# Patient Record
Sex: FEMALE
[De-identification: names, ages, dates, MRNs, and addresses within clinical notes are randomized per-mention and may not be internally consistent; named-entity substitution may affect disease eponyms.]

---

## 2023-08-11 ENCOUNTER — NURSE TRIAGE (OUTPATIENT)
Dept: OTHER | Facility: CLINIC | Age: 68
End: 2023-08-11

## 2023-08-11 NOTE — TELEPHONE ENCOUNTER
Location of patient: [place]    Subjective: Caller states \"Pt was calling because she wants to be put on ozempic but can't get insurance to cover it. Pt found a clinic that will deliver it over night and wanted to know if it was legit or not. \"     Pt was educated on different process for weight loss medications and how the clinics work. Informed pt to talk to  to see what theirs thoughts were. Recommended disposition: [facility] advice provided, patient verbalizes understanding; denies any other questions or concerns; instructed to call back for any new or worsening symptoms. Pt had no farther questions or concerns at this time. This triage is a result of a call to [address] [place] [address]. Please do not respond to the triage nurse through this encounter. Any subsequent communication should be directly with the patient. Reason for Disposition   Health Information question, no triage required and triager able to answer question    Protocols used:  Information Only Call - No Triage-ADULT-

## 2023-08-30 PROBLEM — J96.11 CHRONIC HYPOXEMIC RESPIRATORY FAILURE (MULTI): Status: ACTIVE | Noted: 2023-08-30

## 2023-08-30 PROBLEM — G47.33 OBSTRUCTIVE SLEEP APNEA SYNDROME: Status: ACTIVE | Noted: 2023-08-30

## 2023-08-30 PROBLEM — I10 HYPERTENSION: Status: ACTIVE | Noted: 2023-08-30

## 2023-08-30 PROBLEM — F41.8 MIXED ANXIETY DEPRESSIVE DISORDER: Status: ACTIVE | Noted: 2023-08-30

## 2023-08-30 PROBLEM — M1A.9XX0 CHRONIC GOUT WITHOUT TOPHUS: Status: ACTIVE | Noted: 2023-08-30

## 2023-08-30 PROBLEM — J44.9 CHRONIC OBSTRUCTIVE LUNG DISEASE (MULTI): Status: ACTIVE | Noted: 2023-08-30

## 2023-08-30 PROBLEM — I27.20 PULMONARY HYPERTENSION (MULTI): Status: ACTIVE | Noted: 2023-08-30

## 2023-08-30 PROBLEM — I25.10 CORONARY ARTERIOSCLEROSIS: Status: ACTIVE | Noted: 2023-08-30

## 2023-08-30 PROBLEM — I51.7 CARDIOMEGALY: Status: ACTIVE | Noted: 2023-08-30

## 2023-08-30 PROBLEM — I50.9 ACUTE CONGESTIVE HEART FAILURE (MULTI): Status: ACTIVE | Noted: 2023-08-30

## 2023-08-30 PROBLEM — J90 PLEURAL EFFUSION: Status: ACTIVE | Noted: 2023-08-30

## 2023-08-30 PROBLEM — M81.0 AGE-RELATED OSTEOPOROSIS WITHOUT CURRENT PATHOLOGICAL FRACTURE: Status: ACTIVE | Noted: 2023-08-30

## 2023-08-30 PROBLEM — R04.0 EPISTAXIS: Status: ACTIVE | Noted: 2023-08-30

## 2023-08-30 PROBLEM — R06.09 DYSPNEA ON EXERTION: Status: ACTIVE | Noted: 2023-08-30

## 2023-08-30 PROBLEM — J45.909 ASTHMA (HHS-HCC): Status: ACTIVE | Noted: 2023-08-30

## 2023-08-30 PROBLEM — F41.9 ANXIETY: Status: ACTIVE | Noted: 2023-08-30

## 2023-08-30 PROBLEM — R73.03 PREDIABETES: Status: ACTIVE | Noted: 2023-08-30

## 2023-08-30 PROBLEM — M47.814 THORACIC SPONDYLOSIS: Status: ACTIVE | Noted: 2023-08-30

## 2023-08-30 PROBLEM — I10 BENIGN ESSENTIAL HYPERTENSION: Status: ACTIVE | Noted: 2023-08-30

## 2023-08-30 PROBLEM — E66.9 OBESITY WITH BODY MASS INDEX 30 OR GREATER: Status: ACTIVE | Noted: 2023-08-30

## 2023-08-30 PROBLEM — M17.12 PRIMARY OSTEOARTHRITIS OF LEFT KNEE: Status: ACTIVE | Noted: 2023-08-30

## 2023-08-30 PROBLEM — M47.816 LUMBAR SPONDYLOSIS: Status: ACTIVE | Noted: 2023-08-30

## 2023-08-30 PROBLEM — I48.11 LONGSTANDING PERSISTENT ATRIAL FIBRILLATION (MULTI): Status: ACTIVE | Noted: 2023-08-30

## 2023-08-30 PROBLEM — M47.818 ARTHROPATHY OF BOTH SACROILIAC JOINTS: Status: ACTIVE | Noted: 2023-08-30

## 2023-08-30 PROBLEM — M81.0 OSTEOPOROSIS: Status: ACTIVE | Noted: 2023-08-30

## 2023-08-30 PROBLEM — E87.6 HYPOKALEMIA: Status: ACTIVE | Noted: 2023-08-30

## 2023-08-30 PROBLEM — I50.30 DIASTOLIC HEART FAILURE (MULTI): Status: ACTIVE | Noted: 2023-08-30

## 2023-08-30 PROBLEM — F32.A MODERATELY SEVERE DEPRESSION: Status: ACTIVE | Noted: 2023-08-30

## 2023-08-30 PROBLEM — I11.9 HYPERTENSIVE HEART DISEASE WITHOUT CONGESTIVE HEART FAILURE: Status: ACTIVE | Noted: 2023-08-30

## 2023-08-30 PROBLEM — E66.01 MORBID (SEVERE) OBESITY DUE TO EXCESS CALORIES (MULTI): Status: ACTIVE | Noted: 2023-08-30

## 2023-08-30 PROBLEM — G89.29 OTHER CHRONIC PAIN: Status: ACTIVE | Noted: 2023-08-30

## 2023-08-30 PROBLEM — K21.9 GASTROESOPHAGEAL REFLUX DISEASE: Status: ACTIVE | Noted: 2023-08-30

## 2023-08-30 RX ORDER — SEMAGLUTIDE 2.4 MG/.75ML
0.75 INJECTION, SOLUTION SUBCUTANEOUS
COMMUNITY
Start: 2023-05-23 | End: 2023-10-30 | Stop reason: WASHOUT

## 2023-08-30 RX ORDER — SEMAGLUTIDE 1 MG/.5ML
0.5 INJECTION, SOLUTION SUBCUTANEOUS
COMMUNITY
Start: 2023-05-23 | End: 2023-10-30 | Stop reason: WASHOUT

## 2023-08-30 RX ORDER — METOLAZONE 5 MG/1
5 TABLET ORAL DAILY PRN
COMMUNITY
Start: 2022-04-05

## 2023-08-30 RX ORDER — BIOTIN 10 MG
1 TABLET ORAL DAILY
COMMUNITY

## 2023-08-30 RX ORDER — AMLODIPINE BESYLATE 10 MG/1
10 TABLET ORAL DAILY
COMMUNITY
End: 2023-10-30 | Stop reason: WASHOUT

## 2023-08-30 RX ORDER — APIXABAN 5 MG/1
5 TABLET, FILM COATED ORAL
COMMUNITY
Start: 2023-02-28 | End: 2023-11-13 | Stop reason: SDUPTHER

## 2023-08-30 RX ORDER — SEMAGLUTIDE 0.25 MG/.5ML
0.25 INJECTION, SOLUTION SUBCUTANEOUS
COMMUNITY
Start: 2023-05-23 | End: 2023-10-30 | Stop reason: WASHOUT

## 2023-08-30 RX ORDER — TORSEMIDE 100 MG/1
0.5 TABLET ORAL 2 TIMES DAILY
COMMUNITY
End: 2024-04-08

## 2023-08-30 RX ORDER — BUPROPION HYDROCHLORIDE 150 MG/1
300 TABLET, EXTENDED RELEASE ORAL DAILY
COMMUNITY
End: 2023-11-13 | Stop reason: SDUPTHER

## 2023-08-30 RX ORDER — FUROSEMIDE 20 MG/1
20 TABLET ORAL DAILY
COMMUNITY
End: 2023-10-30 | Stop reason: WASHOUT

## 2023-08-30 RX ORDER — LISINOPRIL 40 MG/1
40 TABLET ORAL DAILY
COMMUNITY
End: 2023-10-30 | Stop reason: WASHOUT

## 2023-08-30 RX ORDER — GUAIFENESIN 600 MG/1
600 TABLET, EXTENDED RELEASE ORAL EVERY 12 HOURS PRN
COMMUNITY
Start: 2023-04-10 | End: 2024-02-09 | Stop reason: ALTCHOICE

## 2023-08-30 RX ORDER — ALLOPURINOL 200 MG/1
1 TABLET ORAL EVERY 12 HOURS
COMMUNITY
End: 2024-04-10 | Stop reason: WASHOUT

## 2023-08-30 RX ORDER — ATENOLOL 100 MG/1
100 TABLET ORAL DAILY
COMMUNITY
End: 2023-10-30 | Stop reason: WASHOUT

## 2023-08-30 RX ORDER — GABAPENTIN 300 MG/1
1 CAPSULE ORAL 3 TIMES DAILY
COMMUNITY
End: 2024-03-18

## 2023-08-30 RX ORDER — POTASSIUM CHLORIDE 20 MEQ/1
20 TABLET, EXTENDED RELEASE ORAL
COMMUNITY
Start: 2019-05-16 | End: 2024-05-29

## 2023-08-30 RX ORDER — SEMAGLUTIDE 1.7 MG/.75ML
0.75 INJECTION, SOLUTION SUBCUTANEOUS
COMMUNITY
Start: 2023-05-23 | End: 2023-10-30 | Stop reason: WASHOUT

## 2023-08-30 RX ORDER — LEVALBUTEROL TARTRATE 45 UG/1
2 AEROSOL, METERED ORAL EVERY 6 HOURS PRN
COMMUNITY
Start: 2020-04-14 | End: 2024-04-10 | Stop reason: WASHOUT

## 2023-09-29 ENCOUNTER — LAB (OUTPATIENT)
Dept: LAB | Facility: LAB | Age: 68
End: 2023-09-29
Payer: MEDICARE

## 2023-09-29 DIAGNOSIS — A21.9: ICD-10-CM

## 2023-09-29 DIAGNOSIS — I10 ESSENTIAL (PRIMARY) HYPERTENSION: ICD-10-CM

## 2023-09-29 DIAGNOSIS — E66.9 OBESITY, UNSPECIFIED: Primary | ICD-10-CM

## 2023-09-29 DIAGNOSIS — Z72.4 INAPPROPRIATE DIET AND EATING HABITS: ICD-10-CM

## 2023-09-29 DIAGNOSIS — I48.91 UNSPECIFIED ATRIAL FIBRILLATION (MULTI): ICD-10-CM

## 2023-09-29 DIAGNOSIS — R73.01 IMPAIRED FASTING GLUCOSE: ICD-10-CM

## 2023-09-29 DIAGNOSIS — Z13.21 ENCOUNTER FOR SCREENING FOR NUTRITIONAL DISORDER: ICD-10-CM

## 2023-09-29 DIAGNOSIS — I25.10 ATHEROSCLEROTIC HEART DISEASE OF NATIVE CORONARY ARTERY WITHOUT ANGINA PECTORIS: ICD-10-CM

## 2023-09-29 DIAGNOSIS — R73.03 PREDIABETES: ICD-10-CM

## 2023-09-29 DIAGNOSIS — I50.33 ACUTE ON CHRONIC DIASTOLIC (CONGESTIVE) HEART FAILURE (MULTI): ICD-10-CM

## 2023-09-29 DIAGNOSIS — R63.2 POLYPHAGIA: ICD-10-CM

## 2023-09-29 DIAGNOSIS — R60.0 LOCALIZED EDEMA: ICD-10-CM

## 2023-09-29 DIAGNOSIS — Z13.228 ENCOUNTER FOR SCREENING FOR OTHER METABOLIC DISORDERS: ICD-10-CM

## 2023-09-29 LAB
ALANINE AMINOTRANSFERASE (SGPT) (U/L) IN SER/PLAS: 43 U/L (ref 7–45)
ALBUMIN (G/DL) IN SER/PLAS: 4.2 G/DL (ref 3.4–5)
ALKALINE PHOSPHATASE (U/L) IN SER/PLAS: 73 U/L (ref 33–136)
ANION GAP IN SER/PLAS: 14 MMOL/L (ref 10–20)
ASPARTATE AMINOTRANSFERASE (SGOT) (U/L) IN SER/PLAS: 39 U/L (ref 9–39)
BILIRUBIN TOTAL (MG/DL) IN SER/PLAS: 0.9 MG/DL (ref 0–1.2)
CALCIUM (MG/DL) IN SER/PLAS: 9.8 MG/DL (ref 8.6–10.6)
CARBON DIOXIDE, TOTAL (MMOL/L) IN SER/PLAS: 41 MMOL/L (ref 21–32)
CHLORIDE (MMOL/L) IN SER/PLAS: 92 MMOL/L (ref 98–107)
CHOLESTEROL (MG/DL) IN SER/PLAS: 203 MG/DL (ref 0–199)
CHOLESTEROL IN HDL (MG/DL) IN SER/PLAS: 45.9 MG/DL
CHOLESTEROL/HDL RATIO: 4.4
COBALAMIN (VITAMIN B12) (PG/ML) IN SER/PLAS: 453 PG/ML (ref 211–911)
CREATININE (MG/DL) IN SER/PLAS: 0.52 MG/DL (ref 0.5–1.05)
ESTIMATED AVERAGE GLUCOSE FOR HBA1C: 128 MG/DL
FERRITIN (UG/LL) IN SER/PLAS: 100 UG/L (ref 8–150)
GFR FEMALE: >90 ML/MIN/1.73M2
GLUCOSE (MG/DL) IN SER/PLAS: 97 MG/DL (ref 74–99)
HEMOGLOBIN A1C/HEMOGLOBIN TOTAL IN BLOOD: 6.1 %
IRON (UG/DL) IN SER/PLAS: 146 UG/DL (ref 35–150)
LDL: 136 MG/DL (ref 0–99)
POTASSIUM (MMOL/L) IN SER/PLAS: 3.3 MMOL/L (ref 3.5–5.3)
PROTEIN TOTAL: 7.1 G/DL (ref 6.4–8.2)
SODIUM (MMOL/L) IN SER/PLAS: 144 MMOL/L (ref 136–145)
TRIGLYCERIDE (MG/DL) IN SER/PLAS: 104 MG/DL (ref 0–149)
UREA NITROGEN (MG/DL) IN SER/PLAS: 18 MG/DL (ref 6–23)
VLDL: 21 MG/DL (ref 0–40)

## 2023-09-29 PROCEDURE — 83527 ASSAY OF INSULIN: CPT

## 2023-09-29 PROCEDURE — 36415 COLL VENOUS BLD VENIPUNCTURE: CPT

## 2023-09-29 PROCEDURE — 82728 ASSAY OF FERRITIN: CPT

## 2023-09-29 PROCEDURE — 83525 ASSAY OF INSULIN: CPT

## 2023-09-29 PROCEDURE — 83036 HEMOGLOBIN GLYCOSYLATED A1C: CPT

## 2023-09-29 PROCEDURE — 80053 COMPREHEN METABOLIC PANEL: CPT

## 2023-09-29 PROCEDURE — 83540 ASSAY OF IRON: CPT

## 2023-09-29 PROCEDURE — 82607 VITAMIN B-12: CPT

## 2023-09-29 PROCEDURE — 80061 LIPID PANEL: CPT

## 2023-10-03 LAB
INSULIN FREE: 8 UIU/ML (ref 3–25)
INSULIN TOTAL: 10 UIU/ML (ref 3–25)

## 2023-10-06 ENCOUNTER — PHARMACY VISIT (OUTPATIENT)
Dept: PHARMACY | Facility: CLINIC | Age: 68
End: 2023-10-06
Payer: COMMERCIAL

## 2023-10-06 ENCOUNTER — TELEPHONE (OUTPATIENT)
Dept: PRIMARY CARE | Facility: CLINIC | Age: 68
End: 2023-10-06
Payer: MEDICARE

## 2023-10-06 DIAGNOSIS — R32 INCONTINENCE IN FEMALE: Primary | ICD-10-CM

## 2023-10-06 PROCEDURE — RXMED WILLOW AMBULATORY MEDICATION CHARGE

## 2023-10-06 NOTE — TELEPHONE ENCOUNTER
I do not have records of incontinence for her to do an official prescription. She needs to be evaluated for an official diagnosis and treatment consideration including prescription use of supplies. For this I recommend urogynecology. Referral placed. They will be able to get her Rx for supplies and everything once evaluated

## 2023-10-06 NOTE — TELEPHONE ENCOUNTER
Pt called in statin that her insurance told her to get a prescription written for her incontinence supplies and send to [facility] , pharmacy populated, states that they will pick it up ( insurance company).  The kind that she wants is call [facility] size large overnnight Please advise

## 2023-10-30 ENCOUNTER — OFFICE VISIT (OUTPATIENT)
Dept: PAIN MEDICINE | Facility: CLINIC | Age: 68
End: 2023-10-30
Payer: MEDICARE

## 2023-10-30 VITALS
SYSTOLIC BLOOD PRESSURE: 155 MMHG | WEIGHT: 277.8 LBS | HEIGHT: 62 IN | HEART RATE: 72 BPM | BODY MASS INDEX: 51.12 KG/M2 | DIASTOLIC BLOOD PRESSURE: 87 MMHG

## 2023-10-30 DIAGNOSIS — R63.2 POLYPHAGIA: ICD-10-CM

## 2023-10-30 DIAGNOSIS — E88.810 METABOLIC SYNDROME: ICD-10-CM

## 2023-10-30 DIAGNOSIS — Z71.3 DIETARY COUNSELING: ICD-10-CM

## 2023-10-30 DIAGNOSIS — Z71.82 EXERCISE COUNSELING: ICD-10-CM

## 2023-10-30 DIAGNOSIS — R73.03 PREDIABETES: Primary | ICD-10-CM

## 2023-10-30 DIAGNOSIS — E88.819 INSULIN RESISTANCE: ICD-10-CM

## 2023-10-30 PROCEDURE — 1125F AMNT PAIN NOTED PAIN PRSNT: CPT | Performed by: NURSE PRACTITIONER

## 2023-10-30 PROCEDURE — 1159F MED LIST DOCD IN RCRD: CPT | Performed by: NURSE PRACTITIONER

## 2023-10-30 PROCEDURE — 99214 OFFICE O/P EST MOD 30 MIN: CPT | Performed by: NURSE PRACTITIONER

## 2023-10-30 PROCEDURE — 3077F SYST BP >= 140 MM HG: CPT | Performed by: NURSE PRACTITIONER

## 2023-10-30 PROCEDURE — 1036F TOBACCO NON-USER: CPT | Performed by: NURSE PRACTITIONER

## 2023-10-30 PROCEDURE — 3079F DIAST BP 80-89 MM HG: CPT | Performed by: NURSE PRACTITIONER

## 2023-10-30 RX ORDER — DULAGLUTIDE 0.75 MG/.5ML
0.75 INJECTION, SOLUTION SUBCUTANEOUS
Qty: 2 ML | Refills: 0 | Status: SHIPPED | OUTPATIENT
Start: 2023-10-30 | End: 2023-11-13 | Stop reason: SDUPTHER

## 2023-10-30 RX ORDER — SEMAGLUTIDE 1.34 MG/ML
0.25 INJECTION, SOLUTION SUBCUTANEOUS
Qty: 1 EACH | Refills: 0 | Status: SHIPPED | OUTPATIENT
Start: 2023-10-30 | End: 2023-11-13 | Stop reason: SDUPTHER

## 2023-10-30 ASSESSMENT — PATIENT HEALTH QUESTIONNAIRE - PHQ9
2. FEELING DOWN, DEPRESSED OR HOPELESS: NOT AT ALL
SUM OF ALL RESPONSES TO PHQ9 QUESTIONS 1 AND 2: 0
1. LITTLE INTEREST OR PLEASURE IN DOING THINGS: NOT AT ALL

## 2023-10-30 ASSESSMENT — PAIN - FUNCTIONAL ASSESSMENT: PAIN_FUNCTIONAL_ASSESSMENT: 0-10

## 2023-10-30 ASSESSMENT — PAIN SCALES - GENERAL: PAINLEVEL_OUTOF10: 3

## 2023-10-30 NOTE — PROGRESS NOTES
Subjective   Patient ID: Vickie Foster is a 67 y.o. female who presents for Weight Management Follow-up.     HPI 67-year-old female with past medical history significant for obesity, GERD, HTN, DLD, CHF, Acute on chronic heart failure with preserved ejection fraction, asthma, pulmonary hypertension, LVH, anxiety with depression, spondylosis, thoracic spondylosis, chronic pain syndrome, atrial fibrillation, coronary artery disease, PreDMII and artropathy. She presents for a weight management follow-up. Vickie was given diet and exercise recommendations at her last visit.  She was asked to implement these changes over the next 1 to 2 months and return, having lost some weight, to further discuss antiobesity medications and lab results.    Weight Hx:  Initial Visit Weight: 278.8  Current Weight: 277.8   Total Loss: 1 lbs    Diet Recall:  Breakfast: 1 piece of pizzas  Lunch: 2 whole chicken wings, 2 cup coleslaw  Dinner: 2 whole chicken wings, 1 slice of apple pie  Snack(s): 1 piece of apple pie    # Current Exercise Routine: None     Review of Systems Unless noted in the HPI all other systems have been reviewed and are negative for complaint    Objective   Physical Exam  General: No apparent distress. Alert, appropriate, oriented x 3. Obese  HENT: Normocephalic, atraumatic. Hearing intact.  Eyes: Pupils equal and round  Neck: Supple, trachea midline  Lungs: Symmetric respiratory excursion on visual exam, nonlabored breathing.  Extremities: No cyanosis noted in legs.  Skin: No rashes, lesions noted examined skin of neck or shoulders  Neck: Reports tenderness to palpation of right trapezius muscle,  Neuro: Alert and appropriate. Ambulating with assistance of a walker. Bulk and tone within normal limits.    Assessment/Plan   Problem List Items Addressed This Visit          Endocrine/Metabolic    Prediabetes - Primary    Relevant Medications    dulaglutide (Trulicity) 0.75 mg/0.5 mL pen injector     Other Visit Diagnoses        Insulin resistance        Metabolic syndrome        Polyphagia        Dietary counseling        Exercise counseling              TREATMENT PLAN:  67-year-old female with obesity here for weight management.  Current BMI: 50  Total Loss: 1lb  Personally reviewed labs with patient:  A1C 6.1%--> prediabetes  EDGAR-IR Score=2.4 (>1.6 suggests IR/Metabolic Syndrome)   HCO3 elevated; has been trending up 37-->36-->41. Recommend fu with PCP.   K+ low at 3.3; reinforced protein rich foods and to fu with PCP.   HLD--> Total Cholesterol 203, LDL  135, HDL:TC Ratio= 4.4 (>2.5 in women suggests insulin resistance/metabolic syndrome).   Discussed medications to assist with prediabetes, insulin resistance and appetite suppression.   After discussing all medication options, we decided to start Trulicity.  Denies personal or family hx of MTC, MENII or Pancreatitis.   Risks and benefits discussed.   > 50% of time spent counseling on the importance of following recommended dietary modifications including: role of diet, low calorie and carbohydrate restrictions, limiting fast food and avoiding high sugary beverages.   Also discussed, the role of exercise with an ultimate goal of at least 250-300 minutes a week for weight loss and weight maintenance.  Follow-up in 8 weeks.

## 2023-10-30 NOTE — PATIENT INSTRUCTIONS
"Pre-Diabetes  Your Hemoglobin A1C is 6.1%   This is in the \"pre-diabetes\" category  Aggressive lifestyle modification focusing on weight reduction and increased physical activity is the primary therapy for the management of Diabetes.   Weight reduction is optimally achieved with a multimodality approach including diet, exercise and possible pharmacologic therapy.    Metabolic Syndrome/Insulin Resistance:   Your EDGAR-IR Score= 2.4 (>1.6 suggests Insulin Resistance).   Your recent labs show insulin resistance of which a diagnosis of Metabolic Syndrome is supported   Metabolic Syndrome is an important risk factor for the future development of Diabetes and/or Cardiovascular Disease.  Metabolic Syndrome has also been associated with other obesity-related disorders including: Fatty Liver Disease (Steatosis, Fibrosis and Cirrhosis), Hepatocellular Carcinoma, Chronic Kidney Disease, Obstructive Sleep Apnea, Gout, Cognitive Decline and Dementia.   Aggressive lifestyle modification focusing on weight reduction and increased physical activity is the primary therapy for the management of Metabolic Syndrome.  Weight reduction is optimally achieved with a multimodality approach including diet, exercise and possible pharmacologic therapy.    Abnormal Lipid Panel:  Your Total Cholesterol Level is elevated.  Your LDL Level is elevated.  Your Total Cholesterol to HDL ratio is elevated.  This is not uncommon with insulin resistance/metabolic syndrome  Diet, exercise and weight reduction will help lower your Lipids but discussion with your PCP is always recommended.    Diet Recommendations:  - Keep a food journal and log everything you eat and drink. You can use a phone applications like evly, MailLift it, a notebook, or the provided meal calendar.   - Eat between 0551-5680 calories per day; meal plan provided to you this visit.  - Consume less than 100 carbohydrates per day. Examples of carbohydrates include: bread, pasta, cakes, " cookies, rice, cereal, fruit drinks, juice, soda and fruit.   - Consume no more than 1 fruit per day.   - Eat 3 meals and 1 snack. Each meal should have 3-4 oz of protein and vegetables. Limit starches.  - Eat on a schedule and do not skip meals.    Exercise Recommendations:   - Aim for 30 minutes per day, 5 days per week to start, with progression over several weeks to more vigorous intensity.   - Emphasize increasing duration, rather than intensity initially.   - Moderate-intensity physical activity includes:  * Brisk Walking  * Biking (slower than 10 MPH)  * Water Aerobics  * Vigorous housework (washing windows, vacuuming, mopping)  * Mowing the lawn (push mower)  * Gardening  * Ballroom dancing    New medication:  You have been prescribed Trulicity, off-label, for the treatment of Insulin Resistance and Metabolic Syndrome.  Trulicity is indicated as an adjunct to a reduced calorie diet and increased physical activity.  This medication works by decreasing the appetite and increased feeling of fullness.   Administer Trulicity once weekly, on the same day each week, at any time of day, with or without meals.   Inject subcutaneously in the abdomen, thigh or upper arm.    Possible Side Effects Include: Nausea, Diarrhea, Vomiting, Constipation, Abdominal Pain, Headache, Fatigue, Dyspepsia, Dizziness, Abdominal Distention, Hypoglycemia (in patients with Type II Diabetes), flatulence, gastroenteritis, and GERD.    *For Constipation--> take a fiber supplement or stool softener daily. Make sure you are drinking at least 64oz of water daily.  *For Nausea--> Eat small, frequent meals throughout the day. If nausea persists, please call the office.    Follow-up in 4 weeks

## 2023-10-31 ENCOUNTER — TELEPHONE (OUTPATIENT)
Dept: PAIN MEDICINE | Facility: CLINIC | Age: 68
End: 2023-10-31

## 2023-11-01 NOTE — TELEPHONE ENCOUNTER
Just wanted to update you that I am working on this. No determination as of yet but the website has also been down all day.

## 2023-11-09 ENCOUNTER — TELEPHONE (OUTPATIENT)
Dept: PAIN MEDICINE | Facility: CLINIC | Age: 68
End: 2023-11-09

## 2023-11-13 ENCOUNTER — OFFICE VISIT (OUTPATIENT)
Dept: CARDIOLOGY | Facility: CLINIC | Age: 68
End: 2023-11-13
Payer: MEDICARE

## 2023-11-13 ENCOUNTER — TELEPHONE (OUTPATIENT)
Dept: PAIN MEDICINE | Facility: CLINIC | Age: 68
End: 2023-11-13

## 2023-11-13 DIAGNOSIS — I48.0 PAF (PAROXYSMAL ATRIAL FIBRILLATION) (MULTI): Primary | ICD-10-CM

## 2023-11-13 PROCEDURE — 3079F DIAST BP 80-89 MM HG: CPT

## 2023-11-13 PROCEDURE — 99213 OFFICE O/P EST LOW 20 MIN: CPT

## 2023-11-13 PROCEDURE — 1036F TOBACCO NON-USER: CPT

## 2023-11-13 PROCEDURE — 1126F AMNT PAIN NOTED NONE PRSNT: CPT

## 2023-11-13 PROCEDURE — 93005 ELECTROCARDIOGRAM TRACING: CPT

## 2023-11-13 PROCEDURE — 3077F SYST BP >= 140 MM HG: CPT

## 2023-11-13 PROCEDURE — 93000 ELECTROCARDIOGRAM COMPLETE: CPT

## 2023-11-13 PROCEDURE — 1159F MED LIST DOCD IN RCRD: CPT

## 2023-11-13 RX ORDER — DOFETILIDE 0.25 MG/1
250 CAPSULE ORAL EVERY 12 HOURS
Qty: 180 CAPSULE | Refills: 1 | Status: SHIPPED | OUTPATIENT
Start: 2023-11-13 | End: 2024-05-16

## 2023-11-13 RX ORDER — BUPROPION HYDROCHLORIDE 300 MG/1
300 TABLET ORAL EVERY MORNING
COMMUNITY
Start: 2023-10-27 | End: 2024-04-29

## 2023-11-13 RX ORDER — APIXABAN 5 MG/1
5 TABLET, FILM COATED ORAL 2 TIMES DAILY
Qty: 180 TABLET | Refills: 1 | Status: SHIPPED | OUTPATIENT
Start: 2023-11-13 | End: 2024-05-22

## 2023-11-13 RX ORDER — METOPROLOL SUCCINATE 25 MG/1
25 TABLET, EXTENDED RELEASE ORAL DAILY
Qty: 90 TABLET | Refills: 3 | Status: SHIPPED | OUTPATIENT
Start: 2023-11-13 | End: 2024-11-12

## 2023-11-13 ASSESSMENT — PAIN SCALES - GENERAL: PAINLEVEL: 0-NO PAIN

## 2023-11-13 NOTE — TELEPHONE ENCOUNTER
I will appeal the decision when I return next week. Please reassure the patient this can take some time but we are working on it.

## 2023-11-13 NOTE — PROGRESS NOTES
Chief Complaint:   Atrial Fibrillation (Tikosyn f/u)     History Of Present Illness:    Vickie Foster is a 67 y.o. female with a past medical history of hypertension and persistent atrial fibrillation.  Patient underwent numerous unsuccessful cardioversions in the past.  Patient underwent dofetilide loading and cardioversion in June of this year with successful restoration of sinus rhythm.  She is feeling well with no recurrence of atrial fibrillation to her knowledge. She is appropriately anticoagulated with apixaban.      Last Recorded Vitals:  There were no vitals filed for this visit.    Past Medical History:  She has a past medical history of A-fib (CMS/HCC), CHF (congestive heart failure) (CMS/HCC), COVID, HTN (hypertension), and Low back pain.    Past Surgical History:  She has no past surgical history on file.      Social History:  She reports that she has quit smoking. Her smoking use included cigarettes. She has never used smokeless tobacco. She reports current alcohol use. She reports that she does not use drugs.    Family History:  Family History   Problem Relation Name Age of Onset    Breast cancer Mother      Heart attack Mother      Heart disease Mother      Colon cancer Father      Breast cancer Sister      Diabetes type II Sister      Kidney failure Sister      Other (herione addict) Sister      No Known Problems Brother      No Known Problems Daughter      Other (recovering alcoholic) Son      Breast cancer Other Maternal Aunt         Allergies:  Patient has no known allergies.    Outpatient Medications:  Current Outpatient Medications   Medication Instructions    ALBUTEROL SULFATE ORAL Albuterol Sulfate ER    allopurinoL 200 mg tablet 1 tablet, oral, Every 12 hours    biotin 10 mg tablet 1 tablet, oral, Daily    buPROPion XL (WELLBUTRIN XL) 300 mg, oral, Every morning    dofetilide (Tikosyn) 250 mcg capsule TAKE 1 CAPSULE BY MOUTH EVERY 12 HOURS    Eliquis 5 mg, oral, 2 times daily    folic  acid/multivit-min/lutein (CENTRUM SILVER ORAL) oral, As directed    gabapentin (Neurontin) 300 mg capsule 1 capsule, oral, 3 times daily    guaiFENesin (MUCINEX) 600 mg, oral, Every 12 hours PRN    levalbuterol (Xopenex) 45 mcg/actuation inhaler 2 puffs, inhalation, Every 6 hours PRN    metOLazone (ZAROXOLYN) 5 mg, oral, Daily PRN    metoprolol succinate XL (Toprol-XL) 25 mg 24 hr tablet TAKE 1 TABLET BY MOUTH ONE TIME DAILY    multivit-min/folic acid/vit K1 (MULTI FOR HER 50 PLUS ORAL) oral, As directed    oxygen (O2) gas therapy inhalation    potassium chloride CR 20 mEq ER tablet 20 mEq, oral, 3 times daily with meals    torsemide (Demadex) 100 mg tablet 0.5 tablets, oral, 2 times daily       Physical Exam:  Physical Exam  Vitals reviewed.   Constitutional:       Appearance: Normal appearance.   Cardiovascular:      Rate and Rhythm: Normal rate and regular rhythm.      Pulses: Normal pulses.      Heart sounds: Normal heart sounds.   Pulmonary:      Effort: Pulmonary effort is normal.      Breath sounds: Normal breath sounds.   Abdominal:      General: Bowel sounds are normal.      Palpations: Abdomen is soft.   Musculoskeletal:      Right lower leg: Edema present.      Left lower leg: Edema present.   Skin:     General: Skin is warm and dry.   Neurological:      General: No focal deficit present.      Mental Status: She is alert and oriented to person, place, and time.      Review of Systems   All other systems reviewed and are negative.     Last Labs:  CBC -  Lab Results   Component Value Date    WBC 8.3 06/08/2023    HGB 12.7 06/08/2023    HCT 41.2 06/08/2023    MCV 99.3 06/08/2023     06/08/2023       CMP -  Lab Results   Component Value Date    CALCIUM 9.8 09/29/2023    PHOS 4.8 (H) 07/25/2022    PROT 7.1 09/29/2023    ALBUMIN 4.2 09/29/2023    ALBUMIN 3.8 06/05/2023    AST 39 09/29/2023    ALT 43 09/29/2023    ALKPHOS 73 09/29/2023    BILITOT 0.9 09/29/2023       LIPID PANEL -   Lab Results    Component Value Date    CHOL 203 (H) 09/29/2023    TRIG 104 09/29/2023    HDL 45.9 09/29/2023    CHHDL 4.4 09/29/2023    CHHDL 4.2 03/29/2023    LDLF 136 (H) 09/29/2023    VLDL 21 09/29/2023       RENAL FUNCTION PANEL -   Lab Results   Component Value Date    GLUCOSE 97 09/29/2023     09/29/2023    K 3.3 (L) 09/29/2023    CL 92 (L) 09/29/2023    CO2 41 (HH) 09/29/2023    ANIONGAP 14 09/29/2023    BUN 18 09/29/2023    CREATININE 0.52 09/29/2023    CALCIUM 9.8 09/29/2023    PHOS 4.8 (H) 07/25/2022    ALBUMIN 4.2 09/29/2023    ALBUMIN 3.8 06/05/2023        Lab Results   Component Value Date    HGBA1C 6.1 (A) 09/29/2023       Last Cardiology Tests:  ECG:  Sinus rhythm at 67 bpm.  AZ interval 180 ms, QRS duration 90 ms, QTc 479 ms    Assessment/Plan   Diagnoses and all orders for this visit:  PAF (paroxysmal atrial fibrillation) (CMS/[facility])  -     dofetilide (Tikosyn) 250 mcg capsule; TAKE 1 CAPSULE BY MOUTH EVERY 12 HOURS  -     Eliquis 5 mg tablet; Take 1 tablet (5 mg) by mouth 2 times a day.  -     metoprolol succinate XL (Toprol-XL) 25 mg 24 hr tablet; TAKE 1 TABLET BY MOUTH ONE TIME DAILY  -     ECG 12 lead (Clinic Performed)  -     Basic Metabolic Panel; Future  -     Magnesium; Future    Continue current regimen.     Mary Kay Webber, APRN-CNP

## 2023-11-22 ENCOUNTER — OFFICE VISIT (OUTPATIENT)
Dept: PAIN MEDICINE | Facility: CLINIC | Age: 68
End: 2023-11-22
Payer: MEDICARE

## 2023-11-22 VITALS — HEIGHT: 62 IN | WEIGHT: 275 LBS | BODY MASS INDEX: 50.61 KG/M2

## 2023-11-22 DIAGNOSIS — M47.812 ARTHROPATHY OF CERVICAL FACET JOINT: ICD-10-CM

## 2023-11-22 DIAGNOSIS — M17.12 PRIMARY OSTEOARTHRITIS OF LEFT KNEE: Primary | ICD-10-CM

## 2023-11-22 DIAGNOSIS — M47.818 ARTHROPATHY OF SACROILIAC JOINT: ICD-10-CM

## 2023-11-22 DIAGNOSIS — M79.18 MYOFASCIAL PAIN: ICD-10-CM

## 2023-11-22 DIAGNOSIS — M48.061 DEGENERATIVE LUMBAR SPINAL STENOSIS: ICD-10-CM

## 2023-11-22 DIAGNOSIS — M54.16 LUMBAR RADICULOPATHY: ICD-10-CM

## 2023-11-22 PROCEDURE — 20610 DRAIN/INJ JOINT/BURSA W/O US: CPT | Performed by: STUDENT IN AN ORGANIZED HEALTH CARE EDUCATION/TRAINING PROGRAM

## 2023-11-22 PROCEDURE — 1125F AMNT PAIN NOTED PAIN PRSNT: CPT | Performed by: STUDENT IN AN ORGANIZED HEALTH CARE EDUCATION/TRAINING PROGRAM

## 2023-11-22 PROCEDURE — 1160F RVW MEDS BY RX/DR IN RCRD: CPT | Performed by: STUDENT IN AN ORGANIZED HEALTH CARE EDUCATION/TRAINING PROGRAM

## 2023-11-22 PROCEDURE — 1159F MED LIST DOCD IN RCRD: CPT | Performed by: STUDENT IN AN ORGANIZED HEALTH CARE EDUCATION/TRAINING PROGRAM

## 2023-11-22 PROCEDURE — 99214 OFFICE O/P EST MOD 30 MIN: CPT | Performed by: STUDENT IN AN ORGANIZED HEALTH CARE EDUCATION/TRAINING PROGRAM

## 2023-11-22 PROCEDURE — 1036F TOBACCO NON-USER: CPT | Performed by: STUDENT IN AN ORGANIZED HEALTH CARE EDUCATION/TRAINING PROGRAM

## 2023-11-22 PROCEDURE — 2500000004 HC RX 250 GENERAL PHARMACY W/ HCPCS (ALT 636 FOR OP/ED): Performed by: STUDENT IN AN ORGANIZED HEALTH CARE EDUCATION/TRAINING PROGRAM

## 2023-11-22 PROCEDURE — 2500000005 HC RX 250 GENERAL PHARMACY W/O HCPCS: Performed by: STUDENT IN AN ORGANIZED HEALTH CARE EDUCATION/TRAINING PROGRAM

## 2023-11-22 RX ORDER — LIDOCAINE HYDROCHLORIDE 10 MG/ML
2 INJECTION, SOLUTION EPIDURAL; INFILTRATION; INTRACAUDAL; PERINEURAL ONCE
Status: COMPLETED | OUTPATIENT
Start: 2023-11-22 | End: 2023-11-22

## 2023-11-22 RX ORDER — LIDOCAINE HYDROCHLORIDE 10 MG/ML
4 INJECTION, SOLUTION EPIDURAL; INFILTRATION; INTRACAUDAL; PERINEURAL ONCE
Status: COMPLETED | OUTPATIENT
Start: 2023-11-22 | End: 2023-11-22

## 2023-11-22 RX ORDER — TRIAMCINOLONE ACETONIDE 40 MG/ML
40 INJECTION, SUSPENSION INTRA-ARTICULAR; INTRAMUSCULAR ONCE
Status: COMPLETED | OUTPATIENT
Start: 2023-11-22 | End: 2023-11-22

## 2023-11-22 RX ADMIN — LIDOCAINE HYDROCHLORIDE 20 MG: 10 INJECTION, SOLUTION EPIDURAL; INFILTRATION; INTRACAUDAL; PERINEURAL at 15:35

## 2023-11-22 RX ADMIN — LIDOCAINE HYDROCHLORIDE 40 MG: 10 INJECTION, SOLUTION EPIDURAL; INFILTRATION; INTRACAUDAL; PERINEURAL at 15:36

## 2023-11-22 RX ADMIN — TRIAMCINOLONE ACETONIDE 40 MG: 40 INJECTION, SUSPENSION INTRA-ARTICULAR; INTRAMUSCULAR at 15:31

## 2023-11-22 ASSESSMENT — PAIN SCALES - GENERAL
PAINLEVEL_OUTOF10: 1
PAINLEVEL_OUTOF10: 0 - NO PAIN
PAINLEVEL: 1
PAINLEVEL_OUTOF10: 0 - NO PAIN

## 2023-11-22 ASSESSMENT — PATIENT HEALTH QUESTIONNAIRE - PHQ9
SUM OF ALL RESPONSES TO PHQ9 QUESTIONS 1 AND 2: 0
1. LITTLE INTEREST OR PLEASURE IN DOING THINGS: NOT AT ALL
2. FEELING DOWN, DEPRESSED OR HOPELESS: NOT AT ALL

## 2023-11-22 ASSESSMENT — PAIN - FUNCTIONAL ASSESSMENT: PAIN_FUNCTIONAL_ASSESSMENT: 0-10

## 2023-11-22 ASSESSMENT — PAIN DESCRIPTION - DESCRIPTORS: DESCRIPTORS: SHARP;STABBING;ACHING

## 2023-11-22 NOTE — PROGRESS NOTES
[facility]  Follow Up Office Visit Note 2023    Patient Information: Vickie Foster, MRN: [id number], : 1955   Primary Care/Referring Physician: Danilo Gonzalez DO, [address] / [facility] [id number]     Chief Complaint: Left knee pain  Interval History: At her last office visit I performed TPI's to her neck. She is also seeing Cristina Rice for weight loss    Today she reports worsening left knee pain. She last had a left knee steroid injection in 2023 with at least 60% improvement for 8 months. She would like this repeated today. Of note, she is on Eliquis. I did discuss with her the increased risk of bleeding while on this medication, but discussed it is reasonable to perform this procedure while on Eliquis as long as she understands the risk. She was amenable to this.     Her back and leg pain remains well controlled and she does not need further intervention at this time    Brief History of Pain: Ms. Vickie Foster is a 67 y.o. female with a PMHx of CHF, pulmonary HTN, AFib (on Eliquis) who presents today for follow up regarding chronic low back/buttock pain.    For reference, she reports pain started approximately 2022 with no obvious inciting event. She has had this type of pain in past, but it is much more severe than normal. Has been to the ED a few times for this, had xrays performed which didn't show any fracture in the spine or hip. During one of these visits she was noted to have worsening LE edema and was admitted for CHF exacerbation. During this admission she continued to have back pain but also developed gout in her right foot. Is having difficulty bearing weight on her right foot, as it worsens both foot pain and causes pain in her right low back and burning pain in her buttock. Has to lie a certain way in bed (moreso on her left side) to get somewhat comfortable. Denies any numbness, tingling in her legs. She is unsure about weakness as she is not  walking much.    Current Pain Medications: Gabapentin 300 mg TID. Lidocaine patches - tried on both back and foot without benefit.  Previously Tried Pain Medications: Methocarbamol. Unable to use NSAID's due to history of ulcers. Tried BioFreeze without benefit    Relevant Surgeries: Denies hip or back surgery. Denies ankle or foot surgery.  Injections: Left knee injection - 60% pain relief for 8 months. Caudal ADAM - significant improvement in pain and ambulation, Right SI joint injection - 80% pain relief  Physical/Occupational Therapy: Denies any PT as she is having too much pain.    Medications:   Current Outpatient Medications   Medication Instructions    ALBUTEROL SULFATE ORAL Albuterol Sulfate ER    allopurinoL 200 mg tablet 1 tablet, oral, Every 12 hours    biotin 10 mg tablet 1 tablet, oral, Daily    buPROPion XL (WELLBUTRIN XL) 300 mg, oral, Every morning    dofetilide (Tikosyn) 250 mcg capsule TAKE 1 CAPSULE BY MOUTH EVERY 12 HOURS    Eliquis 5 mg, oral, 2 times daily    folic acid/multivit-min/lutein (CENTRUM SILVER ORAL) oral, As directed    gabapentin (Neurontin) 300 mg capsule 1 capsule, oral, 3 times daily    guaiFENesin (MUCINEX) 600 mg, oral, Every 12 hours PRN    levalbuterol (Xopenex) 45 mcg/actuation inhaler 2 puffs, inhalation, Every 6 hours PRN    metOLazone (ZAROXOLYN) 5 mg, oral, Daily PRN    metoprolol succinate XL (Toprol-XL) 25 mg 24 hr tablet TAKE 1 TABLET BY MOUTH ONE TIME DAILY    multivit-min/folic acid/vit K1 (MULTI FOR HER 50 PLUS ORAL) oral, As directed    oxygen (O2) 3 L/min, inhalation, Nightly    potassium chloride CR 20 mEq ER tablet 20 mEq, oral, 3 times daily with meals    torsemide (Demadex) 100 mg tablet 0.5 tablets, oral, 2 times daily      Allergies: No Known Allergies    Past Medical & Surgical History:  Past Medical History:   Diagnosis Date    A-fib (CMS/[facility])     CHF (congestive heart failure) (CMS/[facility])     COVID     HTN (hypertension)     Low back pain     History  reviewed. No pertinent surgical history.    Family History   Problem Relation Name Age of Onset    Breast cancer Mother      Heart attack Mother      Heart disease Mother      Colon cancer Father      Breast cancer Sister      Diabetes type II Sister      Kidney failure Sister      Other (herione addict) Sister      No Known Problems Brother      No Known Problems Daughter      Other (recovering alcoholic) Son      Breast cancer Other Maternal Aunt      Social History     Socioeconomic History    Marital status: Unknown     Spouse name: Not on file    Number of children: Not on file    Years of education: Not on file    Highest education level: Not on file   Occupational History    Not on file   Tobacco Use    Smoking status: Former     Types: Cigarettes    Smokeless tobacco: Never   Substance and Sexual Activity    Alcohol use: Yes     Comment: OCASSIONALY    Drug use: Never    Sexual activity: Not on file   Other Topics Concern    Not on file   Social History Narrative    Not on file     Social Determinants of Health     Financial Resource Strain: Not on file   Food Insecurity: Not on file   Transportation Needs: Not on file   Physical Activity: Not on file   Stress: Not on file   Social Connections: Not on file   Intimate Partner Violence: Not on file   Housing Stability: Not on file       Problems, Past medical history, past surgical history, Medications, allergies, social and family history reviewed and as per the electronic medical record from today's encounter    Review of Systems:  CONST: No fever, chills, fatigue, weight changes  EYES: No loss of vision  ENT: No hearing loss, tinnitus  CV: No chest pain, palpitations  RESP: Reports some dyspnea on exertion  GI: No stool incontinence, nausea, vomiting  : No urinary incontinence  MSK: No joint swelling  SKIN: No rash, no hives  NEURO: No headache, dizziness, weakness, paresthesias  PSYCH: No anxiety, depression or suicidal ideation  HEM/LYMPH: No easy  "bruising or bleeding  All other systems reviewed are negative     Physical Exam:  Vitals: Ht 1.575 m (5' 2\")   Wt 125 kg (275 lb)   BMI 50.30 kg/m²   General: No apparent distress. Alert, appropriate, oriented x 3. Mood generally positive, affect congruent. Speaking in full sentences.   HENT: Normocephalic, atraumatic. Hearing intact.  Eyes: Pupils equal and round  Neck: Supple, trachea midline  Lungs: Symmetric respiratory excursion on visual exam, nonlabored breathing.   Extremities: No cyanosis noted in extremities. Pitting edema noted in legs up to the shins  Skin: No rashes, lesions noted.  Neuro: Alert and appropriate. Using a rollator to ambulate. Bulk and tone within normal limits.    Laboratory Data:  The following laboratory data were reviewed during this visit:   Lab Results   Component Value Date    WBC 8.3 06/08/2023    RBC 4.15 06/08/2023    HGB 12.7 06/08/2023    HCT 41.2 06/08/2023     06/08/2023      Lab Results   Component Value Date    INR 1.0 06/05/2023    INR 1.0 12/01/2019     Lab Results   Component Value Date    CREATININE 0.52 09/29/2023    HGBA1C 6.1 (A) 09/29/2023       Imaging:  The following imaging impressions were reviewed by me during this visit:    -8/2022 lumbar MRI shows L3-L4 moderate to marked spinal canal stenosis and right foraminal stenosis without nerve root impingement. L4-L5 moderate spinal canal stenosis. L5-S1 with no significatn central or foraminal stenosis   -4/2021 left knee xray shows moderate OA    I also personally reviewed the images from the above studies myself. These images and my interpretation of them contributed to the management and decision making of the patient's medical plan.    ASSESSMENT:  Ms. Vickie Foster is a 67 y.o. female with low back, leg, and neck pain that is consistent with:    1. Primary osteoarthritis of left knee    2. Lumbar radiculopathy    3. Arthropathy of sacroiliac joint    4. Degenerative lumbar spinal stenosis    5. " Arthropathy of cervical facet joint    6. Myofascial pain        PLAN:  Radiology: -I reviewed her most recent lumbar MRI which shows L3-L4 moderate to marked spinal canal stenosis and right foraminal stenosis without nerve root impingement. L4-L5 moderate spinal canal stenosis. L5-S1 with no significant central or foraminal stenosis. No additional diagnostics at this time.- Given some concern for cervical facet arthropathy I ordered a cervical spine xray which showed fairly diffuse and severe degenerative changes.    Physically: Continue your regular HEP    Psychologically: No needs at this time    Medication: -Continue Gabapentin 300 mg TID  -I recommended cautious trial of Voltaren gel for her left knee. She cannot take PO NSAIDs due to history of ulcers and bloodthinner use    Duration: Greater than 2 month    Intervention: -Reports significant benefit from recent right SI joint injection but continued to have burning buttock pain (R>L) that radiates down her thighs, likely secondary to known lumbar spinal stenosis. Then performed a caudal ADAM instead with significant improvement in pain. Of note, is on Eliquis  - Also reports left knee pain, with prior knee x-ray showing moderate osteoarthritis. Her last injection provided 60% pain relief for almost 8 months but has been worsening lately. This was repeated today, as noted below. Could consider referral to orthopedics if this worsens  - I suspect her current neck pain is secondary to cervical facet arthropathy and myofascial pain/spasms. She has received some benefit from TPI's in the past    Procedure Note: Left knee intra-articular steroid injection  The patient was positioned sitting with the legs hanging over the exam table. The joint space was palpated and marked approximately 1 cm superior to the tibal plateau and 1 cm lateral to the patellar tendon. This area was cleaned with EtOH and the skin was anesthesized with 2 mL of lidocaine 1% with a 25G 1.5 inch  needle. Next, the knee was prepped with chlorhexidine and allowed to dry for 3 minutes. A 25 G 1.5 inch needle was then used in enter the joint space. After negative aspiration, a mixture of 40 mg triamcinolone diluted in 4 mL of lidocaine 1% was then injected, and the needle was then withdrawn. The patient tolerated the procedure well    Sincerely,  Joseph Kelley MD  [facility] - [place]

## 2023-11-28 ENCOUNTER — TELEPHONE (OUTPATIENT)
Dept: PAIN MEDICINE | Facility: CLINIC | Age: 68
End: 2023-11-28

## 2023-11-28 DIAGNOSIS — R73.03 PREDIABETES: Primary | ICD-10-CM

## 2023-11-28 RX ORDER — DULAGLUTIDE 0.75 MG/.5ML
0.75 INJECTION, SOLUTION SUBCUTANEOUS
Qty: 2 ML | Refills: 11 | Status: SHIPPED | OUTPATIENT
Start: 2023-11-28 | End: 2024-02-09

## 2023-11-29 ENCOUNTER — APPOINTMENT (OUTPATIENT)
Dept: PAIN MEDICINE | Facility: CLINIC | Age: 68
End: 2023-11-29
Payer: MEDICARE

## 2023-12-01 ENCOUNTER — TELEPHONE (OUTPATIENT)
Dept: PAIN MEDICINE | Facility: CLINIC | Age: 68
End: 2023-12-01
Payer: MEDICARE

## 2023-12-07 ENCOUNTER — LAB (OUTPATIENT)
Dept: LAB | Facility: LAB | Age: 68
End: 2023-12-07
Payer: MEDICARE

## 2023-12-07 DIAGNOSIS — I48.0 PAF (PAROXYSMAL ATRIAL FIBRILLATION) (MULTI): ICD-10-CM

## 2023-12-07 LAB
ANION GAP SERPL CALC-SCNC: 12 MMOL/L
BUN SERPL-MCNC: 18 MG/DL (ref 8–25)
CALCIUM SERPL-MCNC: 9.3 MG/DL (ref 8.5–10.4)
CHLORIDE SERPL-SCNC: 97 MMOL/L (ref 97–107)
CO2 SERPL-SCNC: 34 MMOL/L (ref 24–31)
CREAT SERPL-MCNC: 0.6 MG/DL (ref 0.4–1.6)
GFR SERPL CREATININE-BSD FRML MDRD: >90 ML/MIN/1.73M*2
GLUCOSE SERPL-MCNC: 95 MG/DL (ref 65–99)
MAGNESIUM SERPL-MCNC: 2.3 MG/DL (ref 1.6–3.1)
POTASSIUM SERPL-SCNC: 4.2 MMOL/L (ref 3.4–5.1)
SODIUM SERPL-SCNC: 143 MMOL/L (ref 133–145)

## 2023-12-07 PROCEDURE — 80048 BASIC METABOLIC PNL TOTAL CA: CPT

## 2023-12-07 PROCEDURE — 36415 COLL VENOUS BLD VENIPUNCTURE: CPT

## 2023-12-07 PROCEDURE — 83735 ASSAY OF MAGNESIUM: CPT

## 2023-12-15 DIAGNOSIS — E88.810 METABOLIC SYNDROME: ICD-10-CM

## 2023-12-15 DIAGNOSIS — E66.01 MORBID (SEVERE) OBESITY DUE TO EXCESS CALORIES (MULTI): Primary | ICD-10-CM

## 2024-01-08 ENCOUNTER — OFFICE VISIT (OUTPATIENT)
Dept: CARDIOLOGY | Facility: CLINIC | Age: 69
End: 2024-01-08
Payer: MEDICARE

## 2024-01-08 VITALS
HEART RATE: 66 BPM | BODY MASS INDEX: 50.48 KG/M2 | SYSTOLIC BLOOD PRESSURE: 138 MMHG | OXYGEN SATURATION: 94 % | DIASTOLIC BLOOD PRESSURE: 89 MMHG | WEIGHT: 276 LBS

## 2024-01-08 DIAGNOSIS — I50.32 CHRONIC DIASTOLIC HEART FAILURE (MULTI): ICD-10-CM

## 2024-01-08 DIAGNOSIS — I10 BENIGN ESSENTIAL HYPERTENSION: Primary | ICD-10-CM

## 2024-01-08 DIAGNOSIS — I10 PRIMARY HYPERTENSION: ICD-10-CM

## 2024-01-08 PROCEDURE — 99213 OFFICE O/P EST LOW 20 MIN: CPT | Performed by: INTERNAL MEDICINE

## 2024-01-08 PROCEDURE — 1036F TOBACCO NON-USER: CPT | Performed by: INTERNAL MEDICINE

## 2024-01-08 PROCEDURE — 1125F AMNT PAIN NOTED PAIN PRSNT: CPT | Performed by: INTERNAL MEDICINE

## 2024-01-08 PROCEDURE — 1159F MED LIST DOCD IN RCRD: CPT | Performed by: INTERNAL MEDICINE

## 2024-01-08 PROCEDURE — 3075F SYST BP GE 130 - 139MM HG: CPT | Performed by: INTERNAL MEDICINE

## 2024-01-08 PROCEDURE — 3079F DIAST BP 80-89 MM HG: CPT | Performed by: INTERNAL MEDICINE

## 2024-01-08 PROCEDURE — 1160F RVW MEDS BY RX/DR IN RCRD: CPT | Performed by: INTERNAL MEDICINE

## 2024-01-08 ASSESSMENT — PAIN SCALES - GENERAL: PAINLEVEL: 10-WORST PAIN EVER

## 2024-01-08 NOTE — PROGRESS NOTES
Subjective      Chief Complaint   Patient presents with    6 MONTH FOLLOW UP          She does have a history of atrial fibrillation has been placed on Tikosyn for this.  She does have pulmonary hypertension. She says in the normal rhythm.  She is not complaining of chest discomfort.  No complaints of PND or orthopnea.  The legs are not swelling on her.  NO palpitaions.  The BP is dong well  The chol was done in sept and the LDL is 119.             ROS     History reviewed. No pertinent surgical history.     Active Ambulatory Problems     Diagnosis Date Noted    Epistaxis 08/30/2023    Benign essential hypertension 08/30/2023    Chronic gout without tophus 08/30/2023    Chronic hypoxemic respiratory failure (CMS/HCC) 08/30/2023    Chronic obstructive lung disease (CMS/HCC) 08/30/2023    Acute congestive heart failure (CMS/HCC) 08/30/2023    Diastolic heart failure (CMS/HCC) 08/30/2023    Cardiomegaly 08/30/2023    Coronary arteriosclerosis 08/30/2023    Dyspnea on exertion 08/30/2023    Gastroesophageal reflux disease 08/30/2023    Hypertension 08/30/2023    Hypertensive heart disease without congestive heart failure 08/30/2023    Hypokalemia 08/30/2023    Longstanding persistent atrial fibrillation (CMS/HCC) 08/30/2023    Mixed anxiety depressive disorder 08/30/2023    Moderately severe depression 08/30/2023    Obesity with body mass index 30 or greater 08/30/2023    Morbid (severe) obesity due to excess calories (CMS/HCC) 08/30/2023    Other chronic pain 08/30/2023    Obstructive sleep apnea syndrome 08/30/2023    Osteoporosis 08/30/2023    Pleural effusion 08/30/2023    Prediabetes 08/30/2023    Primary osteoarthritis of left knee 08/30/2023    Pulmonary hypertension (CMS/HCC) 08/30/2023    Lumbar spondylosis 08/30/2023    Thoracic spondylosis 08/30/2023    Asthma 08/30/2023    Arthropathy of both sacroiliac joints 08/30/2023    Anxiety 08/30/2023    Age-related osteoporosis without current pathological fracture  "08/30/2023     Resolved Ambulatory Problems     Diagnosis Date Noted    No Resolved Ambulatory Problems     Past Medical History:   Diagnosis Date    A-fib (CMS/HCC)     CHF (congestive heart failure) (CMS/HCC)     COVID     HTN (hypertension)     Low back pain         Visit Vitals  /89   Pulse 66   Wt 125 kg (276 lb)   SpO2 94%   BMI 50.48 kg/m²   Smoking Status Former   BSA 2.34 m²        Objective     Constitutional:       Appearance: Healthy appearance.   Eyes:      Pupils: Pupils are equal, round, and reactive to light.   Neck:      Vascular: JVD normal.   Pulmonary:      Breath sounds: Normal breath sounds.   Cardiovascular:      PMI at left midclavicular line. Normal rate. Regular rhythm. Normal S1. Normal S2.       Murmurs: There is no murmur.      No gallop.  No click. No rub.   Pulses:     Intact distal pulses.   Edema:     Peripheral edema absent.   Abdominal:      Palpations: Abdomen is soft.      Tenderness: There is no abdominal tenderness.   Musculoskeletal:      Extremities: No clubbing present.Skin:     General: Skin is warm and dry.   Neurological:      General: No focal deficit present.            Lab Review:         Lab Results   Component Value Date    CHOL 203 (H) 09/29/2023    CHOL 179 03/29/2023    CHOL 177 06/16/2021     Lab Results   Component Value Date    HDL 45.9 09/29/2023    HDL 43 (L) 03/29/2023    HDL 52 06/16/2021     Lab Results   Component Value Date    LDLCALC 119 03/29/2023    LDLCALC 114 06/16/2021    LDLCALC 110 07/28/2020     Lab Results   Component Value Date    TRIG 104 09/29/2023    TRIG 84 03/29/2023    TRIG 53 06/16/2021     No components found for: \"CHOLHDL\"     Assessment/Plan     Benign essential hypertension  Is doing well  No angina and no chf    Diastolic heart failure (CMS/HCC)  Is stable    Hypertension  Is stable     "

## 2024-01-12 ENCOUNTER — TELEPHONE (OUTPATIENT)
Dept: PRIMARY CARE | Facility: CLINIC | Age: 69
End: 2024-01-12
Payer: MEDICARE

## 2024-01-12 NOTE — TELEPHONE ENCOUNTER
I think those should be fine.  We just need to keep an eye on whether or not they strain the kidneys or liver over time.  I do not know if this can affect anything with regards to Eliquis or her cardiac medications so I do strongly suggest that she run the same question by the cardiologist please.  Ginseng does have interactions with Coumadin from a blood thinning standpoint.  Turmeric does interfere with colchicine for gout, but I do not see allopurinol listed.  Turmeric does state caution with bleeding disorders, that should be taken into account with her being on Eliquis.  Genevieve hip should not be used if she has a history of kidney stones.  Thank you

## 2024-01-12 NOTE — TELEPHONE ENCOUNTER
Pt called in stating that she would like to start taking some vitamins, like ginsing and vitamin c with danna hips, and turmeric just wants to make sure that these vitamins will not interact with her routine medications. Please advise.

## 2024-02-09 PROBLEM — J06.9 ACUTE URI: Status: ACTIVE | Noted: 2024-02-09

## 2024-02-16 ENCOUNTER — TELEPHONE (OUTPATIENT)
Dept: PRIMARY CARE | Facility: CLINIC | Age: 69
End: 2024-02-16
Payer: MEDICARE

## 2024-02-16 DIAGNOSIS — J06.9 ACUTE URI: Primary | ICD-10-CM

## 2024-02-16 NOTE — TELEPHONE ENCOUNTER
Pt calling back stating she is still ill she only got 7 day rx of augmentin and would like to have another rx

## 2024-02-20 RX ORDER — AMOXICILLIN AND CLAVULANATE POTASSIUM 875; 125 MG/1; MG/1
875 TABLET, FILM COATED ORAL 2 TIMES DAILY
Qty: 14 TABLET | Refills: 0 | Status: SHIPPED | OUTPATIENT
Start: 2024-02-20 | End: 2024-02-27

## 2024-02-20 NOTE — TELEPHONE ENCOUNTER
Secondary to persisting symptoms, I will send a secondary 7 day course of augmentin. If symptoms continue following this additional course would recommend office follow-up

## 2024-02-21 DIAGNOSIS — M1A.9XX0 CHRONIC GOUT WITHOUT TOPHUS, UNSPECIFIED CAUSE, UNSPECIFIED SITE: ICD-10-CM

## 2024-02-22 ENCOUNTER — TELEPHONE (OUTPATIENT)
Dept: CARDIOLOGY | Facility: CLINIC | Age: 69
End: 2024-02-22

## 2024-02-22 ENCOUNTER — OFFICE VISIT (OUTPATIENT)
Dept: PAIN MEDICINE | Facility: CLINIC | Age: 69
End: 2024-02-22
Payer: MEDICARE

## 2024-02-22 VITALS
HEART RATE: 76 BPM | DIASTOLIC BLOOD PRESSURE: 86 MMHG | HEIGHT: 62 IN | SYSTOLIC BLOOD PRESSURE: 132 MMHG | BODY MASS INDEX: 50.05 KG/M2 | WEIGHT: 272 LBS | RESPIRATION RATE: 18 BRPM

## 2024-02-22 DIAGNOSIS — G89.29 CHRONIC PAIN OF RIGHT KNEE: ICD-10-CM

## 2024-02-22 DIAGNOSIS — M25.561 CHRONIC PAIN OF RIGHT KNEE: ICD-10-CM

## 2024-02-22 DIAGNOSIS — M17.11 PRIMARY OSTEOARTHRITIS OF RIGHT KNEE: Primary | ICD-10-CM

## 2024-02-22 PROCEDURE — 20610 DRAIN/INJ JOINT/BURSA W/O US: CPT | Performed by: STUDENT IN AN ORGANIZED HEALTH CARE EDUCATION/TRAINING PROGRAM

## 2024-02-22 PROCEDURE — 1125F AMNT PAIN NOTED PAIN PRSNT: CPT | Performed by: STUDENT IN AN ORGANIZED HEALTH CARE EDUCATION/TRAINING PROGRAM

## 2024-02-22 PROCEDURE — 1036F TOBACCO NON-USER: CPT | Performed by: STUDENT IN AN ORGANIZED HEALTH CARE EDUCATION/TRAINING PROGRAM

## 2024-02-22 PROCEDURE — 2500000005 HC RX 250 GENERAL PHARMACY W/O HCPCS: Performed by: STUDENT IN AN ORGANIZED HEALTH CARE EDUCATION/TRAINING PROGRAM

## 2024-02-22 PROCEDURE — 99214 OFFICE O/P EST MOD 30 MIN: CPT | Performed by: STUDENT IN AN ORGANIZED HEALTH CARE EDUCATION/TRAINING PROGRAM

## 2024-02-22 PROCEDURE — 1159F MED LIST DOCD IN RCRD: CPT | Performed by: STUDENT IN AN ORGANIZED HEALTH CARE EDUCATION/TRAINING PROGRAM

## 2024-02-22 PROCEDURE — 1160F RVW MEDS BY RX/DR IN RCRD: CPT | Performed by: STUDENT IN AN ORGANIZED HEALTH CARE EDUCATION/TRAINING PROGRAM

## 2024-02-22 PROCEDURE — 3079F DIAST BP 80-89 MM HG: CPT | Performed by: STUDENT IN AN ORGANIZED HEALTH CARE EDUCATION/TRAINING PROGRAM

## 2024-02-22 PROCEDURE — 2500000004 HC RX 250 GENERAL PHARMACY W/ HCPCS (ALT 636 FOR OP/ED): Performed by: STUDENT IN AN ORGANIZED HEALTH CARE EDUCATION/TRAINING PROGRAM

## 2024-02-22 PROCEDURE — 3075F SYST BP GE 130 - 139MM HG: CPT | Performed by: STUDENT IN AN ORGANIZED HEALTH CARE EDUCATION/TRAINING PROGRAM

## 2024-02-22 RX ORDER — BUDESONIDE AND FORMOTEROL FUMARATE DIHYDRATE 80; 4.5 UG/1; UG/1
2 AEROSOL RESPIRATORY (INHALATION)
COMMUNITY

## 2024-02-22 RX ORDER — LIDOCAINE HYDROCHLORIDE 10 MG/ML
4 INJECTION, SOLUTION EPIDURAL; INFILTRATION; INTRACAUDAL; PERINEURAL ONCE
Status: COMPLETED | OUTPATIENT
Start: 2024-02-22 | End: 2024-02-22

## 2024-02-22 RX ORDER — TRIAMCINOLONE ACETONIDE 40 MG/ML
40 INJECTION, SUSPENSION INTRA-ARTICULAR; INTRAMUSCULAR ONCE
Status: COMPLETED | OUTPATIENT
Start: 2024-02-22 | End: 2024-02-22

## 2024-02-22 RX ORDER — LIDOCAINE HYDROCHLORIDE 10 MG/ML
1 INJECTION, SOLUTION EPIDURAL; INFILTRATION; INTRACAUDAL; PERINEURAL ONCE
Status: COMPLETED | OUTPATIENT
Start: 2024-02-22 | End: 2024-02-22

## 2024-02-22 RX ADMIN — LIDOCAINE HYDROCHLORIDE 10 MG: 10 INJECTION, SOLUTION EPIDURAL; INFILTRATION; INTRACAUDAL at 16:24

## 2024-02-22 RX ADMIN — TRIAMCINOLONE ACETONIDE 40 MG: 40 INJECTION, SUSPENSION INTRA-ARTICULAR; INTRAMUSCULAR at 16:25

## 2024-02-22 RX ADMIN — LIDOCAINE HYDROCHLORIDE 40 MG: 10 INJECTION, SOLUTION EPIDURAL; INFILTRATION; INTRACAUDAL at 16:25

## 2024-02-22 ASSESSMENT — PAIN SCALES - GENERAL
PAINLEVEL_OUTOF10: 2
PAINLEVEL: 2

## 2024-02-22 ASSESSMENT — PAIN - FUNCTIONAL ASSESSMENT: PAIN_FUNCTIONAL_ASSESSMENT: 0-10

## 2024-02-22 ASSESSMENT — PATIENT HEALTH QUESTIONNAIRE - PHQ9
SUM OF ALL RESPONSES TO PHQ9 QUESTIONS 1 AND 2: 0
2. FEELING DOWN, DEPRESSED OR HOPELESS: NOT AT ALL
1. LITTLE INTEREST OR PLEASURE IN DOING THINGS: NOT AT ALL

## 2024-02-22 ASSESSMENT — PAIN DESCRIPTION - DESCRIPTORS: DESCRIPTORS: PRESSURE;DULL;SHARP

## 2024-02-22 NOTE — PROGRESS NOTES
[facility]  Follow Up Office Visit Note 2024    Patient Information: Vickie Foster, MRN: [id number], : 1955   Primary Care/Referring Physician: Danilo Gonzalez DO, [address] / [facility] [address]     Chief Complaint: Left knee pain  Interval History: At her last office visit I performed a left knee joint injection    Today she reports 90% pain relief for around 3 months from this injection. Today she is having more pain in the right knee and is interested in having a steroid injection in this knee. Of note, she is on Eliquis. I did discuss with her the increased risk of bleeding while on this medication, but discussed it is reasonable to perform this procedure while on Eliquis as long as she understands the risk. She was amenable to this.    She was seeing Cristina Rice for weight loss but has had trouble getting most weight loss medications approved.    Brief History of Pain: Ms. Vickie Foster is a 68 y.o. female with a PMHx of CHF, pulmonary HTN, AFib (on Eliquis) who presents today for follow up regarding chronic low back/buttock pain.    For reference, she reports pain started approximately 2022 with no obvious inciting event. She has had this type of pain in past, but it is much more severe than normal. Has been to the ED a few times for this, had xrays performed which didn't show any fracture in the spine or hip. During one of these visits she was noted to have worsening LE edema and was admitted for CHF exacerbation. During this admission she continued to have back pain but also developed gout in her right foot. Is having difficulty bearing weight on her right foot, as it worsens both foot pain and causes pain in her right low back and burning pain in her buttock. Has to lie a certain way in bed (moreso on her left side) to get somewhat comfortable. Denies any numbness, tingling in her legs. She is unsure about weakness as she is not walking much.    Current  Pain Medications: Gabapentin 300 mg TID. Lidocaine patches - tried on both back and foot without benefit.  Previously Tried Pain Medications: Methocarbamol. Unable to use NSAID's due to history of ulcers. Tried BioFreeze without benefit    Relevant Surgeries: Denies hip or back surgery. Denies ankle or foot surgery.  Injections: Left knee injection - 60% pain relief for 8 months. Caudal ADAM - significant improvement in pain and ambulation, Right SI joint injection - 80% pain relief  Physical/Occupational Therapy: Denies any PT as she is having too much pain.    Medications:   Current Outpatient Medications   Medication Instructions    ALBUTEROL SULFATE ORAL Albuterol Sulfate ER    allopurinoL 200 mg tablet 1 tablet, oral, Every 12 hours    amoxicillin-pot clavulanate (Augmentin) 875-125 mg tablet 875 mg, oral, 2 times daily    ascorbic acid (VITAMIN C WITH ZINA HIPS) 1,000 mg, oral, Daily    biotin 10 mg tablet 1 tablet, oral, Daily    budesonide-formoteroL (Symbicort) 80-4.5 mcg/actuation inhaler 2 puffs, inhalation, 2 times daily RT, Rinse mouth with water after use to reduce aftertaste and incidence of candidiasis. Do not swallow.    buPROPion XL (WELLBUTRIN XL) 300 mg, oral, Every morning    dofetilide (Tikosyn) 250 mcg capsule TAKE 1 CAPSULE BY MOUTH EVERY 12 HOURS    Eliquis 5 mg, oral, 2 times daily    folic acid/multivit-min/lutein (CENTRUM SILVER ORAL) oral, As directed    gabapentin (Neurontin) 300 mg capsule 1 capsule, oral, 3 times daily    levalbuterol (Xopenex HFA) 45 mcg/actuation inhaler 1-2 puffs, inhalation, Every 6 hours PRN    levalbuterol (Xopenex) 45 mcg/actuation inhaler 2 puffs, inhalation, Every 6 hours PRN    metOLazone (ZAROXOLYN) 5 mg, oral, Daily PRN    metoprolol succinate XL (Toprol-XL) 25 mg 24 hr tablet TAKE 1 TABLET BY MOUTH ONE TIME DAILY    oxygen (O2) 3 L/min, inhalation, Nightly    potassium chloride CR 20 mEq ER tablet 20 mEq, oral, 3 times daily with meals    torsemide  (Demadex) 100 mg tablet 0.5 tablets, oral, 2 times daily      Allergies: No Known Allergies    Past Medical & Surgical History:  Past Medical History:   Diagnosis Date    A-fib (CMS/HCC)     CHF (congestive heart failure) (CMS/HCC)     COVID     HTN (hypertension)     Low back pain       Past Surgical History:   Procedure Laterality Date    CARDIOVERSION         Family History   Problem Relation Name Age of Onset    Breast cancer Mother      Heart attack Mother      Heart disease Mother      Colon cancer Father      Breast cancer Sister      Diabetes type II Sister      Kidney failure Sister      Other (herione addict) Sister      No Known Problems Brother      No Known Problems Daughter      Other (recovering alcoholic) Son      Breast cancer Other Maternal Aunt      Social History     Socioeconomic History    Marital status: Unknown     Spouse name: Not on file    Number of children: Not on file    Years of education: Not on file    Highest education level: Not on file   Occupational History    Not on file   Tobacco Use    Smoking status: Former     Types: Cigarettes    Smokeless tobacco: Never   Vaping Use    Vaping Use: Never used   Substance and Sexual Activity    Alcohol use: Yes     Comment: OCASSIONALY    Drug use: Never    Sexual activity: Defer   Other Topics Concern    Not on file   Social History Narrative    Not on file     Social Determinants of Health     Financial Resource Strain: Not on file   Food Insecurity: Not on file   Transportation Needs: Not on file   Physical Activity: Not on file   Stress: Not on file   Social Connections: Not on file   Intimate Partner Violence: Not on file   Housing Stability: Not on file       Problems, Past medical history, past surgical history, Medications, allergies, social and family history reviewed and as per the electronic medical record from today's encounter    Review of Systems:  CONST: No fever, chills, fatigue, weight changes  EYES: No loss of vision  ENT:  "No hearing loss, tinnitus  CV: No chest pain, palpitations  RESP: Reports some dyspnea on exertion  GI: No stool incontinence, nausea, vomiting  : No urinary incontinence  MSK: No joint swelling  SKIN: No rash, no hives  NEURO: No headache, dizziness  PSYCH: No anxiety, depression  HEM/LYMPH: No easy bruising or bleeding  All other systems reviewed are negative     Physical Exam:  Vitals: /86   Pulse 76   Resp 18   Ht 1.575 m (5' 2\")   Wt 123 kg (272 lb)   BMI 49.75 kg/m²   General: No apparent distress. Alert, appropriate, oriented x 3. Mood generally positive, affect congruent. Speaking in full sentences.   HENT: Normocephalic, atraumatic. Hearing intact.  Eyes: Pupils equal and round  Neck: Supple, trachea midline  Lungs: Symmetric respiratory excursion on visual exam, nonlabored breathing.   Extremities: No cyanosis noted in extremities. Pitting edema noted in legs up to the shins  Skin: No rashes, lesions noted.  Neuro: Alert and appropriate. Using a rollator to ambulate. Bulk and tone within normal limits.    Laboratory Data:  The following laboratory data were reviewed during this visit:   Lab Results   Component Value Date    WBC 8.3 06/08/2023    RBC 4.15 06/08/2023    HGB 12.7 06/08/2023    HCT 41.2 06/08/2023     06/08/2023      Lab Results   Component Value Date    INR 1.0 06/05/2023    INR 1.0 12/01/2019     Lab Results   Component Value Date    CREATININE 0.60 12/07/2023    HGBA1C 6.1 (A) 09/29/2023       Imaging:  The following imaging impressions were reviewed by me during this visit:    -8/2022 lumbar MRI shows L3-L4 moderate to marked spinal canal stenosis and right foraminal stenosis without nerve root impingement. L4-L5 moderate spinal canal stenosis. L5-S1 with no significatn central or foraminal stenosis   -4/2021 left knee xray shows moderate OA    I also personally reviewed the images from the above studies myself. These images and my interpretation of them contributed to " the management and decision making of the patient's medical plan.    ASSESSMENT:  Ms. Vickie Foster is a 68 y.o. female with low back, leg, and neck pain that is consistent with:    1. Primary osteoarthritis of right knee    2. Chronic pain of right knee          PLAN:  Radiology: -I reviewed her most recent lumbar MRI which shows L3-L4 moderate to marked spinal canal stenosis and right foraminal stenosis without nerve root impingement. L4-L5 moderate spinal canal stenosis. L5-S1 with no significant central or foraminal stenosis. No additional diagnostics at this time.- Given some concern for cervical facet arthropathy I ordered a cervical spine xray which showed fairly diffuse and severe degenerative changes.    Physically: Continue your regular HEP    Psychologically: No needs at this time    Medication: -Continue Gabapentin 300 mg TID  -I recommended cautious trial of Voltaren gel for her left knee. She cannot take PO NSAIDs due to history of ulcers and bloodthinner use    Duration: Greater than 2 month    Intervention: -Reports significant benefit from recent right SI joint injection but continued to have burning buttock pain (R>L) that radiates down her thighs, likely secondary to known lumbar spinal stenosis. Then performed a caudal ADAM instead with significant improvement in pain. Of note, is on Eliquis  - Also reports left knee pain, with prior knee x-ray showing moderate osteoarthritis. Her last left knee injection provided 60% pain relief for almost 8 months. Today her right knee pain is more severe and I performed a right knee joint intra-articular steroid injection, as noted below  - I suspect her current neck pain is secondary to cervical facet arthropathy and myofascial pain/spasms. She has received some benefit from TPI's in the past    Procedure Note: Right knee intra-articular steroid injection  The patient was positioned sitting with the legs hanging over the exam table. The joint space was  palpated and marked approximately 1 cm superior to the tibal plateau and 1 cm lateral to the patellar tendon. This area was cleaned with EtOH and the skin was anesthesized with 1 mL of lidocaine 1% with a 25G 1.5 inch needle. Next, the knee was prepped with chlorhexidine and allowed to dry for 3 minutes. A 22 G 1.5 inch needle was then used in enter the joint space. After negative aspiration, a mixture of 40 mg triamcinolone diluted in 4 mL of lidocaine 1% was then injected, and the needle was then withdrawn. The patient tolerated the procedure well    Sincerely,  Joseph Kelley MD  [facility] - [place]

## 2024-02-27 RX ORDER — ALLOPURINOL 100 MG/1
TABLET ORAL
Qty: 360 TABLET | Refills: 0 | Status: SHIPPED | OUTPATIENT
Start: 2024-02-27 | End: 2024-05-24

## 2024-02-27 NOTE — TELEPHONE ENCOUNTER
Patient is overdue for medication follow-up visit.  Please schedule at her earliest convenience (may be when she has gotten over her current URI).  uric acid level for her gout treatment was last checked 11 months ago.  We need to update blood work for her chronic conditions and discuss refills at a visit.  Will send her a temporary refill of her allopurinol

## 2024-03-04 ENCOUNTER — TELEPHONE (OUTPATIENT)
Dept: PRIMARY CARE | Facility: CLINIC | Age: 69
End: 2024-03-04

## 2024-03-04 NOTE — TELEPHONE ENCOUNTER
[facility]  [phone] THEY HAVE PRIOR AUTHORIZATION FOR LEVALBUTEROL PARTRATE SHE STATES AUTHORIZATION EXPIRES TODAY IN THE NEXT 7 HOURS.  CASE  # [id number]

## 2024-03-16 DIAGNOSIS — M54.16 LUMBAR RADICULOPATHY: Primary | ICD-10-CM

## 2024-03-18 ENCOUNTER — APPOINTMENT (OUTPATIENT)
Dept: CARDIOLOGY | Facility: CLINIC | Age: 69
End: 2024-03-18
Payer: MEDICARE

## 2024-03-18 RX ORDER — GABAPENTIN 300 MG/1
300 CAPSULE ORAL 3 TIMES DAILY
Qty: 270 CAPSULE | Refills: 1 | Status: SHIPPED | OUTPATIENT
Start: 2024-03-18 | End: 2024-05-30 | Stop reason: SDUPTHER

## 2024-03-20 ENCOUNTER — TELEPHONE (OUTPATIENT)
Dept: CARDIOLOGY | Facility: CLINIC | Age: 69
End: 2024-03-20
Payer: MEDICARE

## 2024-03-20 ENCOUNTER — TELEPHONE (OUTPATIENT)
Dept: PRIMARY CARE | Facility: CLINIC | Age: 69
End: 2024-03-20
Payer: MEDICARE

## 2024-03-20 NOTE — TELEPHONE ENCOUNTER
PT WANTS TO KNOW IF SHE NEEDS CLEARANCE FOR CATARACT SURGERY BECAUSE SHE STARTED SEMUGLUTIDE CAN SHE BE ON THIS MEDICATION AND HAVE THE CATARACT SURGERY

## 2024-03-25 ENCOUNTER — APPOINTMENT (OUTPATIENT)
Dept: CARDIOLOGY | Facility: CLINIC | Age: 69
End: 2024-03-25
Payer: MEDICARE

## 2024-04-08 DIAGNOSIS — I50.32 CHRONIC DIASTOLIC HEART FAILURE (MULTI): Primary | ICD-10-CM

## 2024-04-08 RX ORDER — TORSEMIDE 100 MG/1
50 TABLET ORAL 2 TIMES DAILY
Qty: 90 TABLET | Refills: 3 | Status: SHIPPED | OUTPATIENT
Start: 2024-04-08

## 2024-04-10 ENCOUNTER — TELEMEDICINE (OUTPATIENT)
Dept: PRIMARY CARE | Facility: CLINIC | Age: 69
End: 2024-04-10
Payer: MEDICARE

## 2024-04-10 DIAGNOSIS — M1A.9XX0 CHRONIC GOUT WITHOUT TOPHUS, UNSPECIFIED CAUSE, UNSPECIFIED SITE: ICD-10-CM

## 2024-04-10 DIAGNOSIS — E66.01 CLASS 3 SEVERE OBESITY DUE TO EXCESS CALORIES WITH SERIOUS COMORBIDITY AND BODY MASS INDEX (BMI) OF 45.0 TO 49.9 IN ADULT (MULTI): Primary | ICD-10-CM

## 2024-04-10 RX ORDER — DOCUSATE SODIUM 100 MG/1
100 CAPSULE, LIQUID FILLED ORAL 2 TIMES DAILY
COMMUNITY

## 2024-04-10 ASSESSMENT — PATIENT HEALTH QUESTIONNAIRE - PHQ9
10. IF YOU CHECKED OFF ANY PROBLEMS, HOW DIFFICULT HAVE THESE PROBLEMS MADE IT FOR YOU TO DO YOUR WORK, TAKE CARE OF THINGS AT HOME, OR GET ALONG WITH OTHER PEOPLE: NOT DIFFICULT AT ALL
9. THOUGHTS THAT YOU WOULD BE BETTER OFF DEAD, OR OF HURTING YOURSELF: NOT AT ALL
SUM OF ALL RESPONSES TO PHQ QUESTIONS 1-9: 0
SUM OF ALL RESPONSES TO PHQ9 QUESTIONS 1 AND 2: 0
4. FEELING TIRED OR HAVING LITTLE ENERGY: NOT AT ALL
7. TROUBLE CONCENTRATING ON THINGS, SUCH AS READING THE NEWSPAPER OR WATCHING TELEVISION: NOT AT ALL
6. FEELING BAD ABOUT YOURSELF - OR THAT YOU ARE A FAILURE OR HAVE LET YOURSELF OR YOUR FAMILY DOWN: NOT AT ALL
8. MOVING OR SPEAKING SO SLOWLY THAT OTHER PEOPLE COULD HAVE NOTICED. OR THE OPPOSITE, BEING SO FIGETY OR RESTLESS THAT YOU HAVE BEEN MOVING AROUND A LOT MORE THAN USUAL: NOT AT ALL
2. FEELING DOWN, DEPRESSED OR HOPELESS: NOT AT ALL
1. LITTLE INTEREST OR PLEASURE IN DOING THINGS: NOT AT ALL
3. TROUBLE FALLING OR STAYING ASLEEP OR SLEEPING TOO MUCH: NOT AT ALL
5. POOR APPETITE OR OVEREATING: NOT AT ALL

## 2024-04-10 ASSESSMENT — PAIN SCALES - GENERAL: PAINLEVEL: 0-NO PAIN

## 2024-04-10 ASSESSMENT — ANXIETY QUESTIONNAIRES
3. WORRYING TOO MUCH ABOUT DIFFERENT THINGS: SEVERAL DAYS
2. NOT BEING ABLE TO STOP OR CONTROL WORRYING: NOT AT ALL
7. FEELING AFRAID AS IF SOMETHING AWFUL MIGHT HAPPEN: NOT AT ALL
4. TROUBLE RELAXING: NOT AT ALL
5. BEING SO RESTLESS THAT IT IS HARD TO SIT STILL: NOT AT ALL
1. FEELING NERVOUS, ANXIOUS, OR ON EDGE: NOT AT ALL
IF YOU CHECKED OFF ANY PROBLEMS ON THIS QUESTIONNAIRE, HOW DIFFICULT HAVE THESE PROBLEMS MADE IT FOR YOU TO DO YOUR WORK, TAKE CARE OF THINGS AT HOME, OR GET ALONG WITH OTHER PEOPLE: NOT DIFFICULT AT ALL
6. BECOMING EASILY ANNOYED OR IRRITABLE: SEVERAL DAYS
GAD7 TOTAL SCORE: 2

## 2024-04-10 ASSESSMENT — LIFESTYLE VARIABLES
SKIP TO QUESTIONS 9-10: 1
AUDIT-C TOTAL SCORE: 1
HOW MANY STANDARD DRINKS CONTAINING ALCOHOL DO YOU HAVE ON A TYPICAL DAY: 1 OR 2
HOW OFTEN DO YOU HAVE A DRINK CONTAINING ALCOHOL: MONTHLY OR LESS
HOW OFTEN DO YOU HAVE SIX OR MORE DRINKS ON ONE OCCASION: NEVER

## 2024-04-10 NOTE — PROGRESS NOTES
Outpatient Visit Note    Chief Complaint   Patient presents with    Medication review     Semiglutide injection, PT WANTS TO MAKE SURE HER TAKING THE MED IS OKAY WITH TALKING OTHER MEDS, UNSURE OF DOSAGE. No further concerns.        With patient's permission, this is a Telemedicine visit with video and audio. The provider and patient participated in this telemedicine encounter.    HPI:  Vickie Foster is a 68 y.o. female here for medication follow-up.    She has not seen me since August 2023 at which time we followed up on her anxiety with depression.  She has been following closely with cardiology since then for her chronic diastolic congestive heart failure, pulmonary hypertension and A-fib with failed cardioversions. Per chart review she last saw cardiology in January 2024, remains on Tikosyn.  Stable on examination. Today she states that she is in regular rhythm and has maintained that since cardioversion with Dr. Ty. Remains on Tikosyn.     She was feeling very depressed by her cardiac situation for which I increased her Wellbutrin dosing for more mood coverage and referred her to counseling.  She also wanted to consider Wegovy but her pulmonology's recommendations to help with weight loss with a focus on first getting her lower extremity edema better before starting that medication.  I did send prescriptions for Trulicity and Ozempic but it did not appear these were covered.  She states that she was able to get this type of medicine through a [employer] in [place] cash pay $350 a month. Lost 7 lbs reportedly in her first week. They inject her each week. She is changing to Weight Watchers for her diet and increasing protein intake. Wants to know if it is ok to be on this medicine. No Fhx thyroid cancer. Does not get reflux or GI issues in general. The company told her she can use dramamine as needed for nausea but she has no GI symptoms. Having regular bowel movements. Denies  tachycardia/palpitations.     She has hyperuricemia for which she is on allopurinol.  Is due to have a uric acid level checked at this time.    Continues to see her pain specialist.    Still happy with all her medicines. Likes the Wellbutrin, has good coverage. Has some labs ordered by the other specialist.     Admits she is not good about following with pulmonology's plans. She has not started to use the CPAP. Asking me about the benefits of this. Now says she will try this.     Medicare sent her a scale and blood pressure kit and have her in a visiting nurse program for monitoring.     PHQ9/GAD7:  Over the past 2 weeks, how often have you been bothered by any of the following problems?  Trouble falling or staying asleep, or sleeping too much: Not at all  Feeling tired or having little energy: Not at all  Poor appetite or overeating: Not at all  Feeling bad about yourself - or that you are a failure or have let yourself or your family down: Not at all  Trouble concentrating on things, such as reading the newspaper or watching television: Not at all  Moving or speaking so slowly that other people could have noticed? Or the opposite - being so fidgety or restless that you have been moving around a lot more than usual.: Not at all  Thoughts that you would be better off dead or hurting yourself in some way: Not at all  Patient Health Questionnaire-9 Score: 0  Over the last 2 weeks, how often have you been bothered by any of the following problems?  Feeling nervous, anxious, or on edge: Not at all  Not being able to stop or control worrying: Not at all  Worrying too much about different things: Several days  Trouble relaxing: Not at all  Being so restless that it is hard to sit still: Not at all  Becoming easily annoyed or irritable: Several days  Feeling afraid as if something awful might happen: Not at all  WILIAM-7 Total Score: 2      Patient Active Problem List    Diagnosis Date Noted    Acute URI 02/09/2024     Epistaxis 08/30/2023    Benign essential hypertension 08/30/2023    Chronic gout without tophus 08/30/2023    Chronic hypoxemic respiratory failure (CMS/HCC) 08/30/2023    Chronic obstructive lung disease (CMS/HCC) 08/30/2023    Acute congestive heart failure (CMS/HCC) 08/30/2023    Diastolic heart failure (CMS/HCC) 08/30/2023    Cardiomegaly 08/30/2023    Coronary arteriosclerosis 08/30/2023    Dyspnea on exertion 08/30/2023    Gastroesophageal reflux disease 08/30/2023    Hypertension 08/30/2023    Hypertensive heart disease without congestive heart failure 08/30/2023    Hypokalemia 08/30/2023    Longstanding persistent atrial fibrillation (CMS/HCC) 08/30/2023    Mixed anxiety depressive disorder 08/30/2023    Moderately severe depression 08/30/2023    Obesity with body mass index 30 or greater 08/30/2023    Morbid (severe) obesity due to excess calories (CMS/HCC) 08/30/2023    Other chronic pain 08/30/2023    Obstructive sleep apnea syndrome 08/30/2023    Osteoporosis 08/30/2023    Pleural effusion 08/30/2023    Prediabetes 08/30/2023    Primary osteoarthritis of left knee 08/30/2023    Pulmonary hypertension (CMS/HCC) 08/30/2023    Lumbar spondylosis 08/30/2023    Thoracic spondylosis 08/30/2023    Asthma 08/30/2023    Arthropathy of both sacroiliac joints 08/30/2023    Anxiety 08/30/2023    Age-related osteoporosis without current pathological fracture 08/30/2023        Past Medical History:   Diagnosis Date    A-fib (CMS/HCC)     CHF (congestive heart failure) (CMS/HCC)     COVID     HTN (hypertension)     Low back pain         Current Medications  Current Outpatient Medications   Medication Instructions    allopurinol (Zyloprim) 100 mg tablet TAKE 2 TABLETS (200 MG) EVERY 12 HOURS    ascorbic acid (VITAMIN C WITH ZINA HIPS) 1,000 mg, oral, Daily    biotin 10 mg tablet 1 tablet, oral, Daily    budesonide-formoteroL (Symbicort) 80-4.5 mcg/actuation inhaler 2 puffs, inhalation, 2 times daily RT, Rinse mouth with  water after use to reduce aftertaste and incidence of candidiasis. Do not swallow.    buPROPion XL (WELLBUTRIN XL) 300 mg, oral, Every morning    docusate sodium (COLACE) 100 mg, oral, 2 times daily    dofetilide (Tikosyn) 250 mcg capsule TAKE 1 CAPSULE BY MOUTH EVERY 12 HOURS    Eliquis 5 mg, oral, 2 times daily    folic acid/multivit-min/lutein (CENTRUM SILVER ORAL) oral, As directed    gabapentin (NEURONTIN) 300 mg, oral, 3 times daily    metOLazone (ZAROXOLYN) 5 mg, oral, Daily PRN    metoprolol succinate XL (Toprol-XL) 25 mg 24 hr tablet TAKE 1 TABLET BY MOUTH ONE TIME DAILY    oxygen (O2) 3 L/min, inhalation, Nightly    potassium chloride CR 20 mEq ER tablet 20 mEq, oral, 3 times daily with meals    torsemide (DEMADEX) 50 mg, oral, 2 times daily        Allergies  No Known Allergies     Past Surgical History:   Procedure Laterality Date    CARDIOVERSION       Family History   Problem Relation Name Age of Onset    Breast cancer Mother      Heart attack Mother      Heart disease Mother      Colon cancer Father      Breast cancer Sister      Diabetes type II Sister      Kidney failure Sister      Other (herione addict) Sister      No Known Problems Brother      No Known Problems Daughter      Other (recovering alcoholic) Son      Breast cancer Other Maternal Aunt      Social History     Tobacco Use    Smoking status: Former     Types: Cigarettes    Smokeless tobacco: Never   Vaping Use    Vaping status: Never Used   Substance Use Topics    Alcohol use: Yes     Comment: OCASSIONALY    Drug use: Never     Tobacco Use: Medium Risk (4/10/2024)    Patient History     Smoking Tobacco Use: Former     Smokeless Tobacco Use: Never     Passive Exposure: Not on file        ROS  All pertinent positive symptoms are included in the history of present illness.  All other systems have been reviewed and are negative and noncontributory to this patient's current ailments.    VITAL SIGNS  There were no vitals filed for this  visit.  There were no vitals filed for this visit.   There is no height or weight on file to calculate BMI.   Patient is unable to proivide    PHYSICAL EXAM  GENERAL APPEARANCE:  Alert and oriented x 3, Pleasant and cooperative, No acute distress.   LUNGS:  No conversational dyspnea or cough during encounter.   PSYCH:  appropriate mood and affect, no difficulty with speech.   Telemedicine visit, no other exam component done.    Counseling:       Medication education:           Education:  The patient is counseled regarding potential side-effects of all new medications          Understanding:  Patient expressed understanding of information conveyed today          Adherence:  No barriers to adherence identified    Assessment/Plan   Problem List Items Addressed This Visit             ICD-10-CM    Chronic gout without tophus M1A.9XX0    Relevant Orders    Uric acid     Other Visit Diagnoses         Codes    Class 3 severe obesity due to excess calories with serious comorbidity and body mass index (BMI) of 45.0 to 49.9 in adult (CMS/[facility])    -  Primary E66.01, Z68.42            Additional Visit Plans:  You are on semaglutide, unsure of the current dose, not through [facility] in [place].  Continue to follow with the medical provider there for monitoring and medication prescribing and administering.  You are experiencing successful weight loss and will do well to pair this with a program like weight watchers and using your CPAP machine.    Right now I do not see any contraindications for you to be on this medication.  No family history of thyroid cancer.  Postmenopausal.  No tachycardia or GI upset.    We are due to check a uric acid level at this time for proper dosing of your gout medicine.  Please have this done when you have your blood work done for the [place] facility.  They have some other labs ordered for you for monitoring.  Be sure to have them keep an eye on your liver, pancreas and thyroid during  treatment.    Doing well on your Wellbutrin, continue this medication dose.  Contact me when you need refills.  No adjustments at this time.    Enroll in nursing program with nurse visits and vitals monitoring.      Patient Care Team:  Danilo Gonzalez DO as PCP - General (Family Medicine)  Danilo Gonzalez DO as PCP - [employer] PCP  MD Manuel Jeong MD (Internal Medicine)    Danilo Gonzalez DO   04/10/24   12:48 PM

## 2024-04-10 NOTE — PATIENT INSTRUCTIONS
Problem List Items Addressed This Visit             ICD-10-CM    Chronic gout without tophus M1A.9XX0    Relevant Orders    Uric acid     Other Visit Diagnoses         Codes    Class 3 severe obesity due to excess calories with serious comorbidity and body mass index (BMI) of 45.0 to 49.9 in adult (CMS/[facility])    -  Primary E66.01, Z68.42            Additional Visit Plans:  You are on semaglutide, unsure of the current dose, not through [facility] in [place].  Continue to follow with the medical provider there for monitoring and medication prescribing and administering.  You are experiencing successful weight loss and will do well to pair this with a program like weight watchers and using your CPAP machine.    Right now I do not see any contraindications for you to be on this medication.  No family history of thyroid cancer.  Postmenopausal.  No tachycardia or GI upset.    We are due to check a uric acid level at this time for proper dosing of your gout medicine.  Please have this done when you have your blood work done for the [place] facility.  They have some other labs ordered for you for monitoring.  Be sure to have them keep an eye on your liver, pancreas and thyroid during treatment.    Doing well on your Wellbutrin, continue this medication dose.  Contact me when you need refills.  No adjustments at this time.    Enroll in nursing program with nurse visits and vitals monitoring.      Patient Care Team:  Danilo Gonzalez DO as PCP - General (Family Medicine)  Danilo Gonzalez DO as PCP - [employer]vantage PCP  MD Manuel Jeong MD (Internal Medicine)    Danilo Gonzalez DO   04/10/24   12:48 PM

## 2024-04-16 ENCOUNTER — OFFICE VISIT (OUTPATIENT)
Dept: CARDIOLOGY | Facility: CLINIC | Age: 69
End: 2024-04-16
Payer: MEDICARE

## 2024-04-16 VITALS — WEIGHT: 271 LBS | BODY MASS INDEX: 49.57 KG/M2

## 2024-04-16 DIAGNOSIS — I48.0 PAF (PAROXYSMAL ATRIAL FIBRILLATION) (MULTI): Primary | ICD-10-CM

## 2024-04-16 PROCEDURE — 93010 ELECTROCARDIOGRAM REPORT: CPT | Performed by: INTERNAL MEDICINE

## 2024-04-16 PROCEDURE — 93005 ELECTROCARDIOGRAM TRACING: CPT | Performed by: INTERNAL MEDICINE

## 2024-04-16 PROCEDURE — 1126F AMNT PAIN NOTED NONE PRSNT: CPT | Performed by: INTERNAL MEDICINE

## 2024-04-16 PROCEDURE — 1036F TOBACCO NON-USER: CPT | Performed by: INTERNAL MEDICINE

## 2024-04-16 PROCEDURE — 1160F RVW MEDS BY RX/DR IN RCRD: CPT | Performed by: INTERNAL MEDICINE

## 2024-04-16 PROCEDURE — 99214 OFFICE O/P EST MOD 30 MIN: CPT | Performed by: INTERNAL MEDICINE

## 2024-04-16 PROCEDURE — 1159F MED LIST DOCD IN RCRD: CPT | Performed by: INTERNAL MEDICINE

## 2024-04-16 PROCEDURE — 3008F BODY MASS INDEX DOCD: CPT | Performed by: INTERNAL MEDICINE

## 2024-04-16 ASSESSMENT — PATIENT HEALTH QUESTIONNAIRE - PHQ9
2. FEELING DOWN, DEPRESSED OR HOPELESS: NOT AT ALL
1. LITTLE INTEREST OR PLEASURE IN DOING THINGS: NOT AT ALL
SUM OF ALL RESPONSES TO PHQ9 QUESTIONS 1 AND 2: 0

## 2024-04-16 ASSESSMENT — PAIN SCALES - GENERAL: PAINLEVEL: 0-NO PAIN

## 2024-04-16 ASSESSMENT — COLUMBIA-SUICIDE SEVERITY RATING SCALE - C-SSRS
6. HAVE YOU EVER DONE ANYTHING, STARTED TO DO ANYTHING, OR PREPARED TO DO ANYTHING TO END YOUR LIFE?: NO
1. IN THE PAST MONTH, HAVE YOU WISHED YOU WERE DEAD OR WISHED YOU COULD GO TO SLEEP AND NOT WAKE UP?: NO
2. HAVE YOU ACTUALLY HAD ANY THOUGHTS OF KILLING YOURSELF?: NO

## 2024-04-16 ASSESSMENT — ENCOUNTER SYMPTOMS
DEPRESSION: 0
OCCASIONAL FEELINGS OF UNSTEADINESS: 0
LOSS OF SENSATION IN FEET: 0

## 2024-04-16 NOTE — ASSESSMENT & PLAN NOTE
History as above.  Plan for cardioversion.  I did discuss possibility of catheter-based therapy as well which she will consider if she has recurrence.

## 2024-04-16 NOTE — H&P (VIEW-ONLY)
Chief Complaint   Patient presents with    Atrial Fibrillation            The patient is a 68-year-old female with a history of hypertension, morbid obesity, and persistent atrial fibrillation.  She was treated with dofetilide last year but has been somewhat noncompliant with follow-up.  She is unclear regarding when she went back into atrial fibrillation but she definitely notes that she has been having more dyspnea with exertion for the past several months.  She says she is compliant with her dofetilide and anticoagulation.         Active Ambulatory Problems     Diagnosis Date Noted    Epistaxis 08/30/2023    Benign essential hypertension 08/30/2023    Chronic gout without tophus 08/30/2023    Chronic hypoxemic respiratory failure (Multi) 08/30/2023    Chronic obstructive lung disease (Multi) 08/30/2023    Acute congestive heart failure (Multi) 08/30/2023    Diastolic heart failure (Multi) 08/30/2023    Cardiomegaly 08/30/2023    Coronary arteriosclerosis 08/30/2023    Dyspnea on exertion 08/30/2023    Gastroesophageal reflux disease 08/30/2023    Hypertension 08/30/2023    Hypertensive heart disease without congestive heart failure 08/30/2023    Hypokalemia 08/30/2023    Longstanding persistent atrial fibrillation (Multi) 08/30/2023    Mixed anxiety depressive disorder 08/30/2023    Moderately severe depression 08/30/2023    Obesity with body mass index 30 or greater 08/30/2023    Morbid (severe) obesity due to excess calories (Multi) 08/30/2023    Other chronic pain 08/30/2023    Obstructive sleep apnea syndrome 08/30/2023    Osteoporosis 08/30/2023    Pleural effusion 08/30/2023    Prediabetes 08/30/2023    Primary osteoarthritis of left knee 08/30/2023    Pulmonary hypertension (Multi) 08/30/2023    Lumbar spondylosis 08/30/2023    Thoracic spondylosis 08/30/2023    Asthma (HHS-HCC) 08/30/2023    Arthropathy of both sacroiliac joints 08/30/2023    Anxiety 08/30/2023    Age-related osteoporosis without current  pathological fracture 08/30/2023    Acute URI 02/09/2024     Resolved Ambulatory Problems     Diagnosis Date Noted    No Resolved Ambulatory Problems     Past Medical History:   Diagnosis Date    A-fib (Multi)     CHF (congestive heart failure) (Multi)     COVID     HTN (hypertension)     Low back pain         Review of Systems   All other systems reviewed and are negative.       Objective     There were no vitals filed for this visit.     Constitutional:       Appearance: Healthy appearance. Obese.   Pulmonary:      Effort: Pulmonary effort is normal.      Breath sounds: Normal breath sounds.   Cardiovascular:      PMI at left midclavicular line. Tachycardia present. Irregularly irregular rhythm.      Murmurs: There is a systolic murmur.      No gallop.  No click. No rub.   Pulses:     Intact distal pulses.   Abdominal:      General: Bowel sounds are normal.   Musculoskeletal:      Cervical back: Neck supple. Skin:     General: Skin is warm and dry.   Neurological:      General: No focal deficit present.            Lab Review:   No visits with results within 2 Month(s) from this visit.   Latest known visit with results is:   Lab on 12/07/2023   Component Date Value    Glucose 12/07/2023 95     Sodium 12/07/2023 143     Potassium 12/07/2023 4.2     Chloride 12/07/2023 97     Bicarbonate 12/07/2023 34 (H)     Urea Nitrogen 12/07/2023 18     Creatinine 12/07/2023 0.60     eGFR 12/07/2023 >90     Calcium 12/07/2023 9.3     Anion Gap 12/07/2023 12     Magnesium 12/07/2023 2.30        ECG:  Atrial fibrillation with ventricular response in the 80s occasional aberrancy nonspecific ST segment changes QRS duration 90 ms QTc 480 ms    Assessment/Plan:  History as above.  Plan for cardioversion.  I did discuss possibility of catheter-based therapy as well which she will consider if she has recurrence.        Problem List Items Addressed This Visit    None

## 2024-04-24 DIAGNOSIS — F32.A MODERATELY SEVERE DEPRESSION: Primary | ICD-10-CM

## 2024-04-29 RX ORDER — BUPROPION HYDROCHLORIDE 300 MG/1
300 TABLET ORAL
Qty: 90 TABLET | Refills: 0 | Status: SHIPPED | OUTPATIENT
Start: 2024-04-29

## 2024-05-01 ENCOUNTER — ANESTHESIA EVENT (OUTPATIENT)
Dept: CARDIOLOGY | Facility: HOSPITAL | Age: 69
End: 2024-05-01
Payer: MEDICARE

## 2024-05-01 ENCOUNTER — ANESTHESIA (OUTPATIENT)
Dept: CARDIOLOGY | Facility: HOSPITAL | Age: 69
End: 2024-05-01
Payer: MEDICARE

## 2024-05-01 ENCOUNTER — HOSPITAL ENCOUNTER (OUTPATIENT)
Facility: HOSPITAL | Age: 69
Setting detail: OUTPATIENT SURGERY
Discharge: HOME | End: 2024-05-01
Attending: INTERNAL MEDICINE | Admitting: INTERNAL MEDICINE
Payer: MEDICARE

## 2024-05-01 VITALS
SYSTOLIC BLOOD PRESSURE: 115 MMHG | HEART RATE: 72 BPM | WEIGHT: 260 LBS | HEIGHT: 62 IN | DIASTOLIC BLOOD PRESSURE: 79 MMHG | RESPIRATION RATE: 18 BRPM | BODY MASS INDEX: 47.84 KG/M2 | OXYGEN SATURATION: 92 %

## 2024-05-01 DIAGNOSIS — I48.0 PAF (PAROXYSMAL ATRIAL FIBRILLATION) (MULTI): ICD-10-CM

## 2024-05-01 PROCEDURE — A92960 PR CARDIOVERSION, ELECTIVE;EXTERN: Performed by: ANESTHESIOLOGIST ASSISTANT

## 2024-05-01 PROCEDURE — 7100000009 HC PHASE TWO TIME - INITIAL BASE CHARGE: Performed by: INTERNAL MEDICINE

## 2024-05-01 PROCEDURE — 3700000002 HC GENERAL ANESTHESIA TIME - EACH INCREMENTAL 1 MINUTE: Performed by: INTERNAL MEDICINE

## 2024-05-01 PROCEDURE — 7100000010 HC PHASE TWO TIME - EACH INCREMENTAL 1 MINUTE: Performed by: INTERNAL MEDICINE

## 2024-05-01 PROCEDURE — 92960 CARDIOVERSION ELECTRIC EXT: CPT | Performed by: INTERNAL MEDICINE

## 2024-05-01 PROCEDURE — 3700000001 HC GENERAL ANESTHESIA TIME - INITIAL BASE CHARGE: Performed by: INTERNAL MEDICINE

## 2024-05-01 PROCEDURE — A92960 PR CARDIOVERSION, ELECTIVE;EXTERN: Performed by: STUDENT IN AN ORGANIZED HEALTH CARE EDUCATION/TRAINING PROGRAM

## 2024-05-01 PROCEDURE — 2500000004 HC RX 250 GENERAL PHARMACY W/ HCPCS (ALT 636 FOR OP/ED): Performed by: ANESTHESIOLOGIST ASSISTANT

## 2024-05-01 RX ORDER — SODIUM CHLORIDE 9 MG/ML
INJECTION, SOLUTION INTRAVENOUS CONTINUOUS PRN
Status: DISCONTINUED | OUTPATIENT
Start: 2024-05-01 | End: 2024-05-01

## 2024-05-01 RX ORDER — PROPOFOL 10 MG/ML
INJECTION, EMULSION INTRAVENOUS AS NEEDED
Status: DISCONTINUED | OUTPATIENT
Start: 2024-05-01 | End: 2024-05-01

## 2024-05-01 RX ADMIN — PROPOFOL 50 MG: 10 INJECTION, EMULSION INTRAVENOUS at 10:45

## 2024-05-01 RX ADMIN — PROPOFOL 50 MG: 10 INJECTION, EMULSION INTRAVENOUS at 10:44

## 2024-05-01 RX ADMIN — SODIUM CHLORIDE: 9 INJECTION, SOLUTION INTRAVENOUS at 10:40

## 2024-05-01 SDOH — HEALTH STABILITY: MENTAL HEALTH: CURRENT SMOKER: 0

## 2024-05-01 ASSESSMENT — COLUMBIA-SUICIDE SEVERITY RATING SCALE - C-SSRS
2. HAVE YOU ACTUALLY HAD ANY THOUGHTS OF KILLING YOURSELF?: NO
1. IN THE PAST MONTH, HAVE YOU WISHED YOU WERE DEAD OR WISHED YOU COULD GO TO SLEEP AND NOT WAKE UP?: NO
6. HAVE YOU EVER DONE ANYTHING, STARTED TO DO ANYTHING, OR PREPARED TO DO ANYTHING TO END YOUR LIFE?: NO

## 2024-05-01 ASSESSMENT — PAIN - FUNCTIONAL ASSESSMENT: PAIN_FUNCTIONAL_ASSESSMENT: 0-10

## 2024-05-01 ASSESSMENT — PAIN SCALES - GENERAL: PAINLEVEL_OUTOF10: 0 - NO PAIN

## 2024-05-01 NOTE — ANESTHESIA POSTPROCEDURE EVALUATION
Patient: Vickie Foster    Procedure Summary       Date: 05/01/24 Room / Location: [facility] CATH LAB 2 (EP) / [facility] Cardiac Cath Lab    Anesthesia Start: 1040 Anesthesia Stop: 1058    Procedure: Cardioversion Diagnosis:       PAF (paroxysmal atrial fibrillation) (Multi)      (PAF (paroxysmal atrial fibrillation) (Multi) [I48.0])    Providers: Amado Ty MD Responsible Provider: Shankar Penaloza DO    Anesthesia Type: MAC ASA Status: 3            Anesthesia Type: MAC    Vitals Value Taken Time   /87 05/01/24 1110   Temp 36 05/01/24 1114   Pulse 61 05/01/24 1110   Resp 18 05/01/24 1110   SpO2 90 % 05/01/24 1110       Anesthesia Post Evaluation    Patient location during evaluation: bedside  Patient participation: complete - patient participated  Level of consciousness: awake and alert  Pain management: adequate  Multimodal analgesia pain management approach  Airway patency: patent  Two or more strategies used to mitigate risk of obstructive sleep apnea  Cardiovascular status: acceptable  Respiratory status: acceptable  Hydration status: acceptable  Postoperative Nausea and Vomiting: none        There were no known notable events for this encounter.

## 2024-05-01 NOTE — POST-PROCEDURE NOTE
Physician Transition of Care Summary  Invasive Cardiovascular Lab    Procedure Date: 5/1/2024  Attending:    * Amado Ty - Primary  Resident/Fellow/Other Assistant: Surgeons and Role:  * No surgeons found with a matching role *    Indications:   Pre-op Diagnosis     * PAF (paroxysmal atrial fibrillation) (Multi) [I48.0]    Post-procedure diagnosis:   Post-op Diagnosis     * PAF (paroxysmal atrial fibrillation) (Multi) [I48.0]    Procedure(s):   Cardioversion  20933 - AK CARDIOVERSION ELECTIVE ARRHYTHMIA EXTERNAL    AK CARDIOVERSION ELECTIVE ARRHYTHMIA EXTERNAL [62624]    Procedure Findings:   See below    Description of the Procedure:   The patient was brought to the Heart and vascular care area while in atrial fibrillation.  She has been compliant with her medications including her anticoagulation without missing any doses.  Anterior/posterior patches were applied and the patient underwent a synchronized 250 joule biphasic shock restoring sinus rhythm immediately with no complications.    Summary:  Successful cardioversion of atrial fibrillation.     Complications:   none    Stents/Implants:   Implants       No implant documentation for this case.            Anticoagulation/Antiplatelet Plan:   eliquis    Estimated Blood Loss:   * No values recorded between 5/1/2024 12:00 AM and 5/1/2024 10:46 AM *    Anesthesia: Monitor Anesthesia Care Anesthesia Staff: Anesthesiologist: DO YANCI SpiveyAA: RASHAUN Brown    Any Specimen(s) Removed:   No specimens collected during this procedure.    Disposition:   stable      Electronically signed by: Amado Ty MD, 5/1/2024 10:46 AM

## 2024-05-01 NOTE — ANESTHESIA PREPROCEDURE EVALUATION
Patient: Vickie Foster    Procedure Information       Date/Time: 05/01/24 1030    Procedure: Cardioversion - 25370  i48.0  [employer]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    Location: [facility] CATH LAB 2 (EP) / [facility] Cardiac Cath Lab    Providers: Amado Ty MD          Past Medical History:   Diagnosis Date    A-fib (Multi)     CHF (congestive heart failure) (Multi)     COVID     HTN (hypertension)     Low back pain      Past Surgical History:   Procedure Laterality Date    CARDIOVERSION         Relevant Problems   Anesthesia (within normal limits)      Cardiac   (+) Acute congestive heart failure (Multi)   (+) Benign essential hypertension   (+) Coronary arteriosclerosis   (+) Hypertension   (+) Longstanding persistent atrial fibrillation (Multi)   (+) PAF (paroxysmal atrial fibrillation) (Multi)      Pulmonary   (+) Asthma (HHS-HCC)   (+) Chronic obstructive lung disease (Multi)   (+) Dyspnea on exertion   (+) Obstructive sleep apnea syndrome   (+) Pulmonary hypertension (Multi)      Neuro   (+) Anxiety   (+) Mixed anxiety depressive disorder   (+) Moderately severe depression      GI   (+) Gastroesophageal reflux disease      Endocrine   (+) Morbid (severe) obesity due to excess calories (Multi)      Musculoskeletal   (+) Lumbar spondylosis   (+) Primary osteoarthritis of left knee   (+) Thoracic spondylosis      ID   (+) Acute URI       Clinical information reviewed:                   NPO Detail:  No data recorded     Physical Exam    Airway  Mallampati: II  TM distance: >3 FB  Neck ROM: full     Cardiovascular    Dental    Pulmonary    Abdominal            Anesthesia Plan    History of general anesthesia?: yes  History of complications of general anesthesia?: no    ASA 3     MAC     The patient is not a current smoker.  Patient was not previously instructed to abstain from smoking on day of procedure.  Patient did not smoke on day of procedure.  Education provided regarding risk of obstructive sleep  apnea.  intravenous induction   Postoperative administration of opioids is intended.  Anesthetic plan and risks discussed with patient.  Use of blood products discussed with patient who consented to blood products.    Plan discussed with CRNA and CAA.

## 2024-05-02 ASSESSMENT — PAIN SCALES - GENERAL
PAINLEVEL_OUTOF10: 0 - NO PAIN
PAINLEVEL_OUTOF10: 0 - NO PAIN

## 2024-05-15 DIAGNOSIS — I48.0 PAF (PAROXYSMAL ATRIAL FIBRILLATION) (MULTI): ICD-10-CM

## 2024-05-16 DIAGNOSIS — I48.0 PAF (PAROXYSMAL ATRIAL FIBRILLATION) (MULTI): ICD-10-CM

## 2024-05-16 RX ORDER — DOFETILIDE 0.25 MG/1
250 CAPSULE ORAL EVERY 12 HOURS
Qty: 180 CAPSULE | Refills: 1 | Status: SHIPPED | OUTPATIENT
Start: 2024-05-16 | End: 2024-11-12

## 2024-05-17 RX ORDER — DOFETILIDE 0.25 MG/1
250 CAPSULE ORAL EVERY 12 HOURS
Qty: 180 CAPSULE | Refills: 1 | Status: SHIPPED | OUTPATIENT
Start: 2024-05-17 | End: 2024-11-13

## 2024-05-22 ENCOUNTER — HOSPITAL ENCOUNTER (OUTPATIENT)
Dept: RADIOLOGY | Facility: CLINIC | Age: 69
Discharge: HOME | End: 2024-05-22
Payer: MEDICARE

## 2024-05-22 ENCOUNTER — OFFICE VISIT (OUTPATIENT)
Dept: PAIN MEDICINE | Facility: CLINIC | Age: 69
End: 2024-05-22
Payer: MEDICARE

## 2024-05-22 VITALS
HEIGHT: 64 IN | OXYGEN SATURATION: 94 % | DIASTOLIC BLOOD PRESSURE: 90 MMHG | BODY MASS INDEX: 43.36 KG/M2 | HEART RATE: 89 BPM | WEIGHT: 254 LBS | RESPIRATION RATE: 18 BRPM | SYSTOLIC BLOOD PRESSURE: 138 MMHG

## 2024-05-22 DIAGNOSIS — G89.29 CHRONIC RIGHT SHOULDER PAIN: ICD-10-CM

## 2024-05-22 DIAGNOSIS — M25.511 CHRONIC RIGHT SHOULDER PAIN: ICD-10-CM

## 2024-05-22 DIAGNOSIS — M75.51 SUBACROMIAL BURSITIS OF RIGHT SHOULDER JOINT: Primary | ICD-10-CM

## 2024-05-22 PROCEDURE — 73030 X-RAY EXAM OF SHOULDER: CPT | Mod: RIGHT SIDE | Performed by: RADIOLOGY

## 2024-05-22 PROCEDURE — 1160F RVW MEDS BY RX/DR IN RCRD: CPT | Performed by: STUDENT IN AN ORGANIZED HEALTH CARE EDUCATION/TRAINING PROGRAM

## 2024-05-22 PROCEDURE — 99214 OFFICE O/P EST MOD 30 MIN: CPT | Performed by: STUDENT IN AN ORGANIZED HEALTH CARE EDUCATION/TRAINING PROGRAM

## 2024-05-22 PROCEDURE — 1036F TOBACCO NON-USER: CPT | Performed by: STUDENT IN AN ORGANIZED HEALTH CARE EDUCATION/TRAINING PROGRAM

## 2024-05-22 PROCEDURE — 3008F BODY MASS INDEX DOCD: CPT | Performed by: STUDENT IN AN ORGANIZED HEALTH CARE EDUCATION/TRAINING PROGRAM

## 2024-05-22 PROCEDURE — 3075F SYST BP GE 130 - 139MM HG: CPT | Performed by: STUDENT IN AN ORGANIZED HEALTH CARE EDUCATION/TRAINING PROGRAM

## 2024-05-22 PROCEDURE — 73030 X-RAY EXAM OF SHOULDER: CPT | Mod: RT

## 2024-05-22 PROCEDURE — 1159F MED LIST DOCD IN RCRD: CPT | Performed by: STUDENT IN AN ORGANIZED HEALTH CARE EDUCATION/TRAINING PROGRAM

## 2024-05-22 PROCEDURE — 3080F DIAST BP >= 90 MM HG: CPT | Performed by: STUDENT IN AN ORGANIZED HEALTH CARE EDUCATION/TRAINING PROGRAM

## 2024-05-22 PROCEDURE — 1125F AMNT PAIN NOTED PAIN PRSNT: CPT | Performed by: STUDENT IN AN ORGANIZED HEALTH CARE EDUCATION/TRAINING PROGRAM

## 2024-05-22 ASSESSMENT — PAIN SCALES - GENERAL
PAINLEVEL_OUTOF10: 9
PAINLEVEL: 9

## 2024-05-22 ASSESSMENT — PAIN - FUNCTIONAL ASSESSMENT: PAIN_FUNCTIONAL_ASSESSMENT: 0-10

## 2024-05-22 ASSESSMENT — PAIN DESCRIPTION - DESCRIPTORS: DESCRIPTORS: ACHING

## 2024-05-22 ASSESSMENT — PATIENT HEALTH QUESTIONNAIRE - PHQ9
1. LITTLE INTEREST OR PLEASURE IN DOING THINGS: NOT AT ALL
SUM OF ALL RESPONSES TO PHQ9 QUESTIONS 1 AND 2: 0
2. FEELING DOWN, DEPRESSED OR HOPELESS: NOT AT ALL

## 2024-05-22 NOTE — PROGRESS NOTES
[facility] Pain Management  Follow Up Office Visit Note 2024    Patient Information: Vickie Foster, MRN: [id number], : 1955   Primary Care/Referring Physician: Danilo Gonzalez, , [address] [facility] [address] / [facility] [address]     Chief Complaint: Left knee pain  Interval History: At her last office visit I performed a right knee joint injection    Today she reports continued pain relief from her last right knee injection.     She has a new right shoulder pain which started 3-4 weeks ago with no obvious inciting event. She has pain when reaching above her head, when holding her purse. She has some pain to palpation of her right anterior shoulder. She denies any pain radiating further down the arm. She denies any numbness, tingling, or weakness in the arms.     Brief History of Pain: Ms. Vickie Foster is a 68 y.o. female with a PMHx of CHF, pulmonary HTN, AFib (on Eliquis) who presents today for follow up regarding chronic low back/buttock pain.    For reference, she reports pain started approximately 2022 with no obvious inciting event. She has had this type of pain in past, but it is much more severe than normal. Has been to the ED a few times for this, had xrays performed which didn't show any fracture in the spine or hip. During one of these visits she was noted to have worsening LE edema and was admitted for CHF exacerbation. During this admission she continued to have back pain but also developed gout in her right foot. Is having difficulty bearing weight on her right foot, as it worsens both foot pain and causes pain in her right low back and burning pain in her buttock. Has to lie a certain way in bed (moreso on her left side) to get somewhat comfortable. Denies any numbness, tingling in her legs. She is unsure about weakness as she is not walking much.    Current Pain Medications: Gabapentin 300 mg TID. Lidocaine patches - tried on both back and foot without benefit.  Previously Tried Pain  Medications: Methocarbamol. Unable to use NSAID's due to history of ulcers. Tried BioFreeze without benefit    Relevant Surgeries: Denies hip or back surgery. Denies ankle or foot surgery.  Injections: Left knee injection - 60% pain relief for 8 months. Caudal ADAM - significant improvement in pain and ambulation, Right SI joint injection - 80% pain relief  Physical/Occupational Therapy: Denies any PT recently    Medications:   Current Outpatient Medications   Medication Instructions    allopurinol (Zyloprim) 100 mg tablet TAKE 2 TABLETS (200 MG) EVERY 12 HOURS    ascorbic acid (VITAMIN C WITH ZINA HIPS) 1,000 mg, oral, Daily    biotin 10 mg tablet 1 tablet, oral, Daily    budesonide-formoteroL (Symbicort) 80-4.5 mcg/actuation inhaler 2 puffs, inhalation, 2 times daily RT, Rinse mouth with water after use to reduce aftertaste and incidence of candidiasis. Do not swallow.    buPROPion XL (WELLBUTRIN XL) 300 mg, oral, Daily before breakfast    calcium carb-vitamin D3-vit K2 600 mg-1,000 unit-90 mcg tablet oral, Daily    docusate sodium (COLACE) 100 mg, oral, 2 times daily    dofetilide (Tikosyn) 250 mcg capsule TAKE 1 CAPSULE BY MOUTH EVERY 12 HOURS    dofetilide (TIKOSYN) 250 mcg, oral, Every 12 hours    Eliquis 5 mg, oral, 2 times daily    folic acid/multivit-min/lutein (CENTRUM SILVER ORAL) oral, As directed    gabapentin (NEURONTIN) 300 mg, oral, 3 times daily    metOLazone (ZAROXOLYN) 5 mg, oral, Daily PRN    metoprolol succinate XL (Toprol-XL) 25 mg 24 hr tablet TAKE 1 TABLET BY MOUTH ONE TIME DAILY    oxygen (O2) 3 L/min, inhalation, Nightly    potassium chloride CR 20 mEq ER tablet 20 mEq, oral, 3 times daily (morning, midday, late afternoon)    torsemide (DEMADEX) 50 mg, oral, 2 times daily      Allergies: No Known Allergies    Past Medical & Surgical History:  Past Medical History:   Diagnosis Date    A-fib (Multi)     CHF (congestive heart failure) (Multi)     COVID     HTN (hypertension)     Low back pain        Past Surgical History:   Procedure Laterality Date    CARDIAC ELECTROPHYSIOLOGY PROCEDURE N/A 05/01/2024    Procedure: Cardioversion;  Surgeon: Amado Ty MD;  Location: [facility] Cardiac Cath Lab;  Service: Electrophysiology;  Laterality: N/A;  66321  i48.0  medical mutual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    CARDIOVERSION      05/01/2024       Family History   Problem Relation Name Age of Onset    Breast cancer Mother      Heart attack Mother      Heart disease Mother      Colon cancer Father      Breast cancer Sister      Diabetes type II Sister      Kidney failure Sister      Other (herione addict) Sister      No Known Problems Brother      No Known Problems Daughter      Other (recovering alcoholic) Son      Breast cancer Other Maternal Aunt      Social History     Socioeconomic History    Marital status: Unknown     Spouse name: Not on file    Number of children: Not on file    Years of education: Not on file    Highest education level: Not on file   Occupational History    Not on file   Tobacco Use    Smoking status: Former     Types: Cigarettes    Smokeless tobacco: Never   Vaping Use    Vaping status: Never Used   Substance and Sexual Activity    Alcohol use: Yes     Comment: monthly or less    Drug use: Never    Sexual activity: Defer   Other Topics Concern    Not on file   Social History Narrative    Not on file     Social Determinants of Health     Financial Resource Strain: Not on file   Food Insecurity: Not on file   Transportation Needs: Not on file   Physical Activity: Not on file   Stress: Not on file   Social Connections: Not on file   Intimate Partner Violence: Not on file   Housing Stability: Not on file       Problems, Past medical history, past surgical history, Medications, allergies, social and family history reviewed and as per the electronic medical record from today's encounter    Review of Systems:  CONST: No fever, chills, fatigue, weight changes  EYES: No loss of vision  ENT: No hearing loss,  "tinnitus  CV: No chest pain, palpitations  RESP: Reports some dyspnea on exertion  GI: No stool incontinence, nausea, vomiting  : No urinary incontinence  MSK: No joint swelling  SKIN: No rash, no hives  NEURO: No headache, dizziness  PSYCH: No anxiety, depression  HEM/LYMPH: No easy bruising or bleeding  All other systems reviewed are negative     Physical Exam:  Vitals: /90   Pulse 89   Resp 18   Ht 1.626 m (5' 4\")   Wt 115 kg (254 lb)   SpO2 94%   BMI 43.60 kg/m²   General: No apparent distress. Alert, appropriate, oriented x 3. Mood generally positive, affect congruent. Speaking in full sentences.   HENT: Normocephalic, atraumatic. Hearing intact.  Eyes: Pupils equal and round  Neck: Supple, trachea midline  Lungs: Symmetric respiratory excursion on visual exam, nonlabored breathing.   Extremities: No cyanosis noted in extremities. Pitting edema noted in legs up to the shins  Skin: No rashes, lesions noted.  MSK: Reports significant pain with right shoulder resisted external rotation, empty can test, and shoulder impingement maneuvers. Mild weakness appreciated with right shoulder empty can test. Reports pain to palpation of right anterior shoulder.   Neuro: Alert and appropriate. Using a rollator to ambulate. Bulk and tone within normal limits.    Laboratory Data:  The following laboratory data were reviewed during this visit:   Lab Results   Component Value Date    WBC 8.3 06/08/2023    RBC 4.15 06/08/2023    HGB 12.7 06/08/2023    HCT 41.2 06/08/2023     06/08/2023      Lab Results   Component Value Date    INR 1.0 06/05/2023    INR 1.0 12/01/2019     Lab Results   Component Value Date    CREATININE 0.60 12/07/2023    HGBA1C 6.1 (A) 09/29/2023       Imaging:  The following imaging impressions were reviewed by me during this visit:    -8/2022 lumbar MRI shows L3-L4 moderate to marked spinal canal stenosis and right foraminal stenosis without nerve root impingement. L4-L5 moderate spinal " canal stenosis. L5-S1 with no significatn central or foraminal stenosis   -4/2021 left knee xray shows moderate OA  -7/20/23 cervical spine xray shows diffuse, severe degenerative changes    I also personally reviewed the images from the above studies myself. These images and my interpretation of them contributed to the management and decision making of the patient's medical plan.    ASSESSMENT:  Ms. Vickie Foster is a 68 y.o. female with low back, leg, and neck pain that is consistent with:    1. Subacromial bursitis of right shoulder joint    2. Chronic right shoulder pain        PLAN:  Radiology: -Given new right shoulder pain I recommend a right shoulder xray    Physically: Continue your regular HEP    Psychologically: No needs at this time    Medication: -Continue Gabapentin 300 mg TID    Duration: Greater than 2 month    Intervention: -Reports significant benefit from recent right SI joint injection but continued to have burning buttock pain (R>L) that radiates down her thighs, likely secondary to known lumbar spinal stenosis. Then performed a caudal ADAM instead with significant improvement in pain. Of note, is on Eliquis  - Also reports left knee pain, with prior knee x-ray showing moderate osteoarthritis. Her last left knee injection is providing 60% pain relief, persistent to today  - I suspect her current neck pain is secondary to cervical facet arthropathy and myofascial pain/spasms. She has received some benefit from TPI's in the past  - I suspect her right shoulder pain is secondary to right shoulder subacromial bursitis, with possible contribution from shoulder joint osteoarthritis. Given the severity of her pain and limited range of motion of her shoulder I recommend an ultrasound guided right subacromial bursa injection utilizing local anesthesia at her next office visit      Sincerely,  Joseph Kelley MD  [facility] Pain Management - [place]

## 2024-05-27 DIAGNOSIS — I10 BENIGN ESSENTIAL HYPERTENSION: Primary | ICD-10-CM

## 2024-05-29 RX ORDER — POTASSIUM CHLORIDE 1500 MG/1
TABLET, EXTENDED RELEASE ORAL
Qty: 270 TABLET | Refills: 3 | Status: SHIPPED | OUTPATIENT
Start: 2024-05-29

## 2024-05-30 ENCOUNTER — HOSPITAL ENCOUNTER (OUTPATIENT)
Dept: RADIOLOGY | Facility: EXTERNAL LOCATION | Age: 69
Discharge: HOME | End: 2024-05-30
Payer: MEDICARE

## 2024-05-30 ENCOUNTER — OFFICE VISIT (OUTPATIENT)
Dept: CARDIOLOGY | Facility: CLINIC | Age: 69
End: 2024-05-30
Payer: MEDICARE

## 2024-05-30 ENCOUNTER — OFFICE VISIT (OUTPATIENT)
Dept: PAIN MEDICINE | Facility: CLINIC | Age: 69
End: 2024-05-30
Payer: MEDICARE

## 2024-05-30 VITALS
DIASTOLIC BLOOD PRESSURE: 84 MMHG | OXYGEN SATURATION: 97 % | BODY MASS INDEX: 43.36 KG/M2 | SYSTOLIC BLOOD PRESSURE: 118 MMHG | HEART RATE: 76 BPM | WEIGHT: 254 LBS | HEIGHT: 64 IN | RESPIRATION RATE: 20 BRPM

## 2024-05-30 VITALS — SYSTOLIC BLOOD PRESSURE: 110 MMHG | DIASTOLIC BLOOD PRESSURE: 76 MMHG | HEART RATE: 86 BPM

## 2024-05-30 DIAGNOSIS — M25.561 CHRONIC PAIN OF BOTH KNEES: ICD-10-CM

## 2024-05-30 DIAGNOSIS — M17.0 BILATERAL PRIMARY OSTEOARTHRITIS OF KNEE: ICD-10-CM

## 2024-05-30 DIAGNOSIS — M75.51 SUBACROMIAL BURSITIS OF RIGHT SHOULDER JOINT: Primary | ICD-10-CM

## 2024-05-30 DIAGNOSIS — G89.29 CHRONIC PAIN OF BOTH KNEES: ICD-10-CM

## 2024-05-30 DIAGNOSIS — M54.16 LUMBAR RADICULOPATHY: ICD-10-CM

## 2024-05-30 DIAGNOSIS — I48.0 PAF (PAROXYSMAL ATRIAL FIBRILLATION) (MULTI): Primary | ICD-10-CM

## 2024-05-30 DIAGNOSIS — M25.562 CHRONIC PAIN OF BOTH KNEES: ICD-10-CM

## 2024-05-30 DIAGNOSIS — R06.09 DYSPNEA ON EXERTION: ICD-10-CM

## 2024-05-30 PROCEDURE — 20611 DRAIN/INJ JOINT/BURSA W/US: CPT | Performed by: STUDENT IN AN ORGANIZED HEALTH CARE EDUCATION/TRAINING PROGRAM

## 2024-05-30 PROCEDURE — 3078F DIAST BP <80 MM HG: CPT | Performed by: INTERNAL MEDICINE

## 2024-05-30 PROCEDURE — 1036F TOBACCO NON-USER: CPT | Performed by: STUDENT IN AN ORGANIZED HEALTH CARE EDUCATION/TRAINING PROGRAM

## 2024-05-30 PROCEDURE — 1160F RVW MEDS BY RX/DR IN RCRD: CPT | Performed by: STUDENT IN AN ORGANIZED HEALTH CARE EDUCATION/TRAINING PROGRAM

## 2024-05-30 PROCEDURE — 99214 OFFICE O/P EST MOD 30 MIN: CPT | Performed by: STUDENT IN AN ORGANIZED HEALTH CARE EDUCATION/TRAINING PROGRAM

## 2024-05-30 PROCEDURE — 3008F BODY MASS INDEX DOCD: CPT | Performed by: INTERNAL MEDICINE

## 2024-05-30 PROCEDURE — 3074F SYST BP LT 130 MM HG: CPT | Performed by: STUDENT IN AN ORGANIZED HEALTH CARE EDUCATION/TRAINING PROGRAM

## 2024-05-30 PROCEDURE — 93010 ELECTROCARDIOGRAM REPORT: CPT | Performed by: INTERNAL MEDICINE

## 2024-05-30 PROCEDURE — 2500000005 HC RX 250 GENERAL PHARMACY W/O HCPCS: Performed by: STUDENT IN AN ORGANIZED HEALTH CARE EDUCATION/TRAINING PROGRAM

## 2024-05-30 PROCEDURE — 3079F DIAST BP 80-89 MM HG: CPT | Performed by: STUDENT IN AN ORGANIZED HEALTH CARE EDUCATION/TRAINING PROGRAM

## 2024-05-30 PROCEDURE — 1159F MED LIST DOCD IN RCRD: CPT | Performed by: INTERNAL MEDICINE

## 2024-05-30 PROCEDURE — 1125F AMNT PAIN NOTED PAIN PRSNT: CPT | Performed by: STUDENT IN AN ORGANIZED HEALTH CARE EDUCATION/TRAINING PROGRAM

## 2024-05-30 PROCEDURE — 1159F MED LIST DOCD IN RCRD: CPT | Performed by: STUDENT IN AN ORGANIZED HEALTH CARE EDUCATION/TRAINING PROGRAM

## 2024-05-30 PROCEDURE — 2500000004 HC RX 250 GENERAL PHARMACY W/ HCPCS (ALT 636 FOR OP/ED): Performed by: STUDENT IN AN ORGANIZED HEALTH CARE EDUCATION/TRAINING PROGRAM

## 2024-05-30 PROCEDURE — 93005 ELECTROCARDIOGRAM TRACING: CPT | Performed by: INTERNAL MEDICINE

## 2024-05-30 PROCEDURE — 99214 OFFICE O/P EST MOD 30 MIN: CPT | Performed by: INTERNAL MEDICINE

## 2024-05-30 PROCEDURE — 3008F BODY MASS INDEX DOCD: CPT | Performed by: STUDENT IN AN ORGANIZED HEALTH CARE EDUCATION/TRAINING PROGRAM

## 2024-05-30 PROCEDURE — 3074F SYST BP LT 130 MM HG: CPT | Performed by: INTERNAL MEDICINE

## 2024-05-30 RX ORDER — LIDOCAINE HYDROCHLORIDE 10 MG/ML
3 INJECTION, SOLUTION EPIDURAL; INFILTRATION; INTRACAUDAL; PERINEURAL ONCE
Status: COMPLETED | OUTPATIENT
Start: 2024-05-30 | End: 2024-05-30

## 2024-05-30 RX ORDER — TRIAMCINOLONE ACETONIDE 40 MG/ML
40 INJECTION, SUSPENSION INTRA-ARTICULAR; INTRAMUSCULAR ONCE
Status: COMPLETED | OUTPATIENT
Start: 2024-05-30 | End: 2024-05-30

## 2024-05-30 RX ORDER — GABAPENTIN 300 MG/1
300 CAPSULE ORAL 3 TIMES DAILY
Qty: 270 CAPSULE | Refills: 1 | Status: SHIPPED | OUTPATIENT
Start: 2024-05-30

## 2024-05-30 RX ADMIN — TRIAMCINOLONE ACETONIDE 40 MG: 40 INJECTION, SUSPENSION INTRA-ARTICULAR; INTRAMUSCULAR at 15:18

## 2024-05-30 RX ADMIN — LIDOCAINE HYDROCHLORIDE 30 MG: 10 SOLUTION INTRAVENOUS at 15:20

## 2024-05-30 ASSESSMENT — PAIN - FUNCTIONAL ASSESSMENT: PAIN_FUNCTIONAL_ASSESSMENT: 0-10

## 2024-05-30 ASSESSMENT — PATIENT HEALTH QUESTIONNAIRE - PHQ9
SUM OF ALL RESPONSES TO PHQ9 QUESTIONS 1 AND 2: 2
2. FEELING DOWN, DEPRESSED OR HOPELESS: SEVERAL DAYS
1. LITTLE INTEREST OR PLEASURE IN DOING THINGS: SEVERAL DAYS
SUM OF ALL RESPONSES TO PHQ9 QUESTIONS 1 AND 2: 0
10. IF YOU CHECKED OFF ANY PROBLEMS, HOW DIFFICULT HAVE THESE PROBLEMS MADE IT FOR YOU TO DO YOUR WORK, TAKE CARE OF THINGS AT HOME, OR GET ALONG WITH OTHER PEOPLE: NOT DIFFICULT AT ALL
2. FEELING DOWN, DEPRESSED OR HOPELESS: NOT AT ALL
1. LITTLE INTEREST OR PLEASURE IN DOING THINGS: NOT AT ALL

## 2024-05-30 ASSESSMENT — COLUMBIA-SUICIDE SEVERITY RATING SCALE - C-SSRS
2. HAVE YOU ACTUALLY HAD ANY THOUGHTS OF KILLING YOURSELF?: NO
6. HAVE YOU EVER DONE ANYTHING, STARTED TO DO ANYTHING, OR PREPARED TO DO ANYTHING TO END YOUR LIFE?: NO
1. IN THE PAST MONTH, HAVE YOU WISHED YOU WERE DEAD OR WISHED YOU COULD GO TO SLEEP AND NOT WAKE UP?: NO

## 2024-05-30 ASSESSMENT — PAIN SCALES - GENERAL
PAINLEVEL: 5
PAINLEVEL_OUTOF10: 5 - MODERATE PAIN

## 2024-05-30 ASSESSMENT — PAIN DESCRIPTION - DESCRIPTORS: DESCRIPTORS: ACHING;STABBING

## 2024-05-30 NOTE — PROGRESS NOTES
[facility] Pain Management  Follow Up Office Visit Note 2024    Patient Information: Vickie Foster, MRN: [id number], : 1955   Primary Care/Referring Physician: Danilo Gonzalez, , [address] [facility] [address] / [facility] [address]     Chief Complaint: Left knee pain  Interval History: At her last office visit I ordered a right shoulder xray and discussed possible right subacromial bursa injection    Today she reports no changes since last seen. She continues to have right anterior shoulder pain, similar to before. I reviewed her right shoulder xray which shows moderate to severe glenohumeral joint OA and moderate AC joint OA. She would like to proceed with injection today    She also feels her bilateral knee pain has been worsening, right worse than left. Her last knee injection was approximately 3 months ago but is starting to wear off.     For reference, she has pain when reaching above her head, when holding her purse. She has some pain to palpation of her right anterior shoulder. She denies any pain radiating further down the arm. She denies any numbness, tingling, or weakness in the arms.     Brief History of Pain: Ms. Vickie Foster is a 68 y.o. female with a PMHx of CHF, pulmonary HTN, AFib (on Eliquis) who presents today for follow up regarding chronic low back/buttock pain.    For reference, she reports pain started approximately 2022 with no obvious inciting event. She has had this type of pain in past, but it is much more severe than normal. Has been to the ED a few times for this, had xrays performed which didn't show any fracture in the spine or hip. During one of these visits she was noted to have worsening LE edema and was admitted for CHF exacerbation. During this admission she continued to have back pain but also developed gout in her right foot. Is having difficulty bearing weight on her right foot, as it worsens both foot pain and causes pain in her right low back and burning pain in her  buttock. Has to lie a certain way in bed (moreso on her left side) to get somewhat comfortable. Denies any numbness, tingling in her legs. She is unsure about weakness as she is not walking much.    Current Pain Medications: Gabapentin 300 mg TID. Lidocaine patches - tried on both back and foot without benefit.  Previously Tried Pain Medications: Methocarbamol. Unable to use NSAID's due to history of ulcers. Tried BioFreeze without benefit    Relevant Surgeries: Denies hip or back surgery. Denies ankle or foot surgery.  Injections: Left knee injection - 60% pain relief for 8 months. Caudal ADAM - significant improvement in pain and ambulation, Right SI joint injection - 80% pain relief  Physical/Occupational Therapy: Denies any PT recently    Medications:   Current Outpatient Medications   Medication Instructions    allopurinol (Zyloprim) 100 mg tablet TAKE 2 TABLETS (200 MG) EVERY 12 HOURS    ascorbic acid (VITAMIN C WITH ZINA HIPS) 1,000 mg, oral, Daily    biotin 10 mg tablet 1 tablet, oral, Daily    budesonide-formoteroL (Symbicort) 80-4.5 mcg/actuation inhaler 2 puffs, inhalation, 2 times daily RT, Rinse mouth with water after use to reduce aftertaste and incidence of candidiasis. Do not swallow.    buPROPion XL (WELLBUTRIN XL) 300 mg, oral, Daily before breakfast    calcium carb-vitamin D3-vit K2 600 mg-1,000 unit-90 mcg tablet oral, Daily    docusate sodium (COLACE) 100 mg, oral, 2 times daily    dofetilide (Tikosyn) 250 mcg capsule TAKE 1 CAPSULE BY MOUTH EVERY 12 HOURS    dofetilide (TIKOSYN) 250 mcg, oral, Every 12 hours    Eliquis 5 mg, oral, 2 times daily    folic acid/multivit-min/lutein (CENTRUM SILVER ORAL) oral, As directed    gabapentin (NEURONTIN) 300 mg, oral, 3 times daily    Klor-Con M20 20 mEq ER tablet TAKE 1 TABLET THREE TIMES A DAY WITH FOOD    metOLazone (ZAROXOLYN) 5 mg, oral, Daily PRN    metoprolol succinate XL (Toprol-XL) 25 mg 24 hr tablet TAKE 1 TABLET BY MOUTH ONE TIME DAILY    oxygen  (O2) 3 L/min, inhalation, Nightly    semaglutide 2.3 mg, subcutaneous, Once Weekly    torsemide (DEMADEX) 50 mg, oral, 2 times daily      Allergies: No Known Allergies    Past Medical & Surgical History:  Past Medical History:   Diagnosis Date    A-fib (Multi)     CHF (congestive heart failure) (Multi)     COVID     HTN (hypertension)     Low back pain       Past Surgical History:   Procedure Laterality Date    CARDIAC ELECTROPHYSIOLOGY PROCEDURE N/A 05/01/2024    Procedure: Cardioversion;  Surgeon: Amado Ty MD;  Location: [facility] Cardiac Cath Lab;  Service: Electrophysiology;  Laterality: N/A;  28008  i48.0  [employer]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    CARDIOVERSION      05/01/2024       Family History   Problem Relation Name Age of Onset    Breast cancer Mother      Heart attack Mother      Heart disease Mother      Colon cancer Father      Breast cancer Sister      Diabetes type II Sister      Kidney failure Sister      Other (herione addict) Sister      No Known Problems Brother      No Known Problems Daughter      Other (recovering alcoholic) Son      Breast cancer Other Maternal Aunt      Social History     Socioeconomic History    Marital status: Unknown     Spouse name: Not on file    Number of children: Not on file    Years of education: Not on file    Highest education level: Not on file   Occupational History    Not on file   Tobacco Use    Smoking status: Former     Types: Cigarettes    Smokeless tobacco: Never   Vaping Use    Vaping status: Never Used   Substance and Sexual Activity    Alcohol use: Yes     Comment: monthly or less    Drug use: Never    Sexual activity: Defer   Other Topics Concern    Not on file   Social History Narrative    Not on file     Social Determinants of Health     Financial Resource Strain: Not on file   Food Insecurity: Not on file   Transportation Needs: Not on file   Physical Activity: Not on file   Stress: Not on file   Social Connections: Not on file   Intimate Partner  "Violence: Not on file   Housing Stability: Not on file       Problems, Past medical history, past surgical history, Medications, allergies, social and family history reviewed and as per the electronic medical record from today's encounter    Review of Systems:  CONST: No fever, chills, fatigue, weight changes  EYES: No loss of vision  ENT: No hearing loss, tinnitus  CV: No chest pain, palpitations  RESP: Reports some dyspnea on exertion  GI: No stool incontinence, nausea, vomiting  : No urinary incontinence  MSK: No joint swelling  SKIN: No rash, no hives  NEURO: No headache, dizziness  PSYCH: No anxiety, depression  HEM/LYMPH: No easy bruising or bleeding  All other systems reviewed are negative     Physical Exam:  Vitals: /84   Pulse 76   Resp 20   Ht 1.626 m (5' 4\")   Wt 115 kg (254 lb)   SpO2 97%   BMI 43.60 kg/m²   General: No apparent distress. Alert, appropriate, oriented x 3. Mood generally positive, affect congruent. Speaking in full sentences.   HENT: Normocephalic, atraumatic. Hearing intact.  Eyes: Pupils equal and round  Neck: Supple, trachea midline  Lungs: Symmetric respiratory excursion on visual exam, nonlabored breathing.   Extremities: No cyanosis noted in extremities. Pitting edema noted in legs up to the shins  Skin: No rashes, lesions noted.  MSK: Reports significant pain with right shoulder resisted external rotation, empty can test, and shoulder impingement maneuvers. Mild weakness appreciated with right shoulder empty can test. Reports pain to palpation of right anterior shoulder.   Neuro: Alert and appropriate. Using a rollator to ambulate. Bulk and tone within normal limits.    Laboratory Data:  The following laboratory data were reviewed during this visit:   Lab Results   Component Value Date    WBC 8.3 06/08/2023    RBC 4.15 06/08/2023    HGB 12.7 06/08/2023    HCT 41.2 06/08/2023     06/08/2023      Lab Results   Component Value Date    INR 1.0 06/05/2023    INR 1.0 " 12/01/2019     Lab Results   Component Value Date    CREATININE 0.60 12/07/2023    HGBA1C 6.1 (A) 09/29/2023       Imaging:  The following imaging impressions were reviewed by me during this visit:    -5/22/24 right shoulder xray shows moderate to severe glenohumeral joint OA, moderate AC joint OA  -8/2022 lumbar MRI shows L3-L4 moderate to marked spinal canal stenosis and right foraminal stenosis without nerve root impingement. L4-L5 moderate spinal canal stenosis. L5-S1 with no significatn central or foraminal stenosis   -4/2021 left knee xray shows moderate OA  -7/20/23 cervical spine xray shows diffuse, severe degenerative changes    I also personally reviewed the images from the above studies myself. These images and my interpretation of them contributed to the management and decision making of the patient's medical plan.    ASSESSMENT:  Ms. Vickie Foster is a 68 y.o. female with low back, leg, and neck pain that is consistent with:    1. Subacromial bursitis of right shoulder joint    2. Lumbar radiculopathy    3. Chronic pain of both knees    4. Bilateral primary osteoarthritis of knee          PLAN:  Radiology: -Given new right shoulder pain I ordered a right shoulder xray which shows significant glenohumeral and AC joint osteoarthritis. No new diagnostics at this time.     Physically: Continue your regular HEP    Psychologically: No needs at this time    Medication: -Continue Gabapentin 300 mg TID    Duration: Greater than 2 month    Intervention: -Reports significant benefit from recent right SI joint injection but continued to have burning buttock pain (R>L) that radiates down her thighs, likely secondary to known lumbar spinal stenosis. She subsequently underwent a caudal ADAM with significant improvement in pain. Of note, is on Eliquis  - Also reports bilateral knee pain, with prior knee x-rays showing moderate osteoarthritis. She has undergone both left and right knee joint injections with generally 60%  pain relief for 3-4 months. Given her need for fairly frequent steroid injections in multiple areas of her body I recommend trial of diagnostic genicular nerve blocks utilizing live fluoroscopy and local anesthesia, with consideration for RF ablation in the future. Risks, benefits, alternatives discussed. She would like to proceed.   - I suspect her current neck pain is secondary to cervical facet arthropathy and myofascial pain/spasms. She has received some benefit from TPI's in the past  - I suspect her right shoulder pain is secondary to right shoulder subacromial bursitis, with possible contribution from shoulder joint osteoarthritis. She underwent ultrasound guided right subacromial bursa injection today, as noted below    Joint Injection/Aspiration    Date/Time: 5/30/2024 1:30 PM    Performed by: Joseph Kelley MD  Authorized by: Joseph Kelley MD    Consent:     Consent obtained:  Written    Consent given by:  Patient    Risks, benefits, and alternatives were discussed: yes      Risks discussed:  Bleeding, infection and pain    Alternatives discussed:  Alternative treatment  Universal protocol:     Procedure explained and questions answered to patient or proxy's satisfaction: yes      Relevant documents present and verified: yes      Imaging studies available: yes      Site/side marked: yes      Immediately prior to procedure, a time out was called: yes      Patient identity confirmed:  Verbally with patient  Location:     Location:  Shoulder    Shoulder:  R subacromial bursa  Anesthesia:     Anesthesia method:  None  Procedure details:     Preparation: Patient was prepped and draped in usual sterile fashion      Needle gauge:  22 G    Ultrasound guidance: yes      Approach:  Lateral    Steroid injected: yes      Specimen collected: no    Post-procedure details:     Dressing:  Adhesive bandage    Procedure completion:  Tolerated well, no immediate complications  Comments:      Ultrasound guided right  subacromial bursa injection  After informed written consent was obtained, the patient was placed in the sitting position with the right arm resting in the lap. The correct site and laterality were confirmed with the patient and marked. The skin was prepped with chlorhexidine, allowed to dry for a minimum of 3 minutes, and draped in a sterile fashion.    A linear ultrasound probe (10-12mHz) was positioned over the right shoulder. The supraspinatus tendon and the subacromial space and bursa were located. Next a 22 gauge 1.5 inch needle was advanced slowly until its tip was visualized under ultrasound to be in the right subacromial/subdeltoid bursal space. Next, a mixture of 40 mg triamcinolone diluted in 3 mL of lidocaine 1% was injected and the needle was removed.   The skin was cleansed and a sterile bandage was applied. The patient tolerated the procedure well and no complications were encountered.    Performed by: Joseph Kelley MD  Authorized by: Joseph Kelley MD      Comments: Ultrasound guidance used for right subacromial bursa injection          Sincerely,  Joseph Kelley MD  On license of [facility] [place]

## 2024-05-30 NOTE — PROGRESS NOTES
Chief Complaint   Patient presents with   • Atrial Fibrillation     S/P Cardioversion 5/1/24            The patient is a 68-year-old female with a history of persistent atrial fibrillation treated with dofetilide.  She had a recurrence and underwent cardioversion just under 4 weeks ago.  She is here in close follow-up.  Unfortunately her atrial fibrillation recurred about a week after her cardioversion.  She did feel markedly improved while in sinus rhythm.  Her predominant manifestations of her atrial fibrillation include dyspnea with exertion.    Atrial Fibrillation  Past medical history includes atrial fibrillation.        Active Ambulatory Problems     Diagnosis Date Noted   • Epistaxis 08/30/2023   • Benign essential hypertension 08/30/2023   • Chronic gout without tophus 08/30/2023   • Chronic hypoxemic respiratory failure (Multi) 08/30/2023   • Chronic obstructive lung disease (Multi) 08/30/2023   • Acute congestive heart failure (Multi) 08/30/2023   • Diastolic heart failure (Multi) 08/30/2023   • Cardiomegaly 08/30/2023   • Coronary arteriosclerosis 08/30/2023   • Dyspnea on exertion 08/30/2023   • Gastroesophageal reflux disease 08/30/2023   • Hypertension 08/30/2023   • Hypertensive heart disease without congestive heart failure 08/30/2023   • Hypokalemia 08/30/2023   • Longstanding persistent atrial fibrillation (Multi) 08/30/2023   • Mixed anxiety depressive disorder 08/30/2023   • Moderately severe depression 08/30/2023   • Obesity with body mass index 30 or greater 08/30/2023   • Morbid (severe) obesity due to excess calories (Multi) 08/30/2023   • Other chronic pain 08/30/2023   • Obstructive sleep apnea syndrome 08/30/2023   • Osteoporosis 08/30/2023   • Pleural effusion 08/30/2023   • Prediabetes 08/30/2023   • Primary osteoarthritis of left knee 08/30/2023   • Pulmonary hypertension (Multi) 08/30/2023   • Lumbar spondylosis 08/30/2023   • Thoracic spondylosis 08/30/2023   • Asthma ([facility])  08/30/2023   • Arthropathy of both sacroiliac joints 08/30/2023   • Anxiety 08/30/2023   • Age-related osteoporosis without current pathological fracture 08/30/2023   • Acute URI 02/09/2024   • PAF (paroxysmal atrial fibrillation) (Multi) 04/16/2024     Resolved Ambulatory Problems     Diagnosis Date Noted   • No Resolved Ambulatory Problems     Past Medical History:   Diagnosis Date   • A-fib (Multi)    • CHF (congestive heart failure) (Multi)    • COVID    • HTN (hypertension)    • Low back pain         Review of Systems   All other systems reviewed and are negative.       Objective     There were no vitals filed for this visit.     Constitutional:       Appearance: Healthy appearance. Overweight.   Pulmonary:      Effort: Pulmonary effort is normal.      Breath sounds: Normal breath sounds.   Cardiovascular:      PMI at left midclavicular line. Tachycardia present. Irregularly irregular rhythm.      Murmurs: There is a systolic murmur.      No gallop.  No click. No rub.   Pulses:     Intact distal pulses.   Abdominal:      General: Bowel sounds are normal.   Musculoskeletal:      Cervical back: Neck supple. Skin:     General: Skin is warm and dry.   Neurological:      General: No focal deficit present.          Lab Review:   No visits with results within 2 Month(s) from this visit.   Latest known visit with results is:   Lab on 12/07/2023   Component Date Value   • Glucose 12/07/2023 95    • Sodium 12/07/2023 143    • Potassium 12/07/2023 4.2    • Chloride 12/07/2023 97    • Bicarbonate 12/07/2023 34 (H)    • Urea Nitrogen 12/07/2023 18    • Creatinine 12/07/2023 0.60    • eGFR 12/07/2023 >90    • Calcium 12/07/2023 9.3    • Anion Gap 12/07/2023 12    • Magnesium 12/07/2023 2.30        ECG:    Atrial fibrillation with a ventricular response in the 80s nonspecific ST segment changes QRS duration 94 ms QTc 470 ms    Assessment/plan:  History as above.  I would recommend catheter-based therapy with pulm vein  isolation.  I discussed this with the patient and her daughter today at length versus taking a rate control approach and discontinuation of her antiarrhythmic drug.  She wishes the former approach for now.  She will be scheduled in the coming weeks.  Problem List Items Addressed This Visit       PAF (paroxysmal atrial fibrillation) (Multi) - Primary    Relevant Orders    ECG 12 lead (Clinic Performed)    Comprehensive metabolic panel    Magnesium

## 2024-06-11 ENCOUNTER — LAB (OUTPATIENT)
Dept: LAB | Facility: LAB | Age: 69
End: 2024-06-11
Payer: MEDICARE

## 2024-06-11 DIAGNOSIS — M1A.9XX0 CHRONIC GOUT WITHOUT TOPHUS, UNSPECIFIED CAUSE, UNSPECIFIED SITE: ICD-10-CM

## 2024-06-11 DIAGNOSIS — I48.0 PAF (PAROXYSMAL ATRIAL FIBRILLATION) (MULTI): ICD-10-CM

## 2024-06-11 DIAGNOSIS — E88.810 METABOLIC SYNDROME: ICD-10-CM

## 2024-06-11 DIAGNOSIS — E66.01 MORBID (SEVERE) OBESITY DUE TO EXCESS CALORIES (MULTI): ICD-10-CM

## 2024-06-11 LAB
ALBUMIN SERPL-MCNC: 4.1 G/DL (ref 3.5–5)
ALP BLD-CCNC: 84 U/L (ref 35–125)
ALT SERPL-CCNC: 23 U/L (ref 5–40)
ANION GAP SERPL CALC-SCNC: 10 MMOL/L
AST SERPL-CCNC: 23 U/L (ref 5–40)
BASOPHILS # BLD AUTO: 0.04 X10*3/UL (ref 0–0.1)
BASOPHILS NFR BLD AUTO: 0.4 %
BILIRUB SERPL-MCNC: 0.5 MG/DL (ref 0.1–1.2)
BUN SERPL-MCNC: 26 MG/DL (ref 8–25)
CALCIUM SERPL-MCNC: 9.7 MG/DL (ref 8.5–10.4)
CHLORIDE SERPL-SCNC: 95 MMOL/L (ref 97–107)
CO2 SERPL-SCNC: 35 MMOL/L (ref 24–31)
CREAT SERPL-MCNC: 0.7 MG/DL (ref 0.4–1.6)
EGFRCR SERPLBLD CKD-EPI 2021: >90 ML/MIN/1.73M*2
EOSINOPHIL # BLD AUTO: 0.08 X10*3/UL (ref 0–0.7)
EOSINOPHIL NFR BLD AUTO: 0.7 %
ERYTHROCYTE [DISTWIDTH] IN BLOOD BY AUTOMATED COUNT: 14.5 % (ref 11.5–14.5)
GLUCOSE SERPL-MCNC: 90 MG/DL (ref 65–99)
HCT VFR BLD AUTO: 46.4 % (ref 36–46)
HGB BLD-MCNC: 14.7 G/DL (ref 12–16)
IMM GRANULOCYTES # BLD AUTO: 0.03 X10*3/UL (ref 0–0.7)
IMM GRANULOCYTES NFR BLD AUTO: 0.3 % (ref 0–0.9)
LYMPHOCYTES # BLD AUTO: 1.76 X10*3/UL (ref 1.2–4.8)
LYMPHOCYTES NFR BLD AUTO: 15.5 %
MAGNESIUM SERPL-MCNC: 2.1 MG/DL (ref 1.6–3.1)
MCH RBC QN AUTO: 30.2 PG (ref 26–34)
MCHC RBC AUTO-ENTMCNC: 31.7 G/DL (ref 32–36)
MCV RBC AUTO: 95 FL (ref 80–100)
MONOCYTES # BLD AUTO: 0.93 X10*3/UL (ref 0.1–1)
MONOCYTES NFR BLD AUTO: 8.2 %
NEUTROPHILS # BLD AUTO: 8.54 X10*3/UL (ref 1.2–7.7)
NEUTROPHILS NFR BLD AUTO: 74.9 %
NRBC BLD-RTO: 0 /100 WBCS (ref 0–0)
PLATELET # BLD AUTO: 266 X10*3/UL (ref 150–450)
POTASSIUM SERPL-SCNC: 4.2 MMOL/L (ref 3.4–5.1)
PROT SERPL-MCNC: 6.8 G/DL (ref 5.9–7.9)
RBC # BLD AUTO: 4.87 X10*6/UL (ref 4–5.2)
SODIUM SERPL-SCNC: 140 MMOL/L (ref 133–145)
T4 FREE SERPL-MCNC: 1.3 NG/DL (ref 0.9–1.7)
TSH SERPL DL<=0.05 MIU/L-ACNC: 1.69 MIU/L (ref 0.27–4.2)
URATE SERPL-MCNC: 6.9 MG/DL (ref 2.5–6.8)
WBC # BLD AUTO: 11.4 X10*3/UL (ref 4.4–11.3)

## 2024-06-11 PROCEDURE — 84550 ASSAY OF BLOOD/URIC ACID: CPT

## 2024-06-11 PROCEDURE — 84443 ASSAY THYROID STIM HORMONE: CPT

## 2024-06-11 PROCEDURE — 80053 COMPREHEN METABOLIC PANEL: CPT

## 2024-06-11 PROCEDURE — 84439 ASSAY OF FREE THYROXINE: CPT

## 2024-06-11 PROCEDURE — 85025 COMPLETE CBC W/AUTO DIFF WBC: CPT

## 2024-06-11 PROCEDURE — 83735 ASSAY OF MAGNESIUM: CPT

## 2024-06-11 PROCEDURE — 36415 COLL VENOUS BLD VENIPUNCTURE: CPT

## 2024-06-13 ENCOUNTER — OFFICE VISIT (OUTPATIENT)
Dept: PRIMARY CARE | Facility: CLINIC | Age: 69
End: 2024-06-13
Payer: MEDICARE

## 2024-06-13 VITALS
TEMPERATURE: 98.9 F | WEIGHT: 248 LBS | HEART RATE: 89 BPM | HEIGHT: 62 IN | DIASTOLIC BLOOD PRESSURE: 84 MMHG | RESPIRATION RATE: 18 BRPM | OXYGEN SATURATION: 97 % | BODY MASS INDEX: 45.64 KG/M2 | SYSTOLIC BLOOD PRESSURE: 138 MMHG

## 2024-06-13 DIAGNOSIS — E66.01 CLASS 3 SEVERE OBESITY DUE TO EXCESS CALORIES WITH SERIOUS COMORBIDITY AND BODY MASS INDEX (BMI) OF 45.0 TO 49.9 IN ADULT (MULTI): ICD-10-CM

## 2024-06-13 DIAGNOSIS — M1A.9XX0 CHRONIC GOUT WITHOUT TOPHUS, UNSPECIFIED CAUSE, UNSPECIFIED SITE: Primary | ICD-10-CM

## 2024-06-13 DIAGNOSIS — R32 URINARY INCONTINENCE, UNSPECIFIED TYPE: ICD-10-CM

## 2024-06-13 DIAGNOSIS — F32.A MODERATELY SEVERE DEPRESSION: ICD-10-CM

## 2024-06-13 PROCEDURE — 3075F SYST BP GE 130 - 139MM HG: CPT | Performed by: FAMILY MEDICINE

## 2024-06-13 PROCEDURE — 1126F AMNT PAIN NOTED NONE PRSNT: CPT | Performed by: FAMILY MEDICINE

## 2024-06-13 PROCEDURE — 1159F MED LIST DOCD IN RCRD: CPT | Performed by: FAMILY MEDICINE

## 2024-06-13 PROCEDURE — 3008F BODY MASS INDEX DOCD: CPT | Performed by: FAMILY MEDICINE

## 2024-06-13 PROCEDURE — 3079F DIAST BP 80-89 MM HG: CPT | Performed by: FAMILY MEDICINE

## 2024-06-13 PROCEDURE — 99214 OFFICE O/P EST MOD 30 MIN: CPT | Performed by: FAMILY MEDICINE

## 2024-06-13 PROCEDURE — 1160F RVW MEDS BY RX/DR IN RCRD: CPT | Performed by: FAMILY MEDICINE

## 2024-06-13 RX ORDER — BUPROPION HYDROCHLORIDE 300 MG/1
300 TABLET ORAL
Qty: 90 TABLET | Refills: 1 | Status: SHIPPED | OUTPATIENT
Start: 2024-06-13

## 2024-06-13 RX ORDER — ALLOPURINOL 300 MG/1
300 TABLET ORAL DAILY
Qty: 90 TABLET | Refills: 1 | Status: SHIPPED | OUTPATIENT
Start: 2024-06-13

## 2024-06-13 ASSESSMENT — PATIENT HEALTH QUESTIONNAIRE - PHQ9
1. LITTLE INTEREST OR PLEASURE IN DOING THINGS: NOT AT ALL
SUM OF ALL RESPONSES TO PHQ9 QUESTIONS 1 & 2: 0
2. FEELING DOWN, DEPRESSED OR HOPELESS: NOT AT ALL

## 2024-06-13 ASSESSMENT — ENCOUNTER SYMPTOMS
OCCASIONAL FEELINGS OF UNSTEADINESS: 1
DEPRESSION: 0
LOSS OF SENSATION IN FEET: 0

## 2024-06-13 ASSESSMENT — PAIN SCALES - GENERAL: PAINLEVEL: 0-NO PAIN

## 2024-06-13 NOTE — PROGRESS NOTES
Outpatient Visit Note    Chief Complaint   Patient presents with    Follow up labs       HPI:  Vickie Foster is a 68 y.o. female here for follow-up on her blood work.     She has been following closely with cardiology for her chronic diastolic congestive heart failure, pulmonary hypertension and A-fib with failed cardioversions. Going to have a cardiac ablation.     She is on semaglutide through [facility] in [place].  Has continued to maintain weight loss on this - down 6 more lbs. Has lost a total of 30 lbs.     She has hyperuricemia for which she is on allopurinol.  Recently had a uric acid level checked which was elevated at 6.9.  She is on 100 mg of allopurinol twice daily.    Has good mood coverage with being on the Wellbutrin.    TSH was normal.  CBC with no significant anemia.  Normal kidney function but can stand to drink more water.  Normal liver numbers.     PHQ9/GAD7:         Past Medical History:   Diagnosis Date    A-fib (Multi)     CHF (congestive heart failure) (Multi)     COVID     HTN (hypertension)     Low back pain         Current Medications  Current Outpatient Medications   Medication Instructions    allopurinol (ZYLOPRIM) 300 mg, oral, Daily    ascorbic acid (VITAMIN C WITH ZINA HIPS) 1,000 mg, oral, Daily    biotin 10 mg tablet 1 tablet, oral, Daily    budesonide-formoteroL (Symbicort) 80-4.5 mcg/actuation inhaler 2 puffs, inhalation, 2 times daily RT, Rinse mouth with water after use to reduce aftertaste and incidence of candidiasis. Do not swallow.    buPROPion XL (WELLBUTRIN XL) 300 mg, oral, Daily before breakfast    calcium carb-vitamin D3-vit K2 600 mg-1,000 unit-90 mcg tablet oral, Daily    docusate sodium (COLACE) 100 mg, oral, 2 times daily    dofetilide (TIKOSYN) 250 mcg, oral, Every 12 hours    Eliquis 5 mg, oral, 2 times daily    folic acid/multivit-min/lutein (CENTRUM SILVER ORAL) oral, As directed    gabapentin (NEURONTIN) 300 mg, oral, 3 times daily    Klor-Con M20 20  mEq ER tablet TAKE 1 TABLET THREE TIMES A DAY WITH FOOD    metOLazone (ZAROXOLYN) 5 mg, oral, Daily PRN    metoprolol succinate XL (Toprol-XL) 25 mg 24 hr tablet TAKE 1 TABLET BY MOUTH ONE TIME DAILY    oxygen (O2) 3 L/min, inhalation, Nightly    semaglutide 2.3 mg, subcutaneous, Once Weekly    torsemide (DEMADEX) 50 mg, oral, 2 times daily        Allergies  No Known Allergies     Past Surgical History:   Procedure Laterality Date    CARDIAC ELECTROPHYSIOLOGY PROCEDURE N/A 05/01/2024    Procedure: Cardioversion;  Surgeon: Amado Ty MD;  Location: [facility] Cardiac Cath Lab;  Service: Electrophysiology;  Laterality: N/A;  [id number]  i48.0  medical [employer]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    CARDIOVERSION      05/01/2024     Family History   Problem Relation Name Age of Onset    Breast cancer Mother      Heart attack Mother      Heart disease Mother      Colon cancer Father      Breast cancer Sister      Diabetes type II Sister      Kidney failure Sister      Other (herione addict) Sister      No Known Problems Brother      No Known Problems Daughter      Other (recovering alcoholic) Son      Breast cancer Other Maternal Aunt      Social History     Tobacco Use    Smoking status: Former     Types: Cigarettes    Smokeless tobacco: Never   Vaping Use    Vaping status: Never Used   Substance Use Topics    Alcohol use: Yes     Comment: monthly or less    Drug use: Never     Tobacco Use: Medium Risk (6/13/2024)    Patient History     Smoking Tobacco Use: Former     Smokeless Tobacco Use: Never     Passive Exposure: Not on file        ROS  All pertinent positive symptoms are included in the history of present illness.  All other systems have been reviewed and are negative and noncontributory to this patient's current ailments.    VITAL SIGNS  Vitals:    06/13/24 1500   BP: 138/84   Pulse: 89   Resp: 18   Temp: 37.2 °C (98.9 °F)   SpO2: 97%     Vitals:    06/13/24 1500   Weight: 112 kg (248 lb)      Body mass index is 45.36 kg/m².     PHYSICAL  EXAM  GENERAL APPEARANCE: well nourished, well developed, looks like stated age, in no acute distress, not ill or tired appearing, conversing well.   HEENT: no trauma, normocephalic.   NECK: supple without rigidity, no neck mass was observed.   LUNGS: good chest wall expansion. In no acute respiratory distress.   EXTREMITIES: moving all extremities equally with no edema.   SKIN: normal skin color and pigmentation, without rash.   NEUROLOGIC EXAM: CN II-XII grossly intact, normal gait.   PSYCH: mood and affect appropriate; alert and oriented to time, place, person; no difficulty with speech or language.     Counseling:       Medication education:           Education:  The patient is counseled regarding potential side-effects of all new medications          Understanding:  Patient expressed understanding of information conveyed today          Adherence:  No barriers to adherence identified     Assessment/Plan   Problem List Items Addressed This Visit             ICD-10-CM    Chronic gout without tophus - Primary [id number].9XX0    Relevant Medications    allopurinol (Zyloprim) 300 mg tablet    Other Relevant Orders    Uric acid    Basic metabolic panel    Moderately severe depression F32.A    Relevant Medications    buPROPion XL (Wellbutrin XL) 300 mg 24 hr tablet     Other Visit Diagnoses         Codes    Class 3 severe obesity due to excess calories with serious comorbidity and body mass index (BMI) of 45.0 to 49.9 in adult (Multi)     E66.01, Z68.42    Urinary incontinence, unspecified type     R32    Relevant Orders    Referral to Physical Therapy            Additional Visit Plans:  Shift your evening torsemide to a bit early to help reduce night time urination. No fluids 2 hours before bed time.     Consider pelvic floor physical therapy    Plan for more allopurinol coverage, 300mg once daily. Recheck uric acid level in 3 months.     Follow up in 6 months or sooner if needed.       Patient Care Team:  Danilo Gonzalez,   as PCP - General (Family Medicine)  Danilo Gonzalez DO as PCP - [employer]age PCP  MD Manuel Jeong MD (Internal Medicine)    Danilo Gonzalez DO   06/13/24   3:42 PM

## 2024-06-13 NOTE — PATIENT INSTRUCTIONS
Problem List Items Addressed This Visit             ICD-10-CM    Chronic gout without tophus - Primary M1A.9XX0    Relevant Medications    allopurinol (Zyloprim) 300 mg tablet    Other Relevant Orders    Uric acid    Basic metabolic panel    Moderately severe depression F32.A    Relevant Medications    buPROPion XL (Wellbutrin XL) 300 mg 24 hr tablet     Other Visit Diagnoses         Codes    Class 3 severe obesity due to excess calories with serious comorbidity and body mass index (BMI) of 45.0 to 49.9 in adult (Multi)     E66.01, Z68.42    Urinary incontinence, unspecified type     R32    Relevant Orders    Referral to Physical Therapy            Additional Visit Plans:  Shift your evening torsemide to a bit early to help reduce night time urination. No fluids 2 hours before bed time.     Consider pelvic floor physical therapy    Plan for more allopurinol coverage, 300mg once daily. Recheck uric acid level in 3 months.     Follow up in 6 months or sooner if needed.       Patient Care Team:  Danilo Gonzalez DO as PCP - General (Family Medicine)  Danilo Gonzalez DO as PCP - [employer] PCP  MD Manuel Jeong MD (Internal Medicine)    Danilo Gonzalez DO   06/13/24   3:42 PM

## 2024-06-14 ENCOUNTER — APPOINTMENT (OUTPATIENT)
Dept: CARDIOLOGY | Facility: CLINIC | Age: 69
End: 2024-06-14
Payer: MEDICARE

## 2024-06-18 ENCOUNTER — TELEPHONE (OUTPATIENT)
Dept: CARDIOLOGY | Facility: CLINIC | Age: 69
End: 2024-06-18
Payer: MEDICARE

## 2024-06-18 DIAGNOSIS — I48.0 PAF (PAROXYSMAL ATRIAL FIBRILLATION) (MULTI): ICD-10-CM

## 2024-06-18 RX ORDER — DOFETILIDE 0.25 MG/1
250 CAPSULE ORAL EVERY 12 HOURS
Qty: 180 CAPSULE | Refills: 1 | Status: SHIPPED | OUTPATIENT
Start: 2024-06-18 | End: 2024-12-15

## 2024-06-18 NOTE — TELEPHONE ENCOUNTER
Patient calling for clarification. Was told by pharmacy her Tikosyn was discontinued so she wanted to know if it should be stopped. It appears patient recently saw PCP and the staff attempted to do a med list cleanup and discontinued a duplicate Tikosyn order that sent notification to the pharmacy that the med was stopped. Called patient to let her know that she should still be taking the Tikosyn and that I could send in a new prescription. In discussing things with her she mentioned upcoming afib ablation and has some questions and concerns about the procedure. Spoke with Dr. Ty, who will call the patient back to discuss her concerns and answer any questions she has before her procedure 7/8/24. Patient aware to call should any other questions or issues arise

## 2024-06-27 ENCOUNTER — HOSPITAL ENCOUNTER (OUTPATIENT)
Dept: CARDIOLOGY | Facility: CLINIC | Age: 69
Discharge: HOME | End: 2024-06-27
Payer: MEDICARE

## 2024-06-27 ENCOUNTER — HOSPITAL ENCOUNTER (OUTPATIENT)
Dept: RADIOLOGY | Facility: HOSPITAL | Age: 69
Discharge: HOME | End: 2024-06-27
Payer: MEDICARE

## 2024-06-27 DIAGNOSIS — I48.0 PAF (PAROXYSMAL ATRIAL FIBRILLATION) (MULTI): ICD-10-CM

## 2024-06-27 DIAGNOSIS — R06.09 DYSPNEA ON EXERTION: ICD-10-CM

## 2024-06-27 PROCEDURE — 93306 TTE W/DOPPLER COMPLETE: CPT | Performed by: INTERNAL MEDICINE

## 2024-06-27 PROCEDURE — 2550000001 HC RX 255 CONTRASTS: Performed by: INTERNAL MEDICINE

## 2024-06-27 PROCEDURE — 71275 CT ANGIOGRAPHY CHEST: CPT

## 2024-06-27 PROCEDURE — 93306 TTE W/DOPPLER COMPLETE: CPT

## 2024-06-27 NOTE — PROGRESS NOTES
Vickie Foster is a 68 y.o. female on day 0 of admission presenting with No Principal Problem: There is no principal problem currently on the Problem List. Please update the Problem List and refresh..    Subjective   ***       Objective     Physical Exam    Last Recorded Vitals  There were no vitals taken for this visit.  Intake/Output last 3 Shifts:  No intake/output data recorded.    Relevant Results  {If you would like to pull in Medications, type .meds     If you would like to pull in Lab results for the last 24 hours, type .[id number]    If you would like to pull in Imaging results, type .imgrslt :99}    {Link to Stroke Scoring tools - Link :99}                       Assessment/Plan   Active Problems:  There are no active Hospital Problems.    ***     {This patient does not have an ACP note on file for this encounter, please fill one out - Advance Care Planning Activity :99}      Price Cruz, RT

## 2024-06-29 LAB
AORTIC VALVE PEAK VELOCITY: 1.47 M/S
AV PEAK GRADIENT: 8.7 MMHG
AVA (PEAK VEL): 2.53 CM2
EJECTION FRACTION APICAL 4 CHAMBER: 62.5
EJECTION FRACTION: 63 %
LEFT ATRIUM VOLUME AREA LENGTH INDEX BSA: 86.1 ML/M2
LEFT VENTRICLE INTERNAL DIMENSION DIASTOLE: 4.22 CM (ref 3.5–6)
LEFT VENTRICULAR OUTFLOW TRACT DIAMETER: 2.25 CM
RIGHT VENTRICLE FREE WALL PEAK S': 8.87 CM/S
RIGHT VENTRICLE PEAK SYSTOLIC PRESSURE: 43.1 MMHG
TRICUSPID ANNULAR PLANE SYSTOLIC EXCURSION: 1.3 CM

## 2024-06-29 NOTE — H&P (VIEW-ONLY)
Subjective      Chief Complaint   Patient presents with     Patient wants to discuss with Dr. Sanchez possible ablation             She does have a history of atrial fibrillation has been placed on Tikosyn for this.  She does have pulmonary hypertension with the last echo showing the right ventricular pressure of 43 mmHg.She underwent a cardioversion on 5/1/2024 lasting for approximately a week and went back into atrial fibrillation.  They are now planning to do A-fib ablation.  Over the last few days she has gained 5-6lbs of water wt.  The ankles are swollen today.  She has lost 30lbs on the semiglutin.  She las sleep apnea and is working on the cpap mask.  No PND or orthopnea.           ROS     Past Surgical History:   Procedure Laterality Date    CARDIAC ELECTROPHYSIOLOGY PROCEDURE N/A 05/01/2024    Procedure: Cardioversion;  Surgeon: Amado Ty MD;  Location: [facility] Cardiac Cath Lab;  Service: Electrophysiology;  Laterality: N/A;  [id number]  i48.0  [employer]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    CARDIOVERSION      05/01/2024        Active Ambulatory Problems     Diagnosis Date Noted    Epistaxis 08/30/2023    Benign essential hypertension 08/30/2023    Chronic gout without tophus 08/30/2023    Chronic hypoxemic respiratory failure (Multi) 08/30/2023    Chronic obstructive lung disease (Multi) 08/30/2023    Acute congestive heart failure (Multi) 08/30/2023    Diastolic heart failure (Multi) 08/30/2023    Cardiomegaly 08/30/2023    Coronary arteriosclerosis 08/30/2023    Dyspnea on exertion 08/30/2023    Gastroesophageal reflux disease 08/30/2023    Hypertension 08/30/2023    Hypertensive heart disease without congestive heart failure 08/30/2023    Hypokalemia 08/30/2023    Longstanding persistent atrial fibrillation (Multi) 08/30/2023    Mixed anxiety depressive disorder 08/30/2023    Moderately severe depression 08/30/2023    Obesity with body mass index 30 or greater 08/30/2023    Morbid (severe) obesity due to excess calories  (Multi) 08/30/2023    Other chronic pain 08/30/2023    Obstructive sleep apnea syndrome 08/30/2023    Osteoporosis 08/30/2023    Pleural effusion 08/30/2023    Prediabetes 08/30/2023    Primary osteoarthritis of left knee 08/30/2023    Pulmonary hypertension (Multi) 08/30/2023    Lumbar spondylosis 08/30/2023    Thoracic spondylosis 08/30/2023    Asthma (HHS-HCC) 08/30/2023    Arthropathy of both sacroiliac joints 08/30/2023    Anxiety 08/30/2023    Age-related osteoporosis without current pathological fracture 08/30/2023    Acute URI 02/09/2024    PAF (paroxysmal atrial fibrillation) (Multi) 04/16/2024     Resolved Ambulatory Problems     Diagnosis Date Noted    No Resolved Ambulatory Problems     Past Medical History:   Diagnosis Date    A-fib (Multi)     CHF (congestive heart failure) (Multi)     COVID     HTN (hypertension)     Low back pain         Visit Vitals  /82   Pulse 91   Wt 114 kg (251 lb)   SpO2 92%   BMI 45.91 kg/m²   OB Status Postmenopausal   Smoking Status Former   BSA 2.23 m²        Objective     Constitutional:       Appearance: Healthy appearance.   Neck:      Vascular: No JVR.   Pulmonary:      Effort: Pulmonary effort is normal.      Breath sounds: Normal breath sounds.   Cardiovascular:      PMI at left midclavicular line. Normal rate. Irregularly irregular rhythm. Normal S1. Normal S2.       Murmurs: There is no murmur.      No gallop.  No click. No rub.   Pulses:     Intact distal pulses.   Edema:     Thigh: bilateral edema of the thigh.     Pretibial: bilateral trace edema of the pretibial area.     Ankle: bilateral trace edema of the ankle.     Feet: bilateral trace edema of the feet.  Abdominal:      Palpations: Abdomen is soft.   Musculoskeletal: Normal range of motion. Skin:     General: Skin is warm and dry.   Neurological:      General: No focal deficit present.            Lab Review:         Lab Results   Component Value Date    CHOL 203 (H) 09/29/2023    CHOL 179 03/29/2023  "   CHOL 177 06/16/2021     Lab Results   Component Value Date    HDL 45.9 09/29/2023    HDL 43 (L) 03/29/2023    HDL 52 06/16/2021     Lab Results   Component Value Date    LDLCALC 119 03/29/2023    LDLCALC 114 06/16/2021    LDLCALC 110 07/28/2020     Lab Results   Component Value Date    TRIG 104 09/29/2023    TRIG 84 03/29/2023    TRIG 53 06/16/2021     No components found for: \"CHOLHDL\"     Assessment/Plan     Acute congestive heart failure (Multi)  She says the legs are swelling some and can take an extra torsemide.  She will have the afib ablation next week and is nervous    Benign essential hypertension  Is controlled     "

## 2024-06-29 NOTE — PROGRESS NOTES
Subjective      Chief Complaint   Patient presents with     Patient wants to discuss with Dr. Sanchez possible ablation             She does have a history of atrial fibrillation has been placed on Tikosyn for this.  She does have pulmonary hypertension with the last echo showing the right ventricular pressure of 43 mmHg.She underwent a cardioversion on 5/1/2024 lasting for approximately a week and went back into atrial fibrillation.  They are now planning to do A-fib ablation.  Over the last few days she has gained 5-6lbs of water wt.  The ankles are swollen today.  She has lost 30lbs on the semiglutin.  She las sleep apnea and is working on the cpap mask.  No PND or orthopnea.           ROS     Past Surgical History:   Procedure Laterality Date    CARDIAC ELECTROPHYSIOLOGY PROCEDURE N/A 05/01/2024    Procedure: Cardioversion;  Surgeon: Amado Ty MD;  Location: [facility] Cardiac Cath Lab;  Service: Electrophysiology;  Laterality: N/A;  [id number]  i48.0  [employer]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    CARDIOVERSION      05/01/2024        Active Ambulatory Problems     Diagnosis Date Noted    Epistaxis 08/30/2023    Benign essential hypertension 08/30/2023    Chronic gout without tophus 08/30/2023    Chronic hypoxemic respiratory failure (Multi) 08/30/2023    Chronic obstructive lung disease (Multi) 08/30/2023    Acute congestive heart failure (Multi) 08/30/2023    Diastolic heart failure (Multi) 08/30/2023    Cardiomegaly 08/30/2023    Coronary arteriosclerosis 08/30/2023    Dyspnea on exertion 08/30/2023    Gastroesophageal reflux disease 08/30/2023    Hypertension 08/30/2023    Hypertensive heart disease without congestive heart failure 08/30/2023    Hypokalemia 08/30/2023    Longstanding persistent atrial fibrillation (Multi) 08/30/2023    Mixed anxiety depressive disorder 08/30/2023    Moderately severe depression 08/30/2023    Obesity with body mass index 30 or greater 08/30/2023    Morbid (severe) obesity due to excess calories  (Multi) 08/30/2023    Other chronic pain 08/30/2023    Obstructive sleep apnea syndrome 08/30/2023    Osteoporosis 08/30/2023    Pleural effusion 08/30/2023    Prediabetes 08/30/2023    Primary osteoarthritis of left knee 08/30/2023    Pulmonary hypertension (Multi) 08/30/2023    Lumbar spondylosis 08/30/2023    Thoracic spondylosis 08/30/2023    Asthma (HHS-HCC) 08/30/2023    Arthropathy of both sacroiliac joints 08/30/2023    Anxiety 08/30/2023    Age-related osteoporosis without current pathological fracture 08/30/2023    Acute URI 02/09/2024    PAF (paroxysmal atrial fibrillation) (Multi) 04/16/2024     Resolved Ambulatory Problems     Diagnosis Date Noted    No Resolved Ambulatory Problems     Past Medical History:   Diagnosis Date    A-fib (Multi)     CHF (congestive heart failure) (Multi)     COVID     HTN (hypertension)     Low back pain         Visit Vitals  /82   Pulse 91   Wt 114 kg (251 lb)   SpO2 92%   BMI 45.91 kg/m²   OB Status Postmenopausal   Smoking Status Former   BSA 2.23 m²        Objective     Constitutional:       Appearance: Healthy appearance.   Neck:      Vascular: No JVR.   Pulmonary:      Effort: Pulmonary effort is normal.      Breath sounds: Normal breath sounds.   Cardiovascular:      PMI at left midclavicular line. Normal rate. Irregularly irregular rhythm. Normal S1. Normal S2.       Murmurs: There is no murmur.      No gallop.  No click. No rub.   Pulses:     Intact distal pulses.   Edema:     Thigh: bilateral edema of the thigh.     Pretibial: bilateral trace edema of the pretibial area.     Ankle: bilateral trace edema of the ankle.     Feet: bilateral trace edema of the feet.  Abdominal:      Palpations: Abdomen is soft.   Musculoskeletal: Normal range of motion. Skin:     General: Skin is warm and dry.   Neurological:      General: No focal deficit present.            Lab Review:         Lab Results   Component Value Date    CHOL 203 (H) 09/29/2023    CHOL 179 03/29/2023  "   CHOL 177 06/16/2021     Lab Results   Component Value Date    HDL 45.9 09/29/2023    HDL 43 (L) 03/29/2023    HDL 52 06/16/2021     Lab Results   Component Value Date    LDLCALC 119 03/29/2023    LDLCALC 114 06/16/2021    LDLCALC 110 07/28/2020     Lab Results   Component Value Date    TRIG 104 09/29/2023    TRIG 84 03/29/2023    TRIG 53 06/16/2021     No components found for: \"CHOLHDL\"     Assessment/Plan     Acute congestive heart failure (Multi)  She says the legs are swelling some and can take an extra torsemide.  She will have the afib ablation next week and is nervous    Benign essential hypertension  Is controlled     "

## 2024-07-01 ENCOUNTER — OFFICE VISIT (OUTPATIENT)
Dept: CARDIOLOGY | Facility: CLINIC | Age: 69
End: 2024-07-01
Payer: MEDICARE

## 2024-07-01 VITALS
SYSTOLIC BLOOD PRESSURE: 125 MMHG | OXYGEN SATURATION: 92 % | HEART RATE: 91 BPM | DIASTOLIC BLOOD PRESSURE: 82 MMHG | WEIGHT: 251 LBS | BODY MASS INDEX: 45.91 KG/M2

## 2024-07-01 DIAGNOSIS — I50.9 ACUTE CONGESTIVE HEART FAILURE, UNSPECIFIED HEART FAILURE TYPE (MULTI): Primary | ICD-10-CM

## 2024-07-01 DIAGNOSIS — I10 BENIGN ESSENTIAL HYPERTENSION: ICD-10-CM

## 2024-07-01 PROCEDURE — 1125F AMNT PAIN NOTED PAIN PRSNT: CPT | Performed by: INTERNAL MEDICINE

## 2024-07-01 PROCEDURE — 3079F DIAST BP 80-89 MM HG: CPT | Performed by: INTERNAL MEDICINE

## 2024-07-01 PROCEDURE — 99213 OFFICE O/P EST LOW 20 MIN: CPT | Performed by: INTERNAL MEDICINE

## 2024-07-01 PROCEDURE — 3074F SYST BP LT 130 MM HG: CPT | Performed by: INTERNAL MEDICINE

## 2024-07-01 PROCEDURE — 3008F BODY MASS INDEX DOCD: CPT | Performed by: INTERNAL MEDICINE

## 2024-07-01 PROCEDURE — 1036F TOBACCO NON-USER: CPT | Performed by: INTERNAL MEDICINE

## 2024-07-01 PROCEDURE — 1159F MED LIST DOCD IN RCRD: CPT | Performed by: INTERNAL MEDICINE

## 2024-07-01 RX ORDER — CLOBETASOL PROPIONATE 0.5 MG/G
CREAM TOPICAL 2 TIMES DAILY
COMMUNITY

## 2024-07-01 ASSESSMENT — PAIN SCALES - GENERAL: PAINLEVEL: 4

## 2024-07-01 ASSESSMENT — ENCOUNTER SYMPTOMS
DEPRESSION: 0
LOSS OF SENSATION IN FEET: 0
OCCASIONAL FEELINGS OF UNSTEADINESS: 0

## 2024-07-01 NOTE — ASSESSMENT & PLAN NOTE
She says the legs are swelling some and can take an extra torsemide.  She will have the afib ablation next week and is nervous

## 2024-07-05 ENCOUNTER — TELEPHONE (OUTPATIENT)
Dept: PAIN MEDICINE | Facility: CLINIC | Age: 69
End: 2024-07-05
Payer: MEDICARE

## 2024-07-08 ENCOUNTER — APPOINTMENT (OUTPATIENT)
Dept: RADIOLOGY | Facility: HOSPITAL | Age: 69
End: 2024-07-08
Payer: MEDICARE

## 2024-07-08 ENCOUNTER — APPOINTMENT (OUTPATIENT)
Dept: CARDIOLOGY | Facility: HOSPITAL | Age: 69
End: 2024-07-08
Payer: MEDICARE

## 2024-07-08 ENCOUNTER — HOSPITAL ENCOUNTER (INPATIENT)
Facility: HOSPITAL | Age: 69
LOS: 4 days | Discharge: HOME | End: 2024-07-12
Attending: INTERNAL MEDICINE | Admitting: INTERNAL MEDICINE
Payer: MEDICARE

## 2024-07-08 DIAGNOSIS — G45.8 OTHER TRANSIENT CEREBRAL ISCHEMIC ATTACKS AND RELATED SYNDROMES: ICD-10-CM

## 2024-07-08 DIAGNOSIS — I63.9 ACUTE CVA (CEREBROVASCULAR ACCIDENT) (MULTI): ICD-10-CM

## 2024-07-08 DIAGNOSIS — I63.89 AC ISCH MULTI VASC TERRITORIES STROKE (MULTI): ICD-10-CM

## 2024-07-08 DIAGNOSIS — R06.00 ACUTE DYSPNEA: ICD-10-CM

## 2024-07-08 DIAGNOSIS — R06.09 DYSPNEA ON EXERTION: ICD-10-CM

## 2024-07-08 DIAGNOSIS — I48.0 PAF (PAROXYSMAL ATRIAL FIBRILLATION) (MULTI): Primary | ICD-10-CM

## 2024-07-08 LAB
ABO GROUP (TYPE) IN BLOOD: NORMAL
ABO GROUP (TYPE) IN BLOOD: NORMAL
ACT BLD: 190 SEC (ref 89–169)
ACT BLD: 217 SEC (ref 89–169)
ALBUMIN SERPL-MCNC: 3.8 G/DL (ref 3.5–5)
ALBUMIN SERPL-MCNC: 4.3 G/DL (ref 3.5–5)
ALP BLD-CCNC: 93 U/L (ref 35–125)
ALT SERPL-CCNC: 30 U/L (ref 5–40)
ANION GAP BLDA CALCULATED.4IONS-SCNC: 10 MMO/L (ref 10–25)
ANION GAP BLDA CALCULATED.4IONS-SCNC: 5 MMO/L (ref 10–25)
ANION GAP SERPL CALC-SCNC: 12 MMOL/L
ANION GAP SERPL CALC-SCNC: 9 MMOL/L
ANTIBODY SCREEN: NORMAL
AORTIC VALVE MEAN GRADIENT: 8.4 MMHG
AORTIC VALVE PEAK VELOCITY: 2.15 M/S
APPARATUS: ABNORMAL
APPARATUS: ABNORMAL
APPEARANCE UR: CLEAR
APTT PPP: 28.8 SECONDS (ref 22–32.5)
ARTERIAL PATENCY WRIST A: POSITIVE
ARTERIAL PATENCY WRIST A: POSITIVE
AST SERPL-CCNC: 34 U/L (ref 5–40)
AV PEAK GRADIENT: 18.6 MMHG
AVA (PEAK VEL): 2.48 CM2
AVA (VTI): 2.83 CM2
BASE EXCESS BLDA CALC-SCNC: 11.7 MMOL/L (ref -2–3)
BASE EXCESS BLDA CALC-SCNC: 6.6 MMOL/L (ref -2–3)
BASOPHILS # BLD AUTO: 0.05 X10*3/UL (ref 0–0.1)
BASOPHILS NFR BLD AUTO: 0.4 %
BILIRUB SERPL-MCNC: 0.6 MG/DL (ref 0.1–1.2)
BILIRUB UR STRIP.AUTO-MCNC: NEGATIVE MG/DL
BODY TEMPERATURE: 37 DEGREES CELSIUS
BODY TEMPERATURE: 37 DEGREES CELSIUS
BUN SERPL-MCNC: 22 MG/DL (ref 8–25)
BUN SERPL-MCNC: 23 MG/DL (ref 8–25)
CA-I BLDA-SCNC: 1.16 MMOL/L (ref 1.1–1.33)
CA-I BLDA-SCNC: 1.21 MMOL/L (ref 1.1–1.33)
CALCIUM SERPL-MCNC: 8.9 MG/DL (ref 8.5–10.4)
CALCIUM SERPL-MCNC: 9.2 MG/DL (ref 8.5–10.4)
CHLORIDE BLDA-SCNC: 102 MMOL/L (ref 98–107)
CHLORIDE BLDA-SCNC: 104 MMOL/L (ref 98–107)
CHLORIDE SERPL-SCNC: 102 MMOL/L (ref 97–107)
CHLORIDE SERPL-SCNC: 103 MMOL/L (ref 97–107)
CO2 SERPL-SCNC: 31 MMOL/L (ref 24–31)
CO2 SERPL-SCNC: 35 MMOL/L (ref 24–31)
COLOR UR: NORMAL
CREAT SERPL-MCNC: 0.6 MG/DL (ref 0.4–1.6)
CREAT SERPL-MCNC: 0.6 MG/DL (ref 0.4–1.6)
CRITICAL CALL TIME: 1519
CRITICAL CALL TIME: 1741
CRITICAL CALLED BY: ABNORMAL
CRITICAL CALLED BY: ABNORMAL
CRITICAL CALLED TO: ABNORMAL
CRITICAL CALLED TO: ABNORMAL
CRITICAL NOTE: ABNORMAL
CRITICAL NOTE: ABNORMAL
CRITICAL READ BACK: ABNORMAL
CRITICAL READ BACK: ABNORMAL
EGFRCR SERPLBLD CKD-EPI 2021: >90 ML/MIN/1.73M*2
EGFRCR SERPLBLD CKD-EPI 2021: >90 ML/MIN/1.73M*2
EJECTION FRACTION APICAL 4 CHAMBER: 61.8
EJECTION FRACTION: 68 %
EOSINOPHIL # BLD AUTO: 0.06 X10*3/UL (ref 0–0.7)
EOSINOPHIL NFR BLD AUTO: 0.4 %
EPAP CMH2O: 5 CM H2O
ERYTHROCYTE [DISTWIDTH] IN BLOOD BY AUTOMATED COUNT: 14.8 % (ref 11.5–14.5)
FLOW: 15 LPM
GLUCOSE BLDA-MCNC: 157 MG/DL (ref 74–99)
GLUCOSE BLDA-MCNC: 229 MG/DL (ref 74–99)
GLUCOSE SERPL-MCNC: 219 MG/DL (ref 65–99)
GLUCOSE SERPL-MCNC: 90 MG/DL (ref 65–99)
GLUCOSE UR STRIP.AUTO-MCNC: NORMAL MG/DL
HCO3 BLDA-SCNC: 36.7 MMOL/L (ref 22–26)
HCO3 BLDA-SCNC: 39.3 MMOL/L (ref 22–26)
HCT VFR BLD AUTO: 49 % (ref 36–46)
HCT VFR BLD EST: 44 % (ref 36–46)
HCT VFR BLD EST: 46 % (ref 36–46)
HGB BLD-MCNC: 14.8 G/DL (ref 12–16)
HGB BLDA-MCNC: 14.7 G/DL (ref 12–16)
HGB BLDA-MCNC: 15.2 G/DL (ref 12–16)
IMM GRANULOCYTES # BLD AUTO: 0.2 X10*3/UL (ref 0–0.7)
IMM GRANULOCYTES NFR BLD AUTO: 1.4 % (ref 0–0.9)
INHALED O2 CONCENTRATION: 90 %
INHALED O2 CONCENTRATION: 90 %
INR PPP: 1 (ref 0.9–1.2)
IPAP CMH2O: 12 CM H2O
KETONES UR STRIP.AUTO-MCNC: NEGATIVE MG/DL
LACTATE BLDA-SCNC: 0.7 MMOL/L (ref 0.4–2)
LACTATE BLDA-SCNC: 1.1 MMOL/L (ref 0.4–2)
LEFT ATRIUM VOLUME AREA LENGTH INDEX BSA: 85.1 ML/M2
LEFT VENTRICLE INTERNAL DIMENSION DIASTOLE: 4.75 CM (ref 3.5–6)
LEFT VENTRICULAR OUTFLOW TRACT DIAMETER: 2.04 CM
LEUKOCYTE ESTERASE UR QL STRIP.AUTO: NEGATIVE
LV EJECTION FRACTION BIPLANE: 63 %
LYMPHOCYTES # BLD AUTO: 1.96 X10*3/UL (ref 1.2–4.8)
LYMPHOCYTES NFR BLD AUTO: 13.8 %
MAGNESIUM SERPL-MCNC: 2.2 MG/DL (ref 1.6–3.1)
MAGNESIUM SERPL-MCNC: 2.2 MG/DL (ref 1.6–3.1)
MCH RBC QN AUTO: 30 PG (ref 26–34)
MCHC RBC AUTO-ENTMCNC: 30.2 G/DL (ref 32–36)
MCV RBC AUTO: 99 FL (ref 80–100)
MITRAL VALVE E/A RATIO: 106.77
MONOCYTES # BLD AUTO: 0.64 X10*3/UL (ref 0.1–1)
MONOCYTES NFR BLD AUTO: 4.5 %
NEUTROPHILS # BLD AUTO: 11.27 X10*3/UL (ref 1.2–7.7)
NEUTROPHILS NFR BLD AUTO: 79.5 %
NITRITE UR QL STRIP.AUTO: NEGATIVE
NRBC BLD-RTO: 0 /100 WBCS (ref 0–0)
NT-PROBNP SERPL-MCNC: 541 PG/ML (ref 0–353)
OXYHGB MFR BLDA: 93.7 % (ref 94–98)
OXYHGB MFR BLDA: 96.7 % (ref 94–98)
PCO2 BLDA: 62 MM HG (ref 38–42)
PCO2 BLDA: 78 MM HG (ref 38–42)
PH BLDA: 7.28 PH (ref 7.38–7.42)
PH BLDA: 7.41 PH (ref 7.38–7.42)
PH UR STRIP.AUTO: 6.5 [PH]
PHOSPHATE SERPL-MCNC: 3.8 MG/DL (ref 2.5–4.5)
PHOSPHATE SERPL-MCNC: 4.2 MG/DL (ref 2.5–4.5)
PLATELET # BLD AUTO: 280 X10*3/UL (ref 150–450)
PO2 BLDA: 106 MM HG (ref 85–95)
PO2 BLDA: 82 MM HG (ref 85–95)
POTASSIUM BLDA-SCNC: 2.6 MMOL/L (ref 3.5–5.3)
POTASSIUM BLDA-SCNC: 2.8 MMOL/L (ref 3.5–5.3)
POTASSIUM SERPL-SCNC: 3 MMOL/L (ref 3.4–5.1)
POTASSIUM SERPL-SCNC: 3.6 MMOL/L (ref 3.4–5.1)
PROT SERPL-MCNC: 7.5 G/DL (ref 5.9–7.9)
PROT UR STRIP.AUTO-MCNC: NEGATIVE MG/DL
PROTHROMBIN TIME: 10.9 SECONDS (ref 9.3–12.7)
RBC # BLD AUTO: 4.93 X10*6/UL (ref 4–5.2)
RBC # UR STRIP.AUTO: NEGATIVE /UL
RH FACTOR (ANTIGEN D): NORMAL
RH FACTOR (ANTIGEN D): NORMAL
RIGHT VENTRICLE FREE WALL PEAK S': 15.44 CM/S
RIGHT VENTRICLE PEAK SYSTOLIC PRESSURE: 55.4 MMHG
SAO2 % BLDA: 95 % (ref 94–100)
SAO2 % BLDA: 98 % (ref 94–100)
SODIUM BLDA-SCNC: 146 MMOL/L (ref 136–145)
SODIUM BLDA-SCNC: 146 MMOL/L (ref 136–145)
SODIUM SERPL-SCNC: 145 MMOL/L (ref 133–145)
SODIUM SERPL-SCNC: 147 MMOL/L (ref 133–145)
SP GR UR STRIP.AUTO: 1.01
SPECIMEN DRAWN FROM PATIENT: ABNORMAL
SPECIMEN DRAWN FROM PATIENT: ABNORMAL
TRICUSPID ANNULAR PLANE SYSTOLIC EXCURSION: 1.9 CM
UROBILINOGEN UR STRIP.AUTO-MCNC: NORMAL MG/DL
VENTILATOR MODE: ABNORMAL
VENTILATOR RATE: 12 BPM
WBC # BLD AUTO: 14.2 X10*3/UL (ref 4.4–11.3)

## 2024-07-08 PROCEDURE — 2780000003 HC OR 278 NO HCPCS: Performed by: INTERNAL MEDICINE

## 2024-07-08 PROCEDURE — 84132 ASSAY OF SERUM POTASSIUM: CPT

## 2024-07-08 PROCEDURE — 7100000010 HC PHASE TWO TIME - EACH INCREMENTAL 1 MINUTE: Performed by: INTERNAL MEDICINE

## 2024-07-08 PROCEDURE — 36600 WITHDRAWAL OF ARTERIAL BLOOD: CPT

## 2024-07-08 PROCEDURE — 5A2204Z RESTORATION OF CARDIAC RHYTHM, SINGLE: ICD-10-PCS | Performed by: INTERNAL MEDICINE

## 2024-07-08 PROCEDURE — C1769 GUIDE WIRE: HCPCS | Performed by: INTERNAL MEDICINE

## 2024-07-08 PROCEDURE — 83735 ASSAY OF MAGNESIUM: CPT

## 2024-07-08 PROCEDURE — C1766 INTRO/SHEATH,STRBLE,NON-PEEL: HCPCS | Performed by: INTERNAL MEDICINE

## 2024-07-08 PROCEDURE — 2500000002 HC RX 250 W HCPCS SELF ADMINISTERED DRUGS (ALT 637 FOR MEDICARE OP, ALT 636 FOR OP/ED)

## 2024-07-08 PROCEDURE — 36415 COLL VENOUS BLD VENIPUNCTURE: CPT

## 2024-07-08 PROCEDURE — 94660 CPAP INITIATION&MGMT: CPT

## 2024-07-08 PROCEDURE — C1759 CATH, INTRA ECHOCARDIOGRAPHY: HCPCS | Performed by: INTERNAL MEDICINE

## 2024-07-08 PROCEDURE — C1760 CLOSURE DEV, VASC: HCPCS | Performed by: INTERNAL MEDICINE

## 2024-07-08 PROCEDURE — C1893 INTRO/SHEATH, FIXED,NON-PEEL: HCPCS | Performed by: INTERNAL MEDICINE

## 2024-07-08 PROCEDURE — 85610 PROTHROMBIN TIME: CPT

## 2024-07-08 PROCEDURE — 86900 BLOOD TYPING SEROLOGIC ABO: CPT

## 2024-07-08 PROCEDURE — 99152 MOD SED SAME PHYS/QHP 5/>YRS: CPT | Performed by: INTERNAL MEDICINE

## 2024-07-08 PROCEDURE — 2500000004 HC RX 250 GENERAL PHARMACY W/ HCPCS (ALT 636 FOR OP/ED): Performed by: INTERNAL MEDICINE

## 2024-07-08 PROCEDURE — 93306 TTE W/DOPPLER COMPLETE: CPT

## 2024-07-08 PROCEDURE — 71045 X-RAY EXAM CHEST 1 VIEW: CPT

## 2024-07-08 PROCEDURE — 2720000007 HC OR 272 NO HCPCS: Performed by: INTERNAL MEDICINE

## 2024-07-08 PROCEDURE — 99153 MOD SED SAME PHYS/QHP EA: CPT | Performed by: INTERNAL MEDICINE

## 2024-07-08 PROCEDURE — 02583ZZ DESTRUCTION OF CONDUCTION MECHANISM, PERCUTANEOUS APPROACH: ICD-10-PCS | Performed by: INTERNAL MEDICINE

## 2024-07-08 PROCEDURE — 2500000001 HC RX 250 WO HCPCS SELF ADMINISTERED DRUGS (ALT 637 FOR MEDICARE OP): Performed by: REGISTERED NURSE

## 2024-07-08 PROCEDURE — 2020000001 HC ICU ROOM DAILY

## 2024-07-08 PROCEDURE — 86850 RBC ANTIBODY SCREEN: CPT

## 2024-07-08 PROCEDURE — 71045 X-RAY EXAM CHEST 1 VIEW: CPT | Performed by: RADIOLOGY

## 2024-07-08 PROCEDURE — 2500000004 HC RX 250 GENERAL PHARMACY W/ HCPCS (ALT 636 FOR OP/ED)

## 2024-07-08 PROCEDURE — 5A09357 ASSISTANCE WITH RESPIRATORY VENTILATION, LESS THAN 24 CONSECUTIVE HOURS, CONTINUOUS POSITIVE AIRWAY PRESSURE: ICD-10-PCS | Performed by: REGISTERED NURSE

## 2024-07-08 PROCEDURE — 99222 1ST HOSP IP/OBS MODERATE 55: CPT

## 2024-07-08 PROCEDURE — 85025 COMPLETE CBC W/AUTO DIFF WBC: CPT

## 2024-07-08 PROCEDURE — 92960 CARDIOVERSION ELECTRIC EXT: CPT | Performed by: INTERNAL MEDICINE

## 2024-07-08 PROCEDURE — 2500000005 HC RX 250 GENERAL PHARMACY W/O HCPCS: Performed by: REGISTERED NURSE

## 2024-07-08 PROCEDURE — 93656 COMPRE EP EVAL ABLTJ ATR FIB: CPT | Performed by: INTERNAL MEDICINE

## 2024-07-08 PROCEDURE — 84100 ASSAY OF PHOSPHORUS: CPT

## 2024-07-08 PROCEDURE — 2700000047 HC OR 270 NO HCPCS: Performed by: INTERNAL MEDICINE

## 2024-07-08 PROCEDURE — 85347 COAGULATION TIME ACTIVATED: CPT

## 2024-07-08 PROCEDURE — 9420000001 HC RT PATIENT EDUCATION 5 MIN

## 2024-07-08 PROCEDURE — 81003 URINALYSIS AUTO W/O SCOPE: CPT | Performed by: REGISTERED NURSE

## 2024-07-08 PROCEDURE — 94640 AIRWAY INHALATION TREATMENT: CPT

## 2024-07-08 PROCEDURE — 83880 ASSAY OF NATRIURETIC PEPTIDE: CPT | Performed by: REGISTERED NURSE

## 2024-07-08 PROCEDURE — 93306 TTE W/DOPPLER COMPLETE: CPT | Performed by: INTERNAL MEDICINE

## 2024-07-08 PROCEDURE — C1733 CATH, EP, OTHR THAN COOL-TIP: HCPCS | Performed by: INTERNAL MEDICINE

## 2024-07-08 PROCEDURE — 2500000005 HC RX 250 GENERAL PHARMACY W/O HCPCS: Performed by: INTERNAL MEDICINE

## 2024-07-08 PROCEDURE — C1730 CATH, EP, 19 OR FEW ELECT: HCPCS | Performed by: INTERNAL MEDICINE

## 2024-07-08 PROCEDURE — 7100000009 HC PHASE TWO TIME - INITIAL BASE CHARGE: Performed by: INTERNAL MEDICINE

## 2024-07-08 RX ORDER — POTASSIUM CHLORIDE 14.9 MG/ML
20 INJECTION INTRAVENOUS
Status: COMPLETED | OUTPATIENT
Start: 2024-07-08 | End: 2024-07-08

## 2024-07-08 RX ORDER — FUROSEMIDE 10 MG/ML
60 INJECTION INTRAMUSCULAR; INTRAVENOUS ONCE
Status: COMPLETED | OUTPATIENT
Start: 2024-07-08 | End: 2024-07-08

## 2024-07-08 RX ORDER — DOFETILIDE 0.25 MG/1
250 CAPSULE ORAL EVERY 12 HOURS
Status: DISCONTINUED | OUTPATIENT
Start: 2024-07-08 | End: 2024-07-12 | Stop reason: HOSPADM

## 2024-07-08 RX ORDER — ALLOPURINOL 100 MG/1
100 TABLET ORAL EVERY MORNING
Status: DISCONTINUED | OUTPATIENT
Start: 2024-07-09 | End: 2024-07-12 | Stop reason: HOSPADM

## 2024-07-08 RX ORDER — ACETAMINOPHEN 325 MG/1
650 TABLET ORAL EVERY 6 HOURS PRN
Status: DISCONTINUED | OUTPATIENT
Start: 2024-07-08 | End: 2024-07-10

## 2024-07-08 RX ORDER — POTASSIUM CHLORIDE 14.9 MG/ML
20 INJECTION INTRAVENOUS
Status: DISCONTINUED | OUTPATIENT
Start: 2024-07-08 | End: 2024-07-09

## 2024-07-08 RX ORDER — FUROSEMIDE 10 MG/ML
20 INJECTION INTRAMUSCULAR; INTRAVENOUS ONCE
Status: COMPLETED | OUTPATIENT
Start: 2024-07-08 | End: 2024-07-08

## 2024-07-08 RX ORDER — IPRATROPIUM BROMIDE AND ALBUTEROL SULFATE 2.5; .5 MG/3ML; MG/3ML
3 SOLUTION RESPIRATORY (INHALATION)
Status: DISCONTINUED | OUTPATIENT
Start: 2024-07-08 | End: 2024-07-09

## 2024-07-08 RX ORDER — POTASSIUM CHLORIDE 20 MEQ/1
40 TABLET, EXTENDED RELEASE ORAL ONCE
Status: COMPLETED | OUTPATIENT
Start: 2024-07-08 | End: 2024-07-08

## 2024-07-08 RX ORDER — HEPARIN SODIUM 10000 [USP'U]/100ML
INJECTION, SOLUTION INTRAVENOUS CONTINUOUS PRN
Status: DISCONTINUED | OUTPATIENT
Start: 2024-07-08 | End: 2024-07-08 | Stop reason: HOSPADM

## 2024-07-08 RX ORDER — FENTANYL CITRATE 50 UG/ML
INJECTION, SOLUTION INTRAMUSCULAR; INTRAVENOUS AS NEEDED
Status: DISCONTINUED | OUTPATIENT
Start: 2024-07-08 | End: 2024-07-08 | Stop reason: HOSPADM

## 2024-07-08 RX ORDER — METOPROLOL SUCCINATE 25 MG/1
25 TABLET, EXTENDED RELEASE ORAL DAILY
Status: DISCONTINUED | OUTPATIENT
Start: 2024-07-09 | End: 2024-07-12 | Stop reason: HOSPADM

## 2024-07-08 RX ORDER — HYDRALAZINE HYDROCHLORIDE 20 MG/ML
10 INJECTION INTRAMUSCULAR; INTRAVENOUS EVERY 4 HOURS PRN
Status: DISCONTINUED | OUTPATIENT
Start: 2024-07-08 | End: 2024-07-11

## 2024-07-08 RX ORDER — ALLOPURINOL 100 MG/1
200 TABLET ORAL NIGHTLY
Status: DISCONTINUED | OUTPATIENT
Start: 2024-07-09 | End: 2024-07-12 | Stop reason: HOSPADM

## 2024-07-08 RX ORDER — GABAPENTIN 300 MG/1
300 CAPSULE ORAL 3 TIMES DAILY
Status: DISCONTINUED | OUTPATIENT
Start: 2024-07-08 | End: 2024-07-12 | Stop reason: HOSPADM

## 2024-07-08 RX ORDER — FLUTICASONE FUROATE AND VILANTEROL 100; 25 UG/1; UG/1
1 POWDER RESPIRATORY (INHALATION)
Status: DISCONTINUED | OUTPATIENT
Start: 2024-07-08 | End: 2024-07-08

## 2024-07-08 RX ORDER — HEPARIN SODIUM 1000 [USP'U]/ML
INJECTION, SOLUTION INTRAVENOUS; SUBCUTANEOUS AS NEEDED
Status: DISCONTINUED | OUTPATIENT
Start: 2024-07-08 | End: 2024-07-08 | Stop reason: HOSPADM

## 2024-07-08 RX ORDER — ALLOPURINOL 300 MG/1
300 TABLET ORAL DAILY
Status: DISCONTINUED | OUTPATIENT
Start: 2024-07-08 | End: 2024-07-08

## 2024-07-08 RX ORDER — MIDAZOLAM HYDROCHLORIDE 1 MG/ML
INJECTION, SOLUTION INTRAMUSCULAR; INTRAVENOUS AS NEEDED
Status: DISCONTINUED | OUTPATIENT
Start: 2024-07-08 | End: 2024-07-08 | Stop reason: HOSPADM

## 2024-07-08 RX ORDER — POTASSIUM CHLORIDE 20 MEQ/1
40 TABLET, EXTENDED RELEASE ORAL ONCE
Status: DISCONTINUED | OUTPATIENT
Start: 2024-07-08 | End: 2024-07-08

## 2024-07-08 RX ORDER — HYDRALAZINE HYDROCHLORIDE 20 MG/ML
10 INJECTION INTRAMUSCULAR; INTRAVENOUS ONCE
Status: COMPLETED | OUTPATIENT
Start: 2024-07-08 | End: 2024-07-08

## 2024-07-08 RX ORDER — BUPROPION HYDROCHLORIDE 300 MG/1
300 TABLET ORAL
Status: DISCONTINUED | OUTPATIENT
Start: 2024-07-09 | End: 2024-07-12 | Stop reason: HOSPADM

## 2024-07-08 RX ORDER — HYDRALAZINE HYDROCHLORIDE 20 MG/ML
INJECTION INTRAMUSCULAR; INTRAVENOUS AS NEEDED
Status: DISCONTINUED | OUTPATIENT
Start: 2024-07-08 | End: 2024-07-08 | Stop reason: HOSPADM

## 2024-07-08 SDOH — SOCIAL STABILITY: SOCIAL INSECURITY: DOES ANYONE TRY TO KEEP YOU FROM HAVING/CONTACTING OTHER FRIENDS OR DOING THINGS OUTSIDE YOUR HOME?: UNABLE TO ASSESS

## 2024-07-08 SDOH — SOCIAL STABILITY: SOCIAL INSECURITY: HAS ANYONE EVER THREATENED TO HURT YOUR FAMILY OR YOUR PETS?: UNABLE TO ASSESS

## 2024-07-08 SDOH — SOCIAL STABILITY: SOCIAL INSECURITY: DO YOU FEEL UNSAFE GOING BACK TO THE PLACE WHERE YOU ARE LIVING?: UNABLE TO ASSESS

## 2024-07-08 SDOH — SOCIAL STABILITY: SOCIAL INSECURITY: HAVE YOU HAD THOUGHTS OF HARMING ANYONE ELSE?: UNABLE TO ASSESS

## 2024-07-08 SDOH — SOCIAL STABILITY: SOCIAL INSECURITY: ARE THERE ANY APPARENT SIGNS OF INJURIES/BEHAVIORS THAT COULD BE RELATED TO ABUSE/NEGLECT?: UNABLE TO ASSESS

## 2024-07-08 SDOH — SOCIAL STABILITY: SOCIAL INSECURITY: ABUSE: ADULT

## 2024-07-08 SDOH — SOCIAL STABILITY: SOCIAL INSECURITY: DO YOU FEEL ANYONE HAS EXPLOITED OR TAKEN ADVANTAGE OF YOU FINANCIALLY OR OF YOUR PERSONAL PROPERTY?: UNABLE TO ASSESS

## 2024-07-08 SDOH — SOCIAL STABILITY: SOCIAL INSECURITY: HAVE YOU HAD ANY THOUGHTS OF HARMING ANYONE ELSE?: UNABLE TO ASSESS

## 2024-07-08 SDOH — SOCIAL STABILITY: SOCIAL INSECURITY: ARE YOU OR HAVE YOU BEEN THREATENED OR ABUSED PHYSICALLY, EMOTIONALLY, OR SEXUALLY BY ANYONE?: UNABLE TO ASSESS

## 2024-07-08 ASSESSMENT — PATIENT HEALTH QUESTIONNAIRE - PHQ9
SUM OF ALL RESPONSES TO PHQ9 QUESTIONS 1 & 2: 0
2. FEELING DOWN, DEPRESSED OR HOPELESS: NOT AT ALL
1. LITTLE INTEREST OR PLEASURE IN DOING THINGS: NOT AT ALL

## 2024-07-08 ASSESSMENT — COGNITIVE AND FUNCTIONAL STATUS - GENERAL
PATIENT BASELINE BEDBOUND: NO
DAILY ACTIVITIY SCORE: 24
MOBILITY SCORE: 24

## 2024-07-08 ASSESSMENT — PAIN - FUNCTIONAL ASSESSMENT
PAIN_FUNCTIONAL_ASSESSMENT: 0-10

## 2024-07-08 ASSESSMENT — PAIN SCALES - WONG BAKER
WONGBAKER_NUMERICALRESPONSE: NO HURT
WONGBAKER_NUMERICALRESPONSE: HURTS LITTLE BIT
WONGBAKER_NUMERICALRESPONSE: NO HURT
WONGBAKER_NUMERICALRESPONSE: HURTS LITTLE BIT

## 2024-07-08 ASSESSMENT — PAIN DESCRIPTION - LOCATION
LOCATION: BACK
LOCATION: HEAD

## 2024-07-08 ASSESSMENT — ACTIVITIES OF DAILY LIVING (ADL)
HEARING - LEFT EAR: FUNCTIONAL
TOILETING: INDEPENDENT
LACK_OF_TRANSPORTATION: PATIENT UNABLE TO ANSWER
ADEQUATE_TO_COMPLETE_ADL: YES
DRESSING YOURSELF: INDEPENDENT
JUDGMENT_ADEQUATE_SAFELY_COMPLETE_DAILY_ACTIVITIES: YES
ASSISTIVE_DEVICE: OXYGEN;WALKER
BATHING: INDEPENDENT
FEEDING YOURSELF: INDEPENDENT
WALKS IN HOME: INDEPENDENT
GROOMING: INDEPENDENT
HEARING - RIGHT EAR: FUNCTIONAL
PATIENT'S MEMORY ADEQUATE TO SAFELY COMPLETE DAILY ACTIVITIES?: YES

## 2024-07-08 ASSESSMENT — PAIN DESCRIPTION - DESCRIPTORS
DESCRIPTORS: ACHING

## 2024-07-08 ASSESSMENT — LIFESTYLE VARIABLES
HOW MANY STANDARD DRINKS CONTAINING ALCOHOL DO YOU HAVE ON A TYPICAL DAY: PATIENT UNABLE TO ANSWER
AUDIT-C TOTAL SCORE: -1
PRESCIPTION_ABUSE_PAST_12_MONTHS: NO
HOW OFTEN DO YOU HAVE A DRINK CONTAINING ALCOHOL: MONTHLY OR LESS
SUBSTANCE_ABUSE_PAST_12_MONTHS: NO
AUDIT-C TOTAL SCORE: -1
HOW OFTEN DO YOU HAVE 6 OR MORE DRINKS ON ONE OCCASION: PATIENT UNABLE TO ANSWER
SKIP TO QUESTIONS 9-10: 0

## 2024-07-08 ASSESSMENT — PAIN SCALES - GENERAL
PAINLEVEL_OUTOF10: 0 - NO PAIN
PAINLEVEL_OUTOF10: 8
PAINLEVEL_OUTOF10: 8
PAINLEVEL_OUTOF10: 0 - NO PAIN

## 2024-07-08 ASSESSMENT — PAIN DESCRIPTION - ORIENTATION
ORIENTATION: MID
ORIENTATION: MID

## 2024-07-08 NOTE — POST-PROCEDURE NOTE
Physician Transition of Care Summary  Invasive Cardiovascular Lab    Procedure Date: 7/8/2024  Attending:    * Amado Ty - Primary  Resident/Fellow/Other Assistant: Surgeons and Role:  * No surgeons found with a matching role *    Indications:   Pre-op Diagnosis     * PAF (paroxysmal atrial fibrillation) (Multi) [I48.0]    Post-procedure diagnosis:   Post-op Diagnosis     * PAF (paroxysmal atrial fibrillation) (Multi) [I48.0]    Procedure(s):   Ablation A-Fib  [id number] - MD COMPRE EP EVAL ABLTJ ATR FIB PULM VEIN ISOLATION        Procedure Findings:   Please see below    Description of the Procedure:   After written witnessed informed consent was obtained the procedure from the patient, the patient was transferred to the EP laboratory. The patient was in the fasting state. A grounding pad was placed. The patient was set up for monitoring of surface ECG and intracardiac electrograms. 3-D mapping patches were placed. The right and left groins were prepped and draped in the usual sterile fashion. Bupivacaine was administered locally for anesthetic. Right and Left  Femoral vein access was obtained. The vessel was accessed using the modified Seldinger technique.     The catheters were positioned as follows:  Right Femoral Vein - 6 Fr EPXT octopolar catheter for phrenic nerve pacing  Left Femoral Vein - 6 Fr coronary sinus EPXT octopolar catheter  Right Femoral Vein - 6 Fr SLO long sheath through a short 12 Fr sheath  Left Femoral Vein - 9 Fr SJM Intracardiac ECHO View Flex catheter       An inquiry catheter was advanced to the superior vena cava for phrenic nerve pacing during right vein ablation. Additionally a separate ECG lead was placed over the phrenic nerve region for assessment of electrocardiographic evidence of phrenic nerve capture. Phrenic nerve pacing was performed during both the right superior vein and right inferior vein ablation. Electrocardiographic monitoring and manual palpation was performed  throughout the right sided vein lesions. There was no loss of phrenic nerve capture during ablation.  Trans septal catheterization was performed under biplane fluoroscopic and intracardiac echocardiographic guidance. Heparin bolus and continuous IV infusion were given immediately following trans septal puncture. An ACT was maintained around 300 throughout the procedure with assessments between 20 and 30 minutes apart throughout the procedure.  The SL 0 long sheath was exchanged over a wire for the Medtronic Flex catheter long sheath.  Transseptal puncture was eventually performed utilizing a cross Medtronic sheath as attempts with a BR K1 needle as well as a [place] needle were unsuccessful.  A 27 mm Medtronic cryoablation arctic front advance balloon system was then advanced to the left atrium. An intra luminary achieve circular catheter was advanced through the balloon catheter. A three dimensional map was recreated of the left atrium and superimposed on our CT scan of the left atrium obtained prior to procedure. The 2 maps were performed utilizing the [employer] mapping system. All 4 veins were cannulated with the  catheter and we performed at least 2 lesions for each vein confirming appropriate seal with contrast injection and cinematography. Duration lesion is very between 3 and 4 minutes per lesion. We achieved temperatures between -40 and -60°C for each lesion. Pulmonary vein signals were assessed utilizing the achieve catheter to ensure complete pulmonary vein isolation. An EPS showed normal intracardiac intervals and no other inducible arrhythmias.  Cardioversion was necessary the end of the procedure to restore sinus rhythm there is no evidence of dual AV mo physiology.     Vascade closure device was utilized for all four venous access sites      Summary  Successful pulmonary vein isolation with Cryoablation.     Complications:   None    Stents/Implants:   Implants       No implant documentation for  this case.            Anticoagulation/Antiplatelet Plan:   Heparin as outlined above    Estimated Blood Loss:   35 mL    Anesthesia: Moderate Sedation Anesthesia Staff: No anesthesia staff entered.    Any Specimen(s) Removed:   No specimens collected during this procedure.    Disposition:   Stable      Electronically signed by: Amado Ty MD, 7/8/2024 6:08 PM

## 2024-07-08 NOTE — CONSULTS
Reason For Consult  Dyspnea post-afib ablation     [facility] Critical Care Medicine       Date:  7/8/2024  Patient:  Vickie Foster  YOB: 1955  MRN:  [id number]   Admit Date:  7/8/2024        History of Present Illness:  Vickie Foster is a 68 y.o. year old female patient with past medical history of afib s/p tikosyn and cardioversion 5/1/24, pHTN, COPD, TANVIR with cpap at night, HFpEF, HTN, GERD, depression, anxiety, morbid obesity, hyperuricemia, with recent weight gain of 5-6lbs in a few days, who presented to  for outpatient afib ablation with Dr Ty. Of note, she recently had a cardioversion 5/1/24 and she converted back to afib at the end of June. After the procedure was completed, she developed acute dyspnea, tachycardia, HTN, and diaphoresis. Vital signs post-ablation were HR 96 (NSR), /74, RR 26, SpO2 86-92% on NRB 15L 100%. She was given 10mg hydralazine and 20mg lasix and diuresed 500mL prior to arrival to the floor. She was then brought to ICU for management of respiratory distress. Stat labs and CXR ordered. Bedside POCUS and CXR consistent with pulmonary edema. She was given another 60mg of IV lasix and 40mEq K. ABG showed respiratory acidosis 7.28/78/82/36. She was placed on bipap by RT.     Interval ICU Events:  7/8: Pt brought to ICU, stat labs obtained and stat echo ordered. ABG showed respiratory acidosis and she was placed on bipap. Will give another dose of lasix d/t vascular congestion.     Medical History:  Past Medical History:   Diagnosis Date    A-fib (Multi)     CHF (congestive heart failure) (Multi)     COVID     HTN (hypertension)     Low back pain      Past Surgical History:   Procedure Laterality Date    CARDIAC ELECTROPHYSIOLOGY PROCEDURE N/A 05/01/2024    Procedure: Cardioversion;  Surgeon: Amado Ty MD;  Location: [facility] Cardiac Cath Lab;  Service: Electrophysiology;  Laterality: N/A;  [address]4718  i48.0  medical [employer]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    CARDIOVERSION       05/01/2024     Medications Prior to Admission   Medication Sig Dispense Refill Last Dose    allopurinol (Zyloprim) 300 mg tablet Take 1 tablet (300 mg) by mouth once daily. 90 tablet 1 7/8/2024    ascorbic acid (Vitamin C With Genevieve Hips) 1,000 mg tablet Take 1 tablet (1,000 mg) by mouth once daily.   7/8/2024    biotin 10 mg tablet Take 1 tablet (10 mg) by mouth once daily.   7/8/2024    budesonide-formoteroL (Symbicort) 80-4.5 mcg/actuation inhaler Inhale 2 puffs 2 times a day. Rinse mouth with water after use to reduce aftertaste and incidence of candidiasis. Do not swallow.   Past Month    buPROPion XL (Wellbutrin XL) 300 mg 24 hr tablet Take 1 tablet (300 mg) by mouth once daily in the morning. Take before meals. 90 tablet 1 7/8/2024    calcium carb-vitamin D3-vit K2 600 mg-1,000 unit-90 mcg tablet Take by mouth once daily.   7/8/2024    clobetasol (Temovate) 0.05 % cream Apply topically 2 times a day.   Past Week    docusate sodium (Colace) 100 mg capsule Take 1 capsule (100 mg) by mouth 2 times a day.   7/7/2024    dofetilide (Tikosyn) 250 mcg capsule Take 1 capsule (250 mcg) by mouth every 12 hours. 180 capsule 1 7/7/2024    Eliquis 5 mg tablet Take 1 tablet (5 mg) by mouth 2 times a day. 180 tablet 1 7/7/2024    folic acid/multivit-min/lutein (CENTRUM SILVER ORAL) Take by mouth. As directed   7/7/2024    gabapentin (Neurontin) 300 mg capsule Take 1 capsule (300 mg) by mouth 3 times a day. 270 capsule 1 7/8/2024    Klor-Con M20 20 mEq ER tablet TAKE 1 TABLET THREE TIMES A DAY WITH FOOD 270 tablet 3 7/8/2024    metOLazone (Zaroxolyn) 5 mg tablet Take 1 tablet (5 mg) by mouth once daily as needed.   7/8/2024    metoprolol succinate XL (Toprol-XL) 25 mg 24 hr tablet TAKE 1 TABLET BY MOUTH ONE TIME DAILY 90 tablet 3 7/8/2024    oxygen (O2) gas therapy Inhale 3 L/min once daily at bedtime.   7/7/2024    semaglutide 2 mg/dose (8 mg/3 mL) pen injector Inject 2.3 mg under the skin 1 (one) time per week.   Past  Week    torsemide (Demadex) 100 mg tablet TAKE ONE-HALF (1/2) TABLET TWICE A DAY 90 tablet 3 7/7/2024     Patient has no known allergies.  Social History     Tobacco Use    Smoking status: Former     Types: Cigarettes    Smokeless tobacco: Never   Vaping Use    Vaping status: Never Used   Substance Use Topics    Alcohol use: Yes     Comment: monthly or less    Drug use: Never     Family History   Problem Relation Name Age of Onset    Breast cancer Mother      Heart attack Mother      Heart disease Mother      Colon cancer Father      Breast cancer Sister      Diabetes type II Sister      Kidney failure Sister      Other (herione addict) Sister      No Known Problems Brother      No Known Problems Daughter      Other (recovering alcoholic) Son      Breast cancer Other Maternal Aunt        Hospital Medications:           Current Facility-Administered Medications:     furosemide (Lasix) injection 60 mg, 60 mg, intravenous, Once, Philippe Seth PA-C    oxygen (O2) therapy, , inhalation, Continuous - Inhalation, Minnie Mcfarlane, APRN-CNP, 90 percent at 07/08/24 1545    perflutren lipid microspheres (Definity) injection 0.5-10 mL of dilution, 0.5-10 mL of dilution, intravenous, Once in imaging, Philippe Seth PA-C    perflutren lipid microspheres (Definity) injection 0.5-10 mL of dilution, 0.5-10 mL of dilution, intravenous, Once in imaging, Amado Ty MD    perflutren protein A microsphere (Optison) injection 0.5 mL, 0.5 mL, intravenous, Once in imaging, Philippe Seth PA-C    perflutren protein A microsphere (Optison) injection 0.5 mL, 0.5 mL, intravenous, Once in imaging, Amado Ty MD    potassium chloride 20 mEq in 100 mL IV premix, 20 mEq, intravenous, q2h, Philippe Seth PA-C    sulfur hexafluoride microsphr (Lumason) injection 24.28 mg, 2 mL, intravenous, Once in imaging, Philippe Seth PA-C    sulfur hexafluoride microsphr (Lumason) injection 24.28 mg, 2 mL, intravenous, Once in imaging,  Amado Ty MD    Review of Systems:  14 point review of systems was completed and negative except for those specially mention in my HPI    Physical Exam:    Heart Rate:  [72-97]   Temp:  [36.8 °C (98.3 °F)]   Resp:  [12-26]   BP: (135-164)/(74-99)   SpO2:  [86 %-98 %]     Physical Exam  Constitutional:       General: She is in acute distress.      Appearance: She is ill-appearing. She is not diaphoretic.   HENT:      Mouth/Throat:      Mouth: Mucous membranes are dry.   Eyes:      Extraocular Movements: Extraocular movements intact.      Conjunctiva/sclera: Conjunctivae normal.      Pupils: Pupils are equal, round, and reactive to light.   Cardiovascular:      Rate and Rhythm: Normal rate and regular rhythm.   Pulmonary:      Effort: Respiratory distress present.   Abdominal:      General: There is distension.      Palpations: Abdomen is soft.      Tenderness: There is no abdominal tenderness.   Musculoskeletal:      Right lower leg: Edema present.      Left lower leg: Edema present.   Skin:     General: Skin is warm and dry.   Neurological:      Mental Status: She is alert and oriented to person, place, and time.         Objective:    I have reviewed all medications, laboratory results, and imaging pertinent for today's encounter.    FiO2 (%):  [90 %] 90 %      Intake/Output Summary (Last 24 hours) at 7/8/2024 1543  Last data filed at 7/8/2024 1353  Gross per 24 hour   Intake --   Output 35 ml   Net -35 ml         Assessment/Plan:    I am currently managing this critically ill patient for the following problems:    Neuro/Psych/Pain Ctrl/Sedation: pt is AOx  Hx depression, anxiety   - Pain Control: tylenol PRN   - Continue home wellbutrin   - CAM ICU qshift, sleep-wake hygiene, delirium precautions     Respiratory/ENT:  Acute hypoxic respiratory failure 2/2 flash pulmonary edema   Dyspnea s/p cardiac ablation   Hx COPD, TANVIR  - Supplemental O2: Bipap, will wean to HFNC when able to sit up-right  - CPAP at  night    - 80mg lasix given with appropriate UOP   - Wean O2 as tolerated to maintain SpO2 >92%  - Continuous pulse ox monitoring   - Pulm hygiene    Cardiovascular:  Hx chronic diasotlic HF, HTN, HLD   Persistent atrial fibrillation s/p ablation - now in NSR   - Stat echo to r/o tamponade/perforation  - Continue home tikosyn, metoprolol   - Home eliquis to resume tmrw d/t oozing from femoral site post-op  - Check coags   - Hydralazine PRN for SBP >160, if no improvement, consider nitroglycerin gtt for afterload reduction   - Continuous cardiac monitoring per ICU protocol  - Maintain MAPs >65  - EKGs PRN for ACS symptoms, arrhythmias     GI:  Hx GERD  - Diet: NPO   - BR: none    Renal/Volume Status (Intra & Extravascular):  Hypervolemia   Hypokalemia   Hyperuricemia   - Baseline Cr 0.9  - Continue home allopurinol  - Maintain jones catheter for adequate I/Os  - Maintain urine output 0.5-1.0cc/kg/hr  - Hourly I/O's  - Replete electrolytes to maintain K >4.0 and Mg >2.0  - Daily BMP, Mg, Phos    Endocrine  Hyperglycemia - possibly stress-induced, no hx DM   - POCT glucose q4hrs while NPO, Consider adding SSI if glucose >180  - Hypoglycemia protocol PRN     Infectious Disease:  Leukocytosis - likely stress-induced post-ablation, predominantly neutrophils   - Antibiotics: not indicated at this time   - Monitor SIRS criteria    Heme/Onc:  No active concerns   - Monitor for s/sx of anemia such as bleeding and bruising   - Transfuse if Hgb <7.0   - Continue home eliquis   - Daily CBC    MSK:  No active concerns   - Padded pressure points   - PT/OT when appropriate    Derm:  - ICU skin protocol    Ethics/Code Status:  Full code     :  DVT Prophylaxis: SCDs  GI Prophylaxis:   Bowel Regimen: none  Diet: NPO until off bipap   CVC: none  Adela: none  Jones: yes 7/8  Restraints: none  Dispo: ICU    Critical Care Time:  60 minutes spent in preparing to see patient (I.e. labs, imaging, etc.), documentation,  discussion plan of care with patient/family/caregiver, and/or coordination of care with multidisciplinary team including the attending. Time does not include completion of procedure time.     Philippe Seth PA-C  Pulmonary & Critical Care Medicine   [facility]

## 2024-07-08 NOTE — CARE PLAN
Problem: Respiratory  Goal: Clear secretions with interventions this shift  Outcome: Progressing  Goal: Minimize anxiety/maximize coping throughout shift  Outcome: Progressing  Goal: Minimal/no exertional discomfort or dyspnea this shift  Outcome: Progressing  Goal: No signs of respiratory distress (eg. Use of accessory muscles. Peds grunting)  Outcome: Progressing  Goal: Patent airway maintained this shift  Outcome: Progressing  Goal: Tolerate mechanical ventilation evidenced by VS/agitation level this shift  Outcome: Progressing  Goal: Tolerate pulmonary toileting this shift  Outcome: Progressing  Goal: Verbalize decreased shortness of breath this shift  Outcome: Progressing  Goal: Wean oxygen to maintain O2 saturation per order/standard this shift  Outcome: Progressing  Goal: Increase self care and/or family involvement in next 24 hours  Outcome: Progressing     Problem: Skin  Goal: Decreased wound size/increased tissue granulation at next dressing change  Outcome: Progressing  Goal: Participates in plan/prevention/treatment measures  Outcome: Progressing  Goal: Prevent/manage excess moisture  Outcome: Progressing  Goal: Prevent/minimize sheer/friction injuries  Outcome: Progressing  Goal: Promote/optimize nutrition  Outcome: Progressing  Goal: Promote skin healing  Outcome: Progressing     Problem: Fall/Injury  Goal: Not fall by end of shift  Outcome: Progressing  Goal: Be free from injury by end of the shift  Outcome: Progressing  Goal: Verbalize understanding of personal risk factors for fall in the hospital  Outcome: Progressing  Goal: Verbalize understanding of risk factor reduction measures to prevent injury from fall in the home  Outcome: Progressing  Goal: Use assistive devices by end of the shift  Outcome: Progressing  Goal: Pace activities to prevent fatigue by end of the shift  Outcome: Progressing   The patient's goals for the shift include      The clinical goals for the shift include to come of  cpap

## 2024-07-08 NOTE — Clinical Note
Closure device placed in the vein. Site closed by VASCADE. X4; 2xleft femoral vein, 2x right femoral vein (right side vascades failed)

## 2024-07-08 NOTE — NURSING NOTE
Pt arrived to the floor at 1530 after ablation d/t respiratory distress. Lt groin dressing is CDI. Rt groin was actively bleeding. Pressure held to site by Lorraine Lewis and myself for 30min. New dressing applied. Site rechecked and dressing is saturated. Pressure, surgi foam and new dressing applied. Pt tolerating cpap at this time and will transition to HFNC.

## 2024-07-08 NOTE — PROGRESS NOTES
Vickie Foster is a 68 y.o. female on day 0 of admission presenting with PAF (paroxysmal atrial fibrillation) (Multi).    Subjective   Seen in the C for acute respiratory distress, hypoxemia postprocedure placed on nonrebreather, given IV furosemide, hydralazine for hypertension, stat x-ray arrangements made for transfer to the ICU   Objective     Last Recorded Vitals  Blood pressure 133/66, pulse 88, temperature 36.8 °C (98.3 °F), temperature source Oral, resp. rate 21, SpO2 95%.  Intake/Output last 3 Shifts:  No intake/output data recorded.    Relevant Results  Results for orders placed or performed during the hospital encounter of 07/08/24 (from the past 24 hour(s))   ACTIVATED CLOTTING TIME LOW   Result Value Ref Range    POCT Activated Clotting Time Low Range 217 (H) 89 - 169 sec   ACTIVATED CLOTTING TIME LOW   Result Value Ref Range    POCT Activated Clotting Time Low Range 190 (H) 89 - 169 sec   Blood Gas Arterial Full Panel   Result Value Ref Range    POCT pH, Arterial 7.28 (L) 7.38 - 7.42 pH    POCT pCO2, Arterial 78 (HH) 38 - 42 mm Hg    POCT pO2, Arterial 82 (L) 85 - 95 mm Hg    POCT SO2, Arterial 95 94 - 100 %    POCT Oxy Hemoglobin, Arterial 93.7 (L) 94.0 - 98.0 %    POCT Hematocrit Calculated, Arterial 46.0 36.0 - 46.0 %    POCT Sodium, Arterial 146 (H) 136 - 145 mmol/L    POCT Potassium, Arterial 2.8 (LL) 3.5 - 5.3 mmol/L    POCT Chloride, Arterial 102 98 - 107 mmol/L    POCT Ionized Calcium, Arterial 1.21 1.10 - 1.33 mmol/L    POCT Glucose, Arterial 229 (H) 74 - 99 mg/dL    POCT Lactate, Arterial 0.7 0.4 - 2.0 mmol/L    POCT Base Excess, Arterial 6.6 (H) -2.0 - 3.0 mmol/L    POCT HCO3 Calculated, Arterial 36.7 (H) 22.0 - 26.0 mmol/L    POCT Hemoglobin, Arterial 15.2 12.0 - 16.0 g/dL    POCT Anion Gap, Arterial 10 10 - 25 mmo/L    Patient Temperature 37.0 degrees Celsius    FiO2 90 %    Apparatus NON REBREATHER     Flow 15.0 LPM    Critical Called By AYDE MARES     Critical Called To DR KIM      Critical Call Time 1519     Critical Read Back Y     Critical Note PH 7.28 PCO2 78     Site of Arterial Puncture Radial Right     Cricket's Test Positive    CBC and Auto Differential   Result Value Ref Range    WBC 14.2 (H) 4.4 - 11.3 x10*3/uL    nRBC 0.0 0.0 - 0.0 /100 WBCs    RBC 4.93 4.00 - 5.20 x10*6/uL    Hemoglobin 14.8 12.0 - 16.0 g/dL    Hematocrit 49.0 (H) 36.0 - 46.0 %    MCV 99 80 - 100 fL    MCH 30.0 26.0 - 34.0 pg    MCHC 30.2 (L) 32.0 - 36.0 g/dL    RDW 14.8 (H) 11.5 - 14.5 %    Platelets 280 150 - 450 x10*3/uL    Neutrophils % 79.5 40.0 - 80.0 %    Immature Granulocytes %, Automated 1.4 (H) 0.0 - 0.9 %    Lymphocytes % 13.8 13.0 - 44.0 %    Monocytes % 4.5 2.0 - 10.0 %    Eosinophils % 0.4 0.0 - 6.0 %    Basophils % 0.4 0.0 - 2.0 %    Neutrophils Absolute 11.27 (H) 1.20 - 7.70 x10*3/uL    Immature Granulocytes Absolute, Automated 0.20 0.00 - 0.70 x10*3/uL    Lymphocytes Absolute 1.96 1.20 - 4.80 x10*3/uL    Monocytes Absolute 0.64 0.10 - 1.00 x10*3/uL    Eosinophils Absolute 0.06 0.00 - 0.70 x10*3/uL    Basophils Absolute 0.05 0.00 - 0.10 x10*3/uL   Comprehensive metabolic panel   Result Value Ref Range    Glucose 219 (H) 65 - 99 mg/dL    Sodium 145 133 - 145 mmol/L    Potassium 3.0 (L) 3.4 - 5.1 mmol/L    Chloride 102 97 - 107 mmol/L    Bicarbonate 31 24 - 31 mmol/L    Urea Nitrogen 23 8 - 25 mg/dL    Creatinine 0.60 0.40 - 1.60 mg/dL    eGFR >90 >60 mL/min/1.73m*2    Calcium 9.2 8.5 - 10.4 mg/dL    Albumin 4.3 3.5 - 5.0 g/dL    Alkaline Phosphatase 93 35 - 125 U/L    Total Protein 7.5 5.9 - 7.9 g/dL    AST 34 5 - 40 U/L    Bilirubin, Total 0.6 0.1 - 1.2 mg/dL    ALT 30 5 - 40 U/L    Anion Gap 12 <=19 mmol/L   Magnesium   Result Value Ref Range    Magnesium 2.20 1.60 - 3.10 mg/dL   Phosphorus   Result Value Ref Range    Phosphorus 4.2 2.5 - 4.5 mg/dL   NT Pro-BNP   Result Value Ref Range    PROBNP 541 (H) 0 - 353 pg/mL   Transthoracic Echo (TTE) Complete   Result Value Ref Range    AV pk gilberto 2.15 m/s     LVOT diam 2.04 cm    AV mn grad 8.4 mmHg    MV E/A ratio 106.77     Tricuspid annular plane systolic excursion 1.9 cm    LV Biplane EF 63 %    LA vol index A/L 85.1 ml/m2    LV EF 68 %    RV free wall pk S' 15.44 cm/s    RVSP 55.4 mmHg    LVIDd 4.75 cm    AV pk grad 18.6 mmHg    Aortic Valve Area by Continuity of VTI 2.83 cm2    Aortic Valve Area by Continuity of Peak Velocity 2.48 cm2    LV A4C EF 61.8    Transthoracic Echo (TTE) Complete    Result Date: 7/8/2024           [facility] [id number] [place] [address]            Phone [phone] TRANSTHORACIC ECHOCARDIOGRAM REPORT Patient Name:     SOURAV Oakley Physician:   [id number] Servando [facility] Study Date:       7/8/2024             Ordering Provider:   [id number] MELLISSA MULLINS MRN/PID:          [id number]             Fellow: Accession#:       [id number]         Nurse: Date of           1955 / 68      Sonographer:         Dalila Kaba RDCS Birth/Age:        years Gender:           F                    Additional Staff: Height:           157.48 cm            Admit Date:          7/8/2024 Weight:           113.85 kg            Admission Status:    Inpatient - STAT BSA / BMI:        2.11 m2 / 45.91      Department Location: [facility]                   kg/m2 Blood Pressure: 161 /96 mmHg Study Type:    TRANSTHORACIC ECHO (TTE) COMPLETE Diagnosis/ICD: Dyspnea, unspecified-R06.00 Indication:    dypsnea post afib ablation, r/o pericardial disease CPT Codes:     Echo Complete w Full Doppler-[id number] Patient History: Pertinent History: HTN, CHF, cardiomegaly, coronary arteriosclerosis, AFIB,                    PHTN, COPD, GERD. Study Detail: The following Echo studies were performed: 2D, M-Mode, Doppler and               color flow.  PHYSICIAN INTERPRETATION: Left Ventricle: The left ventricular systolic function is normal, with a visually estimated ejection fraction of  65-70%. There are no regional wall motion abnormalities. The left ventricular cavity size is normal. Spectral Doppler shows an impaired relaxation pattern of left ventricular diastolic filling. Left Atrium: The left atrium is moderately dilated. Right Ventricle: The right ventricle is normal in size. There is normal right ventricular global systolic function. Right Atrium: The right atrium is mildly dilated. Aortic Valve: The aortic valve is trileaflet. The aortic valve dimensionless index is 0.87. There is no evidence of aortic valve regurgitation. The peak instantaneous gradient of the aortic valve is 18.6 mmHg. The mean gradient of the aortic valve is 8.4 mmHg. Mitral Valve: The mitral valve is normal in structure. There is mild to moderate mitral valve regurgitation. Tricuspid Valve: The tricuspid valve is structurally normal. There is mild to moderate tricuspid regurgitation. Pulmonic Valve: The pulmonic valve is not well visualized. The pulmonic valve regurgitation was not well visualized. Pericardium: There is no pericardial effusion noted. Aorta: The aortic root is normal.  CONCLUSIONS:  1. The left ventricular systolic function is normal, with a visually estimated ejection fraction of 65-70%.  2. Spectral Doppler shows an impaired relaxation pattern of left ventricular diastolic filling.  3. There is normal right ventricular global systolic function.  4. The left atrium is moderately dilated.  5. Mild to moderate mitral valve regurgitation.  6. Mild to moderate tricuspid regurgitation. QUANTITATIVE DATA SUMMARY: 2D MEASUREMENTS:                           Normal Ranges: IVSd:          1.45 cm    (0.6-1.1cm) LVPWd:         1.14 cm    (0.6-1.1cm) LVIDd:         4.75 cm    (3.9-5.9cm) LVIDs:         3.05 cm LV Mass Index: 114.6 g/m2 LV % FS        35.7 % LA VOLUME:                               Normal Ranges: LA Vol A4C:        144.8 ml   (22+/-6mL/m2) LA Vol A2C:        183.3 ml LA Vol BP:         179.1 ml LA  Vol Index A4C:  68.8 ml/m2 LA Vol Index A2C:  87.1 ml/m2 LA Vol Index BP:   85.1 ml/m2 LA Area A4C:       38.5 cm2 LA Area A2C:       39.4 cm2 LA Major Axis A4C: 8.7 cm LA Major Axis A2C: 7.2 cm LA Volume Index:   85.0 ml/m2 LA Vol A4C:        144.0 ml LA Vol A2C:        184.0 ml RA VOLUME BY A/L METHOD:                               Normal Ranges: RA Vol A4C:        97.3 ml    (8.3-19.5ml) RA Vol Index A4C:  46.2 ml/m2 RA Area A4C:       28.9 cm2 RA Major Axis A4C: 7.3 cm AORTA MEASUREMENTS:                      Normal Ranges: Ao Sinus, d: 2.90 cm (2.1-3.5cm) Ao STJ, d:   2.60 cm (1.7-3.4cm) Asc Ao, d:   3.30 cm (2.1-3.4cm) LV SYSTOLIC FUNCTION BY 2D PLANIMETRY (MOD):                      Normal Ranges: EF-A4C View:    62 % (>=55%) EF-A2C View:    64 % EF-Biplane:     63 % EF-Visual:      68 % LV EF Reported: 68 % LV DIASTOLIC FUNCTION:                         Normal Ranges: MV Peak E:    1.24 m/s  (0.7-1.2 m/s) MV Peak A:    0.01 m/s  (0.42-0.7 m/s) E/A Ratio:    106.77    (1.0-2.2) MV e'         0.109 m/s (>8.0) MV lateral e' 0.17 m/s MV medial e'  0.05 m/s E/e' Ratio:   11.36     (<8.0) MITRAL VALVE:                 Normal Ranges: MV DT: 134 msec (150-240msec) AORTIC VALVE:                                    Normal Ranges: AoV Vmax:                2.15 m/s  (<=1.7m/s) AoV Peak P.6 mmHg (<20mmHg) AoV Mean P.4 mmHg  (1.7-11.5mmHg) LVOT Max Sean:            1.64 m/s  (<=1.1m/s) AoV VTI:                 31.64 cm  (18-25cm) LVOT VTI:                27.54 cm LVOT Diameter:           2.04 cm   (1.8-2.4cm) AoV Area, VTI:           2.83 cm2  (2.5-5.5cm2) AoV Area,Vmax:           2.48 cm2  (2.5-4.5cm2) AoV Dimensionless Index: 0.87  RIGHT VENTRICLE: RV Basal 4.58 cm RV Major 7.5 cm TAPSE:   19.5 mm RV s'    0.15 m/s TRICUSPID VALVE/RVSP:                             Normal Ranges: Peak TR Velocity: 3.62 m/s RV Syst Pressure: 55.4 mmHg (< 30mmHg) IVC Diam:         2.41 cm AORTA: Asc Ao Diam  3.35 cm  [id number] Servando Lundberg DO Electronically signed on 7/8/2024 at 4:24:40 PM  ** Final **     Transthoracic echo (TTE) complete    Result Date: 6/29/2024   [facility], [address], [place] [address]            Tel [phone] and Fax [phone] TRANSTHORACIC ECHOCARDIOGRAM REPORT  Patient Name:      SOURAV CUADRA AUDREYLAWRENCE      Reading Physician:    [id number] Demetrius Guerrero MD Study Date:        6/27/2024            Ordering Provider:    [id number] AGA CAMPBELL MRN/PID:           [id number]             Fellow: Accession#:        [id number]         Nurse: Date of Birth/Age: 1955 / 68      Sonographer:          Price parks RDCS Gender:            F                    Additional Staff: Height:            157.48 cm            Admit Date: Weight:            112.49 kg            Admission Status:     Outpatient BSA / BMI:         2.09 m2 / 45.36      Encounter#:           [id number]                    kg/m2 Blood Pressure:    138/84 mmHg          Department Location:  [facility] Study Type:    TRANSTHORACIC ECHO (TTE) COMPLETE Diagnosis/ICD: Paroxysmal atrial fibrillation-I48.0; Other forms of                dyspnea-R06.09 Indication:    afib, MORLEY CPT Code:      Echo Complete w Full Doppler-02448 Patient History: Pertinent History: HTN, diastolic HF, cardiomegaly, afib, PHTN, CHF,MORLEY, morbid                    obesity. Study Detail: The following Echo studies were performed: M-Mode, 2D, Doppler and               color flow. Technically challenging study due to body habitus,               poor acoustic windows and prominent lung artifact.  PHYSICIAN INTERPRETATION: Left Ventricle: Left ventricular ejection fraction is normal, by visual estimate at 60-65%. There are no regional wall motion abnormalities. The left  ventricular cavity size is normal. There is mild to moderate concentric left ventricular hypertrophy. Left ventricular diastolic filling was not assessed. Left Atrium: The left atrium is moderate to severely dilated. Right Ventricle: The right ventricle is normal in size. There is normal right ventriclar wall thickness. There is normal right ventricular global systolic function. Right Atrium: The right atrium is normal in size. Aortic Valve: The aortic valve appears structurally normal. The aortic valve appears tricuspid and non-restricted. There is no evidence of aortic valve regurgitation. The peak instantaneous gradient of the aortic valve is 8.7 mmHg. Mitral Valve: The mitral valve is normal in structure. There is normal mitral valve leaflet mobility. There is trace to mild mitral valve regurgitation. Tricuspid Valve: The tricuspid valve is structurally normal. There is normal tricuspid valve leaflet mobility. There is mild tricuspid regurgitation. The Doppler estimated RVSP is mildly elevated at 43.1 mmHg. Pulmonic Valve: The pulmonic valve is structurally normal. There is trace pulmonic valve regurgitation. Pericardium: There is no pericardial effusion noted. Aorta: The aortic root is normal. The Ao Sinus is 3.70 cm. The Asc Ao is 3.50 cm. There is no dilatation of the aortic arch. There is no dilatation of the ascending aorta. There is no dilatation of the aortic root. There is plaque visualized in the ascending aorta, which is classified as a Grade 2 [mild (focal or diffuse) intimal thickening of 2-3 mm] atherosclerosis. Pulmonary Artery: The pulmonary artery is normal in size. The tricuspid regurgitant velocity is 3.17 m/s, and with an estimated right atrial pressure of 3 mmHg, the estimated pulmonary artery pressure is mildly elevated with the RVSP at 43.1 mmHg. The estimated PASP is 43 mmHg. Systemic Veins: The inferior vena cava appears to be of normal size. There is IVC inspiratory collapse greater than  50%.  CONCLUSIONS:  1. Left ventricular ejection fraction is normal, by visual estimate at 60-65%.  2. There is normal right ventricular global systolic function.  3. The left atrium is moderate to severely dilated.  4. Trace to mild mitral valve regurgitation.  5. Mild tricuspid regurgitation visualized.  6. Mildly elevated RVSP.  7. The estimated PASP is 43 mmHg.  8. There is plaque visualized in the ascending aorta. QUANTITATIVE DATA SUMMARY: 2D MEASUREMENTS:                          Normal Ranges: Ao Root d:     3.70 cm   (2.0-3.7cm) LAs:           6.06 cm   (2.7-4.0cm) IVSd:          1.27 cm   (0.6-1.1cm) LVPWd:         1.09 cm   (0.6-1.1cm) LVIDd:         4.22 cm   (3.9-5.9cm) LVIDs:         3.15 cm LV Mass Index: 83.8 g/m2 LV % FS        25.2 % LA VOLUME:                                Normal Ranges: LA Vol A4C:        147.1 ml    (22+/-6mL/m2) LA Vol A2C:        216.2 ml LA Vol BP:         180.4 ml LA Vol Index A4C:  70.2 ml/m2 LA Vol Index A2C:  103.2 ml/m2 LA Vol Index BP:   86.1 ml/m2 LA Area A4C:       38.8 cm2 LA Area A2C:       46.5 cm2 LA Major Axis A4C: 8.7 cm LA Major Axis A2C: 8.5 cm LA Volume Index:   86.1 ml/m2 LA Vol A4C:        132.5 ml LA Vol A2C:        210.3 ml RA VOLUME BY A/L METHOD:                       Normal Ranges: RA Area A4C: 26.6 cm2 AORTA MEASUREMENTS:                      Normal Ranges: Ao Sinus, d: 3.70 cm (2.1-3.5cm) Asc Ao, d:   3.50 cm (2.1-3.4cm) LV SYSTOLIC FUNCTION BY 2D PLANIMETRY (MOD):                      Normal Ranges: EF-A4C View:    62 % (>=55%) EF-A2C View:    64 % EF-Biplane:     64 % EF-Visual:      63 % LV EF Reported: 63 % LV DIASTOLIC FUNCTION:                     Normal Ranges: MV Peak E: 0.85 m/s (0.7-1.2 m/s) AORTIC VALVE:                         Normal Ranges: AoV Vmax:      1.47 m/s (<=1.7m/s) AoV Peak P.7 mmHg (<20mmHg) LVOT Max Sean:  0.94 m/s (<=1.1m/s) LVOT VTI:      17.61 cm LVOT Diameter: 2.25 cm  (1.8-2.4cm) AoV Area,Vmax: 2.53 cm2  (2.5-4.5cm2)  RIGHT VENTRICLE: RV Basal 4.40 cm TAPSE:   13.0 mm RV s'    0.09 m/s TRICUSPID VALVE/RVSP:                             Normal Ranges: Peak TR Velocity: 3.17 m/s RV Syst Pressure: 43.1 mmHg (< 30mmHg) IVC Diam:         1.80 cm AORTA: Asc Ao Diam 3.51 cm  [id number] Demetrius Guerrero MD Electronically signed on 6/29/2024 at 12:31:59 PM  ** Final **         Physical Exam:  Gen: A+O x 3, respiratory distress, breathing w/ assessory muscle use particularly abdominal muscles  HEENT: Normocephalic/atraumatic pupils equal react light  Neck: No JVD, upstrokes and volumes nl, no bruits   Lung: Diminished bilaterally anterior and posterior, crackles lateral bases   CV:  nl sounding S1, S2, soft MR,  PMI nondisplaced  Abdomen: large, soft, non tender, + BS x 4   Indwelling catheter  Extremities: Right groin [place] with continued oozing, distal pulses palpable bilaterally, 2+ lower extremity edema, doughy /pitting warm to   Hands with swelling, dusky,  good radial pulses  Neuro: no neurologic deficits    Assessment/Plan   Principal Problem:    PAF (paroxysmal atrial fibrillation) (Multi)    68-year-old female with known atrial fibrillation, multiple cardioversions with recurrent atrial fibrillation even despite dofetilide started 1 year prior.  Known diastolic dysfunction stage II at top of right-sided dilatation and high pulmonary pressures, maintained on torsemide and as needed metolazone.  Status post cardioversion last week unable to maintain his sinus rhythm, has noticed increased weight with more dyspnea, did not utilize furosemide today, underwent RFA without difficulty, cardioversion per case, received 500 cc fluid during case, postprocedure developed flash pulmonary edema with hypoxemia.  Treated with nonrebreather, IV furosemide and hydralazine for hypertension.  ABG with acidosis and CO2 in the 70s placed on BiPAP, additional furosemide ordered.  Stat chest x-ray with evidence for volume overload, echocardiogram  with preserved left ventricular function somewhat hyperdynamic 65-70% less RV RA dilatation with suspect high filling pressures and LVH.  Additional,  furosemide 60 mg IV ordered, receiving IV 40 meq for K+ 3.0.   WBC 14.2 may be reactive, urine to be sent  Intensive this team consulted for help in managing patient, will continue to diurese with supplementation of potassium   Keep K+>4 and Mg+ >2  Maintain BiPAP  Continue with dofetilide, baseline ECG  Discussed with Dr. Ty and intensivist team  Initiate intensivist close  involvement management of  this patient      I spent 60 minutes in the professional and overall care of this patient.      Minnie Mcfarlane, APRN-CNP

## 2024-07-09 ENCOUNTER — APPOINTMENT (OUTPATIENT)
Dept: RADIOLOGY | Facility: HOSPITAL | Age: 69
End: 2024-07-09
Payer: MEDICARE

## 2024-07-09 LAB
ALBUMIN SERPL-MCNC: 3.8 G/DL (ref 3.5–5)
ANION GAP SERPL CALC-SCNC: 10 MMOL/L
ANION GAP SERPL CALC-SCNC: 11 MMOL/L
ANION GAP SERPL CALC-SCNC: 9 MMOL/L
BASOPHILS # BLD AUTO: 0.01 X10*3/UL (ref 0–0.1)
BASOPHILS NFR BLD AUTO: 0.1 %
BUN SERPL-MCNC: 22 MG/DL (ref 8–25)
BUN SERPL-MCNC: 23 MG/DL (ref 8–25)
BUN SERPL-MCNC: 28 MG/DL (ref 8–25)
CALCIUM SERPL-MCNC: 8.6 MG/DL (ref 8.5–10.4)
CALCIUM SERPL-MCNC: 9 MG/DL (ref 8.5–10.4)
CALCIUM SERPL-MCNC: 9.1 MG/DL (ref 8.5–10.4)
CHLORIDE SERPL-SCNC: 100 MMOL/L (ref 97–107)
CHLORIDE SERPL-SCNC: 103 MMOL/L (ref 97–107)
CHLORIDE SERPL-SCNC: 99 MMOL/L (ref 97–107)
CO2 SERPL-SCNC: 28 MMOL/L (ref 24–31)
CO2 SERPL-SCNC: 32 MMOL/L (ref 24–31)
CO2 SERPL-SCNC: 34 MMOL/L (ref 24–31)
CREAT SERPL-MCNC: 0.6 MG/DL (ref 0.4–1.6)
CREAT SERPL-MCNC: 0.7 MG/DL (ref 0.4–1.6)
CREAT SERPL-MCNC: 0.8 MG/DL (ref 0.4–1.6)
EGFRCR SERPLBLD CKD-EPI 2021: 81 ML/MIN/1.73M*2
EGFRCR SERPLBLD CKD-EPI 2021: >90 ML/MIN/1.73M*2
EGFRCR SERPLBLD CKD-EPI 2021: >90 ML/MIN/1.73M*2
EOSINOPHIL # BLD AUTO: 0.01 X10*3/UL (ref 0–0.7)
EOSINOPHIL NFR BLD AUTO: 0.1 %
ERYTHROCYTE [DISTWIDTH] IN BLOOD BY AUTOMATED COUNT: 15 % (ref 11.5–14.5)
GLUCOSE SERPL-MCNC: 106 MG/DL (ref 65–99)
GLUCOSE SERPL-MCNC: 111 MG/DL (ref 65–99)
GLUCOSE SERPL-MCNC: 95 MG/DL (ref 65–99)
HCT VFR BLD AUTO: 42.4 % (ref 36–46)
HGB BLD-MCNC: 13.1 G/DL (ref 12–16)
IMM GRANULOCYTES # BLD AUTO: 0.05 X10*3/UL (ref 0–0.7)
IMM GRANULOCYTES NFR BLD AUTO: 0.4 % (ref 0–0.9)
LYMPHOCYTES # BLD AUTO: 1.09 X10*3/UL (ref 1.2–4.8)
LYMPHOCYTES NFR BLD AUTO: 8.9 %
MAGNESIUM SERPL-MCNC: 2.1 MG/DL (ref 1.6–3.1)
MAGNESIUM SERPL-MCNC: 2.2 MG/DL (ref 1.6–3.1)
MAGNESIUM SERPL-MCNC: 2.2 MG/DL (ref 1.6–3.1)
MCH RBC QN AUTO: 30.4 PG (ref 26–34)
MCHC RBC AUTO-ENTMCNC: 30.9 G/DL (ref 32–36)
MCV RBC AUTO: 98 FL (ref 80–100)
MONOCYTES # BLD AUTO: 0.9 X10*3/UL (ref 0.1–1)
MONOCYTES NFR BLD AUTO: 7.3 %
NEUTROPHILS # BLD AUTO: 10.2 X10*3/UL (ref 1.2–7.7)
NEUTROPHILS NFR BLD AUTO: 83.2 %
NRBC BLD-RTO: 0 /100 WBCS (ref 0–0)
NT-PROBNP SERPL-MCNC: 753 PG/ML (ref 0–353)
PHOSPHATE SERPL-MCNC: 3.1 MG/DL (ref 2.5–4.5)
PHOSPHATE SERPL-MCNC: 4.5 MG/DL (ref 2.5–4.5)
PHOSPHATE SERPL-MCNC: 4.5 MG/DL (ref 2.5–4.5)
PLATELET # BLD AUTO: 250 X10*3/UL (ref 150–450)
POTASSIUM SERPL-SCNC: 3.7 MMOL/L (ref 3.4–5.1)
POTASSIUM SERPL-SCNC: 4 MMOL/L (ref 3.4–5.1)
POTASSIUM SERPL-SCNC: 4 MMOL/L (ref 3.4–5.1)
RBC # BLD AUTO: 4.31 X10*6/UL (ref 4–5.2)
SODIUM SERPL-SCNC: 138 MMOL/L (ref 133–145)
SODIUM SERPL-SCNC: 142 MMOL/L (ref 133–145)
SODIUM SERPL-SCNC: 146 MMOL/L (ref 133–145)
TROPONIN T SERPL-MCNC: 395 NG/L
WBC # BLD AUTO: 12.3 X10*3/UL (ref 4.4–11.3)

## 2024-07-09 PROCEDURE — 97116 GAIT TRAINING THERAPY: CPT | Mod: GP

## 2024-07-09 PROCEDURE — 71045 X-RAY EXAM CHEST 1 VIEW: CPT

## 2024-07-09 PROCEDURE — 2500000005 HC RX 250 GENERAL PHARMACY W/O HCPCS

## 2024-07-09 PROCEDURE — 94640 AIRWAY INHALATION TREATMENT: CPT

## 2024-07-09 PROCEDURE — 2500000004 HC RX 250 GENERAL PHARMACY W/ HCPCS (ALT 636 FOR OP/ED)

## 2024-07-09 PROCEDURE — 99232 SBSQ HOSP IP/OBS MODERATE 35: CPT | Performed by: INTERNAL MEDICINE

## 2024-07-09 PROCEDURE — 2500000002 HC RX 250 W HCPCS SELF ADMINISTERED DRUGS (ALT 637 FOR MEDICARE OP, ALT 636 FOR OP/ED): Performed by: REGISTERED NURSE

## 2024-07-09 PROCEDURE — 94660 CPAP INITIATION&MGMT: CPT

## 2024-07-09 PROCEDURE — 2500000002 HC RX 250 W HCPCS SELF ADMINISTERED DRUGS (ALT 637 FOR MEDICARE OP, ALT 636 FOR OP/ED)

## 2024-07-09 PROCEDURE — 84484 ASSAY OF TROPONIN QUANT: CPT | Performed by: REGISTERED NURSE

## 2024-07-09 PROCEDURE — 2500000001 HC RX 250 WO HCPCS SELF ADMINISTERED DRUGS (ALT 637 FOR MEDICARE OP): Performed by: REGISTERED NURSE

## 2024-07-09 PROCEDURE — 80048 BASIC METABOLIC PNL TOTAL CA: CPT

## 2024-07-09 PROCEDURE — 71045 X-RAY EXAM CHEST 1 VIEW: CPT | Performed by: RADIOLOGY

## 2024-07-09 PROCEDURE — 36415 COLL VENOUS BLD VENIPUNCTURE: CPT

## 2024-07-09 PROCEDURE — 97161 PT EVAL LOW COMPLEX 20 MIN: CPT | Mod: GP

## 2024-07-09 PROCEDURE — 2500000005 HC RX 250 GENERAL PHARMACY W/O HCPCS: Performed by: REGISTERED NURSE

## 2024-07-09 PROCEDURE — 83880 ASSAY OF NATRIURETIC PEPTIDE: CPT | Performed by: REGISTERED NURSE

## 2024-07-09 PROCEDURE — 83735 ASSAY OF MAGNESIUM: CPT

## 2024-07-09 PROCEDURE — 84100 ASSAY OF PHOSPHORUS: CPT

## 2024-07-09 PROCEDURE — 97166 OT EVAL MOD COMPLEX 45 MIN: CPT | Mod: GO

## 2024-07-09 PROCEDURE — 97535 SELF CARE MNGMENT TRAINING: CPT | Mod: GO

## 2024-07-09 PROCEDURE — 99291 CRITICAL CARE FIRST HOUR: CPT

## 2024-07-09 PROCEDURE — 2020000001 HC ICU ROOM DAILY

## 2024-07-09 PROCEDURE — 85025 COMPLETE CBC W/AUTO DIFF WBC: CPT

## 2024-07-09 PROCEDURE — 9420000001 HC RT PATIENT EDUCATION 5 MIN

## 2024-07-09 PROCEDURE — 2500000001 HC RX 250 WO HCPCS SELF ADMINISTERED DRUGS (ALT 637 FOR MEDICARE OP)

## 2024-07-09 RX ORDER — FUROSEMIDE 10 MG/ML
60 INJECTION INTRAMUSCULAR; INTRAVENOUS ONCE
Status: COMPLETED | OUTPATIENT
Start: 2024-07-09 | End: 2024-07-09

## 2024-07-09 RX ORDER — ACETAMINOPHEN 500 MG
5 TABLET ORAL NIGHTLY PRN
Status: DISCONTINUED | OUTPATIENT
Start: 2024-07-09 | End: 2024-07-10

## 2024-07-09 RX ORDER — LIDOCAINE 560 MG/1
2 PATCH PERCUTANEOUS; TOPICAL; TRANSDERMAL DAILY
Status: DISCONTINUED | OUTPATIENT
Start: 2024-07-09 | End: 2024-07-12 | Stop reason: HOSPADM

## 2024-07-09 RX ORDER — FUROSEMIDE 10 MG/ML
80 INJECTION INTRAMUSCULAR; INTRAVENOUS ONCE
Status: DISCONTINUED | OUTPATIENT
Start: 2024-07-09 | End: 2024-07-09

## 2024-07-09 RX ORDER — POTASSIUM CHLORIDE 14.9 MG/ML
20 INJECTION INTRAVENOUS ONCE
Status: COMPLETED | OUTPATIENT
Start: 2024-07-09 | End: 2024-07-09

## 2024-07-09 RX ORDER — IPRATROPIUM BROMIDE AND ALBUTEROL SULFATE 2.5; .5 MG/3ML; MG/3ML
3 SOLUTION RESPIRATORY (INHALATION)
Status: DISCONTINUED | OUTPATIENT
Start: 2024-07-09 | End: 2024-07-12 | Stop reason: HOSPADM

## 2024-07-09 RX ORDER — KETOROLAC TROMETHAMINE 30 MG/ML
15 INJECTION, SOLUTION INTRAMUSCULAR; INTRAVENOUS ONCE
Status: COMPLETED | OUTPATIENT
Start: 2024-07-09 | End: 2024-07-09

## 2024-07-09 RX ORDER — METOLAZONE 5 MG/1
5 TABLET ORAL DAILY
Status: DISCONTINUED | OUTPATIENT
Start: 2024-07-09 | End: 2024-07-12 | Stop reason: HOSPADM

## 2024-07-09 RX ORDER — POTASSIUM CHLORIDE 20 MEQ/1
40 TABLET, EXTENDED RELEASE ORAL ONCE
Status: COMPLETED | OUTPATIENT
Start: 2024-07-09 | End: 2024-07-09

## 2024-07-09 SDOH — HEALTH STABILITY: MENTAL HEALTH: HOW MANY STANDARD DRINKS CONTAINING ALCOHOL DO YOU HAVE ON A TYPICAL DAY?: 1 OR 2

## 2024-07-09 SDOH — HEALTH STABILITY: MENTAL HEALTH
HOW OFTEN DO YOU NEED TO HAVE SOMEONE HELP YOU WHEN YOU READ INSTRUCTIONS, PAMPHLETS, OR OTHER WRITTEN MATERIAL FROM YOUR DOCTOR OR PHARMACY?: NEVER

## 2024-07-09 SDOH — ECONOMIC STABILITY: INCOME INSECURITY: HOW HARD IS IT FOR YOU TO PAY FOR THE VERY BASICS LIKE FOOD, HOUSING, MEDICAL CARE, AND HEATING?: NOT VERY HARD

## 2024-07-09 SDOH — SOCIAL STABILITY: SOCIAL INSECURITY: WITHIN THE LAST YEAR, HAVE YOU BEEN AFRAID OF YOUR PARTNER OR EX-PARTNER?: NO

## 2024-07-09 SDOH — ECONOMIC STABILITY: FOOD INSECURITY: WITHIN THE PAST 12 MONTHS, THE FOOD YOU BOUGHT JUST DIDN'T LAST AND YOU DIDN'T HAVE MONEY TO GET MORE.: NEVER TRUE

## 2024-07-09 SDOH — SOCIAL STABILITY: SOCIAL NETWORK: HOW OFTEN DO YOU ATTEND CHURCH OR RELIGIOUS SERVICES?: NEVER

## 2024-07-09 SDOH — SOCIAL STABILITY: SOCIAL NETWORK: HOW OFTEN DO YOU ATTENT MEETINGS OF THE CLUB OR ORGANIZATION YOU BELONG TO?: NEVER

## 2024-07-09 SDOH — SOCIAL STABILITY: SOCIAL NETWORK: ARE YOU MARRIED, WIDOWED, DIVORCED, SEPARATED, NEVER MARRIED, OR LIVING WITH A PARTNER?: MARRIED

## 2024-07-09 SDOH — ECONOMIC STABILITY: TRANSPORTATION INSECURITY
IN THE PAST 12 MONTHS, HAS LACK OF TRANSPORTATION KEPT YOU FROM MEETINGS, WORK, OR FROM GETTING THINGS NEEDED FOR DAILY LIVING?: NO

## 2024-07-09 SDOH — HEALTH STABILITY: MENTAL HEALTH: HOW OFTEN DO YOU HAVE 6 OR MORE DRINKS ON ONE OCCASION?: NEVER

## 2024-07-09 SDOH — HEALTH STABILITY: MENTAL HEALTH: HOW OFTEN DO YOU HAVE A DRINK CONTAINING ALCOHOL?: MONTHLY OR LESS

## 2024-07-09 SDOH — ECONOMIC STABILITY: HOUSING INSECURITY: IN THE LAST 12 MONTHS, HOW MANY PLACES HAVE YOU LIVED?: 1

## 2024-07-09 SDOH — HEALTH STABILITY: MENTAL HEALTH
STRESS IS WHEN SOMEONE FEELS TENSE, NERVOUS, ANXIOUS, OR CAN'T SLEEP AT NIGHT BECAUSE THEIR MIND IS TROUBLED. HOW STRESSED ARE YOU?: NOT AT ALL

## 2024-07-09 SDOH — ECONOMIC STABILITY: HOUSING INSECURITY
IN THE LAST 12 MONTHS, WAS THERE A TIME WHEN YOU DID NOT HAVE A STEADY PLACE TO SLEEP OR SLEPT IN A SHELTER (INCLUDING NOW)?: NO

## 2024-07-09 SDOH — SOCIAL STABILITY: SOCIAL INSECURITY
WITHIN THE LAST YEAR, HAVE TO BEEN RAPED OR FORCED TO HAVE ANY KIND OF SEXUAL ACTIVITY BY YOUR PARTNER OR EX-PARTNER?: NO

## 2024-07-09 SDOH — ECONOMIC STABILITY: FOOD INSECURITY: WITHIN THE PAST 12 MONTHS, YOU WORRIED THAT YOUR FOOD WOULD RUN OUT BEFORE YOU GOT MONEY TO BUY MORE.: NEVER TRUE

## 2024-07-09 SDOH — ECONOMIC STABILITY: TRANSPORTATION INSECURITY
IN THE PAST 12 MONTHS, HAS THE LACK OF TRANSPORTATION KEPT YOU FROM MEDICAL APPOINTMENTS OR FROM GETTING MEDICATIONS?: NO

## 2024-07-09 SDOH — ECONOMIC STABILITY: INCOME INSECURITY: IN THE LAST 12 MONTHS, WAS THERE A TIME WHEN YOU WERE NOT ABLE TO PAY THE MORTGAGE OR RENT ON TIME?: NO

## 2024-07-09 SDOH — SOCIAL STABILITY: SOCIAL NETWORK: HOW OFTEN DO YOU GET TOGETHER WITH FRIENDS OR RELATIVES?: MORE THAN THREE TIMES A WEEK

## 2024-07-09 SDOH — HEALTH STABILITY: PHYSICAL HEALTH: ON AVERAGE, HOW MANY DAYS PER WEEK DO YOU ENGAGE IN MODERATE TO STRENUOUS EXERCISE (LIKE A BRISK WALK)?: 0 DAYS

## 2024-07-09 SDOH — SOCIAL STABILITY: SOCIAL INSECURITY
WITHIN THE LAST YEAR, HAVE YOU BEEN KICKED, HIT, SLAPPED, OR OTHERWISE PHYSICALLY HURT BY YOUR PARTNER OR EX-PARTNER?: NO

## 2024-07-09 SDOH — SOCIAL STABILITY: SOCIAL NETWORK
DO YOU BELONG TO ANY CLUBS OR ORGANIZATIONS SUCH AS CHURCH GROUPS UNIONS, FRATERNAL OR ATHLETIC GROUPS, OR SCHOOL GROUPS?: NO

## 2024-07-09 SDOH — SOCIAL STABILITY: SOCIAL NETWORK
IN A TYPICAL WEEK, HOW MANY TIMES DO YOU TALK ON THE PHONE WITH FAMILY, FRIENDS, OR NEIGHBORS?: MORE THAN THREE TIMES A WEEK

## 2024-07-09 SDOH — SOCIAL STABILITY: SOCIAL INSECURITY: WITHIN THE LAST YEAR, HAVE YOU BEEN HUMILIATED OR EMOTIONALLY ABUSED IN OTHER WAYS BY YOUR PARTNER OR EX-PARTNER?: NO

## 2024-07-09 SDOH — ECONOMIC STABILITY: INCOME INSECURITY: IN THE PAST 12 MONTHS, HAS THE ELECTRIC, GAS, OIL, OR WATER COMPANY THREATENED TO SHUT OFF SERVICE IN YOUR HOME?: NO

## 2024-07-09 SDOH — HEALTH STABILITY: PHYSICAL HEALTH: ON AVERAGE, HOW MANY MINUTES DO YOU ENGAGE IN EXERCISE AT THIS LEVEL?: 0 MIN

## 2024-07-09 ASSESSMENT — PAIN SCALES - GENERAL
PAINLEVEL_OUTOF10: 0 - NO PAIN
PAINLEVEL_OUTOF10: 7
PAINLEVEL_OUTOF10: 8
PAINLEVEL_OUTOF10: 0 - NO PAIN
PAINLEVEL_OUTOF10: 8
PAINLEVEL_OUTOF10: 0 - NO PAIN
PAINLEVEL_OUTOF10: 0 - NO PAIN
PAINLEVEL_OUTOF10: 3

## 2024-07-09 ASSESSMENT — PAIN DESCRIPTION - DESCRIPTORS
DESCRIPTORS: ACHING

## 2024-07-09 ASSESSMENT — PAIN SCALES - WONG BAKER
WONGBAKER_NUMERICALRESPONSE: NO HURT
WONGBAKER_NUMERICALRESPONSE: NO HURT

## 2024-07-09 ASSESSMENT — COGNITIVE AND FUNCTIONAL STATUS - GENERAL
DRESSING REGULAR LOWER BODY CLOTHING: A LOT
MOBILITY SCORE: 17
WALKING IN HOSPITAL ROOM: A LITTLE
DAILY ACTIVITIY SCORE: 19
HELP NEEDED FOR BATHING: A LITTLE
CLIMB 3 TO 5 STEPS WITH RAILING: A LOT
TOILETING: A LITTLE
DRESSING REGULAR UPPER BODY CLOTHING: A LITTLE
MOVING TO AND FROM BED TO CHAIR: A LITTLE
TURNING FROM BACK TO SIDE WHILE IN FLAT BAD: A LITTLE
MOVING FROM LYING ON BACK TO SITTING ON SIDE OF FLAT BED WITH BEDRAILS: A LITTLE
STANDING UP FROM CHAIR USING ARMS: A LITTLE

## 2024-07-09 ASSESSMENT — PAIN - FUNCTIONAL ASSESSMENT
PAIN_FUNCTIONAL_ASSESSMENT: 0-10

## 2024-07-09 ASSESSMENT — LIFESTYLE VARIABLES
AUDIT-C TOTAL SCORE: 1
SKIP TO QUESTIONS 9-10: 1

## 2024-07-09 ASSESSMENT — PAIN DESCRIPTION - LOCATION
LOCATION: HEAD
LOCATION: BACK
LOCATION: HEAD

## 2024-07-09 ASSESSMENT — ACTIVITIES OF DAILY LIVING (ADL)
HOME_MANAGEMENT_TIME_ENTRY: 10
ADL_ASSISTANCE: INDEPENDENT
BATHING_ASSISTANCE: MODERATE
LACK_OF_TRANSPORTATION: NO

## 2024-07-09 ASSESSMENT — PAIN DESCRIPTION - ORIENTATION: ORIENTATION: MID

## 2024-07-09 NOTE — PROGRESS NOTES
Physical Therapy    Physical Therapy Evaluation & Treatment    Patient Name: Vickie Foster  MRN: [id number]  Today's Date: 7/9/2024   Time Calculation  Start Time: 1347  Stop Time: 1410  Time Calculation (min): 23 min    Assessment/Plan   PT Assessment  PT Assessment Results: Decreased strength, Decreased endurance, Impaired balance, Decreased mobility, Decreased safety awareness  Rehab Prognosis: Good  Evaluation/Treatment Tolerance: Patient limited by fatigue  End of Session Communication: Bedside nurse  Assessment Comment: 68 year old female presents with decline from baseline functional mobility, fall risk, impaired balance, and decreased tolerance to activity.  End of Session Patient Position: Up in chair   IP OR SWING BED PT PLAN  Inpatient or Swing Bed: Inpatient  PT Plan  Treatment/Interventions: Bed mobility, Transfer training, Gait training, Stair training, Balance training, Endurance training, Therapeutic exercise, Therapeutic activity  PT Plan: Ongoing PT  PT Frequency: 6 times per week  PT Discharge Recommendations: Low intensity level of continued care (Transition to outpatient pulmonary rehab)  PT Recommended Transfer Status: Assist x1  PT - OK to Discharge: Yes      Subjective     General Visit Information:  General  Reason for Referral: Impaired mobility with PAF  Past Medical History Relevant to Rehab: AFIB, pulm HTN, cardioversion 5/1/2024, gout, cardiomegaly, MORLEY, GERD, HTN, hypokalemia, anxiety, depression, TANVIR, osteoporosis, pleural effusion, OA, spondylosis T-L spine, asthma, CHF, COVID, LBP  Co-Treatment: OT  Co-Treatment Reason: Medical complexity ICU patient  Prior to Session Communication: Bedside nurse  Patient Position Received:  (sitting on side of bed)  General Comment: 68 year old female admit to ICU from outpatient clinic due to developing acute dyspnea, tachycardia, HTN, and diaphoresis s/p cardiac ablation.  Home Living:  Home Living  Type of Home: House  Lives With: Spouse  Home  Adaptive Equipment: Walker rolling or standard  Home Layout: Multi-level  Alternate Level Stairs-Rails:  (Single)  Alternate Level Stairs-Number of Steps: 6  Home Access: Stairs to enter without rails  Entrance Stairs-Rails: None  Entrance Stairs-Number of Steps: 1  Bathroom Shower/Tub: Tub/shower unit  Prior Level of Function:  Prior Function Per Pt/Caregiver Report  Ambulatory Assistance: Independent (walker for community distances)  Precautions:  Precautions  Medical Precautions: Fall precautions  Vital Signs:  Vital Signs  SpO2: (!) 81 % (O2 at 6 liters via nasal cannula)    Objective   Pain:  Pain Assessment  Pain Assessment: 0-10  0-10 (Numeric) Pain Score: 0 - No pain  Cognition:  Cognition  Overall Cognitive Status: Within Functional Limits    General Assessments:  General Observation  General Observation: Edema jett LE ankles               Activity Tolerance  Endurance: Decreased tolerance for upright activites  Activity Tolerance Comments: Fatigue;  mild SOB         Strength  Strength Comments: Jett LE strength assessed via function  Coordination  Movements are Fluid and Coordinated: No  Lower Body Coordination: Slower rate of movement jett LE    Static Sitting Balance  Static Sitting-Balance Support: No upper extremity supported  Static Sitting-Level of Assistance: Independent    Static Standing Balance  Static Standing-Balance Support: Bilateral upper extremity supported  Static Standing-Level of Assistance: Close supervision  Static Standing-Comment/Number of Minutes: Supervision with balance during static standing with rolling walker  Functional Assessments:  Bed Mobility  Bed Mobility: No    Transfers  Transfer: Yes  Transfer 1  Transfer From 1: Sit to  Transfer to 1: Stand  Technique 1: Sit to stand  Transfer Device 1: Walker  Transfer Level of Assistance 1: Minimum assistance  Trials/Comments 1: Assist with trunk support and balance  Transfers 2  Transfer From 2: Stand to  Transfer to 2: Sit  Technique  2: Stand to sit  Transfer Device 2: Walker  Transfer Level of Assistance 2: Close supervision  Trials/Comments 2: Supervision with balance;  verbal cues for hand placement    Ambulation/Gait Training  Ambulation/Gait Training Performed: Yes  Ambulation/Gait Training 1  Surface 1: Level tile  Device 1: Rolling walker  Assistance 1: Close supervision  Comments/Distance (ft) 1: 10 feet x 1, 20 feet x 1, 30 feet x 1 with rolling walker and supervision with balance;  verbal cues for posture;  patient ambulates with slow una, reciprocating gait pattern, decreased step length rakel LE, and forward flexed posture.    Stairs  Stairs: No  Extremity/Trunk Assessments:  RLE   RLE : Within Functional Limits  LLE   LLE : Within Functional Limits  Treatments:                 Bed Mobility  Bed Mobility: No    Ambulation/Gait Training  Ambulation/Gait Training Performed: Yes  Ambulation/Gait Training 1  Surface 1: Level tile  Device 1: Rolling walker  Assistance 1: Close supervision  Comments/Distance (ft) 1: 10 feet x 1, 20 feet x 1, 30 feet x 1 with rolling walker and supervision with balance;  verbal cues for posture;  patient ambulates with slow una, reciprocating gait pattern, decreased step length rakel LE, and forward flexed posture.  Transfers  Transfer: Yes  Transfer 1  Transfer From 1: Sit to  Transfer to 1: Stand  Technique 1: Sit to stand  Transfer Device 1: Walker  Transfer Level of Assistance 1: Minimum assistance  Trials/Comments 1: Assist with trunk support and balance  Transfers 2  Transfer From 2: Stand to  Transfer to 2: Sit  Technique 2: Stand to sit  Transfer Device 2: Walker  Transfer Level of Assistance 2: Close supervision  Trials/Comments 2: Supervision with balance;  verbal cues for hand placement    Stairs  Stairs: No       Outcome Measures:  [facility] Basic Mobility  Turning from your back to your side while in a flat bed without using bedrails: A little  Moving from lying on your back to sitting on the  side of a flat bed without using bedrails: A little  Moving to and from bed to chair (including a wheelchair): A little  Standing up from a chair using your arms (e.g. wheelchair or bedside chair): A little  To walk in hospital room: A little  Climbing 3-5 steps with railing: A lot  Basic Mobility - Total Score: 17    Encounter Problems       Encounter Problems (Active)       Mobility       Bed mobility including supine to sit and sit to supine independently.       Start:  07/09/24    Expected End:  07/23/24            Ambulate 125 feet with rolling walker mod independently. (Progressing)       Start:  07/09/24    Expected End:  07/23/24            Negotiate 6 steps with single handrail and mod assist.       Start:  07/09/24    Expected End:  07/23/24               Transfers       Transfers including sit to stand and stand to sit mod independently. (Progressing)       Start:  07/09/24    Expected End:  07/23/24                   Education Documentation  Mobility Training, taught by Clif Oliveira PT at 7/9/2024  3:27 PM.  Learner: Patient  Readiness: Acceptance  Method: Explanation, Demonstration  Response: Needs Reinforcement    Education Comments  No comments found.

## 2024-07-09 NOTE — CARE PLAN
Problem: Mobility  Goal: Ambulate 125 feet with rolling walker mod independently.  Outcome: Progressing     Problem: Transfers  Goal: Transfers including sit to stand and stand to sit mod independently.  Outcome: Progressing

## 2024-07-09 NOTE — PROGRESS NOTES
TCC met with patient. Assessment complete. Patient lives with her spouse and her son and his girlfriend live in the home. Patient is independent. Patient wears 3 liters of oxygen at bed, [employer] is the supplier. Patient uses a walker at times when outside the home. Patient follows Dr. Danilo Gonzalez. Patient fills prescriptions at [facility] [place]. At this time the discharge plan is to return home with no skilled services. Will follow.      07/09/24 1071   Discharge Planning   Living Arrangements Spouse/significant other;Children   Support Systems Spouse/significant other;Children   Type of Residence Private residence   Number of Stairs to Enter Residence 1   Number of Stairs Within Residence 8   Home or Post Acute Services None   Patient expects to be discharged to: Home   Does the patient need discharge transport arranged? No   Financial Resource Strain   How hard is it for you to pay for the very basics like food, housing, medical care, and heating? Not very   Housing Stability   In the last 12 months, was there a time when you were not able to pay the mortgage or rent on time? N   In the last 12 months, how many places have you lived? 1   In the last 12 months, was there a time when you did not have a steady place to sleep or slept in a shelter (including now)? N   Transportation Needs   In the past 12 months, has lack of transportation kept you from medical appointments or from getting medications? no   In the past 12 months, has lack of transportation kept you from meetings, work, or from getting things needed for daily living? No

## 2024-07-09 NOTE — CARE PLAN
The patient's goals for the shift include      The clinical goals for the shift include pt will remain hemodynamically stable      Problem: Respiratory  Goal: Clear secretions with interventions this shift  Outcome: Progressing  Goal: Minimize anxiety/maximize coping throughout shift  Outcome: Progressing  Goal: Minimal/no exertional discomfort or dyspnea this shift  Outcome: Progressing  Goal: No signs of respiratory distress (eg. Use of accessory muscles. Peds grunting)  Outcome: Progressing  Goal: Patent airway maintained this shift  Outcome: Progressing  Goal: Tolerate mechanical ventilation evidenced by VS/agitation level this shift  Outcome: Progressing  Goal: Tolerate pulmonary toileting this shift  Outcome: Progressing  Goal: Verbalize decreased shortness of breath this shift  Outcome: Progressing  Goal: Wean oxygen to maintain O2 saturation per order/standard this shift  Outcome: Progressing  Goal: Increase self care and/or family involvement in next 24 hours  Outcome: Progressing     Problem: Skin  Goal: Decreased wound size/increased tissue granulation at next dressing change  Outcome: Progressing  Flowsheets (Taken 7/9/2024 1038)  Decreased wound size/increased tissue granulation at next dressing change:   Protective dressings over bony prominences   Promote sleep for wound healing  Goal: Participates in plan/prevention/treatment measures  Outcome: Progressing  Flowsheets (Taken 7/9/2024 1038)  Participates in plan/prevention/treatment measures:   Elevate heels   Increase activity/out of bed for meals  Goal: Prevent/manage excess moisture  Outcome: Progressing  Flowsheets (Taken 7/9/2024 1038)  Prevent/manage excess moisture:   Moisturize dry skin   Monitor for/manage infection if present  Goal: Prevent/minimize sheer/friction injuries  Outcome: Progressing  Flowsheets (Taken 7/9/2024 1038)  Prevent/minimize sheer/friction injuries:   HOB 30 degrees or less   Increase activity/out of bed for meals    Turn/reposition every 2 hours/use positioning/transfer devices  Goal: Promote/optimize nutrition  Outcome: Progressing  Flowsheets (Taken 7/9/2024 1038)  Promote/optimize nutrition:   Discuss with provider if NPO > 2 days   Monitor/record intake including meals  Goal: Promote skin healing  Outcome: Progressing  Flowsheets (Taken 7/9/2024 1038)  Promote skin healing:   Protective dressings over bony prominences   Rotate device position/do not position patient on device     Problem: Fall/Injury  Goal: Not fall by end of shift  Outcome: Progressing  Goal: Be free from injury by end of the shift  Outcome: Progressing  Goal: Verbalize understanding of personal risk factors for fall in the hospital  Outcome: Progressing  Goal: Verbalize understanding of risk factor reduction measures to prevent injury from fall in the home  Outcome: Progressing  Goal: Use assistive devices by end of the shift  Outcome: Progressing  Goal: Pace activities to prevent fatigue by end of the shift  Outcome: Progressing

## 2024-07-09 NOTE — PROGRESS NOTES
"Vickie Foster is a 68 y.o. female on day 1 of admission presenting with PAF (paroxysmal atrial fibrillation) (Multi).    Subjective   The patient is doing well with good diuresis markedly proved although still requiring oxygen   Remains in sinus rhythm    Objective     Physical Exam  General: Alert, oriented to person, place, date/time, pleasant  HEENT: Normocephalic, eyes normal ears normal  Heart: Normal S1, S2 , rhythm regular, no rubs or gallops   Respiratory: Decreased at the bases  Abdomen: Bowel sounds present in all quadrants  Extremities: No upper or lower extremity edema appreciated  Dermatology: Skin Normal  Genitourinary: Deferred  Neurological: Nonfocal, moves all extremities  Psychology :Appropriate affect and mood     Last Recorded Vitals  Blood pressure 107/65, pulse 73, temperature 36.9 °C (98.4 °F), temperature source Oral, resp. rate 16, height 1.575 m (5' 2\"), weight 111 kg (244 lb 0.1 oz), SpO2 98%.  Intake/Output last 3 Shifts:  I/O last 3 completed shifts:  In: 300 (2.7 mL/kg) [IV Piggyback:300]  Out: 2535 (22.9 mL/kg) [Urine:2500 (0.6 mL/kg/hr); Blood:35]  Weight: 110.7 kg     Relevant Results                             Assessment/Plan   Principal Problem:    PAF (paroxysmal atrial fibrillation) (Multi)    Continue diuresis and plan for discharge tomorrow       I spent 30 minutes in the professional and overall care of this patient.      Amado Ty MD      "

## 2024-07-09 NOTE — NURSING NOTE
Head to toe skin assessment complete.  Bilateral groin sites clean, dry and intact. No areas of skin breakdown noted. Bony prominences covered, waffle mattress in place.

## 2024-07-09 NOTE — PROGRESS NOTES
Spiritual Care Visit    Clinical Encounter Type  Visited With: Patient not available     Uziel Ambriz

## 2024-07-09 NOTE — CARE PLAN
Problem: Respiratory  Goal: Minimize anxiety/maximize coping throughout shift  Outcome: Progressing  Goal: No signs of respiratory distress (eg. Use of accessory muscles. Peds grunting)  Outcome: Progressing  Goal: Wean oxygen to maintain O2 saturation per order/standard this shift  Outcome: Progressing     Problem: Respiratory  Goal: Minimize anxiety/maximize coping throughout shift  Outcome: Progressing     Problem: Respiratory  Goal: No signs of respiratory distress (eg. Use of accessory muscles. Peds grunting)  Outcome: Progressing

## 2024-07-09 NOTE — CARE PLAN
The patient's goals for the shift include  Wean from supplemental O2  Problem: Skin  Goal: Decreased wound size/increased tissue granulation at next dressing change  Outcome: Progressing  Flowsheets (Taken 7/9/2024 0518)  Decreased wound size/increased tissue granulation at next dressing change:   Promote sleep for wound healing   Protective dressings over bony prominences  Goal: Participates in plan/prevention/treatment measures  Outcome: Progressing  Flowsheets (Taken 7/9/2024 0518)  Participates in plan/prevention/treatment measures: Elevate heels  Goal: Prevent/manage excess moisture  Outcome: Progressing  Flowsheets (Taken 7/9/2024 0518)  Prevent/manage excess moisture:   Moisturize dry skin   Monitor for/manage infection if present  Goal: Prevent/minimize sheer/friction injuries  Outcome: Progressing  Flowsheets (Taken 7/9/2024 0518)  Prevent/minimize sheer/friction injuries:   Complete micro-shifts as needed if patient unable. Adjust patient position to relieve pressure points, not a full turn   Use pull sheet   HOB 30 degrees or less  Goal: Promote/optimize nutrition  Outcome: Progressing  Flowsheets (Taken 7/9/2024 0518)  Promote/optimize nutrition: Offer water/supplements/favorite foods  Goal: Promote skin healing  Outcome: Progressing  Flowsheets (Taken 7/9/2024 0518)  Promote skin healing: Protective dressings over bony prominences     Problem: Fall/Injury  Goal: Not fall by end of shift  Outcome: Progressing  Goal: Be free from injury by end of the shift  Outcome: Progressing  Goal: Verbalize understanding of personal risk factors for fall in the hospital  Outcome: Progressing  Goal: Verbalize understanding of risk factor reduction measures to prevent injury from fall in the home  Outcome: Progressing  Goal: Use assistive devices by end of the shift  Outcome: Progressing  Goal: Pace activities to prevent fatigue by end of the shift  Outcome: Progressing     The clinical goals for the shift include  Patient will remain hemodynamically stable    Over the shift, the patient did not make progress toward the following goals. Barriers to progression include. Recommendations to address these barriers include.

## 2024-07-09 NOTE — PROGRESS NOTES
07/09/24 9439   Physical Activity   On average, how many days per week do you engage in moderate to strenuous exercise (like a brisk walk)? 0 days   On average, how many minutes do you engage in exercise at this level? 0 min   Financial Resource Strain   How hard is it for you to pay for the very basics like food, housing, medical care, and heating? Not very   Housing Stability   In the last 12 months, was there a time when you were not able to pay the mortgage or rent on time? N   In the last 12 months, how many places have you lived? 1   In the last 12 months, was there a time when you did not have a steady place to sleep or slept in a shelter (including now)? N   Transportation Needs   In the past 12 months, has lack of transportation kept you from medical appointments or from getting medications? no   In the past 12 months, has lack of transportation kept you from meetings, work, or from getting things needed for daily living? No   Food Insecurity   Within the past 12 months, you worried that your food would run out before you got the money to buy more. Never true   Within the past 12 months, the food you bought just didn't last and you didn't have money to get more. Never true   Stress   Do you feel stress - tense, restless, nervous, or anxious, or unable to sleep at night because your mind is troubled all the time - these days? Not at all   Social Connections   In a typical week, how many times do you talk on the phone with family, friends, or neighbors? More than 3   How often do you get together with friends or relatives? More than 3   How often do you attend Alevism or Gnosticism services? Never   Do you belong to any clubs or organizations such as Alevism groups, unions, fraternal or athletic groups, or school groups? No   How often do you attend meetings of the clubs or organizations you belong to? Never   Are you , , , , never , or living with a partner?     Intimate Partner Violence   Within the last year, have you been afraid of your partner or ex-partner? No   Within the last year, have you been humiliated or emotionally abused in other ways by your partner or ex-partner? No   Within the last year, have you been kicked, hit, slapped, or otherwise physically hurt by your partner or ex-partner? No   Within the last year, have you been raped or forced to have any kind of sexual activity by your partner or ex-partner? No   Alcohol Use   Q1: How often do you have a drink containing alcohol? Monthly or l   Q2: How many drinks containing alcohol do you have on a typical day when you are drinking? 1 or 2   Q3: How often do you have six or more drinks on one occasion? Never   Utilities   In the past 12 months has the electric, gas, oil, or water company threatened to shut off services in your home? No   Health Literacy   How often do you need to have someone help you when you read instructions, pamphlets, or other written material from your doctor or pharmacy? Never

## 2024-07-09 NOTE — PROGRESS NOTES
[facility] Critical Care Medicine       Date:  7/9/2024  Patient:  Vickie Foster  YOB: 1955  MRN:  [id number]   Admit Date:  7/8/2024  ========================================================================================================    No chief complaint on file.        History of Present Illness:  Vickie Foster is a 68 y.o. year old female patient with past medical history of afib s/p tikosyn and cardioversion 5/1/24, pHTN, COPD, TANVIR with cpap at night, HFpEF, HTN, GERD, depression, anxiety, morbid obesity, hyperuricemia, with recent weight gain of 5-6lbs in a few days, who presented to  for outpatient afib ablation with Dr yT. Of note, she recently had a cardioversion 5/1/24 and she converted back to afib at the end of June. After the procedure was completed, she developed acute dyspnea, tachycardia, HTN, and diaphoresis. Vital signs post-ablation were HR 96 (NSR), /74, RR 26, SpO2 86-92% on NRB 15L 100%. She was given 10mg hydralazine and 20mg lasix and diuresed 500mL prior to arrival to the floor. She was then brought to ICU for management of respiratory distress. Stat labs and CXR ordered. Bedside POCUS and CXR consistent with pulmonary edema. She was given another 60mg of IV lasix and 40mEq K. ABG showed respiratory acidosis 7.28/78/82/36. She was placed on bipap by RT.       Interval ICU Events:  7/8: Pt brought to ICU, stat labs obtained and stat echo ordered. ABG showed respiratory acidosis and she was placed on bipap. Will give another dose of lasix d/t vascular congestion.     7/9: No acute events overnight. Continue diauresing today and restart home diuretic medicine.     Medical History:  Past Medical History:   Diagnosis Date    A-fib (Multi)     CHF (congestive heart failure) (Multi)     COVID     HTN (hypertension)     Low back pain      Past Surgical History:   Procedure Laterality Date    CARDIAC ELECTROPHYSIOLOGY PROCEDURE N/A 05/01/2024    Procedure:  Cardioversion;  Surgeon: Amado Ty MD;  Location: [facility] Cardiac Cath Lab;  Service: Electrophysiology;  Laterality: N/A;  [id number]  i48.0  medical mutual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    CARDIOVERSION      05/01/2024     Medications Prior to Admission   Medication Sig Dispense Refill Last Dose    allopurinol (Zyloprim) 300 mg tablet Take 1 tablet (300 mg) by mouth once daily. 90 tablet 1 7/8/2024    ascorbic acid (Vitamin C With Genevieve Hips) 1,000 mg tablet Take 1 tablet (1,000 mg) by mouth once daily.   7/8/2024    biotin 10 mg tablet Take 1 tablet (10 mg) by mouth once daily.   7/8/2024    budesonide-formoteroL (Symbicort) 80-4.5 mcg/actuation inhaler Inhale 2 puffs 2 times a day. Rinse mouth with water after use to reduce aftertaste and incidence of candidiasis. Do not swallow.   Past Month    buPROPion XL (Wellbutrin XL) 300 mg 24 hr tablet Take 1 tablet (300 mg) by mouth once daily in the morning. Take before meals. 90 tablet 1 7/8/2024    calcium carb-vitamin D3-vit K2 600 mg-1,000 unit-90 mcg tablet Take by mouth once daily.   7/8/2024    clobetasol (Temovate) 0.05 % cream Apply topically 2 times a day.   Past Week    docusate sodium (Colace) 100 mg capsule Take 1 capsule (100 mg) by mouth 2 times a day.   7/7/2024    dofetilide (Tikosyn) 250 mcg capsule Take 1 capsule (250 mcg) by mouth every 12 hours. 180 capsule 1 7/7/2024    Eliquis 5 mg tablet Take 1 tablet (5 mg) by mouth 2 times a day. 180 tablet 1 7/7/2024    folic acid/multivit-min/lutein (CENTRUM SILVER ORAL) Take by mouth. As directed   7/7/2024    gabapentin (Neurontin) 300 mg capsule Take 1 capsule (300 mg) by mouth 3 times a day. 270 capsule 1 7/8/2024    Klor-Con M20 20 mEq ER tablet TAKE 1 TABLET THREE TIMES A DAY WITH FOOD 270 tablet 3 7/8/2024    metOLazone (Zaroxolyn) 5 mg tablet Take 1 tablet (5 mg) by mouth once daily as needed.   7/8/2024    metoprolol succinate XL (Toprol-XL) 25 mg 24 hr tablet TAKE 1 TABLET BY MOUTH ONE TIME DAILY 90 tablet 3  7/8/2024    oxygen (O2) gas therapy Inhale 3 L/min once daily at bedtime.   7/7/2024    semaglutide 2 mg/dose (8 mg/3 mL) pen injector Inject 2.3 mg under the skin 1 (one) time per week.   Past Week    torsemide (Demadex) 100 mg tablet TAKE ONE-HALF (1/2) TABLET TWICE A DAY 90 tablet 3 7/7/2024     Patient has no known allergies.  Social History     Tobacco Use    Smoking status: Former     Types: Cigarettes    Smokeless tobacco: Never   Vaping Use    Vaping status: Never Used   Substance Use Topics    Alcohol use: Yes     Comment: monthly or less    Drug use: Never     Family History   Problem Relation Name Age of Onset    Breast cancer Mother      Heart attack Mother      Heart disease Mother      Colon cancer Father      Breast cancer Sister      Diabetes type II Sister      Kidney failure Sister      Other (herione addict) Sister      No Known Problems Brother      No Known Problems Daughter      Other (recovering alcoholic) Son      Breast cancer Other Maternal Aunt        Hospital Medications:           Current Facility-Administered Medications:     acetaminophen (Tylenol) tablet 650 mg, 650 mg, oral, q6h PRN, Reynaldo Hahn PA-C, 650 mg at 07/08/24 2041    allopurinol (Zyloprim) tablet 100 mg, 100 mg, oral, q AM, Rhianna Elena    allopurinol (Zyloprim) tablet 200 mg, 200 mg, oral, Nightly, Rhianna Parker    apixaban (Eliquis) tablet 5 mg, 5 mg, oral, BID, KEVON Heller-CNP    buPROPion XL (Wellbutrin XL) 24 hr tablet 300 mg, 300 mg, oral, Daily before breakfast, KEVON Heller-CNP, 300 mg at 07/09/24 0714    dofetilide (Tikosyn) capsule 250 mcg, 250 mcg, oral, q12h, Minnie Mcfarlane APRN-CNP, 250 mcg at 07/09/24 0714    furosemide (Lasix) injection 80 mg, 80 mg, intravenous, Once, Reynaldo Hahn PA-C    gabapentin (Neurontin) capsule 300 mg, 300 mg, oral, TID, Minnie Mcfarlane APRN-CNP, 300 mg at 07/08/24 2042    hydrALAZINE (Apresoline) injection 10 mg, 10 mg, intravenous, q4h PRN, Philippe Seth  "GEOVANNI    ipratropium-albuteroL (Duo-Neb) 0.5-2.5 mg/3 mL nebulizer solution 3 mL, 3 mL, nebulization, q6h, Philippe Seth PA-C, 3 mL at 07/09/24 0739    lidocaine 4 % patch 2 patch, 2 patch, transdermal, Daily, Reynaldo Hahn PA-C    metoprolol succinate XL (Toprol-XL) 24 hr tablet 25 mg, 25 mg, oral, Daily, IBIS Heller    oxygen (O2) therapy, , inhalation, Continuous - Inhalation, IBIS Heller, 6 L/min at 07/09/24 0804    Review of Systems:  14 point review of systems was completed and negative except for those specially mention in my HPI    Physical Exam:    Heart Rate:  [62-97]   Temp:  [36.4 °C (97.5 °F)-36.9 °C (98.4 °F)]   Resp:  [12-30]   BP: ()/(47-99)   Height:  [157.5 cm (5' 2\")]   Weight:  [111 kg (244 lb)-111 kg (244 lb 0.1 oz)]   SpO2:  [86 %-99 %]     Physical Exam  Constitutional:       Appearance: Normal appearance. She is obese.   HENT:      Nose: Nose normal.      Mouth/Throat:      Mouth: Mucous membranes are moist.      Pharynx: Oropharynx is clear.   Cardiovascular:      Rate and Rhythm: Normal rate and regular rhythm.      Pulses: Normal pulses.      Heart sounds: Normal heart sounds.   Pulmonary:      Effort: Pulmonary effort is normal.      Breath sounds: Normal breath sounds.   Abdominal:      General: Abdomen is flat. Bowel sounds are normal.   Musculoskeletal:      Right lower leg: Edema present.      Left lower leg: Edema present.   Skin:     Capillary Refill: Capillary refill takes 2 to 3 seconds.   Neurological:      General: No focal deficit present.      Mental Status: She is alert and oriented to person, place, and time.   Psychiatric:         Mood and Affect: Mood normal.         Behavior: Behavior normal.         Objective:    I have reviewed all medications, laboratory results, and imaging pertinent for today's encounter.    FiO2 (%):  [44 %-90 %] 44 %      Intake/Output Summary (Last 24 hours) at 7/9/2024 0808  Last data filed at 7/9/2024 0600  Gross " per 24 hour   Intake 300 ml   Output 2535 ml   Net -2235 ml       Recent Imaging  Transthoracic Echo (TTE) Complete   Final Result      XR chest 1 view   Final Result   Bilateral infiltrates and probable effusions raising the possibility   of pulmonary edema and CHF. Follow-up to assure clearing is suggested.        MACRO:   none        Signed by: Clif Benavidez 7/8/2024 4:38 PM   Dictation workstation:   [id number]      Electrophysiology procedure   Final Result      XR chest 1 view    (Results Pending)       Transthoracic Echo (TTE) Complete    Result Date: 7/8/2024           [place] [address]            Phone [phone] TRANSTHORACIC ECHOCARDIOGRAM REPORT Patient Name:     SOURAV CUADRA PAKO      Reading Physician:   [id number] Servando [facility] Study Date:       7/8/2024             Ordering Provider:   [id number] MELLISSA MULLINS MRN/PID:          [id number]             Fellow: Accession#:       [id number]         Nurse: Date of           1955 / 68      Sonographer:         Dalila Kaba RDCS Birth/Age:        years Gender:           F                    Additional Staff: Height:           157.48 cm            Admit Date:          7/8/2024 Weight:           113.85 kg            Admission Status:    Inpatient - STAT BSA / BMI:        2.11 m2 / 45.91      Department Location: [facility] ICU                   kg/m2 Blood Pressure: 161 /96 mmHg Study Type:    TRANSTHORACIC ECHO (TTE) COMPLETE Diagnosis/ICD: Dyspnea, unspecified-R06.00 Indication:    dypsnea post afib ablation, r/o pericardial disease CPT Codes:     Echo Complete w Full Doppler-[id number] Patient History: Pertinent History: HTN, CHF, cardiomegaly, coronary arteriosclerosis, AFIB,                    PHTN, COPD, GERD. Study Detail: The following Echo studies were performed: 2D, M-Mode, Doppler and               color flow.  PHYSICIAN INTERPRETATION: Left  Ventricle: The left ventricular systolic function is normal, with a visually estimated ejection fraction of 65-70%. There are no regional wall motion abnormalities. The left ventricular cavity size is normal. Spectral Doppler shows an impaired relaxation pattern of left ventricular diastolic filling. Left Atrium: The left atrium is moderately dilated. Right Ventricle: The right ventricle is normal in size. There is normal right ventricular global systolic function. Right Atrium: The right atrium is mildly dilated. Aortic Valve: The aortic valve is trileaflet. The aortic valve dimensionless index is 0.87. There is no evidence of aortic valve regurgitation. The peak instantaneous gradient of the aortic valve is 18.6 mmHg. The mean gradient of the aortic valve is 8.4 mmHg. Mitral Valve: The mitral valve is normal in structure. There is mild to moderate mitral valve regurgitation. Tricuspid Valve: The tricuspid valve is structurally normal. There is mild to moderate tricuspid regurgitation. Pulmonic Valve: The pulmonic valve is not well visualized. The pulmonic valve regurgitation was not well visualized. Pericardium: There is no pericardial effusion noted. Aorta: The aortic root is normal.  CONCLUSIONS:  1. The left ventricular systolic function is normal, with a visually estimated ejection fraction of 65-70%.  2. Spectral Doppler shows an impaired relaxation pattern of left ventricular diastolic filling.  3. There is normal right ventricular global systolic function.  4. The left atrium is moderately dilated.  5. Mild to moderate mitral valve regurgitation.  6. Mild to moderate tricuspid regurgitation. QUANTITATIVE DATA SUMMARY: 2D MEASUREMENTS:                           Normal Ranges: IVSd:          1.45 cm    (0.6-1.1cm) LVPWd:         1.14 cm    (0.6-1.1cm) LVIDd:         4.75 cm    (3.9-5.9cm) LVIDs:         3.05 cm LV Mass Index: 114.6 g/m2 LV % FS        35.7 % LA VOLUME:                               Normal  Ranges: LA Vol A4C:        144.8 ml   (22+/-6mL/m2) LA Vol A2C:        183.3 ml LA Vol BP:         179.1 ml LA Vol Index A4C:  68.8 ml/m2 LA Vol Index A2C:  87.1 ml/m2 LA Vol Index BP:   85.1 ml/m2 LA Area A4C:       38.5 cm2 LA Area A2C:       39.4 cm2 LA Major Axis A4C: 8.7 cm LA Major Axis A2C: 7.2 cm LA Volume Index:   85.0 ml/m2 LA Vol A4C:        144.0 ml LA Vol A2C:        184.0 ml RA VOLUME BY A/L METHOD:                               Normal Ranges: RA Vol A4C:        97.3 ml    (8.3-19.5ml) RA Vol Index A4C:  46.2 ml/m2 RA Area A4C:       28.9 cm2 RA Major Axis A4C: 7.3 cm AORTA MEASUREMENTS:                      Normal Ranges: Ao Sinus, d: 2.90 cm (2.1-3.5cm) Ao STJ, d:   2.60 cm (1.7-3.4cm) Asc Ao, d:   3.30 cm (2.1-3.4cm) LV SYSTOLIC FUNCTION BY 2D PLANIMETRY (MOD):                      Normal Ranges: EF-A4C View:    62 % (>=55%) EF-A2C View:    64 % EF-Biplane:     63 % EF-Visual:      68 % LV EF Reported: 68 % LV DIASTOLIC FUNCTION:                         Normal Ranges: MV Peak E:    1.24 m/s  (0.7-1.2 m/s) MV Peak A:    0.01 m/s  (0.42-0.7 m/s) E/A Ratio:    106.77    (1.0-2.2) MV e'         0.109 m/s (>8.0) MV lateral e' 0.17 m/s MV medial e'  0.05 m/s E/e' Ratio:   11.36     (<8.0) MITRAL VALVE:                 Normal Ranges: MV DT: 134 msec (150-240msec) AORTIC VALVE:                                    Normal Ranges: AoV Vmax:                2.15 m/s  (<=1.7m/s) AoV Peak P.6 mmHg (<20mmHg) AoV Mean P.4 mmHg  (1.7-11.5mmHg) LVOT Max Sean:            1.64 m/s  (<=1.1m/s) AoV VTI:                 31.64 cm  (18-25cm) LVOT VTI:                27.54 cm LVOT Diameter:           2.04 cm   (1.8-2.4cm) AoV Area, VTI:           2.83 cm2  (2.5-5.5cm2) AoV Area,Vmax:           2.48 cm2  (2.5-4.5cm2) AoV Dimensionless Index: 0.87  RIGHT VENTRICLE: RV Basal 4.58 cm RV Major 7.5 cm TAPSE:   19.5 mm RV s'    0.15 m/s TRICUSPID VALVE/RVSP:                             Normal Ranges:  Peak TR Velocity: 3.62 m/s RV Syst Pressure: 55.4 mmHg (< 30mmHg) IVC Diam:         2.41 cm AORTA: Asc Ao Diam 3.35 cm  [id number] Servando Lundberg DO Electronically signed on 7/8/2024 at 4:24:40 PM  ** Final **     Transthoracic echo (TTE) complete    Result Date: 6/29/2024   [facility], [address] [address]            Tel [phone] and Fax [phone] TRANSTHORACIC ECHOCARDIOGRAM REPORT  Patient Name:      SOURAV KHALIF MIN      Reading Physician:    [id number] Demetrius Guerrero MD Study Date:        6/27/2024            Ordering Provider:    [id number] AGA CAMPBELL MRN/PID:           [id number]             Fellow: Accession#:        [id number]         Nurse: Date of Birth/Age: 1955 / 68      Sonographer:          Price parks RDCS Gender:            F                    Additional Staff: Height:            157.48 cm            Admit Date: Weight:            112.49 kg            Admission Status:     Outpatient BSA / BMI:         2.09 m2 / 45.36      Encounter#:           [id number]                    kg/m2 Blood Pressure:    138/84 mmHg          Department Location:  [facility] Study Type:    TRANSTHORACIC ECHO (TTE) COMPLETE Diagnosis/ICD: Paroxysmal atrial fibrillation-I48.0; Other forms of                dyspnea-R06.09 Indication:    afib, MORLEY CPT Code:      Echo Complete w Full Doppler-[id number] Patient History: Pertinent History: HTN, diastolic HF, cardiomegaly, afib, PHTN, CHF,MORLEY, morbid                    obesity. Study Detail: The following Echo studies were performed: M-Mode, 2D, Doppler and               color flow. Technically challenging study due to body habitus,               poor acoustic windows and prominent lung artifact.  PHYSICIAN INTERPRETATION: Left Ventricle: Left ventricular ejection  fraction is normal, by visual estimate at 60-65%. There are no regional wall motion abnormalities. The left ventricular cavity size is normal. There is mild to moderate concentric left ventricular hypertrophy. Left ventricular diastolic filling was not assessed. Left Atrium: The left atrium is moderate to severely dilated. Right Ventricle: The right ventricle is normal in size. There is normal right ventriclar wall thickness. There is normal right ventricular global systolic function. Right Atrium: The right atrium is normal in size. Aortic Valve: The aortic valve appears structurally normal. The aortic valve appears tricuspid and non-restricted. There is no evidence of aortic valve regurgitation. The peak instantaneous gradient of the aortic valve is 8.7 mmHg. Mitral Valve: The mitral valve is normal in structure. There is normal mitral valve leaflet mobility. There is trace to mild mitral valve regurgitation. Tricuspid Valve: The tricuspid valve is structurally normal. There is normal tricuspid valve leaflet mobility. There is mild tricuspid regurgitation. The Doppler estimated RVSP is mildly elevated at 43.1 mmHg. Pulmonic Valve: The pulmonic valve is structurally normal. There is trace pulmonic valve regurgitation. Pericardium: There is no pericardial effusion noted. Aorta: The aortic root is normal. The Ao Sinus is 3.70 cm. The Asc Ao is 3.50 cm. There is no dilatation of the aortic arch. There is no dilatation of the ascending aorta. There is no dilatation of the aortic root. There is plaque visualized in the ascending aorta, which is classified as a Grade 2 [mild (focal or diffuse) intimal thickening of 2-3 mm] atherosclerosis. Pulmonary Artery: The pulmonary artery is normal in size. The tricuspid regurgitant velocity is 3.17 m/s, and with an estimated right atrial pressure of 3 mmHg, the estimated pulmonary artery pressure is mildly elevated with the RVSP at 43.1 mmHg. The estimated PASP is 43 mmHg.  Systemic Veins: The inferior vena cava appears to be of normal size. There is IVC inspiratory collapse greater than 50%.  CONCLUSIONS:  1. Left ventricular ejection fraction is normal, by visual estimate at 60-65%.  2. There is normal right ventricular global systolic function.  3. The left atrium is moderate to severely dilated.  4. Trace to mild mitral valve regurgitation.  5. Mild tricuspid regurgitation visualized.  6. Mildly elevated RVSP.  7. The estimated PASP is 43 mmHg.  8. There is plaque visualized in the ascending aorta. QUANTITATIVE DATA SUMMARY: 2D MEASUREMENTS:                          Normal Ranges: Ao Root d:     3.70 cm   (2.0-3.7cm) LAs:           6.06 cm   (2.7-4.0cm) IVSd:          1.27 cm   (0.6-1.1cm) LVPWd:         1.09 cm   (0.6-1.1cm) LVIDd:         4.22 cm   (3.9-5.9cm) LVIDs:         3.15 cm LV Mass Index: 83.8 g/m2 LV % FS        25.2 % LA VOLUME:                                Normal Ranges: LA Vol A4C:        147.1 ml    (22+/-6mL/m2) LA Vol A2C:        216.2 ml LA Vol BP:         180.4 ml LA Vol Index A4C:  70.2 ml/m2 LA Vol Index A2C:  103.2 ml/m2 LA Vol Index BP:   86.1 ml/m2 LA Area A4C:       38.8 cm2 LA Area A2C:       46.5 cm2 LA Major Axis A4C: 8.7 cm LA Major Axis A2C: 8.5 cm LA Volume Index:   86.1 ml/m2 LA Vol A4C:        132.5 ml LA Vol A2C:        210.3 ml RA VOLUME BY A/L METHOD:                       Normal Ranges: RA Area A4C: 26.6 cm2 AORTA MEASUREMENTS:                      Normal Ranges: Ao Sinus, d: 3.70 cm (2.1-3.5cm) Asc Ao, d:   3.50 cm (2.1-3.4cm) LV SYSTOLIC FUNCTION BY 2D PLANIMETRY (MOD):                      Normal Ranges: EF-A4C View:    62 % (>=55%) EF-A2C View:    64 % EF-Biplane:     64 % EF-Visual:      63 % LV EF Reported: 63 % LV DIASTOLIC FUNCTION:                     Normal Ranges: MV Peak E: 0.85 m/s (0.7-1.2 m/s) AORTIC VALVE:                         Normal Ranges: AoV Vmax:      1.47 m/s (<=1.7m/s) AoV Peak P.7 mmHg (<20mmHg) LVOT Max Sean:   0.94 m/s (<=1.1m/s) LVOT VTI:      17.61 cm LVOT Diameter: 2.25 cm  (1.8-2.4cm) AoV Area,Vmax: 2.53 cm2 (2.5-4.5cm2)  RIGHT VENTRICLE: RV Basal 4.40 cm TAPSE:   13.0 mm RV s'    0.09 m/s TRICUSPID VALVE/RVSP:                             Normal Ranges: Peak TR Velocity: 3.17 m/s RV Syst Pressure: 43.1 mmHg (< 30mmHg) IVC Diam:         1.80 cm AORTA: Asc Ao Diam 3.51 cm  [id number] Demetrius Guerrero MD Electronically signed on 6/29/2024 at 12:31:59 PM  ** Final **      Assessment/Plan:    I am currently managing this critically ill patient for the following problems:    Neuro/Psych/Pain Ctrl/Sedation:  Hx depression, anxiety   - Pain Control: tylenol PRN   - Continue home wellbutrin and Gabapentin  - CAM ICU qshift, sleep-wake hygiene, delirium precautions     Respiratory/ENT:  Acute hypoxic respiratory failure 2/2 flash pulmonary edema   Dyspnea s/p cardiac ablation   Hx COPD, TANVIR  - Supplemental O2: 6L HFNC  - CPAP at night    - 60 mg lasix given this am  - Duonebs TID  - Wean O2 as tolerated to maintain SpO2 >92%  - Continuous pulse ox monitoring   - Pulm hygiene    Cardiovascular:  Hx chronic diasotlic HF, HTN, HLD   Persistent atrial fibrillation s/p ablation - now in NSR   - Stat Echo: EF 65-70%, spectral Doppler shows an impaired relaxation pattern of left ventricular diastolic filling, normal right ventricular global systolic function, left atrium is moderately dilated, mild to moderate mitral valve regurgitation and mild to moderate tricuspid regurgitation.  - CXR this am: showed less pulmonary edema compared to the day before  - Continue home tikosyn, metoprolol   - Restart home eliquis  - Coags unremarkable  - Hydralazine PRN for SBP >160, if no improvement, consider nitroglycerin gtt for afterload reduction   - Continuous cardiac monitoring per ICU protocol  - Maintain MAPs >65  - EKGs PRN for ACS symptoms, arrhythmias     GI:  Hx GERD  - Diet: Cardiac diet  - BR: none    Renal/Volume Status (Intra &  Extravascular):  Hypervolemia   Hypokalemia   Hyperuricemia   - Baseline Cr 0.9, currently 0.70  - negative 2 L, continue diuresing (60 mg lasix)  - Restart home diuretics (metozalone). Continue to hold Torsemide.  - Continue home allopurinol  - Maintain jones catheter for adequate I/Os  - Maintain urine output 0.5-1.0cc/kg/hr  - Hourly I/O's  - Replete electrolytes to maintain K >4.0 and Mg >2.0  - Daily BMP, Mg, Phos    Endocrine  Hyperglycemia - possibly stress-induced, no hx DM   - POCT glucose q4hrs while NPO, Consider adding SSI if glucose >180  - Hypoglycemia protocol PRN   - Hold home Semaglutide    Infectious Disease:  Leukocytosis - likely stress-induced post-ablation, predominantly neutrophils   - Antibiotics: not indicated at this time   - Monitor SIRS criteria    Heme/Onc:  No active concerns   - Monitor for s/sx of anemia such as bleeding and bruising   - Transfuse if Hgb <7.0   - Continue home eliquis   - Daily CBC    OBGYN/MSK:  No active concerns   - Padded pressure points   - PT/OT when appropriate    Ethics/Code Status:  - ICU skin protocol     :  DVT Prophylaxis: SCDs, Eliquis  GI Prophylaxis: none  Bowel Regimen: none  Diet: Cardiac diet  CVC: none  Adela: none  Jones: yes 7/8. Plan to discontinue tomorrow once diuresed.   Restraints: none  Dispo: ICU    Critical Care Time:  40 minutes spent in preparing to see patient (I.e. review of medical records), evaluation of diagnostics (I.e. labs, imaging, etc.), documentation, discussing plan of care with patient/ family/ caregiver, and/ or coordination of care with multidisciplinary team. Time does not include completion of procedure time.    Plan discussed with Dr. Jose Salcedo PA-C

## 2024-07-09 NOTE — PROGRESS NOTES
Occupational Therapy    Evaluation/Treatment    Patient Name: Vickie Foster  MRN: [id number]  : 1955  Today's Date: 24  Time Calculation  Start Time: 1335  Stop Time: 1400  Time Calculation (min): 25 min       Assessment:  OT Assessment: OT order received, chart reviewed, evaluation completed. Pt demonstrated deficits in ADLs and functional mobility and would benefit from acute OT services to faciliate return to PLOF.  Prognosis: Good  Barriers to Discharge: None  Evaluation/Treatment Tolerance: Patient limited by fatigue  Medical Staff Made Aware: Yes  End of Session Communication: Bedside nurse  End of Session Patient Position: Up in chair, Alarm off, not on at start of session  OT Assessment Results: Decreased ADL status, Decreased safe judgment during ADL, Decreased upper extremity strength, Decreased endurance, Decreased functional mobility, Decreased IADLs  Prognosis: Good  Barriers to Discharge: None  Evaluation/Treatment Tolerance: Patient limited by fatigue  Medical Staff Made Aware: Yes  Strengths: Ability to acquire knowledge  Barriers to Participation: Comorbidities  Plan:  Treatment Interventions: ADL retraining, Functional transfer training, UE strengthening/ROM, Endurance training, Patient/family training, Equipment evaluation/education  OT Frequency: 3 times per week  OT Discharge Recommendations: Low intensity level of continued care  Equipment Recommended upon Discharge: Wheeled walker  OT Recommended Transfer Status: Minimal assist  OT - OK to Discharge: Yes  Treatment Interventions: ADL retraining, Functional transfer training, UE strengthening/ROM, Endurance training, Patient/family training, Equipment evaluation/education    Subjective     General:   OT Received On: 24  General  Reason for Referral: activities of daily living, PAF  Referred By: Rosanna Salcedo PA-C  Past Medical History Relevant to Rehab: Epistaxis, Benign essential hypertension, Chronic gout without  tophus, Chronic hypoxemic respiratory failure,  Chronic obstructive lung disease, Acute congestive heart failure, Diastolic heart failure,   Cardiomegaly  Coronary arteriosclerosis Dyspnea on exertion Gastroesophageal reflux disease Hypertension Hypertensive heart disease without congestive heart failure Hypokalemia Longstanding persistent atrial fibrillation anxiety depressive disorder Moderately severe depression  Morbid (severe) obesity chronic pain Obstructive sleep apnea syndrome Osteoporosis Pleural effusion Prediabetes  Primary osteoarthritis of left knee  Pulmonary hypertension Lumbar spondylosis Thoracic spondylosis  Family/Caregiver Present: Yes  Caregiver Feedback: spouse present but left during eval  Co-Treatment: PT  Co-Treatment Reason: Initial ICU evaluation and limited pt tolerance to activity  Prior to Session Communication: Bedside nurse  Patient Position Received: Bed, 2 rail up (seated EOB)  Preferred Learning Style: verbal  General Comment: Pt is a 67 yo female admit after having an outpatient ablation and developing postop complications.  Precautions:  Hearing/Visual Limitations: WFL  Medical Precautions: Fall precautions, Oxygen therapy device and L/min (6LO2)  Vital Signs:  Heart Rate: 70  Heart Rate Source: Monitor  SpO2: 95 % (degreased to 89% with mobility increased to 92% post rest break)  BP: 90/60  MAP (mmHg): 70  BP Location: Right arm  BP Method: Automatic  Patient Position: Sitting  Pain:  Pain Assessment  Pain Assessment: 0-10  0-10 (Numeric) Pain Score: 0 - No pain    Objective   Cognition:  Overall Cognitive Status: Within Functional Limits  Orientation Level: Oriented X4     Home Living:  Type of Home: House  Lives With: Spouse  Home Adaptive Equipment: Walker rolling or standard  Home Layout: Multi-level (6 up and 6 down split level)  Home Access: Stairs to enter with rails  Entrance Stairs-Rails: Right  Entrance Stairs-Number of Steps: 3  Bathroom Shower/Tub: Tub/shower  unit  Bathroom Toilet: Standard  Bathroom Equipment: None  Prior Function:  Level of [place]: Independent with ADLs and functional transfers, Independent with homemaking with ambulation  Receives Help From:  (Spouse)  ADL Assistance: Independent  Homemaking Assistance: Independent  Ambulatory Assistance: Independent (with RW PRN)  Hand Dominance: Right    ADL:  Eating Assistance: Independent  Grooming Deficit: Setup, Wash/dry face, Brushing hair (seated in chair)  Bathing Assistance: Moderate  UE Dressing Assistance: Minimal  UE Dressing Deficit:  (donning backside gown)  LE Dressing Assistance: Maximal  LE Dressing Deficit: Don/doff L sock, Don/doff R sock (due to groin access)  Toileting Assistance with Device: Not performed  Toileting Deficit: Urinary Catheter    Activity Tolerance:  Endurance: Decreased tolerance for upright activites (SOB with minimal mobility)  Activity Tolerance Comments: required cues for pursed lip breathing  Rate of Perceived Exertion (RPE): 7/10    Bed Mobility/Transfers: Bed Mobility  Bed Mobility: No    Transfers  Transfer: Yes  Transfer 1  Transfer From 1: Bed to  Transfer to 1: Stand  Technique 1: Sit to stand  Transfer Device 1: Walker  Transfer Level of Assistance 1: Minimum assistance  Trials/Comments 1: cues for safe hand placement and RW support  Transfers 2  Transfer From 2: Stand to  Transfer to 2: Chair with arms  Technique 2: Stand to sit  Transfer Device 2: Walker  Transfer Level of Assistance 2: Minimum assistance  Trials/Comments 2: Pt transferred to bedside chair with min A and cues for safe hand placement. Pt with needs in reach.    Functional Mobility:  Functional Mobility  Functional Mobility Performed: Yes  Functional Mobility 1  Surface 1: Level tile  Device 1: Rolling walker  Assistance 1: Minimum assistance  Comments 1: Pt performed functional mobility household distance with min A and RW verbal cues for standing rest break and pursed lip  breathing.    Sensation:  Light Touch: No apparent deficits  Strength:  Strength Comments: 3/5 BUES    Coordination:  Movements are Fluid and Coordinated: Yes   Hand Function:  Hand Function  Gross Grasp: Functional  Coordination: Functional  Extremities: RUE   RUE : Within Functional Limits and LUE   LUE: Within Functional Limits    Outcome Measures: [facility] Daily Activity  Putting on and taking off regular lower body clothing: A lot  Bathing (including washing, rinsing, drying): A little  Putting on and taking off regular upper body clothing: A little  Toileting, which includes using toilet, bedpan or urinal: A little  Taking care of personal grooming such as brushing teeth: None  Eating Meals: None  Daily Activity - Total Score: 19        Education Documentation  Precautions, taught by Dalila Lopez OT at 7/9/2024  3:40 PM.  Learner: Patient  Readiness: Acceptance  Method: Explanation  Response: Verbalizes Understanding  Comment: Pt edu on OT POC    ADL Training, taught by Dalila Lopez OT at 7/9/2024  3:40 PM.  Learner: Patient  Readiness: Acceptance  Method: Explanation  Response: Verbalizes Understanding  Comment: Pt edu on OT POC      Goals:  Encounter Problems       Encounter Problems (Active)       OT Goals       ADLs       Start:  07/09/24    Expected End:  07/26/24       Pt will complete ADL tasks with Mod I, using AE as needed, in order to complete self-care tasks.           Functional mobility       Start:  07/09/24    Expected End:  07/26/24       Pt will perform functional mobility household distance at mod ind level with RW             Functional transfers       Start:  07/09/24    Expected End:  07/26/24       Pt will perform functional transfers at mod level with RW

## 2024-07-10 ENCOUNTER — APPOINTMENT (OUTPATIENT)
Dept: RADIOLOGY | Facility: HOSPITAL | Age: 69
End: 2024-07-10
Payer: MEDICARE

## 2024-07-10 LAB
ALBUMIN SERPL-MCNC: 4 G/DL (ref 3.5–5)
ANION GAP SERPL CALC-SCNC: 14 MMOL/L
ANION GAP SERPL CALC-SCNC: 9 MMOL/L
BASOPHILS # BLD AUTO: 0.01 X10*3/UL (ref 0–0.1)
BASOPHILS NFR BLD AUTO: 0.1 %
BUN SERPL-MCNC: 24 MG/DL (ref 8–25)
BUN SERPL-MCNC: 27 MG/DL (ref 8–25)
CALCIUM SERPL-MCNC: 10 MG/DL (ref 8.5–10.4)
CALCIUM SERPL-MCNC: 9.4 MG/DL (ref 8.5–10.4)
CHLORIDE SERPL-SCNC: 91 MMOL/L (ref 97–107)
CHLORIDE SERPL-SCNC: 99 MMOL/L (ref 97–107)
CO2 SERPL-SCNC: 32 MMOL/L (ref 24–31)
CO2 SERPL-SCNC: 33 MMOL/L (ref 24–31)
CREAT SERPL-MCNC: 0.7 MG/DL (ref 0.4–1.6)
CREAT SERPL-MCNC: 0.8 MG/DL (ref 0.4–1.6)
EGFRCR SERPLBLD CKD-EPI 2021: 81 ML/MIN/1.73M*2
EGFRCR SERPLBLD CKD-EPI 2021: >90 ML/MIN/1.73M*2
EOSINOPHIL # BLD AUTO: 0.06 X10*3/UL (ref 0–0.7)
EOSINOPHIL NFR BLD AUTO: 0.5 %
ERYTHROCYTE [DISTWIDTH] IN BLOOD BY AUTOMATED COUNT: 15 % (ref 11.5–14.5)
GLUCOSE SERPL-MCNC: 123 MG/DL (ref 65–99)
GLUCOSE SERPL-MCNC: 191 MG/DL (ref 65–99)
HCT VFR BLD AUTO: 40.9 % (ref 36–46)
HGB BLD-MCNC: 12.7 G/DL (ref 12–16)
IMM GRANULOCYTES # BLD AUTO: 0.05 X10*3/UL (ref 0–0.7)
IMM GRANULOCYTES NFR BLD AUTO: 0.4 % (ref 0–0.9)
LYMPHOCYTES # BLD AUTO: 0.88 X10*3/UL (ref 1.2–4.8)
LYMPHOCYTES NFR BLD AUTO: 6.9 %
MAGNESIUM SERPL-MCNC: 1.9 MG/DL (ref 1.6–3.1)
MAGNESIUM SERPL-MCNC: 2.5 MG/DL (ref 1.6–3.1)
MCH RBC QN AUTO: 30.4 PG (ref 26–34)
MCHC RBC AUTO-ENTMCNC: 31.1 G/DL (ref 32–36)
MCV RBC AUTO: 98 FL (ref 80–100)
MONOCYTES # BLD AUTO: 1 X10*3/UL (ref 0.1–1)
MONOCYTES NFR BLD AUTO: 7.8 %
NEUTROPHILS # BLD AUTO: 10.78 X10*3/UL (ref 1.2–7.7)
NEUTROPHILS NFR BLD AUTO: 84.3 %
NRBC BLD-RTO: 0 /100 WBCS (ref 0–0)
PHOSPHATE SERPL-MCNC: 3.1 MG/DL (ref 2.5–4.5)
PHOSPHATE SERPL-MCNC: 3.6 MG/DL (ref 2.5–4.5)
PLATELET # BLD AUTO: 207 X10*3/UL (ref 150–450)
POTASSIUM SERPL-SCNC: 3.4 MMOL/L (ref 3.4–5.1)
POTASSIUM SERPL-SCNC: 3.5 MMOL/L (ref 3.4–5.1)
RBC # BLD AUTO: 4.18 X10*6/UL (ref 4–5.2)
SODIUM SERPL-SCNC: 138 MMOL/L (ref 133–145)
SODIUM SERPL-SCNC: 140 MMOL/L (ref 133–145)
WBC # BLD AUTO: 12.8 X10*3/UL (ref 4.4–11.3)

## 2024-07-10 PROCEDURE — 2550000001 HC RX 255 CONTRASTS: Performed by: INTERNAL MEDICINE

## 2024-07-10 PROCEDURE — 2500000005 HC RX 250 GENERAL PHARMACY W/O HCPCS: Performed by: INTERNAL MEDICINE

## 2024-07-10 PROCEDURE — 71045 X-RAY EXAM CHEST 1 VIEW: CPT | Performed by: RADIOLOGY

## 2024-07-10 PROCEDURE — 94664 DEMO&/EVAL PT USE INHALER: CPT

## 2024-07-10 PROCEDURE — 2500000005 HC RX 250 GENERAL PHARMACY W/O HCPCS

## 2024-07-10 PROCEDURE — 97116 GAIT TRAINING THERAPY: CPT | Mod: GP

## 2024-07-10 PROCEDURE — 84100 ASSAY OF PHOSPHORUS: CPT

## 2024-07-10 PROCEDURE — 94640 AIRWAY INHALATION TREATMENT: CPT

## 2024-07-10 PROCEDURE — 2500000001 HC RX 250 WO HCPCS SELF ADMINISTERED DRUGS (ALT 637 FOR MEDICARE OP)

## 2024-07-10 PROCEDURE — 36415 COLL VENOUS BLD VENIPUNCTURE: CPT

## 2024-07-10 PROCEDURE — 70450 CT HEAD/BRAIN W/O DYE: CPT

## 2024-07-10 PROCEDURE — 83735 ASSAY OF MAGNESIUM: CPT

## 2024-07-10 PROCEDURE — 9420000001 HC RT PATIENT EDUCATION 5 MIN

## 2024-07-10 PROCEDURE — 71045 X-RAY EXAM CHEST 1 VIEW: CPT

## 2024-07-10 PROCEDURE — 70498 CT ANGIOGRAPHY NECK: CPT | Performed by: RADIOLOGY

## 2024-07-10 PROCEDURE — 99291 CRITICAL CARE FIRST HOUR: CPT

## 2024-07-10 PROCEDURE — 70450 CT HEAD/BRAIN W/O DYE: CPT | Performed by: STUDENT IN AN ORGANIZED HEALTH CARE EDUCATION/TRAINING PROGRAM

## 2024-07-10 PROCEDURE — 2500000001 HC RX 250 WO HCPCS SELF ADMINISTERED DRUGS (ALT 637 FOR MEDICARE OP): Performed by: REGISTERED NURSE

## 2024-07-10 PROCEDURE — 99233 SBSQ HOSP IP/OBS HIGH 50: CPT | Performed by: INTERNAL MEDICINE

## 2024-07-10 PROCEDURE — 2500000002 HC RX 250 W HCPCS SELF ADMINISTERED DRUGS (ALT 637 FOR MEDICARE OP, ALT 636 FOR OP/ED)

## 2024-07-10 PROCEDURE — 2020000001 HC ICU ROOM DAILY

## 2024-07-10 PROCEDURE — 97535 SELF CARE MNGMENT TRAINING: CPT | Mod: GO

## 2024-07-10 PROCEDURE — 80069 RENAL FUNCTION PANEL: CPT

## 2024-07-10 PROCEDURE — 80048 BASIC METABOLIC PNL TOTAL CA: CPT | Mod: CCI

## 2024-07-10 PROCEDURE — 85025 COMPLETE CBC W/AUTO DIFF WBC: CPT

## 2024-07-10 PROCEDURE — 70496 CT ANGIOGRAPHY HEAD: CPT | Performed by: RADIOLOGY

## 2024-07-10 PROCEDURE — 94660 CPAP INITIATION&MGMT: CPT

## 2024-07-10 PROCEDURE — 70496 CT ANGIOGRAPHY HEAD: CPT

## 2024-07-10 PROCEDURE — 2500000004 HC RX 250 GENERAL PHARMACY W/ HCPCS (ALT 636 FOR OP/ED)

## 2024-07-10 RX ORDER — LORAZEPAM 2 MG/ML
1 INJECTION INTRAMUSCULAR ONCE
Status: COMPLETED | OUTPATIENT
Start: 2024-07-10 | End: 2024-07-10

## 2024-07-10 RX ORDER — LANOLIN ALCOHOL/MO/W.PET/CERES
400 CREAM (GRAM) TOPICAL ONCE
Status: COMPLETED | OUTPATIENT
Start: 2024-07-10 | End: 2024-07-10

## 2024-07-10 RX ORDER — ACETAMINOPHEN 500 MG
5 TABLET ORAL NIGHTLY
Status: DISCONTINUED | OUTPATIENT
Start: 2024-07-10 | End: 2024-07-12 | Stop reason: HOSPADM

## 2024-07-10 RX ORDER — POTASSIUM CHLORIDE 20 MEQ/1
40 TABLET, EXTENDED RELEASE ORAL ONCE
Status: COMPLETED | OUTPATIENT
Start: 2024-07-10 | End: 2024-07-10

## 2024-07-10 RX ORDER — OLANZAPINE 10 MG/2ML
5 INJECTION, POWDER, FOR SOLUTION INTRAMUSCULAR EVERY 4 HOURS PRN
Status: DISCONTINUED | OUTPATIENT
Start: 2024-07-10 | End: 2024-07-12

## 2024-07-10 RX ORDER — LORAZEPAM 2 MG/ML
INJECTION INTRAMUSCULAR
Status: COMPLETED
Start: 2024-07-10 | End: 2024-07-10

## 2024-07-10 RX ORDER — TORSEMIDE 100 MG/1
50 TABLET ORAL 2 TIMES DAILY
Status: DISCONTINUED | OUTPATIENT
Start: 2024-07-10 | End: 2024-07-12 | Stop reason: HOSPADM

## 2024-07-10 RX ORDER — ACETAMINOPHEN 10 MG/ML
1000 INJECTION, SOLUTION INTRAVENOUS EVERY 6 HOURS SCHEDULED
Status: COMPLETED | OUTPATIENT
Start: 2024-07-10 | End: 2024-07-10

## 2024-07-10 RX ORDER — POTASSIUM CHLORIDE 14.9 MG/ML
20 INJECTION INTRAVENOUS
Status: DISCONTINUED | OUTPATIENT
Start: 2024-07-10 | End: 2024-07-10

## 2024-07-10 RX ORDER — POTASSIUM CHLORIDE 20 MEQ/1
20 TABLET, EXTENDED RELEASE ORAL ONCE
Status: COMPLETED | OUTPATIENT
Start: 2024-07-10 | End: 2024-07-10

## 2024-07-10 RX ORDER — ACETAMINOPHEN 325 MG/1
650 TABLET ORAL EVERY 6 HOURS PRN
Status: DISCONTINUED | OUTPATIENT
Start: 2024-07-10 | End: 2024-07-12 | Stop reason: HOSPADM

## 2024-07-10 RX ORDER — ATORVASTATIN CALCIUM 40 MG/1
40 TABLET, FILM COATED ORAL NIGHTLY
Status: DISCONTINUED | OUTPATIENT
Start: 2024-07-10 | End: 2024-07-12 | Stop reason: HOSPADM

## 2024-07-10 SDOH — ECONOMIC STABILITY: HOUSING INSECURITY: IN THE PAST 12 MONTHS, HOW MANY TIMES HAVE YOU MOVED WHERE YOU WERE LIVING?: 1

## 2024-07-10 SDOH — ECONOMIC STABILITY: INCOME INSECURITY: HOW HARD IS IT FOR YOU TO PAY FOR THE VERY BASICS LIKE FOOD, HOUSING, MEDICAL CARE, AND HEATING?: NOT VERY HARD

## 2024-07-10 SDOH — ECONOMIC STABILITY: HOUSING INSECURITY: AT ANY TIME IN THE PAST 12 MONTHS, WERE YOU HOMELESS OR LIVING IN A SHELTER (INCLUDING NOW)?: NO

## 2024-07-10 SDOH — ECONOMIC STABILITY: INCOME INSECURITY: IN THE LAST 12 MONTHS, WAS THERE A TIME WHEN YOU WERE NOT ABLE TO PAY THE MORTGAGE OR RENT ON TIME?: NO

## 2024-07-10 SDOH — HEALTH STABILITY: PHYSICAL HEALTH: ON AVERAGE, HOW MANY DAYS PER WEEK DO YOU ENGAGE IN MODERATE TO STRENUOUS EXERCISE (LIKE A BRISK WALK)?: 0 DAYS

## 2024-07-10 SDOH — HEALTH STABILITY: PHYSICAL HEALTH: ON AVERAGE, HOW MANY MINUTES DO YOU ENGAGE IN EXERCISE AT THIS LEVEL?: 0 MIN

## 2024-07-10 ASSESSMENT — PAIN SCALES - GENERAL
PAINLEVEL_OUTOF10: 0 - NO PAIN
PAINLEVEL_OUTOF10: 7
PAINLEVEL_OUTOF10: 0 - NO PAIN

## 2024-07-10 ASSESSMENT — COGNITIVE AND FUNCTIONAL STATUS - GENERAL
MOBILITY SCORE: 16
HELP NEEDED FOR BATHING: A LITTLE
CLIMB 3 TO 5 STEPS WITH RAILING: TOTAL
MOBILITY SCORE: 17
DRESSING REGULAR UPPER BODY CLOTHING: A LITTLE
DRESSING REGULAR LOWER BODY CLOTHING: A LOT
DRESSING REGULAR UPPER BODY CLOTHING: A LITTLE
HELP NEEDED FOR BATHING: A LITTLE
WALKING IN HOSPITAL ROOM: A LITTLE
MOVING TO AND FROM BED TO CHAIR: A LITTLE
STANDING UP FROM CHAIR USING ARMS: A LITTLE
MOVING FROM LYING ON BACK TO SITTING ON SIDE OF FLAT BED WITH BEDRAILS: A LITTLE
WALKING IN HOSPITAL ROOM: A LITTLE
MOVING FROM LYING ON BACK TO SITTING ON SIDE OF FLAT BED WITH BEDRAILS: A LITTLE
CLIMB 3 TO 5 STEPS WITH RAILING: A LOT
TURNING FROM BACK TO SIDE WHILE IN FLAT BAD: A LITTLE
DAILY ACTIVITIY SCORE: 19
TOILETING: A LITTLE
TOILETING: A LITTLE
STANDING UP FROM CHAIR USING ARMS: A LITTLE
TURNING FROM BACK TO SIDE WHILE IN FLAT BAD: A LITTLE
DAILY ACTIVITIY SCORE: 19
DRESSING REGULAR LOWER BODY CLOTHING: A LOT
MOVING TO AND FROM BED TO CHAIR: A LITTLE

## 2024-07-10 ASSESSMENT — PAIN DESCRIPTION - LOCATION: LOCATION: HEAD

## 2024-07-10 ASSESSMENT — PAIN - FUNCTIONAL ASSESSMENT
PAIN_FUNCTIONAL_ASSESSMENT: 0-10

## 2024-07-10 ASSESSMENT — ACTIVITIES OF DAILY LIVING (ADL): HOME_MANAGEMENT_TIME_ENTRY: 19

## 2024-07-10 NOTE — SIGNIFICANT EVENT
Pt woke up altered at 0115 when she pulled off her CPAP mask.  Nurse found the pt unable to speak for a moment followed by intermittent episodes of expressive aphasia for which I was notified for.  STAT head CT was ordered at this time.      While down in CT, pt became agitated and diaphoretic.  Pt was able to be talked down by taking deep breaths and CT scan was able to be obtained.  Following the CT scan, pt became agitated again and attempting to climb out of bed while being transported back to her room in the ICU.  Pt was given 1mg of ativan and CPAP was applied.  Pt appeared to be more calm and SpO2 was in the high 90's.      Telestroke consult was placed at this time.  Spoke with Dr Walters from Neuro tele-stroke at 0304. Discussed the pt case with Dr. Walters, including hx of atrial fibrillation and pt on eliquis that was restarted the morning of 7/9.  After reviewing the case and CT, no intervention (TNK or thrombectomy) was indicated at this time

## 2024-07-10 NOTE — NURSING NOTE
Wound Wednesday note.  Pt has no areas of breakdown on body.  Pt has rakel groin wounds dry and intact.  Pt has appropriate borders in place.  Pt on ICU bed mattress.

## 2024-07-10 NOTE — PROGRESS NOTES
[facility] Critical Care Medicine       Date:  7/10/2024  Patient:  Vickie Foster  YOB: 1955  MRN:  [id number]   Admit Date:  7/8/2024  ========================================================================================================    No chief complaint on file.        History of Present Illness:  Vickie Foster is a 68 y.o. year old female patient with past medical history of afib s/p tikosyn and cardioversion 5/1/24, pHTN, COPD, TANVIR with cpap at night, HFpEF, HTN, GERD, depression, anxiety, morbid obesity, hyperuricemia, with recent weight gain of 5-6lbs in a few days, who presented to  for outpatient afib ablation with Dr Ty. Of note, she recently had a cardioversion 5/1/24 and she converted back to afib at the end of June. After the procedure was completed, she developed acute dyspnea, tachycardia, HTN, and diaphoresis. Vital signs post-ablation were HR 96 (NSR), /74, RR 26, SpO2 86-92% on NRB 15L 100%. She was given 10mg hydralazine and 20mg lasix and diuresed 500mL prior to arrival to the floor. She was then brought to ICU for management of respiratory distress. Stat labs and CXR ordered. Bedside POCUS and CXR consistent with pulmonary edema. She was given another 60mg of IV lasix and 40mEq K. ABG showed respiratory acidosis 7.28/78/82/36. She was placed on bipap by RT.       Interval ICU Events:  7/8: Pt brought to ICU, stat labs obtained and stat echo ordered. ABG showed respiratory acidosis and she was placed on bipap. Will give another dose of lasix d/t vascular congestion.     7/9: No acute events overnight. Continue diauresing today and restart home diuretic medicine.    7/10: Overnight, pt experienced expressive aphasia. CT head negative and night PA spoke with Dr. Walters from tele-stroke and no interventions were indicated at this time. Pt became agitated following the CT scan and received a 1 mg dose of Ativan. Neurology consulted. Restarted all home diuretics,  will reassess this afternoon if patient would benefit from dose of lasix.     Medical History:  Past Medical History:   Diagnosis Date    A-fib (Multi)     CHF (congestive heart failure) (Multi)     COVID     HTN (hypertension)     Low back pain      Past Surgical History:   Procedure Laterality Date    CARDIAC ELECTROPHYSIOLOGY PROCEDURE N/A 05/01/2024    Procedure: Cardioversion;  Surgeon: Amado Ty MD;  Location: [facility] Cardiac Cath Lab;  Service: Electrophysiology;  Laterality: N/A;  63541  i48.0  medical mutual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    CARDIAC ELECTROPHYSIOLOGY PROCEDURE N/A 7/8/2024    Procedure: Ablation A-Fib;  Surgeon: Amado Ty MD;  Location: [facility] Cardiac Cath Lab;  Service: Electrophysiology;  Laterality: N/A;  AFIB CRYOABLATION; CPT 49648; ICD I48.0   Medicare mutual of [place] medicare  ID - [id number]  [phone]  No Auth required  Call ref # [id number]    CARDIOVERSION      05/01/2024     Medications Prior to Admission   Medication Sig Dispense Refill Last Dose    allopurinol (Zyloprim) 300 mg tablet Take 1 tablet (300 mg) by mouth once daily. 90 tablet 1 7/8/2024    ascorbic acid (Vitamin C With Genevieve Hips) 1,000 mg tablet Take 1 tablet (1,000 mg) by mouth once daily.   7/8/2024    biotin 10 mg tablet Take 1 tablet (10 mg) by mouth once daily.   7/8/2024    budesonide-formoteroL (Symbicort) 80-4.5 mcg/actuation inhaler Inhale 2 puffs 2 times a day. Rinse mouth with water after use to reduce aftertaste and incidence of candidiasis. Do not swallow.   Past Month    buPROPion XL (Wellbutrin XL) 300 mg 24 hr tablet Take 1 tablet (300 mg) by mouth once daily in the morning. Take before meals. 90 tablet 1 7/8/2024    calcium carb-vitamin D3-vit K2 600 mg-1,000 unit-90 mcg tablet Take by mouth once daily.   7/8/2024    clobetasol (Temovate) 0.05 % cream Apply topically 2 times a day.   Past Week    docusate sodium (Colace) 100 mg capsule Take 1 capsule (100 mg) by mouth 2 times a day.   7/7/2024     dofetilide (Tikosyn) 250 mcg capsule Take 1 capsule (250 mcg) by mouth every 12 hours. 180 capsule 1 7/7/2024    Eliquis 5 mg tablet Take 1 tablet (5 mg) by mouth 2 times a day. 180 tablet 1 7/7/2024    folic acid/multivit-min/lutein (CENTRUM SILVER ORAL) Take by mouth. As directed   7/7/2024    gabapentin (Neurontin) 300 mg capsule Take 1 capsule (300 mg) by mouth 3 times a day. 270 capsule 1 7/8/2024    Klor-Con M20 20 mEq ER tablet TAKE 1 TABLET THREE TIMES A DAY WITH FOOD 270 tablet 3 7/8/2024    metOLazone (Zaroxolyn) 5 mg tablet Take 1 tablet (5 mg) by mouth once daily as needed.   7/8/2024    metoprolol succinate XL (Toprol-XL) 25 mg 24 hr tablet TAKE 1 TABLET BY MOUTH ONE TIME DAILY 90 tablet 3 7/8/2024    oxygen (O2) gas therapy Inhale 3 L/min once daily at bedtime.   7/7/2024    semaglutide 2 mg/dose (8 mg/3 mL) pen injector Inject 2.3 mg under the skin 1 (one) time per week.   Past Week    torsemide (Demadex) 100 mg tablet TAKE ONE-HALF (1/2) TABLET TWICE A DAY 90 tablet 3 7/7/2024     Patient has no known allergies.  Social History     Tobacco Use    Smoking status: Former     Types: Cigarettes    Smokeless tobacco: Never   Vaping Use    Vaping status: Never Used   Substance Use Topics    Alcohol use: Yes     Comment: monthly or less    Drug use: Never     Family History   Problem Relation Name Age of Onset    Breast cancer Mother      Heart attack Mother      Heart disease Mother      Colon cancer Father      Breast cancer Sister      Diabetes type II Sister      Kidney failure Sister      Other (herione addict) Sister      No Known Problems Brother      No Known Problems Daughter      Other (recovering alcoholic) Son      Breast cancer Other Maternal Aunt        Hospital Medications:           Current Facility-Administered Medications:     acetaminophen (Ofirmev) injection 1,000 mg, 1,000 mg, intravenous, q6h Reynaldo ANGELES PA-C, 1,000 mg at 07/10/24 0642    allopurinol (Zyloprim) tablet 100 mg,  100 mg, oral, q AM, Rhianna Elena, 100 mg at 07/09/24 0922    allopurinol (Zyloprim) tablet 200 mg, 200 mg, oral, Nightly, Rhianna Elena, 200 mg at 07/09/24 2057    apixaban (Eliquis) tablet 5 mg, 5 mg, oral, BID, KEVON Heller-CNP, 5 mg at 07/09/24 2057    buPROPion XL (Wellbutrin XL) 24 hr tablet 300 mg, 300 mg, oral, Daily before breakfast, Minnie Mcfarlane APRN-CNP, 300 mg at 07/10/24 0641    dofetilide (Tikosyn) capsule 250 mcg, 250 mcg, oral, q12h, KEVON Heller-CNP, 250 mcg at 07/10/24 0641    gabapentin (Neurontin) capsule 300 mg, 300 mg, oral, TID, KEVON Heller-CNP, 300 mg at 07/09/24 2057    hydrALAZINE (Apresoline) injection 10 mg, 10 mg, intravenous, q4h PRN, Philippe Seth PA-C    ipratropium-albuteroL (Duo-Neb) 0.5-2.5 mg/3 mL nebulizer solution 3 mL, 3 mL, nebulization, TID, Haroldo Carvalho PA-C, 3 mL at 07/10/24 0706    lidocaine 4 % patch 2 patch, 2 patch, transdermal, Daily, Reynaldo Hahn PA-C, 2 patch at 07/09/24 0922    melatonin tablet 5 mg, 5 mg, oral, Nightly PRN, Reynaldo Hahn PA-C    metOLazone (Zaroxolyn) tablet 5 mg, 5 mg, oral, Daily, Haroldo Carvalho PA-C, 5 mg at 07/09/24 1056    metoprolol succinate XL (Toprol-XL) 24 hr tablet 25 mg, 25 mg, oral, Daily, KEVON Heller-CNP, 25 mg at 07/09/24 0922    oxygen (O2) therapy, , inhalation, Continuous PRN - O2/gases, Amado Ty MD    oxygen (O2) therapy, , inhalation, BID, Amado Ty MD, 6 L/min at 07/10/24 0711    Review of Systems:  14 point review of systems was completed and negative except for those specially mention in my HPI    Physical Exam:    Heart Rate:  [63-85]   Temp:  [36.6 °C (97.9 °F)-37.4 °C (99.3 °F)]   Resp:  [12-28]   BP: ()/(56-94)   SpO2:  [81 %-100 %]     Physical Exam  Constitutional:       Appearance: Normal appearance. She is obese.   HENT:      Nose: Nose normal.      Mouth/Throat:      Mouth: Mucous membranes are moist.      Pharynx: Oropharynx is clear.   Cardiovascular:       Rate and Rhythm: Normal rate and regular rhythm.      Pulses: Normal pulses.      Heart sounds: Normal heart sounds.   Pulmonary:      Effort: Pulmonary effort is normal.      Breath sounds: Normal breath sounds.   Abdominal:      General: Abdomen is flat. Bowel sounds are normal.   Musculoskeletal:         General: Normal range of motion.   Skin:     Capillary Refill: Capillary refill takes 2 to 3 seconds.   Neurological:      Mental Status: She is alert and oriented to person, place, and time.      Comments: Expressive aphasia   Psychiatric:         Mood and Affect: Mood normal.         Behavior: Behavior normal.         Objective:    I have reviewed all medications, laboratory results, and imaging pertinent for today's encounter.    FiO2 (%):  [44 %] 44 %      Intake/Output Summary (Last 24 hours) at 7/10/2024 0722  Last data filed at 7/10/2024 0400  Gross per 24 hour   Intake 250 ml   Output 2365 ml   Net -2115 ml       Recent Imaging  CT head wo IV contrast   Final Result   No acute intracranial abnormality.        Chronic changes as detailed in the body of the report.        MACRO:   None        Signed by: Arden Berkowitz 7/10/2024 3:22 AM   Dictation workstation:   [id number]      XR chest 1 view   Final Result   Limited exam. Question of faint parenchymal infiltrates.        MACRO:   none        Signed by: Daija Jane 7/9/2024 12:14 PM   Dictation workstation:   [id number]      XR chest 1 view   Final Result   1. Improved alveolar airspace infiltrate with alveolar component of   pulmonary edema. If there is persistent interstitial pulmonary edema   suggested.        2. Mild left basilar effusion.        Signed by: Savita Dominguez 7/9/2024 9:17 AM   Dictation workstation:   [id number]      Transthoracic Echo (TTE) Complete   Final Result      XR chest 1 view   Final Result   Bilateral infiltrates and probable effusions raising the possibility   of pulmonary edema and CHF. Follow-up to assure clearing  is suggested.        MACRO:   none        Signed by: Clif Benavidez 7/8/2024 4:38 PM   Dictation workstation:   [id number]      Electrophysiology procedure   Final Result      XR chest 1 view    (Results Pending)       Transthoracic Echo (TTE) Complete    Result Date: 7/8/2024           [place] [id number]            Phone [phone] TRANSTHORACIC ECHOCARDIOGRAM REPORT Patient Name:     SOURAV KHALIF MIN      Reading Physician:   [id number] Servando [facility] Study Date:       7/8/2024             Ordering Provider:   [id number] MELLISSA MULLINS MRN/PID:          [id number]             Fellow: Accession#:       [id number]         Nurse: Date of           1955 / 68      Sonographer:         Dalila Kaba RDCS Birth/Age:        years Gender:           F                    Additional Staff: Height:           157.48 cm            Admit Date:          7/8/2024 Weight:           113.85 kg            Admission Status:    Inpatient - STAT BSA / BMI:        2.11 m2 / 45.91      Department Location: [facility]                   kg/m2 Blood Pressure: 161 /96 mmHg Study Type:    TRANSTHORACIC ECHO (TTE) COMPLETE Diagnosis/ICD: Dyspnea, unspecified-R06.00 Indication:    dypsnea post afib ablation, r/o pericardial disease CPT Codes:     Echo Complete w Full Doppler-32178 Patient History: Pertinent History: HTN, CHF, cardiomegaly, coronary arteriosclerosis, AFIB,                    PHTN, COPD, GERD. Study Detail: The following Echo studies were performed: 2D, M-Mode, Doppler and               color flow.  PHYSICIAN INTERPRETATION: Left Ventricle: The left ventricular systolic function is normal, with a visually estimated ejection fraction of 65-70%. There are no regional wall motion abnormalities. The left ventricular cavity size is normal. Spectral Doppler shows an impaired relaxation pattern of left ventricular diastolic filling.  Left Atrium: The left atrium is moderately dilated. Right Ventricle: The right ventricle is normal in size. There is normal right ventricular global systolic function. Right Atrium: The right atrium is mildly dilated. Aortic Valve: The aortic valve is trileaflet. The aortic valve dimensionless index is 0.87. There is no evidence of aortic valve regurgitation. The peak instantaneous gradient of the aortic valve is 18.6 mmHg. The mean gradient of the aortic valve is 8.4 mmHg. Mitral Valve: The mitral valve is normal in structure. There is mild to moderate mitral valve regurgitation. Tricuspid Valve: The tricuspid valve is structurally normal. There is mild to moderate tricuspid regurgitation. Pulmonic Valve: The pulmonic valve is not well visualized. The pulmonic valve regurgitation was not well visualized. Pericardium: There is no pericardial effusion noted. Aorta: The aortic root is normal.  CONCLUSIONS:  1. The left ventricular systolic function is normal, with a visually estimated ejection fraction of 65-70%.  2. Spectral Doppler shows an impaired relaxation pattern of left ventricular diastolic filling.  3. There is normal right ventricular global systolic function.  4. The left atrium is moderately dilated.  5. Mild to moderate mitral valve regurgitation.  6. Mild to moderate tricuspid regurgitation. QUANTITATIVE DATA SUMMARY: 2D MEASUREMENTS:                           Normal Ranges: IVSd:          1.45 cm    (0.6-1.1cm) LVPWd:         1.14 cm    (0.6-1.1cm) LVIDd:         4.75 cm    (3.9-5.9cm) LVIDs:         3.05 cm LV Mass Index: 114.6 g/m2 LV % FS        35.7 % LA VOLUME:                               Normal Ranges: LA Vol A4C:        144.8 ml   (22+/-6mL/m2) LA Vol A2C:        183.3 ml LA Vol BP:         179.1 ml LA Vol Index A4C:  68.8 ml/m2 LA Vol Index A2C:  87.1 ml/m2 LA Vol Index BP:   85.1 ml/m2 LA Area A4C:       38.5 cm2 LA Area A2C:       39.4 cm2 LA Major Axis A4C: 8.7 cm LA Major Axis A2C: 7.2 cm  LA Volume Index:   85.0 ml/m2 LA Vol A4C:        144.0 ml LA Vol A2C:        184.0 ml RA VOLUME BY A/L METHOD:                               Normal Ranges: RA Vol A4C:        97.3 ml    (8.3-19.5ml) RA Vol Index A4C:  46.2 ml/m2 RA Area A4C:       28.9 cm2 RA Major Axis A4C: 7.3 cm AORTA MEASUREMENTS:                      Normal Ranges: Ao Sinus, d: 2.90 cm (2.1-3.5cm) Ao STJ, d:   2.60 cm (1.7-3.4cm) Asc Ao, d:   3.30 cm (2.1-3.4cm) LV SYSTOLIC FUNCTION BY 2D PLANIMETRY (MOD):                      Normal Ranges: EF-A4C View:    62 % (>=55%) EF-A2C View:    64 % EF-Biplane:     63 % EF-Visual:      68 % LV EF Reported: 68 % LV DIASTOLIC FUNCTION:                         Normal Ranges: MV Peak E:    1.24 m/s  (0.7-1.2 m/s) MV Peak A:    0.01 m/s  (0.42-0.7 m/s) E/A Ratio:    106.77    (1.0-2.2) MV e'         0.109 m/s (>8.0) MV lateral e' 0.17 m/s MV medial e'  0.05 m/s E/e' Ratio:   11.36     (<8.0) MITRAL VALVE:                 Normal Ranges: MV DT: 134 msec (150-240msec) AORTIC VALVE:                                    Normal Ranges: AoV Vmax:                2.15 m/s  (<=1.7m/s) AoV Peak P.6 mmHg (<20mmHg) AoV Mean P.4 mmHg  (1.7-11.5mmHg) LVOT Max Sean:            1.64 m/s  (<=1.1m/s) AoV VTI:                 31.64 cm  (18-25cm) LVOT VTI:                27.54 cm LVOT Diameter:           2.04 cm   (1.8-2.4cm) AoV Area, VTI:           2.83 cm2  (2.5-5.5cm2) AoV Area,Vmax:           2.48 cm2  (2.5-4.5cm2) AoV Dimensionless Index: 0.87  RIGHT VENTRICLE: RV Basal 4.58 cm RV Major 7.5 cm TAPSE:   19.5 mm RV s'    0.15 m/s TRICUSPID VALVE/RVSP:                             Normal Ranges: Peak TR Velocity: 3.62 m/s RV Syst Pressure: 55.4 mmHg (< 30mmHg) IVC Diam:         2.41 cm AORTA: Asc Ao Diam 3.35 cm  [id number] [facility] Electronically signed on 2024 at 4:24:40 PM  ** Final **     Transthoracic echo (TTE) complete    Result Date: 2024   [facility], [address]  [place] [address]            Tel [phone] and Fax [phone] TRANSTHORACIC ECHOCARDIOGRAM REPORT  Patient Name:      SOURAV CUADRA AUDREYLAWRENCE      Reading Physician:    [id number] Demetrius Guerrero MD Study Date:        6/27/2024            Ordering Provider:    [id number] AGA CAMPBELL MRN/PID:           [id number]             Fellow: Accession#:        [id number]         Nurse: Date of Birth/Age: 1955 / 68      Sonographer:          Price parks                                      [facility] Gender:            F                    Additional Staff: Height:            157.48 cm            Admit Date: Weight:            112.49 kg            Admission Status:     Outpatient BSA / BMI:         2.09 m2 / 45.36      Encounter#:           [id number]                    kg/m2 Blood Pressure:    138/84 mmHg          Department Location:  [facility] Study Type:    TRANSTHORACIC ECHO (TTE) COMPLETE Diagnosis/ICD: Paroxysmal atrial fibrillation-I48.0; Other forms of                dyspnea-R06.09 Indication:    afib, MORLEY CPT Code:      Echo Complete w Full Doppler-17534 Patient History: Pertinent History: HTN, diastolic HF, cardiomegaly, afib, PHTN, CHF,MORLEY, morbid                    obesity. Study Detail: The following Echo studies were performed: M-Mode, 2D, Doppler and               color flow. Technically challenging study due to body habitus,               poor acoustic windows and prominent lung artifact.  PHYSICIAN INTERPRETATION: Left Ventricle: Left ventricular ejection fraction is normal, by visual estimate at 60-65%. There are no regional wall motion abnormalities. The left ventricular cavity size is normal. There is mild to moderate concentric left ventricular hypertrophy. Left ventricular diastolic filling was not assessed. Left Atrium: The left atrium is  moderate to severely dilated. Right Ventricle: The right ventricle is normal in size. There is normal right ventriclar wall thickness. There is normal right ventricular global systolic function. Right Atrium: The right atrium is normal in size. Aortic Valve: The aortic valve appears structurally normal. The aortic valve appears tricuspid and non-restricted. There is no evidence of aortic valve regurgitation. The peak instantaneous gradient of the aortic valve is 8.7 mmHg. Mitral Valve: The mitral valve is normal in structure. There is normal mitral valve leaflet mobility. There is trace to mild mitral valve regurgitation. Tricuspid Valve: The tricuspid valve is structurally normal. There is normal tricuspid valve leaflet mobility. There is mild tricuspid regurgitation. The Doppler estimated RVSP is mildly elevated at 43.1 mmHg. Pulmonic Valve: The pulmonic valve is structurally normal. There is trace pulmonic valve regurgitation. Pericardium: There is no pericardial effusion noted. Aorta: The aortic root is normal. The Ao Sinus is 3.70 cm. The Asc Ao is 3.50 cm. There is no dilatation of the aortic arch. There is no dilatation of the ascending aorta. There is no dilatation of the aortic root. There is plaque visualized in the ascending aorta, which is classified as a Grade 2 [mild (focal or diffuse) intimal thickening of 2-3 mm] atherosclerosis. Pulmonary Artery: The pulmonary artery is normal in size. The tricuspid regurgitant velocity is 3.17 m/s, and with an estimated right atrial pressure of 3 mmHg, the estimated pulmonary artery pressure is mildly elevated with the RVSP at 43.1 mmHg. The estimated PASP is 43 mmHg. Systemic Veins: The inferior vena cava appears to be of normal size. There is IVC inspiratory collapse greater than 50%.  CONCLUSIONS:  1. Left ventricular ejection fraction is normal, by visual estimate at 60-65%.  2. There is normal right ventricular global systolic function.  3. The left atrium is  moderate to severely dilated.  4. Trace to mild mitral valve regurgitation.  5. Mild tricuspid regurgitation visualized.  6. Mildly elevated RVSP.  7. The estimated PASP is 43 mmHg.  8. There is plaque visualized in the ascending aorta. QUANTITATIVE DATA SUMMARY: 2D MEASUREMENTS:                          Normal Ranges: Ao Root d:     3.70 cm   (2.0-3.7cm) LAs:           6.06 cm   (2.7-4.0cm) IVSd:          1.27 cm   (0.6-1.1cm) LVPWd:         1.09 cm   (0.6-1.1cm) LVIDd:         4.22 cm   (3.9-5.9cm) LVIDs:         3.15 cm LV Mass Index: 83.8 g/m2 LV % FS        25.2 % LA VOLUME:                                Normal Ranges: LA Vol A4C:        147.1 ml    (22+/-6mL/m2) LA Vol A2C:        216.2 ml LA Vol BP:         180.4 ml LA Vol Index A4C:  70.2 ml/m2 LA Vol Index A2C:  103.2 ml/m2 LA Vol Index BP:   86.1 ml/m2 LA Area A4C:       38.8 cm2 LA Area A2C:       46.5 cm2 LA Major Axis A4C: 8.7 cm LA Major Axis A2C: 8.5 cm LA Volume Index:   86.1 ml/m2 LA Vol A4C:        132.5 ml LA Vol A2C:        210.3 ml RA VOLUME BY A/L METHOD:                       Normal Ranges: RA Area A4C: 26.6 cm2 AORTA MEASUREMENTS:                      Normal Ranges: Ao Sinus, d: 3.70 cm (2.1-3.5cm) Asc Ao, d:   3.50 cm (2.1-3.4cm) LV SYSTOLIC FUNCTION BY 2D PLANIMETRY (MOD):                      Normal Ranges: EF-A4C View:    62 % (>=55%) EF-A2C View:    64 % EF-Biplane:     64 % EF-Visual:      63 % LV EF Reported: 63 % LV DIASTOLIC FUNCTION:                     Normal Ranges: MV Peak E: 0.85 m/s (0.7-1.2 m/s) AORTIC VALVE:                         Normal Ranges: AoV Vmax:      1.47 m/s (<=1.7m/s) AoV Peak P.7 mmHg (<20mmHg) LVOT Max Sean:  0.94 m/s (<=1.1m/s) LVOT VTI:      17.61 cm LVOT Diameter: 2.25 cm  (1.8-2.4cm) AoV Area,Vmax: 2.53 cm2 (2.5-4.5cm2)  RIGHT VENTRICLE: RV Basal 4.40 cm TAPSE:   13.0 mm RV s'    0.09 m/s TRICUSPID VALVE/RVSP:                             Normal Ranges: Peak TR Velocity: 3.17 m/s RV Syst Pressure: 43.1  mmHg (< 30mmHg) IVC Diam:         1.80 cm AORTA: Asc Ao Diam 3.51 cm  [id number] Demetrius Guerrero MD Electronically signed on 6/29/2024 at 12:31:59 PM  ** Final **      Assessment/Plan:    I am currently managing this critically ill patient for the following problems:    Neuro/Psych/Pain Ctrl/Sedation:  Stroke like symptoms-aphasia  Hx depression, anxiety   - CT head negative. Not a candidate for TNK d/t Eliquis.  - Neurology consulted. Start statin  - Pain Control: tylenol PRN   - Continue home wellbutrin and Gabapentin  - CAM ICU qshift, negative  - Sleep-wake hygiene  - Delirium precautions     Respiratory/ENT:  Acute hypoxic respiratory failure 2/2 flash pulmonary edema   Dyspnea s/p cardiac ablation   Hx COPD, TANVIR  - Supplemental O2: 6L HFNC  - CPAP at night    - Diuresed 2.5 L yesterday, will reassess if another dose of lasix would be beneficial this afternoon  - Duonebs TID  - Wean O2 as tolerated to maintain SpO2 >92%  - Continuous pulse ox monitoring   - Pulm hygiene    Cardiovascular:  Hx chronic diasotlic HF, HTN, HLD   Persistent atrial fibrillation s/p ablation - now in NSR   - Stat Echo: EF 65-70%, spectral Doppler shows an impaired relaxation pattern of left ventricular diastolic filling, normal right ventricular global systolic function, left atrium is moderately dilated, mild to moderate mitral valve regurgitation and mild to moderate tricuspid regurgitation.  - CXR this am: showed less pulmonary edema compared to the day before  - Start stain d/t stroke like symptoms  - Continue home tikosyn, metoprolol   - Continue home eliquis  - Coags unremarkable  - Hydralazine PRN for SBP >160, if no improvement, consider nitroglycerin gtt for afterload reduction   - Continuous cardiac monitoring per ICU protocol  - Maintain MAPs >65  - EKGs PRN for ACS symptoms, arrhythmias     GI:  Hx GERD  - Diet: Cardiac diet  - BR: none    Renal/Volume Status (Intra & Extravascular):  Hypervolemia   Hypokalemia -replaced this  am  Hyperuricemia   - Baseline Cr 0.9, currently 0.70  - negative 2 L, continue diuresing (60 mg lasix)  - Continue home diuretics (metozalone) and Torsemide. Will reassess if another dose of lasix would be beneficial this afternoon.   - Continue home allopurinol  - Cuellar catheter removed today  - Maintain urine output 0.5-1.0cc/kg/hr  - Hourly I/O's  - Replete electrolytes to maintain K >4.0 and Mg >2.0  - Daily BMP, Mg, Phos    Endocrine  Hyperglycemia - possibly stress-induced, no hx DM   - Consider adding SSI if glucose >180  - Hypoglycemia protocol PRN   - Hold home Semaglutide    Infectious Disease:  Leukocytosis - likely stress-induced post-ablation, predominantly neutrophils   - 12.3->12.8. Will continue to monitor.  - Antibiotics: not indicated at this time   - Monitor SIRS criteria    Heme/Onc:  No active concerns   - Monitor for s/sx of anemia such as bleeding and bruising   - Transfuse if Hgb <7.0   - Continue home eliquis   - Daily CBC    OBGYN/MSK:  No active concerns   - Padded pressure points   - PT/OT when appropriate    Ethics/Code Status:  - ICU skin protocol     :  DVT Prophylaxis: SCDs, Eliquis  GI Prophylaxis: none  Bowel Regimen: none  Diet: Cardiac diet  CVC: none  Adela: none  Cuellar: none  Restraints: none  Dispo: ICU    Critical Care Time:  40 minutes spent in preparing to see patient (I.e. review of medical records), evaluation of diagnostics (I.e. labs, imaging, etc.), documentation, discussing plan of care with patient/ family/ caregiver, and/ or coordination of care with multidisciplinary team. Time does not include completion of procedure time.    Plan discussed with Dr. Jose Salcedo PARonnyC

## 2024-07-10 NOTE — CARE PLAN
Problem: Mobility  Goal: Bed mobility including supine to sit and sit to supine independently.  Outcome: Progressing  Goal: Ambulate 125 feet with rolling walker mod independently.  Outcome: Progressing     Problem: Transfers  Goal: Transfers including sit to stand and stand to sit mod independently.  Outcome: Progressing

## 2024-07-10 NOTE — NURSING NOTE
0115: patient pulled CPAP mask off, intermittent expressive aphasia noted. PA-C Hipps notified and came to bedside. Orders received for stat CT scan.    0215: Patient returned to unit from CT scan. PA-C Hipps at bedside. Patient agitated, diaphoretic & attempting to climb out of bed. SpO2 86%. CPAP applied, 1mg ativan given per verbal order from PA Hipps. SpO2 100% post ativan dose.

## 2024-07-10 NOTE — PROGRESS NOTES
"Vickie Foster is a 68 y.o. female on day 2 of admission presenting with PAF (paroxysmal atrial fibrillation) (Multi).    Subjective   Had a neurological event although the CT scan was normal and she appears to be back to herself.  No further atrial fibrillation.  Awaiting further neurological input prior to discharge       Objective     Physical Exam  General: Alert, oriented to person, place, date/time, pleasant  HEENT: Normocephalic, eyes normal ears normal  Heart: Normal S1, S2 , rhythm regular, no rubs or gallops   Respiratory: Lungs clear to auscultation bilaterally, no rales, crackles, wheezes or rhonchi  Abdomen: Bowel sounds present in all quadrants  Extremities: No upper or lower extremity edema appreciated  Dermatology: Skin Normal  Genitourinary: Deferred    Last Recorded Vitals  Blood pressure 111/56, pulse 81, temperature 36.9 °C (98.4 °F), temperature source Axillary, resp. rate 22, height 1.575 m (5' 2\"), weight 111 kg (244 lb 0.1 oz), SpO2 94%.  Intake/Output last 3 Shifts:  I/O last 3 completed shifts:  In: 650 (5.9 mL/kg) [P.O.:150; IV Piggyback:500]  Out: 3290 (29.7 mL/kg) [Urine:3290 (0.8 mL/kg/hr)]  Weight: 110.7 kg     Relevant Results                             Assessment/Plan   Principal Problem:    PAF (paroxysmal atrial fibrillation) (Multi)    Post ablation with heart failure.  Continue diuresis and wean oxygen off.  Awaiting neurological consultation for further recommendations.  Continue anticoagulation.       I spent 30 minutes in the professional and overall care of this patient.      Amado Ty MD      "

## 2024-07-10 NOTE — CONSULTS
Inpatient consult to Neurology  Consult performed by: Mayank Bain MD  Consult ordered by: Rosanna Salcedo PA-C      Chief complaints: Acute onset of speech disturbances    History Of Present Illness  Vickie Foster is a 68 y.o. female presenting with acute onset of speech disturbances.  Patient is a 68-year-old female with history significant for atrial fibrillation status post Tikosyn and cardioversion, hypertension, COPD, TANVIR on CPAP, GERD, depression, anxiety, hyperuricemia who  underwent cardioversion for atrial fibrillation as an outpatient procedure.  However after completion of the procedure, patient developed acute dyspnea with diaphoresis and tachycardia and patient was admitted to the ICU.  Since admission, patient noticed at 1:30 AM that she has speech disturbances with trouble getting words out consistent with expressive aphasia.  Patient had a stroke alert initiated and telestroke was consulted.  After evaluation, patient was found not to be a candidate for any acute intervention as patient has been on anticoagulation with Eliquis.  A neurological consultation was requested for further evaluation and management.  Patient since this early morning when her symptoms started, noticed that her speech has gotten better with still some minor deficits with finding words.  No weakness, numbness, visual disturbances, dizziness or any other neurological symptoms or associated with the episode.     Past Medical History  She has a past medical history of A-fib (Multi), CHF (congestive heart failure) (Multi), COVID, HTN (hypertension), and Low back pain.    Surgical History  She has a past surgical history that includes Cardioversion; Cardiac electrophysiology procedure (N/A, 05/01/2024); and Cardiac electrophysiology procedure (N/A, 7/8/2024).     Social History  She reports that she has quit smoking. Her smoking use included cigarettes. She has never used smokeless tobacco. She reports current alcohol  use. She reports that she does not use drugs.     Family history: No significant family history is noted    Allergies  Patient has no known allergies.    Medications  Medications Prior to Admission   Medication Sig Dispense Refill Last Dose    allopurinol (Zyloprim) 300 mg tablet Take 1 tablet (300 mg) by mouth once daily. 90 tablet 1 7/8/2024    ascorbic acid (Vitamin C With Genevieve Hips) 1,000 mg tablet Take 1 tablet (1,000 mg) by mouth once daily.   7/8/2024    biotin 10 mg tablet Take 1 tablet (10 mg) by mouth once daily.   7/8/2024    budesonide-formoteroL (Symbicort) 80-4.5 mcg/actuation inhaler Inhale 2 puffs 2 times a day. Rinse mouth with water after use to reduce aftertaste and incidence of candidiasis. Do not swallow.   Past Month    buPROPion XL (Wellbutrin XL) 300 mg 24 hr tablet Take 1 tablet (300 mg) by mouth once daily in the morning. Take before meals. 90 tablet 1 7/8/2024    calcium carb-vitamin D3-vit K2 600 mg-1,000 unit-90 mcg tablet Take by mouth once daily.   7/8/2024    clobetasol (Temovate) 0.05 % cream Apply topically 2 times a day.   Past Week    docusate sodium (Colace) 100 mg capsule Take 1 capsule (100 mg) by mouth 2 times a day.   7/7/2024    dofetilide (Tikosyn) 250 mcg capsule Take 1 capsule (250 mcg) by mouth every 12 hours. 180 capsule 1 7/7/2024    Eliquis 5 mg tablet Take 1 tablet (5 mg) by mouth 2 times a day. 180 tablet 1 7/7/2024    folic acid/multivit-min/lutein (CENTRUM SILVER ORAL) Take by mouth. As directed   7/7/2024    gabapentin (Neurontin) 300 mg capsule Take 1 capsule (300 mg) by mouth 3 times a day. 270 capsule 1 7/8/2024    Klor-Con M20 20 mEq ER tablet TAKE 1 TABLET THREE TIMES A DAY WITH FOOD 270 tablet 3 7/8/2024    metOLazone (Zaroxolyn) 5 mg tablet Take 1 tablet (5 mg) by mouth once daily as needed.   7/8/2024    metoprolol succinate XL (Toprol-XL) 25 mg 24 hr tablet TAKE 1 TABLET BY MOUTH ONE TIME DAILY 90 tablet 3 7/8/2024    oxygen (O2) gas therapy Inhale 3  "L/min once daily at bedtime.   7/7/2024    semaglutide 2 mg/dose (8 mg/3 mL) pen injector Inject 2.3 mg under the skin 1 (one) time per week.   Past Week    torsemide (Demadex) 100 mg tablet TAKE ONE-HALF (1/2) TABLET TWICE A DAY 90 tablet 3 7/7/2024       Review of Systems  14 point review of systems was reviewed and negative except as noted above.    Neurological Exam  Physical Exam  Mental Status :  Alert , O x 3  Attention and concentration normal    Speech : Expressive aphasia noted    CN 2-12 :  Pupils round , regular reacting to light  Fundus could not be assessed  VA intact, EOM intact  Facial sensation intact  No facial asymmetry noted  Hearing acuity intact  bilaterally  Tongue midline  Shoulder shrug intact    Motor:  Strength moves all extremities against gravity  Normal bulk and tone    Sensory:  Intact to light touch    Reflexes : 1+  symmetric with equivocal toes    Coordination : Intact to Finger to Nose    Gait : Unable to assess.      Last Recorded Vitals   Blood pressure 114/57, pulse 81, temperature 36.9 °C (98.4 °F), temperature source Axillary, resp. rate 20, height 1.575 m (5' 2\"), weight 111 kg (244 lb 0.1 oz), SpO2 92%.    Relevant Results  XR chest 1 view    Result Date: 7/10/2024  Interpreted By:  Clif Benavidez, STUDY: XR CHEST 1 VIEW; 7/10/2024 5:57 am   INDICATION: CLINICAL INFORMATION: Signs/Symptoms:pulmonary edema. Paroxysmal atrial fibrillation.   COMPARISON: 07/09/2024   ACCESSION NUMBER(S): [id number]   ORDERING CLINICIAN: PAUL HERNANDEZ   TECHNIQUE: Portable chest one view.   FINDINGS: The cardiac silhouette is quite prominent suggesting cardiomegaly. Perihilar infiltrates are present along with small bilateral effusions. No alveolar consolidation is identified although evaluation pulmonary bases is limited by the patient's body habitus.       Probable cardiomegaly with bilateral infiltrates and effusions suggesting CHF and pulmonary edema.   MACRO: none   Signed by: Clif " Reema 7/10/2024 10:28 AM Dictation workstation:   [id number]    CT head wo IV contrast    Result Date: 7/10/2024  Interpreted By:  Arden Berkowitz, STUDY: CT HEAD WO IV CONTRAST;  7/10/2024 2:19 am   INDICATION: Signs/Symptoms:altered mental status.   COMPARISON: Head CT 08/11/2017   ACCESSION NUMBER(S): [id number]   ORDERING CLINICIAN: BENJAMIN WELCH   TECHNIQUE: Axial noncontrast CT images of the head.   FINDINGS: Motion during acquisition of the exam partially limits evaluation.   BRAIN PARENCHYMA: Mild periventricular and subcortical white matter hypoattenuation, likely sequelae of chronic ischemic microangiopathy. Gray-white matter interfaces are preserved. No mass effect or midline shift.   HEMORRHAGE: No acute intracranial hemorrhage. VENTRICLES and EXTRA-AXIAL SPACES: The ventricles and sulci are within normal limits in size for brain volume. No abnormal extraaxial fluid collection. EXTRACRANIAL SOFT TISSUES: Within normal limits. PARANASAL SINUSES/MASTOIDS:  The visualized paranasal sinuses and mastoid air cells are aerated. CALVARIUM: No depressed skull fracture. No destructive osseous lesion.   OTHER FINDINGS: None.       No acute intracranial abnormality.   Chronic changes as detailed in the body of the report.   MACRO: None   Signed by: Arden Berkowitz 7/10/2024 3:22 AM Dictation workstation:   [id number]    XR chest 1 view    Result Date: 7/9/2024  Interpreted By:  Daija Jane, STUDY: XR CHEST 1 VIEW;  7/9/2024 11:04 am   INDICATION: Signs/Symptoms:HF.   COMPARISON: 07/09/2024 at 5:24 a.m.; CT chest dated 06/27/2024   ACCESSION NUMBER(S): [id number]   ORDERING CLINICIAN: KIMBERLY DAVILA   FINDINGS: Resolution is limited due to the patient's size.   The heart may be somewhat enlarged.   There is a suggestion of bilateral faint infiltrates. The left costophrenic angle is not well defined.   There appear to be degenerative changes of the spine.   COMPARISON OF FINDING: The chest is similar.       Limited  exam. Question of faint parenchymal infiltrates.   MACRO: none   Signed by: Daija Jane 7/9/2024 12:14 PM Dictation workstation:   [id number]    XR chest 1 view    Result Date: 7/9/2024  Interpreted By:  Savita Dominguez, STUDY: XR CHEST 1 VIEW 7/9/2024 5:23 am   INDICATION: Signs/Symptoms:hypoxia, pulmonary edema   COMPARISON: 07/08/2024   ACCESSION NUMBER(S): [id number]   ORDERING CLINICIAN: MELLISSA MULLINS   TECHNIQUE: Single AP view chest   FINDINGS: Cardiomediastinal silhouette is moderately enlarged. There is interval improvement in alveolar airspace infiltrate demonstrated within bilateral lung fields in particular right upper lung zone with persistent cephalization and interstitial prominence with interstitial pulmonary edema. Hazy opacities in particular at the left lung base component of layering basilar effusion noted.             1. Improved alveolar airspace infiltrate with alveolar component of pulmonary edema. If there is persistent interstitial pulmonary edema suggested.   2. Mild left basilar effusion.   Signed by: Savita Dominguez 7/9/2024 9:17 AM Dictation workstation:   [id number] ,    Results for orders placed or performed during the hospital encounter of 07/08/24 (from the past 24 hour(s))   Basic Metabolic Panel   Result Value Ref Range    Glucose 111 (H) 65 - 99 mg/dL    Sodium 138 133 - 145 mmol/L    Potassium 3.7 3.4 - 5.1 mmol/L    Chloride 99 97 - 107 mmol/L    Bicarbonate 28 24 - 31 mmol/L    Urea Nitrogen 28 (H) 8 - 25 mg/dL    Creatinine 0.80 0.40 - 1.60 mg/dL    eGFR 81 >60 mL/min/1.73m*2    Calcium 8.6 8.5 - 10.4 mg/dL    Anion Gap 11 <=19 mmol/L   Magnesium   Result Value Ref Range    Magnesium 2.20 1.60 - 3.10 mg/dL   Phosphorus   Result Value Ref Range    Phosphorus 3.1 2.5 - 4.5 mg/dL   Magnesium   Result Value Ref Range    Magnesium 2.50 1.60 - 3.10 mg/dL   Phosphorus   Result Value Ref Range    Phosphorus 3.6 2.5 - 4.5 mg/dL   CBC and Auto Differential   Result Value Ref  Range    WBC 12.8 (H) 4.4 - 11.3 x10*3/uL    nRBC 0.0 0.0 - 0.0 /100 WBCs    RBC 4.18 4.00 - 5.20 x10*6/uL    Hemoglobin 12.7 12.0 - 16.0 g/dL    Hematocrit 40.9 36.0 - 46.0 %    MCV 98 80 - 100 fL    MCH 30.4 26.0 - 34.0 pg    MCHC 31.1 (L) 32.0 - 36.0 g/dL    RDW 15.0 (H) 11.5 - 14.5 %    Platelets 207 150 - 450 x10*3/uL    Neutrophils % 84.3 40.0 - 80.0 %    Immature Granulocytes %, Automated 0.4 0.0 - 0.9 %    Lymphocytes % 6.9 13.0 - 44.0 %    Monocytes % 7.8 2.0 - 10.0 %    Eosinophils % 0.5 0.0 - 6.0 %    Basophils % 0.1 0.0 - 2.0 %    Neutrophils Absolute 10.78 (H) 1.20 - 7.70 x10*3/uL    Immature Granulocytes Absolute, Automated 0.05 0.00 - 0.70 x10*3/uL    Lymphocytes Absolute 0.88 (L) 1.20 - 4.80 x10*3/uL    Monocytes Absolute 1.00 0.10 - 1.00 x10*3/uL    Eosinophils Absolute 0.06 0.00 - 0.70 x10*3/uL    Basophils Absolute 0.01 0.00 - 0.10 x10*3/uL   Basic Metabolic Panel   Result Value Ref Range    Glucose 123 (H) 65 - 99 mg/dL    Sodium 140 133 - 145 mmol/L    Potassium 3.5 3.4 - 5.1 mmol/L    Chloride 99 97 - 107 mmol/L    Bicarbonate 32 (H) 24 - 31 mmol/L    Urea Nitrogen 24 8 - 25 mg/dL    Creatinine 0.70 0.40 - 1.60 mg/dL    eGFR >90 >60 mL/min/1.73m*2    Calcium 9.4 8.5 - 10.4 mg/dL    Anion Gap 9 <=19 mmol/L          Assessment/Plan     Principal Problem:    PAF (paroxysmal atrial fibrillation) (Multi)    Patient is a 68-year-old female with history significant for atrial fibrillation status post Tikosyn and cardioversion, hypertension, COPD, TANVIR on CPAP, GERD, depression, anxiety, hyperuricemia who  underwent cardioversion for atrial fibrillation as an outpatient procedure and subsequent development of diaphoresis with dyspnea and tachycardia with new onset speech disturbances and given her history of atrial fibrillation rule out embolic CVA, stable with symptoms improving with some minor deficits with expressive aphasia noted.    Recommendations;  Reviewed CT scan brain and pending CT angiogram  head /neck results.  MRI brain pending  Continue Eliquis and High intensity statin  2 d echo  PT/OT eval and treatment  Speech eval and treatment  Neurochecks  Stroke labs including TSH, Hb A1c, Lipid Panel  Blood pressure / Blood sugar monitoring and control  Telemetry monitoring   Continue current management  Discussed the above plan with the patient / nurse

## 2024-07-10 NOTE — PROGRESS NOTES
Physical Therapy    Physical Therapy Treatment    Patient Name: Vickie Foster  MRN: [id number]  Today's Date: 7/10/2024  Time Calculation  Start Time: 1418  Stop Time: 1440  Time Calculation (min): 22 min    Assessment/Plan   PT Assessment  Rehab Prognosis: Good  Evaluation/Treatment Tolerance: Patient limited by fatigue  End of Session Communication: Bedside nurse  Assessment Comment: Progressing slowly with functional mobility;  tolerance to activity improving slowly;  fall risk  End of Session Patient Position: Up in chair, Alarm off, not on at start of session  PT Plan  Inpatient/Swing Bed or Outpatient: Inpatient  PT Plan  Treatment/Interventions: Bed mobility, Transfer training, Gait training, Stair training, Balance training, Endurance training, Therapeutic exercise, Therapeutic activity  PT Plan: Ongoing PT  PT Frequency: 6 times per week  PT Discharge Recommendations: Low intensity level of continued care  PT Recommended Transfer Status: Assist x1  PT - OK to Discharge: Yes (with skilled physical therapy services at next level of care)      General Visit Information:   PT  Visit  PT Received On: 07/10/24  General  Co-Treatment: OT  Co-Treatment Reason: Partial co-tx due to medical complexity ICU patient  Prior to Session Communication: Bedside nurse  Patient Position Received: Up in chair, Alarm off, not on at start of session  General Comment: RN cleared patient for treatment.  Patient reports agreeable to treatment.    Subjective   Precautions:  Precautions  Medical Precautions: Fall precautions  Vital Signs:  Vital Signs  SpO2: 93 %    Objective   Pain:  Pain Assessment  Pain Assessment: 0-10  0-10 (Numeric) Pain Score: 0 - No pain  Cognition:  Cognition  Overall Cognitive Status: Within Functional Limits  Coordination:  Movements are Fluid and Coordinated: No  Lower Body Coordination: slower rate of movement rakel LE  Postural Control:  Static Sitting Balance  Static Sitting-Balance Support: No upper  extremity supported  Static Sitting-Level of Assistance: Close supervision  Static Standing Balance  Static Standing-Balance Support: Bilateral upper extremity supported  Static Standing-Level of Assistance: Close supervision  Static Standing-Comment/Number of Minutes: Supervision with balance during static standing with rolling walker          Activity Tolerance:  Activity Tolerance  Endurance: Decreased tolerance for upright activites  Activity Tolerance Comments: Fatigue  Treatments:  Therapeutic Exercise  Therapeutic Exercise Performed: Yes  Therapeutic Exercise Activity 1: ankle pumps, sitting knee extension, sitting hip flexion 5 reps x 1 set              Bed Mobility  Bed Mobility: No    Ambulation/Gait Training  Ambulation/Gait Training Performed: Yes  Ambulation/Gait Training 1  Surface 1: Level tile  Device 1: Rolling walker  Assistance 1: Close supervision  Comments/Distance (ft) 1: 12 feet x 1, 45 feet x 2 with rolling walker and supervision with balance;  verbal cues for posture;  patient ambulates with slow una, reciprocating gait pattern, decreased step length rakel LE, and mild forward flexed posture.  Transfers  Transfer: Yes  Transfer 1  Transfer From 1: Sit to  Transfer to 1: Stand  Technique 1: Sit to stand  Transfer Device 1: Walker  Transfer Level of Assistance 1: Close supervision  Trials/Comments 1: x 2 reps; supervision with balance;  verbal cues for hand placement  Transfers 2  Transfer From 2: Stand to  Transfer to 2: Sit  Technique 2: Stand to sit  Transfer Device 2: Walker  Transfer Level of Assistance 2: Close supervision  Trials/Comments 2: x 2 reps;  supervision with balance;  verbal cues for hand placement    Stairs  Stairs: No         Outcome Measures:  [facility] Basic Mobility  Turning from your back to your side while in a flat bed without using bedrails: A little  Moving from lying on your back to sitting on the side of a flat bed without using bedrails: A little  Moving to and  from bed to chair (including a wheelchair): A little  Standing up from a chair using your arms (e.g. wheelchair or bedside chair): A little  To walk in hospital room: A little  Climbing 3-5 steps with railing: A lot  Basic Mobility - Total Score: 17    Education Documentation  No documentation found.  Education Comments  No comments found.        OP EDUCATION:       Encounter Problems       Encounter Problems (Active)       Mobility       Bed mobility including supine to sit and sit to supine independently. (Progressing)       Start:  07/09/24    Expected End:  07/23/24            Ambulate 125 feet with rolling walker mod independently. (Progressing)       Start:  07/09/24    Expected End:  07/23/24            Negotiate 6 steps with single handrail and mod assist.       Start:  07/09/24    Expected End:  07/23/24               Transfers       Transfers including sit to stand and stand to sit mod independently. (Progressing)       Start:  07/09/24    Expected End:  07/23/24

## 2024-07-10 NOTE — PROGRESS NOTES
Occupational Therapy    OT Treatment    Patient Name: Vickie Foster  MRN: [id number]  Today's Date: 7/10/2024  Time Calculation  Start Time: 1401  Stop Time: 1420  Time Calculation (min): 19 min        Assessment:  OT Assessment: Pt is demonstrating minimal functional progress towards her established goals, but is now demonstrating mild expressive aphasia symptoms. Continue POC.  Evaluation/Treatment Tolerance: Patient tolerated treatment well  End of Session Communication: Bedside nurse  End of Session Patient Position: Up in chair, Alarm off, caregiver present (Needs in reach and PT in with pt.)    Plan:  Treatment Interventions: ADL retraining, Functional transfer training, UE strengthening/ROM, Endurance training, Patient/family training, Equipment evaluation/education  OT Frequency: 3 times per week  OT Discharge Recommendations: Low intensity level of continued care  Equipment Recommended upon Discharge: Wheeled walker  OT Recommended Transfer Status: Assist of 1  OT - OK to Discharge: Yes      Subjective     Previous Visit Info:  OT Last Visit  OT Received On: 07/10/24    General:  General  Family/Caregiver Present: Yes  Caregiver Feedback: spouse present but left during treatment  Co-Treatment: PT  Co-Treatment Reason: Partial co-treat d/t medically complex ICU patient  Prior to Session Communication: Bedside nurse  Patient Position Received: Up in chair, Alarm off, not on at start of session  General Comment: Cleared by nurse prior to session and pt agreeable to OT treatment.    Precautions:  Medical Precautions: Fall precautions, Oxygen therapy device and L/min (6L 02)    Vital Signs:  Vital Signs  Heart Rate: 81  Heart Rate Source: Monitor  SpO2: 95 %  BP: 97/54    Pain:  Pain Assessment  Pain Assessment: 0-10  0-10 (Numeric) Pain Score: 0 - No pain    Objective      Cognition:  Cognition  Overall Cognitive Status: Within Functional Limits  Cognition Comments: Pt having intermittent episodes of word  finding issues during conversation, which nursing and doctors are aware of since yesterday.    Activities of Daily Living:   LE Dressing  LE Dressing: Yes  Adult Briefs Level of Assistance: Close supervision  LE Dressing Where Assessed: Toilet  LE Dressing Comments: Pt doffed soiled pullups and donned new pullups while seated on the ICU toilet with close S for safety.    Toileting  Toileting Level of Assistance: Close supervision  Where Assessed: Toilet  Toileting Comments: Pt completed her whole routine, including clothing management and pericare, with supervision for safety.    Bed Mobility/Transfers:      Transfers  Transfer: Yes  Transfer 1  Transfer From 1: Chair with arms to  Transfer to 1: Stand  Technique 1: Sit to stand, Stand to sit  Transfer Device 1: Walker  Transfer Level of Assistance 1: Close supervision  Trials/Comments 1: Supervision for safety and verbal cues as needed.    Toilet Transfers  Toilet Transfer From: Walker  Toilet Transfer Type: To and from  Toilet Transfer to: Standard toilet  Toilet Transfer Technique: Ambulating  Toilet Transfers: Contact guard, Supervision  Toilet Transfers Comments: Intermittent steadying assist for balance and verbal cues as needed.    Functional Mobility:  Functional Mobility  Functional Mobility Performed: Yes  Functional Mobility 1  Device 1: Rolling walker  Assistance 1: Contact guard  Comments 1: Pt tolerated functional mobility for a household distance using a RW with CGA for balance and supervision for safety.      Outcome Measures:[facility] Daily Activity  Putting on and taking off regular lower body clothing: A lot  Bathing (including washing, rinsing, drying): A little  Putting on and taking off regular upper body clothing: A little  Toileting, which includes using toilet, bedpan or urinal: A little  Taking care of personal grooming such as brushing teeth: None  Eating Meals: None  Daily Activity - Total Score: 19      Education Documentation  No  documentation found.  Education Comments  No comments found.      Goals:  Encounter Problems       Encounter Problems (Active)       OT Goals       ADLs (Progressing)       Start:  07/09/24    Expected End:  07/26/24       Pt will complete ADL tasks with Mod I, using AE as needed, in order to complete self-care tasks.           Functional mobility (Progressing)       Start:  07/09/24    Expected End:  07/26/24       Pt will perform functional mobility household distance at mod ind level with RW             Functional transfers (Progressing)       Start:  07/09/24    Expected End:  07/26/24       Pt will perform functional transfers at mod level with RW

## 2024-07-10 NOTE — TELECONSULT
HPI: Telestroke consult at 3:02 am  68 y.o. female admitted for AF ablation    LKW reported 12am, awoke 1:15 am aphasic    NIHSS reported- 3    Prior Functional Status (Modified Dalzell Scale):  0 No symptoms at all     Imaging Results:  CT Head: no hemorrhage or early infarct signs  CTA Head/Neck: n/a     Assessment:   Working Diagnosis: TIA/ Ischemic Stroke from description of acute onset and in setting of Afib intervention; less likely encephalopathy/ delirium        Recommendations:   Not eligible for TNK - rationale-on Eliquis  Not eligible for thrombectomy - rationale- low NIHSS     Additional Recommendations: discussed, to continue neurologic monitoring, evaluation and management.   Stroke best practices- Swallow eval prior to any PO.  Can continue eliquis therapy for prevention  Risk factor control- BP goal <130/80, Intensive statin to goal LDL < 70, Glucose < 180 mg/dl, smoking cessation.   Complete evaluation with MRI to confirm diagnosis delineate pattern of infarct and obtain vessel imaging (MRA head/neck)      Consult completed by: TELEPHONE communication was used to provide this telehealth service.  Time includes consultation with ED provider and extensive review of data- history, medical records, test results, and neuroimaging studies: 11 - 20 mins was spent in consultation

## 2024-07-10 NOTE — CARE PLAN
The patient's goals for the shift include      The clinical goals for the shift include remain hemodynamically stable    Over the shift, the patient did not make progress toward the following goals. Barriers to progression include pt having some expressive aphasia. Recommendations to address these barriers include monitor pt and labs.

## 2024-07-10 NOTE — PROGRESS NOTES
Anticipate discharge soon. TCC met with patient earlier and she was frustrated that she had expressive aphasia over night. We discussed discharge plans and patient is declining C. At the time of discharge, patient will return home with no skilled services. Will follow.      07/10/24 6388   Discharge Planning   Home or Post Acute Services None   Expected Discharge Disposition Home   Does the patient need discharge transport arranged? No   Patient expects to be discharged to: Home

## 2024-07-11 ENCOUNTER — APPOINTMENT (OUTPATIENT)
Dept: CARDIOLOGY | Facility: HOSPITAL | Age: 69
End: 2024-07-11
Payer: MEDICARE

## 2024-07-11 ENCOUNTER — APPOINTMENT (OUTPATIENT)
Dept: RADIOLOGY | Facility: HOSPITAL | Age: 69
End: 2024-07-11
Payer: MEDICARE

## 2024-07-11 LAB
ALBUMIN SERPL-MCNC: 3.8 G/DL (ref 3.5–5)
ANION GAP BLDV CALCULATED.4IONS-SCNC: -7 MMOL/L (ref 10–25)
ANION GAP SERPL CALC-SCNC: 11 MMOL/L
ANION GAP SERPL CALC-SCNC: 12 MMOL/L
BASE EXCESS BLDV CALC-SCNC: 23.2 MMOL/L (ref -2–3)
BASOPHILS # BLD AUTO: 0.02 X10*3/UL (ref 0–0.1)
BASOPHILS NFR BLD AUTO: 0.2 %
BODY TEMPERATURE: 37 DEGREES CELSIUS
BUN SERPL-MCNC: 27 MG/DL (ref 8–25)
BUN SERPL-MCNC: 30 MG/DL (ref 8–25)
CA-I BLDV-SCNC: 1.16 MMOL/L (ref 1.1–1.33)
CALCIUM SERPL-MCNC: 10 MG/DL (ref 8.5–10.4)
CALCIUM SERPL-MCNC: 9.7 MG/DL (ref 8.5–10.4)
CHLORIDE BLDV-SCNC: 94 MMOL/L (ref 98–107)
CHLORIDE SERPL-SCNC: 90 MMOL/L (ref 97–107)
CHLORIDE SERPL-SCNC: 91 MMOL/L (ref 97–107)
CHOLEST SERPL-MCNC: 186 MG/DL (ref 133–200)
CHOLEST/HDLC SERPL: 4 {RATIO}
CO2 SERPL-SCNC: 35 MMOL/L (ref 24–31)
CO2 SERPL-SCNC: 41 MMOL/L (ref 24–31)
CREAT SERPL-MCNC: 0.8 MG/DL (ref 0.4–1.6)
CREAT SERPL-MCNC: 0.9 MG/DL (ref 0.4–1.6)
EGFRCR SERPLBLD CKD-EPI 2021: 70 ML/MIN/1.73M*2
EGFRCR SERPLBLD CKD-EPI 2021: 81 ML/MIN/1.73M*2
EOSINOPHIL # BLD AUTO: 0.11 X10*3/UL (ref 0–0.7)
EOSINOPHIL NFR BLD AUTO: 1.2 %
ERYTHROCYTE [DISTWIDTH] IN BLOOD BY AUTOMATED COUNT: 14.6 % (ref 11.5–14.5)
EST. AVERAGE GLUCOSE BLD GHB EST-MCNC: 111 MG/DL
GLUCOSE BLD MANUAL STRIP-MCNC: 140 MG/DL (ref 74–99)
GLUCOSE BLDV-MCNC: 106 MG/DL (ref 74–99)
GLUCOSE SERPL-MCNC: 110 MG/DL (ref 65–99)
GLUCOSE SERPL-MCNC: 98 MG/DL (ref 65–99)
HBA1C MFR BLD: 5.5 %
HCO3 BLDV-SCNC: 48.8 MMOL/L (ref 22–26)
HCT VFR BLD AUTO: 44.1 % (ref 36–46)
HCT VFR BLD EST: 43 % (ref 36–46)
HDLC SERPL-MCNC: 46 MG/DL
HGB BLD-MCNC: 14.1 G/DL (ref 12–16)
HGB BLDV-MCNC: 14.4 G/DL (ref 12–16)
IMM GRANULOCYTES # BLD AUTO: 0.02 X10*3/UL (ref 0–0.7)
IMM GRANULOCYTES NFR BLD AUTO: 0.2 % (ref 0–0.9)
INHALED O2 CONCENTRATION: 40 %
LACTATE BLDV-SCNC: 1.7 MMOL/L (ref 0.4–2)
LDLC SERPL CALC-MCNC: 119 MG/DL (ref 65–130)
LYMPHOCYTES # BLD AUTO: 1.36 X10*3/UL (ref 1.2–4.8)
LYMPHOCYTES NFR BLD AUTO: 14.9 %
MAGNESIUM SERPL-MCNC: 1.9 MG/DL (ref 1.6–3.1)
MAGNESIUM SERPL-MCNC: 2.4 MG/DL (ref 1.6–3.1)
MCH RBC QN AUTO: 30.4 PG (ref 26–34)
MCHC RBC AUTO-ENTMCNC: 32 G/DL (ref 32–36)
MCV RBC AUTO: 95 FL (ref 80–100)
MONOCYTES # BLD AUTO: 0.89 X10*3/UL (ref 0.1–1)
MONOCYTES NFR BLD AUTO: 9.8 %
NEUTROPHILS # BLD AUTO: 6.71 X10*3/UL (ref 1.2–7.7)
NEUTROPHILS NFR BLD AUTO: 73.7 %
NRBC BLD-RTO: 0 /100 WBCS (ref 0–0)
OXYHGB MFR BLDV: 97.6 % (ref 45–75)
PCO2 BLDV: 52 MM HG (ref 41–51)
PH BLDV: 7.58 PH (ref 7.33–7.43)
PHOSPHATE SERPL-MCNC: 4.1 MG/DL (ref 2.5–4.5)
PHOSPHATE SERPL-MCNC: 4.5 MG/DL (ref 2.5–4.5)
PLATELET # BLD AUTO: 264 X10*3/UL (ref 150–450)
PO2 BLDV: 170 MM HG (ref 35–45)
POTASSIUM BLDV-SCNC: 3.7 MMOL/L (ref 3.5–5.3)
POTASSIUM SERPL-SCNC: 3.2 MMOL/L (ref 3.4–5.1)
POTASSIUM SERPL-SCNC: 3.8 MMOL/L (ref 3.4–5.1)
RBC # BLD AUTO: 4.64 X10*6/UL (ref 4–5.2)
SAO2 % BLDV: 99 % (ref 45–75)
SODIUM BLDV-SCNC: 132 MMOL/L (ref 136–145)
SODIUM SERPL-SCNC: 138 MMOL/L (ref 133–145)
SODIUM SERPL-SCNC: 142 MMOL/L (ref 133–145)
TRIGL SERPL-MCNC: 107 MG/DL (ref 40–150)
TSH SERPL DL<=0.05 MIU/L-ACNC: 2.5 MIU/L (ref 0.27–4.2)
WBC # BLD AUTO: 9.1 X10*3/UL (ref 4.4–11.3)

## 2024-07-11 PROCEDURE — 94640 AIRWAY INHALATION TREATMENT: CPT

## 2024-07-11 PROCEDURE — 70551 MRI BRAIN STEM W/O DYE: CPT

## 2024-07-11 PROCEDURE — 2500000004 HC RX 250 GENERAL PHARMACY W/ HCPCS (ALT 636 FOR OP/ED)

## 2024-07-11 PROCEDURE — 2060000001 HC INTERMEDIATE ICU ROOM DAILY

## 2024-07-11 PROCEDURE — 84132 ASSAY OF SERUM POTASSIUM: CPT

## 2024-07-11 PROCEDURE — 9420000001 HC RT PATIENT EDUCATION 5 MIN

## 2024-07-11 PROCEDURE — 82947 ASSAY GLUCOSE BLOOD QUANT: CPT

## 2024-07-11 PROCEDURE — 70551 MRI BRAIN STEM W/O DYE: CPT | Performed by: RADIOLOGY

## 2024-07-11 PROCEDURE — 2500000001 HC RX 250 WO HCPCS SELF ADMINISTERED DRUGS (ALT 637 FOR MEDICARE OP): Performed by: REGISTERED NURSE

## 2024-07-11 PROCEDURE — 2500000005 HC RX 250 GENERAL PHARMACY W/O HCPCS

## 2024-07-11 PROCEDURE — 99232 SBSQ HOSP IP/OBS MODERATE 35: CPT

## 2024-07-11 PROCEDURE — 36415 COLL VENOUS BLD VENIPUNCTURE: CPT

## 2024-07-11 PROCEDURE — 83036 HEMOGLOBIN GLYCOSYLATED A1C: CPT

## 2024-07-11 PROCEDURE — 93005 ELECTROCARDIOGRAM TRACING: CPT

## 2024-07-11 PROCEDURE — 80061 LIPID PANEL: CPT

## 2024-07-11 PROCEDURE — 2500000001 HC RX 250 WO HCPCS SELF ADMINISTERED DRUGS (ALT 637 FOR MEDICARE OP)

## 2024-07-11 PROCEDURE — 93306 TTE W/DOPPLER COMPLETE: CPT

## 2024-07-11 PROCEDURE — 84443 ASSAY THYROID STIM HORMONE: CPT

## 2024-07-11 PROCEDURE — 85025 COMPLETE CBC W/AUTO DIFF WBC: CPT

## 2024-07-11 PROCEDURE — 2500000005 HC RX 250 GENERAL PHARMACY W/O HCPCS: Performed by: INTERNAL MEDICINE

## 2024-07-11 PROCEDURE — 83735 ASSAY OF MAGNESIUM: CPT

## 2024-07-11 PROCEDURE — 93306 TTE W/DOPPLER COMPLETE: CPT | Performed by: INTERNAL MEDICINE

## 2024-07-11 PROCEDURE — 84100 ASSAY OF PHOSPHORUS: CPT

## 2024-07-11 PROCEDURE — 2500000002 HC RX 250 W HCPCS SELF ADMINISTERED DRUGS (ALT 637 FOR MEDICARE OP, ALT 636 FOR OP/ED)

## 2024-07-11 PROCEDURE — 80048 BASIC METABOLIC PNL TOTAL CA: CPT

## 2024-07-11 PROCEDURE — 93010 ELECTROCARDIOGRAM REPORT: CPT | Performed by: INTERNAL MEDICINE

## 2024-07-11 RX ORDER — SPIRONOLACTONE 25 MG/1
12.5 TABLET ORAL DAILY
Status: DISCONTINUED | OUTPATIENT
Start: 2024-07-12 | End: 2024-07-12 | Stop reason: HOSPADM

## 2024-07-11 RX ORDER — MAGNESIUM SULFATE HEPTAHYDRATE 40 MG/ML
2 INJECTION, SOLUTION INTRAVENOUS ONCE
Status: COMPLETED | OUTPATIENT
Start: 2024-07-11 | End: 2024-07-11

## 2024-07-11 RX ORDER — POTASSIUM CHLORIDE 20 MEQ/1
40 TABLET, EXTENDED RELEASE ORAL ONCE
Status: COMPLETED | OUTPATIENT
Start: 2024-07-11 | End: 2024-07-11

## 2024-07-11 RX ORDER — POTASSIUM CHLORIDE 14.9 MG/ML
20 INJECTION INTRAVENOUS
Status: DISCONTINUED | OUTPATIENT
Start: 2024-07-11 | End: 2024-07-11

## 2024-07-11 ASSESSMENT — PAIN - FUNCTIONAL ASSESSMENT
PAIN_FUNCTIONAL_ASSESSMENT: 0-10

## 2024-07-11 ASSESSMENT — PAIN SCALES - GENERAL
PAINLEVEL_OUTOF10: 0 - NO PAIN

## 2024-07-11 NOTE — CARE PLAN
Problem: Respiratory  Goal: Clear secretions with interventions this shift  Outcome: Progressing  Goal: Minimize anxiety/maximize coping throughout shift  Outcome: Progressing  Goal: Minimal/no exertional discomfort or dyspnea this shift  Outcome: Progressing  Goal: No signs of respiratory distress (eg. Use of accessory muscles. Peds grunting)  Outcome: Progressing  Goal: Patent airway maintained this shift  Outcome: Progressing  Goal: Tolerate mechanical ventilation evidenced by VS/agitation level this shift  Outcome: Progressing  Goal: Tolerate pulmonary toileting this shift  Outcome: Progressing  Goal: Verbalize decreased shortness of breath this shift  Outcome: Progressing  Goal: Wean oxygen to maintain O2 saturation per order/standard this shift  Outcome: Progressing  Goal: Increase self care and/or family involvement in next 24 hours  Outcome: Progressing     Problem: Skin  Goal: Decreased wound size/increased tissue granulation at next dressing change  Outcome: Progressing  Flowsheets (Taken 7/10/2024 0531 by Amisha Whitney RN)  Decreased wound size/increased tissue granulation at next dressing change:   Protective dressings over bony prominences   Utilize specialty bed per algorithm   Promote sleep for wound healing  Goal: Participates in plan/prevention/treatment measures  Outcome: Progressing  Flowsheets (Taken 7/10/2024 0531 by Amisha Whitney RN)  Participates in plan/prevention/treatment measures: Elevate heels  Goal: Prevent/manage excess moisture  Outcome: Progressing  Flowsheets (Taken 7/10/2024 0531 by Amisha Whitney RN)  Prevent/manage excess moisture:   Moisturize dry skin   Monitor for/manage infection if present   Use wicking fabric (obtain order)  Goal: Prevent/minimize sheer/friction injuries  Outcome: Progressing  Flowsheets (Taken 7/10/2024 0531 by Amisha Whitney RN)  Prevent/minimize sheer/friction injuries:   HOB 30 degrees or less   Utilize specialty bed per algorithm   Increase  activity/out of bed for meals   Use pull sheet  Goal: Promote/optimize nutrition  Outcome: Progressing  Flowsheets (Taken 7/10/2024 0531 by Amisha Whitney RN)  Promote/optimize nutrition:   Consume > 50% meals/supplements   Offer water/supplements/favorite foods   Monitor/record intake including meals  Goal: Promote skin healing  Outcome: Progressing  Flowsheets (Taken 7/10/2024 0531 by Amisha Whitney RN)  Promote skin healing:   Assess skin/pad under line(s)/device(s)   Ensure correct size (line/device) and apply per  instructions   Rotate device position/do not position patient on device     Problem: Fall/Injury  Goal: Not fall by end of shift  Outcome: Progressing  Goal: Be free from injury by end of the shift  Outcome: Progressing  Goal: Verbalize understanding of personal risk factors for fall in the hospital  Outcome: Progressing  Goal: Verbalize understanding of risk factor reduction measures to prevent injury from fall in the home  Outcome: Progressing  Goal: Use assistive devices by end of the shift  Outcome: Progressing  Goal: Pace activities to prevent fatigue by end of the shift  Outcome: Progressing     Problem: Pain  Goal: Takes deep breaths with improved pain control throughout the shift  Outcome: Progressing  Goal: Turns in bed with improved pain control throughout the shift  Outcome: Progressing  Goal: Walks with improved pain control throughout the shift  Outcome: Progressing  Goal: Performs ADL's with improved pain control throughout shift  Outcome: Progressing  Goal: Participates in PT with improved pain control throughout the shift  Outcome: Progressing  Goal: Free from opioid side effects throughout the shift  Outcome: Progressing  Goal: Free from acute confusion related to pain meds throughout the shift  Outcome: Progressing           The patient's goals for the shift include      The clinical goals for the shift include remain hd stable, tolerate MRI

## 2024-07-11 NOTE — PROGRESS NOTES
Patient not medically clear. Patient to have neurosurgery tele consult. At the time of discharge, patient will return home. Patient declining [facility]. Will continue to follow.      07/11/24 4343   Discharge Planning   Home or Post Acute Services None   Expected Discharge Disposition Home   Does the patient need discharge transport arranged? No   Patient expects to be discharged to: Home

## 2024-07-11 NOTE — PROGRESS NOTES
"Vickie Foster is a 68 y.o. female on day 3 of admission presenting with PAF (paroxysmal atrial fibrillation) (Multi).    Subjective   Feels much improved, not aware in atrial fibrillation less shortness of breath       Objective     Physical Exam  Gen: A+O x 3, no acute distress  HEENT: Normocephalic/atraumatic pupils equal react light  Neck: No JVD, upstrokes and volumes nl, no bruits   Lung: CTA, nl AP diameter  CV:  nl sounding S1, S2, no murmur, PMI nondisplaced  Abdomen: soft, non tender, + BS x 4   Extremities: warm to touch, palpable pulses bilaterally, no edema   Neuro: no neurologic deficits        Last Recorded Vitals  Blood pressure 103/60, pulse 83, temperature 37.3 °C (99.1 °F), temperature source Oral, resp. rate 19, height 1.575 m (5' 2\"), weight 111 kg (244 lb 0.1 oz), SpO2 92%.  Intake/Output last 3 Shifts:  I/O last 3 completed shifts:  In: 1760 (15.9 mL/kg) [P.O.:1440; IV Piggyback:320]  Out: 3515 (31.8 mL/kg) [Urine:3515 (0.9 mL/kg/hr)]  Weight: 110.7 kg     Relevant Results  Results for orders placed or performed during the hospital encounter of 07/08/24 (from the past 24 hour(s))   Renal function panel   Result Value Ref Range    Glucose 191 (H) 65 - 99 mg/dL    Sodium 138 133 - 145 mmol/L    Potassium 3.4 3.4 - 5.1 mmol/L    Chloride 91 (L) 97 - 107 mmol/L    Bicarbonate 33 (H) 24 - 31 mmol/L    Urea Nitrogen 27 (H) 8 - 25 mg/dL    Creatinine 0.80 0.40 - 1.60 mg/dL    eGFR 81 >60 mL/min/1.73m*2    Calcium 10.0 8.5 - 10.4 mg/dL    Phosphorus 3.1 2.5 - 4.5 mg/dL    Albumin 4.0 3.5 - 5.0 g/dL    Anion Gap 14 <=19 mmol/L   Magnesium   Result Value Ref Range    Magnesium 1.90 1.60 - 3.10 mg/dL   Magnesium   Result Value Ref Range    Magnesium 1.90 1.60 - 3.10 mg/dL   Phosphorus   Result Value Ref Range    Phosphorus 4.5 2.5 - 4.5 mg/dL   CBC and Auto Differential   Result Value Ref Range    WBC 9.1 4.4 - 11.3 x10*3/uL    nRBC 0.0 0.0 - 0.0 /100 WBCs    RBC 4.64 4.00 - 5.20 x10*6/uL    Hemoglobin " 14.1 12.0 - 16.0 g/dL    Hematocrit 44.1 36.0 - 46.0 %    MCV 95 80 - 100 fL    MCH 30.4 26.0 - 34.0 pg    MCHC 32.0 32.0 - 36.0 g/dL    RDW 14.6 (H) 11.5 - 14.5 %    Platelets 264 150 - 450 x10*3/uL    Neutrophils % 73.7 40.0 - 80.0 %    Immature Granulocytes %, Automated 0.2 0.0 - 0.9 %    Lymphocytes % 14.9 13.0 - 44.0 %    Monocytes % 9.8 2.0 - 10.0 %    Eosinophils % 1.2 0.0 - 6.0 %    Basophils % 0.2 0.0 - 2.0 %    Neutrophils Absolute 6.71 1.20 - 7.70 x10*3/uL    Immature Granulocytes Absolute, Automated 0.02 0.00 - 0.70 x10*3/uL    Lymphocytes Absolute 1.36 1.20 - 4.80 x10*3/uL    Monocytes Absolute 0.89 0.10 - 1.00 x10*3/uL    Eosinophils Absolute 0.11 0.00 - 0.70 x10*3/uL    Basophils Absolute 0.02 0.00 - 0.10 x10*3/uL   Basic Metabolic Panel   Result Value Ref Range    Glucose 110 (H) 65 - 99 mg/dL    Sodium 142 133 - 145 mmol/L    Potassium 3.2 (L) 3.4 - 5.1 mmol/L    Chloride 90 (L) 97 - 107 mmol/L    Bicarbonate 41 (H) 24 - 31 mmol/L    Urea Nitrogen 27 (H) 8 - 25 mg/dL    Creatinine 0.80 0.40 - 1.60 mg/dL    eGFR 81 >60 mL/min/1.73m*2    Calcium 10.0 8.5 - 10.4 mg/dL    Anion Gap 11 <=19 mmol/L   TSH   Result Value Ref Range    Thyroid Stimulating Hormone 2.50 0.27 - 4.20 mIU/L   Hemoglobin A1c   Result Value Ref Range    Hemoglobin A1C 5.5 See below %    Estimated Average Glucose 111 Not Established mg/dL   Lipid panel   Result Value Ref Range    Cholesterol 186 133 - 200 mg/dL    HDL-Cholesterol 46.0 (L) >50.0 mg/dL    Cholesterol/HDL Ratio 4.0 SEE COMMENT    LDL Calculated 119 65 - 130 mg/dL    Triglycerides 107 40 - 150 mg/dL   POCT GLUCOSE   Result Value Ref Range    POCT Glucose 140 (H) 74 - 99 mg/dL   Transthoracic Echo (TTE) Complete   Result Value Ref Range    BSA 2.2 m2   Renal function panel   Result Value Ref Range    Glucose 98 65 - 99 mg/dL    Sodium 138 133 - 145 mmol/L    Potassium 3.8 3.4 - 5.1 mmol/L    Chloride 91 (L) 97 - 107 mmol/L    Bicarbonate 35 (H) 24 - 31 mmol/L    Urea  "Nitrogen 30 (H) 8 - 25 mg/dL    Creatinine 0.90 0.40 - 1.60 mg/dL    eGFR 70 >60 mL/min/1.73m*2    Calcium 9.7 8.5 - 10.4 mg/dL    Phosphorus 4.1 2.5 - 4.5 mg/dL    Albumin 3.8 3.5 - 5.0 g/dL    Anion Gap 12 <=19 mmol/L   Magnesium   Result Value Ref Range    Magnesium 2.40 1.60 - 3.10 mg/dL   BLOOD GAS VENOUS FULL PANEL   Result Value Ref Range    POCT pH, Venous 7.58 (H) 7.33 - 7.43 pH    POCT pCO2, Venous 52 (H) 41 - 51 mm Hg    POCT pO2, Venous 170 (H) 35 - 45 mm Hg    POCT SO2, Venous 99 (H) 45 - 75 %    POCT Oxy Hemoglobin, Venous 97.6 (H) 45.0 - 75.0 %    POCT Hematocrit Calculated, Venous 43.0 36.0 - 46.0 %    POCT Sodium, Venous 132 (L) 136 - 145 mmol/L    POCT Potassium, Venous 3.7 3.5 - 5.3 mmol/L    POCT Chloride, Venous 94 (L) 98 - 107 mmol/L    POCT Ionized Calicum, Venous 1.16 1.10 - 1.33 mmol/L    POCT Glucose, Venous 106 (H) 74 - 99 mg/dL    POCT Lactate, Venous 1.7 0.4 - 2.0 mmol/L    POCT Base Excess, Venous 23.2 (H) -2.0 - 3.0 mmol/L    POCT HCO3 Calculated, Venous 48.8 (H) 22.0 - 26.0 mmol/L    POCT Hemoglobin, Venous 14.4 12.0 - 16.0 g/dL    POCT Anion Gap, Venous -7.0 (L) 10.0 - 25.0 mmol/L    Patient Temperature 37.0 degrees Celsius    FiO2 40 %     MRI Brain   IMPRESSION:  1. The posterior left insula and adjacent temporal lobe/operculum has  a patchy area of acute/subacute ischemia with an additional focus of  ischemia more superiorly in the left lateral parietal lobe. These may  be secondary to embolus. No hemorrhage is noted.    2. The white matter elsewhere has a few nonspecific foci of  hyperintensity which may be secondary to chronic microvascular  ischemic or other etiologies.    3. The sella turcica is enlarged with flattening of the gland along  the floor of the sella consistent with \"empty sella\".    Assessment/Plan   Principal Problem:    PAF (paroxysmal atrial fibrillation) (Multi)    Acute respiratory failure post atrial fibrillation ablation in the setting elevated filling " pressures, acidosis with subsequent flash pulmonary edema acidosis once back in sinus rhythm.  -Has diuresed greater than a liter on a daily basis with much improvement, potassium aggressively supplemented.  -Echocardiogram continues to have preserved left ventricular function with diastolic abnormal relaxation.  -Neurologic event on 7/10 is since resolved MRI with ischemic white matter possibly secondary to emboli also incidental small mass finding, continues to be on apixaban, high intensity statin  -Reverted to AF rates reasonable, would continue dofetilide/bb, would not attempt any cvn in light of recent neurologic issues,  may also convert back to sinus spontaneously, currently early w/ 3 mos post RFA blanking, scarring period  -In terms of medications would continue w/diuresis per intensivist  team though would probably eliminate metolazone was only using on an every few month basis.  May benefit from spironolactone to aid diuresis and K+ sparing. Could consider adding SGLT2 though uncertain interaction with Ozempic and glycemic levels  -Wean oxygen as able,  maintain saturation > 92%, continue with activity  Will assess for discharge tomorrow    D/w Dr. Ty and intensivist team      I spent 45 minutes in the professional and overall care of this patient.      Minnie Mcfarlane, KEVON-CNP

## 2024-07-11 NOTE — PROGRESS NOTES
Occupational Therapy                 Therapy Communication Note    Patient Name: Vickie Foster  MRN: [id number]  Today's Date: 7/11/2024     Discipline: Occupational Therapy    Missed Visit Reason: Patient refused (Pt politely declining any skilled therapy treatment this afternoon due to anti-anxiety meds from her MRI earlier making her feel lethargic/groggy.)

## 2024-07-11 NOTE — PROGRESS NOTES
Physical Therapy                 Therapy Communication Note    Patient Name: Vickie Foster  MRN: [id number]  Today's Date: 7/11/2024     Discipline: Physical Therapy    Missed Visit Reason: Missed Visit Reason: Other (Comment) (Patient reports feeling effects of meds administered prior to MRI;  requesting rest at this time.)    Missed Time: Attempt

## 2024-07-11 NOTE — PROGRESS NOTES
[facility] Medicine       Date:  7/11/2024  Patient:  Vickie Foster  YOB: 1955  MRN:  [id number]   Admit Date:  7/8/2024  ========================================================================================================    No chief complaint on file.        History of Present Illness:  Vickie Foster is a 68 y.o. year old female patient with past medical history of afib s/p tikosyn and cardioversion 5/1/24, pHTN, COPD, TANVIR with cpap at night, HFpEF, HTN, GERD, depression, anxiety, morbid obesity, hyperuricemia, with recent weight gain of 5-6lbs in a few days, who presented to  for outpatient afib ablation with Dr Ty. Of note, she recently had a cardioversion 5/1/24 and she converted back to afib at the end of June. After the procedure was completed, she developed acute dyspnea, tachycardia, HTN, and diaphoresis. Vital signs post-ablation were HR 96 (NSR), /74, RR 26, SpO2 86-92% on NRB 15L 100%. She was given 10mg hydralazine and 20mg lasix and diuresed 500mL prior to arrival to the floor. She was then brought to ICU for management of respiratory distress. Stat labs and CXR ordered. Bedside POCUS and CXR consistent with pulmonary edema. She was given another 60mg of IV lasix and 40mEq K. ABG showed respiratory acidosis 7.28/78/82/36. She was placed on bipap by RT.       Interval ICU Events:  7/8: Pt brought to ICU, stat labs obtained and stat echo ordered. ABG showed respiratory acidosis and she was placed on bipap. Will give another dose of lasix d/t vascular congestion.     7/9: No acute events overnight. Continue diauresing today and restart home diuretic medicine.    7/10: Overnight, pt experienced expressive aphasia. CT head negative and night PA spoke with Dr. Walters from tele-stroke and no interventions were indicated at this time. Pt became agitated following the CT scan and received a 1 mg dose of Ativan. Neurology consulted. Restarted all home diuretics,  will reassess this afternoon if patient would benefit from dose of lasix.     7/11: Aphasia resolved this am. K 3.2 this am, replaced. Back into Afib, rate control (70s-80s). No episodes of aphasia last night. Increased bicarb (41), held home diuretics. May need adjustment to home regimen.   Afternoon update: MRI showed acute/subacute ischemia in the posterior left insula and adjacent temporal lobe/operculum. Discussed with Neurology, no current concerns, will continue current regimen. CT scan showed 9 mm possible meningioma. Discussed with Neurosurgery (Dr. Carney), no acute concerns and can follow up outpatient.     Medical History:  Past Medical History:   Diagnosis Date    A-fib (Multi)     CHF (congestive heart failure) (Multi)     COVID     HTN (hypertension)     Low back pain      Past Surgical History:   Procedure Laterality Date    CARDIAC ELECTROPHYSIOLOGY PROCEDURE N/A 05/01/2024    Procedure: Cardioversion;  Surgeon: Amado Ty MD;  Location: [facility] Cardiac Cath Lab;  Service: Electrophysiology;  Laterality: N/A;  88555  i48.0  medical [place]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    CARDIAC ELECTROPHYSIOLOGY PROCEDURE N/A 7/8/2024    Procedure: Ablation A-Fib;  Surgeon: Amado Ty MD;  Location: [facility] Cardiac Cath Lab;  Service: Electrophysiology;  Laterality: N/A;  AFIB CRYOABLATION; CPT 65631; ICD I48.0   Medicare [employer] of [place] medicare  ID - [id number]  [phone]  No Auth required  Call ref # [id number]    CARDIOVERSION      05/01/2024     Medications Prior to Admission   Medication Sig Dispense Refill Last Dose    allopurinol (Zyloprim) 300 mg tablet Take 1 tablet (300 mg) by mouth once daily. 90 tablet 1 7/8/2024    ascorbic acid (Vitamin C With Genevieve Hips) 1,000 mg tablet Take 1 tablet (1,000 mg) by mouth once daily.   7/8/2024    biotin 10 mg tablet Take 1 tablet (10 mg) by mouth once daily.   7/8/2024    budesonide-formoteroL (Symbicort) 80-4.5 mcg/actuation inhaler Inhale 2 puffs 2 times a day. Rinse  mouth with water after use to reduce aftertaste and incidence of candidiasis. Do not swallow.   Past Month    buPROPion XL (Wellbutrin XL) 300 mg 24 hr tablet Take 1 tablet (300 mg) by mouth once daily in the morning. Take before meals. 90 tablet 1 7/8/2024    calcium carb-vitamin D3-vit K2 600 mg-1,000 unit-90 mcg tablet Take by mouth once daily.   7/8/2024    clobetasol (Temovate) 0.05 % cream Apply topically 2 times a day.   Past Week    docusate sodium (Colace) 100 mg capsule Take 1 capsule (100 mg) by mouth 2 times a day.   7/7/2024    dofetilide (Tikosyn) 250 mcg capsule Take 1 capsule (250 mcg) by mouth every 12 hours. 180 capsule 1 7/7/2024    Eliquis 5 mg tablet Take 1 tablet (5 mg) by mouth 2 times a day. 180 tablet 1 7/7/2024    folic acid/multivit-min/lutein (CENTRUM SILVER ORAL) Take by mouth. As directed   7/7/2024    gabapentin (Neurontin) 300 mg capsule Take 1 capsule (300 mg) by mouth 3 times a day. 270 capsule 1 7/8/2024    Klor-Con M20 20 mEq ER tablet TAKE 1 TABLET THREE TIMES A DAY WITH FOOD 270 tablet 3 7/8/2024    metOLazone (Zaroxolyn) 5 mg tablet Take 1 tablet (5 mg) by mouth once daily as needed.   7/8/2024    metoprolol succinate XL (Toprol-XL) 25 mg 24 hr tablet TAKE 1 TABLET BY MOUTH ONE TIME DAILY 90 tablet 3 7/8/2024    oxygen (O2) gas therapy Inhale 3 L/min once daily at bedtime.   7/7/2024    semaglutide 2 mg/dose (8 mg/3 mL) pen injector Inject 2.3 mg under the skin 1 (one) time per week.   Past Week    torsemide (Demadex) 100 mg tablet TAKE ONE-HALF (1/2) TABLET TWICE A DAY 90 tablet 3 7/7/2024     Patient has no known allergies.  Social History     Tobacco Use    Smoking status: Former     Types: Cigarettes    Smokeless tobacco: Never   Vaping Use    Vaping status: Never Used   Substance Use Topics    Alcohol use: Yes     Comment: monthly or less    Drug use: Never     Family History   Problem Relation Name Age of Onset    Breast cancer Mother      Heart attack Mother      Heart  disease Mother      Colon cancer Father      Breast cancer Sister      Diabetes type II Sister      Kidney failure Sister      Other (herione addict) Sister      No Known Problems Brother      No Known Problems Daughter      Other (recovering alcoholic) Son      Breast cancer Other Maternal Aunt        Hospital Medications:           Current Facility-Administered Medications:     acetaminophen (Tylenol) tablet 650 mg, 650 mg, oral, q6h PRN, Rosanna Salcedo PA-C, 650 mg at 07/11/24 0420    allopurinol (Zyloprim) tablet 100 mg, 100 mg, oral, q AM, Rhianna Elena, 100 mg at 07/10/24 0938    allopurinol (Zyloprim) tablet 200 mg, 200 mg, oral, Nightly, Rhianna [place], 200 mg at 07/10/24 2015    apixaban (Eliquis) tablet 5 mg, 5 mg, oral, BID, IBIS Heller, 5 mg at 07/10/24 2014    atorvastatin (Lipitor) tablet 40 mg, 40 mg, oral, Nightly, Rosanna Salcedo PA-C, 40 mg at 07/10/24 2015    buPROPion XL (Wellbutrin XL) 24 hr tablet 300 mg, 300 mg, oral, Daily before breakfast, IBIS Heller, 300 mg at 07/11/24 0650    dofetilide (Tikosyn) capsule 250 mcg, 250 mcg, oral, q12h, IBIS Heller, 250 mcg at 07/11/24 0650    gabapentin (Neurontin) capsule 300 mg, 300 mg, oral, TID, IBIS Heller, 300 mg at 07/10/24 2100    hydrALAZINE (Apresoline) injection 10 mg, 10 mg, intravenous, q4h PRN, Philippe Seth PA-C    ipratropium-albuteroL (Duo-Neb) 0.5-2.5 mg/3 mL nebulizer solution 3 mL, 3 mL, nebulization, TID, Haroldo Carvalho PA-C, 3 mL at 07/10/24 1928    lidocaine 4 % patch 2 patch, 2 patch, transdermal, Daily, Reynaldo Hahn PA-C, 2 patch at 07/09/24 0922    magnesium sulfate 2 g in sterile water for injection 50 mL, 2 g, intravenous, Once, Philippe Seth PA-C    melatonin tablet 5 mg, 5 mg, oral, Nightly, Rosanna Salcedo PA-C, 5 mg at 07/10/24 2014    metOLazone (Zaroxolyn) tablet 5 mg, 5 mg, oral, Daily, Haroldo Carvalho PA-C, 5 mg at 07/10/24 0938    metoprolol succinate XL  (Toprol-XL) 24 hr tablet 25 mg, 25 mg, oral, Daily, Minnie Mcfarlane, APRN-CNP, 25 mg at 07/10/24 0938    OLANZapine (ZyPREXA) injection 5 mg, 5 mg, intramuscular, q4h PRN, Philippe Seth PA-C    oxygen (O2) therapy, , inhalation, Continuous PRN - O2/gases, Amado Ty MD    oxygen (O2) therapy, , inhalation, BID, Amado Ty MD, 3 L/min at 07/10/24 1928    potassium chloride 20 mEq in sterile water for injection 100 mL, 20 mEq, intravenous, q2h, Philippe Seth PA-C, Last Rate: 50 mL/hr at 07/11/24 0650, 20 mEq at 07/11/24 0650    torsemide (Demadex) tablet 50 mg, 50 mg, oral, BID, Rosanna Salcedo PA-C, 50 mg at 07/10/24 2014    Review of Systems:  14 point review of systems was completed and negative except for those specially mention in my HPI    Physical Exam:    Heart Rate:  [65-91]   Temp:  [36.9 °C (98.4 °F)-37 °C (98.6 °F)]   Resp:  [14-26]   BP: ()/()   Weight:  [111 kg (244 lb 0.1 oz)]   SpO2:  [90 %-96 %]     Physical Exam  Constitutional:       Appearance: Normal appearance. She is obese.   HENT:      Nose: Nose normal.      Mouth/Throat:      Mouth: Mucous membranes are moist.      Pharynx: Oropharynx is clear.   Cardiovascular:      Rate and Rhythm: Normal rate. Rhythm irregular.      Pulses: Normal pulses.      Heart sounds: Normal heart sounds.      Comments: Afib  Pulmonary:      Effort: Pulmonary effort is normal.      Breath sounds: Normal breath sounds.   Abdominal:      General: Abdomen is flat. Bowel sounds are normal.   Musculoskeletal:         General: Normal range of motion.   Skin:     Capillary Refill: Capillary refill takes 2 to 3 seconds.   Neurological:      General: No focal deficit present.      Mental Status: She is alert and oriented to person, place, and time.   Psychiatric:         Mood and Affect: Mood normal.         Behavior: Behavior normal.         Objective:    I have reviewed all medications, laboratory results, and imaging pertinent for today's  encounter.    FiO2 (%):  [32 %] 32 %      Intake/Output Summary (Last 24 hours) at 7/11/2024 0712  Last data filed at 7/11/2024 0500  Gross per 24 hour   Intake 1560 ml   Output 2550 ml   Net -990 ml       Recent Imaging  XR chest 1 view   Final Result   Probable cardiomegaly with bilateral infiltrates and effusions   suggesting CHF and pulmonary edema.        MACRO:   none        Signed by: Clif Benavidez 7/10/2024 10:28 AM   Dictation workstation:   [id number]      CT head wo IV contrast   Final Result   No acute intracranial abnormality.        Chronic changes as detailed in the body of the report.        MACRO:   None        Signed by: Arden Berkowitz 7/10/2024 3:22 AM   Dictation workstation:   [id number]      XR chest 1 view   Final Result   Limited exam. Question of faint parenchymal infiltrates.        MACRO:   none        Signed by: Daija Jane 7/9/2024 12:14 PM   Dictation workstation:   [id number]      XR chest 1 view   Final Result   1. Improved alveolar airspace infiltrate with alveolar component of   pulmonary edema. If there is persistent interstitial pulmonary edema   suggested.        2. Mild left basilar effusion.        Signed by: Savita Dominguez 7/9/2024 9:17 AM   Dictation workstation:   [id number]      Transthoracic Echo (TTE) Complete   Final Result      XR chest 1 view   Final Result   Bilateral infiltrates and probable effusions raising the possibility   of pulmonary edema and CHF. Follow-up to assure clearing is suggested.        MACRO:   none        Signed by: Clif Benavidez 7/8/2024 4:38 PM   Dictation workstation:   [id number]      Electrophysiology procedure   Final Result      CT angio head and neck w and wo IV contrast    (Results Pending)   Transthoracic Echo (TTE) Complete    (Results Pending)   MR brain wo IV contrast    (Results Pending)       Transthoracic Echo (TTE) Complete    Result Date: 7/8/2024           [address]  [address]            Phone [phone] TRANSTHORACIC ECHOCARDIOGRAM REPORT Patient Name:     SOURAV MIN      Reading Phys[facility]   [id number] [facility] Study Date:       7/8/2024             Ordering Provider:   [id number] MELLISSA ANASTACIA                                                             SUSANNA MRN/PID:          [id number]             Fellow: Accession#:       [id number]         Nurse: Date of           1955 / 68      Sonographer:         Dalila Kaba RDCS Birth/Age:        years Gender:           F                    Additional Staff: Height:           157.48 cm            Admit Date:          7/8/2024 Weight:           113.85 kg            Admission Status:    Inpatient - STAT BSA / BMI:        2.11 m2 / 45.91      Department Location: [facility]                   kg/m2 Blood Pressure: 161 /96 mmHg Study Type:    TRANSTHORACIC ECHO (TTE) COMPLETE Diagnosis/ICD: Dyspnea, unspecified-R06.00 Indication:    dypsnea post afib ablation, r/o pericardial disease CPT Codes:     Echo Complete w Full Doppler-[id number] Patient History: Pertinent History: HTN, CHF, cardiomegaly, coronary arteriosclerosis, AFIB,                    PHTN, COPD, GERD. Study Detail: The following Echo studies were performed: 2D, M-Mode, Doppler and               color flow.  PHYSICIAN INTERPRETATION: Left Ventricle: The left ventricular systolic function is normal, with a visually estimated ejection fraction of 65-70%. There are no regional wall motion abnormalities. The left ventricular cavity size is normal. Spectral Doppler shows an impaired relaxation pattern of left ventricular diastolic filling. Left Atrium: The left atrium is moderately dilated. Right Ventricle: The right ventricle is normal in size. There is normal right ventricular global systolic function. Right Atrium: The right atrium is mildly dilated. Aortic Valve: The aortic valve is trileaflet. The aortic valve dimensionless index is 0.87. There is no evidence of aortic valve  regurgitation. The peak instantaneous gradient of the aortic valve is 18.6 mmHg. The mean gradient of the aortic valve is 8.4 mmHg. Mitral Valve: The mitral valve is normal in structure. There is mild to moderate mitral valve regurgitation. Tricuspid Valve: The tricuspid valve is structurally normal. There is mild to moderate tricuspid regurgitation. Pulmonic Valve: The pulmonic valve is not well visualized. The pulmonic valve regurgitation was not well visualized. Pericardium: There is no pericardial effusion noted. Aorta: The aortic root is normal.  CONCLUSIONS:  1. The left ventricular systolic function is normal, with a visually estimated ejection fraction of 65-70%.  2. Spectral Doppler shows an impaired relaxation pattern of left ventricular diastolic filling.  3. There is normal right ventricular global systolic function.  4. The left atrium is moderately dilated.  5. Mild to moderate mitral valve regurgitation.  6. Mild to moderate tricuspid regurgitation. QUANTITATIVE DATA SUMMARY: 2D MEASUREMENTS:                           Normal Ranges: IVSd:          1.45 cm    (0.6-1.1cm) LVPWd:         1.14 cm    (0.6-1.1cm) LVIDd:         4.75 cm    (3.9-5.9cm) LVIDs:         3.05 cm LV Mass Index: 114.6 g/m2 LV % FS        35.7 % LA VOLUME:                               Normal Ranges: LA Vol A4C:        144.8 ml   (22+/-6mL/m2) LA Vol A2C:        183.3 ml LA Vol BP:         179.1 ml LA Vol Index A4C:  68.8 ml/m2 LA Vol Index A2C:  87.1 ml/m2 LA Vol Index BP:   85.1 ml/m2 LA Area A4C:       38.5 cm2 LA Area A2C:       39.4 cm2 LA Major Axis A4C: 8.7 cm LA Major Axis A2C: 7.2 cm LA Volume Index:   85.0 ml/m2 LA Vol A4C:        144.0 ml LA Vol A2C:        184.0 ml RA VOLUME BY A/L METHOD:                               Normal Ranges: RA Vol A4C:        97.3 ml    (8.3-19.5ml) RA Vol Index A4C:  46.2 ml/m2 RA Area A4C:       28.9 cm2 RA Major Axis A4C: 7.3 cm AORTA MEASUREMENTS:                      Normal Ranges: Ao  Sinus, d: 2.90 cm (2.1-3.5cm) Ao STJ, d:   2.60 cm (1.7-3.4cm) Asc Ao, d:   3.30 cm (2.1-3.4cm) LV SYSTOLIC FUNCTION BY 2D PLANIMETRY (MOD):                      Normal Ranges: EF-A4C View:    62 % (>=55%) EF-A2C View:    64 % EF-Biplane:     63 % EF-Visual:      68 % LV EF Reported: 68 % LV DIASTOLIC FUNCTION:                         Normal Ranges: MV Peak E:    1.24 m/s  (0.7-1.2 m/s) MV Peak A:    0.01 m/s  (0.42-0.7 m/s) E/A Ratio:    106.77    (1.0-2.2) MV e'         0.109 m/s (>8.0) MV lateral e' 0.17 m/s MV medial e'  0.05 m/s E/e' Ratio:   11.36     (<8.0) MITRAL VALVE:                 Normal Ranges: MV DT: 134 msec (150-240msec) AORTIC VALVE:                                    Normal Ranges: AoV Vmax:                2.15 m/s  (<=1.7m/s) AoV Peak P.6 mmHg (<20mmHg) AoV Mean P.4 mmHg  (1.7-11.5mmHg) LVOT Max Sean:            1.64 m/s  (<=1.1m/s) AoV VTI:                 31.64 cm  (18-25cm) LVOT VTI:                27.54 cm LVOT Diameter:           2.04 cm   (1.8-2.4cm) AoV Area, VTI:           2.83 cm2  (2.5-5.5cm2) AoV Area,Vmax:           2.48 cm2  (2.5-4.5cm2) AoV Dimensionless Index: 0.87  RIGHT VENTRICLE: RV Basal 4.58 cm RV Major 7.5 cm TAPSE:   19.5 mm RV s'    0.15 m/s TRICUSPID VALVE/RVSP:                             Normal Ranges: Peak TR Velocity: 3.62 m/s RV Syst Pressure: 55.4 mmHg (< 30mmHg) IVC Diam:         2.41 cm AORTA: Asc Ao Diam 3.35 cm  [id number] [facility] Electronically signed on 2024 at 4:24:40 PM  ** Final **     Transthoracic echo (TTE) complete    Result Date: 2024   [facility], [address] [place] [address]            Tel [phone] and Fax [phone] TRANSTHORACIC ECHOCARDIOGRAM REPORT  Patient Name:      SOURAV Oakley Physician:    [id number] Demetrius Guerrero MD Study Date:        2024            Ordering Provider:    [id number] AGA DOMINGUEZ                                                                SHANNAN MRN/PID:           [id number]             Fellow: Accession#:        [id number]         Nurse: Date of Birth/Age: 1955 / 68      Sonographer:          Price parks                                      TERE Gender:            F                    Additional Staff: Height:            157.48 cm            Admit Date: Weight:            112.49 kg            Admission Status:     Outpatient BSA / BMI:         2.09 m2 / 45.36      Encounter#:           [id number]                    kg/m2 Blood Pressure:    138/84 mmHg          Department Location:  [facility] Study Type:    TRANSTHORACIC ECHO (TTE) COMPLETE Diagnosis/ICD: Paroxysmal atrial fibrillation-I48.0; Other forms of                dyspnea-R06.09 Indication:    afib, MORLEY CPT Code:      Echo Complete w Full Doppler-15009 Patient History: Pertinent History: HTN, diastolic HF, cardiomegaly, afib, PHTN, CHF,MORLEY, morbid                    obesity. Study Detail: The following Echo studies were performed: M-Mode, 2D, Doppler and               color flow. Technically challenging study due to body habitus,               poor acoustic windows and prominent lung artifact.  PHYSICIAN INTERPRETATION: Left Ventricle: Left ventricular ejection fraction is normal, by visual estimate at 60-65%. There are no regional wall motion abnormalities. The left ventricular cavity size is normal. There is mild to moderate concentric left ventricular hypertrophy. Left ventricular diastolic filling was not assessed. Left Atrium: The left atrium is moderate to severely dilated. Right Ventricle: The right ventricle is normal in size. There is normal right ventriclar wall thickness. There is normal right ventricular global systolic function. Right Atrium: The right atrium is normal in size. Aortic Valve: The aortic valve appears structurally normal. The aortic valve appears tricuspid and  non-restricted. There is no evidence of aortic valve regurgitation. The peak instantaneous gradient of the aortic valve is 8.7 mmHg. Mitral Valve: The mitral valve is normal in structure. There is normal mitral valve leaflet mobility. There is trace to mild mitral valve regurgitation. Tricuspid Valve: The tricuspid valve is structurally normal. There is normal tricuspid valve leaflet mobility. There is mild tricuspid regurgitation. The Doppler estimated RVSP is mildly elevated at 43.1 mmHg. Pulmonic Valve: The pulmonic valve is structurally normal. There is trace pulmonic valve regurgitation. Pericardium: There is no pericardial effusion noted. Aorta: The aortic root is normal. The Ao Sinus is 3.70 cm. The Asc Ao is 3.50 cm. There is no dilatation of the aortic arch. There is no dilatation of the ascending aorta. There is no dilatation of the aortic root. There is plaque visualized in the ascending aorta, which is classified as a Grade 2 [mild (focal or diffuse) intimal thickening of 2-3 mm] atherosclerosis. Pulmonary Artery: The pulmonary artery is normal in size. The tricuspid regurgitant velocity is 3.17 m/s, and with an estimated right atrial pressure of 3 mmHg, the estimated pulmonary artery pressure is mildly elevated with the RVSP at 43.1 mmHg. The estimated PASP is 43 mmHg. Systemic Veins: The inferior vena cava appears to be of normal size. There is IVC inspiratory collapse greater than 50%.  CONCLUSIONS:  1. Left ventricular ejection fraction is normal, by visual estimate at 60-65%.  2. There is normal right ventricular global systolic function.  3. The left atrium is moderate to severely dilated.  4. Trace to mild mitral valve regurgitation.  5. Mild tricuspid regurgitation visualized.  6. Mildly elevated RVSP.  7. The estimated PASP is 43 mmHg.  8. There is plaque visualized in the ascending aorta. QUANTITATIVE DATA SUMMARY: 2D MEASUREMENTS:                          Normal Ranges: Ao Root d:     3.70 cm    (2.0-3.7cm) LAs:           6.06 cm   (2.7-4.0cm) IVSd:          1.27 cm   (0.6-1.1cm) LVPWd:         1.09 cm   (0.6-1.1cm) LVIDd:         4.22 cm   (3.9-5.9cm) LVIDs:         3.15 cm LV Mass Index: 83.8 g/m2 LV % FS        25.2 % LA VOLUME:                                Normal Ranges: LA Vol A4C:        147.1 ml    (22+/-6mL/m2) LA Vol A2C:        216.2 ml LA Vol BP:         180.4 ml LA Vol Index A4C:  70.2 ml/m2 LA Vol Index A2C:  103.2 ml/m2 LA Vol Index BP:   86.1 ml/m2 LA Area A4C:       38.8 cm2 LA Area A2C:       46.5 cm2 LA Major Axis A4C: 8.7 cm LA Major Axis A2C: 8.5 cm LA Volume Index:   86.1 ml/m2 LA Vol A4C:        132.5 ml LA Vol A2C:        210.3 ml RA VOLUME BY A/L METHOD:                       Normal Ranges: RA Area A4C: 26.6 cm2 AORTA MEASUREMENTS:                      Normal Ranges: Ao Sinus, d: 3.70 cm (2.1-3.5cm) Asc Ao, d:   3.50 cm (2.1-3.4cm) LV SYSTOLIC FUNCTION BY 2D PLANIMETRY (MOD):                      Normal Ranges: EF-A4C View:    62 % (>=55%) EF-A2C View:    64 % EF-Biplane:     64 % EF-Visual:      63 % LV EF Reported: 63 % LV DIASTOLIC FUNCTION:                     Normal Ranges: MV Peak E: 0.85 m/s (0.7-1.2 m/s) AORTIC VALVE:                         Normal Ranges: AoV Vmax:      1.47 m/s (<=1.7m/s) AoV Peak P.7 mmHg (<20mmHg) LVOT Max Sean:  0.94 m/s (<=1.1m/s) LVOT VTI:      17.61 cm LVOT Diameter: 2.25 cm  (1.8-2.4cm) AoV Area,Vmax: 2.53 cm2 (2.5-4.5cm2)  RIGHT VENTRICLE: RV Basal 4.40 cm TAPSE:   13.0 mm RV s'    0.09 m/s TRICUSPID VALVE/RVSP:                             Normal Ranges: Peak TR Velocity: 3.17 m/s RV Syst Pressure: 43.1 mmHg (< 30mmHg) IVC Diam:         1.80 cm AORTA: Asc Ao Diam 3.51 cm  [id number] Demetrius Guerrero MD Electronically signed on 2024 at 12:31:59 PM  ** Final **      Assessment/Plan:    I am currently managing this critically ill patient for the following problems:    Neuro/Psych/Pain Ctrl/Sedation:  Stroke like symptoms-aphasia  Hx depression,  anxiety   - CT head negative. Not a candidate for TNK d/t Eliquis.  - Per Neurology, CT head/neck and MRI. Check TSH, Hgb A1c and lipid panel  - Expressive aphasia resolved this am  - Pain Control: tylenol PRN   - Continue home wellbutrin and Gabapentin  - CAM ICU qshift, negative  - Sleep-wake hygiene  - Delirium precautions     Respiratory/ENT:  Acute hypoxic respiratory failure 2/2 flash pulmonary edema   Dyspnea s/p cardiac ablation   Hx COPD, TANVIR  - Supplemental O2: 6L HFNC. Increased due to going down to MRI and worried about SOB  - CPAP at night    - Diuresed 2.5 L yesterday  - Duonebs TID  - Wean O2 as tolerated to maintain SpO2 >92%  - Continuous pulse ox monitoring   - Pulm hygiene    Cardiovascular:  Hx chronic diasotlic HF, HTN, HLD   Persistent atrial fibrillation s/p ablation   - Afib since 1700 yesterday, rate controlled  - Stat Echo: EF 65-70%, spectral Doppler shows an impaired relaxation pattern of left ventricular diastolic filling, normal right ventricular global systolic function, left atrium is moderately dilated, mild to moderate mitral valve regurgitation and mild to moderate tricuspid regurgitation.  - CXR 7/11: showed less pulmonary edema compared to the day before  - Continue statin d/t stroke like symptoms  - Continue home tikosyn, metoprolol   - Continue home eliquis  - Coags unremarkable  - Continuous cardiac monitoring per ICU protocol  - Maintain MAPs >65  - EKGs PRN for ACS symptoms, arrhythmias     GI:  Hx GERD  - Diet: Cardiac diet  - BR: none    Renal/Volume Status (Intra & Extravascular):  Hypervolemia   Hypokalemia -replaced this am  Hypomagnesia-replaced this am   Hyperuricemia   Contraction Alkalosis  - Increases bicarb and hypokalemic, held home diuretics (Metolazone and Torsemide) this am  - Re-check BMP this afternoon  - Check VBG  - If continued hypokalemia, may need home diuretic medicine regimen adjusted  - Baseline Cr 0.9, currently 0.80  - Negative 2.5 L yesterday with  home diuretics  - Continue home allopurinol  - Maintain urine output 0.5-1.0cc/kg/hr  - Hourly I/O's  - Replete electrolytes to maintain K >4.0 and Mg >2.0  - Daily BMP, Mg, Phos    Endocrine  Hyperglycemia - improved  - Consider adding SSI if glucose >180  - Hypoglycemia protocol PRN   - Hold home Semaglutide    Infectious Disease:  Leukocytosis - improved  - 12.3->12.8->9.1. Will continue to monitor.  - Antibiotics: not indicated at this time   - Monitor SIRS criteria    Heme/Onc:  No active concerns   - Monitor for s/sx of anemia such as bleeding and bruising   - Transfuse if Hgb <7.0   - Continue home eliquis   - Daily CBC    OBGYN/MSK:  No active concerns   - Padded pressure points   - PT/OT when appropriate    Ethics/Code Status:  - ICU skin protocol     :  DVT Prophylaxis: SCDs, Eliquis  GI Prophylaxis: none  Bowel Regimen: none  Diet: Cardiac diet  CVC: none  Adela: none  Ceullar: none  Restraints: none  Dispo: Transfer to floor if continued to wean O2 requirements once back from MRI    Critical Care Time:  40 minutes spent in preparing to see patient (I.e. review of medical records), evaluation of diagnostics (I.e. labs, imaging, etc.), documentation, discussing plan of care with patient/ family/ caregiver, and/ or coordination of care with multidisciplinary team. Time does not include completion of procedure time.    Plan discussed with Dr. Jose Salcedo, [place]

## 2024-07-11 NOTE — PROGRESS NOTES
Vickie Foster is a 68 y.o. female on day 3 of admission presenting with PAF (paroxysmal atrial fibrillation) (Multi).      Subjective   Patient seen and examined.  More alert and interactive.  States her speech has significantly improved since yesterday.  Family at bedside.  MRI consistent with acute to subacute CVA and incidental meningioma noted for which the results were discussed with the patient.       Medications  Medications Prior to Admission   Medication Sig Dispense Refill Last Dose    allopurinol (Zyloprim) 300 mg tablet Take 1 tablet (300 mg) by mouth once daily. 90 tablet 1 7/8/2024    ascorbic acid (Vitamin C With Genevieve Hips) 1,000 mg tablet Take 1 tablet (1,000 mg) by mouth once daily.   7/8/2024    biotin 10 mg tablet Take 1 tablet (10 mg) by mouth once daily.   7/8/2024    budesonide-formoteroL (Symbicort) 80-4.5 mcg/actuation inhaler Inhale 2 puffs 2 times a day. Rinse mouth with water after use to reduce aftertaste and incidence of candidiasis. Do not swallow.   Past Month    buPROPion XL (Wellbutrin XL) 300 mg 24 hr tablet Take 1 tablet (300 mg) by mouth once daily in the morning. Take before meals. 90 tablet 1 7/8/2024    calcium carb-vitamin D3-vit K2 600 mg-1,000 unit-90 mcg tablet Take by mouth once daily.   7/8/2024    clobetasol (Temovate) 0.05 % cream Apply topically 2 times a day.   Past Week    docusate sodium (Colace) 100 mg capsule Take 1 capsule (100 mg) by mouth 2 times a day.   7/7/2024    dofetilide (Tikosyn) 250 mcg capsule Take 1 capsule (250 mcg) by mouth every 12 hours. 180 capsule 1 7/7/2024    Eliquis 5 mg tablet Take 1 tablet (5 mg) by mouth 2 times a day. 180 tablet 1 7/7/2024    folic acid/multivit-min/lutein (CENTRUM SILVER ORAL) Take by mouth. As directed   7/7/2024    gabapentin (Neurontin) 300 mg capsule Take 1 capsule (300 mg) by mouth 3 times a day. 270 capsule 1 7/8/2024    Klor-Con M20 20 mEq ER tablet TAKE 1 TABLET THREE TIMES A DAY WITH FOOD 270 tablet 3  "7/8/2024    metOLazone (Zaroxolyn) 5 mg tablet Take 1 tablet (5 mg) by mouth once daily as needed.   7/8/2024    metoprolol succinate XL (Toprol-XL) 25 mg 24 hr tablet TAKE 1 TABLET BY MOUTH ONE TIME DAILY 90 tablet 3 7/8/2024    oxygen (O2) gas therapy Inhale 3 L/min once daily at bedtime.   7/7/2024    semaglutide 2 mg/dose (8 mg/3 mL) pen injector Inject 2.3 mg under the skin 1 (one) time per week.   Past Week    torsemide (Demadex) 100 mg tablet TAKE ONE-HALF (1/2) TABLET TWICE A DAY 90 tablet 3 7/7/2024       Review of Systems  14 point review of systems was reviewed and negative except as noted above.    Objective     Last Recorded Vitals  Blood pressure 103/60, pulse 83, temperature 37.3 °C (99.1 °F), temperature source Oral, resp. rate 19, height 1.575 m (5' 2\"), weight 111 kg (244 lb 0.1 oz), SpO2 92%.      Neurological Exam  Physical Exam  Mental Status :  Alert , O x 3  Attention and concentration normal     Speech : Expressive aphasia improving since yesterday     CN 2-12 :  Pupils round , regular reacting to light  Fundus could not be assessed  VA intact, EOM intact  Facial sensation intact  No facial asymmetry noted  Hearing acuity intact  bilaterally  Tongue midline  Shoulder shrug intact     Motor:  Strength moves all extremities against gravity  Normal bulk and tone     Sensory:  Intact to light touch     Reflexes : 1+  symmetric with equivocal toes     Coordination : Intact to Finger to Nose     Gait : Unable to assess.    Relevant Results  MR brain wo IV contrast    Result Date: 7/11/2024  Interpreted By:  Agus Lynn, STUDY: MR BRAIN WO IV CONTRAST;  7/11/2024 10:29 am   INDICATION: Signs/Symptoms:Patient with history of atrial fibrillation with new onset speech disturbances.  Rule out embolic CVA.   COMPARISON: 07/10/2024 CT   ACCESSION NUMBER(S): [id number]   ORDERING CLINICIAN: NIRAV MELARA   TECHNIQUE: Axial T2, FLAIR, DWI, gradient echo T2 and sagittal and coronal T1 weighted images " "of brain were acquired.   FINDINGS: CSF Spaces: Ventricles and sulci are normal, unchanged.   Parenchyma: The left cerebral hemisphere has scattered foci of acute ischemia with restricted diffusion most prominent along the posterior left insula extending to the posterior anterior left temporal lobe and insular cortex. An additional focus is noted more posteriorly and superiorly in the lateral left parietal lobe. The findings along the posterior left temporal lobe are also FLAIR hyperintense. No hemorrhage or mass is noted.   The cerebral white matter elsewhere has a few nonspecific foci of FLAIR hyperintensity.   The pituitary gland is flattened along the sella turcica; the sella turcica is enlarged to 17 mm AP dimension.   Paranasal Sinuses and Mastoids: Visualized paranasal sinuses and mastoid air cells are unremarkable.       1. The posterior left insula and adjacent temporal lobe/operculum has a patchy area of acute/subacute ischemia with an additional focus of ischemia more superiorly in the left lateral parietal lobe. These may be secondary to embolus. No hemorrhage is noted.   2. The white matter elsewhere has a few nonspecific foci of hyperintensity which may be secondary to chronic microvascular ischemic or other etiologies.   3. The sella turcica is enlarged with flattening of the gland along the floor of the sella consistent with \"empty sella\".   MACRO: Critical Finding:  See findings. Notification was initiated on 7/11/2024 at 10:38 am by  Agus Lynn.  (**-OCF-**)   Signed by: Augs Lynn 7/11/2024 10:38 AM Dictation workstation:   [id number]    CT angio head and neck w and wo IV contrast    Result Date: 7/11/2024  Interpreted By:  Agus Lynn, STUDY: CT ANGIO HEAD AND NECK W AND WO IV CONTRAST;  7/10/2024 6:52 pm   INDICATION: Signs/Symptoms:Patient with acute onset of speech disturbances.  Rule out embolic CVA.   COMPARISON: None.   ACCESSION NUMBER(S): [id number]   ORDERING CLINICIAN: NIRAV" KELTON   TECHNIQUE: Unenhanced CT images of the head were obtained. Subsequently, 75 ML of Omnipaque 350 was administered intravenously and axial images of the head and neck were acquired.  Coronal, sagittal, and 3-D reconstructions were provided for review. Carotid stenoses were determined using modified NASCET criteria.   FINDINGS: No new acute infarct, hemorrhage or intraparenchymal brain mass is noted.   The dura just to the left of the superior sagittal sinus in the region of the posterior left frontal lobe has a 9 mm partially calcified enhancing mass.   The main pulmonary artery is dilated to 4.4 cm.   CTA HEAD FINDINGS:   Anterior circulation: The bilateral intracranial internal carotid arteries, bilateral carotid terminals, bilateral proximal anterior and middle cerebral arteries are normal.   Posterior circulation: Bilateral intracranial vertebral arteries, vertebrobasilar junction, basilar artery and proximal posterior cerebral arteries are normal.   CTA NECK FINDINGS:   Right carotid vessels: The common carotid artery is normal. The carotid bifurcation is normal. The cervical segment of the right internal carotid artery is markedly tortuous distally there is 0% stenosis  .   Left carotid vessels: The common carotid artery is normal. The carotid bifurcation is normal. The mid cervical segment of the left internal carotid artery is markedly tortuous. There is 0% stenosis  .   Vertebral vessels:  The distal cervical segments of the vertebral arteries are tortuous with mild beading bilaterally. No significant stenoses are noted.         1. The dura has a 9 mm enhancing partially calcified mass just to the left of the superior sagittal sinus along the posterior left frontal lobe consistent with meningioma.   2. The distal cervical segments of the vertebral arteries are ectatic with mild beading. The mid to distal cervical segments of the internal carotid arteries are also markedly tortuous. These findings  can be seen with fibromuscular dysplasia.   3. No significant stenoses of the cervical carotid/vertebral arteries or intracranial arteries are noted.   MACRO: Critical Finding:  See findings. Notification was initiated on 7/11/2024 at 10:16 am by  Agus Lynn.  (**-OCF-**)   Signed by: Agus Lynn 7/11/2024 10:16 AM Dictation workstation:   [id number]    XR chest 1 view    Result Date: 7/10/2024  Interpreted By:  Clif Benavidez, STUDY: XR CHEST 1 VIEW; 7/10/2024 5:57 am   INDICATION: CLINICAL INFORMATION: Signs/Symptoms:pulmonary edema. Paroxysmal atrial fibrillation.   COMPARISON: 07/09/2024   ACCESSION NUMBER(S): [id number]   ORDERING CLINICIAN: PAUL HERNANDEZ   TECHNIQUE: Portable chest one view.   FINDINGS: The cardiac silhouette is quite prominent suggesting cardiomegaly. Perihilar infiltrates are present along with small bilateral effusions. No alveolar consolidation is identified although evaluation pulmonary bases is limited by the patient's body habitus.       Probable cardiomegaly with bilateral infiltrates and effusions suggesting CHF and pulmonary edema.   MACRO: none   Signed by: Clif Benavidez 7/10/2024 10:28 AM Dictation workstation:   [id number]    CT head wo IV contrast    Result Date: 7/10/2024  Interpreted By:  Arden Berkowitz, STUDY: CT HEAD WO IV CONTRAST;  7/10/2024 2:19 am   INDICATION: Signs/Symptoms:altered mental status.   COMPARISON: Head CT 08/11/2017   ACCESSION NUMBER(S): [id number]   ORDERING CLINICIAN: BENJAMIN WELCH   TECHNIQUE: Axial noncontrast CT images of the head.   FINDINGS: Motion during acquisition of the exam partially limits evaluation.   BRAIN PARENCHYMA: Mild periventricular and subcortical white matter hypoattenuation, likely sequelae of chronic ischemic microangiopathy. Gray-white matter interfaces are preserved. No mass effect or midline shift.   HEMORRHAGE: No acute intracranial hemorrhage. VENTRICLES and EXTRA-AXIAL SPACES: The ventricles and sulci are within normal  limits in size for brain volume. No abnormal extraaxial fluid collection. EXTRACRANIAL SOFT TISSUES: Within normal limits. PARANASAL SINUSES/MASTOIDS:  The visualized paranasal sinuses and mastoid air cells are aerated. CALVARIUM: No depressed skull fracture. No destructive osseous lesion.   OTHER FINDINGS: None.       No acute intracranial abnormality.   Chronic changes as detailed in the body of the report.   MACRO: None   Signed by: Arden Berkowitz 7/10/2024 3:22 AM Dictation workstation:   [id number]     Results for orders placed or performed during the hospital encounter of 07/08/24 (from the past 24 hour(s))   Renal function panel   Result Value Ref Range    Glucose 191 (H) 65 - 99 mg/dL    Sodium 138 133 - 145 mmol/L    Potassium 3.4 3.4 - 5.1 mmol/L    Chloride 91 (L) 97 - 107 mmol/L    Bicarbonate 33 (H) 24 - 31 mmol/L    Urea Nitrogen 27 (H) 8 - 25 mg/dL    Creatinine 0.80 0.40 - 1.60 mg/dL    eGFR 81 >60 mL/min/1.73m*2    Calcium 10.0 8.5 - 10.4 mg/dL    Phosphorus 3.1 2.5 - 4.5 mg/dL    Albumin 4.0 3.5 - 5.0 g/dL    Anion Gap 14 <=19 mmol/L   Magnesium   Result Value Ref Range    Magnesium 1.90 1.60 - 3.10 mg/dL   Magnesium   Result Value Ref Range    Magnesium 1.90 1.60 - 3.10 mg/dL   Phosphorus   Result Value Ref Range    Phosphorus 4.5 2.5 - 4.5 mg/dL   CBC and Auto Differential   Result Value Ref Range    WBC 9.1 4.4 - 11.3 x10*3/uL    nRBC 0.0 0.0 - 0.0 /100 WBCs    RBC 4.64 4.00 - 5.20 x10*6/uL    Hemoglobin 14.1 12.0 - 16.0 g/dL    Hematocrit 44.1 36.0 - 46.0 %    MCV 95 80 - 100 fL    MCH 30.4 26.0 - 34.0 pg    MCHC 32.0 32.0 - 36.0 g/dL    RDW 14.6 (H) 11.5 - 14.5 %    Platelets 264 150 - 450 x10*3/uL    Neutrophils % 73.7 40.0 - 80.0 %    Immature Granulocytes %, Automated 0.2 0.0 - 0.9 %    Lymphocytes % 14.9 13.0 - 44.0 %    Monocytes % 9.8 2.0 - 10.0 %    Eosinophils % 1.2 0.0 - 6.0 %    Basophils % 0.2 0.0 - 2.0 %    Neutrophils Absolute 6.71 1.20 - 7.70 x10*3/uL    Immature Granulocytes  Absolute, Automated 0.02 0.00 - 0.70 x10*3/uL    Lymphocytes Absolute 1.36 1.20 - 4.80 x10*3/uL    Monocytes Absolute 0.89 0.10 - 1.00 x10*3/uL    Eosinophils Absolute 0.11 0.00 - 0.70 x10*3/uL    Basophils Absolute 0.02 0.00 - 0.10 x10*3/uL   Basic Metabolic Panel   Result Value Ref Range    Glucose 110 (H) 65 - 99 mg/dL    Sodium 142 133 - 145 mmol/L    Potassium 3.2 (L) 3.4 - 5.1 mmol/L    Chloride 90 (L) 97 - 107 mmol/L    Bicarbonate 41 (H) 24 - 31 mmol/L    Urea Nitrogen 27 (H) 8 - 25 mg/dL    Creatinine 0.80 0.40 - 1.60 mg/dL    eGFR 81 >60 mL/min/1.73m*2    Calcium 10.0 8.5 - 10.4 mg/dL    Anion Gap 11 <=19 mmol/L   TSH   Result Value Ref Range    Thyroid Stimulating Hormone 2.50 0.27 - 4.20 mIU/L   Hemoglobin A1c   Result Value Ref Range    Hemoglobin A1C 5.5 See below %    Estimated Average Glucose 111 Not Established mg/dL   Lipid panel   Result Value Ref Range    Cholesterol 186 133 - 200 mg/dL    HDL-Cholesterol 46.0 (L) >50.0 mg/dL    Cholesterol/HDL Ratio 4.0 SEE COMMENT    LDL Calculated 119 65 - 130 mg/dL    Triglycerides 107 40 - 150 mg/dL   POCT GLUCOSE   Result Value Ref Range    POCT Glucose 140 (H) 74 - 99 mg/dL   Transthoracic Echo (TTE) Complete   Result Value Ref Range    BSA 2.2 m2   Renal function panel   Result Value Ref Range    Glucose 98 65 - 99 mg/dL    Sodium 138 133 - 145 mmol/L    Potassium 3.8 3.4 - 5.1 mmol/L    Chloride 91 (L) 97 - 107 mmol/L    Bicarbonate 35 (H) 24 - 31 mmol/L    Urea Nitrogen 30 (H) 8 - 25 mg/dL    Creatinine 0.90 0.40 - 1.60 mg/dL    eGFR 70 >60 mL/min/1.73m*2    Calcium 9.7 8.5 - 10.4 mg/dL    Phosphorus 4.1 2.5 - 4.5 mg/dL    Albumin 3.8 3.5 - 5.0 g/dL    Anion Gap 12 <=19 mmol/L   Magnesium   Result Value Ref Range    Magnesium 2.40 1.60 - 3.10 mg/dL   BLOOD GAS VENOUS FULL PANEL   Result Value Ref Range    POCT pH, Venous 7.58 (H) 7.33 - 7.43 pH    POCT pCO2, Venous 52 (H) 41 - 51 mm Hg    POCT pO2, Venous 170 (H) 35 - 45 mm Hg    POCT SO2, Venous 99 (H)  45 - 75 %    POCT Oxy Hemoglobin, Venous 97.6 (H) 45.0 - 75.0 %    POCT Hematocrit Calculated, Venous 43.0 36.0 - 46.0 %    POCT Sodium, Venous 132 (L) 136 - 145 mmol/L    POCT Potassium, Venous 3.7 3.5 - 5.3 mmol/L    POCT Chloride, Venous 94 (L) 98 - 107 mmol/L    POCT Ionized Calicum, Venous 1.16 1.10 - 1.33 mmol/L    POCT Glucose, Venous 106 (H) 74 - 99 mg/dL    POCT Lactate, Venous 1.7 0.4 - 2.0 mmol/L    POCT Base Excess, Venous 23.2 (H) -2.0 - 3.0 mmol/L    POCT HCO3 Calculated, Venous 48.8 (H) 22.0 - 26.0 mmol/L    POCT Hemoglobin, Venous 14.4 12.0 - 16.0 g/dL    POCT Anion Gap, Venous -7.0 (L) 10.0 - 25.0 mmol/L    Patient Temperature 37.0 degrees Celsius    FiO2 40 %         Assessment/Plan      Principal Problem:    PAF (paroxysmal atrial fibrillation) (Multi)  Patient is a 68-year-old female with history significant for atrial fibrillation status post Tikosyn and cardioversion, hypertension, COPD, TANVIR on CPAP, GERD, depression, anxiety, hyperuricemia who  underwent cardioversion for atrial fibrillation as an outpatient procedure and subsequent development of diaphoresis with dyspnea and tachycardia with new onset speech disturbances and given her history of atrial fibrillation and MRI of the brain consistent with most likely embolic CVA and also an incidental meningioma noted, stable with symptoms improving.    Recommendations;  Reviewed CT scan brain and pending CT angiogram head /neck results.  MRI brain results reviewed and discussed with the patient  Continue Eliquis and High intensity statin  2 d echo preliminary results reviewed  PT/OT eval and treatment  Speech eval and treatment  Neurochecks  Cardiology on board  Stroke labs including TSH, Hb A1c, Lipid Panel  Blood pressure / Blood sugar monitoring and control  Telemetry monitoring   Continue current management  Discussed the above plan with the patient / nurse and family    I spent 45 minutes in the professional and overall care of this  patient.      Mayank Bain MD

## 2024-07-12 ENCOUNTER — PHARMACY VISIT (OUTPATIENT)
Dept: PHARMACY | Facility: CLINIC | Age: 69
End: 2024-07-12
Payer: COMMERCIAL

## 2024-07-12 VITALS
RESPIRATION RATE: 18 BRPM | DIASTOLIC BLOOD PRESSURE: 67 MMHG | OXYGEN SATURATION: 92 % | TEMPERATURE: 97.9 F | WEIGHT: 231.48 LBS | HEIGHT: 62 IN | SYSTOLIC BLOOD PRESSURE: 114 MMHG | HEART RATE: 104 BPM | BODY MASS INDEX: 42.6 KG/M2

## 2024-07-12 LAB
ANION GAP SERPL CALC-SCNC: 9 MMOL/L
BASOPHILS # BLD AUTO: 0.02 X10*3/UL (ref 0–0.1)
BASOPHILS NFR BLD AUTO: 0.2 %
BUN SERPL-MCNC: 36 MG/DL (ref 8–25)
CALCIUM SERPL-MCNC: 10.1 MG/DL (ref 8.5–10.4)
CHLORIDE SERPL-SCNC: 90 MMOL/L (ref 97–107)
CO2 SERPL-SCNC: 42 MMOL/L (ref 24–31)
CREAT SERPL-MCNC: 0.9 MG/DL (ref 0.4–1.6)
EGFRCR SERPLBLD CKD-EPI 2021: 70 ML/MIN/1.73M*2
EOSINOPHIL # BLD AUTO: 0.14 X10*3/UL (ref 0–0.7)
EOSINOPHIL NFR BLD AUTO: 1.4 %
ERYTHROCYTE [DISTWIDTH] IN BLOOD BY AUTOMATED COUNT: 14.6 % (ref 11.5–14.5)
GLUCOSE SERPL-MCNC: 114 MG/DL (ref 65–99)
HCT VFR BLD AUTO: 47.2 % (ref 36–46)
HGB BLD-MCNC: 15 G/DL (ref 12–16)
IMM GRANULOCYTES # BLD AUTO: 0.03 X10*3/UL (ref 0–0.7)
IMM GRANULOCYTES NFR BLD AUTO: 0.3 % (ref 0–0.9)
LYMPHOCYTES # BLD AUTO: 1.7 X10*3/UL (ref 1.2–4.8)
LYMPHOCYTES NFR BLD AUTO: 17.1 %
MAGNESIUM SERPL-MCNC: 2.1 MG/DL (ref 1.6–3.1)
MCH RBC QN AUTO: 30.4 PG (ref 26–34)
MCHC RBC AUTO-ENTMCNC: 31.8 G/DL (ref 32–36)
MCV RBC AUTO: 96 FL (ref 80–100)
MONOCYTES # BLD AUTO: 0.84 X10*3/UL (ref 0.1–1)
MONOCYTES NFR BLD AUTO: 8.4 %
NEUTROPHILS # BLD AUTO: 7.22 X10*3/UL (ref 1.2–7.7)
NEUTROPHILS NFR BLD AUTO: 72.6 %
NRBC BLD-RTO: 0 /100 WBCS (ref 0–0)
PHOSPHATE SERPL-MCNC: 5 MG/DL (ref 2.5–4.5)
PLATELET # BLD AUTO: 259 X10*3/UL (ref 150–450)
POTASSIUM SERPL-SCNC: 4 MMOL/L (ref 3.4–5.1)
Q ONSET: 224 MS
QRS COUNT: 14 BEATS
QRS DURATION: 92 MS
QT INTERVAL: 370 MS
QTC CALCULATION(BAZETT): 445 MS
QTC FREDERICIA: 418 MS
R AXIS: 0 DEGREES
RBC # BLD AUTO: 4.93 X10*6/UL (ref 4–5.2)
SODIUM SERPL-SCNC: 141 MMOL/L (ref 133–145)
T AXIS: 154 DEGREES
T OFFSET: 409 MS
VENTRICULAR RATE: 87 BPM
WBC # BLD AUTO: 10 X10*3/UL (ref 4.4–11.3)

## 2024-07-12 PROCEDURE — 97530 THERAPEUTIC ACTIVITIES: CPT | Mod: GP

## 2024-07-12 PROCEDURE — 83735 ASSAY OF MAGNESIUM: CPT

## 2024-07-12 PROCEDURE — 84100 ASSAY OF PHOSPHORUS: CPT

## 2024-07-12 PROCEDURE — 2500000005 HC RX 250 GENERAL PHARMACY W/O HCPCS

## 2024-07-12 PROCEDURE — 2500000001 HC RX 250 WO HCPCS SELF ADMINISTERED DRUGS (ALT 637 FOR MEDICARE OP)

## 2024-07-12 PROCEDURE — 94640 AIRWAY INHALATION TREATMENT: CPT

## 2024-07-12 PROCEDURE — RXMED WILLOW AMBULATORY MEDICATION CHARGE

## 2024-07-12 PROCEDURE — 2500000002 HC RX 250 W HCPCS SELF ADMINISTERED DRUGS (ALT 637 FOR MEDICARE OP, ALT 636 FOR OP/ED)

## 2024-07-12 PROCEDURE — 85025 COMPLETE CBC W/AUTO DIFF WBC: CPT

## 2024-07-12 PROCEDURE — 94660 CPAP INITIATION&MGMT: CPT

## 2024-07-12 PROCEDURE — 80048 BASIC METABOLIC PNL TOTAL CA: CPT

## 2024-07-12 PROCEDURE — 36415 COLL VENOUS BLD VENIPUNCTURE: CPT

## 2024-07-12 PROCEDURE — 97110 THERAPEUTIC EXERCISES: CPT | Mod: GP

## 2024-07-12 RX ORDER — ATORVASTATIN CALCIUM 40 MG/1
40 TABLET, FILM COATED ORAL NIGHTLY
Qty: 30 TABLET | Refills: 0 | Status: SHIPPED | OUTPATIENT
Start: 2024-07-12

## 2024-07-12 ASSESSMENT — PAIN DESCRIPTION - ORIENTATION
ORIENTATION: LOWER
ORIENTATION: UPPER;MID

## 2024-07-12 ASSESSMENT — COGNITIVE AND FUNCTIONAL STATUS - GENERAL
CLIMB 3 TO 5 STEPS WITH RAILING: TOTAL
DRESSING REGULAR LOWER BODY CLOTHING: A LITTLE
MOVING TO AND FROM BED TO CHAIR: A LITTLE
MOVING TO AND FROM BED TO CHAIR: A LITTLE
DRESSING REGULAR UPPER BODY CLOTHING: A LITTLE
TURNING FROM BACK TO SIDE WHILE IN FLAT BAD: A LITTLE
TURNING FROM BACK TO SIDE WHILE IN FLAT BAD: A LITTLE
CLIMB 3 TO 5 STEPS WITH RAILING: A LOT
TOILETING: A LITTLE
HELP NEEDED FOR BATHING: A LITTLE
DAILY ACTIVITIY SCORE: 20
WALKING IN HOSPITAL ROOM: A LITTLE
WALKING IN HOSPITAL ROOM: A LITTLE
MOBILITY SCORE: 16
STANDING UP FROM CHAIR USING ARMS: A LITTLE
MOVING FROM LYING ON BACK TO SITTING ON SIDE OF FLAT BED WITH BEDRAILS: A LITTLE
STANDING UP FROM CHAIR USING ARMS: A LITTLE
MOBILITY SCORE: 18

## 2024-07-12 ASSESSMENT — PAIN SCALES - GENERAL
PAINLEVEL_OUTOF10: 0 - NO PAIN
PAINLEVEL_OUTOF10: 0 - NO PAIN
PAINLEVEL_OUTOF10: 6
PAINLEVEL_OUTOF10: 0 - NO PAIN
PAINLEVEL_OUTOF10: 0 - NO PAIN
PAINLEVEL_OUTOF10: 6

## 2024-07-12 ASSESSMENT — PAIN - FUNCTIONAL ASSESSMENT
PAIN_FUNCTIONAL_ASSESSMENT: 0-10
PAIN_FUNCTIONAL_ASSESSMENT: FLACC (FACE, LEGS, ACTIVITY, CRY, CONSOLABILITY)
PAIN_FUNCTIONAL_ASSESSMENT: FLACC (FACE, LEGS, ACTIVITY, CRY, CONSOLABILITY)
PAIN_FUNCTIONAL_ASSESSMENT: 0-10
PAIN_FUNCTIONAL_ASSESSMENT: 0-10

## 2024-07-12 ASSESSMENT — PAIN DESCRIPTION - LOCATION
LOCATION: BACK
LOCATION: HEAD

## 2024-07-12 ASSESSMENT — PAIN DESCRIPTION - DESCRIPTORS: DESCRIPTORS: ACHING

## 2024-07-12 NOTE — PROGRESS NOTES
Vickie Foster is a 68 y.o. female on day 4 of admission presenting with PAF (paroxysmal atrial fibrillation) (Multi).      Subjective   Sitting at side of the bed eating breakfast. Denies any complaints. States she feels well. Currently on 3L NC, denies any respiratory symptoms. States she wears 3L NC nightly, has all of the supplies at home if she needs to wear O2 during the day. Denies difficulty urinating and constipation.        Objective     Last Recorded Vitals  BP 99/61   Pulse 79   Temp 36.5 °C (97.7 °F) (Temporal)   Resp 18   Wt 105 kg (231 lb 7.7 oz)   SpO2 98%   Intake/Output last 3 Shifts:    Intake/Output Summary (Last 24 hours) at 7/12/2024 0943  Last data filed at 7/12/2024 0900  Gross per 24 hour   Intake 1010 ml   Output 2350 ml   Net -1340 ml       Admission Weight  Weight: 111 kg (244 lb) (07/08/24 1521)    Daily Weight  07/12/24 : 105 kg (231 lb 7.7 oz)    Image Results  ECG 12 Lead  Atrial fibrillation  Septal infarct , age undetermined  ST & T wave abnormality, consider lateral ischemia  Abnormal ECG  When compared with ECG of 01-MAY-2024 10:10,  Nonspecific T wave abnormality, worse in Anterior leads      Physical Exam  Constitutional:       General: She is not in acute distress.     Appearance: She is not toxic-appearing.   HENT:      Head: Normocephalic.      Mouth/Throat:      Pharynx: Oropharynx is clear.   Eyes:      General: No scleral icterus.  Cardiovascular:      Rate and Rhythm: Normal rate. Rhythm irregular.   Pulmonary:      Effort: No respiratory distress.      Breath sounds: No wheezing.   Abdominal:      General: There is no distension.      Palpations: Abdomen is soft.   Musculoskeletal:      Right lower leg: No edema.      Left lower leg: No edema.   Neurological:      Mental Status: She is alert and oriented to person, place, and time.         Relevant Results               Assessment/Plan        Principal Problem:    PAF (paroxysmal atrial fibrillation)  (Multi)    Acute hypoxic respiratory failure 2/2 flash pulmonary edema  Occurred post-procedurally after a cardiac ablation on 7/8  S/p IV diuresis, now back on home torsemide  Wean O2 as tolerated  May need home O2 eval prior to discharge    Atrial fibrillation  S/p ablation 7/8, was briefly in sinus rhythm but is now back in Afib  EP following  Continue dofetilide and metoprolol  Continue eliquis    Acute/subacute stroke in the posterior left insula/temporal lobe  Had transient aphasia, now resolved  Likely embolic in the setting of atrial fibrillation  Evaluated by neurology  Continue eliquis and high intensity statin    Meningioma  Incidental finding on CT brain  Follow up with neurology/neurosurgery as outpatient    Chronic diastolic heart failure  Echo showing EF 65-70% with diastolic dysfunction  S/p IV diuresis, now back on home torsemide  Metolazone discontinued by EP    HTN  Started on aldactone by EP, hold parameters in place    TANVIR  Baseline 3L NC nightly  Continue BiPAP nightly    Gout  Continue home allopurinol    Plan:  Wean O2 as able  May need home O2 eval prior to discharge  Await final EP recommendations  Anticipate discharge home today                    Keena Ibarra MD

## 2024-07-12 NOTE — NURSING NOTE
Assumed care of patient.  Patient sitting up on side of bed.  Bedside shift report received.  POX 98% on 3L NC.  No c/o pain or discomfort at this time.   Call light in reach.

## 2024-07-12 NOTE — DISCHARGE SUMMARY
Discharge Diagnosis  PAF (paroxysmal atrial fibrillation) (Multi)    Issues Requiring Follow-Up  Atrial fibrillation   Stroke  Meningioma    Discharge Meds     Your medication list        START taking these medications        Instructions Last Dose Given Next Dose Due   atorvastatin 40 mg tablet  Commonly known as: Lipitor      Take 1 tablet (40 mg) by mouth once daily at bedtime.              CONTINUE taking these medications        Instructions Last Dose Given Next Dose Due   allopurinol 300 mg tablet  Commonly known as: Zyloprim      Take 1 tablet (300 mg) by mouth once daily.       biotin 10 mg tablet           budesonide-formoteroL 80-4.5 mcg/actuation inhaler  Commonly known as: Symbicort           buPROPion  mg 24 hr tablet  Commonly known as: Wellbutrin XL      Take 1 tablet (300 mg) by mouth once daily in the morning. Take before meals.       calcium carb-vitamin D3-vit K2 600 mg-1,000 unit-90 mcg tablet           CENTRUM SILVER ORAL           clobetasol 0.05 % cream  Commonly known as: Temovate           docusate sodium 100 mg capsule  Commonly known as: Colace           dofetilide 250 mcg capsule  Commonly known as: Tikosyn      Take 1 capsule (250 mcg) by mouth every 12 hours.       Eliquis 5 mg tablet  Generic drug: apixaban      Take 1 tablet (5 mg) by mouth 2 times a day.       gabapentin 300 mg capsule  Commonly known as: Neurontin      Take 1 capsule (300 mg) by mouth 3 times a day.       Klor-Con M20 20 mEq ER tablet  Generic drug: potassium chloride CR      TAKE 1 TABLET THREE TIMES A DAY WITH FOOD       metoprolol succinate XL 25 mg 24 hr tablet  Commonly known as: Toprol-XL      TAKE 1 TABLET BY MOUTH ONE TIME DAILY       oxygen gas therapy  Commonly known as: O2           semaglutide 2 mg/dose (8 mg/3 mL) pen injector           torsemide 100 mg tablet  Commonly known as: Demadex      TAKE ONE-HALF (1/2) TABLET TWICE A DAY       Vitamin C With Genevieve Hips 1,000 mg tablet  Generic drug:  ascorbic acid                  STOP taking these medications      metOLazone 5 mg tablet  Commonly known as: Zaroxolyn                  Where to Get Your Medications        These medications [facility]  [id number] [facility] [address]      Hours: 9 AM to 6 PM Mon-Fri, 9 AM to 1 PM Sat Phone: [phone]   atorvastatin 40 mg tablet         Test Results Pending At Discharge  Pending Labs       No current pending labs.            Hospital Course   68yoF hx of atrial fibrillation, HTN, TANVIR, chronic diastolic heart failure who presented for elective cardiac ablation. Following the procedure, patient developed acute hypoxic respiratory failure 2/2 flash pulmonary edema and required BiPAP. Patient was admitted to the ICU and was treated with IV diuresis. EP following. Patient had oozing from the femoral site and there was a delay in resuming home eliquis. During this time, patient developed new onset aphasia. Stroke neurology was consulted. CT head negative for acute process but did incidentally show a meningioma. MRI brain showed acute/subacute stroke in the posterior left insula/temporal lobe. Neurology was consulted, suspect the area of ischemia is likely embolic in origin. Recommended continue eliquis and adding high intensity statin. Patient's aphasia resolved completely the following day. Respiratory status improved and supplemental oxygen was weaned down. Home O2 eval was completed and it was determined that patient did not require oxygen at rest or with activity. Cleared for discharge by EP. Patient will need to follow up with EP, neurology, and her PCP.     Pertinent Physical Exam At Time of Discharge  Physical Exam    Outpatient Follow-Up  Future Appointments   Date Time Provider Department Center   7/24/2024  1:00 PM Joanie Osborn, PT [id number] [facility]   8/9/2024 10:00 AM Amado Ty MD [id number] [facility]   8/27/2024 10:45 AM Amado Ty MD [id number]  [facility]   1/6/2025  2:00 PM Joseph Sanchez MD [id number] [facility]     Time spent on discharge: 35 minutes    Keena Ibarra MD

## 2024-07-12 NOTE — PROGRESS NOTES
07/12/24 0829   Discharge Planning   Expected Discharge Disposition Home   Patient expects to be discharged to: Home refusing [facility]

## 2024-07-12 NOTE — PROGRESS NOTES
Physical Therapy    Physical Therapy Treatment    Patient Name: Vickie Foster  MRN: [id number]  Today's Date: 7/12/2024  Time Calculation  Start Time: 1130  Stop Time: 1157  Time Calculation (min): 27 min    Assessment/Plan   PT Assessment  End of Session Communication: Bedside nurse  Assessment Comment: pt demonstrating improving overall functional mobility tolerance and ease with FWW use, on 2-3 liters o2; Cont to recommend low int rehab  as pt is still functioning below baseline  End of Session Patient Position: Up in chair, Alarm off, not on at start of session (call button in reach)  PT Plan  Inpatient/Swing Bed or Outpatient: Inpatient  PT Plan  Treatment/Interventions: Bed mobility, Transfer training, Gait training, Stair training, Balance training, Endurance training, Therapeutic exercise, Therapeutic activity  PT Plan: Ongoing PT  PT Frequency: 6 times per week  PT Discharge Recommendations: Low intensity level of continued care  Equipment Recommended upon Discharge: Wheeled walker  PT Recommended Transfer Status: Stand by assist, Assistive device (FWw)  PT - OK to Discharge: Yes      General Visit Information:   PT  Visit  PT Received On: 07/12/24  General  Family/Caregiver Present: No  Prior to Session Communication: Bedside nurse  Patient Position Received: Alarm off, not on at start of session (sitting edge of bed)  Preferred Learning Style: verbal, visual  General Comment: cleared by nurse for therapy; pt agreeable to therapy; + telemetry, odalisck; MRI of brain 7/10 revealed acute/subacute left tempral embolic CVA---acute speech difficulties with complete resolution 7/10/24; pt on Eliquis    Subjective   Precautions:  Precautions  Hearing/Visual Limitations: WFL  Medical Precautions: Fall precautions, Oxygen therapy device and L/min (2-3 liters o2 via nc)  Vital Signs:  Vital Signs  Heart Rate: 92 (in A-fib)  SpO2: 94 %  Patient Position: Sitting    Objective   Pain:  Pain Assessment  Pain Assessment:  0-10  0-10 (Numeric) Pain Score: 0 - No pain  Cognition:  Cognition  Overall Cognitive Status: Within Functional Limits  Orientation Level: Oriented X4  Safety/Judgement:  (decreased safety insight during functional mobility)  Coordination:  Heel to Shin: Intact  Postural Control:  Postural Control  Posture Comment: obese with mild forward head, protracted shldrs  Extremity/Trunk Assessments:    Activity Tolerance:  Activity Tolerance  Endurance: Decreased tolerance for upright activites  Activity Tolerance Comments: improving functional mobility tolerance-----good -  Treatments:  Therapeutic Exercise  Therapeutic Exercise Performed: Yes  Therapeutic Exercise Activity 1: seated rakel AP/heel raises x 25 reps  Therapeutic Exercise Activity 2: seated rakel LAQ's x 25 reps  Therapeutic Exercise Activity 3: seated rakel marching x 20 reps  Therapeutic Exercise Activity 4: seated rakel hip abd/add x 20 reps    Therapeutic Activity  Therapeutic Activity Performed: Yes (see transfers, amb with FWW comments)    Ambulation/Gait Training  Ambulation/Gait Training Performed: Yes  Ambulation/Gait Training 1  Surface 1: Level tile  Device 1: Rolling walker  Assistance 1: Close supervision  Comments/Distance (ft) 1: pt amb 56 ft x 2, FWW, verbal cues for staying close to FWW, pursed lip breathing; O2 sat dropped to 90% but pt recivered to 94% with seated pursed lip breathing x 30 to 40 secs. Overall balance good.  Ambulation/Gait Training 2  Surface 2: Level tile  Device 2: Rolling walker  Assistance 2: Close supervision, Minimal verbal cues  Comments/Distance (ft) 2: pt amb 150 ft x 1, FWW, verbal cues for pursed lip breathing; o2 sat dropped to 89%; recovered to 94% with seated pursed lip breathing x 30 to 40 secs  Transfers  Transfer: Yes  Transfer 1  Transfer From 1: Sit to  Transfer to 1: Stand  Technique 1: Sit to stand  Transfer Device 1: Walker  Transfer Level of Assistance 1: Close supervision, Minimal verbal  cues  Trials/Comments 1: x 3 trials throughout session; verbal cues for proper rakel hand placement on seated surface  Transfers 2  Transfer From 2: Stand to  Transfer to 2: Sit  Technique 2: Stand to sit  Transfer Device 2: Walker  Transfer Level of Assistance 2: Close supervision, Minimal verbal cues  Trials/Comments 2: x 3 trials throughout session; verbal cues for reaching back for bed/arms of chair with both hands    Stairs  Stairs: No    Outcome Measures:  [facility] Basic Mobility  Turning from your back to your side while in a flat bed without using bedrails: None  Moving from lying on your back to sitting on the side of a flat bed without using bedrails: A little  Moving to and from bed to chair (including a wheelchair): A little  Standing up from a chair using your arms (e.g. wheelchair or bedside chair): A little  To walk in hospital room: A little  Climbing 3-5 steps with railing: A lot  Basic Mobility - Total Score: 18    Education Documentation  Mobility Training, taught by Jess Milton, PT at 7/12/2024 12:10 PM.  Learner: Patient  Readiness: Acceptance  Method: Explanation, Demonstration  Response: Verbalizes Understanding, Needs Reinforcement                     Encounter Problems       Encounter Problems (Active)       Mobility       Bed mobility including supine to sit and sit to supine independently. (Progressing)       Start:  07/09/24    Expected End:  07/23/24            Ambulate 125 feet with rolling walker mod independently. (Progressing)       Start:  07/09/24    Expected End:  07/23/24            Negotiate 6 steps with single handrail and mod assist. (Progressing)       Start:  07/09/24    Expected End:  07/23/24               Pain - Adult          Transfers       Transfers including sit to stand and stand to sit mod independently. (Progressing)       Start:  07/09/24    Expected End:  07/23/24

## 2024-07-13 LAB
AORTIC VALVE MEAN GRADIENT: 3.8 MMHG
AORTIC VALVE PEAK VELOCITY: 1.46 M/S
AV PEAK GRADIENT: 8.5 MMHG
AVA (PEAK VEL): 1.87 CM2
AVA (VTI): 2.32 CM2
EJECTION FRACTION APICAL 4 CHAMBER: 60.5
EJECTION FRACTION: 63 %
LEFT VENTRICLE INTERNAL DIMENSION DIASTOLE: 1.37 CM (ref 3.5–6)
LEFT VENTRICULAR OUTFLOW TRACT DIAMETER: 1.97 CM
LV EJECTION FRACTION BIPLANE: 58 %
MITRAL VALVE E/A RATIO: 115.55
RIGHT VENTRICLE PEAK SYSTOLIC PRESSURE: 33.8 MMHG

## 2024-07-15 ENCOUNTER — APPOINTMENT (OUTPATIENT)
Dept: CARDIOLOGY | Facility: CLINIC | Age: 69
End: 2024-07-15
Payer: MEDICARE

## 2024-07-15 LAB
Q ONSET: 224 MS
QRS COUNT: 14 BEATS
QRS DURATION: 92 MS
QT INTERVAL: 370 MS
QTC CALCULATION(BAZETT): 445 MS
QTC FREDERICIA: 418 MS
R AXIS: 0 DEGREES
T AXIS: 154 DEGREES
T OFFSET: 409 MS
VENTRICULAR RATE: 87 BPM

## 2024-07-18 ENCOUNTER — APPOINTMENT (OUTPATIENT)
Dept: PRIMARY CARE | Facility: CLINIC | Age: 69
End: 2024-07-18
Payer: MEDICARE

## 2024-07-18 DIAGNOSIS — D32.9 MENINGIOMA (MULTI): ICD-10-CM

## 2024-07-18 DIAGNOSIS — Z09 HOSPITAL DISCHARGE FOLLOW-UP: Primary | ICD-10-CM

## 2024-07-18 DIAGNOSIS — Z86.73 HISTORY OF EMBOLIC STROKE: ICD-10-CM

## 2024-07-18 NOTE — PROGRESS NOTES
STAFF TO DO ON ROOMING: X           Outpatient Visit Note    No chief complaint on file.      HPI:  Vickie Foster is a 68 y.o. female here  ***    She was admitted to the hospital on 7/8/2024 for atrial fibrillation with stroke.  She presented for an elective cardiac ablation at which time she developed acute hypoxic respiratory failure secondary to flash pulmonary edema and needed oxygen support.  Treated in the ICU with IV diuresis.  During this time she developed new onset aphasia and neurology was consulted.  CT of the head was negative for an acute process but did show a meningioma.  MRI of the brain showed acute/subacute stroke, thought to be embolic in origin.  Remained on Eliquis and high intensity statin was added.  Aphasia resolved the next day and respiratory status improved.  Patient did not require home oxygen at rest or with activity.  Discharged home with follow-up with EP, neurology and me.  Discharged home on 7/12/2024.    She has an upcoming appointment with Dr. Ty on 8/27/2024.    Neurology?    Today she states that XX    No longer in atrial fibrillation?    Residual neurological deficits?    Shortness of breath, orthopnea or lower extremity edema?  Chest pain?    PHQ9/GAD7:         Patient Active Problem List    Diagnosis Date Noted    Acute URI 02/09/2024    Epistaxis 08/30/2023    Benign essential hypertension 08/30/2023    Chronic gout without tophus 08/30/2023    Chronic hypoxemic respiratory failure (Multi) 08/30/2023    Chronic obstructive lung disease (Multi) 08/30/2023    Acute congestive heart failure (Multi) 08/30/2023    Diastolic heart failure (Multi) 08/30/2023    Cardiomegaly 08/30/2023    Coronary arteriosclerosis 08/30/2023    Dyspnea on exertion 08/30/2023    Gastroesophageal reflux disease 08/30/2023    Hypertension 08/30/2023    Hypertensive heart disease without congestive heart failure 08/30/2023    Hypokalemia 08/30/2023    Longstanding persistent atrial  fibrillation (Multi) 08/30/2023    Mixed anxiety depressive disorder 08/30/2023    Moderately severe depression 08/30/2023    Obesity with body mass index 30 or greater 08/30/2023    Morbid (severe) obesity due to excess calories (Multi) 08/30/2023    Other chronic pain 08/30/2023    Obstructive sleep apnea syndrome 08/30/2023    Osteoporosis 08/30/2023    Pleural effusion 08/30/2023    Prediabetes 08/30/2023    Primary osteoarthritis of left knee 08/30/2023    Pulmonary hypertension (Multi) 08/30/2023    Lumbar spondylosis 08/30/2023    Thoracic spondylosis 08/30/2023    Asthma ([facility]) 08/30/2023    Arthropathy of both sacroiliac joints 08/30/2023    Anxiety 08/30/2023    Age-related osteoporosis without current pathological fracture 08/30/2023    PAF (paroxysmal atrial fibrillation) (Multi) 04/16/2024        Past Medical History:   Diagnosis Date    A-fib (Multi)     CHF (congestive heart failure) (Multi)     COVID     HTN (hypertension)     Low back pain         Current Medications  Current Outpatient Medications   Medication Instructions    allopurinol (ZYLOPRIM) 300 mg, oral, Daily    ascorbic acid (VITAMIN C WITH ZINA HIPS) 1,000 mg, oral, Daily    atorvastatin (LIPITOR) 40 mg, oral, Nightly    biotin 10 mg tablet 1 tablet, oral, Daily    budesonide-formoteroL (Symbicort) 80-4.5 mcg/actuation inhaler 2 puffs, inhalation, 2 times daily RT, Rinse mouth with water after use to reduce aftertaste and incidence of candidiasis. Do not swallow.    buPROPion XL (WELLBUTRIN XL) 300 mg, oral, Daily before breakfast    calcium carb-vitamin D3-vit K2 600 mg-1,000 unit-90 mcg tablet oral, Daily    clobetasol (Temovate) 0.05 % cream Topical, 2 times daily    docusate sodium (COLACE) 100 mg, oral, 2 times daily    dofetilide (TIKOSYN) 250 mcg, oral, Every 12 hours    Eliquis 5 mg, oral, 2 times daily    folic acid/multivit-min/lutein (CENTRUM SILVER ORAL) oral, As directed    gabapentin (NEURONTIN) 300 mg, oral, 3 times  daily    Klor-Con M20 20 mEq ER tablet TAKE 1 TABLET THREE TIMES A DAY WITH FOOD    metoprolol succinate XL (Toprol-XL) 25 mg 24 hr tablet TAKE 1 TABLET BY MOUTH ONE TIME DAILY    oxygen (O2) 3 L/min, inhalation, Nightly    semaglutide 2.3 mg, subcutaneous, Once Weekly    torsemide (DEMADEX) 50 mg, oral, 2 times daily        Allergies  No Known Allergies     Past Surgical History:   Procedure Laterality Date    CARDIAC ELECTROPHYSIOLOGY PROCEDURE N/A 05/01/2024    Procedure: Cardioversion;  Surgeon: Amado Ty MD;  Location: [facility];  Service: Electrophysiology;  Laterality: N/A;  81884  i48.0  medical mutual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    CARDIAC ELECTROPHYSIOLOGY PROCEDURE N/A 7/8/2024    Procedure: Ablation A-Fib;  Surgeon: Amado Ty MD;  Location: [facility];  Service: Electrophysiology;  Laterality: N/A;  AFIB CRYOABLATION; CPT 93154; ICD I48.0   Medicare mutual of [place] medicare  ID - [id number]  [phone]  No Auth required  Call ref # [id number]    CARDIOVERSION      05/01/2024     Family History   Problem Relation Name Age of Onset    Breast cancer Mother      Heart attack Mother      Heart disease Mother      Colon cancer Father      Breast cancer Sister      Diabetes type II Sister      Kidney failure Sister      Other (herione addict) Sister      No Known Problems Brother      No Known Problems Daughter      Other (recovering alcoholic) Son      Breast cancer Other Maternal Aunt      Social History     Tobacco Use    Smoking status: Former     Types: Cigarettes    Smokeless tobacco: Never   Vaping Use    Vaping status: Never Used   Substance Use Topics    Alcohol use: Yes     Comment: monthly or less    Drug use: Never     Tobacco Use: Medium Risk (7/1/2024)    Patient History     Smoking Tobacco Use: Former     Smokeless Tobacco Use: Never     Passive Exposure: Not on file        ROS  All pertinent positive symptoms are included in the history of present illness.  All  other systems have been reviewed and are negative and noncontributory to this patient's current ailments.    VITAL SIGNS  There were no vitals filed for this visit.  There were no vitals filed for this visit.   There is no height or weight on file to calculate BMI.     PHYSICAL EXAM  GENERAL APPEARANCE: well nourished, well developed, looks like stated age, in no acute distress, not ill or tired appearing, conversing well.   HEENT: no trauma, normocephalic.   NECK: no nodes, supple without rigidity, no neck mass was observed,  no thyromegaly.   HEART: regular rate and rhythm, S1 and S2 heard with no murmurs or skipped beats. No carotid bruits.  LUNGS: clear to auscultation bilaterally with no wheezes, crackles or rales.   ABDOMEN: no organomegaly, soft, nontender, nondistended, normal bowel sounds, no guarding/rebound/rigidity.   EXTREMITIES: moving all extremities equally with no edema or deformities.   SKIN: normal skin color and pigmentation, normal skin turgor without rash, lesions, or nodules visualized.   NEUROLOGIC EXAM: CN II-XII grossly intact, normal gait, normal balance.   PSYCH: mood and affect appropriate; alert and oriented to time, place, person; no difficulty with speech or language.     GENERAL APPEARANCE: well nourished, well developed, looks like stated age, in no acute distress, not ill or tired appearing, conversing well.   HEENT: no trauma, normocephalic.   NECK: supple without rigidity, no neck mass was observed.   LUNGS: good chest wall expansion. In no acute respiratory distress.   EXTREMITIES: moving all extremities equally with no edema.   SKIN: normal skin color and pigmentation, without rash.   NEUROLOGIC EXAM: CN II-XII grossly intact, normal gait.   PSYCH: mood and affect appropriate; alert and oriented to time, place, person; no difficulty with speech or language.     Counseling:       Medication education:           Education:  The patient is counseled regarding potential  side-effects of all new medications          Understanding:  Patient expressed understanding of information conveyed today          Adherence:  No barriers to adherence identified     Assessment/Plan   {Assess/PlanSmartLinks:94857}    Additional Visit Plans:  ***    Next Wellness Exam/Annual Physical Due  ***    Patient Care Team:  Danilo Gonzalez DO as PCP - General (Family Medicine)  Danilo Gonzalez DO as PCP - O [employer] Advantage PCP  MD Manuel Jeong MD (Internal Medicine)    Danilo Gonzalez DO   07/18/24   8:05 AM

## 2024-07-19 ENCOUNTER — OFFICE VISIT (OUTPATIENT)
Dept: PRIMARY CARE | Facility: CLINIC | Age: 69
End: 2024-07-19
Payer: MEDICARE

## 2024-07-19 VITALS
DIASTOLIC BLOOD PRESSURE: 92 MMHG | WEIGHT: 248.8 LBS | OXYGEN SATURATION: 93 % | TEMPERATURE: 98.7 F | HEART RATE: 95 BPM | BODY MASS INDEX: 45.78 KG/M2 | RESPIRATION RATE: 20 BRPM | SYSTOLIC BLOOD PRESSURE: 122 MMHG | HEIGHT: 62 IN

## 2024-07-19 DIAGNOSIS — I50.32 CHRONIC DIASTOLIC HEART FAILURE (MULTI): Primary | ICD-10-CM

## 2024-07-19 DIAGNOSIS — J44.9 CHRONIC OBSTRUCTIVE PULMONARY DISEASE, UNSPECIFIED COPD TYPE (MULTI): ICD-10-CM

## 2024-07-19 DIAGNOSIS — Z09 HOSPITAL DISCHARGE FOLLOW-UP: Primary | ICD-10-CM

## 2024-07-19 DIAGNOSIS — Z86.73 HISTORY OF EMBOLIC STROKE: ICD-10-CM

## 2024-07-19 DIAGNOSIS — D32.9 MENINGIOMA (MULTI): ICD-10-CM

## 2024-07-19 PROCEDURE — 1111F DSCHRG MED/CURRENT MED MERGE: CPT | Performed by: FAMILY MEDICINE

## 2024-07-19 PROCEDURE — 1036F TOBACCO NON-USER: CPT | Performed by: FAMILY MEDICINE

## 2024-07-19 PROCEDURE — 1160F RVW MEDS BY RX/DR IN RCRD: CPT | Performed by: FAMILY MEDICINE

## 2024-07-19 PROCEDURE — 3080F DIAST BP >= 90 MM HG: CPT | Performed by: FAMILY MEDICINE

## 2024-07-19 PROCEDURE — 3008F BODY MASS INDEX DOCD: CPT | Performed by: FAMILY MEDICINE

## 2024-07-19 PROCEDURE — 1159F MED LIST DOCD IN RCRD: CPT | Performed by: FAMILY MEDICINE

## 2024-07-19 PROCEDURE — 99214 OFFICE O/P EST MOD 30 MIN: CPT | Performed by: FAMILY MEDICINE

## 2024-07-19 PROCEDURE — 1126F AMNT PAIN NOTED NONE PRSNT: CPT | Performed by: FAMILY MEDICINE

## 2024-07-19 PROCEDURE — 3074F SYST BP LT 130 MM HG: CPT | Performed by: FAMILY MEDICINE

## 2024-07-19 RX ORDER — DOXYCYCLINE 100 MG/1
1 CAPSULE ORAL
COMMUNITY
Start: 2024-07-17

## 2024-07-19 RX ORDER — GUAIFENESIN 600 MG/1
600 TABLET, EXTENDED RELEASE ORAL 2 TIMES DAILY
COMMUNITY
Start: 2023-04-10

## 2024-07-19 RX ORDER — PREDNISONE 10 MG/1
10 TABLET ORAL
COMMUNITY
Start: 2024-07-17

## 2024-07-19 ASSESSMENT — PATIENT HEALTH QUESTIONNAIRE - PHQ9
SUM OF ALL RESPONSES TO PHQ9 QUESTIONS 1 & 2: 0
1. LITTLE INTEREST OR PLEASURE IN DOING THINGS: NOT AT ALL
2. FEELING DOWN, DEPRESSED OR HOPELESS: NOT AT ALL

## 2024-07-19 ASSESSMENT — COLUMBIA-SUICIDE SEVERITY RATING SCALE - C-SSRS
1. IN THE PAST MONTH, HAVE YOU WISHED YOU WERE DEAD OR WISHED YOU COULD GO TO SLEEP AND NOT WAKE UP?: NO
2. HAVE YOU ACTUALLY HAD ANY THOUGHTS OF KILLING YOURSELF?: NO
6. HAVE YOU EVER DONE ANYTHING, STARTED TO DO ANYTHING, OR PREPARED TO DO ANYTHING TO END YOUR LIFE?: NO

## 2024-07-19 ASSESSMENT — ENCOUNTER SYMPTOMS
OCCASIONAL FEELINGS OF UNSTEADINESS: 0
DEPRESSION: 0
LOSS OF SENSATION IN FEET: 0

## 2024-07-19 ASSESSMENT — PAIN SCALES - GENERAL: PAINLEVEL: 0-NO PAIN

## 2024-07-19 NOTE — PATIENT INSTRUCTIONS
Problem List Items Addressed This Visit             ICD-10-CM    Chronic obstructive lung disease (Multi) J44.9    Meningioma (Multi) D32.9    History of embolic stroke Z86.73     Other Visit Diagnoses         Codes    Hospital discharge follow-up    -  Primary Z09            Additional Visit Plans:  Try sleeping propped upright in a chair and see if this is more restful and comfortable for you. Try to prop those legs up.     Be up and about but take it easy as you recover. Printed a bed chair pillow for you to consider.     Talked about your upcoming camping trip. Continue your medicines and seeing your specialists. The neurologist will watch the size of the meningioma and let you know if there is any concern for cancer or if it is benign. Headaches are likely from the low oxygen in general, your COPD adds to this.     Talk to cardiology about your reservations for the stress test.           Patient Care Team:  Danilo Gonzaelz DO as PCP - General (Family Medicine)  Danilo Gonzalez DO as PCP - O Medicare Advantage PCP  MD Manuel Jeong MD (Internal Medicine)    Danilo Gonzalez DO   07/19/24   1:24 PM

## 2024-07-19 NOTE — PROGRESS NOTES
Outpatient Visit Note    Chief Complaint   Patient presents with    Hospital Follow-up     Afib/cardiac ablation       HPI:  Vickie Foster is a 68 y.o. female here  for hospital follow up. Her daughter joined the visit towards the end.     She was admitted to the hospital on 7/8/2024 for atrial fibrillation with stroke.  She presented for an elective cardiac ablation at which time she developed acute hypoxic respiratory failure secondary to flash pulmonary edema and needed oxygen support.  Treated in the ICU with IV diuresis.  During this time she developed new onset aphasia and neurology was consulted.  CT of the head was negative for an acute process but did show a meningioma.  MRI of the brain showed acute/subacute stroke, thought to be embolic in origin.  Remained on Eliquis and high intensity statin was added.  Aphasia resolved the next day and respiratory status improved.  Patient did not require home oxygen at rest or with activity.  Discharged home with follow-up with EP, neurology and me.  Discharged home on 7/12/2024.    She has an upcoming appointment with Dr. Ty on 8/27/2024.    Neurology appt has not been secured.     Today she tells me about how things happened with her not being abler to breath not get her words out.     Atrial fibrillation came back. She does not feel this when it is there.     Residual neurological deficits.    Saw Dr. Broussard 2 days ago. Started on an antibiotic, steroid and inhaler.     Still fighting the swelling in her legs and the SOB from her infection. No chest pain.     PHQ9/GAD7:         Patient Active Problem List    Diagnosis Date Noted    History of embolic stroke 07/19/2024    Meningioma (Multi) 07/18/2024    Acute URI 02/09/2024    Epistaxis 08/30/2023    Benign essential hypertension 08/30/2023    Chronic gout without tophus 08/30/2023    Chronic hypoxemic respiratory failure (Multi) 08/30/2023    Chronic obstructive lung disease (Multi) 08/30/2023     Acute congestive heart failure (Multi) 08/30/2023    Diastolic heart failure (Multi) 08/30/2023    Cardiomegaly 08/30/2023    Coronary arteriosclerosis 08/30/2023    Dyspnea on exertion 08/30/2023    Gastroesophageal reflux disease 08/30/2023    Hypertension 08/30/2023    Hypertensive heart disease without congestive heart failure 08/30/2023    Hypokalemia 08/30/2023    Longstanding persistent atrial fibrillation (Multi) 08/30/2023    Mixed anxiety depressive disorder 08/30/2023    Moderately severe depression 08/30/2023    Obesity with body mass index 30 or greater 08/30/2023    Morbid (severe) obesity due to excess calories (Multi) 08/30/2023    Other chronic pain 08/30/2023    Obstructive sleep apnea syndrome 08/30/2023    Osteoporosis 08/30/2023    Pleural effusion 08/30/2023    Prediabetes 08/30/2023    Primary osteoarthritis of left knee 08/30/2023    Pulmonary hypertension (Multi) 08/30/2023    Lumbar spondylosis 08/30/2023    Thoracic spondylosis 08/30/2023    Asthma ([facility]) 08/30/2023    Arthropathy of both sacroiliac joints 08/30/2023    Anxiety 08/30/2023    Age-related osteoporosis without current pathological fracture 08/30/2023    PAF (paroxysmal atrial fibrillation) (Multi) 04/16/2024        Past Medical History:   Diagnosis Date    A-fib (Multi)     CHF (congestive heart failure) (Multi)     COVID     Heart disease     HTN (hypertension)     Low back pain         Current Medications  Current Outpatient Medications   Medication Instructions    allopurinol (ZYLOPRIM) 300 mg, oral, Daily    ascorbic acid (VITAMIN C WITH ZINA HIPS) 1,000 mg, oral, Daily    atorvastatin (LIPITOR) 40 mg, oral, Nightly    biotin 10 mg tablet 1 tablet, oral, Daily    budesonide-formoteroL (Symbicort) 80-4.5 mcg/actuation inhaler 2 puffs, inhalation, 2 times daily RT, Rinse mouth with water after use to reduce aftertaste and incidence of candidiasis. Do not swallow.    buPROPion XL (WELLBUTRIN XL) 300 mg, oral, Daily before  breakfast    calcium carb-vitamin D3-vit K2 600 mg-1,000 unit-90 mcg tablet oral, Daily    clobetasol (Temovate) 0.05 % cream Topical, 2 times daily    docusate sodium (COLACE) 100 mg, oral, 2 times daily    dofetilide (TIKOSYN) 250 mcg, oral, Every 12 hours    doxycycline (Monodox) 100 mg capsule 1 capsule, oral, Every 12 hours scheduled (0630,1830)    Eliquis 5 mg, oral, 2 times daily    folic acid/multivit-min/lutein (CENTRUM SILVER ORAL) oral, As directed    gabapentin (NEURONTIN) 300 mg, oral, 3 times daily    guaiFENesin (MUCINEX) 600 mg, oral, 2 times daily    Klor-Con M20 20 mEq ER tablet TAKE 1 TABLET THREE TIMES A DAY WITH FOOD    metoprolol succinate XL (Toprol-XL) 25 mg 24 hr tablet TAKE 1 TABLET BY MOUTH ONE TIME DAILY    oxygen (O2) 3 L/min, inhalation, Nightly    predniSONE (DELTASONE) 10 mg    semaglutide 2.3 mg, subcutaneous, Once Weekly    torsemide (DEMADEX) 50 mg, oral, 2 times daily        Allergies  No Known Allergies     Past Surgical History:   Procedure Laterality Date    CARDIAC ELECTROPHYSIOLOGY PROCEDURE N/A 05/01/2024    Procedure: Cardioversion;  Surgeon: Amado Ty MD;  Location: [facility] Cardiac Cath Lab;  Service: Electrophysiology;  Laterality: N/A;  26197  i48.0  medical mutual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    CARDIAC ELECTROPHYSIOLOGY PROCEDURE N/A 7/8/2024    Procedure: Ablation A-Fib;  Surgeon: Amado Ty MD;  Location: [facility] Cardiac Cath Lab;  Service: Electrophysiology;  Laterality: N/A;  AFIB CRYOABLATION; CPT 27696; ICD I48.0   Medicare mutual of [place] medicare  ID - [id number]  [phone]  No Auth required  Call ref # [id number]    CARDIOVERSION      05/01/2024     Family History   Problem Relation Name Age of Onset    Breast cancer Mother      Heart attack Mother      Heart disease Mother      Colon cancer Father      Breast cancer Sister      Diabetes type II Sister      Kidney failure Sister      Other (herione addict) Sister      No Known Problems Brother      No Known  Problems Daughter      Other (recovering alcoholic) Son      Breast cancer Other Maternal Aunt      Social History     Tobacco Use    Smoking status: Former     Types: Cigarettes    Smokeless tobacco: Never   Vaping Use    Vaping status: Never Used   Substance Use Topics    Alcohol use: Yes     Comment: monthly or less    Drug use: Never     Tobacco Use: Medium Risk (7/19/2024)    Patient History     Smoking Tobacco Use: Former     Smokeless Tobacco Use: Never     Passive Exposure: Not on file        ROS  All pertinent positive symptoms are included in the history of present illness.  All other systems have been reviewed and are negative and noncontributory to this patient's current ailments.    VITAL SIGNS  Vitals:    07/19/24 1309   BP: (!) 122/92   Pulse: 95   Resp: 20   Temp: 37.1 °C (98.7 °F)   SpO2: 93%     Vitals:    07/19/24 1309   Weight: 113 kg (248 lb 12.8 oz)      Body mass index is 45.51 kg/m².     PHYSICAL EXAM  GENERAL APPEARANCE: well nourished, well developed, looks like stated age, in no acute distress, not ill or tired appearing, conversing well.   HEENT: no trauma, normocephalic. Slight hoarseness.  NECK: supple without rigidity, no neck mass was observed  LUNGS: Slight crackles in lower lung fields bilaterally, infrequent. No wheezing. No increased work of breathing or conversational dyspnea.   EXTREMITIES: moving all extremities equally with edema   SKIN: normal skin color and pigmentation, normal skin turgor without rash, lesions, or nodules visualized.   NEUROLOGIC EXAM: CN II-XII grossly intact, normal balance. Walking with wheeled walker  PSYCH: Anxious mood but consolable when having questions answered, and affect appropriate; alert and oriented to time, place, person; no difficulty with speech or language.       Assessment/Plan   Problem List Items Addressed This Visit             ICD-10-CM    Chronic obstructive lung disease (Multi) J44.9    Meningioma (Multi) D32.9    History of embolic  stroke Z86.73     Other Visit Diagnoses         Codes    Hospital discharge follow-up    -  Primary Z09            Additional Visit Plans:  Try sleeping propped upright in a chair and see if this is more restful and comfortable for you. Try to prop those legs up.     Be up and about but take it easy as you recover. Printed a bed chair pillow for you to consider.     Talked about your upcoming camping trip. Continue your medicines and seeing your specialists. The neurologist will watch the size of the meningioma and let you know if there is any concern for cancer or if it is benign. Headaches are likely from the low oxygen in general, your COPD adds to this.     Talk to cardiology about your reservations for the stress test.     Together we looked up photos of the lungs when they are affected by pneumonia versus pulmonary edema from CHF etc.       Patient Care Team:  Danilo Gonzalez DO as PCP - General (Family Medicine)  Danilo Gonzalez DO as PCP - [employer] PCP  MD Manuel Jeong MD (Internal Medicine)    Danilo Gonzalez DO   07/19/24   1:24 PM

## 2024-07-19 NOTE — SIGNIFICANT EVENT
Billing based on time which included chart review and preparation, obtaining HPI, completion of exam, answering patient's questions and looking at photos together to help her understand how the lungs are affected by her conditions, talking about helping with sleep while recovering from her ailments, guidance regarding concerning symptoms to look out for for needing medical care, placement of referral and documenting in  the chart the same day.

## 2024-07-22 ENCOUNTER — TELEPHONE (OUTPATIENT)
Dept: CARDIOLOGY | Facility: CLINIC | Age: 69
End: 2024-07-22
Payer: MEDICARE

## 2024-07-22 NOTE — TELEPHONE ENCOUNTER
Patient calling because she is swollen in her ankles ,she is wondering if she should double up on her torsemide and for how long . Patient also stated she did this on Friday when she was advised by the doc on call but he said only for two days .her current dosage is 100 mg 1/2 in am and 1/2 pm please advise

## 2024-07-23 ENCOUNTER — TELEPHONE (OUTPATIENT)
Dept: CARDIOLOGY | Facility: CLINIC | Age: 69
End: 2024-07-23
Payer: MEDICARE

## 2024-07-23 DIAGNOSIS — I48.0 PAF (PAROXYSMAL ATRIAL FIBRILLATION) (MULTI): ICD-10-CM

## 2024-07-23 RX ORDER — APIXABAN 5 MG/1
5 TABLET, FILM COATED ORAL 2 TIMES DAILY
Qty: 180 TABLET | Refills: 1 | Status: SHIPPED | OUTPATIENT
Start: 2024-07-23 | End: 2025-01-19

## 2024-07-24 ENCOUNTER — TELEPHONE (OUTPATIENT)
Dept: CARDIOLOGY | Facility: CLINIC | Age: 69
End: 2024-07-24
Payer: MEDICARE

## 2024-07-24 ENCOUNTER — APPOINTMENT (OUTPATIENT)
Dept: PHYSICAL THERAPY | Facility: CLINIC | Age: 69
End: 2024-07-24
Payer: MEDICARE

## 2024-07-24 ENCOUNTER — TELEPHONE (OUTPATIENT)
Dept: PRIMARY CARE | Facility: CLINIC | Age: 69
End: 2024-07-24
Payer: MEDICARE

## 2024-07-24 NOTE — TELEPHONE ENCOUNTER
Visiting Nurse Gwen called the office today stating that Vickie's legs are swollen.  Vickie had called on call over weekend and was told to double up on her water pill.  She is still swelling, so on her own this morning took 150mg of torsemide.  Her legs are still swelling.  Gwen tried to send her to ER, but she refused.  Gwen states that Vickie is somewhat confused also, she had a stroke recently.  She is asking for advice?  Thanks  Call back Vickie [phone]    Called patient with response, she expressed understanding

## 2024-07-24 NOTE — TELEPHONE ENCOUNTER
Patient called, has questions regarding the referral for Neurology. Her last appointment was on 07-19-24 (Hospital Follow Up). PT.# [phone]

## 2024-07-25 ENCOUNTER — APPOINTMENT (OUTPATIENT)
Dept: CARDIOLOGY | Facility: CLINIC | Age: 69
End: 2024-07-25
Payer: MEDICARE

## 2024-07-26 NOTE — TELEPHONE ENCOUNTER
Okay continue to follow closely with cardiology.  The follow-up is not because there is any thought that the meningioma would be abnormal but because she experienced a stroke.  Every stroke patient has a neurology follow-up and a neurologist on the routine.  I highly encouraged her to see neurology!  This is very important for risk reduction

## 2024-07-26 NOTE — TELEPHONE ENCOUNTER
Spoke with patient and she states that she is not going to see the neurologist and she had seen one when she was in the hospital and was told that it is benign and not to worry about it and just to have a repeat CT don in 6 Months.  Unless her symptoms increase (dizziness, sweating, nausea).  She states that her legs became very swollen from her CHF she in creased her diuretic and postassium and she did an on demand call and they had her get labs done and [facility]. She doesn't know what the results are yet. She is back to taking her usual dose now.   She is going to come in on Wed to get set up for mychart.  PL

## 2024-07-30 ENCOUNTER — APPOINTMENT (OUTPATIENT)
Dept: PHARMACY | Facility: HOSPITAL | Age: 69
End: 2024-07-30
Payer: MEDICARE

## 2024-07-30 ENCOUNTER — HOSPITAL ENCOUNTER (OUTPATIENT)
Dept: RADIOLOGY | Facility: CLINIC | Age: 69
Discharge: HOME | End: 2024-07-30
Payer: MEDICARE

## 2024-07-30 DIAGNOSIS — J44.9 CHRONIC OBSTRUCTIVE PULMONARY DISEASE, UNSPECIFIED (MULTI): ICD-10-CM

## 2024-07-30 DIAGNOSIS — I50.32 CHRONIC DIASTOLIC HEART FAILURE (MULTI): ICD-10-CM

## 2024-07-30 PROCEDURE — 71046 X-RAY EXAM CHEST 2 VIEWS: CPT | Performed by: RADIOLOGY

## 2024-07-30 PROCEDURE — 71046 X-RAY EXAM CHEST 2 VIEWS: CPT

## 2024-07-30 ASSESSMENT — ENCOUNTER SYMPTOMS
ORTHOPNEA: 0
UNEXPECTED WEIGHT CHANGE: 0
NEAR-SYNCOPE: 0
PALPITATIONS: 0
CHEST PRESSURE: 0
EDEMA: 0
ABDOMINAL PAIN: 0
CLAUDICATION: 0
SHORTNESS OF BREATH: 1
FATIGUE: 1

## 2024-07-30 NOTE — PROGRESS NOTES
Pharmacy Post-Discharge Visit    Vickie Foster is a 68 y.o. female was referred to Clinical Pharmacy Team to complete a post-discharge medication optimization and monitoring visit.  The patient was referred for their Congestive Heart Failure.    Admission Date: 7/8/24  Discharge Date: 7/12/24    Referring Provider: Danilo Gonzalez DO  PCP: Danilo Gonzalez DO - last visit: 7/19/24, next visit:       Subjective   No Known Allergies    [id number] - [place], [place] - [address] [facility] [facility]  [address]  [place] [facility] [address]  Phone: [phone] Fax: [phone]    [facility] - [place], [place] - [address] [place] [address]  [address] [place] [address]  [place] [address]  Phone: [phone] Fax: [phone]    [facility] - [address] [address]  Phone: [phone] Fax: [phone]      Medication System Management:  Affordability/Accessibility: none  Adherence/Organization: none      Social History     Social History Narrative    Not on file        Notable Medication changes following discharge:  Start: atorvastatin  Stop: metolazone  Change: none    Congestive Heart Failure  Presents for initial visit. The disease course has been stable. Associated symptoms include fatigue and shortness of breath. Pertinent negatives include no abdominal pain, chest pain, chest pressure, claudication, edema, muscle weakness, near-syncope, nocturia, orthopnea, palpitations, paroxysmal nocturnal dyspnea or unexpected weight change. Past treatments include beta blockers. The treatment provided moderate relief. Compliance with prior treatments has been good. Her past medical history is significant for CVA and HTN.     Weak. Feeling stronger now.    Review of Systems   Constitutional:  Positive for fatigue. Negative for unexpected weight change.   Respiratory:  Positive for shortness of breath.     Cardiovascular:  Negative for chest pain, palpitations, claudication and near-syncope.   Gastrointestinal:  Negative for abdominal pain.   Genitourinary:  Negative for nocturia.   Musculoskeletal:  Negative for muscle weakness.     Objective     There were no vitals taken for this visit.   BP Readings from Last 4 Encounters:   07/19/24 (!) 122/92   07/12/24 114/67   07/01/24 125/82   06/13/24 138/84      There were no vitals filed for this visit.     LAB  Lab Results   Component Value Date    BILITOT 0.6 07/08/2024    CALCIUM 10.1 07/12/2024    CO2 42 (H) 07/12/2024    CL 90 (L) 07/12/2024    CREATININE 0.90 07/12/2024    GLUCOSE 114 (H) 07/12/2024    ALKPHOS 93 07/08/2024    K 4.0 07/12/2024    PROT 7.5 07/08/2024     07/12/2024    AST 34 07/08/2024    ALT 30 07/08/2024    BUN 36 (H) 07/12/2024    ANIONGAP 9 07/12/2024    MG 2.10 07/12/2024    PHOS 5.0 (H) 07/12/2024    ALBUMIN 3.8 07/11/2024    GFRF >90 09/29/2023     Lab Results   Component Value Date    TRIG 107 07/11/2024    CHOL 186 07/11/2024    LDLCALC 119 07/11/2024    HDL 46.0 (L) 07/11/2024     Lab Results   Component Value Date    HGBA1C 5.5 07/11/2024         Current Outpatient Medications   Medication Instructions    allopurinol (ZYLOPRIM) 300 mg, oral, Daily    ascorbic acid (VITAMIN C WITH ZIAN HIPS) 1,000 mg, oral, Daily    atorvastatin (LIPITOR) 40 mg, oral, Nightly    biotin 10 mg tablet 1 tablet, oral, Daily    budesonide-formoteroL (Symbicort) 80-4.5 mcg/actuation inhaler 2 puffs, inhalation, 2 times daily RT, Rinse mouth with water after use to reduce aftertaste and incidence of candidiasis. Do not swallow.    buPROPion XL (WELLBUTRIN XL) 300 mg, oral, Daily before breakfast    calcium carb-vitamin D3-vit K2 600 mg-1,000 unit-90 mcg tablet oral, Daily    clobetasol (Temovate) 0.05 % cream Topical, 2 times daily    docusate sodium (COLACE) 100 mg, oral, 2 times daily    dofetilide (TIKOSYN) 250 mcg, oral, Every 12 hours    doxycycline  (Monodox) 100 mg capsule 1 capsule, oral, Every 12 hours scheduled (0630,1830)    Eliquis 5 mg, oral, 2 times daily    folic acid/multivit-min/lutein (CENTRUM SILVER ORAL) oral, As directed    gabapentin (NEURONTIN) 300 mg, oral, 3 times daily    guaiFENesin (MUCINEX) 600 mg, oral, 2 times daily    Klor-Con M20 20 mEq ER tablet TAKE 1 TABLET THREE TIMES A DAY WITH FOOD    metoprolol succinate XL (Toprol-XL) 25 mg 24 hr tablet TAKE 1 TABLET BY MOUTH ONE TIME DAILY    oxygen (O2) 3 L/min, inhalation, Nightly    predniSONE (DELTASONE) 10 mg    semaglutide 2.3 mg, subcutaneous, Once Weekly    torsemide (DEMADEX) 50 mg, oral, 2 times daily        HISTORICAL PHARMACOTHERAPY  none    DRUG INTERACTIONS  None at time of review      Assessment/Plan   Problem List Items Addressed This Visit       Diastolic heart failure (Multi)     CONTINUE all medications as prescribed  CHF not controlled but improving;  Statin  Diuretics  Beta blocker  Future recommendations: Pt to discuss with cardiologist  Entresto for reduce hospitalization benefits. No contraindications found  SGLT-2 for reduce hospitalization benefits. No contraindications found  Educated on  DASH Diet  150 mins per week of exercise  FUV with pharmacy in 3 months              Follow Up:    Continue all meds under the continuation of care with the referring provider and clinical pharmacy team.    Shen Benedict PharmD     Verbal consent to manage patient's drug therapy was obtained from the patient. They were informed they may decline to participate or withdraw from participation in pharmacy services at any time.

## 2024-07-31 ENCOUNTER — TELEPHONE (OUTPATIENT)
Dept: PRIMARY CARE | Facility: CLINIC | Age: 69
End: 2024-07-31
Payer: MEDICARE

## 2024-07-31 NOTE — TELEPHONE ENCOUNTER
Spoke with patient and she states that she had the conversation with the pharmacy  group and that they are ok with her going back on the semaglutide program She wants to know how she can get set up to go back on it next week. Please advise

## 2024-08-01 NOTE — TELEPHONE ENCOUNTER
CHERISE, the [facility] fax no: [phone], Pt is calling for a letter stating she can return back to the treatments. Please call her and let her know when [phone]

## 2024-08-01 NOTE — TELEPHONE ENCOUNTER
Vickie is calling asking if Dr. FARIBA Gonzalez will be willing to provide a letter stating it is ok to resume the medication:    semaglutide 2 mg/dose (8 mg/3 mL) pen injector Inject 2.3 mg under the skin 1 (one) time per week.     She had stopped taking this because she had a Heart Ablasion on 07-08-24 with Dr. Amado Ty. Pt. Gets this medication from [facility] and they are requesting she provide a letter stating it is ok to resume this medication  [employer] phone # [phone]. Pt will call us with their fax number when she gets it. Pt's # [phone]. Thank You.

## 2024-08-01 NOTE — ASSESSMENT & PLAN NOTE
CONTINUE all medications as prescribed  CHF not controlled but improving;  Statin  Diuretics  Beta blocker  Future recommendations: Pt to discuss with cardiologist  Entresto for reduce hospitalization benefits. No contraindications found  SGLT-2 for reduce hospitalization benefits. No contraindications found  Educated on  DASH Diet  150 mins per week of exercise  FUV with pharmacy in 3 months

## 2024-08-05 ENCOUNTER — APPOINTMENT (OUTPATIENT)
Dept: RADIOLOGY | Facility: HOSPITAL | Age: 69
End: 2024-08-05
Payer: MEDICARE

## 2024-08-05 ENCOUNTER — HOSPITAL ENCOUNTER (INPATIENT)
Facility: HOSPITAL | Age: 69
LOS: 4 days | Discharge: HOME | End: 2024-08-10
Attending: STUDENT IN AN ORGANIZED HEALTH CARE EDUCATION/TRAINING PROGRAM | Admitting: HOSPITALIST
Payer: MEDICARE

## 2024-08-05 ENCOUNTER — APPOINTMENT (OUTPATIENT)
Dept: CARDIOLOGY | Facility: HOSPITAL | Age: 69
End: 2024-08-05
Payer: MEDICARE

## 2024-08-05 DIAGNOSIS — M47.818 ARTHROPATHY OF BOTH SACROILIAC JOINTS: ICD-10-CM

## 2024-08-05 DIAGNOSIS — Z86.73 HISTORY OF EMBOLIC STROKE: ICD-10-CM

## 2024-08-05 DIAGNOSIS — I25.10 CORONARY ARTERIOSCLEROSIS: ICD-10-CM

## 2024-08-05 DIAGNOSIS — M47.816 LUMBAR SPONDYLOSIS: ICD-10-CM

## 2024-08-05 DIAGNOSIS — I50.32 CHRONIC DIASTOLIC HEART FAILURE (MULTI): ICD-10-CM

## 2024-08-05 DIAGNOSIS — R06.02 SHORTNESS OF BREATH: Primary | ICD-10-CM

## 2024-08-05 DIAGNOSIS — I50.33 ACUTE ON CHRONIC DIASTOLIC CONGESTIVE HEART FAILURE (MULTI): ICD-10-CM

## 2024-08-05 DIAGNOSIS — M79.89 RIGHT LEG SWELLING: ICD-10-CM

## 2024-08-05 DIAGNOSIS — I51.7 CARDIOMEGALY: ICD-10-CM

## 2024-08-05 DIAGNOSIS — I48.0 PAF (PAROXYSMAL ATRIAL FIBRILLATION) (MULTI): ICD-10-CM

## 2024-08-05 LAB
ALBUMIN SERPL-MCNC: 3.7 G/DL (ref 3.5–5)
ALP BLD-CCNC: 84 U/L (ref 35–125)
ALT SERPL-CCNC: 27 U/L (ref 5–40)
ANION GAP SERPL CALC-SCNC: 10 MMOL/L
AST SERPL-CCNC: 25 U/L (ref 5–40)
BASOPHILS # BLD AUTO: 0.02 X10*3/UL (ref 0–0.1)
BASOPHILS NFR BLD AUTO: 0.2 %
BILIRUB SERPL-MCNC: 0.4 MG/DL (ref 0.1–1.2)
BUN SERPL-MCNC: 23 MG/DL (ref 8–25)
CALCIUM SERPL-MCNC: 9.2 MG/DL (ref 8.5–10.4)
CHLORIDE SERPL-SCNC: 100 MMOL/L (ref 97–107)
CO2 SERPL-SCNC: 34 MMOL/L (ref 24–31)
CREAT SERPL-MCNC: 0.8 MG/DL (ref 0.4–1.6)
EGFRCR SERPLBLD CKD-EPI 2021: 81 ML/MIN/1.73M*2
EOSINOPHIL # BLD AUTO: 0.12 X10*3/UL (ref 0–0.7)
EOSINOPHIL NFR BLD AUTO: 1 %
ERYTHROCYTE [DISTWIDTH] IN BLOOD BY AUTOMATED COUNT: 16.5 % (ref 11.5–14.5)
GLUCOSE SERPL-MCNC: 104 MG/DL (ref 65–99)
HCT VFR BLD AUTO: 41.8 % (ref 36–46)
HGB BLD-MCNC: 13.1 G/DL (ref 12–16)
IMM GRANULOCYTES # BLD AUTO: 0.04 X10*3/UL (ref 0–0.7)
IMM GRANULOCYTES NFR BLD AUTO: 0.3 % (ref 0–0.9)
LYMPHOCYTES # BLD AUTO: 1.74 X10*3/UL (ref 1.2–4.8)
LYMPHOCYTES NFR BLD AUTO: 15.1 %
MCH RBC QN AUTO: 30.4 PG (ref 26–34)
MCHC RBC AUTO-ENTMCNC: 31.3 G/DL (ref 32–36)
MCV RBC AUTO: 97 FL (ref 80–100)
MONOCYTES # BLD AUTO: 0.81 X10*3/UL (ref 0.1–1)
MONOCYTES NFR BLD AUTO: 7 %
NEUTROPHILS # BLD AUTO: 8.82 X10*3/UL (ref 1.2–7.7)
NEUTROPHILS NFR BLD AUTO: 76.4 %
NRBC BLD-RTO: 0 /100 WBCS (ref 0–0)
NT-PROBNP SERPL-MCNC: 710 PG/ML (ref 0–353)
PLATELET # BLD AUTO: 198 X10*3/UL (ref 150–450)
POTASSIUM SERPL-SCNC: 3.6 MMOL/L (ref 3.4–5.1)
PROT SERPL-MCNC: 6.8 G/DL (ref 5.9–7.9)
RBC # BLD AUTO: 4.31 X10*6/UL (ref 4–5.2)
SODIUM SERPL-SCNC: 144 MMOL/L (ref 133–145)
TROPONIN T SERPL-MCNC: 28 NG/L
TROPONIN T SERPL-MCNC: 29 NG/L
WBC # BLD AUTO: 11.6 X10*3/UL (ref 4.4–11.3)

## 2024-08-05 PROCEDURE — 2500000002 HC RX 250 W HCPCS SELF ADMINISTERED DRUGS (ALT 637 FOR MEDICARE OP, ALT 636 FOR OP/ED): Performed by: STUDENT IN AN ORGANIZED HEALTH CARE EDUCATION/TRAINING PROGRAM

## 2024-08-05 PROCEDURE — 36415 COLL VENOUS BLD VENIPUNCTURE: CPT | Performed by: NURSE PRACTITIONER

## 2024-08-05 PROCEDURE — 71045 X-RAY EXAM CHEST 1 VIEW: CPT

## 2024-08-05 PROCEDURE — 71045 X-RAY EXAM CHEST 1 VIEW: CPT | Performed by: RADIOLOGY

## 2024-08-05 PROCEDURE — 83880 ASSAY OF NATRIURETIC PEPTIDE: CPT | Performed by: NURSE PRACTITIONER

## 2024-08-05 PROCEDURE — 99285 EMERGENCY DEPT VISIT HI MDM: CPT | Mod: 25

## 2024-08-05 PROCEDURE — 85025 COMPLETE CBC W/AUTO DIFF WBC: CPT | Performed by: NURSE PRACTITIONER

## 2024-08-05 PROCEDURE — 84484 ASSAY OF TROPONIN QUANT: CPT | Performed by: NURSE PRACTITIONER

## 2024-08-05 PROCEDURE — 80053 COMPREHEN METABOLIC PANEL: CPT | Performed by: NURSE PRACTITIONER

## 2024-08-05 PROCEDURE — 93005 ELECTROCARDIOGRAM TRACING: CPT

## 2024-08-05 PROCEDURE — 96374 THER/PROPH/DIAG INJ IV PUSH: CPT

## 2024-08-05 PROCEDURE — 94640 AIRWAY INHALATION TREATMENT: CPT

## 2024-08-05 PROCEDURE — 96375 TX/PRO/DX INJ NEW DRUG ADDON: CPT

## 2024-08-05 PROCEDURE — 2500000004 HC RX 250 GENERAL PHARMACY W/ HCPCS (ALT 636 FOR OP/ED): Performed by: STUDENT IN AN ORGANIZED HEALTH CARE EDUCATION/TRAINING PROGRAM

## 2024-08-05 RX ORDER — IPRATROPIUM BROMIDE AND ALBUTEROL SULFATE 2.5; .5 MG/3ML; MG/3ML
3 SOLUTION RESPIRATORY (INHALATION) ONCE
Status: COMPLETED | OUTPATIENT
Start: 2024-08-05 | End: 2024-08-05

## 2024-08-05 RX ORDER — KETOROLAC TROMETHAMINE 30 MG/ML
15 INJECTION, SOLUTION INTRAMUSCULAR; INTRAVENOUS ONCE
Status: COMPLETED | OUTPATIENT
Start: 2024-08-05 | End: 2024-08-05

## 2024-08-05 RX ORDER — FUROSEMIDE 10 MG/ML
60 INJECTION INTRAMUSCULAR; INTRAVENOUS ONCE
Status: COMPLETED | OUTPATIENT
Start: 2024-08-05 | End: 2024-08-05

## 2024-08-05 ASSESSMENT — COLUMBIA-SUICIDE SEVERITY RATING SCALE - C-SSRS
1. IN THE PAST MONTH, HAVE YOU WISHED YOU WERE DEAD OR WISHED YOU COULD GO TO SLEEP AND NOT WAKE UP?: NO
6. HAVE YOU EVER DONE ANYTHING, STARTED TO DO ANYTHING, OR PREPARED TO DO ANYTHING TO END YOUR LIFE?: NO
2. HAVE YOU ACTUALLY HAD ANY THOUGHTS OF KILLING YOURSELF?: NO

## 2024-08-05 ASSESSMENT — PAIN DESCRIPTION - PROGRESSION: CLINICAL_PROGRESSION: NOT CHANGED

## 2024-08-05 ASSESSMENT — PAIN DESCRIPTION - PAIN TYPE: TYPE: CHRONIC PAIN

## 2024-08-05 ASSESSMENT — LIFESTYLE VARIABLES
HAVE YOU EVER FELT YOU SHOULD CUT DOWN ON YOUR DRINKING: NO
EVER HAD A DRINK FIRST THING IN THE MORNING TO STEADY YOUR NERVES TO GET RID OF A HANGOVER: NO
TOTAL SCORE: 0
EVER FELT BAD OR GUILTY ABOUT YOUR DRINKING: NO
HAVE PEOPLE ANNOYED YOU BY CRITICIZING YOUR DRINKING: NO

## 2024-08-05 ASSESSMENT — PAIN DESCRIPTION - LOCATION: LOCATION: HIP

## 2024-08-05 ASSESSMENT — PAIN SCALES - GENERAL
PAINLEVEL_OUTOF10: 3
PAINLEVEL_OUTOF10: 9
PAINLEVEL_OUTOF10: 3

## 2024-08-05 ASSESSMENT — PAIN DESCRIPTION - ORIENTATION: ORIENTATION: RIGHT

## 2024-08-05 ASSESSMENT — PAIN - FUNCTIONAL ASSESSMENT: PAIN_FUNCTIONAL_ASSESSMENT: 0-10

## 2024-08-05 NOTE — ED TRIAGE NOTES
TRIAGE NOTE   I saw the patient as the Clinician in Triage and performed a brief history and physical exam, established acuity, and ordered appropriate tests to develop basic plan of care. Patient will be seen by an [facility], resident and/or physician who will independently evaluate the patient. Please see subsequent provider notes for further details and disposition.     Brief HPI: In brief, Vickie Foster is a 68 y.o. female that presents for lower extremity edema, CHF exacerbation, shortness of breath.  Patient with bilateral lower extremity edema mild shortness of breath worse with exertion.    Focused Physical exam:   CONSTITUTIONAL: Vital signs reviewed as charted, well-developed and in no distress  Eyes: Extraocular muscles are intact. Pupils equal round and reactive to light. Conjunctiva are pink.    ENT: Mucous membranes are moist. Tongue in the midline. Pharynx was without erythema or exudates, uvula midline  LUNGS: Decreased breath sounds at the bases, clear in the upper lobes.  HEART: Regular rate and rhythm without murmur thrill or rub, strong tones, auscultation is normal.  3+ bilateral lower extremity edema  ABDOMEN: Soft and nontender without guarding rebound rigidity or mass. Bowel sounds are present and normal in all quadrants. There is no palpable masses or aneurysms identified. No hepatosplenomegaly, normal abdominal exam.  Neuro: The patient is awake, alert and oriented ×3. Moving all 4 extremities and answering questions appropriately.   MUSCULOSKELETAL: The calves are nontender to palpation. Full gross active range of motion.   PSYCH: Awake alert oriented, normal mood and affect.  Skin:  Dry, normal color, warm to the touch, no rash present.      Plan/MDM:   Cardiac evaluation, chest x-ray  Please see subsequent provider note for further details and disposition

## 2024-08-06 ENCOUNTER — APPOINTMENT (OUTPATIENT)
Dept: RADIOLOGY | Facility: HOSPITAL | Age: 69
End: 2024-08-06
Payer: MEDICARE

## 2024-08-06 PROBLEM — R06.02 SHORTNESS OF BREATH: Status: ACTIVE | Noted: 2024-08-06

## 2024-08-06 LAB
ALBUMIN SERPL-MCNC: 3.6 G/DL (ref 3.5–5)
ANION GAP SERPL CALC-SCNC: 9 MMOL/L
ATRIAL RATE: 300 BPM
BUN SERPL-MCNC: 24 MG/DL (ref 8–25)
CALCIUM SERPL-MCNC: 9 MG/DL (ref 8.5–10.4)
CHLORIDE SERPL-SCNC: 102 MMOL/L (ref 97–107)
CO2 SERPL-SCNC: 36 MMOL/L (ref 24–31)
CREAT SERPL-MCNC: 0.8 MG/DL (ref 0.4–1.6)
EGFRCR SERPLBLD CKD-EPI 2021: 81 ML/MIN/1.73M*2
ERYTHROCYTE [DISTWIDTH] IN BLOOD BY AUTOMATED COUNT: 16.5 % (ref 11.5–14.5)
GLUCOSE SERPL-MCNC: 106 MG/DL (ref 65–99)
HCT VFR BLD AUTO: 40.3 % (ref 36–46)
HGB BLD-MCNC: 12.5 G/DL (ref 12–16)
MAGNESIUM SERPL-MCNC: 2.3 MG/DL (ref 1.6–3.1)
MCH RBC QN AUTO: 30.5 PG (ref 26–34)
MCHC RBC AUTO-ENTMCNC: 31 G/DL (ref 32–36)
MCV RBC AUTO: 98 FL (ref 80–100)
NRBC BLD-RTO: 0 /100 WBCS (ref 0–0)
PHOSPHATE SERPL-MCNC: 5.1 MG/DL (ref 2.5–4.5)
PLATELET # BLD AUTO: 189 X10*3/UL (ref 150–450)
POTASSIUM SERPL-SCNC: 3.4 MMOL/L (ref 3.4–5.1)
Q ONSET: 224 MS
QRS COUNT: 11 BEATS
QRS DURATION: 90 MS
QT INTERVAL: 442 MS
QTC CALCULATION(BAZETT): 467 MS
QTC FREDERICIA: 458 MS
R AXIS: 34 DEGREES
RBC # BLD AUTO: 4.1 X10*6/UL (ref 4–5.2)
SODIUM SERPL-SCNC: 147 MMOL/L (ref 133–145)
T AXIS: 146 DEGREES
T OFFSET: 445 MS
TROPONIN T SERPL-MCNC: 31 NG/L
VENTRICULAR RATE: 67 BPM
WBC # BLD AUTO: 8.9 X10*3/UL (ref 4.4–11.3)

## 2024-08-06 PROCEDURE — 93971 EXTREMITY STUDY: CPT | Performed by: RADIOLOGY

## 2024-08-06 PROCEDURE — 2500000001 HC RX 250 WO HCPCS SELF ADMINISTERED DRUGS (ALT 637 FOR MEDICARE OP): Performed by: REGISTERED NURSE

## 2024-08-06 PROCEDURE — 2500000004 HC RX 250 GENERAL PHARMACY W/ HCPCS (ALT 636 FOR OP/ED): Performed by: HOSPITALIST

## 2024-08-06 PROCEDURE — 2500000005 HC RX 250 GENERAL PHARMACY W/O HCPCS: Performed by: HOSPITALIST

## 2024-08-06 PROCEDURE — 83735 ASSAY OF MAGNESIUM: CPT | Performed by: HOSPITALIST

## 2024-08-06 PROCEDURE — 2060000001 HC INTERMEDIATE ICU ROOM DAILY

## 2024-08-06 PROCEDURE — 84484 ASSAY OF TROPONIN QUANT: CPT | Performed by: NURSE PRACTITIONER

## 2024-08-06 PROCEDURE — 2500000004 HC RX 250 GENERAL PHARMACY W/ HCPCS (ALT 636 FOR OP/ED): Performed by: INTERNAL MEDICINE

## 2024-08-06 PROCEDURE — 85027 COMPLETE CBC AUTOMATED: CPT | Performed by: HOSPITALIST

## 2024-08-06 PROCEDURE — 36415 COLL VENOUS BLD VENIPUNCTURE: CPT | Performed by: HOSPITALIST

## 2024-08-06 PROCEDURE — 2500000002 HC RX 250 W HCPCS SELF ADMINISTERED DRUGS (ALT 637 FOR MEDICARE OP, ALT 636 FOR OP/ED): Performed by: HOSPITALIST

## 2024-08-06 PROCEDURE — 99222 1ST HOSP IP/OBS MODERATE 55: CPT

## 2024-08-06 PROCEDURE — 80069 RENAL FUNCTION PANEL: CPT | Performed by: HOSPITALIST

## 2024-08-06 PROCEDURE — 2500000001 HC RX 250 WO HCPCS SELF ADMINISTERED DRUGS (ALT 637 FOR MEDICARE OP): Performed by: HOSPITALIST

## 2024-08-06 PROCEDURE — 93971 EXTREMITY STUDY: CPT

## 2024-08-06 PROCEDURE — 36415 COLL VENOUS BLD VENIPUNCTURE: CPT | Performed by: NURSE PRACTITIONER

## 2024-08-06 RX ORDER — TIZANIDINE 2 MG/1
2 TABLET ORAL EVERY 8 HOURS PRN
Status: DISCONTINUED | OUTPATIENT
Start: 2024-08-06 | End: 2024-08-10 | Stop reason: HOSPADM

## 2024-08-06 RX ORDER — PROCHLORPERAZINE EDISYLATE 5 MG/ML
5 INJECTION INTRAMUSCULAR; INTRAVENOUS EVERY 6 HOURS PRN
Status: DISCONTINUED | OUTPATIENT
Start: 2024-08-06 | End: 2024-08-10 | Stop reason: HOSPADM

## 2024-08-06 RX ORDER — POTASSIUM CHLORIDE 20 MEQ/1
20 TABLET, EXTENDED RELEASE ORAL
Status: DISCONTINUED | OUTPATIENT
Start: 2024-08-06 | End: 2024-08-10 | Stop reason: HOSPADM

## 2024-08-06 RX ORDER — FUROSEMIDE 10 MG/ML
80 INJECTION INTRAMUSCULAR; INTRAVENOUS EVERY 12 HOURS
Status: DISCONTINUED | OUTPATIENT
Start: 2024-08-06 | End: 2024-08-09

## 2024-08-06 RX ORDER — TALC
3 POWDER (GRAM) TOPICAL NIGHTLY PRN
Status: DISCONTINUED | OUTPATIENT
Start: 2024-08-06 | End: 2024-08-10 | Stop reason: HOSPADM

## 2024-08-06 RX ORDER — GABAPENTIN 300 MG/1
300 CAPSULE ORAL 3 TIMES DAILY
Status: DISCONTINUED | OUTPATIENT
Start: 2024-08-06 | End: 2024-08-10 | Stop reason: HOSPADM

## 2024-08-06 RX ORDER — FUROSEMIDE 10 MG/ML
80 INJECTION INTRAMUSCULAR; INTRAVENOUS EVERY 8 HOURS
Status: DISCONTINUED | OUTPATIENT
Start: 2024-08-06 | End: 2024-08-06

## 2024-08-06 RX ORDER — ALLOPURINOL 300 MG/1
300 TABLET ORAL DAILY
Status: DISCONTINUED | OUTPATIENT
Start: 2024-08-06 | End: 2024-08-10 | Stop reason: HOSPADM

## 2024-08-06 RX ORDER — BUPROPION HYDROCHLORIDE 300 MG/1
300 TABLET ORAL DAILY
Status: DISCONTINUED | OUTPATIENT
Start: 2024-08-06 | End: 2024-08-10 | Stop reason: HOSPADM

## 2024-08-06 RX ORDER — FLUTICASONE FUROATE AND VILANTEROL 100; 25 UG/1; UG/1
1 POWDER RESPIRATORY (INHALATION)
Status: DISCONTINUED | OUTPATIENT
Start: 2024-08-06 | End: 2024-08-10 | Stop reason: HOSPADM

## 2024-08-06 RX ORDER — ATORVASTATIN CALCIUM 40 MG/1
40 TABLET, FILM COATED ORAL NIGHTLY
Status: DISCONTINUED | OUTPATIENT
Start: 2024-08-06 | End: 2024-08-10 | Stop reason: HOSPADM

## 2024-08-06 RX ORDER — DOFETILIDE 0.25 MG/1
250 CAPSULE ORAL EVERY 12 HOURS
Status: DISCONTINUED | OUTPATIENT
Start: 2024-08-06 | End: 2024-08-10 | Stop reason: HOSPADM

## 2024-08-06 RX ORDER — ACETAMINOPHEN 325 MG/1
650 TABLET ORAL EVERY 6 HOURS PRN
Status: DISCONTINUED | OUTPATIENT
Start: 2024-08-06 | End: 2024-08-10 | Stop reason: HOSPADM

## 2024-08-06 RX ORDER — METOPROLOL SUCCINATE 25 MG/1
25 TABLET, EXTENDED RELEASE ORAL DAILY
Status: DISCONTINUED | OUTPATIENT
Start: 2024-08-06 | End: 2024-08-10 | Stop reason: HOSPADM

## 2024-08-06 RX ORDER — POLYETHYLENE GLYCOL 3350 17 G/17G
17 POWDER, FOR SOLUTION ORAL DAILY PRN
Status: DISCONTINUED | OUTPATIENT
Start: 2024-08-06 | End: 2024-08-10 | Stop reason: HOSPADM

## 2024-08-06 SDOH — SOCIAL STABILITY: SOCIAL INSECURITY: DOES ANYONE TRY TO KEEP YOU FROM HAVING/CONTACTING OTHER FRIENDS OR DOING THINGS OUTSIDE YOUR HOME?: NO

## 2024-08-06 SDOH — ECONOMIC STABILITY: HOUSING INSECURITY: AT ANY TIME IN THE PAST 12 MONTHS, WERE YOU HOMELESS OR LIVING IN A SHELTER (INCLUDING NOW)?: NO

## 2024-08-06 SDOH — ECONOMIC STABILITY: INCOME INSECURITY: IN THE PAST 12 MONTHS, HAS THE ELECTRIC, GAS, OIL, OR WATER COMPANY THREATENED TO SHUT OFF SERVICE IN YOUR HOME?: NO

## 2024-08-06 SDOH — SOCIAL STABILITY: SOCIAL INSECURITY: ARE THERE ANY APPARENT SIGNS OF INJURIES/BEHAVIORS THAT COULD BE RELATED TO ABUSE/NEGLECT?: NO

## 2024-08-06 SDOH — SOCIAL STABILITY: SOCIAL NETWORK: ARE YOU MARRIED, WIDOWED, DIVORCED, SEPARATED, NEVER MARRIED, OR LIVING WITH A PARTNER?: MARRIED

## 2024-08-06 SDOH — SOCIAL STABILITY: SOCIAL INSECURITY: HAVE YOU HAD ANY THOUGHTS OF HARMING ANYONE ELSE?: NO

## 2024-08-06 SDOH — ECONOMIC STABILITY: FOOD INSECURITY: WITHIN THE PAST 12 MONTHS, THE FOOD YOU BOUGHT JUST DIDN'T LAST AND YOU DIDN'T HAVE MONEY TO GET MORE.: NEVER TRUE

## 2024-08-06 SDOH — HEALTH STABILITY: MENTAL HEALTH: HOW OFTEN DO YOU HAVE 6 OR MORE DRINKS ON ONE OCCASION?: NEVER

## 2024-08-06 SDOH — SOCIAL STABILITY: SOCIAL INSECURITY: DO YOU FEEL ANYONE HAS EXPLOITED OR TAKEN ADVANTAGE OF YOU FINANCIALLY OR OF YOUR PERSONAL PROPERTY?: NO

## 2024-08-06 SDOH — SOCIAL STABILITY: SOCIAL NETWORK: HOW OFTEN DO YOU ATTEND CHURCH OR RELIGIOUS SERVICES?: 1 TO 4 TIMES PER YEAR

## 2024-08-06 SDOH — ECONOMIC STABILITY: FOOD INSECURITY: WITHIN THE PAST 12 MONTHS, YOU WORRIED THAT YOUR FOOD WOULD RUN OUT BEFORE YOU GOT MONEY TO BUY MORE.: NEVER TRUE

## 2024-08-06 SDOH — SOCIAL STABILITY: SOCIAL INSECURITY: ARE YOU OR HAVE YOU BEEN THREATENED OR ABUSED PHYSICALLY, EMOTIONALLY, OR SEXUALLY BY ANYONE?: NO

## 2024-08-06 SDOH — SOCIAL STABILITY: SOCIAL INSECURITY: DO YOU FEEL UNSAFE GOING BACK TO THE PLACE WHERE YOU ARE LIVING?: NO

## 2024-08-06 SDOH — SOCIAL STABILITY: SOCIAL INSECURITY: HAVE YOU HAD THOUGHTS OF HARMING ANYONE ELSE?: NO

## 2024-08-06 SDOH — HEALTH STABILITY: PHYSICAL HEALTH: ON AVERAGE, HOW MANY DAYS PER WEEK DO YOU ENGAGE IN MODERATE TO STRENUOUS EXERCISE (LIKE A BRISK WALK)?: 0 DAYS

## 2024-08-06 SDOH — SOCIAL STABILITY: SOCIAL INSECURITY: WITHIN THE LAST YEAR, HAVE YOU BEEN AFRAID OF YOUR PARTNER OR EX-PARTNER?: NO

## 2024-08-06 SDOH — SOCIAL STABILITY: SOCIAL INSECURITY: ABUSE: ADULT

## 2024-08-06 SDOH — SOCIAL STABILITY: SOCIAL INSECURITY: WERE YOU ABLE TO COMPLETE ALL THE BEHAVIORAL HEALTH SCREENINGS?: YES

## 2024-08-06 SDOH — SOCIAL STABILITY: SOCIAL INSECURITY: WITHIN THE LAST YEAR, HAVE YOU BEEN HUMILIATED OR EMOTIONALLY ABUSED IN OTHER WAYS BY YOUR PARTNER OR EX-PARTNER?: NO

## 2024-08-06 SDOH — HEALTH STABILITY: MENTAL HEALTH: HOW OFTEN DO YOU HAVE A DRINK CONTAINING ALCOHOL?: NEVER

## 2024-08-06 SDOH — SOCIAL STABILITY: SOCIAL INSECURITY: HAS ANYONE EVER THREATENED TO HURT YOUR FAMILY OR YOUR PETS?: NO

## 2024-08-06 SDOH — HEALTH STABILITY: MENTAL HEALTH: HOW MANY STANDARD DRINKS CONTAINING ALCOHOL DO YOU HAVE ON A TYPICAL DAY?: PATIENT DOES NOT DRINK

## 2024-08-06 SDOH — SOCIAL STABILITY: SOCIAL NETWORK: HOW OFTEN DO YOU ATTENT MEETINGS OF THE CLUB OR ORGANIZATION YOU BELONG TO?: 1 TO 4 TIMES PER YEAR

## 2024-08-06 SDOH — SOCIAL STABILITY: SOCIAL NETWORK: HOW OFTEN DO YOU GET TOGETHER WITH FRIENDS OR RELATIVES?: MORE THAN THREE TIMES A WEEK

## 2024-08-06 SDOH — HEALTH STABILITY: PHYSICAL HEALTH: ON AVERAGE, HOW MANY MINUTES DO YOU ENGAGE IN EXERCISE AT THIS LEVEL?: 0 MIN

## 2024-08-06 ASSESSMENT — COGNITIVE AND FUNCTIONAL STATUS - GENERAL
PATIENT BASELINE BEDBOUND: NO
MOBILITY SCORE: 24
DAILY ACTIVITIY SCORE: 24
DAILY ACTIVITIY SCORE: 24
MOBILITY SCORE: 24

## 2024-08-06 ASSESSMENT — LIFESTYLE VARIABLES
SUBSTANCE_ABUSE_PAST_12_MONTHS: NO
AUDIT-C TOTAL SCORE: 1
SKIP TO QUESTIONS 9-10: 1
HOW OFTEN DO YOU HAVE 6 OR MORE DRINKS ON ONE OCCASION: NEVER
PRESCIPTION_ABUSE_PAST_12_MONTHS: NO
AUDIT-C TOTAL SCORE: 1
HOW OFTEN DO YOU HAVE A DRINK CONTAINING ALCOHOL: MONTHLY OR LESS
HOW MANY STANDARD DRINKS CONTAINING ALCOHOL DO YOU HAVE ON A TYPICAL DAY: 1 OR 2
SKIP TO QUESTIONS 9-10: 1
AUDIT-C TOTAL SCORE: 0
AUDIT-C TOTAL SCORE: 1
SUBSTANCE_ABUSE_PAST_12_MONTHS: NO
HOW OFTEN DO YOU HAVE A DRINK CONTAINING ALCOHOL: MONTHLY OR LESS
SKIP TO QUESTIONS 9-10: 1
PRESCIPTION_ABUSE_PAST_12_MONTHS: NO
HOW OFTEN DO YOU HAVE 6 OR MORE DRINKS ON ONE OCCASION: NEVER
HOW MANY STANDARD DRINKS CONTAINING ALCOHOL DO YOU HAVE ON A TYPICAL DAY: 1 OR 2
AUDIT-C TOTAL SCORE: 1

## 2024-08-06 ASSESSMENT — PATIENT HEALTH QUESTIONNAIRE - PHQ9
1. LITTLE INTEREST OR PLEASURE IN DOING THINGS: NOT AT ALL
SUM OF ALL RESPONSES TO PHQ9 QUESTIONS 1 & 2: 0
2. FEELING DOWN, DEPRESSED OR HOPELESS: NOT AT ALL
SUM OF ALL RESPONSES TO PHQ9 QUESTIONS 1 & 2: 0
1. LITTLE INTEREST OR PLEASURE IN DOING THINGS: NOT AT ALL
2. FEELING DOWN, DEPRESSED OR HOPELESS: NOT AT ALL

## 2024-08-06 ASSESSMENT — ACTIVITIES OF DAILY LIVING (ADL)
DRESSING YOURSELF: INDEPENDENT
JUDGMENT_ADEQUATE_SAFELY_COMPLETE_DAILY_ACTIVITIES: YES
HEARING - RIGHT EAR: FUNCTIONAL
ADEQUATE_TO_COMPLETE_ADL: YES
WALKS IN HOME: INDEPENDENT
PATIENT'S MEMORY ADEQUATE TO SAFELY COMPLETE DAILY ACTIVITIES?: YES
FEEDING YOURSELF: INDEPENDENT
ADEQUATE_TO_COMPLETE_ADL: YES
BATHING: INDEPENDENT
PATIENT'S MEMORY ADEQUATE TO SAFELY COMPLETE DAILY ACTIVITIES?: YES
HEARING - LEFT EAR: FUNCTIONAL
GROOMING: INDEPENDENT
TOILETING: INDEPENDENT
ASSISTIVE_DEVICE: WALKER
ASSISTIVE_DEVICE: OXYGEN;WALKER
HEARING - RIGHT EAR: FUNCTIONAL
HEARING - LEFT EAR: FUNCTIONAL
TOILETING: INDEPENDENT
FEEDING YOURSELF: INDEPENDENT
BATHING: INDEPENDENT
LACK_OF_TRANSPORTATION: NO
GROOMING: INDEPENDENT
WALKS IN HOME: INDEPENDENT
DRESSING YOURSELF: INDEPENDENT

## 2024-08-06 ASSESSMENT — ENCOUNTER SYMPTOMS
DYSPNEA ON EXERTION: 1
SHORTNESS OF BREATH: 1

## 2024-08-06 ASSESSMENT — PAIN SCALES - GENERAL
PAINLEVEL_OUTOF10: 0 - NO PAIN
PAINLEVEL_OUTOF10: 0 - NO PAIN

## 2024-08-06 ASSESSMENT — COLUMBIA-SUICIDE SEVERITY RATING SCALE - C-SSRS
2. HAVE YOU ACTUALLY HAD ANY THOUGHTS OF KILLING YOURSELF?: NO
6. HAVE YOU EVER DONE ANYTHING, STARTED TO DO ANYTHING, OR PREPARED TO DO ANYTHING TO END YOUR LIFE?: NO

## 2024-08-06 ASSESSMENT — PAIN - FUNCTIONAL ASSESSMENT: PAIN_FUNCTIONAL_ASSESSMENT: 0-10

## 2024-08-06 NOTE — CONSULTS
Inpatient consult to Cardiology  Consult performed by: KEVON Bautsita-CNP  Consult ordered by: Joanna Farnsworth DO  Reason for consult: CHF Exacerbation        History Of Present Illness:    Vickie Foster is a 68 y.o. female presenting with shortness of breath and lower extremity swelling.  She follows with Dr. Sanchez and Dr. Ty for cardiology.  She is a past medical history of atrial fibrillation, chronic diastolic heart failure, chronic respiratory failure requiring 3 L of oxygen at night, hypertensive disorder, and recent embolic stroke.  Patient was recently hospitalized and discharged on 7/12/2020 for after developing flash pulmonary edema following an elective cardiac ablation for atrial fibrillation.  This stay was further complicated by an embolic stroke.  The patient was discharged home in stable condition.  She reports to me that over the last week she has noticed worsening shortness of breath and lower extremity swelling.  She does report orthopnea as well as significant dyspnea on exertion.  Denies chest pain, pressure or palpitations.  She states that over the weekend it became significantly worse and she was unable to reach any of her doctors so she decided to double her dose of home torsemide on Sunday.  She was taking 100 mg of torsemide twice daily and did not have significant improvement in her symptoms so she decided to present to [facility] emergency department.  Chest x-ray on arrival revealed prominent interstitial markings at the lung bases representative of possible mild edema but also could be related to poor inspiratory effort.  Lab work revealed sodium 144, potassium 3.6, creatinine 0.80 and hemoglobin of 13.1.  proBNP very mildly elevated at 710.  Troponins elevated but flat at 28, 2931.  EKG completed in the emergency department revealed atrial flutter with variable AV block but no evidence of acute ischemia.  She was given 2 nebulizer treatments, a total of 120 mg of IV  Lasix and Toradol in the emergency department and was admitted to the hospital on telemetry for further assessment and management.    Review of Systems   Cardiovascular:  Positive for dyspnea on exertion and leg swelling.   Respiratory:  Positive for shortness of breath.    All other systems reviewed and are negative.         Last Recorded Vitals:  Vitals:    08/06/24 0715 08/06/24 0730 08/06/24 0745 08/06/24 0800   BP:  112/70  105/61   Pulse: 76 77 75 72   Resp: 15 16 (!) 22 15   Temp:       TempSrc:       SpO2: 99% 98% 95% 94%   Weight:       Height:           Last Labs:  CBC - 8/6/2024:  6:46 AM  8.9 12.5 189    40.3      CMP - 8/6/2024:  6:46 AM  9.0 6.8 25 --- 0.4   5.1 3.6 27 84      PTT - 7/8/2024:  5:26 PM  1.0   10.9 28.8     Hemoglobin A1C   Date/Time Value Ref Range Status   07/11/2024 05:26 AM 5.5 See below % Final   09/29/2023 02:34 PM 6.1 (A) % Final     Comment:          Diagnosis of Diabetes-Adults   Non-Diabetic: < or = 5.6%   Increased risk for developing diabetes: 5.7-6.4%   Diagnostic of diabetes: > or = 6.5%  .       Monitoring of Diabetes                Age (y)     Therapeutic Goal (%)   Adults:          >18           <7.0   Pediatrics:    13-18           <7.5                   7-12           <8.0                   0- 6            7.5-8.5   American Diabetes Association. Diabetes Care 33(S1), Jan 2010.   03/29/2023 01:38 PM 6.1 (H) 4.0 - 6.0 % Final     Comment:     Hemoglobin A1C levels are related to mean blood glucose during the   preceding 2-3 months. The relationship table below may be used as a   general guide. Each 1% increase in HGB A1C is a reflection of an   increase in mean glucose of approximately 30 mg/dl.   Reference: Diabetes Care, volume 29, supplement 1 Jan. 2006                        HGB A1C ................. Approx. Mean Glucose   _______________________________________________   6%   ...............................  120 mg/dl   7%   ...............................  150  mg/dl   8%   ...............................  180 mg/dl   9%   ...............................  210 mg/dl   10%  ...............................  240 mg/dl  Performed at [address] [address]       LDL Calculated   Date/Time Value Ref Range Status   07/11/2024 05:26  65 - 130 mg/dL Final   03/29/2023 01:38  65 - 130 MG/DL Final   06/16/2021 07:33  65 - 130 MG/DL Final   07/28/2020 03:08  65 - 130 MG/DL Final     VLDL   Date/Time Value Ref Range Status   09/29/2023 02:34 PM 21 0 - 40 mg/dL Final      Last I/O:  I/O last 3 completed shifts:  In: - (0 mL/kg)   Out: 300 (2.6 mL/kg) [Urine:300 (0.1 mL/kg/hr)]  Weight: 113.4 kg     Past Cardiology Tests (Last 3 Years):  EKG:  ECG 12 Lead 08/05/2024 (Preliminary)      ECG 12 Lead 07/11/2024      ECG 12 lead (Clinic Performed) 05/30/2024      ECG 12 lead (Clinic Performed) 04/16/2024      ECG 12 lead (Clinic Performed) 11/13/2023 (Preliminary)    Echo:  Transthoracic Echo (TTE) Complete 07/11/2024      Transthoracic Echo (TTE) Complete 07/08/2024      Transthoracic echo (TTE) complete 06/27/2024    Ejection Fractions:  EF   Date/Time Value Ref Range Status   07/11/2024 11:41 AM 63 %    07/08/2024 03:51 PM 68 %    06/27/2024 02:24 PM 63 %      Cath:  No results found for this or any previous visit from the past 1095 days.    Stress Test:  No results found for this or any previous visit from the past 1095 days.    Cardiac Imaging:  No results found for this or any previous visit from the past 1095 days.      Past Medical History:  She has a past medical history of A-fib (Multi), CHF (congestive heart failure) (Multi), COVID, Heart disease, HTN (hypertension), and Low back pain.    Past Surgical History:  She has a past surgical history that includes Cardioversion; Cardiac electrophysiology procedure (N/A, 05/01/2024); Cardiac electrophysiology procedure (N/A, 07/08/2024); and Ablation of dysrhythmic focus.      Social  History:  She reports that she quit smoking about 20 years ago. Her smoking use included cigarettes. She has never used smokeless tobacco. She reports current alcohol use. She reports that she does not use drugs.    Family History:  Family History   Problem Relation Name Age of Onset    Breast cancer Mother      Heart attack Mother           in early 80s of MI    Heart disease Mother      Colon cancer Father      Breast cancer Sister      Diabetes type II Sister      Kidney failure Sister      Other (herione addict) Sister      No Known Problems Brother      No Known Problems Daughter      Other (recovering alcoholic) Son      Breast cancer Other Maternal Aunt         Allergies:  Patient has no known allergies.    Inpatient Medications:  Scheduled medications   Medication Dose Route Frequency    allopurinol  300 mg oral Daily    apixaban  5 mg oral BID    atorvastatin  40 mg oral Nightly    buPROPion XL  300 mg oral Daily    dofetilide  250 mcg oral q12h    fluticasone furoate-vilanteroL  1 puff inhalation Daily    furosemide  80 mg intravenous q8h    gabapentin  300 mg oral TID    metoprolol succinate XL  25 mg oral Daily    oxygen   inhalation Continuous - Inhalation    potassium chloride CR  20 mEq oral BID     PRN medications   Medication    acetaminophen    polyethylene glycol    prochlorperazine    tiZANidine     Continuous Medications   Medication Dose Last Rate     Outpatient Medications:  Current Outpatient Medications   Medication Instructions    allopurinol (ZYLOPRIM) 300 mg, oral, Daily    ascorbic acid (VITAMIN C WITH ZINA HIPS) 1,000 mg, oral, Daily    atorvastatin (LIPITOR) 40 mg, oral, Nightly    biotin 10 mg tablet 1 tablet, oral, Daily    budesonide-formoteroL (Symbicort) 80-4.5 mcg/actuation inhaler 2 puffs, inhalation, 2 times daily RT, Rinse mouth with water after use to reduce aftertaste and incidence of candidiasis. Do not swallow.    buPROPion XL (WELLBUTRIN XL) 300 mg, oral, Daily before  breakfast    calcium carb-vitamin D3-vit K2 600 mg-1,000 unit-90 mcg tablet oral, Daily    clobetasol (Temovate) 0.05 % cream Topical, 2 times daily    docusate sodium (COLACE) 100 mg, oral, 2 times daily    dofetilide (TIKOSYN) 250 mcg, oral, Every 12 hours    doxycycline (Monodox) 100 mg capsule 1 capsule, oral, Every 12 hours scheduled (0630,1830)    Eliquis 5 mg, oral, 2 times daily    folic acid/multivit-min/lutein (CENTRUM SILVER ORAL) oral, As directed    gabapentin (NEURONTIN) 300 mg, oral, 3 times daily    guaiFENesin (MUCINEX) 600 mg, oral, 2 times daily    Klor-Con M20 20 mEq ER tablet TAKE 1 TABLET THREE TIMES A DAY WITH FOOD    metoprolol succinate XL (Toprol-XL) 25 mg 24 hr tablet TAKE 1 TABLET BY MOUTH ONE TIME DAILY    oxygen (O2) 3 L/min, inhalation, Nightly    predniSONE (DELTASONE) 10 mg    semaglutide 2.3 mg, subcutaneous, Once Weekly    torsemide (DEMADEX) 50 mg, oral, 2 times daily       Physical Exam  Cardiovascular:      Rate and Rhythm: Normal rate. Rhythm irregular.      Heart sounds: No murmur heard.     No friction rub. No gallop.   Pulmonary:      Comments: Conversational dyspnea noted. Crackles in the bilateral lung bases.  Musculoskeletal:      Right lower leg: Edema present.      Left lower leg: Edema present.   Skin:     General: Skin is warm and dry.      Capillary Refill: Capillary refill takes less than 2 seconds.   Neurological:      Mental Status: She is alert and oriented to person, place, and time.   Psychiatric:         Mood and Affect: Mood normal.         Behavior: Behavior normal.            Assessment/Plan   Acute on Chronic Hypoxic Respiratory Failure on 3L Oxygen nightly  Acute on Chronic Diastolic Heart Failure  Longstanding Persistent Atrial Fibrillation/Flutter  Recent Stroke    Impression and Plan:    8/6: As described above.  Patient with worsening lower extremity swelling as well as shortness of breath over the past few weeks but worsening over the last couple of  days. She does endorse significant orthopnea. She did increase her home torsemide dose on her own to 100 mg twice daily without significant improvement.  Her proBNP is very mildly elevated at 710.  Troponins elevated but flat pattern at 28, 2931.  Chest x-ray with evidence of what could be mild edema but rather unchanged from her previous study on 7/31/2024.  The patient did have a recent echocardiogram on 7/11/2024 which revealed a preserved ejection fraction of 60 to 65% and moderately dilated left atrium.  On a previous echo from June of this year the patient was noted to have a mildly elevated RVSP with an estimated PASP at 43 mg of mercury.  No need to repeat the study this admission.  On my exam the patient is alert and oriented.  She remains in a rate controlled atrial flutter on telemetry.  She does have significant lower extremity edema as well as conversational dyspnea.  The patient is currently on 3 L nasal cannula, and as I am talking to her she is desatting to the low 90s on her pulse oximetry.  Kidney function is normal this morning with a creatinine of 0.80.  Patient does report to me that she was recently camping and may have had more salty foods and increased fluid intake. Patient certainly does present with clinical symptoms of an acute exacerbation of her diastolic heart failure, would continue IV Lasix at 80 mg every 8 hours.  As noted per the primary team the patient was taking very high dose torsemide at home without significant diuresis, so it may be challenging to find an appropriate diuretic regimen.  Will need close attention paid to intake and output as well as daily weights.  Continue Eliquis twice daily for stroke risk reduction in the setting of atrial fibrillation.  Would continue the patient on her home dose of Tikosyn, although she is not currently in sinus rhythm.  Heart failure navigator as well as dietician have been consulted. We will follow with you.     Peripheral IV 08/05/24  20 G Proximal;Right Forearm (Active)   Site Assessment Clean;Dry;Intact 08/05/24 1921   Number of days: 1       Code Status:  Full Code    I spent 60 minutes in the professional and overall care of this patient.        KEVON Bautista-CNP

## 2024-08-06 NOTE — SIGNIFICANT EVENT
68 y.o. female with history of atrial fibrillation, diastolic heart failure, chronic respiratory failure with oxygen requirements 3 L at night, hypertension, recent stroke who presents to the emergency room with shortness of breath.  C/O of worsening orthopnea where prior to this CHFE she could lie on side (has not been able to lie flat for a while), where now she has to sleep in a chair.  Notes dietary indiscretion with increased salt intake.  [facility] and [facility], remains on 2L NC. Asymmetric swelling in LE R>L. Cardiology consulted.  Plan in addition to H&P  - Lasix 80 mg IV q8  - resume dofetilide   - Continue eliquis   - Holding home torsemide, with plan to modify diuretic regimen at DC  - Duplex US of RLE, is on eliquis, low suspicion for DVT    Orlando Gaitan DO

## 2024-08-06 NOTE — ED PROVIDER NOTES
HPI   Chief Complaint   Patient presents with    Leg Swelling     Patient arrives through triage with obvious bilaterally leg swelling and difficulty breathing. History of CHF.        This is a 68-year-old female with a past medical history of atrial fibrillation, HFpEF, COPD presenting to the ED for evaluation of shortness of breath, worsening swelling the legs over the past 3 days or so.  She states that she tried increasing her torsemide to 100 mg twice a day from 50 mg twice per day and her symptoms have only been continuing to get worse.  She denies any chest pain but admits severe shortness of breath with any exertion and is unable to lay flat to sleep due to severe worsening of her shortness of breath.  She does wear 3 L of oxygen at night by nasal cannula at night when sleeping and is not supposed to wear during the day, she has been wearing her oxygen during the day as well due to shortness of breath.  Family states they have seen pulse ox readings as low as 79% while she is trying to sleep at night.      History provided by:  Patient and relative   used: No            Patient History   Past Medical History:   Diagnosis Date    A-fib (Multi)     CHF (congestive heart failure) (Multi)     COVID     Heart disease     HTN (hypertension)     Low back pain      Past Surgical History:   Procedure Laterality Date    CARDIAC ELECTROPHYSIOLOGY PROCEDURE N/A 05/01/2024    Procedure: Cardioversion;  Surgeon: Amado Ty MD;  Location: [facility] Cardiac Cath Lab;  Service: Electrophysiology;  Laterality: N/A;  67243  i48.0  medical mutual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    CARDIAC ELECTROPHYSIOLOGY PROCEDURE N/A 7/8/2024    Procedure: Ablation A-Fib;  Surgeon: Amado Ty MD;  Location: [facility] Cardiac Cath Lab;  Service: Electrophysiology;  Laterality: N/A;  AFIB CRYOABLATION; CPT 59669; ICD I48.0   Medicare mutual of [place] medicare  ID - [id number]  [phone]  No Auth required  Call ref # [id number]     CARDIOVERSION      05/01/2024     Family History   Problem Relation Name Age of Onset    Breast cancer Mother      Heart attack Mother      Heart disease Mother      Colon cancer Father      Breast cancer Sister      Diabetes type II Sister      Kidney failure Sister      Other (herione addict) Sister      No Known Problems Brother      No Known Problems Daughter      Other (recovering alcoholic) Son      Breast cancer Other Maternal Aunt      Social History     Tobacco Use    Smoking status: Former     Types: Cigarettes    Smokeless tobacco: Never   Vaping Use    Vaping status: Never Used   Substance Use Topics    Alcohol use: Yes     Comment: monthly or less    Drug use: Never       Physical Exam   ED Triage Vitals [08/05/24 1758]   Temperature Heart Rate Respirations BP   36.4 °C (97.5 °F) 70 20 (!) 158/104      Pulse Ox Temp Source Heart Rate Source Patient Position   (!) 92 % Tympanic -- Sitting      BP Location FiO2 (%)     Left arm --       Physical Exam  General: well developed, obese elderly female who is awake and alert, in no apparent distress.  Family at bedside.  HENT: normocephalic, atraumatic.  CV: regular rate, irregularly irregular rhythm, no murmur, no gallops, or rubs. radial and dorsalis pedis pulses +2/4 bilaterally  Resp: Lung sounds diminished in the bases bilaterally.  Faint crackles in the posterior lower lung fields are present.  Patient is breathing comfortably at rest, however when laid flat she becomes tachypneic and develops intercostal retractions.  GI: abdomen soft, nontender without rigidity or guarding, no peritoneal signs, abdomen is nondistended, no masses palpated  MSK: 3+ pitting edema to lower legs bilaterally.  Psych: appropriate mood and affect, cooperative with exam  Skin: warm, dry, without evidence of rash or abrasions    ED Course & MDM   ED Course as of 08/07/24 1438   Mon Aug 05, 2024   2115 EKG Time:2112  EKG Interpretation time:2115  EKG Interpretation: EKG shows  atrial flutter with variable block with a rate of 67 bpm, normal axis, QTc 467, nonspecific ST and T wave changes but no evidence of STEMI.    EKG was interpreted by myself independently [JL]      ED Course User Index  [JL] Jayce Rothman DO         Diagnoses as of 08/07/24 1438   Shortness of breath   Acute on chronic diastolic congestive heart failure (Multi)                       [place] Coma Scale Score: 15                        Medical Decision Making  Patient is sitting upright in the ED, in no acute distress.  I did lay her flat for about 30 seconds and she developed worsening tachypnea, intercostal muscle use, worsening dyspnea and oxygen saturation dropped to 88%.  I sat her back up and her symptoms gradually improved over the course of a few minutes.  She does have pitting edema in the lower extremities bilaterally consistent with fluid overload.  BNP is 710 x-ray image which I personally reviewed shows evidence of pulmonary edema on the x-ray.  She does take Eliquis, I have low suspicion for acute PE, clinically with worsening bilateral leg swelling, worsening orthopnea symptoms are more suggestive of acute decompensated CHF.  She does have a history of COPD, 2 DuoNebs were ordered as well as IV Lasix for initial treatment of her symptoms.  Patient to be admitted for management of her acute CHF.    EKG shows atrial flutter with variable block, normal heart rate.  No acute ischemic changes.  No STEMI.  She will need to be admitted due to acute respiratory failure in the setting of acute on chronic CHF.    Procedure  Procedures     Janes Little,   08/07/24 143

## 2024-08-06 NOTE — PROGRESS NOTES
8:53 AM Called patient to complete assessment; tried cell phone and room phone but no answer.     9:55 AM Received a return call from patient and introduced self. She states she meant to call someone else and will call this worker back at a later time.

## 2024-08-06 NOTE — CARE PLAN
Problem: Skin  Goal: Decreased wound size/increased tissue granulation at next dressing change  Outcome: Progressing  Goal: Participates in plan/prevention/treatment measures  Outcome: Progressing  Goal: Prevent/manage excess moisture  Outcome: Progressing  Goal: Prevent/minimize sheer/friction injuries  Outcome: Progressing  Goal: Promote/optimize nutrition  Outcome: Progressing  Goal: Promote skin healing  Outcome: Progressing   The patient's goals for the shift include      The clinical goals for the shift include start diuresing    Over the shift, the patient did not make progress toward the following goals. Barriers to progression include nothing. Recommendations to address these barriers include none.

## 2024-08-06 NOTE — PROGRESS NOTES
TCC spoke to patient at bedside, introduced self and explained role. Patient is independent from home with spouse. State she uses oxygen at night 3L supplied from [employer]. Patient uses walker for long distances. No reports of smoking or alcohol use. OSMEL Zhang is PCP and [facility] [place] [facility] [place]. TCC provided LW and POA paperwork for patient to review.   Patient plans to return upon discharge   TCC will follow for discharge needs.     UPDATE 1500: Spoke to Visiting nurse through insurance states patient is in a program and does her vitals everyday and they follow virtually.      08/06/24 1011   Discharge Planning   Living Arrangements Spouse/significant other   Support Systems Spouse/significant other   Assistance Needed uses a walker for long distances   Type of Residence Private residence   Number of Stairs to Enter Residence 1   Number of Stairs Within Residence 13  (5 to downstairs, 8 to upstairs)   Do you have animals or pets at home? Yes   Type of Animals or Pets dog   Who is requesting discharge planning? Provider   Home or Post Acute Services None   Expected Discharge Disposition Home   Does the patient need discharge transport arranged? No   Financial Resource Strain   How hard is it for you to pay for the very basics like food, housing, medical care, and heating? Not hard   Housing Stability   In the last 12 months, was there a time when you were not able to pay the mortgage or rent on time? N   In the past 12 months, how many times have you moved where you were living? 0   At any time in the past 12 months, were you homeless or living in a shelter (including now)? N   Transportation Needs   In the past 12 months, has lack of transportation kept you from medical appointments or from getting medications? no   In the past 12 months, has lack of transportation kept you from meetings, work, or from getting things needed for daily living? No

## 2024-08-06 NOTE — PROGRESS NOTES
Spiritual Care Visit    Clinical Encounter Type  Visited With: Patient not available  Continue Visiting: Yes     Uziel Ambriz

## 2024-08-06 NOTE — H&P
History Of Present Illness  Vickie Foster is a 68 y.o. female with history of atrial fibrillation, diastolic heart failure, chronic respiratory failure with oxygen requirements 3 L at night, hypertension, recent stroke who presents to the emergency room with shortness of breath.  Patient reports increasing shortness of breath, dyspnea on exertion, orthopnea, lower extremity swelling over the last 3 weeks.  She says she called her normal doctors but no one got back to her.  About a week ago she increased her home torsemide dose from 50 mg to 100 mg twice daily.  She has been taking the increased dose for about the last week and still not having significant urine output or improving symptoms.  In the emergency room I saw her on 3 L.  She still has some conversational dyspnea.  She is sitting at a 90 degree angle in bed.  Pulse ox drops to 87% very quickly if the head of the bed is lowered.  She denies any chest pain.  Says otherwise she feels okay.  She says she has been coughing for about 24 hours with clear mucus production.     Past Medical History  She has a past medical history of A-fib (Multi), CHF (congestive heart failure) (Multi), COVID, Heart disease, HTN (hypertension), and Low back pain.    Surgical History  She has a past surgical history that includes Cardioversion; Cardiac electrophysiology procedure (N/A, 2024); Cardiac electrophysiology procedure (N/A, 2024); and Ablation of dysrhythmic focus.     Social History  She reports that she quit smoking about 20 years ago. Her smoking use included cigarettes. She has never used smokeless tobacco. She reports current alcohol use. She reports that she does not use drugs.    Family History  Family History   Problem Relation Name Age of Onset    Breast cancer Mother      Heart attack Mother           in early 80s of MI    Heart disease Mother      Colon cancer Father      Breast cancer Sister      Diabetes type II Sister      Kidney failure  Sister      Other (herione addict) Sister      No Known Problems Brother      No Known Problems Daughter      Other (recovering alcoholic) Son      Breast cancer Other Maternal Aunt         Allergies  Patient has no known allergies.    Review of Systems  General: no fatigue, no malaise, no fevers/chills   HENT: no rhinorrhea, no sore throat, no ear pain   Eyes: no change in vision, denies eye pain or discharge   Lungs: + SOB, + cough, no hemoptysis   CV: no chest pain, no palpitations, + leg edema   Abd: no nausea/vomiting, no constipation/diarrhea, no abdominal pain   : no dysuria, no frequency, no nocturia, no flank pain   Endocrine: no polydipsia/polyuria, no hot or cold intolerance   Neuro: no headaches, no syncope, no seizures   MSK: no back pain, no neck pain, no joint problems   Psych: no anxiety, no depression, no hallucinations       Physical Exam  General: alert, no diaphoresis   HENT: mucous membranes moist, external ears normal, no rhinorrhea   Eyes: no icterus or injection, no discharge   Lungs: Bibasilar rales   Heart: Irregularly irregular,  +3 pitting LE edema BL   GI: abdomen soft, nontender, nondistended, BS present   MSK: no joint effusion or deformity   Skin: no rashes, erythema, or ecchymosis   Neuro: grossly normal cognition, motor strength, sensation     Last Recorded Vitals  /74   Pulse 75   Temp 36.4 °C (97.5 °F) (Tympanic)   Resp (!) 22   Wt 113 kg (250 lb)   SpO2 97%     Relevant Results           Assessment/Plan   Principal Problem:    Shortness of breath      Acute on chronic diastolic heart failure  -Has all the clinical symptoms of heart failure with respiratory failure  -She takes high torsemide at home, taking approximately 100 mg twice daily for the last week.  She has not had any improvement in symptoms in that time.  Will start IV Lasix 80 3 times daily.  I am concerned because this probably still is an equivalent to what she has been taking at home.  She may benefit  from a higher dose Lasix drip but will defer to cardiology as they have seen her previously  -She has not been able to urinate yet in the emergency room.  Will place Cuellar catheter if she is not able to urinate within the next 2 hours.  Bladder scan done and showed 300 cc of urine but the nurse said it was suboptimal view because of patient's inability to lay flat  -Heart failure navigator, care coordinator, dietitian consulted for support as well  -Cardiology consulted    Acute on chronic respiratory failure with hypoxia  -Secondary to the problem above.  Will wean oxygen as able.  She should be on continuous pulse ox for now given her rapid ability to desaturate    A-fib  -Continue beta-blocker, Eliquis, Tikosyn    Back pain  -Muscle relaxer ordered as needed as well as Tylenol    Recent stroke  -Patient on Eliquis as mentioned above.  Also on atorvastatin    Patient request to be FULL CODE.       Joanna Farnsworth DO

## 2024-08-07 LAB
ALBUMIN SERPL-MCNC: 3.4 G/DL (ref 3.5–5)
ANION GAP SERPL CALC-SCNC: 10 MMOL/L
BUN SERPL-MCNC: 24 MG/DL (ref 8–25)
CALCIUM SERPL-MCNC: 9.4 MG/DL (ref 8.5–10.4)
CHLORIDE SERPL-SCNC: 103 MMOL/L (ref 97–107)
CO2 SERPL-SCNC: 33 MMOL/L (ref 24–31)
CREAT SERPL-MCNC: 0.7 MG/DL (ref 0.4–1.6)
EGFRCR SERPLBLD CKD-EPI 2021: >90 ML/MIN/1.73M*2
ERYTHROCYTE [DISTWIDTH] IN BLOOD BY AUTOMATED COUNT: 16.5 % (ref 11.5–14.5)
GLUCOSE SERPL-MCNC: 99 MG/DL (ref 65–99)
HCT VFR BLD AUTO: 40.8 % (ref 36–46)
HGB BLD-MCNC: 12.4 G/DL (ref 12–16)
MAGNESIUM SERPL-MCNC: 2.4 MG/DL (ref 1.6–3.1)
MCH RBC QN AUTO: 30.3 PG (ref 26–34)
MCHC RBC AUTO-ENTMCNC: 30.4 G/DL (ref 32–36)
MCV RBC AUTO: 100 FL (ref 80–100)
NRBC BLD-RTO: 0 /100 WBCS (ref 0–0)
PHOSPHATE SERPL-MCNC: 4.2 MG/DL (ref 2.5–4.5)
PLATELET # BLD AUTO: 180 X10*3/UL (ref 150–450)
POTASSIUM SERPL-SCNC: 3.5 MMOL/L (ref 3.4–5.1)
RBC # BLD AUTO: 4.09 X10*6/UL (ref 4–5.2)
SODIUM SERPL-SCNC: 146 MMOL/L (ref 133–145)
WBC # BLD AUTO: 8.6 X10*3/UL (ref 4.4–11.3)

## 2024-08-07 PROCEDURE — 85027 COMPLETE CBC AUTOMATED: CPT | Performed by: HOSPITALIST

## 2024-08-07 PROCEDURE — 83735 ASSAY OF MAGNESIUM: CPT

## 2024-08-07 PROCEDURE — 2500000004 HC RX 250 GENERAL PHARMACY W/ HCPCS (ALT 636 FOR OP/ED): Performed by: INTERNAL MEDICINE

## 2024-08-07 PROCEDURE — 94640 AIRWAY INHALATION TREATMENT: CPT

## 2024-08-07 PROCEDURE — 2500000001 HC RX 250 WO HCPCS SELF ADMINISTERED DRUGS (ALT 637 FOR MEDICARE OP): Performed by: REGISTERED NURSE

## 2024-08-07 PROCEDURE — 2500000001 HC RX 250 WO HCPCS SELF ADMINISTERED DRUGS (ALT 637 FOR MEDICARE OP): Performed by: HOSPITALIST

## 2024-08-07 PROCEDURE — 2500000002 HC RX 250 W HCPCS SELF ADMINISTERED DRUGS (ALT 637 FOR MEDICARE OP, ALT 636 FOR OP/ED): Performed by: HOSPITALIST

## 2024-08-07 PROCEDURE — 2500000001 HC RX 250 WO HCPCS SELF ADMINISTERED DRUGS (ALT 637 FOR MEDICARE OP): Performed by: STUDENT IN AN ORGANIZED HEALTH CARE EDUCATION/TRAINING PROGRAM

## 2024-08-07 PROCEDURE — 80069 RENAL FUNCTION PANEL: CPT | Performed by: HOSPITALIST

## 2024-08-07 PROCEDURE — 2500000005 HC RX 250 GENERAL PHARMACY W/O HCPCS: Performed by: HOSPITALIST

## 2024-08-07 PROCEDURE — 36415 COLL VENOUS BLD VENIPUNCTURE: CPT | Performed by: HOSPITALIST

## 2024-08-07 PROCEDURE — 2060000001 HC INTERMEDIATE ICU ROOM DAILY

## 2024-08-07 PROCEDURE — 99232 SBSQ HOSP IP/OBS MODERATE 35: CPT

## 2024-08-07 RX ORDER — PANTOPRAZOLE SODIUM 40 MG/10ML
40 INJECTION, POWDER, LYOPHILIZED, FOR SOLUTION INTRAVENOUS DAILY
Status: DISCONTINUED | OUTPATIENT
Start: 2024-08-07 | End: 2024-08-10 | Stop reason: HOSPADM

## 2024-08-07 RX ORDER — PANTOPRAZOLE SODIUM 40 MG/1
40 TABLET, DELAYED RELEASE ORAL DAILY
Status: DISCONTINUED | OUTPATIENT
Start: 2024-08-07 | End: 2024-08-10 | Stop reason: HOSPADM

## 2024-08-07 ASSESSMENT — COGNITIVE AND FUNCTIONAL STATUS - GENERAL
DAILY ACTIVITIY SCORE: 24
DAILY ACTIVITIY SCORE: 24
MOBILITY SCORE: 24
MOBILITY SCORE: 24

## 2024-08-07 ASSESSMENT — PAIN SCALES - GENERAL
PAINLEVEL_OUTOF10: 0 - NO PAIN
PAINLEVEL_OUTOF10: 0 - NO PAIN

## 2024-08-07 ASSESSMENT — PAIN - FUNCTIONAL ASSESSMENT
PAIN_FUNCTIONAL_ASSESSMENT: 0-10
PAIN_FUNCTIONAL_ASSESSMENT: 0-10

## 2024-08-07 NOTE — PROGRESS NOTES
08/07/24 1247   Discharge Planning   Expected Discharge Disposition Home     No skilled needs

## 2024-08-07 NOTE — NURSING NOTE
Spoke to Dr. Farnsworth and asked her if the EKG needs to be completed for a QTC results for the tikosyn being administered. She stated that it is not needed.

## 2024-08-07 NOTE — NURSING NOTE
Assumed care of patient. Patient is A&Ox3 and is resting in bed. Call light in reach. Patient denies pain and is afib on monitor @ 79bpm. Patient is on 3L nc.

## 2024-08-07 NOTE — PROGRESS NOTES
"Vickie Foster is a 68 y.o. female on day 1 of admission presenting with Shortness of breath.    Subjective   Patient sitting up in chair. Reports her breathing feels improved this morning.        Objective     Physical Exam  Cardiovascular:      Rate and Rhythm: Normal rate. Rhythm irregular.      Heart sounds: No murmur heard.     No friction rub. No gallop.   Pulmonary:      Effort: Pulmonary effort is normal.      Breath sounds: No wheezing, rhonchi or rales.      Comments: On 3L NC. Breath sounds diminished in bilateral lung bases  Abdominal:      General: Bowel sounds are normal.      Palpations: Abdomen is soft.   Musculoskeletal:      Right lower leg: 3+ Edema present.      Left lower leg: 3+ Edema present.   Skin:     General: Skin is warm and dry.      Capillary Refill: Capillary refill takes less than 2 seconds.   Neurological:      Mental Status: She is alert and oriented to person, place, and time.   Psychiatric:         Behavior: Behavior normal.         Last Recorded Vitals  Blood pressure 107/70, pulse 87, temperature 36.7 °C (98.1 °F), temperature source Oral, resp. rate 20, height 1.575 m (5' 2\"), weight 113 kg (249 lb 1.9 oz), SpO2 96%.  Intake/Output last 3 Shifts:  I/O last 3 completed shifts:  In: 200 (1.8 mL/kg) [P.O.:200]  Out: 300 (2.7 mL/kg) [Urine:300 (0.1 mL/kg/hr)]  Weight: 113 kg     Relevant Results  Results for orders placed or performed during the hospital encounter of 08/05/24 (from the past 24 hour(s))   Renal Function Panel   Result Value Ref Range    Glucose 99 65 - 99 mg/dL    Sodium 146 (H) 133 - 145 mmol/L    Potassium 3.5 3.4 - 5.1 mmol/L    Chloride 103 97 - 107 mmol/L    Bicarbonate 33 (H) 24 - 31 mmol/L    Urea Nitrogen 24 8 - 25 mg/dL    Creatinine 0.70 0.40 - 1.60 mg/dL    eGFR >90 >60 mL/min/1.73m*2    Calcium 9.4 8.5 - 10.4 mg/dL    Phosphorus 4.2 2.5 - 4.5 mg/dL    Albumin 3.4 (L) 3.5 - 5.0 g/dL    Anion Gap 10 <=19 mmol/L   CBC   Result Value Ref Range    WBC 8.6 " 4.4 - 11.3 x10*3/uL    nRBC 0.0 0.0 - 0.0 /100 WBCs    RBC 4.09 4.00 - 5.20 x10*6/uL    Hemoglobin 12.4 12.0 - 16.0 g/dL    Hematocrit 40.8 36.0 - 46.0 %     80 - 100 fL    MCH 30.3 26.0 - 34.0 pg    MCHC 30.4 (L) 32.0 - 36.0 g/dL    RDW 16.5 (H) 11.5 - 14.5 %    Platelets 180 150 - 450 x10*3/uL   Magnesium   Result Value Ref Range    Magnesium 2.40 1.60 - 3.10 mg/dL                     Assessment/Plan   Principal Problem:    Shortness of breath    Acute on Chronic Hypoxic Respiratory Failure on 3L Oxygen nightly  Acute on Chronic Diastolic Heart Failure  Longstanding Persistent Atrial Fibrillation/Flutter  Recent Stroke     Impression and Plan:     8/6: As described above.  Patient with worsening lower extremity swelling as well as shortness of breath over the past few weeks but worsening over the last couple of days. She does endorse significant orthopnea. She did increase her home torsemide dose on her own to 100 mg twice daily without significant improvement.  Her proBNP is very mildly elevated at 710.  Troponins elevated but flat pattern at 28, 2931.  Chest x-ray with evidence of what could be mild edema but rather unchanged from her previous study on 7/31/2024.  The patient did have a recent echocardiogram on 7/11/2024 which revealed a preserved ejection fraction of 60 to 65% and moderately dilated left atrium.  On a previous echo from June of this year the patient was noted to have a mildly elevated RVSP with an estimated PASP at 43 mg of mercury.  No need to repeat the study this admission.  On my exam the patient is alert and oriented.  She remains in a rate controlled atrial flutter on telemetry.  She does have significant lower extremity edema as well as conversational dyspnea.  The patient is currently on 3 L nasal cannula, and as I am talking to her she is desatting to the low 90s on her pulse oximetry.  Kidney function is normal this morning with a creatinine of 0.80.  Patient does report to me  that she was recently camping and may have had more salty foods and increased fluid intake. Patient certainly does present with clinical symptoms of an acute exacerbation of her diastolic heart failure, would continue IV Lasix at 80 mg every 8 hours.  As noted per the primary team the patient was taking very high dose torsemide at home without significant diuresis, so it may be challenging to find an appropriate diuretic regimen.  Will need close attention paid to intake and output as well as daily weights.  Continue Eliquis twice daily for stroke risk reduction in the setting of atrial fibrillation.  Would continue the patient on her home dose of Tikosyn, although she is not currently in sinus rhythm.  Heart failure navigator as well as dietician have been consulted. We will follow with you.     8/7: As above. Patient denies chest pain, pressure or palpitations. Patient reports to me that she urinated quite a bit overnight, but unfortunately no output was recorded. I have addressed this with nursing staff. She is on 3L NC with pulse oximetries around 94%. At home, she only wears oxygen at night. Patient is in a rate controlled atrial fibrillation on telemetry without significant ectopy. Blood pressures remain normotensive with last recorded at 96%. Patient still with significant lower extremity pitting edema. Lab work this morning reveals sodum 146, potassium 3.5, creatinine 0.70, and a hemoglobin of 12.4. Would continue IV furosemide every 12 hours with strict intakes and outputs as well as daily weights. We will follow with you.       Wendy Rodrigues, APRN-CNP

## 2024-08-07 NOTE — PROGRESS NOTES
Vickie Foster is a 68 y.o. female on day 1 of admission presenting with Shortness of breath.      Subjective   Feeling well today. States her lower extremity edema is improving, feels like she's 50% of the way back to baseline. Tolerating PO. Currently on 3L NC. Denies SOB. Reports low urine output today, states she hasn't urinated after getting the AM dose of lasix. Discussed with RN, will bladder scan to evaluate for retention. Having BM, denies constipation.        Objective     Last Recorded Vitals  /63 (BP Location: Right arm, Patient Position: Sitting)   Pulse 87   Temp 36.6 °C (97.9 °F) (Oral)   Resp 18   Wt 113 kg (249 lb 1.9 oz)   SpO2 96%   Intake/Output last 3 Shifts:    Intake/Output Summary (Last 24 hours) at 8/7/2024 1428  Last data filed at 8/7/2024 0932  Gross per 24 hour   Intake 380 ml   Output 500 ml   Net -120 ml       Admission Weight  Weight: 113 kg (250 lb) (08/05/24 1758)    Daily Weight  08/06/24 : 113 kg (249 lb 1.9 oz)    Image Results  ECG 12 Lead   Atrial fibrillationÂ Â ST &Â T wave abnormality, consider lateral ischemia  Abnormal ECG  When compared with ECG ofÂ 11-JUL-2024 06:20,   No significant change was foundÂ Â   Confirmed by Demetrius Guerrero ([id number]) on 8/6/2024 9:45:02 PM  Lower extremity venous duplex right  Narrative: Interpreted By:  Savita Dominguez,   STUDY:  [facility] US LOWER EXTREMITY VENOUS DUPLEX RIGHT;  8/6/2024 1:32 pm      INDICATION:  Signs/Symptoms:Asymmetrical swelling of RLE.      COMPARISON:  None.      ACCESSION NUMBER(S):  [id number]      ORDERING CLINICIAN:  ABHISHEK WILL      TECHNIQUE:  Vascular ultrasound of the right lower extremities was performed.  Real-time compression views as well as Gray scale, color Doppler and  spectral Doppler waveform analysis was performed.      FINDINGS:  Evaluation of the visualized portions of the right common femoral  vein, proximal, mid, and distal femoral vein, and popliteal vein were  performed.  Evaluation of the  visualized portions of the  posterior  tibial and peroneal veins were also performed.              The evaluated veins demonstrate normal compressibility. There is  intact venous flow demonstrating normal respiratory variability and  normal augmentation of flow with calf compression. Therefore, there  is no ultrasonographic evidence for deep vein thrombosis within the  evaluated veins.          Impression: 1. No DVT visualized portions right lower extremity.      MACRO:  None      Signed by: Savita Dominguez 8/6/2024 2:15 PM  Dictation workstation:   [id number]      Physical Exam  Constitutional:       General: She is not in acute distress.     Appearance: She is not toxic-appearing.   HENT:      Head: Normocephalic.      Mouth/Throat:      Pharynx: Oropharynx is clear.   Eyes:      General: No scleral icterus.  Cardiovascular:      Rate and Rhythm: Normal rate. Rhythm irregular.   Pulmonary:      Effort: No respiratory distress.      Breath sounds: No wheezing.      Comments: Mildly diminished in B/L bases, otherwise clear  Abdominal:      General: There is no distension.      Palpations: Abdomen is soft.   Musculoskeletal:      Right lower leg: Edema present.      Left lower leg: Edema present.      Comments: RLE edema > LLE edema   Neurological:      Mental Status: She is alert and oriented to person, place, and time.         Relevant Results               Assessment/Plan                  Principal Problem:    Shortness of breath    Acute on chronic diastolic heart failure  Cardiology consulted  Continue IV diuresis per cardiology  RLE edema > LLE edema which is normal per patient; LE US negative for DVT  Cardiac diet  Fluid restriction  Strict I&Os    Atrial fibrillation  Currently rate controlled  Continue eliquis, metoprolol, and tikosyn    Acute on chronic hypoxic respiratory failure 2/2 CHF exacerbation  Continue supplemental oxygen, wean as able    Back pain  Continue muscle relaxant and tylenol  PRN    Recent stroke  Continue eliquis and statin    Low urine output  Discussed with RN - will start with bladder scan  Consider straight cath vs jones placement if retaining    DVT ppx: eliquis  GI ppx: PPI    Plan:  Continue IV diuresis per cardiology  Bladder scan to r/o retention, may need straight cath vs jones                         Keena Ibarra MD

## 2024-08-07 NOTE — CONSULTS
"Nutrition Assessement Note    Nutrition Assessment    Reason for Assessment: Provider consult order (Educate patient)    Reason for Hospital Admission:  Vickie Foster is a 68 y.o. female who is admitted for SOB. Called patient via phone. Patient states she watches weight and intake at home. She is agreeable to heart healthy diet education at this time. Education provided via phone, will bring physical handout for patient at follow-up.     Past Medical History:   Diagnosis Date    A-fib (Multi)     CHF (congestive heart failure) (Multi)     COVID     Heart disease     HTN (hypertension)     Low back pain       Past Surgical History:   Procedure Laterality Date    ABLATION OF DYSRHYTHMIC FOCUS      CARDIAC ELECTROPHYSIOLOGY PROCEDURE N/A 05/01/2024    Procedure: Cardioversion;  Surgeon: Amado Ty MD;  Location: [facility];  Service: Electrophysiology;  Laterality: N/A;  59005  i48.0  medical [place]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    CARDIAC ELECTROPHYSIOLOGY PROCEDURE N/A 07/08/2024    Procedure: Ablation A-Fib;  Surgeon: Amado Ty MD;  Location: [facility];  Service: Electrophysiology;  Laterality: N/A;  AFIB CRYOABLATION; CPT 63425; ICD I48.0   Medicare [employer] [place] [employer]are  ID - [id number]  [phone]  No Auth required  Call ref # [id number]    CARDIOVERSION      05/01/2024       Nutrition History:  Food and Nutrient History: Patient reports \"okay\" appetite, states she has not been too hungry. Still eating adequate meals/day. Intake is good.  Energy Intake: Good > 75 %  Food Allergies/Intolerances:  None  GI Symptoms: None  Oral Problems: None    Anthropometrics:  Ht: 157.5 cm (5' 2\"), Wt: 113 kg (249 lb 1.9 oz), BMI: 45.55  IBW/kg (Dietitian Calculated): 50 kg  Percent of IBW: 226 %  Adjusted Body Weight (kg): 65.8 kg    Weight Change:  Daily Weight  08/06/24 : 113 kg (249 lb 1.9 oz)  07/19/24 : 113 kg (248 lb 12.8 oz)  07/12/24 : 105 kg (231 lb 7.7 oz)  07/01/24 : 114 kg (251 " lb)  06/13/24 : 112 kg (248 lb)  05/30/24 : 115 kg (254 lb)  05/22/24 : 115 kg (254 lb)  05/01/24 : 118 kg (260 lb)  04/16/24 : 123 kg (271 lb)  02/22/24 : 123 kg (272 lb)     Weight History / % Weight Change: Patient reports a recent wt around 234#. States she has had a recent 16# wt gain likely fluid related. Records support.        Significant Weight Gain: Fluid related    Nutrition Focused Physical Exam Findings: defer: Phone assessment                       Nutrition Significant Labs:  Lab Results   Component Value Date    WBC 8.6 08/07/2024    HGB 12.4 08/07/2024    HCT 40.8 08/07/2024     08/07/2024    CHOL 186 07/11/2024    TRIG 107 07/11/2024    HDL 46.0 (L) 07/11/2024    ALT 27 08/05/2024    AST 25 08/05/2024     (H) 08/07/2024    K 3.5 08/07/2024     08/07/2024    CREATININE 0.70 08/07/2024    BUN 24 08/07/2024    CO2 33 (H) 08/07/2024    TSH 2.50 07/11/2024    INR 1.0 07/08/2024    HGBA1C 5.5 07/11/2024     Nutrition Specific Medications:  allopurinol, 300 mg, oral, Daily  apixaban, 5 mg, oral, BID  atorvastatin, 40 mg, oral, Nightly  buPROPion XL, 300 mg, oral, Daily  dofetilide, 250 mcg, oral, q12h  fluticasone furoate-vilanteroL, 1 puff, inhalation, Daily  furosemide, 80 mg, intravenous, q12h  gabapentin, 300 mg, oral, TID  metoprolol succinate XL, 25 mg, oral, Daily  oxygen, , inhalation, Continuous - Inhalation  potassium chloride CR, 20 mEq, oral, BID           Dietary Orders (From admission, onward)       Start     Ordered    08/06/24 0830  Adult diet Cardiac; 70 gm fat; 2 - 3 grams Sodium; 1800 mL fluid  Diet effective now        Question Answer Comment   Diet type Cardiac    Fat restriction: 70 gm fat    Sodium restriction: 2 - 3 grams Sodium    Dietary fluid restriction / 24h: 1800 mL fluid        08/06/24 0829                   Estimated Needs:   Estimated Energy Needs  Total Energy Estimated Needs (kCal):  (8643-2333)  Total Estimated Energy Need per Day (kCal/kg):   (25-30)  Method for Estimating Needs: Adjusted wt    Estimated Protein Needs  Total Protein Estimated Needs (g):  (66-79)  Total Protein Estimated Needs (g/kg):  (1.0-1.2)  Method for Estimating Needs: Adjusted wt    Estimated Fluid Needs  Total Fluid Estimated Needs (mL):  (2947-5100)  Total Fluid Estimated Needs (mL/kg):  (25-30)  Method for Estimating Needs: 1 mL/kcal        Nutrition Diagnosis   Nutrition Diagnosis:  Malnutrition Diagnosis  Patient has Malnutrition Diagnosis: No    Nutrition Diagnosis  Patient has Nutrition Diagnosis: Yes  Diagnosis Status (1): New  Nutrition Diagnosis 1: Increased nutrient needs  Related to (1): increased demand for nutrients  As Evidenced by (1): conditions associated with diagnosis - CHF  Additional Nutrition Diagnosis: Diagnosis 2  Diagnosis Status (2): New  Nutrition Diagnosis 2: Food and nutrition related knowledge deficit  Related to (2): needs review of diet education  As Evidenced by (2): conditions associated with diagnosis       Nutrition Interventions/Recommendations   Nutrition Interventions and Recommendations:    Nutrition Prescription:  Individualized Nutrition Prescription Provided for : 1881-7717 kcal/day and 66-79g protein/day to be provided via diet    Nutrition Interventions:   Food and/or Nutrient Delivery Interventions  Interventions: Meals and snacks  Meals and Snacks: Fat-modified diet, Mineral-modified diet  Goal: Provide as ordered    Education Documentation  Diet - Low Sodium, taught by Cris Henley RDN, LD at 8/7/2024 12:10 PM.  Learner: Patient  Readiness: Acceptance  Method: Explanation  Response: Verbalizes Understanding             Nutrition Monitoring and Evaluation   Monitoring/Evaluation:   Food/Nutrient Related History Monitoring  Monitoring and Evaluation Plan: Energy intake  Energy Intake: Estimated energy intake  Criteria: pt to consume >/= 75% estimated needs    Body Composition/Growth/Weight History  Monitoring and Evaluation Plan:  Weight  Weight: Measured weight  Criteria: pt will maintain non fluid related wt            Time Spent/Follow-up:   Follow Up  Time Spent (min): 35 minutes  Last Date of Nutrition Visit: 08/07/24  Nutrition Follow-Up Needed?: 5-7 days  Follow up Comment: 8/12/24

## 2024-08-08 ENCOUNTER — DOCUMENTATION (OUTPATIENT)
Dept: CASE MANAGEMENT | Facility: HOSPITAL | Age: 69
End: 2024-08-08
Payer: MEDICARE

## 2024-08-08 LAB
ALBUMIN SERPL-MCNC: 3.7 G/DL (ref 3.5–5)
ANION GAP SERPL CALC-SCNC: 11 MMOL/L
BUN SERPL-MCNC: 21 MG/DL (ref 8–25)
CALCIUM SERPL-MCNC: 9.3 MG/DL (ref 8.5–10.4)
CHLORIDE SERPL-SCNC: 99 MMOL/L (ref 97–107)
CO2 SERPL-SCNC: 32 MMOL/L (ref 24–31)
CREAT SERPL-MCNC: 0.7 MG/DL (ref 0.4–1.6)
EGFRCR SERPLBLD CKD-EPI 2021: >90 ML/MIN/1.73M*2
ERYTHROCYTE [DISTWIDTH] IN BLOOD BY AUTOMATED COUNT: 16.1 % (ref 11.5–14.5)
GLUCOSE SERPL-MCNC: 93 MG/DL (ref 65–99)
HCT VFR BLD AUTO: 41.8 % (ref 36–46)
HGB BLD-MCNC: 12.6 G/DL (ref 12–16)
MCH RBC QN AUTO: 30.4 PG (ref 26–34)
MCHC RBC AUTO-ENTMCNC: 30.1 G/DL (ref 32–36)
MCV RBC AUTO: 101 FL (ref 80–100)
NRBC BLD-RTO: 0 /100 WBCS (ref 0–0)
PHOSPHATE SERPL-MCNC: 3.8 MG/DL (ref 2.5–4.5)
PLATELET # BLD AUTO: 196 X10*3/UL (ref 150–450)
POTASSIUM SERPL-SCNC: 3.9 MMOL/L (ref 3.4–5.1)
RBC # BLD AUTO: 4.14 X10*6/UL (ref 4–5.2)
SODIUM SERPL-SCNC: 142 MMOL/L (ref 133–145)
WBC # BLD AUTO: 8.4 X10*3/UL (ref 4.4–11.3)

## 2024-08-08 PROCEDURE — 2500000002 HC RX 250 W HCPCS SELF ADMINISTERED DRUGS (ALT 637 FOR MEDICARE OP, ALT 636 FOR OP/ED): Performed by: STUDENT IN AN ORGANIZED HEALTH CARE EDUCATION/TRAINING PROGRAM

## 2024-08-08 PROCEDURE — 2500000005 HC RX 250 GENERAL PHARMACY W/O HCPCS: Performed by: HOSPITALIST

## 2024-08-08 PROCEDURE — 85027 COMPLETE CBC AUTOMATED: CPT | Performed by: HOSPITALIST

## 2024-08-08 PROCEDURE — 99232 SBSQ HOSP IP/OBS MODERATE 35: CPT | Performed by: INTERNAL MEDICINE

## 2024-08-08 PROCEDURE — 94640 AIRWAY INHALATION TREATMENT: CPT

## 2024-08-08 PROCEDURE — 2500000001 HC RX 250 WO HCPCS SELF ADMINISTERED DRUGS (ALT 637 FOR MEDICARE OP): Performed by: STUDENT IN AN ORGANIZED HEALTH CARE EDUCATION/TRAINING PROGRAM

## 2024-08-08 PROCEDURE — 94664 DEMO&/EVAL PT USE INHALER: CPT

## 2024-08-08 PROCEDURE — 2500000001 HC RX 250 WO HCPCS SELF ADMINISTERED DRUGS (ALT 637 FOR MEDICARE OP): Performed by: HOSPITALIST

## 2024-08-08 PROCEDURE — 84100 ASSAY OF PHOSPHORUS: CPT | Performed by: HOSPITALIST

## 2024-08-08 PROCEDURE — 2500000002 HC RX 250 W HCPCS SELF ADMINISTERED DRUGS (ALT 637 FOR MEDICARE OP, ALT 636 FOR OP/ED): Performed by: HOSPITALIST

## 2024-08-08 PROCEDURE — 2500000004 HC RX 250 GENERAL PHARMACY W/ HCPCS (ALT 636 FOR OP/ED): Performed by: INTERNAL MEDICINE

## 2024-08-08 PROCEDURE — 9420000001 HC RT PATIENT EDUCATION 5 MIN

## 2024-08-08 PROCEDURE — 2060000001 HC INTERMEDIATE ICU ROOM DAILY

## 2024-08-08 PROCEDURE — 36415 COLL VENOUS BLD VENIPUNCTURE: CPT | Performed by: HOSPITALIST

## 2024-08-08 RX ORDER — IPRATROPIUM BROMIDE AND ALBUTEROL SULFATE 2.5; .5 MG/3ML; MG/3ML
3 SOLUTION RESPIRATORY (INHALATION) EVERY 6 HOURS PRN
Status: DISCONTINUED | OUTPATIENT
Start: 2024-08-08 | End: 2024-08-10 | Stop reason: HOSPADM

## 2024-08-08 ASSESSMENT — COGNITIVE AND FUNCTIONAL STATUS - GENERAL
DAILY ACTIVITIY SCORE: 24
DAILY ACTIVITIY SCORE: 24
MOBILITY SCORE: 21
MOBILITY SCORE: 21
CLIMB 3 TO 5 STEPS WITH RAILING: TOTAL
CLIMB 3 TO 5 STEPS WITH RAILING: TOTAL

## 2024-08-08 ASSESSMENT — PAIN SCALES - GENERAL
PAINLEVEL_OUTOF10: 0 - NO PAIN
PAINLEVEL_OUTOF10: 0 - NO PAIN
PAINLEVEL_OUTOF10: 2
PAINLEVEL_OUTOF10: 0 - NO PAIN
PAINLEVEL_OUTOF10: 5 - MODERATE PAIN

## 2024-08-08 ASSESSMENT — PAIN - FUNCTIONAL ASSESSMENT
PAIN_FUNCTIONAL_ASSESSMENT: 0-10

## 2024-08-08 NOTE — PROGRESS NOTES
"Vickie Foster is a 68 y.o. female on day 2 of admission presenting with Shortness of breath.    Subjective   Patient states she had a lot of urination yesterday.  Breathing feels better.  She thinks swelling is going down a little bit.       Objective     Physical Exam  General: Awake and alert  Neck: Normal jugular venous pressures  Heart: Irregular rate and rhythm  Lungs: Diminished breath sounds in the bases but no active wheezing or crackles  Abdomen: Soft bowel sounds positive  Extremities: 1-2+ edema bilaterally  Neuro: Awake alert answering all questions appropriately    Last Recorded Vitals  Blood pressure 110/79, pulse 77, temperature 36.9 °C (98.4 °F), temperature source Temporal, resp. rate 15, height 1.575 m (5' 2\"), weight 116 kg (256 lb 6.3 oz), SpO2 92%.  Intake/Output last 3 Shifts:  I/O last 3 completed shifts:  In: 500 (4.3 mL/kg) [P.O.:500]  Out: 3200 (27.5 mL/kg) [Urine:3200 (0.8 mL/kg/hr)]  Weight: 116.3 kg     Relevant Results                  Results for orders placed or performed during the hospital encounter of 08/05/24 (from the past 24 hour(s))   Renal Function Panel   Result Value Ref Range    Glucose 93 65 - 99 mg/dL    Sodium 142 133 - 145 mmol/L    Potassium 3.9 3.4 - 5.1 mmol/L    Chloride 99 97 - 107 mmol/L    Bicarbonate 32 (H) 24 - 31 mmol/L    Urea Nitrogen 21 8 - 25 mg/dL    Creatinine 0.70 0.40 - 1.60 mg/dL    eGFR >90 >60 mL/min/1.73m*2    Calcium 9.3 8.5 - 10.4 mg/dL    Phosphorus 3.8 2.5 - 4.5 mg/dL    Albumin 3.7 3.5 - 5.0 g/dL    Anion Gap 11 <=19 mmol/L   CBC   Result Value Ref Range    WBC 8.4 4.4 - 11.3 x10*3/uL    nRBC 0.0 0.0 - 0.0 /100 WBCs    RBC 4.14 4.00 - 5.20 x10*6/uL    Hemoglobin 12.6 12.0 - 16.0 g/dL    Hematocrit 41.8 36.0 - 46.0 %     (H) 80 - 100 fL    MCH 30.4 26.0 - 34.0 pg    MCHC 30.1 (L) 32.0 - 36.0 g/dL    RDW 16.1 (H) 11.5 - 14.5 %    Platelets 196 150 - 450 x10*3/uL               Assessment/Plan   Principal Problem:    Shortness of " breath      Acute on Chronic Hypoxic Respiratory Failure on 3L Oxygen nightly  Acute on Chronic Diastolic Heart Failure  Longstanding Persistent Atrial Fibrillation/Flutter  Recent Stroke     Impression and Plan:     8/6: As described above.  Patient with worsening lower extremity swelling as well as shortness of breath over the past few weeks but worsening over the last couple of days. She does endorse significant orthopnea. She did increase her home torsemide dose on her own to 100 mg twice daily without significant improvement.  Her proBNP is very mildly elevated at 710.  Troponins elevated but flat pattern at 28, 2931.  Chest x-ray with evidence of what could be mild edema but rather unchanged from her previous study on 7/31/2024.  The patient did have a recent echocardiogram on 7/11/2024 which revealed a preserved ejection fraction of 60 to 65% and moderately dilated left atrium.  On a previous echo from June of this year the patient was noted to have a mildly elevated RVSP with an estimated PASP at 43 mg of mercury.  No need to repeat the study this admission.  On my exam the patient is alert and oriented.  She remains in a rate controlled atrial flutter on telemetry.  She does have significant lower extremity edema as well as conversational dyspnea.  The patient is currently on 3 L nasal cannula, and as I am talking to her she is desatting to the low 90s on her pulse oximetry.  Kidney function is normal this morning with a creatinine of 0.80.  Patient does report to me that she was recently camping and may have had more salty foods and increased fluid intake. Patient certainly does present with clinical symptoms of an acute exacerbation of her diastolic heart failure, would continue IV Lasix at 80 mg every 8 hours.  As noted per the primary team the patient was taking very high dose torsemide at home without significant diuresis, so it may be challenging to find an appropriate diuretic regimen.  Will need  close attention paid to intake and output as well as daily weights.  Continue Eliquis twice daily for stroke risk reduction in the setting of atrial fibrillation.  Would continue the patient on her home dose of Tikosyn, although she is not currently in sinus rhythm.  Heart failure navigator as well as dietician have been consulted. We will follow with you.      8/7: As above. Patient denies chest pain, pressure or palpitations. Patient reports to me that she urinated quite a bit overnight, but unfortunately no output was recorded. I have addressed this with nursing staff. She is on 3L NC with pulse oximetries around 94%. At home, she only wears oxygen at night. Patient is in a rate controlled atrial fibrillation on telemetry without significant ectopy. Blood pressures remain normotensive with last recorded at 96%. Patient still with significant lower extremity pitting edema. Lab work this morning reveals sodum 146, potassium 3.5, creatinine 0.70, and a hemoglobin of 12.4. Would continue IV furosemide every 12 hours with strict intakes and outputs as well as daily weights. We will follow with you.     8/8: Patient had a -3 L fluid balance over the last 24 hours.  Still with edema in the lower extremities.  I will recommend an additional 24 hours of IV diuretic therapy and then likely transition to oral therapy tomorrow.  Serum creatinine at 0.7 and serum potassium of 3.9 on morning laboratory studies.  Will recheck basic metabolic panel in the morning.             I spent 15 minutes in the professional and overall care of this patient.      Servando Lundberg, DO

## 2024-08-08 NOTE — PROGRESS NOTES
CHF Clinical Nurse Navigator Documentation    Congestive Heart Failure disease education was performed by the Clinical Nurse Navigator with a good understanding: Yes.  Mini-Cog test completed: n/a    Living With Heart Failure Education booklet provided:  Yes.  CHF signs and symptoms discussed and when to call cardiologist:  Yes.  Compliant with home medications: Yes.  Low sodium nutrition education reviewed: Yes.  Fluid Restriction Education reviewed: n/a.  Daily Weight Education reviewed: Yes. Pt has working scale.  EF: 60-65%    Cardiovascular Rehab Referral ordered:  Yes.  Follow-up with Cardiologist after discharge education provided: Yes. Appt made with  on 8/21/24 at 3pm in [place].     Meds to Beds discussed: Yes   Meds to Beds Opt In charted: Yes.    Comments: Pt admitted for SOB, LE Edema, HFpEF. Reviewed af HF education completed. Pt is agreeable to me following for 30 days after DC.

## 2024-08-08 NOTE — NURSING NOTE
Assumed care of patient. Patient is A&Ox3 and is sitting in a chair watching tv on 3L nc. Patient denies pain and is afib on monitor @ 70bpm.

## 2024-08-08 NOTE — PROGRESS NOTES
Spiritual Care Visit    Clinical Encounter Type  Visited With: Patient not available  Routine Visit: Introduction  Continue Visiting: Yes     Uziel Ambriz

## 2024-08-08 NOTE — NURSING NOTE
CHF education/book given to patient. Discussed low sodium diet, importance of daily weights, following up with physician appointments, taking medications as prescribed.

## 2024-08-08 NOTE — PROGRESS NOTES
Vickie Foster is a 68 y.o. female on day 2 of admission presenting with Shortness of breath.      Subjective   Patient remain on 3 L oxygen.  She has been afebrile during the night.  Patient complain of shortness of breath intermittently.         Objective     Last Recorded Vitals  /79 (BP Location: Right arm, Patient Position: Lying)   Pulse 91   Temp 36.9 °C (98.4 °F) (Temporal)   Resp 15   Wt 116 kg (256 lb 6.3 oz)   SpO2 92%   Intake/Output last 3 Shifts:    Intake/Output Summary (Last 24 hours) at 8/8/2024 0818  Last data filed at 8/8/2024 0629  Gross per 24 hour   Intake 300 ml   Output 3200 ml   Net -2900 ml       Admission Weight  Weight: 113 kg (250 lb) (08/05/24 1758)    Daily Weight  08/08/24 : 116 kg (256 lb 6.3 oz)    Image Results  ECG 12 Lead   Atrial fibrillationÂ Â ST &Â T wave abnormality, consider lateral ischemia  Abnormal ECG  When compared with ECG ofÂ 11-JUL-2024 06:20,   No significant change was foundÂ Â   Confirmed by Demetrius Guerrero ([id number]) on 8/6/2024 9:45:02 PM  Lower extremity venous duplex right  Narrative: Interpreted By:  Savita Dominguez,   STUDY:  [facility] US LOWER EXTREMITY VENOUS DUPLEX RIGHT;  8/6/2024 1:32 pm      INDICATION:  Signs/Symptoms:Asymmetrical swelling of RLE.      COMPARISON:  None.      ACCESSION NUMBER(S):  [id number]      ORDERING CLINICIAN:  ABHISHEK WILL      TECHNIQUE:  Vascular ultrasound of the right lower extremities was performed.  Real-time compression views as well as Gray scale, color Doppler and  spectral Doppler waveform analysis was performed.      FINDINGS:  Evaluation of the visualized portions of the right common femoral  vein, proximal, mid, and distal femoral vein, and popliteal vein were  performed.  Evaluation of the visualized portions of the  posterior  tibial and peroneal veins were also performed.              The evaluated veins demonstrate normal compressibility. There is  intact venous flow demonstrating normal respiratory  variability and  normal augmentation of flow with calf compression. Therefore, there  is no ultrasonographic evidence for deep vein thrombosis within the  evaluated veins.          Impression: 1. No DVT visualized portions right lower extremity.      MACRO:  None      Signed by: Savita Dominguez 8/6/2024 2:15 PM  Dictation workstation:   [id number]      Physical Exam    General: On 3 L oxygen, cooperating during physical exam, pleasant, morbidly obese  HEENT: Pupils are equal and reactive to light and commendation , oral mucosa moist, no JVD   Cardiovascular: Irregularly irregular heart rhythm, PMI nondisplaced  Lungs: Clear to auscultation bilaterally, no wheezing, no crackles, no dullness to percussion.  Abdomen: No hepatosplenomegaly appreciated, soft , not tender, positive bowel sounds, positive bowel movement.  Neuro: Alert and oriented x3, strength in upper and lower extremities , sensation intact.  Musculoskeletal: +1 pedal edema in both lower extremity   Vascular: Pulses are intact in upper and lower extremities  Skin: Change in both lower extremity secondary venous stasis     Assessment/Plan      Acute on chronic CHF with diastolic dysfunction  Continue with her IV Lasix  Monitor I&O's  Fluid restriction  Dr. Lundberg on the case    Atrial fibrillation  Has ablation, cardioversion  She remained in A-fib.  Patient is on Tikosyn, metoprolol  Continue with anticoagulation    Acute on chronic respiratory failure with hypoxia  Patient remained on 3 L oxygen.  She is on her baseline    Morbid obesity  Advise regarding diet    History of stroke  Continue with current medication  Patient is on anticoagulation    Gout  Continue with allopurinol    COPD  Chronic respiratory failure on 3 L oxygen at home.    Waiting for cardiology to see her today.  Continue with current medication.  Check CBC and BMP in AM.      Principal Problem:    Shortness of breath                  Randal De Anda MD

## 2024-08-09 ENCOUNTER — APPOINTMENT (OUTPATIENT)
Dept: CARDIOLOGY | Facility: CLINIC | Age: 69
End: 2024-08-09
Payer: MEDICARE

## 2024-08-09 LAB
ALBUMIN SERPL-MCNC: 3.6 G/DL (ref 3.5–5)
ANION GAP SERPL CALC-SCNC: 12 MMOL/L
BUN SERPL-MCNC: 20 MG/DL (ref 8–25)
CALCIUM SERPL-MCNC: 9.2 MG/DL (ref 8.5–10.4)
CHLORIDE SERPL-SCNC: 100 MMOL/L (ref 97–107)
CO2 SERPL-SCNC: 32 MMOL/L (ref 24–31)
CREAT SERPL-MCNC: 0.7 MG/DL (ref 0.4–1.6)
EGFRCR SERPLBLD CKD-EPI 2021: >90 ML/MIN/1.73M*2
ERYTHROCYTE [DISTWIDTH] IN BLOOD BY AUTOMATED COUNT: 15.8 % (ref 11.5–14.5)
GLUCOSE SERPL-MCNC: 97 MG/DL (ref 65–99)
HCT VFR BLD AUTO: 40.4 % (ref 36–46)
HGB BLD-MCNC: 12.5 G/DL (ref 12–16)
MCH RBC QN AUTO: 30.5 PG (ref 26–34)
MCHC RBC AUTO-ENTMCNC: 30.9 G/DL (ref 32–36)
MCV RBC AUTO: 99 FL (ref 80–100)
NRBC BLD-RTO: 0 /100 WBCS (ref 0–0)
PHOSPHATE SERPL-MCNC: 3.8 MG/DL (ref 2.5–4.5)
PLATELET # BLD AUTO: 178 X10*3/UL (ref 150–450)
POTASSIUM SERPL-SCNC: 3.8 MMOL/L (ref 3.4–5.1)
RBC # BLD AUTO: 4.1 X10*6/UL (ref 4–5.2)
SODIUM SERPL-SCNC: 144 MMOL/L (ref 133–145)
WBC # BLD AUTO: 8.3 X10*3/UL (ref 4.4–11.3)

## 2024-08-09 PROCEDURE — 94640 AIRWAY INHALATION TREATMENT: CPT

## 2024-08-09 PROCEDURE — 99232 SBSQ HOSP IP/OBS MODERATE 35: CPT | Performed by: INTERNAL MEDICINE

## 2024-08-09 PROCEDURE — 2500000001 HC RX 250 WO HCPCS SELF ADMINISTERED DRUGS (ALT 637 FOR MEDICARE OP): Performed by: REGISTERED NURSE

## 2024-08-09 PROCEDURE — 80069 RENAL FUNCTION PANEL: CPT | Performed by: HOSPITALIST

## 2024-08-09 PROCEDURE — 9420000001 HC RT PATIENT EDUCATION 5 MIN

## 2024-08-09 PROCEDURE — 85027 COMPLETE CBC AUTOMATED: CPT | Performed by: HOSPITALIST

## 2024-08-09 PROCEDURE — 2500000001 HC RX 250 WO HCPCS SELF ADMINISTERED DRUGS (ALT 637 FOR MEDICARE OP): Performed by: STUDENT IN AN ORGANIZED HEALTH CARE EDUCATION/TRAINING PROGRAM

## 2024-08-09 PROCEDURE — 2500000004 HC RX 250 GENERAL PHARMACY W/ HCPCS (ALT 636 FOR OP/ED): Performed by: INTERNAL MEDICINE

## 2024-08-09 PROCEDURE — 2500000005 HC RX 250 GENERAL PHARMACY W/O HCPCS: Performed by: HOSPITALIST

## 2024-08-09 PROCEDURE — 36415 COLL VENOUS BLD VENIPUNCTURE: CPT | Performed by: HOSPITALIST

## 2024-08-09 PROCEDURE — 2500000001 HC RX 250 WO HCPCS SELF ADMINISTERED DRUGS (ALT 637 FOR MEDICARE OP): Performed by: INTERNAL MEDICINE

## 2024-08-09 PROCEDURE — 2060000001 HC INTERMEDIATE ICU ROOM DAILY

## 2024-08-09 PROCEDURE — 2500000002 HC RX 250 W HCPCS SELF ADMINISTERED DRUGS (ALT 637 FOR MEDICARE OP, ALT 636 FOR OP/ED): Performed by: HOSPITALIST

## 2024-08-09 PROCEDURE — 2500000001 HC RX 250 WO HCPCS SELF ADMINISTERED DRUGS (ALT 637 FOR MEDICARE OP): Performed by: HOSPITALIST

## 2024-08-09 RX ORDER — TORSEMIDE 100 MG/1
50 TABLET ORAL
Status: DISCONTINUED | OUTPATIENT
Start: 2024-08-09 | End: 2024-08-10 | Stop reason: HOSPADM

## 2024-08-09 ASSESSMENT — COGNITIVE AND FUNCTIONAL STATUS - GENERAL
MOBILITY SCORE: 21
CLIMB 3 TO 5 STEPS WITH RAILING: TOTAL
CLIMB 3 TO 5 STEPS WITH RAILING: TOTAL
DAILY ACTIVITIY SCORE: 24
DAILY ACTIVITIY SCORE: 24
MOBILITY SCORE: 21

## 2024-08-09 ASSESSMENT — PAIN - FUNCTIONAL ASSESSMENT
PAIN_FUNCTIONAL_ASSESSMENT: 0-10

## 2024-08-09 ASSESSMENT — PAIN SCALES - GENERAL
PAINLEVEL_OUTOF10: 0 - NO PAIN
PAINLEVEL_OUTOF10: 0 - NO PAIN
PAINLEVEL_OUTOF10: 3
PAINLEVEL_OUTOF10: 0 - NO PAIN

## 2024-08-09 ASSESSMENT — PAIN DESCRIPTION - DESCRIPTORS: DESCRIPTORS: ACHING

## 2024-08-09 NOTE — PROGRESS NOTES
08/09/24 1430   Discharge Planning   Expected Discharge Disposition Home     No skilled needs

## 2024-08-09 NOTE — PROGRESS NOTES
"Vickie Foster is a 68 y.o. female on day 3 of admission presenting with Shortness of breath.    Subjective   States she is feeling better with less swelling and edema but developed a nosebleed this morning.       Objective     Physical Exam  General: Awake and alert  Nasal: Evidence of previous bleed, blood now crusted and does not peer to be actively bleeding at this time  Neck: Normal jugular venous pressures  Heart: Regular rate and rhythm  Lungs: Clear anteriorly  Abdomen: Soft obese bowel sounds positive  Extremities: 1+ edema  Last Recorded Vitals  Blood pressure 110/77, pulse 72, temperature 36.5 °C (97.7 °F), temperature source Temporal, resp. rate 19, height 1.575 m (5' 2\"), weight 116 kg (255 lb 11.7 oz), SpO2 92%.  Intake/Output last 3 Shifts:  I/O last 3 completed shifts:  In: 1240 (10.7 mL/kg) [P.O.:1240]  Out: 5450 (47 mL/kg) [Urine:5450 (1.3 mL/kg/hr)]  Weight: 116 kg     Relevant Results                Results for orders placed or performed during the hospital encounter of 08/05/24 (from the past 24 hour(s))   Renal Function Panel   Result Value Ref Range    Glucose 97 65 - 99 mg/dL    Sodium 144 133 - 145 mmol/L    Potassium 3.8 3.4 - 5.1 mmol/L    Chloride 100 97 - 107 mmol/L    Bicarbonate 32 (H) 24 - 31 mmol/L    Urea Nitrogen 20 8 - 25 mg/dL    Creatinine 0.70 0.40 - 1.60 mg/dL    eGFR >90 >60 mL/min/1.73m*2    Calcium 9.2 8.5 - 10.4 mg/dL    Phosphorus 3.8 2.5 - 4.5 mg/dL    Albumin 3.6 3.5 - 5.0 g/dL    Anion Gap 12 <=19 mmol/L   CBC   Result Value Ref Range    WBC 8.3 4.4 - 11.3 x10*3/uL    nRBC 0.0 0.0 - 0.0 /100 WBCs    RBC 4.10 4.00 - 5.20 x10*6/uL    Hemoglobin 12.5 12.0 - 16.0 g/dL    Hematocrit 40.4 36.0 - 46.0 %    MCV 99 80 - 100 fL    MCH 30.5 26.0 - 34.0 pg    MCHC 30.9 (L) 32.0 - 36.0 g/dL    RDW 15.8 (H) 11.5 - 14.5 %    Platelets 178 150 - 450 x10*3/uL                 Assessment/Plan   Principal Problem:    Shortness of breath      Acute on Chronic Hypoxic Respiratory Failure " on 3L Oxygen nightly  Acute on Chronic Diastolic Heart Failure  Longstanding Persistent Atrial Fibrillation/Flutter  Recent Stroke     Impression and Plan:     8/6: As described above.  Patient with worsening lower extremity swelling as well as shortness of breath over the past few weeks but worsening over the last couple of days. She does endorse significant orthopnea. She did increase her home torsemide dose on her own to 100 mg twice daily without significant improvement.  Her proBNP is very mildly elevated at 710.  Troponins elevated but flat pattern at 28, 2931.  Chest x-ray with evidence of what could be mild edema but rather unchanged from her previous study on 7/31/2024.  The patient did have a recent echocardiogram on 7/11/2024 which revealed a preserved ejection fraction of 60 to 65% and moderately dilated left atrium.  On a previous echo from June of this year the patient was noted to have a mildly elevated RVSP with an estimated PASP at 43 mg of mercury.  No need to repeat the study this admission.  On my exam the patient is alert and oriented.  She remains in a rate controlled atrial flutter on telemetry.  She does have significant lower extremity edema as well as conversational dyspnea.  The patient is currently on 3 L nasal cannula, and as I am talking to her she is desatting to the low 90s on her pulse oximetry.  Kidney function is normal this morning with a creatinine of 0.80.  Patient does report to me that she was recently camping and may have had more salty foods and increased fluid intake. Patient certainly does present with clinical symptoms of an acute exacerbation of her diastolic heart failure, would continue IV Lasix at 80 mg every 8 hours.  As noted per the primary team the patient was taking very high dose torsemide at home without significant diuresis, so it may be challenging to find an appropriate diuretic regimen.  Will need close attention paid to intake and output as well as daily  weights.  Continue Eliquis twice daily for stroke risk reduction in the setting of atrial fibrillation.  Would continue the patient on her home dose of Tikosyn, although she is not currently in sinus rhythm.  Heart failure navigator as well as dietician have been consulted. We will follow with you.      8/7: As above. Patient denies chest pain, pressure or palpitations. Patient reports to me that she urinated quite a bit overnight, but unfortunately no output was recorded. I have addressed this with nursing staff. She is on 3L NC with pulse oximetries around 94%. At home, she only wears oxygen at night. Patient is in a rate controlled atrial fibrillation on telemetry without significant ectopy. Blood pressures remain normotensive with last recorded at 96%. Patient still with significant lower extremity pitting edema. Lab work this morning reveals sodum 146, potassium 3.5, creatinine 0.70, and a hemoglobin of 12.4. Would continue IV furosemide every 12 hours with strict intakes and outputs as well as daily weights. We will follow with you.      8/8: Patient had a -3 L fluid balance over the last 24 hours.  Still with edema in the lower extremities.  I will recommend an additional 24 hours of IV diuretic therapy and then likely transition to oral therapy tomorrow.  Serum creatinine at 0.7 and serum potassium of 3.9 on morning laboratory studies.  Will recheck basic metabolic panel in the morning.     8/9: Clinically appears to be compensated.  -1600 fluid balance over the last 24 hours.  Will transition from IV furosemide to oral torsemide therapy.  Patient did have a nosebleed this morning and I suggested pausing Eliquis for short time, but patient is resistant since she has had a stroke in the past.  Manual pressure was applied and active bleeding appears to have stopped currently.  Serum potassium 3.8 and serum creatinine 0.7.  Would begin discharge planning.  If nosebleeds stop and patient clinically well  potentially discharge home either later this afternoon or tomorrow morning.          I spent 20 minutes in the professional and overall care of this patient.      Servando Lundberg, DO

## 2024-08-09 NOTE — NURSING NOTE
Nurse called in to patient room. Patient experiencing nose bleed. Patient states she scratched the inside of her nose. Pressure applied, patient cleaned up. While cleaning patient, clot came out and bleeding continued. MD made aware. Patient wanted to hold pressure herself. Supplies and instructions given. Checked back on patient at 1120, bleeding stopped. New gown and new oxygen tubing with humidifier given.

## 2024-08-09 NOTE — PROGRESS NOTES
Vickie Foster is a 68 y.o. female on day 3 of admission presenting with Shortness of breath.      Subjective   Patient denied fever or chills.    She feels better today.  Patient remain on 3 L oxygen.  Patient is on chronic oxygen therapy during the night only          Objective     Last Recorded Vitals  /77 (BP Location: Right arm, Patient Position: Lying)   Pulse 72   Temp 36.5 °C (97.7 °F) (Temporal)   Resp 19   Wt 116 kg (255 lb 11.7 oz)   SpO2 92%   Intake/Output last 3 Shifts:    Intake/Output Summary (Last 24 hours) at 8/9/2024 0935  Last data filed at 8/9/2024 0755  Gross per 24 hour   Intake 1240 ml   Output 2350 ml   Net -1110 ml       Admission Weight  Weight: 113 kg (250 lb) (08/05/24 1758)    Daily Weight  08/09/24 : 116 kg (255 lb 11.7 oz)    Image Results  ECG 12 Lead   Atrial fibrillationÂ Â ST &Â T wave abnormality, consider lateral ischemia  Abnormal ECG  When compared with ECG ofÂ 11-JUL-2024 06:20,   No significant change was foundÂ Â   Confirmed by Demetrius Guerrero ([id number]) on 8/6/2024 9:45:02 PM  Lower extremity venous duplex right  Narrative: Interpreted By:  Savita Dominguez,   STUDY:  [facility] US LOWER EXTREMITY VENOUS DUPLEX RIGHT;  8/6/2024 1:32 pm      INDICATION:  Signs/Symptoms:Asymmetrical swelling of RLE.      COMPARISON:  None.      ACCESSION NUMBER(S):  [id number]      ORDERING CLINICIAN:  ABHISHEK WILL      TECHNIQUE:  Vascular ultrasound of the right lower extremities was performed.  Real-time compression views as well as Gray scale, color Doppler and  spectral Doppler waveform analysis was performed.      FINDINGS:  Evaluation of the visualized portions of the right common femoral  vein, proximal, mid, and distal femoral vein, and popliteal vein were  performed.  Evaluation of the visualized portions of the  posterior  tibial and peroneal veins were also performed.              The evaluated veins demonstrate normal compressibility. There is  intact venous flow  demonstrating normal respiratory variability and  normal augmentation of flow with calf compression. Therefore, there  is no ultrasonographic evidence for deep vein thrombosis within the  evaluated veins.          Impression: 1. No DVT visualized portions right lower extremity.      MACRO:  None      Signed by: Savita Dominguez 8/6/2024 2:15 PM  Dictation workstation:   [id number]      Physical Exam    General: On 3 L oxygen, cooperating during physical exam, pleasant, morbidly obese  HEENT: Pupils are equal and reactive to light and commendation , oral mucosa moist, no JVD   Cardiovascular: Irregularly irregular heart rhythm, PMI nondisplaced  Lungs: Clear to auscultation bilaterally, no wheezing, no crackles, no dullness to percussion.  Abdomen: No hepatosplenomegaly appreciated, soft , not tender, positive bowel sounds, positive bowel movement.  Neuro: Alert and oriented x3, strength in upper and lower extremities , sensation intact.  Musculoskeletal: +1 pedal edema in both lower extremity   Vascular: Pulses are intact in upper and lower extremities  Skin: Change in both lower extremity secondary venous stasis     Assessment/Plan      Acute on chronic CHF with diastolic dysfunction  Continue with her IV Lasix  Monitor I&O's  Fluid restriction  Dr. Lundberg on the case    Atrial fibrillation  Has ablation, cardioversion  She remained in A-fib.  Patient is on Tikosyn, metoprolol  Continue with anticoagulation    Acute on chronic respiratory failure with hypoxia  Patient remained on 3 L oxygen.  She is on her baseline    Morbid obesity  Advise regarding diet    History of stroke  Continue with current medication  Patient is on anticoagulation    Gout  Continue with allopurinol    COPD  Chronic respiratory failure on 3 L oxygen at home.    Waiting for cardiology to see her today.  Continue with current medication.    BMP in AM.  PT /OT to evaluate her today.    Principal Problem:    Shortness of breath                   Randal De Anda MD

## 2024-08-09 NOTE — NURSING NOTE
Assumed care of patient. Pain 0/10. No needs expressed. Bed low and locked, call light within reach. Patient up to chair.

## 2024-08-09 NOTE — CARE PLAN
The patient's goals for the shift include      The clinical goals for the shift include follow fluid restriction

## 2024-08-10 ENCOUNTER — PHARMACY VISIT (OUTPATIENT)
Dept: PHARMACY | Facility: CLINIC | Age: 69
End: 2024-08-10
Payer: COMMERCIAL

## 2024-08-10 VITALS
TEMPERATURE: 98.8 F | HEART RATE: 85 BPM | HEIGHT: 62 IN | OXYGEN SATURATION: 94 % | WEIGHT: 255.73 LBS | RESPIRATION RATE: 18 BRPM | BODY MASS INDEX: 47.06 KG/M2 | DIASTOLIC BLOOD PRESSURE: 63 MMHG | SYSTOLIC BLOOD PRESSURE: 99 MMHG

## 2024-08-10 LAB
ALBUMIN SERPL-MCNC: 3.7 G/DL (ref 3.5–5)
ANION GAP SERPL CALC-SCNC: 7 MMOL/L
BUN SERPL-MCNC: 23 MG/DL (ref 8–25)
CALCIUM SERPL-MCNC: 9.4 MG/DL (ref 8.5–10.4)
CHLORIDE SERPL-SCNC: 98 MMOL/L (ref 97–107)
CO2 SERPL-SCNC: 36 MMOL/L (ref 24–31)
CREAT SERPL-MCNC: 0.7 MG/DL (ref 0.4–1.6)
EGFRCR SERPLBLD CKD-EPI 2021: >90 ML/MIN/1.73M*2
ERYTHROCYTE [DISTWIDTH] IN BLOOD BY AUTOMATED COUNT: 15.9 % (ref 11.5–14.5)
GLUCOSE SERPL-MCNC: 96 MG/DL (ref 65–99)
HCT VFR BLD AUTO: 41.3 % (ref 36–46)
HGB BLD-MCNC: 12.8 G/DL (ref 12–16)
MCH RBC QN AUTO: 30.5 PG (ref 26–34)
MCHC RBC AUTO-ENTMCNC: 31 G/DL (ref 32–36)
MCV RBC AUTO: 98 FL (ref 80–100)
NRBC BLD-RTO: 0 /100 WBCS (ref 0–0)
PHOSPHATE SERPL-MCNC: 4.3 MG/DL (ref 2.5–4.5)
PLATELET # BLD AUTO: 203 X10*3/UL (ref 150–450)
POTASSIUM SERPL-SCNC: 4.2 MMOL/L (ref 3.4–5.1)
RBC # BLD AUTO: 4.2 X10*6/UL (ref 4–5.2)
SODIUM SERPL-SCNC: 141 MMOL/L (ref 133–145)
WBC # BLD AUTO: 9.4 X10*3/UL (ref 4.4–11.3)

## 2024-08-10 PROCEDURE — RXMED WILLOW AMBULATORY MEDICATION CHARGE

## 2024-08-10 PROCEDURE — 85027 COMPLETE CBC AUTOMATED: CPT | Performed by: HOSPITALIST

## 2024-08-10 PROCEDURE — 2500000001 HC RX 250 WO HCPCS SELF ADMINISTERED DRUGS (ALT 637 FOR MEDICARE OP): Performed by: STUDENT IN AN ORGANIZED HEALTH CARE EDUCATION/TRAINING PROGRAM

## 2024-08-10 PROCEDURE — 80069 RENAL FUNCTION PANEL: CPT | Performed by: HOSPITALIST

## 2024-08-10 PROCEDURE — 2500000005 HC RX 250 GENERAL PHARMACY W/O HCPCS: Performed by: HOSPITALIST

## 2024-08-10 PROCEDURE — 2500000001 HC RX 250 WO HCPCS SELF ADMINISTERED DRUGS (ALT 637 FOR MEDICARE OP): Performed by: INTERNAL MEDICINE

## 2024-08-10 PROCEDURE — 36415 COLL VENOUS BLD VENIPUNCTURE: CPT | Performed by: HOSPITALIST

## 2024-08-10 PROCEDURE — 2500000001 HC RX 250 WO HCPCS SELF ADMINISTERED DRUGS (ALT 637 FOR MEDICARE OP): Performed by: HOSPITALIST

## 2024-08-10 PROCEDURE — 9420000001 HC RT PATIENT EDUCATION 5 MIN

## 2024-08-10 PROCEDURE — 2500000002 HC RX 250 W HCPCS SELF ADMINISTERED DRUGS (ALT 637 FOR MEDICARE OP, ALT 636 FOR OP/ED): Performed by: HOSPITALIST

## 2024-08-10 PROCEDURE — 94640 AIRWAY INHALATION TREATMENT: CPT

## 2024-08-10 RX ORDER — TIZANIDINE 2 MG/1
2 TABLET ORAL EVERY 12 HOURS PRN
Qty: 30 TABLET | Refills: 0 | Status: SHIPPED | OUTPATIENT
Start: 2024-08-10 | End: 2024-08-25

## 2024-08-10 RX ORDER — TORSEMIDE 100 MG/1
50 TABLET ORAL 2 TIMES DAILY
Qty: 30 TABLET | Refills: 0 | Status: SHIPPED | OUTPATIENT
Start: 2024-08-10 | End: 2024-09-09

## 2024-08-10 ASSESSMENT — COGNITIVE AND FUNCTIONAL STATUS - GENERAL
MOBILITY SCORE: 21
DAILY ACTIVITIY SCORE: 24
CLIMB 3 TO 5 STEPS WITH RAILING: TOTAL

## 2024-08-10 ASSESSMENT — PAIN SCALES - GENERAL: PAINLEVEL_OUTOF10: 0 - NO PAIN

## 2024-08-10 NOTE — PROGRESS NOTES
Spiritual Care Visit    Clinical Encounter Type  Visited With: Patient  Routine Visit: Introduction  Continue Visiting: Yes         Values/Beliefs  Spiritual Requests During Hospitalization: Anointing & Communion today    Sacramental Encounters  Communion: Patient wants communion  Communion Given Indicator: Yes  Sacrament of Sick-Anointing: Anointed     Uziel Ambriz

## 2024-08-10 NOTE — CARE PLAN
The patient's goals for the shift include rest      The clinical goals for the shift include monitor respiratory status      Problem: Skin  Goal: Decreased wound size/increased tissue granulation at next dressing change  Outcome: Progressing  Goal: Participates in plan/prevention/treatment measures  Outcome: Progressing  Goal: Prevent/manage excess moisture  Outcome: Progressing  Goal: Prevent/minimize sheer/friction injuries  Outcome: Progressing  Goal: Promote/optimize nutrition  Outcome: Progressing  Goal: Promote skin healing  Outcome: Progressing     Problem: Fall/Injury  Goal: Not fall by end of shift  Outcome: Progressing  Goal: Be free from injury by end of the shift  Outcome: Progressing  Goal: Verbalize understanding of personal risk factors for fall in the hospital  Outcome: Progressing  Goal: Verbalize understanding of risk factor reduction measures to prevent injury from fall in the home  Outcome: Progressing  Goal: Use assistive devices by end of the shift  Outcome: Progressing  Goal: Pace activities to prevent fatigue by end of the shift  Outcome: Progressing     Problem: Pain - Adult  Goal: Verbalizes/displays adequate comfort level or baseline comfort level  Outcome: Progressing     Problem: Safety - Adult  Goal: Free from fall injury  Outcome: Progressing     Problem: Discharge Planning  Goal: Discharge to home or other facility with appropriate resources  Outcome: Progressing     Problem: Chronic Conditions and Co-morbidities  Goal: Patient's chronic conditions and co-morbidity symptoms are monitored and maintained or improved  Outcome: Progressing     Problem: Nutrition  Goal: Oral intake greater 75%  Outcome: Progressing  Goal: Reduce weight from edema/fluid  Outcome: Progressing     Problem: Respiratory  Goal: Wean oxygen to maintain O2 saturation per order/standard this shift  Outcome: Progressing

## 2024-08-10 NOTE — SIGNIFICANT EVENT
Home O2 cert: Pt on RA at rest SpO2 85% HR 79 bpm. Titrated to 3L NC for SpO2 of 95% HR 79 bpm. On ambulation titrated pt to 4L NC for SpO2 of 92% HR 89 bpm. Pt will need 3L at rest and 4L on ambulation.

## 2024-08-10 NOTE — DISCHARGE SUMMARY
Discharge Diagnosis  Shortness of breath    Issues Requiring Follow-Up  Acute on chronic CHF with diastolic dysfunction  COPD  Chronic respiratory failure with hypoxia on oxygen therapy  Atrial fibrillation  Morbid obesity  History of stroke  Gout  COPD      Discharge Meds     Your medication list        CONTINUE taking these medications        Instructions Last Dose Given Next Dose Due   allopurinol 300 mg tablet  Commonly known as: Zyloprim      Take 1 tablet (300 mg) by mouth once daily.       atorvastatin 40 mg tablet  Commonly known as: Lipitor      Take 1 tablet (40 mg) by mouth once daily at bedtime.       budesonide-formoteroL 80-4.5 mcg/actuation inhaler  Commonly known as: Symbicort           buPROPion  mg 24 hr tablet  Commonly known as: Wellbutrin XL      Take 1 tablet (300 mg) by mouth once daily in the morning. Take before meals.       calcium carb-vitamin D3-vit K2 600 mg-1,000 unit-90 mcg tablet           CENTRUM SILVER ORAL           dofetilide 250 mcg capsule  Commonly known as: Tikosyn      Take 1 capsule (250 mcg) by mouth every 12 hours.       Eliquis 5 mg tablet  Generic drug: apixaban      Take 1 tablet (5 mg) by mouth 2 times a day.       gabapentin 300 mg capsule  Commonly known as: Neurontin      Take 1 capsule (300 mg) by mouth 3 times a day.       Klor-Con M20 20 mEq ER tablet  Generic drug: potassium chloride CR      TAKE 1 TABLET THREE TIMES A DAY WITH FOOD       metoprolol succinate XL 25 mg 24 hr tablet  Commonly known as: Toprol-XL      TAKE 1 TABLET BY MOUTH ONE TIME DAILY       oxygen gas therapy  Commonly known as: O2           semaglutide 2 mg/dose (8 mg/3 mL) pen injector           torsemide 100 mg tablet  Commonly known as: Demadex      Take 0.5 tablets (50 mg) by mouth 2 times a day.       Vitamin C With Genevieve Hips 1,000 mg tablet  Generic drug: ascorbic acid                  STOP taking these medications      biotin 10 mg tablet        clobetasol 0.05 % cream  Commonly  known as: Temovate        docusate sodium 100 mg capsule  Commonly known as: Colace                  Where to Get Your Medications        These medications were sent to [facility]  [id number] [facility] [address]      Hours: 9 AM to 6 PM Mon-Fri, 9 AM to 1 PM Sat Phone: [phone]   torsemide 100 mg tablet         Test Results Pending At Discharge  Pending Labs       Order Current Status    Extra Urine Gray Tube Collected (08/06/24 2031)    Urinalysis with Reflex Culture and Microscopic Collected (08/06/24 2031)            Hospital Course     Patient is a 68 years female past medical history atrial fibrillation, chronic respiratory failure on 3 l oxygen at night hypertension, recent stroke chf with diastolic dysfunction.  patient presented to [facility] er complaining of shortness of breath on exertion.  on admission patient required higher amount of oxygen she was found to have oxygen saturation 87%.  Patient was admitted to hospital for further evaluation and treatment.  She was treated for acute on chronic CHF with diastolic dysfunction.  Patient was evaluated by Dr. Lundberg from cardiology services.  She was started on IV Lasix patient.  Patient has been on torsemide at home.  She has history of atrial fibrillation patient is on anticoagulation and Tikosyn.  Patient had chronic respiratory failure with hypoxia, on 3 L oxygen at home.  After few days in hospital on IV Lasix patient improved, swelling lower extremity improved.  Patient was advised to continue with torsemide.  Patient was started on torsemide 50 mg twice daily.  I advised her to follow-up with Dr. Lundberg in 2 weeks and her primary care physician in 2 weeks.  Patient will be evaluated by respiratory therapist if she need oxygen during the day.  Patient is clinically hemodynamic stable to get discharge today.      Pertinent Physical Exam At Time of Discharge  Physical Exam    General: In non acute distress, cooperating during  physical exam.  HEENT: Pupils are equal and reactive to light and commendation , oral mucosa moist, no JVD oral mucosa is moist.  Cardiovascular: Normal sinus rhythm, no MRG.  Lungs: Clear to auscultation bilaterally, no wheezing, no crackles, no dullness to percussion.  Abdomen: No hepatosplenomegaly appreciated, soft , not tender, positive bowel sounds, positive bowel movement.  Neuro: Alert and oriented x3, strength in upper and lower extremities , sensation intact.  Psych: Patient had great insight was going on  Musculoskeletal: +1 pedal edema both lower extremity   vascular: Pulses are intact in upper and lower extremities  Skin: No petechiae, ecchymosis or other stigmata for dermatology disease.   Outpatient Follow-Up  Future Appointments   Date Time Provider Department Center   8/21/2024  3:00 PM Joseph Sanchez MD [id number] [facility]   8/27/2024 10:45 AM Amado Ty MD [id number] [facility]   10/29/2024 10:00 AM PHARMACY [facility] [id number] [facility]   1/6/2025  2:00 PM Joseph Sanchez MD [id number] [facility]     Follow-up with PCP in 2 weeks  Follow-up with cardiology in 3 weeks.    Time spent discharging the patient 39 minutes  Randal De Anda MD

## 2024-08-13 ENCOUNTER — HOSPITAL ENCOUNTER (EMERGENCY)
Facility: HOSPITAL | Age: 69
Discharge: HOME | End: 2024-08-13
Payer: MEDICARE

## 2024-08-13 VITALS
RESPIRATION RATE: 16 BRPM | SYSTOLIC BLOOD PRESSURE: 133 MMHG | WEIGHT: 255 LBS | OXYGEN SATURATION: 97 % | DIASTOLIC BLOOD PRESSURE: 95 MMHG | HEART RATE: 92 BPM | TEMPERATURE: 97.2 F | HEIGHT: 62 IN | BODY MASS INDEX: 46.93 KG/M2

## 2024-08-13 DIAGNOSIS — R04.0 EPISTAXIS: Primary | ICD-10-CM

## 2024-08-13 PROCEDURE — 30903 CONTROL OF NOSEBLEED: CPT | Mod: LT | Performed by: PHYSICIAN ASSISTANT

## 2024-08-13 PROCEDURE — 2500000001 HC RX 250 WO HCPCS SELF ADMINISTERED DRUGS (ALT 637 FOR MEDICARE OP): Performed by: PHYSICIAN ASSISTANT

## 2024-08-13 PROCEDURE — 99282 EMERGENCY DEPT VISIT SF MDM: CPT | Mod: 25

## 2024-08-13 RX ORDER — SILVER NITRATE 38.21; 12.74 MG/1; MG/1
1 STICK TOPICAL ONCE
Status: COMPLETED | OUTPATIENT
Start: 2024-08-13 | End: 2024-08-13

## 2024-08-13 RX ORDER — OXYMETAZOLINE HCL 0.05 %
2 SPRAY, NON-AEROSOL (ML) NASAL ONCE
Status: COMPLETED | OUTPATIENT
Start: 2024-08-13 | End: 2024-08-13

## 2024-08-13 ASSESSMENT — LIFESTYLE VARIABLES
TOTAL SCORE: 0
EVER FELT BAD OR GUILTY ABOUT YOUR DRINKING: NO
HAVE YOU EVER FELT YOU SHOULD CUT DOWN ON YOUR DRINKING: NO
EVER HAD A DRINK FIRST THING IN THE MORNING TO STEADY YOUR NERVES TO GET RID OF A HANGOVER: NO
HAVE PEOPLE ANNOYED YOU BY CRITICIZING YOUR DRINKING: NO

## 2024-08-13 ASSESSMENT — PAIN - FUNCTIONAL ASSESSMENT
PAIN_FUNCTIONAL_ASSESSMENT: 0-10
PAIN_FUNCTIONAL_ASSESSMENT: 0-10

## 2024-08-13 ASSESSMENT — COLUMBIA-SUICIDE SEVERITY RATING SCALE - C-SSRS
6. HAVE YOU EVER DONE ANYTHING, STARTED TO DO ANYTHING, OR PREPARED TO DO ANYTHING TO END YOUR LIFE?: NO
2. HAVE YOU ACTUALLY HAD ANY THOUGHTS OF KILLING YOURSELF?: NO
1. IN THE PAST MONTH, HAVE YOU WISHED YOU WERE DEAD OR WISHED YOU COULD GO TO SLEEP AND NOT WAKE UP?: NO

## 2024-08-13 ASSESSMENT — PAIN SCALES - GENERAL
PAINLEVEL_OUTOF10: 0 - NO PAIN
PAINLEVEL_OUTOF10: 0 - NO PAIN

## 2024-08-13 NOTE — TELEPHONE ENCOUNTER
MARY on patient VM to get clearance for treatment when at her appt with the cardiologist on Thursday and then let us know.  PL

## 2024-08-13 NOTE — ED PROVIDER NOTES
HPI   Chief Complaint   Patient presents with    Epistaxis (Nose Bleed)     Patient has had a nose bleed for about one and a half hour. Patient on eliquis. Bleeding is slightly controlled right now and pressure is being aapplied to nose.       68-year-old female presented emergency department with a chief complaint of bleeding from her left nare.  It is been ongoing for about an hour.  She is on Eliquis.  Recently diagnosed with DVT.  Was recently hospitalized for congestive heart failure flareup and had oxygen increased and is now wearing her nasal cannula throughout the day which was not previously.  She denies any specific injury or trauma.  She denies lightheadedness dizziness numbness weakness.  He attempted to hold pressure but was unable to achieve hemostasis.  No other complaint.              Patient History   Past Medical History:   Diagnosis Date    A-fib (Multi)     CHF (congestive heart failure) (Multi)     COVID     Heart disease     HTN (hypertension)     Low back pain      Past Surgical History:   Procedure Laterality Date    ABLATION OF DYSRHYTHMIC FOCUS      CARDIAC ELECTROPHYSIOLOGY PROCEDURE N/A 2024    Procedure: Cardioversion;  Surgeon: Amado Ty MD;  Location: [facility] Cardiac Cath Lab;  Service: Electrophysiology;  Laterality: N/A;  21681  i48.0  medical mutual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    CARDIAC ELECTROPHYSIOLOGY PROCEDURE N/A 2024    Procedure: Ablation A-Fib;  Surgeon: Amado Ty MD;  Location: [facility] Cardiac Cath Lab;  Service: Electrophysiology;  Laterality: N/A;  AFIB CRYOABLATION; CPT 06062; ICD I48.0   Medicare [employer] of [place] medicare  ID - [id number]  [phone]  No Auth required  Call ref # [id number]    CARDIOVERSION      2024     Family History   Problem Relation Name Age of Onset    Breast cancer Mother      Heart attack Mother           in early 80s of MI    Heart disease Mother      Colon cancer Father      Breast cancer Sister      Diabetes type II  Sister      Kidney failure Sister      Other (herione addict) Sister      No Known Problems Brother      No Known Problems Daughter      Other (recovering alcoholic) Son      Breast cancer Other Maternal Aunt      Social History     Tobacco Use    Smoking status: Former     Current packs/day: 0.00     Types: Cigarettes     Quit date:      Years since quittin.6    Smokeless tobacco: Never   Vaping Use    Vaping status: Never Used   Substance Use Topics    Alcohol use: Yes     Comment: monthly or less    Drug use: Never       Physical Exam   ED Triage Vitals [24 1312]   Temperature Heart Rate Respirations BP   36.2 °C (97.2 °F) 92 16 (!) 133/95      Pulse Ox Temp Source Heart Rate Source Patient Position   97 % Temporal Monitor Sitting      BP Location FiO2 (%)     Right arm --       Physical Exam  Vitals and nursing note reviewed.   Constitutional:       Appearance: Normal appearance.   HENT:      Head: Normocephalic.      Nose:      Comments: No septal defect, bleeding from left nare     Mouth/Throat:      Mouth: Mucous membranes are moist.   Cardiovascular:      Rate and Rhythm: Normal rate.   Pulmonary:      Effort: Pulmonary effort is normal.   Abdominal:      General: Abdomen is flat.   Musculoskeletal:         General: Normal range of motion.      Cervical back: Normal range of motion.   Skin:     General: Skin is warm.   Neurological:      General: No focal deficit present.      Mental Status: She is alert.   Psychiatric:         Mood and Affect: Mood normal.           ED Course & MDM   Diagnoses as of 24 1435   Epistaxis                 No data recorded     [place] Coma Scale Score: 15 (24 1350 : Romy Zuluaga RN)                           Medical Decision Making  I have seen and evaluated this patient.  Physician available for consultation.  Vital signs have been reviewed.  All laboratory and diagnostic imaging is reviewed by myself and interpreted by myself unless otherwise  stated.  Additionally imaging is interpreted by radiologist.    I am able to see the small area of bleeding inside the patient's left nare.  This is cauterized by myself with silver nitrate with successful hemostasis.  Patient is discharged with ear nose throat follow-up.  Released in good condition from emergent standpoint.    Labs Reviewed - No data to display  No orders to display     Medications   silver nitrate applicators applicator 1 Application (1 Application Topical Given 8/13/24 1345)   oxymetazoline (Afrin) 0.05 % nasal spray 2 spray (2 sprays Each Nostril Given 8/13/24 1345)     Discharge Medication List as of 8/13/2024  2:03 PM            Procedure  Epistaxis Management    Performed by: Jayce Wesley PA-C  Authorized by: Jayce Wesley PA-C    Consent:     Consent obtained:  Verbal    Risks discussed:  Bleeding  Universal protocol:     Procedure explained and questions answered to patient or proxy's satisfaction: yes      Patient identity confirmed:  Verbally with patient  Anesthesia:     Anesthesia method:  None  Procedure details:     Treatment site:  L anterior    Treatment method:  Silver nitrate    Treatment complexity:  Extensive    Treatment episode: initial    Post-procedure details:     Assessment:  Bleeding stopped    Procedure completion:  Tolerated       Jayce Wesley PA-C  08/13/24 1938

## 2024-08-13 NOTE — TELEPHONE ENCOUNTER
Patient is in the ER with a nose bleed and we had received a phone call from Zach with [employer] and they are through her insurance company and do home visits with her. He just wanted to up date us and let us know that they will be following up with her at home and will send the information via fax of their assessment. PL

## 2024-08-14 ENCOUNTER — PATIENT OUTREACH (OUTPATIENT)
Dept: CASE MANAGEMENT | Facility: HOSPITAL | Age: 69
End: 2024-08-14
Payer: MEDICARE

## 2024-08-16 DIAGNOSIS — I63.9 ACUTE CVA (CEREBROVASCULAR ACCIDENT) (MULTI): ICD-10-CM

## 2024-08-16 RX ORDER — ATORVASTATIN CALCIUM 40 MG/1
40 TABLET, FILM COATED ORAL NIGHTLY
Qty: 90 TABLET | Refills: 0 | Status: SHIPPED | OUTPATIENT
Start: 2024-08-16 | End: 2024-11-14

## 2024-08-16 NOTE — TELEPHONE ENCOUNTER
Has two tabs left and is getting nevous. She is asking for this to be sent today or tomorrow. Are you able to fill this one time as she is out of the office?

## 2024-08-16 NOTE — TELEPHONE ENCOUNTER
Patient request refill of Atorvastatin 40mg to [place] to [facility] [place].    Also, was a letter sent to her so she can resume getting semi-glutide shots? [facility] shots [facility].  Please call & advise.  Thank you

## 2024-08-21 ENCOUNTER — APPOINTMENT (OUTPATIENT)
Dept: CARDIOLOGY | Facility: CLINIC | Age: 69
End: 2024-08-21
Payer: MEDICARE

## 2024-08-22 ENCOUNTER — PATIENT OUTREACH (OUTPATIENT)
Dept: CASE MANAGEMENT | Facility: HOSPITAL | Age: 69
End: 2024-08-22
Payer: MEDICARE

## 2024-08-25 NOTE — PROGRESS NOTES
Subjective      Chief Complaint   Patient presents with    Follow-up          She does have a history of atrial fibrillation has been placed on Tikosyn for this.  She does have pulmonary hypertension with the last echo showing the right ventricular pressure of 43 mmHg.She underwent a cardioversion on 5/1/2024 lasting for approximately a week and went back into atrial fibrillation.  They are now planning to do A-fib ablation. She was in the ER recently with a bloody nose   She says no energy and more depressed.  Will fall asleep even with walking.  She has the sleep apnea and can't use the cpap mask.  She is not complaining of chest discomfort.  No complaints of PND or orthopnea.  The legs are not swelling on her.  NO palpitaions.  Is not sleeping well.  Says lays down and has trouble with breathing.             ROS     Past Surgical History:   Procedure Laterality Date    ABLATION OF DYSRHYTHMIC FOCUS      CARDIAC ELECTROPHYSIOLOGY PROCEDURE N/A 05/01/2024    Procedure: Cardioversion;  Surgeon: Amado Ty MD;  Location: [facility] Cardiac Cath Lab;  Service: Electrophysiology;  Laterality: N/A;  01231  i48.0  medical mutual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    CARDIAC ELECTROPHYSIOLOGY PROCEDURE N/A 07/08/2024    Procedure: Ablation A-Fib;  Surgeon: Amado Ty MD;  Location: [facility] Cardiac Cath Lab;  Service: Electrophysiology;  Laterality: N/A;  AFIB CRYOABLATION; CPT 75326; ICD I48.0   Medicare mutual of [place] medicare  ID - [id number]  [phone]  No Auth required  Call ref # [id number]    CARDIOVERSION      05/01/2024        Active Ambulatory Problems     Diagnosis Date Noted    Epistaxis 08/30/2023    Benign essential hypertension 08/30/2023    Chronic gout without tophus 08/30/2023    Chronic hypoxemic respiratory failure (Multi) 08/30/2023    Chronic obstructive lung disease (Multi) 08/30/2023    Acute congestive heart failure (Multi) 08/30/2023    Diastolic heart failure (Multi) 08/30/2023    Cardiomegaly  08/30/2023    Coronary arteriosclerosis 08/30/2023    Dyspnea on exertion 08/30/2023    Gastroesophageal reflux disease 08/30/2023    Hypertension 08/30/2023    Hypertensive heart disease without congestive heart failure 08/30/2023    Hypokalemia 08/30/2023    Longstanding persistent atrial fibrillation (Multi) 08/30/2023    Mixed anxiety depressive disorder 08/30/2023    Moderately severe depression 08/30/2023    Obesity with body mass index 30 or greater 08/30/2023    Morbid (severe) obesity due to excess calories (Multi) 08/30/2023    Other chronic pain 08/30/2023    Obstructive sleep apnea syndrome 08/30/2023    Osteoporosis 08/30/2023    Pleural effusion 08/30/2023    Prediabetes 08/30/2023    Primary osteoarthritis of left knee 08/30/2023    Pulmonary hypertension (Multi) 08/30/2023    Lumbar spondylosis 08/30/2023    Thoracic spondylosis 08/30/2023    Asthma ([facility]) 08/30/2023    Arthropathy of both sacroiliac joints 08/30/2023    Anxiety 08/30/2023    Age-related osteoporosis without current pathological fracture 08/30/2023    Acute URI 02/09/2024    PAF (paroxysmal atrial fibrillation) (Multi) 04/16/2024    Meningioma (Multi) 07/18/2024    History of embolic stroke 07/19/2024    Shortness of breath 08/06/2024     Resolved Ambulatory Problems     Diagnosis Date Noted    No Resolved Ambulatory Problems     Past Medical History:   Diagnosis Date    A-fib (Multi)     CHF (congestive heart failure) (Multi)     COVID     Heart disease     HTN (hypertension)     Low back pain         Visit Vitals  /75   Pulse (!) 118   Wt 113 kg (249 lb)   SpO2 90%   BMI 45.54 kg/m²   OB Status Postmenopausal   Smoking Status Former   BSA 2.22 m²        Objective     Constitutional:       Appearance: Healthy appearance.   Neck:      Vascular: No JVR.   Pulmonary:      Effort: Pulmonary effort is normal.      Breath sounds: Normal breath sounds.   Cardiovascular:      PMI at left midclavicular line. Normal rate. Irregularly  "irregular rhythm. Normal S1. Normal S2.       Murmurs: There is no murmur.      No gallop.  No click. No rub.   Pulses:     Intact distal pulses.   Abdominal:      Palpations: Abdomen is soft.   Musculoskeletal: Normal range of motion. Skin:     General: Skin is warm and dry.   Neurological:      General: No focal deficit present.            Lab Review:         Lab Results   Component Value Date    CHOL 186 07/11/2024    CHOL 203 (H) 09/29/2023    CHOL 179 03/29/2023     Lab Results   Component Value Date    HDL 46.0 (L) 07/11/2024    HDL 45.9 09/29/2023    HDL 43 (L) 03/29/2023     Lab Results   Component Value Date    LDLCALC 119 07/11/2024    LDLCALC 119 03/29/2023    LDLCALC 114 06/16/2021     Lab Results   Component Value Date    TRIG 107 07/11/2024    TRIG 104 09/29/2023    TRIG 84 03/29/2023     No components found for: \"CHOLHDL\"     Assessment/Plan     Cardiomegaly  She is having trouble shortness of breath do not feel she is in acute congestive heart failure she has sleep apnea is unable to use a CPAP mask.  She says she will fall asleep during the day since comes fall asleep just walking.  Will have her see Dr. James for the sleep apnea.  She can use the metolazone as needed.  Would like to see her back in 4 months    Hypertension  Is doing well    PAF (paroxysmal atrial fibrillation) (Multi)  She remains in the atrial fibrillation she had had the cardioversion which did not work.     "

## 2024-08-26 ENCOUNTER — OFFICE VISIT (OUTPATIENT)
Dept: CARDIOLOGY | Facility: CLINIC | Age: 69
End: 2024-08-26
Payer: MEDICARE

## 2024-08-26 VITALS
DIASTOLIC BLOOD PRESSURE: 75 MMHG | SYSTOLIC BLOOD PRESSURE: 104 MMHG | WEIGHT: 249 LBS | HEART RATE: 118 BPM | OXYGEN SATURATION: 90 % | BODY MASS INDEX: 45.54 KG/M2

## 2024-08-26 DIAGNOSIS — I10 PRIMARY HYPERTENSION: ICD-10-CM

## 2024-08-26 DIAGNOSIS — I51.7 CARDIOMEGALY: ICD-10-CM

## 2024-08-26 DIAGNOSIS — I48.0 PAF (PAROXYSMAL ATRIAL FIBRILLATION) (MULTI): ICD-10-CM

## 2024-08-26 DIAGNOSIS — I50.32 CHRONIC DIASTOLIC HEART FAILURE (MULTI): Primary | ICD-10-CM

## 2024-08-26 PROCEDURE — 1036F TOBACCO NON-USER: CPT | Performed by: INTERNAL MEDICINE

## 2024-08-26 PROCEDURE — 3078F DIAST BP <80 MM HG: CPT | Performed by: INTERNAL MEDICINE

## 2024-08-26 PROCEDURE — 1111F DSCHRG MED/CURRENT MED MERGE: CPT | Performed by: INTERNAL MEDICINE

## 2024-08-26 PROCEDURE — 3074F SYST BP LT 130 MM HG: CPT | Performed by: INTERNAL MEDICINE

## 2024-08-26 PROCEDURE — 1126F AMNT PAIN NOTED NONE PRSNT: CPT | Performed by: INTERNAL MEDICINE

## 2024-08-26 PROCEDURE — 1159F MED LIST DOCD IN RCRD: CPT | Performed by: INTERNAL MEDICINE

## 2024-08-26 PROCEDURE — 99213 OFFICE O/P EST LOW 20 MIN: CPT | Performed by: INTERNAL MEDICINE

## 2024-08-26 RX ORDER — METOLAZONE 2.5 MG/1
2.5 TABLET ORAL AS NEEDED
Qty: 30 TABLET | Refills: 0 | Status: SHIPPED | OUTPATIENT
Start: 2024-08-26 | End: 2024-09-25

## 2024-08-26 RX ORDER — METOLAZONE 2.5 MG/1
2.5 TABLET ORAL DAILY
COMMUNITY
End: 2024-08-26 | Stop reason: SDUPTHER

## 2024-08-26 ASSESSMENT — ENCOUNTER SYMPTOMS
LOSS OF SENSATION IN FEET: 0
OCCASIONAL FEELINGS OF UNSTEADINESS: 0
DEPRESSION: 0

## 2024-08-26 ASSESSMENT — PATIENT HEALTH QUESTIONNAIRE - PHQ9
1. LITTLE INTEREST OR PLEASURE IN DOING THINGS: NOT AT ALL
2. FEELING DOWN, DEPRESSED OR HOPELESS: NOT AT ALL
SUM OF ALL RESPONSES TO PHQ9 QUESTIONS 1 AND 2: 0

## 2024-08-26 ASSESSMENT — PAIN SCALES - GENERAL: PAINLEVEL: 0-NO PAIN

## 2024-08-26 NOTE — ASSESSMENT & PLAN NOTE
She is having trouble shortness of breath do not feel she is in acute congestive heart failure she has sleep apnea is unable to use a CPAP mask.  She says she will fall asleep during the day since comes fall asleep just walking.  Will have her see Dr. James for the sleep apnea.  She can use the metolazone as needed.  Would like to see her back in 4 months

## 2024-08-27 ENCOUNTER — TELEPHONE (OUTPATIENT)
Dept: PRIMARY CARE | Facility: CLINIC | Age: 69
End: 2024-08-27

## 2024-08-27 ENCOUNTER — TELEPHONE (OUTPATIENT)
Dept: CARDIOLOGY | Facility: CLINIC | Age: 69
End: 2024-08-27
Payer: MEDICARE

## 2024-08-27 NOTE — TELEPHONE ENCOUNTER
[facility] pharmacy called today to clarify dosage on metolazone, she says daily maximum dosage needs added to the directions.  Please advise, thanks    Spoke with pharmacist to clarify rx, they will fill now.

## 2024-08-27 NOTE — LETTER
August 27, 2024     Patient: Vickie Foster   YOB: 1955   Date of Visit: 8/27/2024       To Whom It May Concern:    Vickie Foster is a patient of mine and has recently seen cardiology for hospital follow-up.  Based on recent workup she is medically cleared to continue semaglutide treatments with your clinic at this time so long as she is being scheduled for proper lab follow-up on liver, thyroid and pancreas levels on occasion for monitoring per protocol for semaglutide treatments.    If you have any questions or concerns, please don't hesitate to call.         Sincerely,     Danilo Gonzalez, DO

## 2024-08-27 NOTE — TELEPHONE ENCOUNTER
Pt did see the cardiologist and he said it up to the pcp for her to continue the Semiglutide with [facility]. She wants you to send a letter to [employer] stating she can resume her shots. Please advise call back [phone].

## 2024-08-28 NOTE — TELEPHONE ENCOUNTER
Pt is calling back the fourth time about following up on restarted on her Semiglutide treatments, and also needs to follow up with Linda Zel[facility], Please advise and call back [phone].

## 2024-08-29 NOTE — TELEPHONE ENCOUNTER
Left detailed message on VM advised patient that the letter is ready, however I know that she has not been able to get her MyChart working. I left her a message and asked what she needed us to do with the letter. PL

## 2024-08-30 ENCOUNTER — PATIENT OUTREACH (OUTPATIENT)
Dept: CASE MANAGEMENT | Facility: HOSPITAL | Age: 69
End: 2024-08-30
Payer: MEDICARE

## 2024-08-30 NOTE — PROGRESS NOTES
Heart Failure Nurse Navigator Transition of Care Phone Call    The role of the HF nurse navigator is to (1) characterize risk profiles of patients with heart failure transitioning from [id number] after hospitalization, (2) recommend interventions to improve care and reduce risks of worse post-hospitalization outcomes.     Assessment  Call attempted to patient .     HF Symptoms  Chest pain? No  Shortness of breath? lying down  Orthopnea? No  Paroxysmal nocturnal dyspnea? No  Edema? Yes - very slight on one foot.  Weight gain >2lbs in 3 days or 5lbs in 1 week? No    Medications  Is the patient prescribed the following medications?  ACEi/ARB/ARNi? No  BB? No  MRA? No  SGLT2i? No  Diuretic? Yes  Is the patient adherent to prescribed medications? YES    Management    Is the patient obtaining daily weights? YES  If yes, current weight?     Is the patient following diet limitations (2-3g Na, fluid restriction)? YES  Does the patient have a  cardiology follow-up scheduled? YES  If yes, appointment details? Saw Dr. Sanchez on 8/26/24   If no, what is the reason?   Does the patient require HF education reinforcement? No  If yes, what was discussed?     Recommendations/Comments:  Continue following HF education and keep working on low Na diet. If SOB increases reach out to Dr. Sanchez's office.          Mireya KESSLER RN  [facility] Clinical Nurse Navigator, CHF  [phone]

## 2024-09-03 ENCOUNTER — TELEPHONE (OUTPATIENT)
Dept: CARDIOLOGY | Facility: CLINIC | Age: 69
End: 2024-09-03
Payer: MEDICARE

## 2024-09-05 ENCOUNTER — OFFICE VISIT (OUTPATIENT)
Dept: CARDIOLOGY | Facility: CLINIC | Age: 69
End: 2024-09-05
Payer: MEDICARE

## 2024-09-05 ENCOUNTER — APPOINTMENT (OUTPATIENT)
Dept: CARDIOLOGY | Facility: CLINIC | Age: 69
End: 2024-09-05
Payer: MEDICARE

## 2024-09-05 VITALS — DIASTOLIC BLOOD PRESSURE: 72 MMHG | BODY MASS INDEX: 45.54 KG/M2 | WEIGHT: 249 LBS | SYSTOLIC BLOOD PRESSURE: 118 MMHG

## 2024-09-05 DIAGNOSIS — I48.0 PAF (PAROXYSMAL ATRIAL FIBRILLATION) (MULTI): Primary | ICD-10-CM

## 2024-09-05 PROCEDURE — 99214 OFFICE O/P EST MOD 30 MIN: CPT | Performed by: INTERNAL MEDICINE

## 2024-09-05 PROCEDURE — 3074F SYST BP LT 130 MM HG: CPT | Performed by: INTERNAL MEDICINE

## 2024-09-05 PROCEDURE — 93005 ELECTROCARDIOGRAM TRACING: CPT | Performed by: INTERNAL MEDICINE

## 2024-09-05 PROCEDURE — 1111F DSCHRG MED/CURRENT MED MERGE: CPT | Performed by: INTERNAL MEDICINE

## 2024-09-05 PROCEDURE — 3078F DIAST BP <80 MM HG: CPT | Performed by: INTERNAL MEDICINE

## 2024-09-05 PROCEDURE — 93010 ELECTROCARDIOGRAM REPORT: CPT | Performed by: INTERNAL MEDICINE

## 2024-09-05 PROCEDURE — 1126F AMNT PAIN NOTED NONE PRSNT: CPT | Performed by: INTERNAL MEDICINE

## 2024-09-05 PROCEDURE — 1036F TOBACCO NON-USER: CPT | Performed by: INTERNAL MEDICINE

## 2024-09-05 ASSESSMENT — PATIENT HEALTH QUESTIONNAIRE - PHQ9
SUM OF ALL RESPONSES TO PHQ QUESTIONS 1-9: 18
6. FEELING BAD ABOUT YOURSELF - OR THAT YOU ARE A FAILURE OR HAVE LET YOURSELF OR YOUR FAMILY DOWN: NEARLY EVERY DAY
SUM OF ALL RESPONSES TO PHQ9 QUESTIONS 1 AND 2: 6
9. THOUGHTS THAT YOU WOULD BE BETTER OFF DEAD, OR OF HURTING YOURSELF: NOT AT ALL
5. POOR APPETITE OR OVEREATING: MORE THAN HALF THE DAYS
2. FEELING DOWN, DEPRESSED OR HOPELESS: NEARLY EVERY DAY
3. TROUBLE FALLING OR STAYING ASLEEP OR SLEEPING TOO MUCH: NEARLY EVERY DAY
8. MOVING OR SPEAKING SO SLOWLY THAT OTHER PEOPLE COULD HAVE NOTICED. OR THE OPPOSITE, BEING SO FIGETY OR RESTLESS THAT YOU HAVE BEEN MOVING AROUND A LOT MORE THAN USUAL: SEVERAL DAYS
4. FEELING TIRED OR HAVING LITTLE ENERGY: NEARLY EVERY DAY
1. LITTLE INTEREST OR PLEASURE IN DOING THINGS: NEARLY EVERY DAY
10. IF YOU CHECKED OFF ANY PROBLEMS, HOW DIFFICULT HAVE THESE PROBLEMS MADE IT FOR YOU TO DO YOUR WORK, TAKE CARE OF THINGS AT HOME, OR GET ALONG WITH OTHER PEOPLE: SOMEWHAT DIFFICULT
7. TROUBLE CONCENTRATING ON THINGS, SUCH AS READING THE NEWSPAPER OR WATCHING TELEVISION: NOT AT ALL

## 2024-09-05 ASSESSMENT — ENCOUNTER SYMPTOMS
LOSS OF SENSATION IN FEET: 0
OCCASIONAL FEELINGS OF UNSTEADINESS: 0
DEPRESSION: 1

## 2024-09-05 ASSESSMENT — PAIN SCALES - GENERAL: PAINLEVEL: 0-NO PAIN

## 2024-09-05 NOTE — H&P (VIEW-ONLY)
No chief complaint on file.           The patient is well-known to me.  She is a 68-year-old female with a history of morbid obesity and recurrent atrial fibrillation.  She underwent catheter-based therapy in addition to dofetilide loading but unfortunately reverted back to atrial fibrillation and has been having quite a bit of fluid overload has gotten a bit better over the past week with the use of metolazone.  There is also questionable neurologic event but she seems to have no deficit currently and neurology did not initiate brain attack protocol.  She is quite dismayed regarding the recurrent atrial fibrillation despite her being compliant with her medications and despite catheter-based therapy.         Active Ambulatory Problems     Diagnosis Date Noted    Epistaxis 08/30/2023    Benign essential hypertension 08/30/2023    Chronic gout without tophus 08/30/2023    Chronic hypoxemic respiratory failure (Multi) 08/30/2023    Chronic obstructive lung disease (Multi) 08/30/2023    Acute congestive heart failure (Multi) 08/30/2023    Diastolic heart failure (Multi) 08/30/2023    Cardiomegaly 08/30/2023    Coronary arteriosclerosis 08/30/2023    Dyspnea on exertion 08/30/2023    Gastroesophageal reflux disease 08/30/2023    Hypertension 08/30/2023    Hypertensive heart disease without congestive heart failure 08/30/2023    Hypokalemia 08/30/2023    Longstanding persistent atrial fibrillation (Multi) 08/30/2023    Mixed anxiety depressive disorder 08/30/2023    Moderately severe depression 08/30/2023    Obesity with body mass index 30 or greater 08/30/2023    Morbid (severe) obesity due to excess calories (Multi) 08/30/2023    Other chronic pain 08/30/2023    Obstructive sleep apnea syndrome 08/30/2023    Osteoporosis 08/30/2023    Pleural effusion 08/30/2023    Prediabetes 08/30/2023    Primary osteoarthritis of left knee 08/30/2023    Pulmonary hypertension (Multi) 08/30/2023    Lumbar spondylosis 08/30/2023     Thoracic spondylosis 08/30/2023    Asthma ([facility]-H[facility]) 08/30/2023    Arthropathy of both sacroiliac joints 08/30/2023    Anxiety 08/30/2023    Age-related osteoporosis without current pathological fracture 08/30/2023    Acute URI 02/09/2024    PAF (paroxysmal atrial fibrillation) (Multi) 04/16/2024    Meningioma (Multi) 07/18/2024    History of embolic stroke 07/19/2024    Shortness of breath 08/06/2024     Resolved Ambulatory Problems     Diagnosis Date Noted    No Resolved Ambulatory Problems     Past Medical History:   Diagnosis Date    A-fib (Multi)     CHF (congestive heart failure) (Multi)     COVID     Heart disease     HTN (hypertension)     Low back pain         Review of Systems   All other systems reviewed and are negative.       Objective     There were no vitals filed for this visit.     Constitutional:       Appearance: Healthy appearance. Obese.   Pulmonary:      Effort: Pulmonary effort is normal.      Breath sounds: Normal breath sounds.   Cardiovascular:      PMI at left midclavicular line. Tachycardia present. Irregularly irregular rhythm.      Murmurs: There is a systolic murmur.      No gallop.  No click. No rub.   Pulses:     Intact distal pulses.   Edema:     Pretibial: bilateral 2+ edema of the pretibial area.  Abdominal:      General: Bowel sounds are normal.   Musculoskeletal:      Cervical back: Neck supple. Skin:     General: Skin is warm and dry.   Neurological:      General: No focal deficit present.            Lab Review:   Admission on 08/05/2024, Discharged on 08/10/2024   Component Date Value    Glucose 08/05/2024 104 (H)     Sodium 08/05/2024 144     Potassium 08/05/2024 3.6     Chloride 08/05/2024 100     Bicarbonate 08/05/2024 34 (H)     Urea Nitrogen 08/05/2024 23     Creatinine 08/05/2024 0.80     eGFR 08/05/2024 81     Calcium 08/05/2024 9.2     Albumin 08/05/2024 3.7     Alkaline Phosphatase 08/05/2024 84     Total Protein 08/05/2024 6.8     AST 08/05/2024 25     Bilirubin,  Total 08/05/2024 0.4     ALT 08/05/2024 27     Anion Gap 08/05/2024 10     Ventricular Rate 08/05/2024 67     Atrial Rate 08/05/2024 300     QRS Duration 08/05/2024 90     QT Interval 08/05/2024 442     QTC Calculation(Bazett) 08/05/2024 467     R Axis 08/05/2024 34     T Axis 08/05/2024 146     QRS Count 08/05/2024 11     Q Onset 08/05/2024 224     T Offset 08/05/2024 445     QTC Fredericia 08/05/2024 458     WBC 08/05/2024 11.6 (H)     nRBC 08/05/2024 0.0     RBC 08/05/2024 4.31     Hemoglobin 08/05/2024 13.1     Hematocrit 08/05/2024 41.8     MCV 08/05/2024 97     MCH 08/05/2024 30.4     MCHC 08/05/2024 31.3 (L)     RDW 08/05/2024 16.5 (H)     Platelets 08/05/2024 198     Neutrophils % 08/05/2024 76.4     Immature Granulocytes %,* 08/05/2024 0.3     Lymphocytes % 08/05/2024 15.1     Monocytes % 08/05/2024 7.0     Eosinophils % 08/05/2024 1.0     Basophils % 08/05/2024 0.2     Neutrophils Absolute 08/05/2024 8.82 (H)     Immature Granulocytes Ab* 08/05/2024 0.04     Lymphocytes Absolute 08/05/2024 1.74     Monocytes Absolute 08/05/2024 0.81     Eosinophils Absolute 08/05/2024 0.12     Basophils Absolute 08/05/2024 0.02     PROBNP 08/05/2024 710 (H)     Troponin T, High Sensiti* 08/05/2024 28 (HH)     Troponin T, High Sensiti* 08/05/2024 29 (HH)     Troponin T, High Sensiti* 08/06/2024 31 (HH)     Glucose 08/06/2024 106 (H)     Sodium 08/06/2024 147 (H)     Potassium 08/06/2024 3.4     Chloride 08/06/2024 102     Bicarbonate 08/06/2024 36 (H)     Urea Nitrogen 08/06/2024 24     Creatinine 08/06/2024 0.80     eGFR 08/06/2024 81     Calcium 08/06/2024 9.0     Phosphorus 08/06/2024 5.1 (H)     Albumin 08/06/2024 3.6     Anion Gap 08/06/2024 9     Magnesium 08/06/2024 2.30     WBC 08/06/2024 8.9     nRBC 08/06/2024 0.0     RBC 08/06/2024 4.10     Hemoglobin 08/06/2024 12.5     Hematocrit 08/06/2024 40.3     MCV 08/06/2024 98     MCH 08/06/2024 30.5     MCHC 08/06/2024 31.0 (L)     RDW 08/06/2024 16.5 (H)     Platelets  08/06/2024 189     Glucose 08/07/2024 99     Sodium 08/07/2024 146 (H)     Potassium 08/07/2024 3.5     Chloride 08/07/2024 103     Bicarbonate 08/07/2024 33 (H)     Urea Nitrogen 08/07/2024 24     Creatinine 08/07/2024 0.70     eGFR 08/07/2024 >90     Calcium 08/07/2024 9.4     Phosphorus 08/07/2024 4.2     Albumin 08/07/2024 3.4 (L)     Anion Gap 08/07/2024 10     WBC 08/07/2024 8.6     nRBC 08/07/2024 0.0     RBC 08/07/2024 4.09     Hemoglobin 08/07/2024 12.4     Hematocrit 08/07/2024 40.8     MCV 08/07/2024 100     MCH 08/07/2024 30.3     MCHC 08/07/2024 30.4 (L)     RDW 08/07/2024 16.5 (H)     Platelets 08/07/2024 180     Magnesium 08/07/2024 2.40     Glucose 08/08/2024 93     Sodium 08/08/2024 142     Potassium 08/08/2024 3.9     Chloride 08/08/2024 99     Bicarbonate 08/08/2024 32 (H)     Urea Nitrogen 08/08/2024 21     Creatinine 08/08/2024 0.70     eGFR 08/08/2024 >90     Calcium 08/08/2024 9.3     Phosphorus 08/08/2024 3.8     Albumin 08/08/2024 3.7     Anion Gap 08/08/2024 11     WBC 08/08/2024 8.4     nRBC 08/08/2024 0.0     RBC 08/08/2024 4.14     Hemoglobin 08/08/2024 12.6     Hematocrit 08/08/2024 41.8     MCV 08/08/2024 101 (H)     MCH 08/08/2024 30.4     MCHC 08/08/2024 30.1 (L)     RDW 08/08/2024 16.1 (H)     Platelets 08/08/2024 196     Glucose 08/09/2024 97     Sodium 08/09/2024 144     Potassium 08/09/2024 3.8     Chloride 08/09/2024 100     Bicarbonate 08/09/2024 32 (H)     Urea Nitrogen 08/09/2024 20     Creatinine 08/09/2024 0.70     eGFR 08/09/2024 >90     Calcium 08/09/2024 9.2     Phosphorus 08/09/2024 3.8     Albumin 08/09/2024 3.6     Anion Gap 08/09/2024 12     WBC 08/09/2024 8.3     nRBC 08/09/2024 0.0     RBC 08/09/2024 4.10     Hemoglobin 08/09/2024 12.5     Hematocrit 08/09/2024 40.4     MCV 08/09/2024 99     MCH 08/09/2024 30.5     MCHC 08/09/2024 30.9 (L)     RDW 08/09/2024 15.8 (H)     Platelets 08/09/2024 178     Glucose 08/10/2024 96     Sodium 08/10/2024 141     Potassium  08/10/2024 4.2     Chloride 08/10/2024 98     Bicarbonate 08/10/2024 36 (H)     Urea Nitrogen 08/10/2024 23     Creatinine 08/10/2024 0.70     eGFR 08/10/2024 >90     Calcium 08/10/2024 9.4     Phosphorus 08/10/2024 4.3     Albumin 08/10/2024 3.7     Anion Gap 08/10/2024 7     WBC 08/10/2024 9.4     nRBC 08/10/2024 0.0     RBC 08/10/2024 4.20     Hemoglobin 08/10/2024 12.8     Hematocrit 08/10/2024 41.3     MCV 08/10/2024 98     MCH 08/10/2024 30.5     MCHC 08/10/2024 31.0 (L)     RDW 08/10/2024 15.9 (H)     Platelets 08/10/2024 203    No results displayed because visit has over 200 results.          ECG:  Atrial fibrillation that is coarse in nature ventricular rate 87 bpm QRS duration 90 ms QTc 480 ms    Assessment/plan:  I would recommend a cardioversion over the next week or 2 and try to encourage her to continue medications for now if this fails I would take a rate control approach and discontinue her dofetilide    Problem List Items Addressed This Visit       PAF (paroxysmal atrial fibrillation) (Multi) - Primary    Relevant Orders    ECG 12 lead (Clinic Performed)    Basic metabolic panel    Magnesium

## 2024-09-05 NOTE — ASSESSMENT & PLAN NOTE
I would recommend a cardioversion over the next week or 2 and try to encourage her to continue medications for now if this fails I would take a rate control approach and discontinue her dofetilide

## 2024-09-05 NOTE — PROGRESS NOTES
No chief complaint on file.           The patient is well-known to me.  She is a 68-year-old female with a history of morbid obesity and recurrent atrial fibrillation.  She underwent catheter-based therapy in addition to dofetilide loading but unfortunately reverted back to atrial fibrillation and has been having quite a bit of fluid overload has gotten a bit better over the past week with the use of metolazone.  There is also questionable neurologic event but she seems to have no deficit currently and neurology did not initiate brain attack protocol.  She is quite dismayed regarding the recurrent atrial fibrillation despite her being compliant with her medications and despite catheter-based therapy.         Active Ambulatory Problems     Diagnosis Date Noted    Epistaxis 08/30/2023    Benign essential hypertension 08/30/2023    Chronic gout without tophus 08/30/2023    Chronic hypoxemic respiratory failure (Multi) 08/30/2023    Chronic obstructive lung disease (Multi) 08/30/2023    Acute congestive heart failure (Multi) 08/30/2023    Diastolic heart failure (Multi) 08/30/2023    Cardiomegaly 08/30/2023    Coronary arteriosclerosis 08/30/2023    Dyspnea on exertion 08/30/2023    Gastroesophageal reflux disease 08/30/2023    Hypertension 08/30/2023    Hypertensive heart disease without congestive heart failure 08/30/2023    Hypokalemia 08/30/2023    Longstanding persistent atrial fibrillation (Multi) 08/30/2023    Mixed anxiety depressive disorder 08/30/2023    Moderately severe depression 08/30/2023    Obesity with body mass index 30 or greater 08/30/2023    Morbid (severe) obesity due to excess calories (Multi) 08/30/2023    Other chronic pain 08/30/2023    Obstructive sleep apnea syndrome 08/30/2023    Osteoporosis 08/30/2023    Pleural effusion 08/30/2023    Prediabetes 08/30/2023    Primary osteoarthritis of left knee 08/30/2023    Pulmonary hypertension (Multi) 08/30/2023    Lumbar spondylosis 08/30/2023     Thoracic spondylosis 08/30/2023    Asthma ([facility]-H[facility]) 08/30/2023    Arthropathy of both sacroiliac joints 08/30/2023    Anxiety 08/30/2023    Age-related osteoporosis without current pathological fracture 08/30/2023    Acute URI 02/09/2024    PAF (paroxysmal atrial fibrillation) (Multi) 04/16/2024    Meningioma (Multi) 07/18/2024    History of embolic stroke 07/19/2024    Shortness of breath 08/06/2024     Resolved Ambulatory Problems     Diagnosis Date Noted    No Resolved Ambulatory Problems     Past Medical History:   Diagnosis Date    A-fib (Multi)     CHF (congestive heart failure) (Multi)     COVID     Heart disease     HTN (hypertension)     Low back pain         Review of Systems   All other systems reviewed and are negative.       Objective     There were no vitals filed for this visit.     Constitutional:       Appearance: Healthy appearance. Obese.   Pulmonary:      Effort: Pulmonary effort is normal.      Breath sounds: Normal breath sounds.   Cardiovascular:      PMI at left midclavicular line. Tachycardia present. Irregularly irregular rhythm.      Murmurs: There is a systolic murmur.      No gallop.  No click. No rub.   Pulses:     Intact distal pulses.   Edema:     Pretibial: bilateral 2+ edema of the pretibial area.  Abdominal:      General: Bowel sounds are normal.   Musculoskeletal:      Cervical back: Neck supple. Skin:     General: Skin is warm and dry.   Neurological:      General: No focal deficit present.            Lab Review:   Admission on 08/05/2024, Discharged on 08/10/2024   Component Date Value    Glucose 08/05/2024 104 (H)     Sodium 08/05/2024 144     Potassium 08/05/2024 3.6     Chloride 08/05/2024 100     Bicarbonate 08/05/2024 34 (H)     Urea Nitrogen 08/05/2024 23     Creatinine 08/05/2024 0.80     eGFR 08/05/2024 81     Calcium 08/05/2024 9.2     Albumin 08/05/2024 3.7     Alkaline Phosphatase 08/05/2024 84     Total Protein 08/05/2024 6.8     AST 08/05/2024 25     Bilirubin,  Total 08/05/2024 0.4     ALT 08/05/2024 27     Anion Gap 08/05/2024 10     Ventricular Rate 08/05/2024 67     Atrial Rate 08/05/2024 300     QRS Duration 08/05/2024 90     QT Interval 08/05/2024 442     QTC Calculation(Bazett) 08/05/2024 467     R Axis 08/05/2024 34     T Axis 08/05/2024 146     QRS Count 08/05/2024 11     Q Onset 08/05/2024 224     T Offset 08/05/2024 445     QTC Fredericia 08/05/2024 458     WBC 08/05/2024 11.6 (H)     nRBC 08/05/2024 0.0     RBC 08/05/2024 4.31     Hemoglobin 08/05/2024 13.1     Hematocrit 08/05/2024 41.8     MCV 08/05/2024 97     MCH 08/05/2024 30.4     MCHC 08/05/2024 31.3 (L)     RDW 08/05/2024 16.5 (H)     Platelets 08/05/2024 198     Neutrophils % 08/05/2024 76.4     Immature Granulocytes %,* 08/05/2024 0.3     Lymphocytes % 08/05/2024 15.1     Monocytes % 08/05/2024 7.0     Eosinophils % 08/05/2024 1.0     Basophils % 08/05/2024 0.2     Neutrophils Absolute 08/05/2024 8.82 (H)     Immature Granulocytes Ab* 08/05/2024 0.04     Lymphocytes Absolute 08/05/2024 1.74     Monocytes Absolute 08/05/2024 0.81     Eosinophils Absolute 08/05/2024 0.12     Basophils Absolute 08/05/2024 0.02     PROBNP 08/05/2024 710 (H)     Troponin T, High Sensiti* 08/05/2024 28 (HH)     Troponin T, High Sensiti* 08/05/2024 29 (HH)     Troponin T, High Sensiti* 08/06/2024 31 (HH)     Glucose 08/06/2024 106 (H)     Sodium 08/06/2024 147 (H)     Potassium 08/06/2024 3.4     Chloride 08/06/2024 102     Bicarbonate 08/06/2024 36 (H)     Urea Nitrogen 08/06/2024 24     Creatinine 08/06/2024 0.80     eGFR 08/06/2024 81     Calcium 08/06/2024 9.0     Phosphorus 08/06/2024 5.1 (H)     Albumin 08/06/2024 3.6     Anion Gap 08/06/2024 9     Magnesium 08/06/2024 2.30     WBC 08/06/2024 8.9     nRBC 08/06/2024 0.0     RBC 08/06/2024 4.10     Hemoglobin 08/06/2024 12.5     Hematocrit 08/06/2024 40.3     MCV 08/06/2024 98     MCH 08/06/2024 30.5     MCHC 08/06/2024 31.0 (L)     RDW 08/06/2024 16.5 (H)     Platelets  08/06/2024 189     Glucose 08/07/2024 99     Sodium 08/07/2024 146 (H)     Potassium 08/07/2024 3.5     Chloride 08/07/2024 103     Bicarbonate 08/07/2024 33 (H)     Urea Nitrogen 08/07/2024 24     Creatinine 08/07/2024 0.70     eGFR 08/07/2024 >90     Calcium 08/07/2024 9.4     Phosphorus 08/07/2024 4.2     Albumin 08/07/2024 3.4 (L)     Anion Gap 08/07/2024 10     WBC 08/07/2024 8.6     nRBC 08/07/2024 0.0     RBC 08/07/2024 4.09     Hemoglobin 08/07/2024 12.4     Hematocrit 08/07/2024 40.8     MCV 08/07/2024 100     MCH 08/07/2024 30.3     MCHC 08/07/2024 30.4 (L)     RDW 08/07/2024 16.5 (H)     Platelets 08/07/2024 180     Magnesium 08/07/2024 2.40     Glucose 08/08/2024 93     Sodium 08/08/2024 142     Potassium 08/08/2024 3.9     Chloride 08/08/2024 99     Bicarbonate 08/08/2024 32 (H)     Urea Nitrogen 08/08/2024 21     Creatinine 08/08/2024 0.70     eGFR 08/08/2024 >90     Calcium 08/08/2024 9.3     Phosphorus 08/08/2024 3.8     Albumin 08/08/2024 3.7     Anion Gap 08/08/2024 11     WBC 08/08/2024 8.4     nRBC 08/08/2024 0.0     RBC 08/08/2024 4.14     Hemoglobin 08/08/2024 12.6     Hematocrit 08/08/2024 41.8     MCV 08/08/2024 101 (H)     MCH 08/08/2024 30.4     MCHC 08/08/2024 30.1 (L)     RDW 08/08/2024 16.1 (H)     Platelets 08/08/2024 196     Glucose 08/09/2024 97     Sodium 08/09/2024 144     Potassium 08/09/2024 3.8     Chloride 08/09/2024 100     Bicarbonate 08/09/2024 32 (H)     Urea Nitrogen 08/09/2024 20     Creatinine 08/09/2024 0.70     eGFR 08/09/2024 >90     Calcium 08/09/2024 9.2     Phosphorus 08/09/2024 3.8     Albumin 08/09/2024 3.6     Anion Gap 08/09/2024 12     WBC 08/09/2024 8.3     nRBC 08/09/2024 0.0     RBC 08/09/2024 4.10     Hemoglobin 08/09/2024 12.5     Hematocrit 08/09/2024 40.4     MCV 08/09/2024 99     MCH 08/09/2024 30.5     MCHC 08/09/2024 30.9 (L)     RDW 08/09/2024 15.8 (H)     Platelets 08/09/2024 178     Glucose 08/10/2024 96     Sodium 08/10/2024 141     Potassium  08/10/2024 4.2     Chloride 08/10/2024 98     Bicarbonate 08/10/2024 36 (H)     Urea Nitrogen 08/10/2024 23     Creatinine 08/10/2024 0.70     eGFR 08/10/2024 >90     Calcium 08/10/2024 9.4     Phosphorus 08/10/2024 4.3     Albumin 08/10/2024 3.7     Anion Gap 08/10/2024 7     WBC 08/10/2024 9.4     nRBC 08/10/2024 0.0     RBC 08/10/2024 4.20     Hemoglobin 08/10/2024 12.8     Hematocrit 08/10/2024 41.3     MCV 08/10/2024 98     MCH 08/10/2024 30.5     MCHC 08/10/2024 31.0 (L)     RDW 08/10/2024 15.9 (H)     Platelets 08/10/2024 203    No results displayed because visit has over 200 results.          ECG:  Atrial fibrillation that is coarse in nature ventricular rate 87 bpm QRS duration 90 ms QTc 480 ms    Assessment/plan:  I would recommend a cardioversion over the next week or 2 and try to encourage her to continue medications for now if this fails I would take a rate control approach and discontinue her dofetilide    Problem List Items Addressed This Visit       PAF (paroxysmal atrial fibrillation) (Multi) - Primary    Relevant Orders    ECG 12 lead (Clinic Performed)    Basic metabolic panel    Magnesium

## 2024-09-09 ENCOUNTER — PATIENT OUTREACH (OUTPATIENT)
Dept: CASE MANAGEMENT | Facility: HOSPITAL | Age: 69
End: 2024-09-09
Payer: MEDICARE

## 2024-09-09 NOTE — PROGRESS NOTES
Heart Failure Nurse Navigator Transition of Care Phone Call    The role of the HF nurse navigator is to (1) characterize risk profiles of patients with heart failure transitioning from [id number] after hospitalization, (2) recommend interventions to improve care and reduce risks of worse post-hospitalization outcomes.     Assessment  Call attempted to patient .     HF Symptoms  Chest pain? No  Shortness of breath? with exertion  Orthopnea? No  Paroxysmal nocturnal dyspnea? No  Edema? No  Weight gain >2lbs in 3 days or 5lbs in 1 week? No    Medications  Is the patient prescribed the following medications?  ACEi/ARB/ARNi? No  BB? Yes  MRA? No  SGLT2i? No  Diuretic? Yes  Is the patient adherent to prescribed medications? YES    Management    Is the patient obtaining daily weights? YES  If yes, current weight? 139  Is the patient following diet limitations (2-3g Na, fluid restriction)? YES  Does the patient have a  cardiology follow-up scheduled? YES  If yes, appointment details? 8/26/24  Does the patient require HF education reinforcement? No      Recommendations/Comments:  Pt feels good and denies HF symptoms. She is scheduled for a cardioversion on 9/18/24 with Dr. Ty. No needs/issues at this time.      Mireya KESSLER RN  [facility] Clinical Nurse Navigator, CHF  [phone]

## 2024-09-10 ENCOUNTER — TELEPHONE (OUTPATIENT)
Dept: CARDIOLOGY | Facility: CLINIC | Age: 69
End: 2024-09-10
Payer: MEDICARE

## 2024-09-10 ENCOUNTER — TELEPHONE (OUTPATIENT)
Dept: PRIMARY CARE | Facility: CLINIC | Age: 69
End: 2024-09-10
Payer: MEDICARE

## 2024-09-10 NOTE — TELEPHONE ENCOUNTER
Vickie called back as she had not heard from the office. She is asking what she can do as she is having issues sleeping. She mentioned that she does sleep with her oxygen. She is not able to see Dr. Verma until Oct. She is not able to continue like this, per the patient. She has fell out of her recliner a few times while trying to sleep.

## 2024-09-10 NOTE — TELEPHONE ENCOUNTER
Sorry I really do not know what to offer. She needs to speak with the heart doctor about this, the need to sleep upright is concerning for heart failure and fluid issues. Honestly - she may actually need to go to the ER. The heart may be failure after the ablation.

## 2024-09-10 NOTE — TELEPHONE ENCOUNTER
She had a heart ablation on 7/8/24 and she isn't getting more than 1 hour of sleep. She only sleeps in car because she can't breathe laying down. She is going to call Dr. Ty about this too.  She wanted this sent to Dr. Jackson since Dr. Carrasco isn't here.

## 2024-09-12 NOTE — TELEPHONE ENCOUNTER
Spoke with Vickie and she did talk to Dr. Ty's office. She is going to have another cardioversion.

## 2024-09-16 ENCOUNTER — LAB (OUTPATIENT)
Dept: LAB | Facility: LAB | Age: 69
End: 2024-09-16
Payer: MEDICARE

## 2024-09-16 DIAGNOSIS — I48.0 PAF (PAROXYSMAL ATRIAL FIBRILLATION) (MULTI): ICD-10-CM

## 2024-09-16 DIAGNOSIS — M1A.9XX0 CHRONIC GOUT WITHOUT TOPHUS, UNSPECIFIED CAUSE, UNSPECIFIED SITE: ICD-10-CM

## 2024-09-16 LAB
ANION GAP SERPL CALC-SCNC: 9 MMOL/L
BUN SERPL-MCNC: 42 MG/DL (ref 8–25)
CALCIUM SERPL-MCNC: 9.4 MG/DL (ref 8.5–10.4)
CHLORIDE SERPL-SCNC: 92 MMOL/L (ref 97–107)
CO2 SERPL-SCNC: 40 MMOL/L (ref 24–31)
CREAT SERPL-MCNC: 0.7 MG/DL (ref 0.4–1.6)
EGFRCR SERPLBLD CKD-EPI 2021: >90 ML/MIN/1.73M*2
ERYTHROCYTE [DISTWIDTH] IN BLOOD BY AUTOMATED COUNT: 15 % (ref 11.5–14.5)
GLUCOSE SERPL-MCNC: 126 MG/DL (ref 65–99)
HCT VFR BLD AUTO: 41.9 % (ref 36–46)
HGB BLD-MCNC: 13 G/DL (ref 12–16)
MCH RBC QN AUTO: 31 PG (ref 26–34)
MCHC RBC AUTO-ENTMCNC: 31 G/DL (ref 32–36)
MCV RBC AUTO: 100 FL (ref 80–100)
NRBC BLD-RTO: 0 /100 WBCS (ref 0–0)
PLATELET # BLD AUTO: 275 X10*3/UL (ref 150–450)
POTASSIUM SERPL-SCNC: 3.5 MMOL/L (ref 3.4–5.1)
RBC # BLD AUTO: 4.19 X10*6/UL (ref 4–5.2)
SODIUM SERPL-SCNC: 141 MMOL/L (ref 133–145)
URATE SERPL-MCNC: 6.6 MG/DL (ref 2.5–6.8)
WBC # BLD AUTO: 11.1 X10*3/UL (ref 4.4–11.3)

## 2024-09-16 PROCEDURE — 84550 ASSAY OF BLOOD/URIC ACID: CPT

## 2024-09-16 PROCEDURE — 36415 COLL VENOUS BLD VENIPUNCTURE: CPT

## 2024-09-16 PROCEDURE — 80048 BASIC METABOLIC PNL TOTAL CA: CPT

## 2024-09-16 PROCEDURE — 85027 COMPLETE CBC AUTOMATED: CPT

## 2024-09-16 PROCEDURE — 83735 ASSAY OF MAGNESIUM: CPT

## 2024-09-17 ENCOUNTER — TELEPHONE (OUTPATIENT)
Dept: CARDIOLOGY | Facility: CLINIC | Age: 69
End: 2024-09-17
Payer: MEDICARE

## 2024-09-18 ENCOUNTER — HOSPITAL ENCOUNTER (OUTPATIENT)
Facility: HOSPITAL | Age: 69
Setting detail: OUTPATIENT SURGERY
Discharge: HOME | End: 2024-09-18
Attending: INTERNAL MEDICINE | Admitting: INTERNAL MEDICINE
Payer: MEDICARE

## 2024-09-18 ENCOUNTER — APPOINTMENT (OUTPATIENT)
Dept: CARDIOLOGY | Facility: HOSPITAL | Age: 69
End: 2024-09-18
Payer: MEDICARE

## 2024-09-18 ENCOUNTER — PATIENT OUTREACH (OUTPATIENT)
Dept: CASE MANAGEMENT | Facility: HOSPITAL | Age: 69
End: 2024-09-18

## 2024-09-18 ENCOUNTER — ANESTHESIA (OUTPATIENT)
Dept: CARDIOLOGY | Facility: HOSPITAL | Age: 69
End: 2024-09-18
Payer: MEDICARE

## 2024-09-18 ENCOUNTER — ANESTHESIA EVENT (OUTPATIENT)
Dept: CARDIOLOGY | Facility: HOSPITAL | Age: 69
End: 2024-09-18
Payer: MEDICARE

## 2024-09-18 VITALS
TEMPERATURE: 99.2 F | RESPIRATION RATE: 16 BRPM | OXYGEN SATURATION: 96 % | SYSTOLIC BLOOD PRESSURE: 116 MMHG | DIASTOLIC BLOOD PRESSURE: 70 MMHG | HEART RATE: 84 BPM

## 2024-09-18 DIAGNOSIS — I48.0 PAF (PAROXYSMAL ATRIAL FIBRILLATION) (MULTI): Primary | ICD-10-CM

## 2024-09-18 LAB
ATRIAL RATE: 68 BPM
BNP SERPL-MCNC: 125 PG/ML (ref 0–99)
MAGNESIUM SERPL-MCNC: 2.13 MG/DL (ref 1.6–2.4)
Q ONSET: 224 MS
QRS COUNT: 12 BEATS
QRS DURATION: 100 MS
QT INTERVAL: 438 MS
QTC CALCULATION(BAZETT): 495 MS
QTC FREDERICIA: 475 MS
R AXIS: 4 DEGREES
T AXIS: 152 DEGREES
T OFFSET: 443 MS
VENTRICULAR RATE: 77 BPM

## 2024-09-18 PROCEDURE — 92960 CARDIOVERSION ELECTRIC EXT: CPT | Mod: 59 | Performed by: INTERNAL MEDICINE

## 2024-09-18 PROCEDURE — 83880 ASSAY OF NATRIURETIC PEPTIDE: CPT | Performed by: NURSE PRACTITIONER

## 2024-09-18 PROCEDURE — 36415 COLL VENOUS BLD VENIPUNCTURE: CPT | Performed by: NURSE PRACTITIONER

## 2024-09-18 PROCEDURE — 3700000002 HC GENERAL ANESTHESIA TIME - EACH INCREMENTAL 1 MINUTE: Performed by: INTERNAL MEDICINE

## 2024-09-18 PROCEDURE — 2720000007 HC OR 272 NO HCPCS: Performed by: INTERNAL MEDICINE

## 2024-09-18 PROCEDURE — 7100000010 HC PHASE TWO TIME - EACH INCREMENTAL 1 MINUTE: Performed by: INTERNAL MEDICINE

## 2024-09-18 PROCEDURE — A92960 PR CARDIOVERSION, ELECTIVE;EXTERN: Performed by: ANESTHESIOLOGY

## 2024-09-18 PROCEDURE — 2500000004 HC RX 250 GENERAL PHARMACY W/ HCPCS (ALT 636 FOR OP/ED): Performed by: ANESTHESIOLOGY

## 2024-09-18 PROCEDURE — 2500000004 HC RX 250 GENERAL PHARMACY W/ HCPCS (ALT 636 FOR OP/ED): Performed by: NURSE PRACTITIONER

## 2024-09-18 PROCEDURE — 3700000001 HC GENERAL ANESTHESIA TIME - INITIAL BASE CHARGE: Performed by: INTERNAL MEDICINE

## 2024-09-18 PROCEDURE — 7100000009 HC PHASE TWO TIME - INITIAL BASE CHARGE: Performed by: INTERNAL MEDICINE

## 2024-09-18 PROCEDURE — 92960 CARDIOVERSION ELECTRIC EXT: CPT | Performed by: INTERNAL MEDICINE

## 2024-09-18 PROCEDURE — 93005 ELECTROCARDIOGRAM TRACING: CPT

## 2024-09-18 PROCEDURE — 93010 ELECTROCARDIOGRAM REPORT: CPT | Performed by: INTERNAL MEDICINE

## 2024-09-18 PROCEDURE — 2500000002 HC RX 250 W HCPCS SELF ADMINISTERED DRUGS (ALT 637 FOR MEDICARE OP, ALT 636 FOR OP/ED): Performed by: NURSE PRACTITIONER

## 2024-09-18 RX ORDER — ONDANSETRON HYDROCHLORIDE 2 MG/ML
4 INJECTION, SOLUTION INTRAVENOUS ONCE AS NEEDED
Status: CANCELLED | OUTPATIENT
Start: 2024-09-18

## 2024-09-18 RX ORDER — SODIUM CHLORIDE, SODIUM LACTATE, POTASSIUM CHLORIDE, CALCIUM CHLORIDE 600; 310; 30; 20 MG/100ML; MG/100ML; MG/100ML; MG/100ML
100 INJECTION, SOLUTION INTRAVENOUS CONTINUOUS
Status: CANCELLED | OUTPATIENT
Start: 2024-09-18

## 2024-09-18 RX ORDER — PROPOFOL 10 MG/ML
INJECTION, EMULSION INTRAVENOUS AS NEEDED
Status: DISCONTINUED | OUTPATIENT
Start: 2024-09-18 | End: 2024-09-18

## 2024-09-18 RX ORDER — ALBUTEROL SULFATE 0.83 MG/ML
2.5 SOLUTION RESPIRATORY (INHALATION) ONCE AS NEEDED
Status: CANCELLED | OUTPATIENT
Start: 2024-09-18

## 2024-09-18 RX ORDER — SODIUM CHLORIDE 9 MG/ML
100 INJECTION, SOLUTION INTRAVENOUS CONTINUOUS
Status: DISCONTINUED | OUTPATIENT
Start: 2024-09-18 | End: 2024-09-18 | Stop reason: HOSPADM

## 2024-09-18 RX ORDER — FUROSEMIDE 10 MG/ML
40 INJECTION INTRAMUSCULAR; INTRAVENOUS ONCE
Status: COMPLETED | OUTPATIENT
Start: 2024-09-18 | End: 2024-09-18

## 2024-09-18 RX ORDER — HYDRALAZINE HYDROCHLORIDE 20 MG/ML
5 INJECTION INTRAMUSCULAR; INTRAVENOUS EVERY 30 MIN PRN
Status: CANCELLED | OUTPATIENT
Start: 2024-09-18

## 2024-09-18 RX ORDER — POTASSIUM CHLORIDE 750 MG/1
20 TABLET, FILM COATED, EXTENDED RELEASE ORAL ONCE
Status: COMPLETED | OUTPATIENT
Start: 2024-09-18 | End: 2024-09-18

## 2024-09-18 RX ORDER — LIDOCAINE HYDROCHLORIDE 10 MG/ML
0.1 INJECTION, SOLUTION INFILTRATION; PERINEURAL ONCE
Status: CANCELLED | OUTPATIENT
Start: 2024-09-18 | End: 2024-09-18

## 2024-09-18 SDOH — HEALTH STABILITY: MENTAL HEALTH: CURRENT SMOKER: 0

## 2024-09-18 ASSESSMENT — PAIN - FUNCTIONAL ASSESSMENT: PAIN_FUNCTIONAL_ASSESSMENT: 0-10

## 2024-09-18 ASSESSMENT — PAIN SCALES - GENERAL: PAINLEVEL_OUTOF10: 0 - NO PAIN

## 2024-09-18 NOTE — POST-PROCEDURE NOTE
Physician Transition of Care Summary  Invasive Cardiovascular Lab    Procedure Date: 9/18/2024  Attending:    * Amado Ty - Primary  Resident/Fellow/Other Assistant: Surgeons and Role:  * No surgeons found with a matching role *    Indications:   Pre-op Diagnosis      * PAF (paroxysmal atrial fibrillation) (Multi) [I48.0]    Post-procedure diagnosis:   Post-op Diagnosis     * PAF (paroxysmal atrial fibrillation) (Multi) [I48.0]    Procedure(s):   Cardioversion  44181 - ME CARDIOVERSION ELECTIVE ARRHYTHMIA EXTERNAL        Procedure Findings:   See below    Description of the Procedure:   The patient was brought to the Heart and vascular care area while in atrial fibrillation.  She has been compliant with her medications including her anticoagulation without missing any doses.  Anterior/posterior patches were applied and the patient underwent a synchronized 250 joule biphasic shock restoring sinus rhythm immediately with no complications.    Summary:  Successful cardioversion of atrial fibrillation.     Complications:   none    Stents/Implants:   Implants       No implant documentation for this case.            Anticoagulation/Antiplatelet Plan:   eliquis    Estimated Blood Loss:   0 mL    Anesthesia: Monitor Anesthesia Care Anesthesia Staff: Anesthesiologist: Rodney Johnson MD    Any Specimen(s) Removed:   Order Name Source Comment Collection Info Order Time   MAGNESIUM Blood, Venous  Collected By: ESTUARDO Wilkinson 9/18/2024 12:08 PM     Release result to [facility]   Immediate        B-TYPE NATRIURETIC PEPTIDE Blood, Venous  Collected By: Shankar Pineda RN 9/18/2024 12:08 PM     Release result to MyChart   Immediate            Disposition:   stable      Electronically signed by: Amado Ty MD, 9/18/2024 1:49 PM

## 2024-09-18 NOTE — ANESTHESIA PREPROCEDURE EVALUATION
Patient: Vickie Foster    Procedure Information       Date/Time: 09/18/24 1330    Procedure: Cardioversion - CARDIOVERSION CPT 72416, ICD I48.0 no auth required/REF # [id number]    Location: [facility] CV ROOM / Virtual [facility] Cardiac Cath Lab    Providers: Amado Ty MD          Past Medical History:   Diagnosis Date    A-fib (Multi)     CHF (congestive heart failure) (Multi)     COVID     Heart disease     HTN (hypertension)     Low back pain       Relevant Problems   Cardiac   (+) Acute congestive heart failure (Multi)   (+) Benign essential hypertension   (+) Coronary arteriosclerosis   (+) Hypertension   (+) Longstanding persistent atrial fibrillation (Multi)   (+) PAF (paroxysmal atrial fibrillation) (Multi)      Pulmonary   (+) Asthma (HHS-HCC)   (+) Chronic obstructive lung disease (Multi)   (+) Dyspnea on exertion   (+) Obstructive sleep apnea syndrome   (+) Pulmonary hypertension (Multi)      Neuro   (+) Anxiety   (+) Mixed anxiety depressive disorder   (+) Moderately severe depression      GI   (+) Gastroesophageal reflux disease      Endocrine   (+) Morbid (severe) obesity due to excess calories (Multi) (BMI 45.5)      Musculoskeletal   (+) Lumbar spondylosis   (+) Primary osteoarthritis of left knee   (+) Thoracic spondylosis      ID   (+) Acute URI     Past Surgical History:   Procedure Laterality Date    ABLATION OF DYSRHYTHMIC FOCUS      CARDIAC ELECTROPHYSIOLOGY PROCEDURE N/A 05/01/2024    Procedure: Cardioversion;  Surgeon: Amado Ty MD;  Location: [facility] Cardiac Cath Lab;  Service: Electrophysiology;  Laterality: N/A;  74533  i48.0  medical mutual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    CARDIAC ELECTROPHYSIOLOGY PROCEDURE N/A 07/08/2024    Procedure: Ablation A-Fib;  Surgeon: Amado Ty MD;  Location: [facility] Cardiac Cath Lab;  Service: Electrophysiology;  Laterality: N/A;  AFIB CRYOABLATION; CPT 29368; ICD I48.0   Medicare mutual of [place] [employer]are  ID - [id number]  [phone]  No Auth required  Call ref  # [id number]    CARDIOVERSION      05/01/2024        Clinical information reviewed:   Tobacco  Allergies  Meds   Med Hx  Surg Hx   Fam Hx  Soc Hx        NPO Detail:  NPO/Void Status  Date of Last Liquid: 09/18/24  Time of Last Liquid: 0000  Date of Last Solid: 09/18/24  Time of Last Solid: 0000         Physical Exam    Airway  Mallampati: III  TM distance: >3 FB  Neck ROM: limited     Cardiovascular    Dental    Pulmonary    Abdominal            Anesthesia Plan    History of general anesthesia?: yes  History of complications of general anesthesia?: no    ASA 3     MAC     The patient is not a current smoker.  Patient was not previously instructed to abstain from smoking on day of procedure.  Patient did not smoke on day of procedure.    intravenous induction   Postoperative administration of opioids is intended.  Anesthetic plan and risks discussed with patient.    Plan discussed with attending.

## 2024-09-18 NOTE — INTERVAL H&P NOTE
H&P reviewed. The patient was examined and there are no changes to the H&P. Pt on oxygen 3Lnc, pulse ox 96%.  c/o dysnea with minimal activity, lungs are clear, Lasic 40mg IV preop.  Pt takes Eliquis and Tikosyn, last dose this morning, denies missed doses in past 30 days,   EKG shows atrial fib.

## 2024-09-18 NOTE — DISCHARGE INSTRUCTIONS
****Please do not miss any doses of your anticoagulant (Eliquis/Xarelto/Warfarin). This is very important to prevent the risk of stroke.****                 Cardioversion     Cardioversion is a non-surgical way to restore your heart's normal rhythm. Medication may be tried first to correct an irregular heartbeat, but if medicines do not work, electrical cardioversion may be needed. The electrical current should make your heartbeat normally again.      After the procedure-    Your heart rate, blood pressure and respirations will be checked frequently by the nurse until you are fully alert.      When the patches are removed form your chest, you may notice redness, irritation, or itching similar to a mild sunburn. You may You may use over the counter hydrocortisone 1% cream and apply up to three times a day for any irritation to your skin on your chest.      Discharge information-   Have an adult with you to get you home from the hospital; you will not be allowed to drive for 24 hours after the procedure.      You may resume your usual routine after discharge.      Make sure you and your family understands how you are to take your medications. The doctor will tell you what to do with your cardiac medicines and your anti-coagulation medicines.      There is a small chance your irregular heartbeat may return. If you feel skipped beats, rapid heart rate, or chest tightness, call your doctor.

## 2024-09-18 NOTE — PROGRESS NOTES
Heart Failure Nurse Navigator Transition of Care Phone Call    The role of the HF nurse navigator is to (1) characterize risk profiles of patients with heart failure transitioning from [id number] after hospitalization, (2) recommend interventions to improve care and reduce risks of worse post-hospitalization outcomes.     Assessment  Call attempted to patient .     HF Symptoms  Chest pain? No  Shortness of breath? none  Orthopnea? No  Paroxysmal nocturnal dyspnea? Yes  Edema? No  Weight gain >2lbs in 3 days or 5lbs in 1 week? No    Medications  Is the patient prescribed the following medications?  ACEi/ARB/ARNi? No  BB? Yes  MRA? No  SGLT2i? No  Diuretic? Yes  Is the patient adherent to prescribed medications? YES    Management    Is the patient obtaining daily weights? YES  If yes, current weight? No gains  Is the patient following diet limitations (2-3g Na, fluid restriction)? YES  Does the patient have a  cardiology follow-up scheduled? YES/completed  Does the patient require HF education reinforcement? No    Recommendations/Comments:  Pt had successful cardioversion today with Dr. Ty. She states she is doing well and keeping up with daily wts and taking all meds. No questions/issues.    Pt was DC from [facility] on 8/10/24 and has not readmitted for 30 days. I am closing her case at this time.     Mireya KESSLER RN  [facility] Clinical Nurse Navigator, CHF  [phone]

## 2024-09-18 NOTE — POST-PROCEDURE NOTE
Physician Transition of Care Summary  Invasive Cardiovascular Lab    Procedure Date: 9/18/2024  Attending:    * Amado Ty - Primary  Resident/Fellow/Other Assistant: Surgeons and Role:  * No surgeons found with a matching role *    Indications:   Pre-op Diagnosis      * PAF (paroxysmal atrial fibrillation) (Multi) [I48.0]    Post-procedure diagnosis:   Post-op Diagnosis     * PAF (paroxysmal atrial fibrillation) (Multi) [I48.0]    Procedure(s):   Cardioversion  34353 - MS CARDIOVERSION ELECTIVE ARRHYTHMIA EXTERNAL        Procedure Findings:   Please see below    Description of the Procedure:   The patient was brought to the Heart and vascular care area while in atrial fibrillation.  She has been compliant with her medications including her anticoagulation without missing any doses.  Anterior/posterior patches were applied and the patient underwent a synchronized 250 joule biphasic shock restoring sinus rhythm immediately with no complications.    Summary:  Successful cardioversion of atrial fibrillation.     Complications:   None    Stents/Implants:   Implants       No implant documentation for this case.            Anticoagulation/Antiplatelet Plan:   Eliquis    Estimated Blood Loss:   0 mL    Anesthesia: Monitor Anesthesia Care Anesthesia Staff: Anesthesiologist: Rodney Johnson MD    Any Specimen(s) Removed:   Order Name Source Comment Collection Info Order Time   MAGNESIUM Blood, Venous  Collected By: ESTUARDO Wilkinson 9/18/2024 12:08 PM     Release result to [facility]   Immediate        B-TYPE NATRIURETIC PEPTIDE Blood, Venous  Collected By: Shankar Pineda RN 9/18/2024 12:08 PM     Release result to MyChart   Immediate            Disposition:   Stable      Electronically signed by: Amado yT MD, 9/18/2024 3:04 PM

## 2024-09-18 NOTE — ANESTHESIA POSTPROCEDURE EVALUATION
Patient: Vickie Foster    Procedure Summary       Date: 09/18/24 Room / Location: [facility] CV ROOM / Virtual [facility] Cardiac Cath Lab    Anesthesia Start: 1342 Anesthesia Stop: 1349    Procedure: Cardioversion Diagnosis:       PAF (paroxysmal atrial fibrillation) (Multi)      (PAF (paroxysmal atrial fibrillation) (Multi) [I48.0])    Providers: Amado Ty MD Responsible Provider: Rodney Johnson MD    Anesthesia Type: MAC ASA Status: 3            Anesthesia Type: MAC    Vitals Value Taken Time   BP 96/57 09/18/24 1349   Temp  09/18/24 1349   Pulse 70 09/18/24 1349   Resp 16 09/18/24 1349   SpO2 94 09/18/24 1349       Anesthesia Post Evaluation    Patient location during evaluation: PACU  Patient participation: complete - patient participated  Level of consciousness: awake and alert  Pain management: adequate  Airway patency: patent  Cardiovascular status: acceptable  Respiratory status: acceptable  Hydration status: acceptable  Postoperative Nausea and Vomiting: none        There were no known notable events for this encounter.

## 2024-09-23 ENCOUNTER — TELEPHONE (OUTPATIENT)
Dept: PRIMARY CARE | Facility: CLINIC | Age: 69
End: 2024-09-23
Payer: MEDICARE

## 2024-09-23 DIAGNOSIS — M47.816 LUMBAR SPONDYLOSIS: ICD-10-CM

## 2024-09-23 DIAGNOSIS — M47.818 ARTHROPATHY OF BOTH SACROILIAC JOINTS: ICD-10-CM

## 2024-09-24 ENCOUNTER — TELEPHONE (OUTPATIENT)
Dept: PAIN MEDICINE | Facility: CLINIC | Age: 69
End: 2024-09-24
Payer: MEDICARE

## 2024-09-24 DIAGNOSIS — M79.18 MYOFASCIAL PAIN: Primary | ICD-10-CM

## 2024-09-24 RX ORDER — CYCLOBENZAPRINE HCL 5 MG
5-10 TABLET ORAL 2 TIMES DAILY PRN
Qty: 60 TABLET | Refills: 1 | Status: SHIPPED | OUTPATIENT
Start: 2024-09-24 | End: 2024-10-24

## 2024-09-24 NOTE — TELEPHONE ENCOUNTER
Pt called complaining of extreme back pain.  She states she pulled a muscle and is rating her pain 10/10.  She states she will not go to the ER because she can't walk.  Pt is requesting a muscle relaxer to help with pain.  Pt has a follow up appointment with you on 10/10.

## 2024-09-24 NOTE — TELEPHONE ENCOUNTER
I just sent in a prescription for Cyclobenzaprine. She can take 5-10 mg twice a day as needed. She should try 5 mg first and if this does not help can try 10 mg. She should be aware that this may make her very drowsy

## 2024-09-24 NOTE — TELEPHONE ENCOUNTER
Is there a muscle relaxant that she has used in the past which helped and not caused side effects? I see that someone prescribed her Tizanidine back in 08/2024 and could send that in if it was helpful. Thanks

## 2024-09-25 RX ORDER — TIZANIDINE 2 MG/1
2 TABLET ORAL EVERY 12 HOURS PRN
Qty: 10 TABLET | Refills: 0 | Status: ON HOLD | OUTPATIENT
Start: 2024-09-25 | End: 2024-09-30

## 2024-09-25 NOTE — TELEPHONE ENCOUNTER
Needs a hospital FU visit to discuss use of this or appropriate refills. Sending 10 tabs for coverage

## 2024-09-26 DIAGNOSIS — M1A.9XX0 CHRONIC GOUT WITHOUT TOPHUS, UNSPECIFIED CAUSE, UNSPECIFIED SITE: ICD-10-CM

## 2024-09-26 RX ORDER — ALLOPURINOL 200 MG/1
200 TABLET ORAL 2 TIMES DAILY
Qty: 180 TABLET | Refills: 0 | Status: ON HOLD | OUTPATIENT
Start: 2024-09-26

## 2024-09-27 NOTE — TELEPHONE ENCOUNTER
Appt for hosp fu is made, however, she is not taking the Tizanidine as her pain management had called in Cyclobenzaprine 5 mg 1-2 tabs no more then 10 mgs BID.

## 2024-09-30 ENCOUNTER — APPOINTMENT (OUTPATIENT)
Dept: PRIMARY CARE | Facility: CLINIC | Age: 69
End: 2024-09-30
Payer: MEDICARE

## 2024-09-30 NOTE — TELEPHONE ENCOUNTER
Pt is r/s her hosp appt because she is taking muscle relaxers and has no one to drive her to the appt. following about her Hosp Follow up, she states she miserable and frustrated, because she keeps getting on thing after another going wrong, she's having backache and having to hold on the furniture and she is taking muscle relaxers and it is helping, but the tailbone is not feeling better. Please advise and call back [phone].

## 2024-10-01 ENCOUNTER — HOSPITAL ENCOUNTER (EMERGENCY)
Facility: HOSPITAL | Age: 69
Discharge: HOME | DRG: 871 | End: 2024-10-01
Payer: MEDICARE

## 2024-10-01 VITALS
HEART RATE: 98 BPM | RESPIRATION RATE: 22 BRPM | TEMPERATURE: 97.8 F | BODY MASS INDEX: 44.72 KG/M2 | WEIGHT: 243 LBS | SYSTOLIC BLOOD PRESSURE: 138 MMHG | HEIGHT: 62 IN | OXYGEN SATURATION: 98 % | DIASTOLIC BLOOD PRESSURE: 107 MMHG

## 2024-10-01 DIAGNOSIS — R04.0 EPISTAXIS: Primary | ICD-10-CM

## 2024-10-01 LAB
ANION GAP SERPL CALCULATED.3IONS-SCNC: 10 MMOL/L (ref 10–20)
BASOPHILS # BLD AUTO: 0.02 X10*3/UL (ref 0–0.1)
BASOPHILS NFR BLD AUTO: 0.2 %
BUN SERPL-MCNC: 30 MG/DL (ref 6–23)
CALCIUM SERPL-MCNC: 9.8 MG/DL (ref 8.6–10.3)
CHLORIDE SERPL-SCNC: 89 MMOL/L (ref 98–107)
CO2 SERPL-SCNC: 43 MMOL/L (ref 21–32)
CREAT SERPL-MCNC: 0.64 MG/DL (ref 0.5–1.05)
EGFRCR SERPLBLD CKD-EPI 2021: >90 ML/MIN/1.73M*2
EOSINOPHIL # BLD AUTO: 0.06 X10*3/UL (ref 0–0.7)
EOSINOPHIL NFR BLD AUTO: 0.5 %
ERYTHROCYTE [DISTWIDTH] IN BLOOD BY AUTOMATED COUNT: 13.8 % (ref 11.5–14.5)
GLUCOSE SERPL-MCNC: 115 MG/DL (ref 74–99)
HCT VFR BLD AUTO: 41.7 % (ref 36–46)
HGB BLD-MCNC: 13.4 G/DL (ref 12–16)
IMM GRANULOCYTES # BLD AUTO: 0.04 X10*3/UL (ref 0–0.7)
IMM GRANULOCYTES NFR BLD AUTO: 0.3 % (ref 0–0.9)
LYMPHOCYTES # BLD AUTO: 1.11 X10*3/UL (ref 1.2–4.8)
LYMPHOCYTES NFR BLD AUTO: 9.5 %
MCH RBC QN AUTO: 31.2 PG (ref 26–34)
MCHC RBC AUTO-ENTMCNC: 32.1 G/DL (ref 32–36)
MCV RBC AUTO: 97 FL (ref 80–100)
MONOCYTES # BLD AUTO: 0.86 X10*3/UL (ref 0.1–1)
MONOCYTES NFR BLD AUTO: 7.4 %
NEUTROPHILS # BLD AUTO: 9.6 X10*3/UL (ref 1.2–7.7)
NEUTROPHILS NFR BLD AUTO: 82.1 %
NRBC BLD-RTO: 0 /100 WBCS (ref 0–0)
PLATELET # BLD AUTO: 271 X10*3/UL (ref 150–450)
POTASSIUM SERPL-SCNC: 2.9 MMOL/L (ref 3.5–5.3)
RBC # BLD AUTO: 4.29 X10*6/UL (ref 4–5.2)
SODIUM SERPL-SCNC: 139 MMOL/L (ref 136–145)
WBC # BLD AUTO: 11.7 X10*3/UL (ref 4.4–11.3)

## 2024-10-01 PROCEDURE — 80048 BASIC METABOLIC PNL TOTAL CA: CPT

## 2024-10-01 PROCEDURE — 85025 COMPLETE CBC W/AUTO DIFF WBC: CPT

## 2024-10-01 PROCEDURE — 36415 COLL VENOUS BLD VENIPUNCTURE: CPT

## 2024-10-01 PROCEDURE — 2500000001 HC RX 250 WO HCPCS SELF ADMINISTERED DRUGS (ALT 637 FOR MEDICARE OP)

## 2024-10-01 PROCEDURE — 30901 CONTROL OF NOSEBLEED: CPT

## 2024-10-01 PROCEDURE — 99283 EMERGENCY DEPT VISIT LOW MDM: CPT | Mod: 25

## 2024-10-01 PROCEDURE — 30903 CONTROL OF NOSEBLEED: CPT

## 2024-10-01 RX ORDER — OXYMETAZOLINE HCL 0.05 %
2 SPRAY, NON-AEROSOL (ML) NASAL EVERY 12 HOURS PRN
Status: DISCONTINUED | OUTPATIENT
Start: 2024-10-01 | End: 2024-10-02 | Stop reason: HOSPADM

## 2024-10-01 RX ORDER — SILVER NITRATE 38.21; 12.74 MG/1; MG/1
STICK TOPICAL ONCE
Status: COMPLETED | OUTPATIENT
Start: 2024-10-01 | End: 2024-10-01

## 2024-10-01 ASSESSMENT — PAIN DESCRIPTION - DESCRIPTORS: DESCRIPTORS: DISCOMFORT

## 2024-10-01 ASSESSMENT — PAIN DESCRIPTION - LOCATION: LOCATION: BACK

## 2024-10-01 ASSESSMENT — PAIN - FUNCTIONAL ASSESSMENT: PAIN_FUNCTIONAL_ASSESSMENT: 0-10

## 2024-10-01 ASSESSMENT — PAIN DESCRIPTION - PAIN TYPE: TYPE: CHRONIC PAIN

## 2024-10-01 NOTE — Clinical Note
Vickie Foster was seen and treated in our emergency department on 10/1/2024.  She may return to work on 10/03/2024.       If you have any questions or concerns, please don't hesitate to call.      Oscar Mosley PA-C

## 2024-10-02 ENCOUNTER — APPOINTMENT (OUTPATIENT)
Dept: RADIOLOGY | Facility: HOSPITAL | Age: 69
DRG: 871 | End: 2024-10-02
Payer: MEDICARE

## 2024-10-02 ENCOUNTER — APPOINTMENT (OUTPATIENT)
Dept: CARDIOLOGY | Facility: HOSPITAL | Age: 69
DRG: 871 | End: 2024-10-02
Payer: MEDICARE

## 2024-10-02 ENCOUNTER — HOSPITAL ENCOUNTER (INPATIENT)
Facility: HOSPITAL | Age: 69
End: 2024-10-02
Attending: STUDENT IN AN ORGANIZED HEALTH CARE EDUCATION/TRAINING PROGRAM | Admitting: INTERNAL MEDICINE
Payer: MEDICARE

## 2024-10-02 DIAGNOSIS — R65.20 SEPSIS WITH ACUTE HYPOXIC RESPIRATORY FAILURE WITHOUT SEPTIC SHOCK, DUE TO UNSPECIFIED ORGANISM (MULTI): Primary | ICD-10-CM

## 2024-10-02 DIAGNOSIS — J96.01 SEPSIS WITH ACUTE HYPOXIC RESPIRATORY FAILURE WITHOUT SEPTIC SHOCK, DUE TO UNSPECIFIED ORGANISM (MULTI): Primary | ICD-10-CM

## 2024-10-02 DIAGNOSIS — J18.9 PNEUMONIA OF BOTH LOWER LOBES DUE TO INFECTIOUS ORGANISM: ICD-10-CM

## 2024-10-02 DIAGNOSIS — J43.2 CENTRILOBULAR EMPHYSEMA (MULTI): ICD-10-CM

## 2024-10-02 DIAGNOSIS — E87.6 HYPOKALEMIA: ICD-10-CM

## 2024-10-02 DIAGNOSIS — M47.814 THORACIC SPONDYLOSIS: ICD-10-CM

## 2024-10-02 DIAGNOSIS — J90 PLEURAL EFFUSION: ICD-10-CM

## 2024-10-02 DIAGNOSIS — A41.9 SEPSIS WITH ACUTE HYPOXIC RESPIRATORY FAILURE WITHOUT SEPTIC SHOCK, DUE TO UNSPECIFIED ORGANISM (MULTI): Primary | ICD-10-CM

## 2024-10-02 DIAGNOSIS — J96.11 CHRONIC HYPOXEMIC RESPIRATORY FAILURE (MULTI): ICD-10-CM

## 2024-10-02 DIAGNOSIS — R44.3 HALLUCINATIONS: ICD-10-CM

## 2024-10-02 LAB
ALBUMIN SERPL BCP-MCNC: 4 G/DL (ref 3.4–5)
ALP SERPL-CCNC: 113 U/L (ref 33–136)
ALT SERPL W P-5'-P-CCNC: 25 U/L (ref 7–45)
AMPHETAMINES UR QL SCN: NORMAL
ANION GAP BLDV CALCULATED.4IONS-SCNC: 0 MMOL/L (ref 10–25)
ANION GAP SERPL CALCULATED.3IONS-SCNC: 12 MMOL/L (ref 10–20)
APPEARANCE UR: CLEAR
AST SERPL W P-5'-P-CCNC: 44 U/L (ref 9–39)
BARBITURATES UR QL SCN: NORMAL
BASE EXCESS BLDV CALC-SCNC: 17.6 MMOL/L (ref -2–3)
BASOPHILS # BLD AUTO: 0.03 X10*3/UL (ref 0–0.1)
BASOPHILS NFR BLD AUTO: 0.2 %
BENZODIAZ UR QL SCN: NORMAL
BILIRUB SERPL-MCNC: 1.1 MG/DL (ref 0–1.2)
BILIRUB UR STRIP.AUTO-MCNC: NEGATIVE MG/DL
BNP SERPL-MCNC: 179 PG/ML (ref 0–99)
BODY TEMPERATURE: 37 DEGREES CELSIUS
BUN SERPL-MCNC: 35 MG/DL (ref 6–23)
BZE UR QL SCN: NORMAL
CA-I BLDV-SCNC: 1.21 MMOL/L (ref 1.1–1.33)
CALCIUM SERPL-MCNC: 10 MG/DL (ref 8.6–10.3)
CANNABINOIDS UR QL SCN: NORMAL
CARDIAC TROPONIN I PNL SERPL HS: 18 NG/L (ref 0–13)
CARDIAC TROPONIN I PNL SERPL HS: 20 NG/L (ref 0–13)
CHLORIDE BLDV-SCNC: 92 MMOL/L (ref 98–107)
CHLORIDE SERPL-SCNC: 87 MMOL/L (ref 98–107)
CO2 SERPL-SCNC: 45 MMOL/L (ref 21–32)
COLOR UR: NORMAL
CREAT SERPL-MCNC: 0.75 MG/DL (ref 0.5–1.05)
EGFRCR SERPLBLD CKD-EPI 2021: 87 ML/MIN/1.73M*2
EOSINOPHIL # BLD AUTO: 0.01 X10*3/UL (ref 0–0.7)
EOSINOPHIL NFR BLD AUTO: 0.1 %
ERYTHROCYTE [DISTWIDTH] IN BLOOD BY AUTOMATED COUNT: 13.9 % (ref 11.5–14.5)
ETHANOL SERPL-MCNC: <10 MG/DL
FENTANYL+NORFENTANYL UR QL SCN: NORMAL
FLUAV RNA RESP QL NAA+PROBE: NOT DETECTED
FLUBV RNA RESP QL NAA+PROBE: NOT DETECTED
GLUCOSE BLDV-MCNC: 157 MG/DL (ref 74–99)
GLUCOSE SERPL-MCNC: 153 MG/DL (ref 74–99)
GLUCOSE UR STRIP.AUTO-MCNC: NORMAL MG/DL
HCO3 BLDV-SCNC: 49.6 MMOL/L (ref 22–26)
HCT VFR BLD AUTO: 41.7 % (ref 36–46)
HCT VFR BLD EST: 38 % (ref 36–46)
HGB BLD-MCNC: 13.2 G/DL (ref 12–16)
HGB BLDV-MCNC: 12.8 G/DL (ref 12–16)
IMM GRANULOCYTES # BLD AUTO: 0.04 X10*3/UL (ref 0–0.7)
IMM GRANULOCYTES NFR BLD AUTO: 0.3 % (ref 0–0.9)
INHALED O2 CONCENTRATION: 38 %
KETONES UR STRIP.AUTO-MCNC: NEGATIVE MG/DL
LACTATE BLDV-SCNC: 1 MMOL/L (ref 0.4–2)
LACTATE SERPL-SCNC: 0.9 MMOL/L (ref 0.4–2)
LEUKOCYTE ESTERASE UR QL STRIP.AUTO: NEGATIVE
LYMPHOCYTES # BLD AUTO: 1.02 X10*3/UL (ref 1.2–4.8)
LYMPHOCYTES NFR BLD AUTO: 7.1 %
MCH RBC QN AUTO: 31.1 PG (ref 26–34)
MCHC RBC AUTO-ENTMCNC: 31.7 G/DL (ref 32–36)
MCV RBC AUTO: 98 FL (ref 80–100)
METHADONE UR QL SCN: NORMAL
MONOCYTES # BLD AUTO: 0.92 X10*3/UL (ref 0.1–1)
MONOCYTES NFR BLD AUTO: 6.4 %
NEUTROPHILS # BLD AUTO: 12.3 X10*3/UL (ref 1.2–7.7)
NEUTROPHILS NFR BLD AUTO: 85.9 %
NITRITE UR QL STRIP.AUTO: NEGATIVE
NRBC BLD-RTO: 0 /100 WBCS (ref 0–0)
OPIATES UR QL SCN: NORMAL
OXYCODONE+OXYMORPHONE UR QL SCN: NORMAL
OXYHGB MFR BLDV: 74.4 % (ref 45–75)
PCO2 BLDV: 108 MM HG (ref 41–51)
PCP UR QL SCN: NORMAL
PH BLDV: 7.27 PH (ref 7.33–7.43)
PH UR STRIP.AUTO: 5 [PH]
PLATELET # BLD AUTO: 303 X10*3/UL (ref 150–450)
PO2 BLDV: 50 MM HG (ref 35–45)
POTASSIUM BLDV-SCNC: 2.8 MMOL/L (ref 3.5–5.3)
POTASSIUM SERPL-SCNC: 2.7 MMOL/L (ref 3.5–5.3)
PROT SERPL-MCNC: 7.3 G/DL (ref 6.4–8.2)
PROT UR STRIP.AUTO-MCNC: NEGATIVE MG/DL
RBC # BLD AUTO: 4.24 X10*6/UL (ref 4–5.2)
RBC # UR STRIP.AUTO: NEGATIVE /UL
SAO2 % BLDV: 77 % (ref 45–75)
SARS-COV-2 RNA RESP QL NAA+PROBE: NOT DETECTED
SODIUM BLDV-SCNC: 139 MMOL/L (ref 136–145)
SODIUM SERPL-SCNC: 141 MMOL/L (ref 136–145)
SP GR UR STRIP.AUTO: 1.01
UROBILINOGEN UR STRIP.AUTO-MCNC: NORMAL MG/DL
WBC # BLD AUTO: 14.3 X10*3/UL (ref 4.4–11.3)

## 2024-10-02 PROCEDURE — 81003 URINALYSIS AUTO W/O SCOPE: CPT | Performed by: STUDENT IN AN ORGANIZED HEALTH CARE EDUCATION/TRAINING PROGRAM

## 2024-10-02 PROCEDURE — 84132 ASSAY OF SERUM POTASSIUM: CPT

## 2024-10-02 PROCEDURE — 70450 CT HEAD/BRAIN W/O DYE: CPT

## 2024-10-02 PROCEDURE — 99291 CRITICAL CARE FIRST HOUR: CPT | Mod: 25

## 2024-10-02 PROCEDURE — 2500000001 HC RX 250 WO HCPCS SELF ADMINISTERED DRUGS (ALT 637 FOR MEDICARE OP): Performed by: STUDENT IN AN ORGANIZED HEALTH CARE EDUCATION/TRAINING PROGRAM

## 2024-10-02 PROCEDURE — 96365 THER/PROPH/DIAG IV INF INIT: CPT

## 2024-10-02 PROCEDURE — 2500000005 HC RX 250 GENERAL PHARMACY W/O HCPCS: Performed by: STUDENT IN AN ORGANIZED HEALTH CARE EDUCATION/TRAINING PROGRAM

## 2024-10-02 PROCEDURE — 71045 X-RAY EXAM CHEST 1 VIEW: CPT | Performed by: RADIOLOGY

## 2024-10-02 PROCEDURE — 84484 ASSAY OF TROPONIN QUANT: CPT | Performed by: STUDENT IN AN ORGANIZED HEALTH CARE EDUCATION/TRAINING PROGRAM

## 2024-10-02 PROCEDURE — 84075 ASSAY ALKALINE PHOSPHATASE: CPT | Performed by: STUDENT IN AN ORGANIZED HEALTH CARE EDUCATION/TRAINING PROGRAM

## 2024-10-02 PROCEDURE — 96366 THER/PROPH/DIAG IV INF ADDON: CPT

## 2024-10-02 PROCEDURE — 85025 COMPLETE CBC W/AUTO DIFF WBC: CPT | Performed by: STUDENT IN AN ORGANIZED HEALTH CARE EDUCATION/TRAINING PROGRAM

## 2024-10-02 PROCEDURE — 2500000004 HC RX 250 GENERAL PHARMACY W/ HCPCS (ALT 636 FOR OP/ED)

## 2024-10-02 PROCEDURE — 36415 COLL VENOUS BLD VENIPUNCTURE: CPT

## 2024-10-02 PROCEDURE — 2500000004 HC RX 250 GENERAL PHARMACY W/ HCPCS (ALT 636 FOR OP/ED): Performed by: STUDENT IN AN ORGANIZED HEALTH CARE EDUCATION/TRAINING PROGRAM

## 2024-10-02 PROCEDURE — 93005 ELECTROCARDIOGRAM TRACING: CPT

## 2024-10-02 PROCEDURE — 83605 ASSAY OF LACTIC ACID: CPT

## 2024-10-02 PROCEDURE — 80053 COMPREHEN METABOLIC PANEL: CPT | Performed by: STUDENT IN AN ORGANIZED HEALTH CARE EDUCATION/TRAINING PROGRAM

## 2024-10-02 PROCEDURE — 96375 TX/PRO/DX INJ NEW DRUG ADDON: CPT

## 2024-10-02 PROCEDURE — 80307 DRUG TEST PRSMV CHEM ANLYZR: CPT | Performed by: STUDENT IN AN ORGANIZED HEALTH CARE EDUCATION/TRAINING PROGRAM

## 2024-10-02 PROCEDURE — 82077 ASSAY SPEC XCP UR&BREATH IA: CPT | Performed by: STUDENT IN AN ORGANIZED HEALTH CARE EDUCATION/TRAINING PROGRAM

## 2024-10-02 PROCEDURE — 71045 X-RAY EXAM CHEST 1 VIEW: CPT

## 2024-10-02 PROCEDURE — 87040 BLOOD CULTURE FOR BACTERIA: CPT | Mod: WESLAB

## 2024-10-02 PROCEDURE — 2060000001 HC INTERMEDIATE ICU ROOM DAILY

## 2024-10-02 PROCEDURE — 83880 ASSAY OF NATRIURETIC PEPTIDE: CPT | Performed by: STUDENT IN AN ORGANIZED HEALTH CARE EDUCATION/TRAINING PROGRAM

## 2024-10-02 PROCEDURE — 36415 COLL VENOUS BLD VENIPUNCTURE: CPT | Performed by: STUDENT IN AN ORGANIZED HEALTH CARE EDUCATION/TRAINING PROGRAM

## 2024-10-02 PROCEDURE — 87636 SARSCOV2 & INF A&B AMP PRB: CPT | Performed by: STUDENT IN AN ORGANIZED HEALTH CARE EDUCATION/TRAINING PROGRAM

## 2024-10-02 PROCEDURE — 93010 ELECTROCARDIOGRAM REPORT: CPT | Performed by: INTERNAL MEDICINE

## 2024-10-02 PROCEDURE — 70450 CT HEAD/BRAIN W/O DYE: CPT | Performed by: RADIOLOGY

## 2024-10-02 RX ORDER — POTASSIUM CHLORIDE 1.5 G/1.58G
20 POWDER, FOR SOLUTION ORAL 2 TIMES DAILY
Status: DISPENSED | OUTPATIENT
Start: 2024-10-02

## 2024-10-02 RX ORDER — METOPROLOL TARTRATE 1 MG/ML
5 INJECTION, SOLUTION INTRAVENOUS ONCE
Status: DISCONTINUED | OUTPATIENT
Start: 2024-10-02 | End: 2024-10-03

## 2024-10-02 RX ORDER — POTASSIUM CHLORIDE 14.9 MG/ML
20 INJECTION INTRAVENOUS
Status: COMPLETED | OUTPATIENT
Start: 2024-10-02 | End: 2024-10-03

## 2024-10-02 RX ORDER — CEFTRIAXONE 2 G/50ML
2 INJECTION, SOLUTION INTRAVENOUS ONCE
Status: COMPLETED | OUTPATIENT
Start: 2024-10-02 | End: 2024-10-02

## 2024-10-02 ASSESSMENT — LIFESTYLE VARIABLES
EVER HAD A DRINK FIRST THING IN THE MORNING TO STEADY YOUR NERVES TO GET RID OF A HANGOVER: NO
HAVE YOU EVER FELT YOU SHOULD CUT DOWN ON YOUR DRINKING: NO
HAVE PEOPLE ANNOYED YOU BY CRITICIZING YOUR DRINKING: NO
TOTAL SCORE: 0
EVER FELT BAD OR GUILTY ABOUT YOUR DRINKING: NO

## 2024-10-02 ASSESSMENT — PAIN SCALES - GENERAL: PAINLEVEL_OUTOF10: 0 - NO PAIN

## 2024-10-02 ASSESSMENT — PAIN - FUNCTIONAL ASSESSMENT: PAIN_FUNCTIONAL_ASSESSMENT: 0-10

## 2024-10-02 NOTE — DISCHARGE INSTRUCTIONS
Please speak with Dr. Broussard for humidified oxygen at home to help reduce the risk of nosebleeds.    Be sure to take all medications, over the counter medications or prescription medications only as directed.    Be sure to follow up as directed in 1-2 days. All of the details of your follow up instructions are detailed in the follow up section of this packet.    If you are being discharged with any pains medications or muscle relaxers (norco, Vicodin, hydrocodone products, Percocet, oxycodone products, flexeril, cyclobenzaprine, robaxin, norflex, brand or generic, or any other pain controlling medications with the exception of Ibuprofen and regular Tylenol, do not drive or operate machinery, climb ladders or participate in any activity that could potentially put yourself or others at risk should you get dizzy, or be/feel impaired at all.    Return to emergency room without delay for ANY new or worsening pains or for any other symptoms or concerns. Return with worsening pains, nausea, vomiting, trouble breathing, palpitations, shortness of breath, inability to pass stool or urine, loss of control of stool or urine, any numbness or tingling (that is not normal for you), uncontrolled fevers, the passing of blood or other material in stool or urine, rashes, pains or for any other symptoms or concerns you may have. You are always welcome to return to the ER at any time for any reason or for any other concerns you may have.

## 2024-10-02 NOTE — ED NOTES
Went over discharge instructions with pt. Pt verbalized understanding. No further issue sat time of discharge.     Will Marte RN  10/01/24 7588

## 2024-10-02 NOTE — TELEPHONE ENCOUNTER
[facility]A-ADVISED PT TO THE ED. I spoke with Vickie and her  Jose Maria. Jose Maria is taking Vickie to [facility] ED. Vickie called stating she fell twice today. She is stating she is hallucinating,seeing and talking to people that are not there. She is stating at one time she thought she was talking and seeing her daughter but later on realized her daughter is out of town. She is stating she is scared. She is stating that her ankles,feet and legs are swollen. Her Daughter in Law Colleen was in the back round stating Vickie is swollen all over.

## 2024-10-02 NOTE — ED PROVIDER NOTES
HPI   Chief Complaint   Patient presents with    Epistaxis (Nose Bleed)     Pt presents to the ED with c/o nosebleed. Pt stated that she blew her nose and picked her nose and suddenly started experiencing nosebleed. Pt is on eliqius for hx of A-fib       Patient is a 68-year-old female presenting via EMS from home with a left nare epistaxis.  She states that she was starting to pick at her nose when the bleeding started.  She does take Eliquis for history of A-fib.  States that she has had nosebleeds in the past and she states that silver nitrate is typically what is the most effective.  Endorses feeling slightly weak and she states that that is lab related to the bleeding as she states this has been bleeding for almost 3 hours prior to EMS intervention.  She states that she tried to pinch her nose without relief and she does arrive with nasal clamp on.  Patient denies fevers, chills, cough, sore throat, runny nose, chest pain, shortness of breath, abdominal pain, nausea, vomiting, diarrhea or urinary complaints.              Patient History   Past Medical History:   Diagnosis Date    A-fib (Multi)     CHF (congestive heart failure) (Multi)     COVID     Heart disease     HTN (hypertension)     Low back pain      Past Surgical History:   Procedure Laterality Date    ABLATION OF DYSRHYTHMIC FOCUS      CARDIAC ELECTROPHYSIOLOGY PROCEDURE N/A 05/01/2024    Procedure: Cardioversion;  Surgeon: Amado Ty MD;  Location: [facility] Cardiac Cath Lab;  Service: Electrophysiology;  Laterality: N/A;  00337  i48.0  medical mutual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    CARDIAC ELECTROPHYSIOLOGY PROCEDURE N/A 07/08/2024    Procedure: Ablation A-Fib;  Surgeon: Amado Ty MD;  Location: [facility] Cardiac Cath Lab;  Service: Electrophysiology;  Laterality: N/A;  AFIB CRYOABLATION; CPT 58722; ICD I48.0   Medicare mutual of [place] medicare  ID - [id number]  [phone]  No Auth required  Call ref # [id number]    CARDIAC ELECTROPHYSIOLOGY PROCEDURE  N/A 2024    Procedure: Cardioversion;  Surgeon: Amado Ty MD;  Location: [facility] Cardiac Cath Lab;  Service: Electrophysiology;  Laterality: N/A;  CARDIOVERSION CPT 88535, ICD I48.0 no auth required/REF # [id number]    CARDIOVERSION      2024     Family History   Problem Relation Name Age of Onset    Breast cancer Mother      Heart attack Mother           in early 80s of MI    Heart disease Mother      Colon cancer Father      Breast cancer Sister      Diabetes type II Sister      Kidney failure Sister      Other (herione addict) Sister      No Known Problems Brother      No Known Problems Daughter      Other (recovering alcoholic) Son      Breast cancer Other Maternal Aunt      Social History     Tobacco Use    Smoking status: Former     Current packs/day: 0.00     Types: Cigarettes     Quit date:      Years since quittin.    Smokeless tobacco: Never   Vaping Use    Vaping status: Never Used   Substance Use Topics    Alcohol use: Not Currently     Comment: monthly or less    Drug use: Never       Physical Exam   ED Triage Vitals [10/01/24 2043]   Temperature Heart Rate Respirations BP   36.6 °C (97.8 °F) 98 (!) 22 (!) 138/107      Pulse Ox Temp src Heart Rate Source Patient Position   98 % -- -- --      BP Location FiO2 (%)     -- --       Physical Exam  Vitals and nursing note reviewed.   Constitutional:       Appearance: She is well-developed.      Comments: Awake, sitting in examination chair   HENT:      Head: Normocephalic and atraumatic.      Nose:      Comments: Active bleeding from the left nare     Mouth/Throat:      Mouth: Mucous membranes are moist.      Pharynx: Oropharynx is clear.   Eyes:      Extraocular Movements: Extraocular movements intact.      Conjunctiva/sclera: Conjunctivae normal.      Pupils: Pupils are equal, round, and reactive to light.   Cardiovascular:      Rate and Rhythm: Normal rate and regular rhythm.      Pulses: Normal pulses.      Heart sounds:  Normal heart sounds. No murmur heard.  Pulmonary:      Effort: Pulmonary effort is normal. No respiratory distress.      Breath sounds: Normal breath sounds.   Abdominal:      General: Abdomen is flat.      Palpations: Abdomen is soft.      Tenderness: There is no abdominal tenderness.   Musculoskeletal:         General: No swelling. Normal range of motion.      Cervical back: Normal range of motion and neck supple.   Skin:     General: Skin is warm and dry.      Capillary Refill: Capillary refill takes less than 2 seconds.   Neurological:      General: No focal deficit present.      Mental Status: She is alert and oriented to person, place, and time.   Psychiatric:         Mood and Affect: Mood normal.         Behavior: Behavior normal.           ED Course & MDM   ED Course as of 10/02/24 0001   e Oct 01, 2024   2057 Patient states that she has hadbefore and has required cautery.  Cauterization was done at 2056.  Nose clamp reapplied.  Patient will be monitored for rebleed. [AR]   2132 Cauterization was ineffective.  Afrin ordered. [AR]   2156 Afrin has been applied to cottonball swab and placed in the left nare.  Will recheck in 15 minutes [AR]   2238 Upon further evaluation after Afrin, significant improvement and cessation of nosebleed to left nare [AR]      ED Course User Index  [AR] Oscar Mosley PA-C         Diagnoses as of 10/02/24 0001   Epistaxis                 No data recorded     [place] Coma Scale Score: 15 (10/01/24 2045 : Will Marte RN)                           Medical Decision Making  Patient is a 68-year-old female presenting via EMS with left nare epistaxis.  Patient states that the silver nitrate stick works so that was ordered initially.  Conditions considered include but are not limited to: Contusion, anterior bleed, posterior bleed, trauma.    Silver nitrate stick and nasal clamp were ineffective.  Oxymetazoline soaked cotton swab was inserted into the left nare and given  roughly 15 minutes.  After examination, the bleeding has subsided.  Patient was observed for an additional 10 minutes with no active bleeding.  Lab work was ordered due to patient's concern for feeling weak.  CBC does show mild leukocytosis with WBCs of 11.7 but is without signs of anemia.  BMP does show mild hypokalemia with potassium of 2.9 and a bicarb of 43.  This is likely related to patient's history of COPD.    I believe this patient is at low risk for complication, and a disposition of discharge is acceptable.  Return to the Emergency Department if new or worsening symptoms including headache, fever, chills, chest pain, shortness of breath, syncope, near syncope, abdominal pain, nausea, vomiting,  diarrhea, or worsening pain.  Educated patient to not blow her nose, do not pick at her nose, do not excessively rub her nose as this may all restart the bleeding.  Patient is agreeable to a disposition of discharge with follow-up with primary care in the next several days.    Portions of this note made with Dragon software, please be mindful of potential grammatical errors.        Medications   oxymetazoline (Afrin) 0.05 % nasal spray 2 spray (2 sprays Each Nostril Given 10/1/24 2150)   silver nitrate applicators applicator (1 applicator Topical Given 10/1/24 2051)         Labs Reviewed   CBC WITH AUTO DIFFERENTIAL - Abnormal       Result Value    WBC 11.7 (*)     nRBC 0.0      RBC 4.29      Hemoglobin 13.4      Hematocrit 41.7      MCV 97      MCH 31.2      MCHC 32.1      RDW 13.8      Platelets 271      Neutrophils % 82.1      Immature Granulocytes %, Automated 0.3      Lymphocytes % 9.5      Monocytes % 7.4      Eosinophils % 0.5      Basophils % 0.2      Neutrophils Absolute 9.60 (*)     Immature Granulocytes Absolute, Automated 0.04      Lymphocytes Absolute 1.11 (*)     Monocytes Absolute 0.86      Eosinophils Absolute 0.06      Basophils Absolute 0.02     BASIC METABOLIC PANEL - Abnormal    Glucose 115 (*)      Sodium 139      Potassium 2.9 (*)     Chloride 89 (*)     Bicarbonate 43 (*)     Anion Gap 10      Urea Nitrogen 30 (*)     Creatinine 0.64      eGFR >90      Calcium 9.8           Procedure  Procedures     Oscar Mosley PA-C  10/02/24 0004

## 2024-10-02 NOTE — ED PROCEDURE NOTE
Attempted to contact pt. No answer. Voicemail box full. It appears pt's script was just filled a few days ago.    Procedure  Epistaxis Management    Performed by: Oscar Mosley PA-C  Authorized by: Oscar Mosley PA-C    Consent:     Consent obtained:  Verbal    Consent given by:  Patient    Risks, benefits, and alternatives were discussed: yes      Risks discussed:  Bleeding, nasal injury and pain  Universal protocol:     Procedure explained and questions answered to patient or proxy's satisfaction: yes      Site/side marked: yes      Immediately prior to procedure, a time out was called: yes      Patient identity confirmed:  Verbally with patient  Anesthesia:     Anesthesia method:  None  Procedure details:     Treatment site:  L anterior    Treatment method:  Silver nitrate and anterior pack    Treatment complexity:  Extensive    Treatment episode: recurring    Post-procedure details:     Assessment:  Bleeding stopped    Procedure completion:  Tolerated well, no immediate complications  Comments:      Initial silver nitrate stick was utilized and this was ineffective.  Oxymetazoline soaked gauze packed into patient's left nare was effective in stopping the bleed.  We did discuss to limit trauma to the nare as this may cause repeat bleeding.               Oscar Mosley PA-C  10/02/24 0007

## 2024-10-02 NOTE — ED PROVIDER NOTES
"HPI   Chief Complaint   Patient presents with    Hallucinations     67% in triage on room air, patient states supposed to be on 3L.  Applied 6L NC in triage.   states patient has been hallucinating today and over the past month had about 9 falls with 2 today.       Patient is a 68-year-old female past medical history of atrial fibrillation on Eliquis, CHF, hypertension, chronic back pain presenting with concerns for hallucinations.  Patient reports that for the last week, she has been hallucinating.  She is \"seeing things and seeing family that are not there \".  She states that they do not talk to her but she tries to talk to them.  She states that she has been having multiple falls over the last several weeks with 2 recent falls earlier today.  She does feel like her legs are giving out on her.  Believes that she did hit her head today.  He has been states that she has not been sleeping and he believes that she may be having hallucinations because of that.  The patient does endorse mild dysuria over the last day or so.  Denies incontinence.  States that she usually wears 3 L nasal cannula at night but has been increasing to 3 L nasal cannula all day.  Patient denies fevers, chills, cough, sore throat, runny nose, chest pain, abdominal pain, nausea, vomiting, diarrhea or urinary complaints.   states that the patient has had an identified mass in her brain that the neurologist states was nothing to remark on.              Patient History   Past Medical History:   Diagnosis Date    A-fib (Multi)     CHF (congestive heart failure) (Multi)     COVID     Heart disease     HTN (hypertension)     Low back pain      Past Surgical History:   Procedure Laterality Date    ABLATION OF DYSRHYTHMIC FOCUS      CARDIAC ELECTROPHYSIOLOGY PROCEDURE N/A 05/01/2024    Procedure: Cardioversion;  Surgeon: Amado Ty MD;  Location: [facility] Cardiac Cath Lab;  Service: Electrophysiology;  Laterality: N/A;  " 49102  i48.0  medical mutual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    CARDIAC ELECTROPHYSIOLOGY PROCEDURE N/A 2024    Procedure: Ablation A-Fib;  Surgeon: Amado Ty MD;  Location: [facility] Cardiac Cath Lab;  Service: Electrophysiology;  Laterality: N/A;  AFIB CRYOABLATION; CPT 99620; ICD I48.0   Medicare mutual of [place] medicare  ID - [id number]  [phone]  No Auth required  Call ref # [id number]    CARDIAC ELECTROPHYSIOLOGY PROCEDURE N/A 2024    Procedure: Cardioversion;  Surgeon: Amado Ty MD;  Location: [facility] Cardiac Cath Lab;  Service: Electrophysiology;  Laterality: N/A;  CARDIOVERSION CPT 23088, ICD I48.0 no auth required/REF # [id number]    CARDIOVERSION      2024     Family History   Problem Relation Name Age of Onset    Breast cancer Mother      Heart attack Mother           in early 80s of MI    Heart disease Mother      Colon cancer Father      Breast cancer Sister      Diabetes type II Sister      Kidney failure Sister      Other (herione addict) Sister      No Known Problems Brother      No Known Problems Daughter      Other (recovering alcoholic) Son      Breast cancer Other Maternal Aunt      Social History     Tobacco Use    Smoking status: Former     Current packs/day: 0.00     Types: Cigarettes     Quit date:      Years since quittin.7    Smokeless tobacco: Never   Vaping Use    Vaping status: Never Used   Substance Use Topics    Alcohol use: Not Currently     Comment: monthly or less    Drug use: Never       Physical Exam   ED Triage Vitals [10/02/24 1834]   Temperature Heart Rate Respirations BP   36.6 °C (97.9 °F) (!) 105 20 (!) 130/93      Pulse Ox Temp Source Heart Rate Source Patient Position   (!) 88 % Temporal Monitor Sitting      BP Location FiO2 (%)     Left arm --       Physical Exam  Vitals and nursing note reviewed.   Constitutional:       Appearance: She is well-developed.      Comments: Awake, sitting in examination bed   HENT:      Head: Normocephalic and  atraumatic.      Nose: Nose normal.      Mouth/Throat:      Mouth: Mucous membranes are moist.      Pharynx: Oropharynx is clear.   Eyes:      Conjunctiva/sclera: Conjunctivae normal.      Pupils: Pupils are equal, round, and reactive to light.   Cardiovascular:      Rate and Rhythm: Regular rhythm. Tachycardia present.      Pulses: Normal pulses.      Heart sounds: Normal heart sounds. No murmur heard.  Pulmonary:      Effort: Pulmonary effort is normal. No respiratory distress.      Breath sounds: Normal breath sounds.   Abdominal:      General: Abdomen is flat.      Palpations: Abdomen is soft.      Tenderness: There is no abdominal tenderness.   Musculoskeletal:         General: Swelling present.      Cervical back: Normal range of motion and neck supple.      Comments: There is mild pitting edema in the bilateral lower extremities   Skin:     General: Skin is warm and dry.      Capillary Refill: Capillary refill takes less than 2 seconds.   Neurological:      General: No focal deficit present.      Mental Status: She is alert and oriented to person, place, and time.   Psychiatric:         Mood and Affect: Mood normal.         Behavior: Behavior normal.           ED Course & MDM   ED Course as of 10/03/24 0118   Wed Oct 02, 2024   1948 Initially, sepsis protocol was withheld as patient was not tachycardic.  Patient has started tachycardic and has been persistently hypoxic.  Patient's white blood cell count is elevated as well.  Sepsis orders placed with IV antibiotics.  1 L fluid bolus ordered over concern for possible fluid overload due to diagnosis of congestive heart failure.  Concern for pneumonia on chest x-ray. [AR]   1950 EKG on my interpretation shows A-fib with RVR, rate is 114 beats minute.  Normal axis.  QTc is 465 ms.  There are T wave inversions in leads V4 through V6, I and aVL.  Mild ST depression in lead V6.  No ST elevation.  No STEMI. [NT]      ED Course User Index  [AR] Oscar Mosley,  GEOVANNI  [NT] Janes Little,          Diagnoses as of 10/03/24 0118   Sepsis with acute hypoxic respiratory failure without septic shock, due to unspecified organism (Multi)   Hypokalemia   Pneumonia of both lower lobes due to infectious organism   Hallucinations                 No data recorded     Maddy Coma Scale Score: 15 (10/02/24 1835 : Romy Zuluaga RN)                           Medical Decision Making  Patient is a 68-year-old female with past medical history of atrial fibrillation on Eliquis, CHF, hypertension, chronic back pain presenting with concerns for hallucinations.  Lab work, urine, imaging ordered.  Condition considered include but are not limited to: Intracranial pathology, hypoxia, UTI, other infection.    I saw this patient in conjunction with Dr. Little.  CBC does show leukocytosis with WC of 14.3 but is without signs of anemia.  BNP elevated at 179.  Troponin elevated to 18.  Chest x-ray shows bilateral airspace opacities.  CT head without acute intracranial pathology.  It also does not remark on any mass that /patient had described during history taking.  IV antibiotics as ordered per septic protocol for pneumonia.  Lactate within normal limits.  Urine drug screen is negative.  Viral swabs are negative.    I spoke with admitting provider, Dr. Cortez.  After discussion, patient will be admitted for further management of sepsis related to pneumonia.  She did ask for VBG prior to admission.  As I deemed necessary from the patient's history, physical, laboratory imaging findings as well as ED course, I considered the above listed diagnoses.    Upon my evaluation, this patient had a high probability of imminent or life threatening deterioration due to sepsis and hypoxia, which required my direct attention, intervention, and personal management.    I personally provided 33 minutes to nonconcurrent critical care time exclusive of time spent on separately billable procedures.   Time includes review of laboratory data, radiology results, discussion with consultants, and monitoring for potential decompensation.  Interventions were performed as documented above.    Portions of this note made with Dragon software, please be mindful of potential grammatical errors.        Medications   metoprolol tartrate (Lopressor) injection 5 mg (has no administration in time range)   potassium chloride 20 mEq in sterile water for injection 100 mL (20 mEq intravenous New Bag 10/3/24 0017)   potassium chloride (Klor-Con) packet 20 mEq (20 mEq oral Given 10/2/24 2103)   oxygen (O2) therapy (50 percent inhalation Start 10/3/24 0038)   oxygen (O2) therapy (has no administration in time range)   sodium chloride 0.9 % bolus 1,000 mL (0 mL intravenous Stopped 10/2/24 2314)   cefTRIAXone (Rocephin) 2 g in dextrose (iso) IV 50 mL (0 g intravenous Stopped 10/2/24 2314)   azithromycin 500 mg in dextrose 5% 250 mL IV (0 mg intravenous Stopped 10/3/24 0024)         Labs Reviewed   CBC WITH AUTO DIFFERENTIAL - Abnormal       Result Value    WBC 14.3 (*)     nRBC 0.0      RBC 4.24      Hemoglobin 13.2      Hematocrit 41.7      MCV 98      MCH 31.1      MCHC 31.7 (*)     RDW 13.9      Platelets 303      Neutrophils % 85.9      Immature Granulocytes %, Automated 0.3      Lymphocytes % 7.1      Monocytes % 6.4      Eosinophils % 0.1      Basophils % 0.2      Neutrophils Absolute 12.30 (*)     Immature Granulocytes Absolute, Automated 0.04      Lymphocytes Absolute 1.02 (*)     Monocytes Absolute 0.92      Eosinophils Absolute 0.01      Basophils Absolute 0.03     COMPREHENSIVE METABOLIC PANEL - Abnormal    Glucose 153 (*)     Sodium 141      Potassium 2.7 (*)     Chloride 87 (*)     Bicarbonate 45 (*)     Anion Gap 12      Urea Nitrogen 35 (*)     Creatinine 0.75      eGFR 87      Calcium 10.0      Albumin 4.0      Alkaline Phosphatase 113      Total Protein 7.3      AST 44 (*)     Bilirubin, Total 1.1      ALT 25     B-TYPE  NATRIURETIC PEPTIDE - Abnormal     (*)     Narrative:        <100 pg/mL - Heart failure unlikely  100-299 pg/mL - Intermediate probability of acute heart                  failure exacerbation. Correlate with clinical                  context and patient history.    >=300 pg/mL - Heart Failure likely. Correlate with clinical                  context and patient history.    BNP testing is performed using different testing methodology at [facility] than at other [facility]. Direct result comparisons should only be made within the same method.      SERIAL TROPONIN-INITIAL - Abnormal    Troponin I, High Sensitivity 18 (*)     Narrative:     Less than 99th percentile of normal range cutoff-  Female and children under 18 years old <14 ng/L; Male <21 ng/L: Negative  Repeat testing should be performed if clinically indicated.     Female and children under 18 years old 14-50 ng/L; Male 21-50 ng/L:  Consistent with possible cardiac damage and possible increased clinical   risk. Serial measurements may help to assess extent of myocardial damage.     >50 ng/L: Consistent with cardiac damage, increased clinical risk and  myocardial infarction. Serial measurements may help assess extent of   myocardial damage.      NOTE: Children less than 1 year old may have higher baseline troponin   levels and results should be interpreted in conjunction with the overall   clinical context.     NOTE: Troponin I testing is performed using a different   testing methodology at [facility] than at other   [facility]. Direct result comparisons should only   be made within the same method.   SERIAL TROPONIN, 1 HOUR - Abnormal    Troponin I, High Sensitivity 20 (*)     Narrative:     Less than 99th percentile of normal range cutoff-  Female and children under 18 years old <14 ng/L; Male <21 ng/L: Negative  Repeat testing should be performed if clinically indicated.     Female and children under 18 years  old 14-50 ng/L; Male 21-50 ng/L:  Consistent with possible cardiac damage and possible increased clinical   risk. Serial measurements may help to assess extent of myocardial damage.     >50 ng/L: Consistent with cardiac damage, increased clinical risk and  myocardial infarction. Serial measurements may help assess extent of   myocardial damage.      NOTE: Children less than 1 year old may have higher baseline troponin   levels and results should be interpreted in conjunction with the overall   clinical context.     NOTE: Troponin I testing is performed using a different   testing methodology at [facility] than at other   [facility]. Direct result comparisons should only   be made within the same method.   BLOOD GAS VENOUS FULL PANEL - Abnormal    POCT pH, Venous 7.27 (*)     POCT pCO2, Venous 108 (*)     POCT pO2, Venous 50 (*)     POCT SO2, Venous 77 (*)     POCT Oxy Hemoglobin, Venous 74.4      POCT Hematocrit Calculated, Venous 38.0      POCT Sodium, Venous 139      POCT Potassium, Venous 2.8 (*)     POCT Chloride, Venous 92 (*)     POCT Ionized Calicum, Venous 1.21      POCT Glucose, Venous 157 (*)     POCT Lactate, Venous 1.0      POCT Base Excess, Venous 17.6 (*)     POCT HCO3 Calculated, Venous 49.6 (*)     POCT Hemoglobin, Venous 12.8      POCT Anion Gap, Venous 0.0 (*)     Patient Temperature 37.0      FiO2 38     BLOOD CULTURE - Normal    Blood Culture Loaded on Instrument - Culture in progress     BLOOD CULTURE - Normal    Blood Culture Loaded on Instrument - Culture in progress     SARS-COV-2 PCR - Normal    Coronavirus 2019, PCR Not Detected      Narrative:     This assay has received FDA Emergency Use Authorization (EUA) and is only authorized for the duration of time that circumstances exist to justify the authorization of the emergency use of in vitro diagnostic tests for the detection of SARS-CoV-2 virus and/or diagnosis of COVID-19 infection under section 564(b)(1) of the Act,  21 U.S.C. 360bbb-3(b)(1). This assay is an in vitro diagnostic nucleic acid amplification test for the qualitative detection of SARS-CoV-2 from nasopharyngeal specimens and has been validated for use at [facility]. Negative results do not preclude COVID-19 infections and should not be used as the sole basis for diagnosis, treatment, or other management decisions.     INFLUENZA A AND B PCR - Normal    Flu A Result Not Detected      Flu B Result Not Detected      Narrative:     This assay is an in vitro diagnostic multiplex nucleic acid amplification test for the detection and discrimination of Influenza A & B from nasopharyngeal specimens, and has been validated for use at [facility]. Negative results do not preclude Influenza A/B infections, and should not be used as the sole basis for diagnosis, treatment, or other management decisions. If Influenza A/B and RSV PCR results are negative, testing for Parainfluenza virus, Adenovirus and Metapneumovirus is routinely performed for [facility] and intensive care inpatients, and is available on other patients by placing an add-on request.   DRUG SCREEN,URINE - Normal    Amphetamine Screen, Urine Presumptive Negative      Barbiturate Screen, Urine Presumptive Negative      Benzodiazepines Screen, Urine Presumptive Negative      Cannabinoid Screen, Urine Presumptive Negative      Cocaine Metabolite Screen, Urine Presumptive Negative      Fentanyl Screen, Urine Presumptive Negative      Opiate Screen, Urine Presumptive Negative      Oxycodone Screen, Urine Presumptive Negative      PCP Screen, Urine Presumptive Negative      Methadone Screen, Urine Presumptive Negative      Narrative:     Drug screen results are presumptive and should not be used to assess   compliance with prescribed medication. Contact the performing [facility] laboratory   to add-on definitive confirmatory testing if clinically indicated.    Toxicology  screening results are reported qualitatively. The concentration must   be greater than or equal to the cutoff to be reported as positive. The concentration   at which the screening test can detect an individual drug or metabolite varies.   The absence of expected drug(s) and/or drug metabolite(s) may indicate non-compliance,   inappropriate timing of specimen collection relative to drug administration, poor drug   absorption, diluted/adulterated urine, or limitations of testing. For medical purposes   only; not valid for forensic use.    Interpretive questions should be directed to the laboratory medical directors.   ALCOHOL - Normal    Alcohol <10     URINALYSIS WITH REFLEX CULTURE AND MICROSCOPIC - Normal    Color, Urine Light-Yellow      Appearance, Urine Clear      Specific Gravity, Urine 1.009      pH, Urine 5.0      Protein, Urine NEGATIVE      Glucose, Urine Normal      Blood, Urine NEGATIVE      Ketones, Urine NEGATIVE      Bilirubin, Urine NEGATIVE      Urobilinogen, Urine Normal      Nitrite, Urine NEGATIVE      Leukocyte Esterase, Urine NEGATIVE     LACTATE - Normal    Lactate 0.9      Narrative:     Venipuncture immediately after or during the administration of Metamizole may lead to falsely low results. Testing should be performed immediately prior to Metamizole dosing.   TROPONIN SERIES- (INITIAL, 1 HR)    Narrative:     The following orders were created for panel order Troponin Series, (0, 1 HR).  Procedure                               Abnormality         Status                     ---------                               -----------         ------                     Troponin I, High Sensiti...[[id number]]  Abnormal            Final result               Troponin, High Sensitivi...[[id number]]  Abnormal            Final result                 Please view results for these tests on the individual orders.   URINALYSIS WITH REFLEX CULTURE AND MICROSCOPIC    Narrative:     The following orders were created  for panel order Urinalysis with Reflex Culture and Microscopic.  Procedure                               Abnormality         Status                     ---------                               -----------         ------                     Urinalysis with Reflex C...[[id number]]  Normal              Final result               Extra Urine Gray Tube[[id number]]                            In process                   Please view results for these tests on the individual orders.   EXTRA URINE GRAY TUBE   BLOOD GAS ARTERIAL FULL PANEL         CT head wo IV contrast   Final Result   No evidence of acute cortical infarct or intracranial hemorrhage.        MACRO:   None        Signed by: Clark Nelson 10/2/2024 7:25 PM   Dictation workstation:   [id number]      XR chest 1 view   Final Result   1.  There are bilateral patchy airspace opacities noted.                  MACRO:   None        Signed by: Clark Nelson 10/2/2024 7:05 PM   Dictation workstation:   [id number]      CT angio chest for pulmonary embolism    (Results Pending)         Procedure  Procedures     Oscar Mosley PA-C  10/03/24 0118

## 2024-10-03 ENCOUNTER — APPOINTMENT (OUTPATIENT)
Dept: RADIOLOGY | Facility: HOSPITAL | Age: 69
DRG: 871 | End: 2024-10-03
Payer: MEDICARE

## 2024-10-03 LAB
ALBUMIN SERPL BCP-MCNC: 3.5 G/DL (ref 3.4–5)
ALBUMIN SERPL BCP-MCNC: 3.5 G/DL (ref 3.4–5)
ANION GAP BLDA CALCULATED.4IONS-SCNC: -6 MMO/L (ref 10–25)
ANION GAP BLDA CALCULATED.4IONS-SCNC: 1 MMO/L (ref 10–25)
ANION GAP SERPL CALCULATED.3IONS-SCNC: 8 MMOL/L (ref 10–20)
ANION GAP SERPL CALCULATED.3IONS-SCNC: ABNORMAL MMOL/L
ANION GAP SERPL CALCULATED.3IONS-SCNC: ABNORMAL MMOL/L
APPARATUS: ABNORMAL
APPARATUS: ABNORMAL
APPEARANCE UR: CLEAR
ARTERIAL PATENCY WRIST A: POSITIVE
ARTERIAL PATENCY WRIST A: POSITIVE
BASE EXCESS BLDA CALC-SCNC: 17.8 MMOL/L (ref -2–3)
BASE EXCESS BLDA CALC-SCNC: 24.2 MMOL/L (ref -2–3)
BASOPHILS # BLD AUTO: 0.01 X10*3/UL (ref 0–0.1)
BASOPHILS NFR BLD AUTO: 0.1 %
BILIRUB UR STRIP.AUTO-MCNC: NEGATIVE MG/DL
BODY TEMPERATURE: 37 DEGREES CELSIUS
BODY TEMPERATURE: 37 DEGREES CELSIUS
BUN SERPL-MCNC: 29 MG/DL (ref 6–23)
BUN SERPL-MCNC: 34 MG/DL (ref 6–23)
BUN SERPL-MCNC: 36 MG/DL (ref 6–23)
CA-I BLDA-SCNC: 1.18 MMOL/L (ref 1.1–1.33)
CA-I BLDA-SCNC: 1.19 MMOL/L (ref 1.1–1.33)
CALCIUM SERPL-MCNC: 9 MG/DL (ref 8.6–10.3)
CALCIUM SERPL-MCNC: 9.4 MG/DL (ref 8.6–10.3)
CALCIUM SERPL-MCNC: 9.4 MG/DL (ref 8.6–10.3)
CARDIAC TROPONIN I PNL SERPL HS: 23 NG/L (ref 0–13)
CHLORIDE BLDA-SCNC: 93 MMOL/L (ref 98–107)
CHLORIDE BLDA-SCNC: 95 MMOL/L (ref 98–107)
CHLORIDE SERPL-SCNC: 89 MMOL/L (ref 98–107)
CHLORIDE SERPL-SCNC: 90 MMOL/L (ref 98–107)
CHLORIDE SERPL-SCNC: 92 MMOL/L (ref 98–107)
CO2 SERPL-SCNC: 45 MMOL/L (ref 21–32)
CO2 SERPL-SCNC: >45 MMOL/L (ref 21–32)
CO2 SERPL-SCNC: >45 MMOL/L (ref 21–32)
COLOR UR: COLORLESS
CREAT SERPL-MCNC: 0.69 MG/DL (ref 0.5–1.05)
CREAT SERPL-MCNC: 0.81 MG/DL (ref 0.5–1.05)
CREAT SERPL-MCNC: 0.94 MG/DL (ref 0.5–1.05)
CRITICAL CALL TIME: 513
CRITICAL CALLED BY: ABNORMAL
CRITICAL CALLED TO: ABNORMAL
CRITICAL READ BACK: ABNORMAL
EGFRCR SERPLBLD CKD-EPI 2021: 67 ML/MIN/1.73M*2
EGFRCR SERPLBLD CKD-EPI 2021: 80 ML/MIN/1.73M*2
EGFRCR SERPLBLD CKD-EPI 2021: >90 ML/MIN/1.73M*2
EOSINOPHIL # BLD AUTO: 0 X10*3/UL (ref 0–0.7)
EOSINOPHIL NFR BLD AUTO: 0 %
EPAP CMH2O: 8 CM H2O
EPAP CMH2O: 8 CM H2O
ERYTHROCYTE [DISTWIDTH] IN BLOOD BY AUTOMATED COUNT: 13.9 % (ref 11.5–14.5)
GLUCOSE BLD MANUAL STRIP-MCNC: 100 MG/DL (ref 74–99)
GLUCOSE BLD MANUAL STRIP-MCNC: 100 MG/DL (ref 74–99)
GLUCOSE BLD MANUAL STRIP-MCNC: 111 MG/DL (ref 74–99)
GLUCOSE BLD MANUAL STRIP-MCNC: 125 MG/DL (ref 74–99)
GLUCOSE BLD MANUAL STRIP-MCNC: 134 MG/DL (ref 74–99)
GLUCOSE BLD MANUAL STRIP-MCNC: 161 MG/DL (ref 74–99)
GLUCOSE BLD MANUAL STRIP-MCNC: 180 MG/DL (ref 74–99)
GLUCOSE BLDA-MCNC: 122 MG/DL (ref 74–99)
GLUCOSE BLDA-MCNC: 97 MG/DL (ref 74–99)
GLUCOSE SERPL-MCNC: 117 MG/DL (ref 74–99)
GLUCOSE SERPL-MCNC: 127 MG/DL (ref 74–99)
GLUCOSE SERPL-MCNC: 141 MG/DL (ref 74–99)
GLUCOSE UR STRIP.AUTO-MCNC: NORMAL MG/DL
HCO3 BLDA-SCNC: 46.7 MMOL/L (ref 22–26)
HCO3 BLDA-SCNC: 51.5 MMOL/L (ref 22–26)
HCT VFR BLD AUTO: 37.9 % (ref 36–46)
HCT VFR BLD EST: 36 % (ref 36–46)
HCT VFR BLD EST: 37 % (ref 36–46)
HGB BLD-MCNC: 11.7 G/DL (ref 12–16)
HGB BLDA-MCNC: 12.1 G/DL (ref 12–16)
HGB BLDA-MCNC: 12.4 G/DL (ref 12–16)
HOLD SPECIMEN: NORMAL
HYALINE CASTS #/AREA URNS AUTO: ABNORMAL /LPF
IMM GRANULOCYTES # BLD AUTO: 0.06 X10*3/UL (ref 0–0.7)
IMM GRANULOCYTES NFR BLD AUTO: 0.5 % (ref 0–0.9)
INHALED O2 CONCENTRATION: 50 %
INHALED O2 CONCENTRATION: 50 %
IPAP CMH2O: 18 CM H2O
IPAP CMH2O: 18 CM H2O
KETONES UR STRIP.AUTO-MCNC: NEGATIVE MG/DL
LACTATE BLDA-SCNC: 0.7 MMOL/L (ref 0.4–2)
LACTATE BLDA-SCNC: 0.8 MMOL/L (ref 0.4–2)
LEUKOCYTE ESTERASE UR QL STRIP.AUTO: ABNORMAL
LYMPHOCYTES # BLD AUTO: 0.69 X10*3/UL (ref 1.2–4.8)
LYMPHOCYTES NFR BLD AUTO: 5.3 %
MAGNESIUM SERPL-MCNC: 1.81 MG/DL (ref 1.6–2.4)
MAGNESIUM SERPL-MCNC: 2.01 MG/DL (ref 1.6–2.4)
MAGNESIUM SERPL-MCNC: 2.08 MG/DL (ref 1.6–2.4)
MCH RBC QN AUTO: 31.5 PG (ref 26–34)
MCHC RBC AUTO-ENTMCNC: 30.9 G/DL (ref 32–36)
MCV RBC AUTO: 102 FL (ref 80–100)
MONOCYTES # BLD AUTO: 1.15 X10*3/UL (ref 0.1–1)
MONOCYTES NFR BLD AUTO: 8.8 %
MRSA DNA SPEC QL NAA+PROBE: NOT DETECTED
NEUTROPHILS # BLD AUTO: 11.22 X10*3/UL (ref 1.2–7.7)
NEUTROPHILS NFR BLD AUTO: 85.3 %
NITRITE UR QL STRIP.AUTO: NEGATIVE
NRBC BLD-RTO: 0 /100 WBCS (ref 0–0)
OXYHGB MFR BLDA: 95.6 % (ref 94–98)
OXYHGB MFR BLDA: 96 % (ref 94–98)
PCO2 BLDA: 66 MM HG (ref 38–42)
PCO2 BLDA: 79 MM HG (ref 38–42)
PH BLDA: 7.38 PH (ref 7.38–7.42)
PH BLDA: 7.5 PH (ref 7.38–7.42)
PH UR STRIP.AUTO: 6.5 [PH]
PHOSPHATE SERPL-MCNC: 4 MG/DL (ref 2.5–4.9)
PHOSPHATE SERPL-MCNC: 4.1 MG/DL (ref 2.5–4.9)
PHOSPHATE SERPL-MCNC: 5.7 MG/DL (ref 2.5–4.9)
PLATELET # BLD AUTO: 255 X10*3/UL (ref 150–450)
PO2 BLDA: 89 MM HG (ref 85–95)
PO2 BLDA: 99 MM HG (ref 85–95)
POTASSIUM BLDA-SCNC: 2.4 MMOL/L (ref 3.5–5.3)
POTASSIUM BLDA-SCNC: 2.7 MMOL/L (ref 3.5–5.3)
POTASSIUM SERPL-SCNC: 2.6 MMOL/L (ref 3.5–5.3)
POTASSIUM SERPL-SCNC: 3.4 MMOL/L (ref 3.5–5.3)
POTASSIUM SERPL-SCNC: 3.6 MMOL/L (ref 3.5–5.3)
PROCALCITONIN SERPL-MCNC: 0.05 NG/ML
PROT UR STRIP.AUTO-MCNC: NEGATIVE MG/DL
Q ONSET: 225 MS
QRS COUNT: 18 BEATS
QRS DURATION: 104 MS
QT INTERVAL: 338 MS
QTC CALCULATION(BAZETT): 465 MS
QTC FREDERICIA: 418 MS
R AXIS: 23 DEGREES
RBC # BLD AUTO: 3.72 X10*6/UL (ref 4–5.2)
RBC # UR STRIP.AUTO: ABNORMAL /UL
RBC #/AREA URNS AUTO: ABNORMAL /HPF
SAO2 % BLDA: 98 % (ref 94–100)
SAO2 % BLDA: 99 % (ref 94–100)
SODIUM BLDA-SCNC: 138 MMOL/L (ref 136–145)
SODIUM BLDA-SCNC: 138 MMOL/L (ref 136–145)
SODIUM SERPL-SCNC: 140 MMOL/L (ref 136–145)
SODIUM SERPL-SCNC: 142 MMOL/L (ref 136–145)
SODIUM SERPL-SCNC: 143 MMOL/L (ref 136–145)
SP GR UR STRIP.AUTO: 1.01
SPECIMEN DRAWN FROM PATIENT: ABNORMAL
SPECIMEN DRAWN FROM PATIENT: ABNORMAL
T AXIS: 183 DEGREES
T OFFSET: 394 MS
UROBILINOGEN UR STRIP.AUTO-MCNC: NORMAL MG/DL
VENTILATOR MODE: ABNORMAL
VENTILATOR MODE: ABNORMAL
VENTILATOR RATE: 18 BPM
VENTILATOR RATE: 18 BPM
VENTRICULAR RATE: 114 BPM
WBC # BLD AUTO: 13.1 X10*3/UL (ref 4.4–11.3)
WBC #/AREA URNS AUTO: ABNORMAL /HPF

## 2024-10-03 PROCEDURE — 87086 URINE CULTURE/COLONY COUNT: CPT | Mod: WESLAB

## 2024-10-03 PROCEDURE — 94640 AIRWAY INHALATION TREATMENT: CPT

## 2024-10-03 PROCEDURE — 36415 COLL VENOUS BLD VENIPUNCTURE: CPT

## 2024-10-03 PROCEDURE — 2500000004 HC RX 250 GENERAL PHARMACY W/ HCPCS (ALT 636 FOR OP/ED): Mod: JZ

## 2024-10-03 PROCEDURE — 84132 ASSAY OF SERUM POTASSIUM: CPT

## 2024-10-03 PROCEDURE — 87641 MR-STAPH DNA AMP PROBE: CPT

## 2024-10-03 PROCEDURE — 99233 SBSQ HOSP IP/OBS HIGH 50: CPT

## 2024-10-03 PROCEDURE — 80069 RENAL FUNCTION PANEL: CPT

## 2024-10-03 PROCEDURE — 2500000005 HC RX 250 GENERAL PHARMACY W/O HCPCS: Performed by: STUDENT IN AN ORGANIZED HEALTH CARE EDUCATION/TRAINING PROGRAM

## 2024-10-03 PROCEDURE — 2500000002 HC RX 250 W HCPCS SELF ADMINISTERED DRUGS (ALT 637 FOR MEDICARE OP, ALT 636 FOR OP/ED)

## 2024-10-03 PROCEDURE — 87899 AGENT NOS ASSAY W/OPTIC: CPT | Mod: WESLAB

## 2024-10-03 PROCEDURE — 2500000001 HC RX 250 WO HCPCS SELF ADMINISTERED DRUGS (ALT 637 FOR MEDICARE OP)

## 2024-10-03 PROCEDURE — 5A09357 ASSISTANCE WITH RESPIRATORY VENTILATION, LESS THAN 24 CONSECUTIVE HOURS, CONTINUOUS POSITIVE AIRWAY PRESSURE: ICD-10-PCS | Performed by: INTERNAL MEDICINE

## 2024-10-03 PROCEDURE — 2500000004 HC RX 250 GENERAL PHARMACY W/ HCPCS (ALT 636 FOR OP/ED): Performed by: STUDENT IN AN ORGANIZED HEALTH CARE EDUCATION/TRAINING PROGRAM

## 2024-10-03 PROCEDURE — 94660 CPAP INITIATION&MGMT: CPT

## 2024-10-03 PROCEDURE — 71275 CT ANGIOGRAPHY CHEST: CPT | Performed by: RADIOLOGY

## 2024-10-03 PROCEDURE — 83735 ASSAY OF MAGNESIUM: CPT

## 2024-10-03 PROCEDURE — 2550000001 HC RX 255 CONTRASTS: Performed by: STUDENT IN AN ORGANIZED HEALTH CARE EDUCATION/TRAINING PROGRAM

## 2024-10-03 PROCEDURE — 94664 DEMO&/EVAL PT USE INHALER: CPT

## 2024-10-03 PROCEDURE — 9420000001 HC RT PATIENT EDUCATION 5 MIN

## 2024-10-03 PROCEDURE — 87449 NOS EACH ORGANISM AG IA: CPT | Mod: WESLAB

## 2024-10-03 PROCEDURE — 84145 PROCALCITONIN (PCT): CPT | Mod: WESLAB

## 2024-10-03 PROCEDURE — 97535 SELF CARE MNGMENT TRAINING: CPT | Mod: GO

## 2024-10-03 PROCEDURE — 2500000004 HC RX 250 GENERAL PHARMACY W/ HCPCS (ALT 636 FOR OP/ED)

## 2024-10-03 PROCEDURE — 2020000001 HC ICU ROOM DAILY

## 2024-10-03 PROCEDURE — 81003 URINALYSIS AUTO W/O SCOPE: CPT

## 2024-10-03 PROCEDURE — 2500000005 HC RX 250 GENERAL PHARMACY W/O HCPCS

## 2024-10-03 PROCEDURE — 84100 ASSAY OF PHOSPHORUS: CPT

## 2024-10-03 PROCEDURE — 97166 OT EVAL MOD COMPLEX 45 MIN: CPT | Mod: GO

## 2024-10-03 PROCEDURE — 97162 PT EVAL MOD COMPLEX 30 MIN: CPT | Mod: GP

## 2024-10-03 PROCEDURE — 82947 ASSAY GLUCOSE BLOOD QUANT: CPT

## 2024-10-03 PROCEDURE — 99291 CRITICAL CARE FIRST HOUR: CPT

## 2024-10-03 PROCEDURE — 71275 CT ANGIOGRAPHY CHEST: CPT

## 2024-10-03 PROCEDURE — 85025 COMPLETE CBC W/AUTO DIFF WBC: CPT

## 2024-10-03 PROCEDURE — 84484 ASSAY OF TROPONIN QUANT: CPT

## 2024-10-03 PROCEDURE — 97530 THERAPEUTIC ACTIVITIES: CPT | Mod: GP

## 2024-10-03 RX ORDER — VANCOMYCIN 2 G/400ML
2000 INJECTION, SOLUTION INTRAVENOUS ONCE
Status: DISCONTINUED | OUTPATIENT
Start: 2024-10-03 | End: 2024-10-03

## 2024-10-03 RX ORDER — VANCOMYCIN 1.5 G/300ML
15 INJECTION, SOLUTION INTRAVENOUS ONCE
Status: DISCONTINUED | OUTPATIENT
Start: 2024-10-03 | End: 2024-10-03

## 2024-10-03 RX ORDER — FUROSEMIDE 10 MG/ML
60 INJECTION INTRAMUSCULAR; INTRAVENOUS ONCE
Status: COMPLETED | OUTPATIENT
Start: 2024-10-03 | End: 2024-10-03

## 2024-10-03 RX ORDER — VANCOMYCIN 1.75 G/350ML
1250 INJECTION, SOLUTION INTRAVENOUS EVERY 12 HOURS
Status: DISCONTINUED | OUTPATIENT
Start: 2024-10-03 | End: 2024-10-03

## 2024-10-03 RX ORDER — CYCLOBENZAPRINE HCL 10 MG
5 TABLET ORAL 2 TIMES DAILY PRN
Status: DISPENSED | OUTPATIENT
Start: 2024-10-03

## 2024-10-03 RX ORDER — DOFETILIDE 0.25 MG/1
250 CAPSULE ORAL EVERY 12 HOURS
Status: DISPENSED | OUTPATIENT
Start: 2024-10-03

## 2024-10-03 RX ORDER — CYCLOBENZAPRINE HCL 5 MG
5 TABLET ORAL ONCE
Status: DISCONTINUED | OUTPATIENT
Start: 2024-10-03 | End: 2024-10-04

## 2024-10-03 RX ORDER — IPRATROPIUM BROMIDE AND ALBUTEROL SULFATE 2.5; .5 MG/3ML; MG/3ML
3 SOLUTION RESPIRATORY (INHALATION) 3 TIMES DAILY
Status: DISCONTINUED | OUTPATIENT
Start: 2024-10-03 | End: 2024-10-04

## 2024-10-03 RX ORDER — POTASSIUM CHLORIDE 14.9 MG/ML
20 INJECTION INTRAVENOUS
Status: DISPENSED | OUTPATIENT
Start: 2024-10-03 | End: 2024-10-03

## 2024-10-03 RX ORDER — CEFTRIAXONE 2 G/50ML
2 INJECTION, SOLUTION INTRAVENOUS EVERY 24 HOURS
Status: DISCONTINUED | OUTPATIENT
Start: 2024-10-03 | End: 2024-10-03

## 2024-10-03 RX ORDER — POTASSIUM CHLORIDE 20 MEQ/1
20 TABLET, EXTENDED RELEASE ORAL ONCE
Status: COMPLETED | OUTPATIENT
Start: 2024-10-03 | End: 2024-10-03

## 2024-10-03 RX ORDER — DOCUSATE SODIUM 100 MG/1
100 CAPSULE, LIQUID FILLED ORAL 3 TIMES DAILY
Status: DISPENSED | OUTPATIENT
Start: 2024-10-03

## 2024-10-03 RX ORDER — INSULIN LISPRO 100 [IU]/ML
0-10 INJECTION, SOLUTION INTRAVENOUS; SUBCUTANEOUS
Status: ACTIVE | OUTPATIENT
Start: 2024-10-03

## 2024-10-03 RX ORDER — BUDESONIDE 0.5 MG/2ML
0.5 INHALANT ORAL
Status: DISCONTINUED | OUTPATIENT
Start: 2024-10-03 | End: 2024-10-03

## 2024-10-03 RX ORDER — POTASSIUM CHLORIDE 1.5 G/1.58G
20 POWDER, FOR SOLUTION ORAL ONCE
Status: COMPLETED | OUTPATIENT
Start: 2024-10-03 | End: 2024-10-03

## 2024-10-03 RX ORDER — INSULIN LISPRO 100 [IU]/ML
0-10 INJECTION, SOLUTION INTRAVENOUS; SUBCUTANEOUS EVERY 4 HOURS
Status: DISCONTINUED | OUTPATIENT
Start: 2024-10-03 | End: 2024-10-03

## 2024-10-03 RX ORDER — POTASSIUM CHLORIDE 20 MEQ/1
40 TABLET, EXTENDED RELEASE ORAL ONCE
Status: COMPLETED | OUTPATIENT
Start: 2024-10-03 | End: 2024-10-03

## 2024-10-03 RX ORDER — TORSEMIDE 100 MG/1
50 TABLET ORAL 2 TIMES DAILY
Status: DISPENSED | OUTPATIENT
Start: 2024-10-03

## 2024-10-03 RX ORDER — FLUTICASONE FUROATE AND VILANTEROL 200; 25 UG/1; UG/1
1 POWDER RESPIRATORY (INHALATION)
Status: DISPENSED | OUTPATIENT
Start: 2024-10-03

## 2024-10-03 RX ORDER — ATORVASTATIN CALCIUM 40 MG/1
40 TABLET, FILM COATED ORAL NIGHTLY
Status: DISPENSED | OUTPATIENT
Start: 2024-10-03

## 2024-10-03 RX ORDER — GABAPENTIN 300 MG/1
300 CAPSULE ORAL 3 TIMES DAILY
Status: DISPENSED | OUTPATIENT
Start: 2024-10-03

## 2024-10-03 RX ORDER — IPRATROPIUM BROMIDE AND ALBUTEROL SULFATE 2.5; .5 MG/3ML; MG/3ML
3 SOLUTION RESPIRATORY (INHALATION) EVERY 6 HOURS PRN
Status: DISPENSED | OUTPATIENT
Start: 2024-10-03

## 2024-10-03 RX ORDER — BUPROPION HYDROCHLORIDE 300 MG/1
300 TABLET ORAL
Status: DISPENSED | OUTPATIENT
Start: 2024-10-03

## 2024-10-03 RX ORDER — POLYETHYLENE GLYCOL 3350 17 G/17G
17 POWDER, FOR SOLUTION ORAL DAILY
Status: DISPENSED | OUTPATIENT
Start: 2024-10-03

## 2024-10-03 RX ORDER — METOLAZONE 2.5 MG/1
2.5 TABLET ORAL AS NEEDED
Status: ACTIVE | OUTPATIENT
Start: 2024-10-03

## 2024-10-03 RX ORDER — VANCOMYCIN HYDROCHLORIDE 1 G/20ML
INJECTION, POWDER, LYOPHILIZED, FOR SOLUTION INTRAVENOUS DAILY PRN
Status: DISCONTINUED | OUTPATIENT
Start: 2024-10-03 | End: 2024-10-03

## 2024-10-03 RX ORDER — METOPROLOL TARTRATE 25 MG/1
12.5 TABLET, FILM COATED ORAL 2 TIMES DAILY
Status: DISPENSED | OUTPATIENT
Start: 2024-10-03

## 2024-10-03 RX ORDER — DEXTROSE 50 % IN WATER (D50W) INTRAVENOUS SYRINGE
12.5
Status: ACTIVE | OUTPATIENT
Start: 2024-10-03

## 2024-10-03 RX ORDER — ALLOPURINOL 100 MG/1
200 TABLET ORAL 2 TIMES DAILY
Status: DISPENSED | OUTPATIENT
Start: 2024-10-03

## 2024-10-03 RX ORDER — DEXTROSE 50 % IN WATER (D50W) INTRAVENOUS SYRINGE
25
Status: ACTIVE | OUTPATIENT
Start: 2024-10-03

## 2024-10-03 SDOH — HEALTH STABILITY: MENTAL HEALTH: HOW MANY STANDARD DRINKS CONTAINING ALCOHOL DO YOU HAVE ON A TYPICAL DAY?: PATIENT DOES NOT DRINK

## 2024-10-03 SDOH — SOCIAL STABILITY: SOCIAL INSECURITY: HAS ANYONE EVER THREATENED TO HURT YOUR FAMILY OR YOUR PETS?: NO

## 2024-10-03 SDOH — ECONOMIC STABILITY: FOOD INSECURITY: WITHIN THE PAST 12 MONTHS, YOU WORRIED THAT YOUR FOOD WOULD RUN OUT BEFORE YOU GOT MONEY TO BUY MORE.: NEVER TRUE

## 2024-10-03 SDOH — SOCIAL STABILITY: SOCIAL INSECURITY: ARE THERE ANY APPARENT SIGNS OF INJURIES/BEHAVIORS THAT COULD BE RELATED TO ABUSE/NEGLECT?: NO

## 2024-10-03 SDOH — HEALTH STABILITY: MENTAL HEALTH: HOW OFTEN DO YOU HAVE 6 OR MORE DRINKS ON ONE OCCASION?: NEVER

## 2024-10-03 SDOH — ECONOMIC STABILITY: INCOME INSECURITY: IN THE PAST 12 MONTHS, HAS THE ELECTRIC, GAS, OIL, OR WATER COMPANY THREATENED TO SHUT OFF SERVICE IN YOUR HOME?: NO

## 2024-10-03 SDOH — SOCIAL STABILITY: SOCIAL INSECURITY: DO YOU FEEL UNSAFE GOING BACK TO THE PLACE WHERE YOU ARE LIVING?: NO

## 2024-10-03 SDOH — HEALTH STABILITY: PHYSICAL HEALTH: ON AVERAGE, HOW MANY MINUTES DO YOU ENGAGE IN EXERCISE AT THIS LEVEL?: 0 MIN

## 2024-10-03 SDOH — SOCIAL STABILITY: SOCIAL INSECURITY: WITHIN THE LAST YEAR, HAVE YOU BEEN AFRAID OF YOUR PARTNER OR EX-PARTNER?: NO

## 2024-10-03 SDOH — SOCIAL STABILITY: SOCIAL NETWORK: ARE YOU MARRIED, WIDOWED, DIVORCED, SEPARATED, NEVER MARRIED, OR LIVING WITH A PARTNER?: MARRIED

## 2024-10-03 SDOH — SOCIAL STABILITY: SOCIAL INSECURITY: DOES ANYONE TRY TO KEEP YOU FROM HAVING/CONTACTING OTHER FRIENDS OR DOING THINGS OUTSIDE YOUR HOME?: NO

## 2024-10-03 SDOH — SOCIAL STABILITY: SOCIAL INSECURITY: HAVE YOU HAD THOUGHTS OF HARMING ANYONE ELSE?: NO

## 2024-10-03 SDOH — ECONOMIC STABILITY: INCOME INSECURITY: IN THE LAST 12 MONTHS, WAS THERE A TIME WHEN YOU WERE NOT ABLE TO PAY THE MORTGAGE OR RENT ON TIME?: NO

## 2024-10-03 SDOH — ECONOMIC STABILITY: HOUSING INSECURITY: AT ANY TIME IN THE PAST 12 MONTHS, WERE YOU HOMELESS OR LIVING IN A SHELTER (INCLUDING NOW)?: NO

## 2024-10-03 SDOH — HEALTH STABILITY: PHYSICAL HEALTH: ON AVERAGE, HOW MANY DAYS PER WEEK DO YOU ENGAGE IN MODERATE TO STRENUOUS EXERCISE (LIKE A BRISK WALK)?: 0 DAYS

## 2024-10-03 SDOH — SOCIAL STABILITY: SOCIAL INSECURITY: WITHIN THE LAST YEAR, HAVE YOU BEEN HUMILIATED OR EMOTIONALLY ABUSED IN OTHER WAYS BY YOUR PARTNER OR EX-PARTNER?: NO

## 2024-10-03 SDOH — SOCIAL STABILITY: SOCIAL INSECURITY: DO YOU FEEL ANYONE HAS EXPLOITED OR TAKEN ADVANTAGE OF YOU FINANCIALLY OR OF YOUR PERSONAL PROPERTY?: NO

## 2024-10-03 SDOH — ECONOMIC STABILITY: FOOD INSECURITY: WITHIN THE PAST 12 MONTHS, THE FOOD YOU BOUGHT JUST DIDN'T LAST AND YOU DIDN'T HAVE MONEY TO GET MORE.: NEVER TRUE

## 2024-10-03 SDOH — ECONOMIC STABILITY: INCOME INSECURITY: HOW HARD IS IT FOR YOU TO PAY FOR THE VERY BASICS LIKE FOOD, HOUSING, MEDICAL CARE, AND HEATING?: NOT HARD AT ALL

## 2024-10-03 SDOH — ECONOMIC STABILITY: HOUSING INSECURITY: IN THE PAST 12 MONTHS, HOW MANY TIMES HAVE YOU MOVED WHERE YOU WERE LIVING?: 0

## 2024-10-03 SDOH — SOCIAL STABILITY: SOCIAL NETWORK: HOW OFTEN DO YOU GET TOGETHER WITH FRIENDS OR RELATIVES?: MORE THAN THREE TIMES A WEEK

## 2024-10-03 SDOH — HEALTH STABILITY: MENTAL HEALTH: HOW OFTEN DO YOU HAVE A DRINK CONTAINING ALCOHOL?: NEVER

## 2024-10-03 SDOH — SOCIAL STABILITY: SOCIAL NETWORK: HOW OFTEN DO YOU ATTENT MEETINGS OF THE CLUB OR ORGANIZATION YOU BELONG TO?: NEVER

## 2024-10-03 SDOH — SOCIAL STABILITY: SOCIAL INSECURITY: ABUSE: ADULT

## 2024-10-03 SDOH — SOCIAL STABILITY: SOCIAL INSECURITY: HAVE YOU HAD ANY THOUGHTS OF HARMING ANYONE ELSE?: NO

## 2024-10-03 SDOH — SOCIAL STABILITY: SOCIAL INSECURITY: WERE YOU ABLE TO COMPLETE ALL THE BEHAVIORAL HEALTH SCREENINGS?: YES

## 2024-10-03 SDOH — SOCIAL STABILITY: SOCIAL NETWORK: HOW OFTEN DO YOU ATTEND CHURCH OR RELIGIOUS SERVICES?: 1 TO 4 TIMES PER YEAR

## 2024-10-03 SDOH — SOCIAL STABILITY: SOCIAL INSECURITY: ARE YOU OR HAVE YOU BEEN THREATENED OR ABUSED PHYSICALLY, EMOTIONALLY, OR SEXUALLY BY ANYONE?: NO

## 2024-10-03 ASSESSMENT — COGNITIVE AND FUNCTIONAL STATUS - GENERAL
CLIMB 3 TO 5 STEPS WITH RAILING: TOTAL
MOVING TO AND FROM BED TO CHAIR: A LITTLE
MOVING FROM LYING ON BACK TO SITTING ON SIDE OF FLAT BED WITH BEDRAILS: A LOT
PERSONAL GROOMING: A LITTLE
TOILETING: TOTAL
WALKING IN HOSPITAL ROOM: A LOT
HELP NEEDED FOR BATHING: A LOT
TURNING FROM BACK TO SIDE WHILE IN FLAT BAD: A LITTLE
STANDING UP FROM CHAIR USING ARMS: A LOT
PATIENT BASELINE BEDBOUND: NO
TURNING FROM BACK TO SIDE WHILE IN FLAT BAD: A LOT
MOVING FROM LYING ON BACK TO SITTING ON SIDE OF FLAT BED WITH BEDRAILS: A LOT
MOBILITY SCORE: 17
DRESSING REGULAR LOWER BODY CLOTHING: TOTAL
CLIMB 3 TO 5 STEPS WITH RAILING: A LITTLE
WALKING IN HOSPITAL ROOM: A LITTLE
DAILY ACTIVITIY SCORE: 24
STANDING UP FROM CHAIR USING ARMS: A LITTLE
DAILY ACTIVITIY SCORE: 14
MOBILITY SCORE: 11
DRESSING REGULAR UPPER BODY CLOTHING: A LITTLE
MOVING TO AND FROM BED TO CHAIR: A LOT

## 2024-10-03 ASSESSMENT — ACTIVITIES OF DAILY LIVING (ADL)
JUDGMENT_ADEQUATE_SAFELY_COMPLETE_DAILY_ACTIVITIES: YES
BATHING: INDEPENDENT
DRESSING YOURSELF: INDEPENDENT
ASSISTIVE_DEVICE: WALKER
HEARING - RIGHT EAR: FUNCTIONAL
ADL_ASSISTANCE: INDEPENDENT
ADL_ASSISTANCE: INDEPENDENT
HEARING - LEFT EAR: FUNCTIONAL
WALKS IN HOME: INDEPENDENT
GROOMING: INDEPENDENT
BATHING_ASSISTANCE: MODERATE
TOILETING: INDEPENDENT
FEEDING YOURSELF: INDEPENDENT
PATIENT'S MEMORY ADEQUATE TO SAFELY COMPLETE DAILY ACTIVITIES?: YES
ADEQUATE_TO_COMPLETE_ADL: YES
HOME_MANAGEMENT_TIME_ENTRY: 10

## 2024-10-03 ASSESSMENT — PATIENT HEALTH QUESTIONNAIRE - PHQ9
SUM OF ALL RESPONSES TO PHQ9 QUESTIONS 1 & 2: 3
2. FEELING DOWN, DEPRESSED OR HOPELESS: NEARLY EVERY DAY
1. LITTLE INTEREST OR PLEASURE IN DOING THINGS: NOT AT ALL

## 2024-10-03 ASSESSMENT — PAIN - FUNCTIONAL ASSESSMENT
PAIN_FUNCTIONAL_ASSESSMENT: FLACC (FACE, LEGS, ACTIVITY, CRY, CONSOLABILITY)
PAIN_FUNCTIONAL_ASSESSMENT: 0-10

## 2024-10-03 ASSESSMENT — PAIN SCALES - GENERAL
PAINLEVEL_OUTOF10: 0 - NO PAIN
PAINLEVEL_OUTOF10: 10 - WORST POSSIBLE PAIN
PAINLEVEL_OUTOF10: 0 - NO PAIN
PAINLEVEL_OUTOF10: 2
PAINLEVEL_OUTOF10: 0 - NO PAIN

## 2024-10-03 ASSESSMENT — LIFESTYLE VARIABLES
SUBSTANCE_ABUSE_PAST_12_MONTHS: NO
PRESCIPTION_ABUSE_PAST_12_MONTHS: NO
AUDIT-C TOTAL SCORE: 0
SKIP TO QUESTIONS 9-10: 1

## 2024-10-03 ASSESSMENT — PAIN DESCRIPTION - DESCRIPTORS: DESCRIPTORS: DISCOMFORT;ACHING

## 2024-10-03 NOTE — SIGNIFICANT EVENT
Pt recent ABG @0500 with pH 7.50, pCO2 66, pO2 99, and HCO3 51.5.  BiPAP removed at this time and pt placed on HiFlow NC at 40L/100%.  Pt mentation has improved and she in now conversing and A&Ox3.  Pt is able to confirm her code status as Full Code.  Pt is concerned about getting her medications, and I assured the pt she would be receiving them while here at the hospital.  Pt was able to pass a bedside swallow exam with her nurse.  Pt will be ordered a diet at this time and restarted on her home medications.     Reynaldo Hahn PA-C  Pulmonary and Critical Care Medicine   [facility]

## 2024-10-03 NOTE — CONSULTS
Vancomycin Dosing by Pharmacy- INITIAL    Vickie Foster is a 68 y.o. year old female who Pharmacy has been consulted for vancomycin dosing for sepsis/pneumonia. Based on the patient's indication and renal status this patient will be dosed based on a goal AUC of 500-600.     Renal function is currently stable.    Visit Vitals  /80   Pulse 89   Temp 36.6 °C (97.9 °F) (Temporal)   Resp 21        Lab Results   Component Value Date    CREATININE 0.81 10/03/2024    CREATININE 0.75 10/02/2024    CREATININE 0.64 10/01/2024    CREATININE 0.70 2024        Patient weight is as follows:   Vitals:    10/03/24 0400   Weight: 108 kg (238 lb 8.6 oz)       Cultures:  No results found for the encounter in last 14 days.        No intake/output data recorded.  I/O during current shift:  I/O this shift:  In: -   Out: 400 [Urine:400]    Temp (24hrs), Av.6 °C (97.9 °F), Min:36.6 °C (97.9 °F), Max:36.6 °C (97.9 °F)         Assessment/Plan     Patient will be given a loading dose of 2000 mg.  Will initiate vancomycin maintenance,  1250 mg every 12 hours.    This dosing regimen is predicted by [employer] to result in the following pharmacokinetic parameters:    Loading dose: 2000 mg at 05:00 10/03/2024.  Regimen: 1250 mg IV every 12 hours.  Start time: 17:00 on 10/03/2024  Exposure target: AUC24 (range)400-600 mg/L.hr   [id number]: 579 mg/L.hr  AUC24,ss: 585 mg/L.hr  Probability of AUC24 > 400: 76 %  Ctrough,ss: 16.6 mg/L  Probability of Ctrough,ss > 20: 41 %    Follow-up level will be ordered on 10/4 at 0500, unless clinically indicated sooner.  Will continue to monitor renal function daily while on vancomycin and order serum creatinine at least every 48 hours if not already ordered.  Follow for continued vancomycin needs, clinical response, and signs/symptoms of toxicity.       KYLE DEL TORO, PharmD

## 2024-10-03 NOTE — PROGRESS NOTES
Vancomycin Dosing by Pharmacy- Cessation of Therapy    Consult to pharmacy for vancomycin dosing has been discontinued by the prescriber, pharmacy will sign off at this time.    Please call pharmacy if there are further questions or re-enter a consult if vancomycin is resumed.     Estefania Gallardo, ShaneD

## 2024-10-03 NOTE — PROGRESS NOTES
"[facility] Critical Care Medicine Progress Note      Date:  10/3/2024  Patient:  Vickie Foster  YOB: 1955  MRN:  [id number]   Admit Date:  10/2/2024      Chief Complaint   Patient presents with    Hallucinations     67% in triage on room air, patient states supposed to be on 3L.  Applied 6L NC in triage.   states patient has been hallucinating today and over the past month had about 9 falls with 2 today.         History of Present Illness:  Vickie Foster is a 68 y.o. year old female patient with Past Medical History of  HTN, HLD, HFpEF, COPD, TANVIR, paroxysmal atrial fibrillation s/p tikosyn/cardioversion/ablation, stroke, GERD, pre-diabetes, chronic gout, depression, anxiety, and low back pain that presented to the ED with hallucinations.  Pt reported to the ED that she has been \"seeing things and seeing family that are not there\" for about the last week.  Pt has also had multiple falls for the last week, states her legs have felt weak, and reported 2 falls earlier in the day to the ED provider.  Pt believes that she did hit her head earlier in the day.  Pt had stated that she has not been sleeping for the last week and contributes her hallucinations to this, but there is also concern as she has recently been taking muscle relaxers for back pain.  Pt has also been experiencing increased shortness of breath.  She usually wear 3L NC at night but has had to wear oxygen during the day with increased O2 at night.  Pt elicits some mild dysuria over the last few days.  Pt denies fever, chills, cough, chest pain, abdominal pain, nausea, vomiting, and diarrhea.       Pt vitals on arrival to the ED include temp 36.6, HR 98, resp 22, /107, and SpO2 98% on 3L NC.  CMP significant for glucose 153, potassium 2.7, chloride 87, bicarb 45, BUN 35, and AST 44.  BNP elevated 179.  Troponin elevated at 18 with repeat 20.  Lactate within normal limits 0.9.  CBC with WBC 14.3 and H/H 13.2/41.7.  Covid-19, " Flu A/B negative.  CXR with bilateral patchy airspace opacities.  CT head with no evidence of acute cortical infarct or intracranial hemorrhage. Alcohol level <10.  Urine toxicology screen negative.  Pt was admitted to hospital medicine service for treatment of pneumonia.       Pt had worsening mentation and a VBG was ordered.  VBG with pH 7.27, pCO2 108, pO2 50, HCO3 49.6.  Pt was placed on continuous BiPAP and pt care was transferred to the ICU team.       Interval ICU Events:  10/3: Deferred to ICU overnight for worsening hypoxic/hypercapnic respiratory failure.  Placed on BiPAP with added correction of pCO2 and then weaned off to HFNC 40 L, 100% FiO2.  This morning patient is awake, alert, oriented x 3.  HFNC weaned to 40 L, 80% FiO2.  On antibiotics with ceftriaxone and azithromycin.  CT chest with signs of volume overload and pulmonary hypertension.  Resume home torsemide for diuresis.    Medical History:  Past Medical History:   Diagnosis Date    A-fib (Multi)     CHF (congestive heart failure) (Multi)     COVID     Heart disease     HTN (hypertension)     Low back pain      Past Surgical History:   Procedure Laterality Date    ABLATION OF DYSRHYTHMIC FOCUS      CARDIAC ELECTROPHYSIOLOGY PROCEDURE N/A 05/01/2024    Procedure: Cardioversion;  Surgeon: Amado Ty MD;  Location: [facility] Cardiac Cath Lab;  Service: Electrophysiology;  Laterality: N/A;  05736  i48.0  medical mutual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    CARDIAC ELECTROPHYSIOLOGY PROCEDURE N/A 07/08/2024    Procedure: Ablation A-Fib;  Surgeon: Amado Ty MD;  Location: [facility] Cardiac Cath Lab;  Service: Electrophysiology;  Laterality: N/A;  AFIB CRYOABLATION; CPT 78300; ICD I48.0   Medicare mutual of [place] medicare  ID - [id number]  [phone]  No Auth required  Call ref # [id number]    CARDIAC ELECTROPHYSIOLOGY PROCEDURE N/A 9/18/2024    Procedure: Cardioversion;  Surgeon: Amado Ty MD;  Location: [facility] Cardiac Cath Lab;  Service:  Electrophysiology;  Laterality: N/A;  CARDIOVERSION CPT 45253, ICD I48.0 no auth required/REF # [id number]    CARDIOVERSION      05/01/2024     Medications Prior to Admission   Medication Sig Dispense Refill Last Dose    allopurinol 200 mg tablet Take 200 mg by mouth 2 times a day. 180 tablet 0 10/2/2024    ascorbic acid (Vitamin C With Genevieve Hips) 1,000 mg tablet Take 1 tablet (1,000 mg) by mouth once daily.   10/2/2024    atorvastatin (Lipitor) 40 mg tablet Take 1 tablet (40 mg) by mouth once daily at bedtime. 90 tablet 0 10/2/2024    budesonide-formoteroL (Symbicort) 80-4.5 mcg/actuation inhaler Inhale 2 puffs 2 times a day. Rinse mouth with water after use to reduce aftertaste and incidence of candidiasis. Do not swallow.   10/2/2024    buPROPion XL (Wellbutrin XL) 300 mg 24 hr tablet Take 1 tablet (300 mg) by mouth once daily in the morning. Take before meals. 90 tablet 1 10/2/2024    calcium carb-vitamin D3-vit K2 600 mg-1,000 unit-90 mcg tablet Take by mouth once daily.   10/2/2024    dofetilide (Tikosyn) 250 mcg capsule Take 1 capsule (250 mcg) by mouth every 12 hours. 180 capsule 1 10/2/2024    Eliquis 5 mg tablet Take 1 tablet (5 mg) by mouth 2 times a day. 180 tablet 1 10/2/2024    folic acid/multivit-min/lutein (CENTRUM SILVER ORAL) Take by mouth. As directed   10/2/2024    gabapentin (Neurontin) 300 mg capsule Take 1 capsule (300 mg) by mouth 3 times a day. 270 capsule 1 10/2/2024    Klor-Con M20 20 mEq ER tablet TAKE 1 TABLET THREE TIMES A DAY WITH FOOD 270 tablet 3 10/2/2024    metoprolol succinate XL (Toprol-XL) 25 mg 24 hr tablet TAKE 1 TABLET BY MOUTH ONE TIME DAILY 90 tablet 3 10/2/2024    oxygen (O2) gas therapy Inhale 3 L/min once daily at bedtime.   10/2/2024    cyclobenzaprine (Flexeril) 5 mg tablet Take 1-2 tablets (5-10 mg) by mouth 2 times a day as needed for muscle spasms. 60 tablet 1 Unknown    metOLazone (Zaroxolyn) 2.5 mg tablet Take 1 tablet (2.5 mg) by mouth if needed (when legs  kunal). prn 30 tablet 0     semaglutide 2 mg/dose (8 mg/3 mL) pen injector Inject 2.3 mg under the skin 1 (one) time per week.   Unknown    tiZANidine (Zanaflex) 2 mg tablet Take 1 tablet (2 mg) by mouth every 12 hours if needed for muscle spasms for up to 5 days. 10 tablet 0     torsemide (Demadex) 100 mg tablet Take 1/2 a tablet (50 mg) by mouth 2 times a day. 30 tablet 0      Patient has no known allergies.  Social History     Tobacco Use    Smoking status: Former     Current packs/day: 0.00     Types: Cigarettes     Quit date:      Years since quittin.7    Smokeless tobacco: Never   Vaping Use    Vaping status: Never Used   Substance Use Topics    Alcohol use: Not Currently     Comment: monthly or less    Drug use: Never     Family History   Problem Relation Name Age of Onset    Breast cancer Mother      Heart attack Mother           in early 80s of MI    Heart disease Mother      Colon cancer Father      Breast cancer Sister      Diabetes type II Sister      Kidney failure Sister      Other (herione addict) Sister      No Known Problems Brother      No Known Problems Daughter      Other (recovering alcoholic) Son      Breast cancer Other Maternal Aunt        Hospital Medications:           Current Facility-Administered Medications:     allopurinol (Zyloprim) tablet 200 mg, 200 mg, oral, BID, Reynaldo E Hipps, PA-C, 200 mg at 10/03/24 09    apixaban (Eliquis) tablet 5 mg, 5 mg, oral, BID, Reynaldo E Hipps, PA-C, 5 mg at 10/03/24 09    atorvastatin (Lipitor) tablet 40 mg, 40 mg, oral, Nightly, Reynaldo E Hipps, PA-C    azithromycin (Zithromax) 250 mg in dextrose 5% 250 mL IV, 250 mg, intravenous, q24h, Reynaldo E Hipps, PA-C    buPROPion XL (Wellbutrin XL) 24 hr tablet 300 mg, 300 mg, oral, Daily before breakfast, Reynaldo E Hipps, PA-C, 300 mg at 10/03/24 0914    cefTRIAXone (Rocephin) 2 g in dextrose (iso) IV 50 mL, 2 g, intravenous, q24h, Reynaldo E Hipps, PA-C    [Held by provider] cyclobenzaprine (Flexeril) tablet  5 mg, 5 mg, oral, BID PRN, Reynaldo Hahn, PA-C    dextrose 50 % injection 12.5 g, 12.5 g, intravenous, q15 min PRN, Reynaldo Hahn, PA-C    dextrose 50 % injection 25 g, 25 g, intravenous, q15 min PRN, Reynaldo Riverss, PA-C    dofetilide (Tikosyn) capsule 250 mcg, 250 mcg, oral, q12h, Reynaldo Hahn, PA-C, 250 mcg at 10/03/24 0913    fluticasone furoate-vilanteroL (Breo Ellipta) 200-25 mcg/dose inhaler 1 puff, 1 puff, inhalation, Daily, ANJUM Sharp-C, 1 puff at 10/03/24 0818    [Held by provider] gabapentin (Neurontin) capsule 300 mg, 300 mg, oral, TID, Reynaldo Hahn PA-C    glucagon (Glucagen) injection 1 mg, 1 mg, intramuscular, q15 min PRN, Reynaldo Hahn, PA-C    glucagon (Glucagen) injection 1 mg, 1 mg, intramuscular, q15 min PRN, Reynaldo Hahn, PA-C    insulin lispro (HumaLOG) injection 0-10 Units, 0-10 Units, subcutaneous, q4h, Reynaldo Hahn PA-C    ipratropium-albuteroL (Duo-Neb) 0.5-2.5 mg/3 mL nebulizer solution 3 mL, 3 mL, nebulization, q6h PRN, Reynaldo Hahn PA-C    ipratropium-albuteroL (Duo-Neb) 0.5-2.5 mg/3 mL nebulizer solution 3 mL, 3 mL, nebulization, TID, Wesley Siegel APRN-CNP, 3 mL at 10/03/24 0950    [Held by provider] metOLazone (Zaroxolyn) tablet 2.5 mg, 2.5 mg, oral, PRN, Reynaldo Hahn, PA-C    metoprolol tartrate (Lopressor) tablet 12.5 mg, 12.5 mg, oral, BID, Wesley Siegel APRN-CNP    oxygen (O2) therapy, , inhalation, Continuous - Inhalation, Reynaldo Hahn PA-C, 40 L/min at 10/03/24 0820    oxygen (O2) therapy, , inhalation, Continuous - Inhalation, Reynaldo E Hipps, PA-C, 40 L/min at 10/03/24 0829    potassium chloride (Klor-Con) packet 20 mEq, 20 mEq, oral, BID, Reynaldo E Hipps, PA-C, 20 mEq at 10/03/24 0913    torsemide (Demadex) tablet 50 mg, 50 mg, oral, BID, KEVON Pantoja-CNP, 50 mg at 10/03/24 0930    Review of Systems:  14 point review of systems was completed and negative except for those specially mention in my HPI    Physical Exam:    Heart Rate:  []   Temp:  [36.6  "°C (97.9 °F)-37.4 °C (99.3 °F)]   Resp:  [17-39]   BP: ()/()   Height:  [157.5 cm (5' 2\")]   Weight:  [108 kg (238 lb 8.6 oz)-110 kg (243 lb)]   SpO2:  [88 %-99 %]     Physical Exam  Constitutional:       Appearance: She is obese. She is ill-appearing.   HENT:      Head: Normocephalic and atraumatic.      Mouth/Throat:      Mouth: Mucous membranes are moist.      Pharynx: Oropharynx is clear. No oropharyngeal exudate.   Eyes:      General: No scleral icterus.     Extraocular Movements: Extraocular movements intact.      Pupils: Pupils are equal, round, and reactive to light.   Cardiovascular:      Rate and Rhythm: Normal rate. Rhythm irregular.      Pulses: Normal pulses.      Heart sounds: Normal heart sounds. No murmur heard.     No friction rub. No gallop.   Pulmonary:      Effort: No respiratory distress.      Breath sounds: Normal breath sounds. No wheezing.   Abdominal:      General: There is no distension.      Palpations: Abdomen is soft.   Musculoskeletal:         General: Normal range of motion.      Cervical back: Normal range of motion and neck supple.      Right lower leg: Edema present.      Left lower leg: Edema present.   Skin:     General: Skin is warm and dry.      Capillary Refill: Capillary refill takes less than 2 seconds.   Neurological:      General: No focal deficit present.      Mental Status: She is alert and oriented to person, place, and time. Mental status is at baseline.         Objective:    I have reviewed all medications, laboratory results, and imaging pertinent for today's encounter.    FiO2 (%):  [50 %-80 %] 80 %      Intake/Output Summary (Last 24 hours) at 10/3/2024 1113  Last data filed at 10/3/2024 1033  Gross per 24 hour   Intake --   Output 2600 ml   Net -2600 ml         Assessment/Plan:    I am currently managing this critically ill patient for the following problems:    Neuro/Psych/Pain Ctrl/Sedation:  Hx Chronic Pain, Anxiety, Depression, Embolic Stroke  - " Neurochecks, CAM assessment, delirium precautions  - Continue home Wellbutrin  - Resume home Gabapentin, continue to hold Flexeril for now  - Tylenol PRN for pain     Respiratory/ENT:  #Acute on chronic hypoxic/hypercapneic respiratory failure - likely underlying OHS and now with CHF exacerbation  #Hx of COPD and TANVIR - 3L NC overnight   - BiPAP 12/4 at night - will benefit from home BiPAP on discharge  - Without significant wheezing, will defer steroids at this time  - Continue home Symbicort inhaler  - Duonebs 4x daily while awake  - Aggressive pulmonary/bronchial hygiene  - On HFNC 40 L, 80% FiO2, wean for SpO2 >88%    Cardiovascular:  #HFpEF Exacerbation  #Hx HTN, HLD, pAfib s/p DCCV and Ablation  - Echo 7/2024 with LVEF 60 to 65%, LA dilation, normal RV function  - CT chest with enlarged PA and signs of volume overload  - Continue statin  - Continue Tikosyn, Metoprolol, Eliquis  - Continue home Torsemide 50mg BID, goal net -1 to -2 L  - Maintain MAP > 60    GI:  Hx of GERD  -Diet: Cardiac, Diabetic     Renal/Volume Status (Intra & Extravascular):  No acute issues, renal function stable  - Continue home torsemide 50mg BID, goal net -1 to -2 L  - Daily RFP and electrolyte repletion    Endocrine  #Hx of Prediabetes   - Hold home semaglutide   - SSI ACHS     Infectious Disease:  #Concern for CAP  - Check strep/legionella antigens  - MRSA PCR negative  - Check Procalcitonin  - Continue Ceftriaxone and Azithromycin  - Low threshold to DC abx if procal < 0.5     Heme/Onc:  No acute issues  - Daily CBC monitoring   - Transfuse for Hgb <7 or symptomatic anemia      Derm/MSK:  #Chronic Gout   - Conitnue home Allopurinol  - ICU skin protocol   - Padded pressure points   - PT/OT eval     :  DVT Prophylaxis: On Eliquis  GI Prophylaxis: No indication  Bowel Regimen: PRN   Diet: Cardiac/Diabetic  CVC: no  [place]: no  [place]: Yes (10/2)  Restraints: No  Dispo: ICU    Critical Care Time:  45 minutes spent in  preparing to see patient (I.e. review of medical records), evaluation of diagnostics (I.e. labs, imaging, etc.), documentation, discussing plan of care with patient/ family/ caregiver, and/ or coordination of care with multidisciplinary team. Time does not include completion of procedure time.       Wesley Siegel, APRN-CNP  Pulmonary & Critical Care Medicine  [facility]

## 2024-10-03 NOTE — PROGRESS NOTES
Occupational Therapy    Evaluation/Treatment    Patient Name: Vickie Foster  MRN: [id number]  Department: [facility] S ICU  Room: 14/14-A  Today's Date: 10/03/24  Time Calculation  Start Time: 1300  Stop Time: 1345  Time Calculation (min): 45 min       Assessment:  OT Assessment: Pt presents on eval with increased overall weakness, poor activity tolerance while on HFNC, multiple recent falls, and poor standing balance affecting her self-care and functional transfers/mobility. Pt will benefit from continued skilled OT to address these deficits.  Prognosis: Fair  Barriers to Discharge: Decreased caregiver support  Evaluation/Treatment Tolerance: Patient limited by fatigue, Other (Comment) (Limited by HFNC.)  End of Session Communication: Bedside nurse  End of Session Patient Position: Up in chair, Alarm on (Needs in reach and nurse aware.)  OT Assessment Results: Decreased ADL status, Decreased upper extremity strength, Decreased safe judgment during ADL, Decreased cognition, Decreased endurance, Decreased functional mobility, Decreased trunk control for functional activities    Plan:  Treatment Interventions: ADL retraining, Functional transfer training, UE strengthening/ROM, Endurance training, Patient/family training, Equipment evaluation/education, Neuromuscular reeducation, Compensatory technique education  OT Frequency: 5 times per week  OT Discharge Recommendations: Moderate intensity level of continued care  Equipment Recommended upon Discharge: Wheeled walker  OT Recommended Transfer Status: Assist of 2  OT - OK to Discharge: Yes      Subjective     General:   OT Received On: 10/03/24  General  Reason for Referral: weakness, impaired ADL's/mobility, falls  Referred By: Wesley Siegel, KEVON-CNP  Past Medical History Relevant to Rehab: HTN, COPD, a-fib, TANVIR, CVA, gout, dep, anxiety, LBP, COVID, cardioversion, cardiac ablation  Family/Caregiver Present: No  Co-Treatment: PT  Co-Treatment Reason:  Medically/physically complex ICU eval on HFNC with limited activity tolerance  Prior to Session Communication: Bedside nurse  Patient Position Received: Bed, 3 rail up, Alarm on  General Comment: Pt is a 69 yo female admitted to the hospital with hallucinations and up to 9 falls in the past month.    Precautions:  Hearing/Visual Limitations: reading glasses  Medical Precautions: Fall precautions, Oxygen therapy device and L/min (80% HFNC)    Vital Signs Comment: HR 90, pulse ox 93%, and /67     Pain:  Pain Assessment  Pain Assessment: 0-10  0-10 (Numeric) Pain Score: 2  Pain Type: Chronic pain  Pain Location: Back    Objective     Cognition:  Overall Cognitive Status: Impaired  Orientation Level: Oriented X4  Cognition Comments: Pt reports word finding difficulty, which is evident intermittently during conversation with her. Pt reports this happens since her prior CVA.  Insight: Mild  Processing Speed: Delayed    Home Living:  Type of Home: House  Lives With: Spouse  Home Adaptive Equipment: Other (Comment) (rollator)  Home Layout: Multi-level, Stairs to alternate level with rails  Alternate Level Stairs-Rails:  (unilateral)  Alternate Level Stairs-Number of Steps: 5 down and 10 up  Home Access: Other (Comment) (1 step entry with 0 rails)  Bathroom Shower/Tub: Tub/shower unit  Bathroom Toilet: Standard  Bathroom Equipment: None    Prior Function:  Level of Anthony: Independent with ADLs and functional transfers, Needs assistance with homemaking  Receives Help From: Family (spouse)  ADL Assistance: Independent  Homemaking Assistance: Needs assistance (spouse has been helping out, but still works)  Ambulatory Assistance: Independent (furniture walking inside and rollator outside)  Vocational: Retired  Hand Dominance: Right  Prior Function Comments: Spouse does the driving.    ADL:  Eating Assistance: Independent  Grooming Assistance: Minimal  Grooming Deficit: Other (Comment) (seated only)  Bathing  Assistance: Moderate  UE Dressing Assistance: Minimal  LE Dressing Assistance: Total  LE Dressing Deficit: Don/doff R sock, Don/doff L sock  Toileting Assistance with Device: Total  Toileting Deficit: Perineal hygiene (Pt required total A in standing to wipe after an attempted BM.)    Activity Tolerance:  Activity Tolerance Comments: impaired and currently on HFNC    Bed Mobility/Transfers: Bed Mobility  Bed Mobility:  (Pt completed supine to sit in bed with mod A x2 for trunk up and moving her BLE's off the bed.)    Transfers  Transfer:  (Pt completed sit<>stand multiple times with mod A x2(arm in arm assist) for balance/safety and due to overall weakness.)    Functional Mobility:  Functional Mobility  Functional Mobility Performed:  (Pt only able to take a few steps to the bedside commode followed by the chair with mod A x2(arm in arm assist) for balance/safety and due to overall weakness.)    Standing Balance:  Static Standing Balance  Static Standing-Balance Support: Bilateral upper extremity supported  Static Standing-Level of Assistance: Moderate assistance (x2)  Static Standing-Comment/Number of Minutes: Pt stood 2-3 minutes during pericare.    Therapy/Activity: Therapeutic Activity  Therapeutic Activity Performed:  (See bed mobility, transfers, functional mobility, LB dressing, toileting, and static standing balance activity.)    Sensation:  Sensation Comment: pt denies paresthesias    Strength:  Strength Comments: MILLY's 3+/5    Coordination:  Coordination Comment: MILLY's WFL grossly     Hand Function:  Hand Function  Gross Grasp: Functional  Coordination: Functional      Outcome Measures: [facility] Daily Activity  Putting on and taking off regular lower body clothing: Total  Bathing (including washing, rinsing, drying): A lot  Putting on and taking off regular upper body clothing: A little  Toileting, which includes using toilet, bedpan or urinal: Total  Taking care of personal grooming such as brushing teeth: A  little  Eating Meals: None  Daily Activity - Total Score: 14      Education Documentation  Body Mechanics, taught by Sunny Rios Jr., OT at 10/3/2024  3:07 PM.  Learner: Patient  Readiness: Acceptance  Method: Explanation  Response: Verbalizes Understanding, Needs Reinforcement    Home Exercise Program, taught by Sunny Rios Jr., OT at 10/3/2024  3:07 PM.  Learner: Patient  Readiness: Acceptance  Method: Explanation  Response: Verbalizes Understanding, Needs Reinforcement    ADL Training, taught by Sunny Rios Jr., OT at 10/3/2024  3:07 PM.  Learner: Patient  Readiness: Acceptance  Method: Explanation  Response: Verbalizes Understanding, Needs Reinforcement    Education Comments  No comments found.      Goals:  Encounter Problems       Encounter Problems (Active)       OT Goals       Pt will complete all grooming tasks with setup/indep. (Progressing)       Start:  10/03/24    Expected End:  11/03/24            Pt will complete UB dressing/bathing with setup/indep and LB dressing/bathing with close S using adaptive equipment as needed. (Progressing)       Start:  10/03/24    Expected End:  11/03/24            Pt will complete all toileting tasks with close S. (Progressing)       Start:  10/03/24    Expected End:  11/03/24            Pt will complete all functional transfers and mobility with close S using a RW. (Progressing)       Start:  10/03/24    Expected End:  11/03/24

## 2024-10-03 NOTE — CARE PLAN
Problem: Skin  Goal: Decreased wound size/increased tissue granulation at next dressing change  Outcome: Progressing  Flowsheets (Taken 10/3/2024 0640)  Decreased wound size/increased tissue granulation at next dressing change: Protective dressings over bony prominences  Goal: Participates in plan/prevention/treatment measures  Outcome: Progressing  Flowsheets (Taken 10/3/2024 0640)  Participates in plan/prevention/treatment measures: Elevate heels  Goal: Prevent/manage excess moisture  Outcome: Progressing  Flowsheets (Taken 10/3/2024 0640)  Prevent/manage excess moisture: Moisturize dry skin  Goal: Prevent/minimize sheer/friction injuries  Outcome: Progressing  Flowsheets (Taken 10/3/2024 0640)  Prevent/minimize sheer/friction injuries:   HOB 30 degrees or less   Use pull sheet  Goal: Promote/optimize nutrition  Outcome: Progressing  Flowsheets (Taken 10/3/2024 0640)  Promote/optimize nutrition: Offer water/supplements/favorite foods  Goal: Promote skin healing  Outcome: Progressing  Flowsheets (Taken 10/3/2024 0640)  Promote skin healing: Turn/reposition every 2 hours/use positioning/transfer devices     Problem: Fall/Injury  Goal: Not fall by end of shift  Outcome: Progressing  Goal: Be free from injury by end of the shift  Outcome: Progressing  Goal: Verbalize understanding of personal risk factors for fall in the hospital  Outcome: Progressing  Goal: Verbalize understanding of risk factor reduction measures to prevent injury from fall in the home  Outcome: Progressing  Goal: Use assistive devices by end of the shift  Outcome: Progressing  Goal: Pace activities to prevent fatigue by end of the shift  Outcome: Progressing     Problem: Pain  Goal: Takes deep breaths with improved pain control throughout the shift  Outcome: Progressing  Goal: Turns in bed with improved pain control throughout the shift  Outcome: Progressing  Goal: Walks with improved pain control throughout the shift  Outcome: Progressing  Goal:  Performs ADL's with improved pain control throughout shift  Outcome: Progressing  Goal: Participates in PT with improved pain control throughout the shift  Outcome: Progressing  Goal: Free from opioid side effects throughout the shift  Outcome: Progressing  Goal: Free from acute confusion related to pain meds throughout the shift  Outcome: Progressing     Problem: Fall/Injury  Goal: Not fall by end of shift  Outcome: Progressing     Problem: Pain  Goal: Takes deep breaths with improved pain control throughout the shift  Outcome: Progressing   The patient's goals for the shift include      The clinical goals for the shift include

## 2024-10-03 NOTE — H&P
"[facility] Critical Care Medicine       Date:  10/3/2024  Patient:  Vickie Foster  YOB: 1955  MRN:  [id number]   Admit Date:  10/2/2024      Chief Complaint   Patient presents with    Hallucinations     67% in triage on room air, patient states supposed to be on 3L.  Applied 6L NC in triage.   states patient has been hallucinating today and over the past month had about 9 falls with 2 today.         History of Present Illness:  Vickie Foster is a 68 y.o. year old female patient with Past Medical History of  HTN, HLD, HFpEF, COPD, TANVIR, paroxysmal atrial fibrillation s/p tikosyn/cardioversion/ablation, stroke, GERD, pre-diabetes, chronic gout, depression, anxiety, and low back pain that presented to the ED with hallucinations.  Pt reported to the ED that she has been \"seeing things and seeing family that are not there\" for about the last week.  Pt has also had multiple falls for the last week, states her legs have felt weak, and reported 2 falls earlier in the day to the ED provider.  Pt believes that she did hit her head earlier in the day.  Pt had stated that she has not been sleeping for the last week and contributes her hallucinations to this, but there is also concern as she has recently been taking muscle relaxers for back pain.  Pt has also been experiencing increased shortness of breath.  She usually wear 3L NC at night but has had to wear oxygen during the day with increased O2 at night.  Pt elicits some mild dysuria over the last few days.  Pt denies fever, chills, cough, chest pain, abdominal pain, nausea, vomiting, and diarrhea.      Pt vitals on arrival to the ED include temp 36.6, HR 98, resp 22, /107, and SpO2 98% on 3L NC.  CMP significant for glucose 153, potassium 2.7, chloride 87, bicarb 45, BUN 35, and AST 44.  BNP elevated 179.  Troponin elevated at 18 with repeat 20.  Lactate within normal limits 0.9.  CBC with WBC 14.3 and H/H 13.2/41.7.  Covid-19, Flu A/B " negative.  CXR with bilateral patchy airspace opacities.  CT head with no evidence of acute cortical infarct or intracranial hemorrhage. Alcohol level <10.  Urine toxicology screen negative.  Pt was admitted to hospital medicine service for treatment of pneumonia.      Pt had worsening mentation and a VBG was ordered.  VBG with pH 7.27, pCO2 108, pO2 50, HCO3 49.6.  Pt was placed on continuous BiPAP and pt care was transferred to the ICU team.       Interval ICU Events:  10/3: Pt admitted to the ICU for acute on chronic hypoxic/hypercapneic respiratory failure.  Pt placed on BiPAP with slow improvement.  Will continue to monitor and repeat ABG in 2 hours.  Continue antibiotics started by the ED with concern for pneumonia with addition of vancomycin.  Diuresis as tolerated.  Cuellar catheter placed for strict I/Os monitoring.       Medical History:  Past Medical History:   Diagnosis Date    A-fib (Multi)     CHF (congestive heart failure) (Multi)     COVID     Heart disease     HTN (hypertension)     Low back pain      Past Surgical History:   Procedure Laterality Date    ABLATION OF DYSRHYTHMIC FOCUS      CARDIAC ELECTROPHYSIOLOGY PROCEDURE N/A 05/01/2024    Procedure: Cardioversion;  Surgeon: Amado Ty MD;  Location: [facility];  Service: Electrophysiology;  Laterality: N/A;  90248  i48.0  medical mutual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    CARDIAC ELECTROPHYSIOLOGY PROCEDURE N/A 07/08/2024    Procedure: Ablation A-Fib;  Surgeon: Amado Ty MD;  Location: [facility];  Service: Electrophysiology;  Laterality: N/A;  AFIB CRYOABLATION; CPT 47540; ICD I48.0   Medicare mutual of [place] medicare  ID - [id number]  [phone]  No Auth required  Call ref # [id number]    CARDIAC ELECTROPHYSIOLOGY PROCEDURE N/A 9/18/2024    Procedure: Cardioversion;  Surgeon: Amado Ty MD;  Location: [facility];  Service: Electrophysiology;  Laterality: N/A;  CARDIOVERSION CPT 75098, ICD I48.0 no auth  required/REF # [id number]    CARDIOVERSION      2024     (Not in a hospital admission)    Patient has no known allergies.  Social History     Tobacco Use    Smoking status: Former     Current packs/day: 0.00     Types: Cigarettes     Quit date:      Years since quittin.7    Smokeless tobacco: Never   Vaping Use    Vaping status: Never Used   Substance Use Topics    Alcohol use: Not Currently     Comment: monthly or less    Drug use: Never     Family History   Problem Relation Name Age of Onset    Breast cancer Mother      Heart attack Mother           in early 80s of MI    Heart disease Mother      Colon cancer Father      Breast cancer Sister      Diabetes type II Sister      Kidney failure Sister      Other (herione addict) Sister      No Known Problems Brother      No Known Problems Daughter      Other (recovering alcoholic) Son      Breast cancer Other Maternal Aunt        Review of Systems:  14 point review of systems was completed and negative except for those specially mention in my HPI    Physical Exam:    Physical Exam  Constitutional:       Appearance: She is obese. She is ill-appearing.      Interventions: Face mask in place.   HENT:      Head: Normocephalic.      Mouth/Throat:      Mouth: Mucous membranes are dry.      Pharynx: Oropharynx is clear.   Eyes:      Pupils: Pupils are equal, round, and reactive to light.   Cardiovascular:      Rate and Rhythm: Normal rate. Rhythm irregular.      Pulses: Normal pulses.      Heart sounds: Normal heart sounds.   Pulmonary:      Effort: Tachypnea and respiratory distress present.      Breath sounds: Decreased breath sounds present.   Abdominal:      General: Bowel sounds are normal. There is no distension.      Palpations: Abdomen is soft.      Tenderness: There is no abdominal tenderness.   Genitourinary:     Comments: Cuellar catheter placed   Musculoskeletal:         General: Normal range of motion.      Right lower leg: Edema present.       Left lower leg: Edema present.   Skin:     General: Skin is warm and dry.      Capillary Refill: Capillary refill takes less than 2 seconds.   Neurological:      Mental Status: She is lethargic.      Comments: Responsive to painful stimuli.          Vitals:  Most recent :  Vitals:    10/03/24 0038   BP:    Pulse:    Resp: (!) 21   Temp:    SpO2: 96%       24hr Min/Max:  Temp  Min: 36.6 °C (97.9 °F)  Max: 36.6 °C (97.9 °F)  Pulse  Min: 89  Max: 118  BP  Min: 100/80  Max: 130/93  Resp  Min: 18  Max: 24  SpO2  Min: 88 %  Max: 96 %      Objective:    Scheduled Medications:  metoprolol, 5 mg, intravenous, Once  oxygen, , inhalation, Continuous - Inhalation  oxygen, , inhalation, Continuous - Inhalation  potassium chloride, 20 mEq, oral, BID  potassium chloride, 20 mEq, intravenous, q2h        Continuous Medications:       PRN Medications:      Labs/Radiology/Diagnostic Review:  Results for orders placed or performed during the hospital encounter of 10/02/24 (from the past 24 hour(s))   CBC and Auto Differential   Result Value Ref Range    WBC 14.3 (H) 4.4 - 11.3 x10*3/uL    nRBC 0.0 0.0 - 0.0 /100 WBCs    RBC 4.24 4.00 - 5.20 x10*6/uL    Hemoglobin 13.2 12.0 - 16.0 g/dL    Hematocrit 41.7 36.0 - 46.0 %    MCV 98 80 - 100 fL    MCH 31.1 26.0 - 34.0 pg    MCHC 31.7 (L) 32.0 - 36.0 g/dL    RDW 13.9 11.5 - 14.5 %    Platelets 303 150 - 450 x10*3/uL    Neutrophils % 85.9 40.0 - 80.0 %    Immature Granulocytes %, Automated 0.3 0.0 - 0.9 %    Lymphocytes % 7.1 13.0 - 44.0 %    Monocytes % 6.4 2.0 - 10.0 %    Eosinophils % 0.1 0.0 - 6.0 %    Basophils % 0.2 0.0 - 2.0 %    Neutrophils Absolute 12.30 (H) 1.20 - 7.70 x10*3/uL    Immature Granulocytes Absolute, Automated 0.04 0.00 - 0.70 x10*3/uL    Lymphocytes Absolute 1.02 (L) 1.20 - 4.80 x10*3/uL    Monocytes Absolute 0.92 0.10 - 1.00 x10*3/uL    Eosinophils Absolute 0.01 0.00 - 0.70 x10*3/uL    Basophils Absolute 0.03 0.00 - 0.10 x10*3/uL   Comprehensive metabolic panel   Result  Value Ref Range    Glucose 153 (H) 74 - 99 mg/dL    Sodium 141 136 - 145 mmol/L    Potassium 2.7 (LL) 3.5 - 5.3 mmol/L    Chloride 87 (L) 98 - 107 mmol/L    Bicarbonate 45 (HH) 21 - 32 mmol/L    Anion Gap 12 10 - 20 mmol/L    Urea Nitrogen 35 (H) 6 - 23 mg/dL    Creatinine 0.75 0.50 - 1.05 mg/dL    eGFR 87 >60 mL/min/1.73m*2    Calcium 10.0 8.6 - 10.3 mg/dL    Albumin 4.0 3.4 - 5.0 g/dL    Alkaline Phosphatase 113 33 - 136 U/L    Total Protein 7.3 6.4 - 8.2 g/dL    AST 44 (H) 9 - 39 U/L    Bilirubin, Total 1.1 0.0 - 1.2 mg/dL    ALT 25 7 - 45 U/L   B-type natriuretic peptide   Result Value Ref Range     (H) 0 - 99 pg/mL   Ethanol   Result Value Ref Range    Alcohol <10 <=10 mg/dL   Troponin I, High Sensitivity, Initial   Result Value Ref Range    Troponin I, High Sensitivity 18 (H) 0 - 13 ng/L   Sars-CoV-2 PCR   Result Value Ref Range    Coronavirus 2019, PCR Not Detected Not Detected   Influenza A, and B PCR   Result Value Ref Range    Flu A Result Not Detected Not Detected    Flu B Result Not Detected Not Detected   Troponin, High Sensitivity, 1 Hour   Result Value Ref Range    Troponin I, High Sensitivity 20 (H) 0 - 13 ng/L   Blood Culture    Specimen: Peripheral Venipuncture; Blood culture   Result Value Ref Range    Blood Culture Loaded on Instrument - Culture in progress    Blood Culture    Specimen: Peripheral Venipuncture; Blood culture   Result Value Ref Range    Blood Culture Loaded on Instrument - Culture in progress    Drug Screen, Urine   Result Value Ref Range    Amphetamine Screen, Urine Presumptive Negative Presumptive Negative    Barbiturate Screen, Urine Presumptive Negative Presumptive Negative    Benzodiazepines Screen, Urine Presumptive Negative Presumptive Negative    Cannabinoid Screen, Urine Presumptive Negative Presumptive Negative    Cocaine Metabolite Screen, Urine Presumptive Negative Presumptive Negative    Fentanyl Screen, Urine Presumptive Negative Presumptive Negative    Opiate  Screen, Urine Presumptive Negative Presumptive Negative    Oxycodone Screen, Urine Presumptive Negative Presumptive Negative    PCP Screen, Urine Presumptive Negative Presumptive Negative    Methadone Screen, Urine Presumptive Negative Presumptive Negative   Urinalysis with Reflex Culture and Microscopic   Result Value Ref Range    Color, Urine Light-Yellow Light-Yellow, Yellow, Dark-Yellow    Appearance, Urine Clear Clear    Specific Gravity, Urine 1.009 1.005 - 1.035    pH, Urine 5.0 5.0, 5.5, 6.0, 6.5, 7.0, 7.5, 8.0    Protein, Urine NEGATIVE NEGATIVE, 10 (TRACE), 20 (TRACE) mg/dL    Glucose, Urine Normal Normal mg/dL    Blood, Urine NEGATIVE NEGATIVE    Ketones, Urine NEGATIVE NEGATIVE mg/dL    Bilirubin, Urine NEGATIVE NEGATIVE    Urobilinogen, Urine Normal Normal mg/dL    Nitrite, Urine NEGATIVE NEGATIVE    Leukocyte Esterase, Urine NEGATIVE NEGATIVE   Lactate   Result Value Ref Range    Lactate 0.9 0.4 - 2.0 mmol/L   BLOOD GAS VENOUS FULL PANEL   Result Value Ref Range    POCT pH, Venous 7.27 (L) 7.33 - 7.43 pH    POCT pCO2, Venous 108 (HH) 41 - 51 mm Hg    POCT pO2, Venous 50 (H) 35 - 45 mm Hg    POCT SO2, Venous 77 (H) 45 - 75 %    POCT Oxy Hemoglobin, Venous 74.4 45.0 - 75.0 %    POCT Hematocrit Calculated, Venous 38.0 36.0 - 46.0 %    POCT Sodium, Venous 139 136 - 145 mmol/L    POCT Potassium, Venous 2.8 (LL) 3.5 - 5.3 mmol/L    POCT Chloride, Venous 92 (L) 98 - 107 mmol/L    POCT Ionized Cal[place], Venous 1.21 1.10 - 1.33 mmol/L    POCT Glucose, Venous 157 (H) 74 - 99 mg/dL    POCT Lactate, Venous 1.0 0.4 - 2.0 mmol/L    POCT Base Excess, Venous 17.6 (H) -2.0 - 3.0 mmol/L    POCT HCO3 Calculated, Venous 49.6 (H) 22.0 - 26.0 mmol/L    POCT Hemoglobin, Venous 12.8 12.0 - 16.0 g/dL    POCT Anion Gap, Venous 0.0 (L) 10.0 - 25.0 mmol/L    Patient Temperature 37.0 degrees Celsius    FiO2 38 %       CT head wo IV contrast    Result Date: 10/2/2024  Interpreted By:  Clark Nelson, STUDY: CT HEAD WO IV CONTRAST;   10/2/2024 7:17 pm   INDICATION: Signs/Symptoms:hallucinations.     COMPARISON: 07/10/2020   ACCESSION NUMBER(S): [id number]   ORDERING CLINICIAN: NILTON HARRY   TECHNIQUE: Noncontrast axial CT scan of head was performed. Angled reformats in brain and bone windows were generated. The images were reviewed in bone, brain, blood and soft tissue windows.   FINDINGS: CSF Spaces: The ventricles, sulci and basal cisterns are within normal limits. There is no extraaxial fluid collection.   Parenchyma: There are periventricular white matter changes noted. The grey-white differentiation is intact. There is no mass effect or midline shift.  There is no intracranial hemorrhage.   Calvarium: The calvarium is unremarkable.   Paranasal sinuses and mastoids: There is scattered sinus disease noted.       No evidence of acute cortical infarct or intracranial hemorrhage.   MACRO: None   Signed by: Clark Nelson 10/2/2024 7:25 PM Dictation workstation:   [id number]    XR chest 1 view    Result Date: 10/2/2024  Interpreted By:  Clark Nelson, STUDY: XR CHEST 1 VIEW;  10/2/2024 7:01 pm   INDICATION: Signs/Symptoms:hypoxic, short of breath, suspect CHF.     COMPARISON: 08/05/2020   ACCESSION NUMBER(S): [id number]   ORDERING CLINICIAN: NILTON HARRY   FINDINGS: AP radiograph of the chest was provided.       CARDIOMEDIASTINAL SILHOUETTE: Cardiomediastinal silhouette is normal in size and configuration.   LUNGS: There are bilateral patchy airspace opacities noted.   ABDOMEN: No remarkable upper abdominal findings.   BONES: No acute osseous changes.       1.  There are bilateral patchy airspace opacities noted.       MACRO: None   Signed by: Clark Nelson 10/2/2024 7:05 PM Dictation workstation:   [id number]    ECG 12 lead    Result Date: 9/18/2024  Atrial fibrillation with premature ventricular or aberrantly conducted complexes Moderate voltage criteria for LVH, may be normal variant Marked ST abnormality, possible lateral  subendocardial injury Prolonged QT Abnormal ECG When compared with ECG of 05-AUG-2024 21:12, ST now depressed in Anterior leads Confirmed by Servando Lundberg ([id number]) on 9/18/2024 3:41:36 PM    Electrophysiology procedure    Result Date: 9/18/2024  Table formatting from the original result was not included. Procedure Date: 9/18/2024 Attending:    * Amado Ty - Primary Resident/Fellow/Other Assistant: Surgeons and Role: * No surgeons found with a matching role * Indications: Pre-op Diagnosis    * PAF (paroxysmal atrial fibrillation) (Multi) [I48.0] Post-procedure diagnosis: Post-op Diagnosis    * PAF (paroxysmal atrial fibrillation) (Multi) [I48.0] Procedure(s): Cardioversion 42762 - HI CARDIOVERSION ELECTIVE ARRHYTHMIA EXTERNAL Procedure Findings: See below Description of the Procedure: The patient was brought to the Heart and vascular care area while in atrial fibrillation.  She has been compliant with her medications including her anticoagulation without missing any doses.  Anterior/posterior patches were applied and the patient underwent a synchronized 250 joule biphasic shock restoring sinus rhythm immediately with no complications. Summary: Successful cardioversion of atrial fibrillation. Complications: none Stents/Implants: Implants   No implant documentation for this case.  Anticoagulation/Antiplatelet Plan: eliquis Estimated Blood Loss: 0 mL Anesthesia: Monitor Anesthesia Care Anesthesia Staff: Anesthesiologist: Rodney Johnson MD Any Specimen(s) Removed: Order Name Source Comment Collection Info Order Time MAGNESIUM Blood, Venous  Collected By: ESTUARDO Wilkinson 9/18/2024 12:08 PM   Release result to MyChart   Immediate      B-TYPE NATRIURETIC PEPTIDE Blood, Venous  Collected By: Shankar Pineda RN 9/18/2024 12:08 PM   Release result to MyChart   Immediate      Disposition: stable     ECG 12 lead (Clinic Performed)    Result Date: 9/5/2024  Atrial fibrillation that is coarse in nature ventricular  rate 87 bpm QRS duration 90 ms QTc 480 ms      I/O:  No intake or output data in the 24 hours ending 10/03/24 0105    I have reviewed all medications, laboratory results, and imaging pertinent for today's encounter    Assessment/Plan:    I am currently managing this critically ill patient for the following problems:    Neuro/Psych/Pain Ctrl/Sedation:  Acute metabolic encephalopathy   Low back pain   Hx of anxiety and depression   Hx of embolic stroke   -Continue neuro checks per protocol.   -Continue home wellbutrin when no longer NPO  -Hold home gabapentin and flexeril for altered mentation   -Pain control: tylenol prn   -CAM ICU score q-shift  -Sleep/wake cycle hygiene  -Delirium precaution    Respiratory/ENT:  Acute on chronic hypoxic/hypercapneic respiratory failure   Hx of COPD and TANVIR - 3L NC overnight   -Currently on BIPAP, wean as tolerated   -Continuous pulse oximetry, maintain SpO2 >90%  -Home symbicort inhaler transitioned to nebulizer BID  -Duonebs q6prn for wheezing   -Aggressive pulmonary/bronchial hygiene     Cardiovascular:  Paroxysmal atrial fibrillation s/p tikosyn/multiple cardioversions/ablation   Hx of HFpEF  Hx of HTN  Hx of HLD  -Continue home atorvastatin when no longer NPO  -Continue home eliquis and tikosyn when no longer NPO   -Hold home metoprolol for soft BP  -Hold home torsemide and metolazone   -Continue diuresis as tolerated   -Goal to maintain a MAP >65    GI:  Hx of GERD  -Diet: NPO while on BIPAP and pending swallow evaluation   -Bowel regimen: start colace when pt no longer NPO      Renal/Volume Status (Intra & Extravascular):  -Daily RFP and Mag  -Replace electrolytes per unit protocol   -Goal Mag>2.0 and K>4.0  -Cuellar catheter placed for strict I&Os monitoring   -Goal urine output 0.5-1.0 ml/kg/hr     Endocrine  Hx of Prediabetes   -Hold home semaglutide   -POCT glucose q4h while NPO  -SSI q4h as indicated   -Monitor for hypo/hyperglycemia     Infectious Disease:  Sepsis 2/2  pneumonia   -Continue azithromycin and rocephin in ED  -Start vancomycin   -MRSA PCR pending   -Blood cultures, pending   -UA and Urine culture, pending     Heme/Onc:  -Daily CBC monitoring   -Transfuse for Hgb <7 or symptomatic anemia   -Coag panel pending    Derm/MSK:  Chronic Gout   -ICU skin protocol   -Padded pressure points   -PT/OT eval and treat when appropriate   -Allopurinol on hold for NPO    Ethics/Code Status:  Assumed Full Code (pt unable to communicate/unable to reach contact) - Full Code on prior admissions     :  DVT Prophylaxis: continue home eliquis   GI Prophylaxis: none   Bowel Regimen: colace on hold   Diet: NPO, pending swallow eval   CVC: none   [place]: none   Cuellar: placed 10/3  Restraints: none   Dispo: admit to ICU     Critical Care Time:  73 minutes spent in preparing to see patient (I.e. review of medical records), evaluation of diagnostics (I.e. labs, imaging, etc.), documentation, discussing plan of care with patient/ family/ caregiver, and/ or coordination of care with multidisciplinary team. Time does not include completion of procedure time.        Reynaldo Hahn PA-C  Pulmonary and Critical Care Medicine   [facility]

## 2024-10-03 NOTE — CARE PLAN
Problem: Skin  Goal: Decreased wound size/increased tissue granulation at next dressing change  10/3/2024 1413 by Ginny Clarke RN  Flowsheets (Taken 10/3/2024 0640 by Jess Alvares RN)  Decreased wound size/increased tissue granulation at next dressing change: Protective dressings over bony prominences  10/3/2024 1413 by Ginny Clarke RN  Outcome: Progressing  Goal: Participates in plan/prevention/treatment measures  10/3/2024 1413 by Ginny Clarke RN  Flowsheets (Taken 10/3/2024 0640 by Jess Alvares RN)  Participates in plan/prevention/treatment measures: Elevate heels  10/3/2024 1413 by Ginny Clarke RN  Outcome: Progressing  Goal: Prevent/manage excess moisture  10/3/2024 1413 by Ginny Clarke RN  Flowsheets (Taken 10/3/2024 0640 by Jess Alvares RN)  Prevent/manage excess moisture: Moisturize dry skin  10/3/2024 1413 by Ginny Clarke RN  Outcome: Progressing  Goal: Prevent/minimize sheer/friction injuries  10/3/2024 1413 by Ginny Clarke RN  Flowsheets (Taken 10/3/2024 0640 by Jess Alvares RN)  Prevent/minimize sheer/friction injuries:   HOB 30 degrees or less   Use pull sheet  10/3/2024 1413 by Ginny Clarke RN  Outcome: Progressing  Goal: Promote/optimize nutrition  10/3/2024 1413 by Ginny Clarke RN  Flowsheets (Taken 10/3/2024 0640 by Jess Alvares RN)  Promote/optimize nutrition: Offer water/supplements/favorite foods  10/3/2024 1413 by Ginny Clarke RN  Outcome: Progressing  Goal: Promote skin healing  10/3/2024 1413 by Ginny Clarke RN  Flowsheets (Taken 10/3/2024 0640 by Jess Alvares RN)  Promote skin healing: Turn/reposition every 2 hours/use positioning/transfer devices  10/3/2024 1413 by Ginny Clarke RN  Outcome: Progressing     Problem: Fall/Injury  Goal: Not fall by end of shift  Outcome: Progressing  Goal: Be free from injury by end of the shift  Outcome: Progressing  Goal: Verbalize understanding of personal risk factors for fall  "in the hospital  Outcome: Progressing  Goal: Verbalize understanding of risk factor reduction measures to prevent injury from fall in the home  Outcome: Progressing  Goal: Use assistive devices by end of the shift  Outcome: Progressing  Goal: Pace activities to prevent fatigue by end of the shift  Outcome: Progressing     Problem: Pain  Goal: Takes deep breaths with improved pain control throughout the shift  Outcome: Progressing  Goal: Turns in bed with improved pain control throughout the shift  Outcome: Progressing  Goal: Walks with improved pain control throughout the shift  Outcome: Progressing  Goal: Performs ADL's with improved pain control throughout shift  Outcome: Progressing  Goal: Participates in PT with improved pain control throughout the shift  Outcome: Progressing  Goal: Free from opioid side effects throughout the shift  Outcome: Progressing  Goal: Free from acute confusion related to pain meds throughout the shift  Outcome: Progressing     Problem: Respiratory  Goal: Verbalize decreased shortness of breath this shift  Outcome: Progressing       The patient's goals for the shift include  \"feel better.\"    The clinical goals for the shift include Titrate o2 as tolerated        "

## 2024-10-03 NOTE — SIGNIFICANT EVENT
Initially, I accepted this patient to be admitted to my service to stepdown unit with diagnosis of pneumonia.  According to the signout, patient presented with hallucinations, altered mental status and frequent falls during the last week.  I was told that patient was tachycardic and hypoxic on admission and she required 6 L/min nasal cannula.  I requested venous blood gas performed.  When I came to see patient in the emergency department, I saw results of venous blood gas which demonstrates pH of 7.27 and CO2 level of 100 08.  Patient is confused, barely arousable during exam.  I reached for respiratory and asked them to place patient on BiPAP.  Discussed with ICU team: Due to severe CO2 retention, acute on chronic respiratory failure with respiratory acidosis and metabolic encephalopathy patient needs to go to intensive care unit for continuous BiPAP therapy.  Will change admitting service from hospitalist to medical critical care.

## 2024-10-03 NOTE — PROGRESS NOTES
Spiritual Care Visit    Clinical Encounter Type  Visited With: Patient  Routine Visit: Introduction  Continue Visiting: Yes         Values/Beliefs  Spiritual Requests During Hospitalization: Vickie received the sacraments of the anointing & Commuion today    Sacramental Encounters  Communion: Patient wants communion  Communion Given Indicator: Yes  Sacrament of Sick-Anointing: Anointed                             Taxonomy  Intended Effects: Build relationship of care and support, Demonstrate caring and concern    Uziel Ambriz

## 2024-10-03 NOTE — CARE PLAN
Transported patient to CT Scan on Bipap; then 0200 transported patient to ICU #14 on Bipap. SpO2 93%, hr 81, rr 15. Patient tolerated well.

## 2024-10-03 NOTE — PROGRESS NOTES
Physical Therapy    Physical Therapy Evaluation & Treatment    Patient Name: Vickie Foster  MRN: [id number]  Department: [facility] S ICU  Room: 14/14-A  Today's Date: 10/3/2024   Time Calculation  Start Time: 1306  Stop Time: 1344  Time Calculation (min): 38 min    Assessment/Plan   PT Assessment  PT Assessment Results: Decreased strength, Decreased endurance, Impaired balance, Decreased mobility, Decreased coordination, Impaired judgement, Pain  Rehab Prognosis: Good  Evaluation/Treatment Tolerance: Patient tolerated treatment well, Patient limited by fatigue  Medical Staff Made Aware: Yes  End of Session Communication: Bedside nurse  Assessment Comment: pt would benefit from skilled therapy services. pt is a high falls risk  End of Session Patient Position: Up in chair, Alarm on (call button in reach; RN present)  IP OR SWING BED PT PLAN  Inpatient or Swing Bed: Inpatient    PT Plan  Treatment/Interventions: Bed mobility, Transfer training, Gait training, Stair training, Balance training, Strengthening, Endurance training, Therapeutic exercise  PT Plan: Ongoing PT  PT Frequency: 6 times per week  PT Discharge Recommendations: Moderate intensity level of continued care  Equipment Recommended upon Discharge: Wheeled walker  PT Recommended Transfer Status:  (MOD A x 2)  PT - OK to Discharge: Yes      Subjective   General Visit Information:  General  Reason for Referral: pt is a 69 y/o female admitted with sepsis and acute hypoxic respiratory failure; c/o SOB, and hallucinations; impaired mobility  Referred By: IBIS Pantoja  Past Medical History Relevant to Rehab: HTN, COPD, a-fib, TANVIR, CVA, gout, dep, anxiety, LBP, COVID, cardioversion, cardiac ablation; GERD; CHF; depression; asthma;  meningioma; osteoporosis  Co-Treatment: OT  Co-Treatment Reason: Medically/physically complex ICU eval on HFNC with limited activity tolerance  Prior to Session Communication: Bedside nurse  Patient Position Received: Bed, 3  rail up, Alarm on  General Comment: pt cooperative and asked to use BSC      Home Living:  Home Living  Type of Home: House  Lives With: Spouse  Home Adaptive Equipment:  (rollator)  Home Layout: Multi-level  Home Access:  1 entry stair with no railing  Bathroom Shower/Tub: Tub/shower unit  Bathroom Toilet: Standard  Home Living Comments: pt has 10 stairs with 1 railing to upstairs bedroom and bathroom; pt also has 5 stairs with 1 railing to basement family room    Prior Level of Function:  Prior Function Per Pt/Caregiver Report  Level of [place]: Independent with ADLs and functional transfers, Independent with homemaking with ambulation  Receives Help From:  (spouse can assist if needed)  ADL Assistance: Independent  Homemaking Assistance: Needs assistance  Ambulatory Assistance: Independent (pt reported being a 'furniture walker' in home and uses rollator outdoors)  Vocational: Retired  Prior Function Comments: pts spouse provides transportation    Precautions:  Precautions  Hearing/Visual Limitations: wears glasses  Medical Precautions: Fall precautions, Oxygen therapy device and L/min  Precautions Comment: educated and instructed pt in energy conservation  techniques; + HFNC    Vital Signs:     HR: 84-99 bpm  RR: 19-27 bpm  BP: 128/63  SaO2: 93-96% wearing HFNC 80%          Objective   Pain:  Pain Assessment  Pain Assessment:  (2/10 LBP)    Cognition:  Cognition  Overall Cognitive Status: Within Functional Limits  Orientation Level: Oriented X4  Safety/Judgement:  (impaired safety awareness)  Insight: Mild    General Assessments:      Activity Tolerance  Endurance:  (FAIR activity tolerance)    Sensation  Sensation Comment: denies any numbness/tingling      Coordination  Coordination Comment: short inconsistent steps    Postural Control  Posture Comment: rounded shoulders with head down posture and mild left lateral trunk lean    Static Sitting Balance  Static Sitting-Comment/Number of Minutes:  GOOD  Dynamic Sitting Balance  Dynamic Sitting-Comments: GOOD-    Static Standing Balance  Static Standing-Comment/Number of Minutes: FAIR-  Dynamic Standing Balance  Dynamic Standing-Comments: POOR+      Functional Assessments:  Bed Mobility  Bed Mobility:  (supine to sit with MOD A x 2 for trunk up, B LE and scooting to EOB. pt given seated rest break due to mild fatigue)      Transfer:  sit <-> stand x 2 trials with MOD A x 2/Handheld A x 2. increased time and effort noted. pt reaching for furniture; FAIR eccentric control noted during stand to sit.    Ambulation/Gait Training Performed:  pt took 5-6 short inconsistent steps with MOD A x 2/Handheld A x 2 toward BSC. pt leaning forward reaching too far forward for commode; VC for safety awareness. pt unable to have a BM; pt took a 6-7 short shuffling steps back to chair with MOD A x 2; pt hunched over and reaching for chair inappropriately; pt required guidance for safe transfer into chair.  Increased fatigue/MORLEY noted. Instructed pt in pursed lip breathing       Extremity/Trunk Assessments:  RUE   RUE : Within Functional Limits  LUE   LUE: Within Functional Limits  RLE   RLE :  (WFL with grossly 3/5 strength and moderate swelling)  LLE   LLE :  (WFL with grossly 3/5 strength and moderate swelling)        Outcome Measures:  [facility] Basic Mobility  Turning from your back to your side while in a flat bed without using bedrails: A lot  Moving from lying on your back to sitting on the side of a flat bed without using bedrails: A lot  Moving to and from bed to chair (including a wheelchair): A lot  Standing up from a chair using your arms (e.g. wheelchair or bedside chair): A lot  To walk in hospital room: A lot  Climbing 3-5 steps with railing: Total  Basic Mobility - Total Score: 11    Encounter Problems       Encounter Problems (Active)       Mobility       STG - Patient will ambulate 75' x 1 using rolling walker with SBA (Progressing)       Start:  10/03/24     Expected End:  10/17/24            STG - Patient will negotiate 10 stairs using 1 railing with CGA (Progressing)       Start:  10/03/24    Expected End:  10/17/24               PT Transfers       STG - Patient to transfer to and from sit to supine MOD INDEPENDENT (Progressing)       Start:  10/03/24    Expected End:  10/17/24            STG - Patient will transfer sit to and from stand with SUPERVISION (Progressing)       Start:  10/03/24    Expected End:  10/17/24                   Education Documentation  Precautions, taught by Randy Goode, PT at 10/3/2024  2:52 PM.  Learner: Patient  Readiness: Eager  Method: Explanation  Response: Verbalizes Understanding    Mobility Training, taught by Randy Goode, PT at 10/3/2024  2:52 PM.  Learner: Patient  Readiness: Eager  Method: Explanation  Response: Verbalizes Understanding    Education Comments  No comments found.

## 2024-10-04 LAB
ALBUMIN SERPL BCP-MCNC: 3.4 G/DL (ref 3.4–5)
ALBUMIN SERPL BCP-MCNC: 3.6 G/DL (ref 3.4–5)
ANION GAP SERPL CALCULATED.3IONS-SCNC: ABNORMAL MMOL/L
ANION GAP SERPL CALCULATED.3IONS-SCNC: ABNORMAL MMOL/L
BACTERIA UR CULT: NO GROWTH
BASOPHILS # BLD AUTO: 0.02 X10*3/UL (ref 0–0.1)
BASOPHILS NFR BLD AUTO: 0.2 %
BUN SERPL-MCNC: 33 MG/DL (ref 6–23)
BUN SERPL-MCNC: 36 MG/DL (ref 6–23)
CALCIUM SERPL-MCNC: 9.5 MG/DL (ref 8.6–10.3)
CALCIUM SERPL-MCNC: 9.7 MG/DL (ref 8.6–10.3)
CHLORIDE SERPL-SCNC: 89 MMOL/L (ref 98–107)
CHLORIDE SERPL-SCNC: 90 MMOL/L (ref 98–107)
CO2 SERPL-SCNC: >45 MMOL/L (ref 21–32)
CO2 SERPL-SCNC: >45 MMOL/L (ref 21–32)
CREAT SERPL-MCNC: 0.63 MG/DL (ref 0.5–1.05)
CREAT SERPL-MCNC: 0.69 MG/DL (ref 0.5–1.05)
EGFRCR SERPLBLD CKD-EPI 2021: >90 ML/MIN/1.73M*2
EGFRCR SERPLBLD CKD-EPI 2021: >90 ML/MIN/1.73M*2
EOSINOPHIL # BLD AUTO: 0.07 X10*3/UL (ref 0–0.7)
EOSINOPHIL NFR BLD AUTO: 0.6 %
ERYTHROCYTE [DISTWIDTH] IN BLOOD BY AUTOMATED COUNT: 13.7 % (ref 11.5–14.5)
GLUCOSE BLD MANUAL STRIP-MCNC: 111 MG/DL (ref 74–99)
GLUCOSE BLD MANUAL STRIP-MCNC: 144 MG/DL (ref 74–99)
GLUCOSE BLD MANUAL STRIP-MCNC: 184 MG/DL (ref 74–99)
GLUCOSE BLD MANUAL STRIP-MCNC: 91 MG/DL (ref 74–99)
GLUCOSE SERPL-MCNC: 108 MG/DL (ref 74–99)
GLUCOSE SERPL-MCNC: 109 MG/DL (ref 74–99)
HCT VFR BLD AUTO: 36.8 % (ref 36–46)
HGB BLD-MCNC: 11.5 G/DL (ref 12–16)
IMM GRANULOCYTES # BLD AUTO: 0.04 X10*3/UL (ref 0–0.7)
IMM GRANULOCYTES NFR BLD AUTO: 0.4 % (ref 0–0.9)
LEGIONELLA AG UR QL: NEGATIVE
LYMPHOCYTES # BLD AUTO: 1.46 X10*3/UL (ref 1.2–4.8)
LYMPHOCYTES NFR BLD AUTO: 13.4 %
MAGNESIUM SERPL-MCNC: 1.8 MG/DL (ref 1.6–2.4)
MCH RBC QN AUTO: 31 PG (ref 26–34)
MCHC RBC AUTO-ENTMCNC: 31.3 G/DL (ref 32–36)
MCV RBC AUTO: 99 FL (ref 80–100)
MONOCYTES # BLD AUTO: 1.03 X10*3/UL (ref 0.1–1)
MONOCYTES NFR BLD AUTO: 9.4 %
NEUTROPHILS # BLD AUTO: 8.31 X10*3/UL (ref 1.2–7.7)
NEUTROPHILS NFR BLD AUTO: 76 %
NRBC BLD-RTO: 0 /100 WBCS (ref 0–0)
PHOSPHATE SERPL-MCNC: 2.4 MG/DL (ref 2.5–4.9)
PHOSPHATE SERPL-MCNC: 2.8 MG/DL (ref 2.5–4.9)
PLATELET # BLD AUTO: 246 X10*3/UL (ref 150–450)
POTASSIUM SERPL-SCNC: 2.7 MMOL/L (ref 3.5–5.3)
POTASSIUM SERPL-SCNC: 3.5 MMOL/L (ref 3.5–5.3)
RBC # BLD AUTO: 3.71 X10*6/UL (ref 4–5.2)
S PNEUM AG UR QL: NEGATIVE
SODIUM SERPL-SCNC: 140 MMOL/L (ref 136–145)
SODIUM SERPL-SCNC: 144 MMOL/L (ref 136–145)
WBC # BLD AUTO: 10.9 X10*3/UL (ref 4.4–11.3)

## 2024-10-04 PROCEDURE — 94799 UNLISTED PULMONARY SVC/PX: CPT

## 2024-10-04 PROCEDURE — 2500000004 HC RX 250 GENERAL PHARMACY W/ HCPCS (ALT 636 FOR OP/ED)

## 2024-10-04 PROCEDURE — 97530 THERAPEUTIC ACTIVITIES: CPT | Mod: GO

## 2024-10-04 PROCEDURE — 94660 CPAP INITIATION&MGMT: CPT

## 2024-10-04 PROCEDURE — 2500000001 HC RX 250 WO HCPCS SELF ADMINISTERED DRUGS (ALT 637 FOR MEDICARE OP)

## 2024-10-04 PROCEDURE — 80069 RENAL FUNCTION PANEL: CPT

## 2024-10-04 PROCEDURE — 99291 CRITICAL CARE FIRST HOUR: CPT

## 2024-10-04 PROCEDURE — 9420000001 HC RT PATIENT EDUCATION 5 MIN

## 2024-10-04 PROCEDURE — 94640 AIRWAY INHALATION TREATMENT: CPT

## 2024-10-04 PROCEDURE — 2500000005 HC RX 250 GENERAL PHARMACY W/O HCPCS

## 2024-10-04 PROCEDURE — 97535 SELF CARE MNGMENT TRAINING: CPT | Mod: GO

## 2024-10-04 PROCEDURE — 2060000001 HC INTERMEDIATE ICU ROOM DAILY

## 2024-10-04 PROCEDURE — 2500000002 HC RX 250 W HCPCS SELF ADMINISTERED DRUGS (ALT 637 FOR MEDICARE OP, ALT 636 FOR OP/ED)

## 2024-10-04 PROCEDURE — 82947 ASSAY GLUCOSE BLOOD QUANT: CPT

## 2024-10-04 PROCEDURE — 36415 COLL VENOUS BLD VENIPUNCTURE: CPT

## 2024-10-04 PROCEDURE — 97530 THERAPEUTIC ACTIVITIES: CPT | Mod: GP

## 2024-10-04 PROCEDURE — 84100 ASSAY OF PHOSPHORUS: CPT

## 2024-10-04 PROCEDURE — 83735 ASSAY OF MAGNESIUM: CPT

## 2024-10-04 PROCEDURE — 85025 COMPLETE CBC W/AUTO DIFF WBC: CPT

## 2024-10-04 PROCEDURE — 2500000005 HC RX 250 GENERAL PHARMACY W/O HCPCS: Performed by: HOSPITALIST

## 2024-10-04 RX ORDER — POTASSIUM CHLORIDE 1.5 G/1.58G
40 POWDER, FOR SOLUTION ORAL ONCE
Status: COMPLETED | OUTPATIENT
Start: 2024-10-04 | End: 2024-10-04

## 2024-10-04 RX ORDER — POTASSIUM CHLORIDE 14.9 MG/ML
20 INJECTION INTRAVENOUS
Status: DISCONTINUED | OUTPATIENT
Start: 2024-10-04 | End: 2024-10-04

## 2024-10-04 RX ORDER — LIDOCAINE 560 MG/1
2 PATCH PERCUTANEOUS; TOPICAL; TRANSDERMAL DAILY
Status: DISPENSED | OUTPATIENT
Start: 2024-10-04

## 2024-10-04 RX ORDER — POTASSIUM CHLORIDE 1.5 G/1.58G
20 POWDER, FOR SOLUTION ORAL ONCE
Status: COMPLETED | OUTPATIENT
Start: 2024-10-04 | End: 2024-10-04

## 2024-10-04 RX ORDER — MAGNESIUM SULFATE HEPTAHYDRATE 40 MG/ML
2 INJECTION, SOLUTION INTRAVENOUS ONCE
Status: COMPLETED | OUTPATIENT
Start: 2024-10-04 | End: 2024-10-04

## 2024-10-04 SDOH — ECONOMIC STABILITY: INCOME INSECURITY: IN THE LAST 12 MONTHS, WAS THERE A TIME WHEN YOU WERE NOT ABLE TO PAY THE MORTGAGE OR RENT ON TIME?: NO

## 2024-10-04 SDOH — HEALTH STABILITY: MENTAL HEALTH: HOW OFTEN DO YOU HAVE A DRINK CONTAINING ALCOHOL?: NEVER

## 2024-10-04 SDOH — SOCIAL STABILITY: SOCIAL NETWORK: ARE YOU MARRIED, WIDOWED, DIVORCED, SEPARATED, NEVER MARRIED, OR LIVING WITH A PARTNER?: MARRIED

## 2024-10-04 SDOH — SOCIAL STABILITY: SOCIAL INSECURITY: WITHIN THE LAST YEAR, HAVE YOU BEEN HUMILIATED OR EMOTIONALLY ABUSED IN OTHER WAYS BY YOUR PARTNER OR EX-PARTNER?: NO

## 2024-10-04 SDOH — ECONOMIC STABILITY: HOUSING INSECURITY: AT ANY TIME IN THE PAST 12 MONTHS, WERE YOU HOMELESS OR LIVING IN A SHELTER (INCLUDING NOW)?: NO

## 2024-10-04 SDOH — ECONOMIC STABILITY: FOOD INSECURITY: WITHIN THE PAST 12 MONTHS, THE FOOD YOU BOUGHT JUST DIDN'T LAST AND YOU DIDN'T HAVE MONEY TO GET MORE.: NEVER TRUE

## 2024-10-04 SDOH — ECONOMIC STABILITY: HOUSING INSECURITY: IN THE PAST 12 MONTHS, HOW MANY TIMES HAVE YOU MOVED WHERE YOU WERE LIVING?: 0

## 2024-10-04 SDOH — SOCIAL STABILITY: SOCIAL NETWORK: HOW OFTEN DO YOU ATTENT MEETINGS OF THE CLUB OR ORGANIZATION YOU BELONG TO?: NEVER

## 2024-10-04 SDOH — HEALTH STABILITY: MENTAL HEALTH: HOW OFTEN DO YOU HAVE 6 OR MORE DRINKS ON ONE OCCASION?: NEVER

## 2024-10-04 SDOH — ECONOMIC STABILITY: FOOD INSECURITY: WITHIN THE PAST 12 MONTHS, YOU WORRIED THAT YOUR FOOD WOULD RUN OUT BEFORE YOU GOT MONEY TO BUY MORE.: NEVER TRUE

## 2024-10-04 SDOH — SOCIAL STABILITY: SOCIAL INSECURITY: WITHIN THE LAST YEAR, HAVE YOU BEEN AFRAID OF YOUR PARTNER OR EX-PARTNER?: NO

## 2024-10-04 SDOH — HEALTH STABILITY: PHYSICAL HEALTH: ON AVERAGE, HOW MANY DAYS PER WEEK DO YOU ENGAGE IN MODERATE TO STRENUOUS EXERCISE (LIKE A BRISK WALK)?: 0 DAYS

## 2024-10-04 SDOH — ECONOMIC STABILITY: INCOME INSECURITY: IN THE PAST 12 MONTHS, HAS THE ELECTRIC, GAS, OIL, OR WATER COMPANY THREATENED TO SHUT OFF SERVICE IN YOUR HOME?: NO

## 2024-10-04 SDOH — HEALTH STABILITY: MENTAL HEALTH: HOW MANY STANDARD DRINKS CONTAINING ALCOHOL DO YOU HAVE ON A TYPICAL DAY?: PATIENT DOES NOT DRINK

## 2024-10-04 SDOH — SOCIAL STABILITY: SOCIAL NETWORK: HOW OFTEN DO YOU GET TOGETHER WITH FRIENDS OR RELATIVES?: MORE THAN THREE TIMES A WEEK

## 2024-10-04 SDOH — HEALTH STABILITY: PHYSICAL HEALTH: ON AVERAGE, HOW MANY MINUTES DO YOU ENGAGE IN EXERCISE AT THIS LEVEL?: 0 MIN

## 2024-10-04 SDOH — SOCIAL STABILITY: SOCIAL NETWORK: HOW OFTEN DO YOU ATTEND CHURCH OR RELIGIOUS SERVICES?: MORE THAN 4 TIMES PER YEAR

## 2024-10-04 SDOH — ECONOMIC STABILITY: INCOME INSECURITY: HOW HARD IS IT FOR YOU TO PAY FOR THE VERY BASICS LIKE FOOD, HOUSING, MEDICAL CARE, AND HEATING?: NOT HARD AT ALL

## 2024-10-04 ASSESSMENT — COGNITIVE AND FUNCTIONAL STATUS - GENERAL
MOVING TO AND FROM BED TO CHAIR: A LOT
HELP NEEDED FOR BATHING: A LOT
TOILETING: TOTAL
MOVING FROM LYING ON BACK TO SITTING ON SIDE OF FLAT BED WITH BEDRAILS: A LOT
DRESSING REGULAR UPPER BODY CLOTHING: A LITTLE
MOVING TO AND FROM BED TO CHAIR: A LOT
TOILETING: A LITTLE
DRESSING REGULAR LOWER BODY CLOTHING: TOTAL
TOILETING: TOTAL
TURNING FROM BACK TO SIDE WHILE IN FLAT BAD: A LOT
DAILY ACTIVITIY SCORE: 18
MOBILITY SCORE: 18
PERSONAL GROOMING: TOTAL
MOVING FROM LYING ON BACK TO SITTING ON SIDE OF FLAT BED WITH BEDRAILS: A LITTLE
STANDING UP FROM CHAIR USING ARMS: A LOT
STANDING UP FROM CHAIR USING ARMS: A LOT
DAILY ACTIVITIY SCORE: 14
DRESSING REGULAR LOWER BODY CLOTHING: A LITTLE
DAILY ACTIVITIY SCORE: 6
DRESSING REGULAR UPPER BODY CLOTHING: A LITTLE
PERSONAL GROOMING: A LITTLE
TURNING FROM BACK TO SIDE WHILE IN FLAT BAD: A LITTLE
EATING MEALS: A LITTLE
CLIMB 3 TO 5 STEPS WITH RAILING: TOTAL
MOBILITY SCORE: 11
MOBILITY SCORE: 11
MOVING FROM LYING ON BACK TO SITTING ON SIDE OF FLAT BED WITH BEDRAILS: A LITTLE
WALKING IN HOSPITAL ROOM: TOTAL
WALKING IN HOSPITAL ROOM: A LOT
DRESSING REGULAR LOWER BODY CLOTHING: TOTAL
HELP NEEDED FOR BATHING: A LITTLE
DRESSING REGULAR UPPER BODY CLOTHING: TOTAL
HELP NEEDED FOR BATHING: TOTAL
PERSONAL GROOMING: A LITTLE
CLIMB 3 TO 5 STEPS WITH RAILING: TOTAL
CLIMB 3 TO 5 STEPS WITH RAILING: A LITTLE
STANDING UP FROM CHAIR USING ARMS: A LITTLE
MOVING TO AND FROM BED TO CHAIR: A LITTLE
EATING MEALS: TOTAL
WALKING IN HOSPITAL ROOM: A LITTLE
TURNING FROM BACK TO SIDE WHILE IN FLAT BAD: A LOT

## 2024-10-04 ASSESSMENT — PAIN SCALES - GENERAL
PAINLEVEL_OUTOF10: 5 - MODERATE PAIN
PAINLEVEL_OUTOF10: 0 - NO PAIN
PAINLEVEL_OUTOF10: 0 - NO PAIN
PAINLEVEL_OUTOF10: 5 - MODERATE PAIN
PAINLEVEL_OUTOF10: 0 - NO PAIN
PAINLEVEL_OUTOF10: 0 - NO PAIN
PAINLEVEL_OUTOF10: 3
PAINLEVEL_OUTOF10: 3
PAINLEVEL_OUTOF10: 5 - MODERATE PAIN

## 2024-10-04 ASSESSMENT — PAIN - FUNCTIONAL ASSESSMENT
PAIN_FUNCTIONAL_ASSESSMENT: 0-10
PAIN_FUNCTIONAL_ASSESSMENT: FLACC (FACE, LEGS, ACTIVITY, CRY, CONSOLABILITY)
PAIN_FUNCTIONAL_ASSESSMENT: 0-10
PAIN_FUNCTIONAL_ASSESSMENT: 0-10

## 2024-10-04 ASSESSMENT — ACTIVITIES OF DAILY LIVING (ADL)
HOME_MANAGEMENT_TIME_ENTRY: 15
LACK_OF_TRANSPORTATION: NO

## 2024-10-04 ASSESSMENT — PAIN DESCRIPTION - DESCRIPTORS
DESCRIPTORS: DISCOMFORT;ACHING

## 2024-10-04 ASSESSMENT — PAIN SCALES - WONG BAKER: WONGBAKER_NUMERICALRESPONSE: NO HURT

## 2024-10-04 ASSESSMENT — LIFESTYLE VARIABLES
SKIP TO QUESTIONS 9-10: 1
AUDIT-C TOTAL SCORE: 0

## 2024-10-04 NOTE — PROGRESS NOTES
"[facility] Critical Care Medicine       Date:  10/4/2024  Patient:  Vickie Foster  YOB: 1955  MRN:  [id number]   Admit Date:  10/2/2024    Chief Complaint   Patient presents with    Hallucinations     67% in triage on room air, patient states supposed to be on 3L.  Applied 6L NC in triage.   states patient has been hallucinating today and over the past month had about 9 falls with 2 today.         History of Present Illness:  Vickie Foster is a 68 y.o. year old female patient with Past Medical History of  HTN, HLD, HFpEF, COPD, TANVIR, paroxysmal atrial fibrillation s/p tikosyn/cardioversion/ablation, stroke, GERD, pre-diabetes, chronic gout, depression, anxiety, and low back pain that presented to the ED with hallucinations.  Pt reported to the ED that she has been \"seeing things and seeing family that are not there\" for about the last week.  Pt has also had multiple falls for the last week, states her legs have felt weak, and reported 2 falls earlier in the day to the ED provider.  Pt believes that she did hit her head earlier in the day.  Pt had stated that she has not been sleeping for the last week and contributes her hallucinations to this, but there is also concern as she has recently been taking muscle relaxers for back pain.  Pt has also been experiencing increased shortness of breath.  She usually wear 3L NC at night but has had to wear oxygen during the day with increased O2 at night.  Pt elicits some mild dysuria over the last few days.  Pt denies fever, chills, cough, chest pain, abdominal pain, nausea, vomiting, and diarrhea.       Pt vitals on arrival to the ED include temp 36.6, HR 98, resp 22, /107, and SpO2 98% on 3L NC.  CMP significant for glucose 153, potassium 2.7, chloride 87, bicarb 45, BUN 35, and AST 44.  BNP elevated 179.  Troponin elevated at 18 with repeat 20.  Lactate within normal limits 0.9.  CBC with WBC 14.3 and H/H 13.2/41.7.  Covid-19, Flu A/B " negative.  CXR with bilateral patchy airspace opacities.  CT head with no evidence of acute cortical infarct or intracranial hemorrhage. Alcohol level <10.  Urine toxicology screen negative.  Pt was admitted to hospital medicine service for treatment of pneumonia.       Pt had worsening mentation and a VBG was ordered.  VBG with pH 7.27, pCO2 108, pO2 50, HCO3 49.6.  Pt was placed on continuous BiPAP and pt care was transferred to the ICU team.         Interval ICU Events:  10/3: Deferred to ICU overnight for worsening hypoxic/hypercapnic respiratory failure.  Placed on BiPAP with added correction of pCO2 and then weaned off to HFNC 40 L, 100% FiO2.  This morning patient is awake, alert, oriented x 3.  HFNC weaned to 40 L, 80% FiO2.  On antibiotics with ceftriaxone and azithromycin.  CT chest with signs of volume overload and pulmonary hypertension.  Resume home torsemide for diuresis.    10/4: No acute events overnight. Tolerated bipap all night. Now on HFNC 35L/50%. Mentation at baseline. Diuresing well. Ok to downgrade to stepdown.     Objective     Medical History:  Past Medical History:   Diagnosis Date    A-fib (Multi)     CHF (congestive heart failure) (Multi)     COVID     Heart disease     HTN (hypertension)     Low back pain      Past Surgical History:   Procedure Laterality Date    ABLATION OF DYSRHYTHMIC FOCUS      CARDIAC ELECTROPHYSIOLOGY PROCEDURE N/A 05/01/2024    Procedure: Cardioversion;  Surgeon: Amado Ty MD;  Location: [facility] Cardiac Cath Lab;  Service: Electrophysiology;  Laterality: N/A;  13060  i48.0  medical mutual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    CARDIAC ELECTROPHYSIOLOGY PROCEDURE N/A 07/08/2024    Procedure: Ablation A-Fib;  Surgeon: Amado Ty MD;  Location: [facility] Cardiac Cath Lab;  Service: Electrophysiology;  Laterality: N/A;  AFIB CRYOABLATION; CPT 73438; ICD I48.0   Medicare mutual of [place] medicare  ID - [id number]  [phone]  No Auth required  Call ref # [id number]    CARDIAC  ELECTROPHYSIOLOGY PROCEDURE N/A 9/18/2024    Procedure: Cardioversion;  Surgeon: Amado Ty MD;  Location: [facility] Cardiac Cath Lab;  Service: Electrophysiology;  Laterality: N/A;  CARDIOVERSION CPT 41137, ICD I48.0 no auth required/REF # [id number]    CARDIOVERSION      05/01/2024     Medications Prior to Admission   Medication Sig Dispense Refill Last Dose    allopurinol 200 mg tablet Take 200 mg by mouth 2 times a day. 180 tablet 0 10/2/2024    ascorbic acid (Vitamin C With Genevieve Hips) 1,000 mg tablet Take 1 tablet (1,000 mg) by mouth once daily.   10/2/2024    atorvastatin (Lipitor) 40 mg tablet Take 1 tablet (40 mg) by mouth once daily at bedtime. 90 tablet 0 10/2/2024    budesonide-formoteroL (Symbicort) 80-4.5 mcg/actuation inhaler Inhale 2 puffs 2 times a day. Rinse mouth with water after use to reduce aftertaste and incidence of candidiasis. Do not swallow.   10/2/2024    buPROPion XL (Wellbutrin XL) 300 mg 24 hr tablet Take 1 tablet (300 mg) by mouth once daily in the morning. Take before meals. 90 tablet 1 10/2/2024    calcium carb-vitamin D3-vit K2 600 mg-1,000 unit-90 mcg tablet Take by mouth once daily.   10/2/2024    dofetilide (Tikosyn) 250 mcg capsule Take 1 capsule (250 mcg) by mouth every 12 hours. 180 capsule 1 10/2/2024    Eliquis 5 mg tablet Take 1 tablet (5 mg) by mouth 2 times a day. 180 tablet 1 10/2/2024    folic acid/multivit-min/lutein (CENTRUM SILVER ORAL) Take by mouth. As directed   10/2/2024    gabapentin (Neurontin) 300 mg capsule Take 1 capsule (300 mg) by mouth 3 times a day. 270 capsule 1 10/2/2024    Klor-Con M20 20 mEq ER tablet TAKE 1 TABLET THREE TIMES A DAY WITH FOOD 270 tablet 3 10/2/2024    metoprolol succinate XL (Toprol-XL) 25 mg 24 hr tablet TAKE 1 TABLET BY MOUTH ONE TIME DAILY 90 tablet 3 10/2/2024    oxygen (O2) gas therapy Inhale 3 L/min once daily at bedtime.   10/2/2024    cyclobenzaprine (Flexeril) 5 mg tablet Take 1-2 tablets (5-10 mg) by mouth 2 times a  day as needed for muscle spasms. 60 tablet 1 Unknown    metOLazone (Zaroxolyn) 2.5 mg tablet Take 1 tablet (2.5 mg) by mouth if needed (when legs swell). prn 30 tablet 0     semaglutide 2 mg/dose (8 mg/3 mL) pen injector Inject 2.3 mg under the skin 1 (one) time per week.   Unknown    tiZANidine (Zanaflex) 2 mg tablet Take 1 tablet (2 mg) by mouth every 12 hours if needed for muscle spasms for up to 5 days. 10 tablet 0     torsemide (Demadex) 100 mg tablet Take 1/2 a tablet (50 mg) by mouth 2 times a day. 30 tablet 0      Patient has no known allergies.  Social History     Tobacco Use    Smoking status: Former     Current packs/day: 0.00     Types: Cigarettes     Quit date:      Years since quittin.    Smokeless tobacco: Never   Vaping Use    Vaping status: Never Used   Substance Use Topics    Alcohol use: Not Currently     Comment: monthly or less    Drug use: Never     Family History   Problem Relation Name Age of Onset    Breast cancer Mother      Heart attack Mother           in early 80s of MI    Heart disease Mother      Colon cancer Father      Breast cancer Sister      Diabetes type II Sister      Kidney failure Sister      Other (herione addict) Sister      No Known Problems Brother      No Known Problems Daughter      Other (recovering alcoholic) Son      Breast cancer Other Maternal Aunt        Hospital Medications:           Current Facility-Administered Medications:     allopurinol (Zyloprim) tablet 200 mg, 200 mg, oral, BID, Reynaldo E Hipps, PA-C, 200 mg at 10/04/24 0841    apixaban (Eliquis) tablet 5 mg, 5 mg, oral, BID, Reynaldo E Hipps, PA-C, 5 mg at 10/04/24 0841    atorvastatin (Lipitor) tablet 40 mg, 40 mg, oral, Nightly, Reynaldo E Hipps, PA-C, 40 mg at 10/03/24 2021    buPROPion XL (Wellbutrin XL) 24 hr tablet 300 mg, 300 mg, oral, Daily before breakfast, Reynaldo E Hipps, PA-C, 300 mg at 10/04/24 0840    [Held by provider] cyclobenzaprine (Flexeril) tablet 5 mg, 5 mg, oral, BID PRN, Reynaldo E  IKE HahnC    cyclobenzaprine (Flexeril) tablet 5 mg, 5 mg, oral, Once, Reynaldo Hahn PA-C    dextrose 50 % injection 12.5 g, 12.5 g, intravenous, q15 min PRN, Reynaldo Hahn PA-C    dextrose 50 % injection 25 g, 25 g, intravenous, q15 min PRN, ANJUM Sharp-C    docusate sodium (Colace) capsule 100 mg, 100 mg, oral, TID, KEVON Pantoja-CNP, 100 mg at 10/04/24 0841    dofetilide (Tikosyn) capsule 250 mcg, 250 mcg, oral, q12h, ANJUM Sharp-KODI, 250 mcg at 10/04/24 0849    fluticasone furoate-vilanteroL (Breo Ellipta) 200-25 mcg/dose inhaler 1 puff, 1 puff, inhalation, Daily, Reynaldo Hahn PA-C, 1 puff at 10/04/24 0718    gabapentin (Neurontin) capsule 300 mg, 300 mg, oral, TID, KEVON Pantoja-CNP, 300 mg at 10/04/24 0841    glucagon (Glucagen) injection 1 mg, 1 mg, intramuscular, q15 min PRN, Reynaldo Hahn PA-C    glucagon (Glucagen) injection 1 mg, 1 mg, intramuscular, q15 min PRN, ANJUM Sharp-C    insulin lispro (HumaLOG) injection 0-10 Units, 0-10 Units, subcutaneous, Before meals & nightly, KEVON Pantoja-CNP, 2 Units at 10/03/24 2022    ipratropium-albuteroL (Duo-Neb) 0.5-2.5 mg/3 mL nebulizer solution 3 mL, 3 mL, nebulization, q6h PRN, ANJUM Sharp-C    ipratropium-albuteroL (Duo-Neb) 0.5-2.5 mg/3 mL nebulizer solution 3 mL, 3 mL, nebulization, TID, IBIS Pantoja, 3 mL at 10/04/24 0723    magnesium sulfate 2 g in sterile water for injection 50 mL, 2 g, intravenous, Once, Reynaldo Hahn PA-C, Last Rate: 25 mL/hr at 10/04/24 0840, 2 g at 10/04/24 0840    [Held by provider] metOLazone (Zaroxolyn) tablet 2.5 mg, 2.5 mg, oral, PRN, Reynaldo Hahn PA-C    metoprolol tartrate (Lopressor) tablet 12.5 mg, 12.5 mg, oral, BID, IBIS Pantoja, 12.5 mg at 10/04/24 0841    oxygen (O2) therapy, , inhalation, Continuous - Inhalation, IBIS Pantoja, 40 L/min at 10/04/24 0723    oxygen (O2) therapy, , inhalation, Continuous - Inhalation, Wesley GALAVIZ  Robbin, APRN-CNP, 40 L/min at 10/04/24 0718    polyethylene glycol (Glycolax, Miralax) packet 17 g, 17 g, oral, Daily, Wesley Siegel APRN-CNP, 17 g at 10/04/24 0930    potassium chloride (Klor-Con) packet 20 mEq, 20 mEq, oral, BID, Reynaldo E Hipps, PA-C, 20 mEq at 10/04/24 0840    potassium chloride (Klor-Con) packet 40 mEq, 40 mEq, oral, Once, Reynaldo E Hipps, PA-C    potassium chloride 20 mEq in sterile water for injection 100 mL, 20 mEq, intravenous, q2h, Reynaldo E Hipps, PA-C, Stopped at 10/04/24 0855    torsemide (Demadex) tablet 50 mg, 50 mg, oral, BID, KEVON Pantoja-CNP, 50 mg at 10/04/24 0840    Review of Systems:  14 point review of systems was completed and negative except for those specially mention in my HPI    Physical Exam:    Heart Rate:  []   Temp:  [36.5 °C (97.7 °F)-36.8 °C (98.2 °F)]   Resp:  [12-31]   BP: ()/()   Weight:  [111 kg (244 lb 0.8 oz)]   SpO2:  [89 %-100 %]     Physical Exam  Constitutional:       General: She is not in acute distress.     Appearance: She is ill-appearing. She is not diaphoretic.   HENT:      Head: Normocephalic and atraumatic.      Mouth/Throat:      Mouth: Mucous membranes are moist.   Eyes:      Extraocular Movements: Extraocular movements intact.      Pupils: Pupils are equal, round, and reactive to light.   Cardiovascular:      Rate and Rhythm: Normal rate. Rhythm irregular.   Pulmonary:      Effort: Pulmonary effort is normal. No respiratory distress.      Breath sounds: No wheezing.   Abdominal:      Palpations: Abdomen is soft.      Tenderness: There is no abdominal tenderness.   Genitourinary:     Comments: Cuellar catheter to gravity drainage   Musculoskeletal:      Right lower leg: Edema present.      Left lower leg: Edema present.   Skin:     General: Skin is warm and dry.   Neurological:      Mental Status: She is alert and oriented to person, place, and time. Mental status is at baseline.   Psychiatric:         Attention and  Perception: She does not perceive auditory or visual hallucinations.         Objective:    I have reviewed all medications, laboratory results, and imaging pertinent for today's encounter.    FiO2 (%):  [50 %-80 %] 60 %      Intake/Output Summary (Last 24 hours) at 10/4/2024 1001  Last data filed at 10/4/2024 0600  Gross per 24 hour   Intake 980 ml   Output 2675 ml   Net -1695 ml            Assessment/Plan:    I am currently managing this critically ill patient for the following problems:    Neuro/Psych/Pain Ctrl/Sedation:   Hx Chronic Pain, Anxiety, Depression, Embolic Stroke  - Pain Control: tylenol, home flexeril PRN   - Continue home Wellbutrin, gabapentin   - CAM ICU qshift, sleep-wake hygiene, delirium precautions     Respiratory/ENT:  Acute on chronic hypoxic/hypercapneic respiratory failure - likely underlying OHS and now with CHF exacerbation  #Hx of COPD and TANVIR   - Supplemental O2: HFNC 35L/50%, bipap nightly   - Without significant wheezing, will defer steroids at this time  - Continue home Symbicort inhaler  - Duonebs PRN   - Wean as tolerated to maintain SpO2 >92%  - Continuous pulse ox monitoring   - Pulm hygiene    Cardiovascular:   HFpEF Exacerbation  Hx HTN, HLD, pAfib s/p DCCV and Ablation  - Echo 7/2024 with LVEF 60 to 65%, LA dilation, normal RV function  - CT chest with enlarged PA and signs of volume overload  - Continue statin  - Continue Tikosyn, Metoprolol, Eliquis  - Continue home Torsemide 50mg BID, goal net -1 to -2 L  - Maintain MAP > 60    GI:  Hx of GERD  -Diet: Cardiac, Diabetic    Renal/Volume Status/Electrolytes:  No acute issues, renal function stable  - Continue home torsemide 50mg BID, goal net -1 to -2 L  - Daily RFP and electrolyte repletion    Endocrine:  Hx of Prediabetes   - Hold home semaglutide   - SSI ACHS    Infectious Disease:  Concern for CAP - resolved   - Antibiotics: rocephin and azithromycin discontinued as infectious concern low   - Blood cultures: no growth to  date   - Urine culture: no growth   - Urine antigens in process   - MRSA PCR negative   - Monitor SIRS criteria    Heme/Onc:  No active concerns   - Monitor for s/sx of anemia such as bleeding and bruising   - Transfuse if Hgb <7.0   - Daily CBC    MSK:  Chronic gout   Mechanical falls   - PT/OT eval  -- recommended moderate intensity rehab on dc     Derm:  - ICU skin protocol  - Q2hr turns  - Padded pressure points     Ethics/Code Status:  Full code    :  DVT Prophylaxis: one eliquis  GI Prophylaxis: none  Bowel Regimen: PRN  Diet: cardiac/diabetic  CVC: none  Adela: none  Cuellar: yes 10/2  Restraints: none  Disposition: stepdown - likely will need SNF upon discharge due to weakness and recent falls, may benefit from home bipap, also needs new scale for her daily weights as hers broke recently     Critical Care Time:  36 minutes spent in preparing to see patient (I.e. labs, imaging, etc.), documentation, discussion plan of care with patient/family/caregiver, and/or coordination of care with multidisciplinary team including the attending. Time does not include completion of procedure time.     Plan discussed with Dr. Suero.     IKE AlexanderC  Pulmonary & Critical Care Medicine   [facility]

## 2024-10-04 NOTE — PROGRESS NOTES
10/04/24 1441   Physical Activity   On average, how many days per week do you engage in moderate to strenuous exercise (like a brisk walk)? 0 days   On average, how many minutes do you engage in exercise at this level? 0 min   Financial Resource Strain   How hard is it for you to pay for the very basics like food, housing, medical care, and heating? Not hard   Housing Stability   In the last 12 months, was there a time when you were not able to pay the mortgage or rent on time? N   In the past 12 months, how many times have you moved where you were living? 0   At any time in the past 12 months, were you homeless or living in a shelter (including now)? N   Transportation Needs   In the past 12 months, has lack of transportation kept you from medical appointments or from getting medications? no   In the past 12 months, has lack of transportation kept you from meetings, work, or from getting things needed for daily living? No   Food Insecurity   Within the past 12 months, you worried that your food would run out before you got the money to buy more. Never true   Within the past 12 months, the food you bought just didn't last and you didn't have money to get more. Never true   Stress   Do you feel stress - tense, restless, nervous, or anxious, or unable to sleep at night because your mind is troubled all the time - these days? Not at all   Social Connections   In a typical week, how many times do you talk on the phone with family, friends, or neighbors? More than 3   How often do you get together with friends or relatives? More than 3   How often do you attend Baptism or Jewish services? More than 4   Do you belong to any clubs or organizations such as Baptism groups, unions, fraternal or athletic groups, or school groups? No   How often do you attend meetings of the clubs or organizations you belong to? Never   Are you , , , , never , or living with a partner?     Intimate Partner Violence   Within the last year, have you been afraid of your partner or ex-partner? No   Within the last year, have you been humiliated or emotionally abused in other ways by your partner or ex-partner? No   Within the last year, have you been kicked, hit, slapped, or otherwise physically hurt by your partner or ex-partner? No   Within the last year, have you been raped or forced to have any kind of sexual activity by your partner or ex-partner? No   Alcohol Use   Q1: How often do you have a drink containing alcohol? Never   Q2: How many drinks containing alcohol do you have on a typical day when you are drinking? None   Q3: How often do you have six or more drinks on one occasion? Never   Utilities   In the past 12 months has the electric, gas, oil, or water company threatened to shut off services in your home? No   Health Literacy   How often do you need to have someone help you when you read instructions, pamphlets, or other written material from your doctor or pharmacy? Never

## 2024-10-04 NOTE — CARE PLAN
The patient's goals for the shift include pain control      The clinical goals for the shift include Titrate O2 as tolerated, increaes activity, pain control      Problem: Skin  Goal: Decreased wound size/increased tissue granulation at next dressing change  Outcome: Progressing  Flowsheets (Taken 10/3/2024 2329)  Decreased wound size/increased tissue granulation at next dressing change:   Protective dressings over bony prominences   Utilize specialty bed per algorithm  Goal: Participates in plan/prevention/treatment measures  Outcome: Progressing  Flowsheets (Taken 10/3/2024 2329)  Participates in plan/prevention/treatment measures:   Discuss with provider PT/OT consult   Increase activity/out of bed for meals   Elevate heels  Goal: Prevent/manage excess moisture  Outcome: Progressing  Flowsheets (Taken 10/3/2024 2329)  Prevent/manage excess moisture:   Cleanse incontinence/protect with barrier cream   Moisturize dry skin  Goal: Prevent/minimize sheer/friction injuries  Outcome: Progressing  Flowsheets (Taken 10/3/2024 2329)  Prevent/minimize sheer/friction injuries:   HOB 30 degrees or less   Increase activity/out of bed for meals   Turn/reposition every 2 hours/use positioning/transfer devices   Use pull sheet  Goal: Promote/optimize nutrition  Outcome: Progressing  Flowsheets (Taken 10/3/2024 2329)  Promote/optimize nutrition:   Consume > 50% meals/supplements   Monitor/record intake including meals   Offer water/supplements/favorite foods  Goal: Promote skin healing  Outcome: Progressing  Flowsheets (Taken 10/3/2024 2329)  Promote skin healing: Turn/reposition every 2 hours/use positioning/transfer devices     Problem: Fall/Injury  Goal: Not fall by end of shift  Outcome: Progressing  Goal: Be free from injury by end of the shift  Outcome: Progressing  Goal: Verbalize understanding of personal risk factors for fall in the hospital  Outcome: Progressing  Goal: Verbalize understanding of risk factor reduction  measures to prevent injury from fall in the home  Outcome: Progressing  Goal: Use assistive devices by end of the shift  Outcome: Progressing  Goal: Pace activities to prevent fatigue by end of the shift  Outcome: Progressing     Problem: Pain  Goal: Takes deep breaths with improved pain control throughout the shift  Outcome: Progressing  Goal: Turns in bed with improved pain control throughout the shift  Outcome: Progressing  Goal: Walks with improved pain control throughout the shift  Outcome: Progressing  Goal: Performs ADL's with improved pain control throughout shift  Outcome: Progressing  Goal: Participates in PT with improved pain control throughout the shift  Outcome: Progressing  Goal: Free from opioid side effects throughout the shift  Outcome: Progressing  Goal: Free from acute confusion related to pain meds throughout the shift  Outcome: Progressing     Problem: Respiratory  Goal: Verbalize decreased shortness of breath this shift  Outcome: Progressing     Problem: Pain - Adult  Goal: Verbalizes/displays adequate comfort level or baseline comfort level  Outcome: Progressing     Problem: Safety - Adult  Goal: Free from fall injury  Outcome: Progressing     Problem: Discharge Planning  Goal: Discharge to home or other facility with appropriate resources  Outcome: Progressing     Problem: Chronic Conditions and Co-morbidities  Goal: Patient's chronic conditions and co-morbidity symptoms are monitored and maintained or improved  Outcome: Progressing

## 2024-10-04 NOTE — TELEPHONE ENCOUNTER
Thank you so much for giving her that advice.  I see that she was admitted to the hospital with sepsis and pneumonia.  Hope she gets better soon.  Lets book her out for a hospital visit given that we have slim openings.  Maybe pick something for a week and a half from now?

## 2024-10-04 NOTE — PROGRESS NOTES
Physical Therapy    Physical Therapy Treatment    Patient Name: Vickie Foster  MRN: [id number]  Department: [address] ICU  Room: 14/14-A  Today's Date: 10/4/2024  Time Calculation  Start Time: 0855  Stop Time: 0930  Time Calculation (min): 35 min         Assessment/Plan   PT Assessment  PT Assessment Results: Decreased strength, Decreased endurance, Impaired balance, Decreased mobility, Decreased coordination, Impaired judgement, Pain  Rehab Prognosis: Good  Barriers to Discharge: medical status  Evaluation/Treatment Tolerance: Patient tolerated treatment well, Patient limited by fatigue, Patient limited by pain  Medical Staff Made Aware: Yes  Strengths: Ability to acquire knowledge  Barriers to Participation: Comorbidities  End of Session Communication: Bedside nurse  Assessment Comment: The patient requires modAx2 for transfers and bedside ambulation; pt would continue to benefit from skilled therapy services to address functional deficits. Pt is appropriate for moderate intensity rehab services on DC.  End of Session Patient Position: Up in room, Alarm off, not on at start of session (nsg aware)  PT Plan  Inpatient/Swing Bed or Outpatient: Inpatient  PT Plan  Treatment/Interventions: Bed mobility, Transfer training, Gait training, Stair training, Balance training, Strengthening, Endurance training, Therapeutic exercise  PT Plan: Ongoing PT  PT Frequency: 6 times per week  PT Discharge Recommendations: Moderate intensity level of continued care  Equipment Recommended upon Discharge: Wheeled walker  PT Recommended Transfer Status:  (MOD A x 2)  PT - OK to Discharge: Yes      General Visit Information:   PT  Visit  PT Received On: 10/04/24  Response to Previous Treatment: Patient with no complaints from previous session.  General  Co-Treatment: OT  Co-Treatment Reason: Medically/physically complex ICU eval on HFNC with limited activity tolerance  Prior to Session Communication: Bedside nurse  Patient Position  Received: Up in chair, Alarm off, not on at start of session  Preferred Learning Style: verbal, visual  General Comment: The patient was cleared for therapy follow-up by nsg. The patient presented seated in bedside chair and agreeable to session.    Subjective   Precautions:  Precautions  Hearing/Visual Limitations: wears glasses  Medical Precautions: Fall precautions, Oxygen therapy device and L/min (HFNC; 60% FiO2)  Precautions Comment: educated and instructed pt in energy conservation  techniques; + HFNC    Vital Signs (Past 2hrs)        Date/Time Vitals Session Patient Position Pulse Resp SpO2 BP MAP (mmHg)    10/04/24 0855 During PT  --  84  --  94 %  --  --                   Vital Signs Comment: HR to 130bpm during BM attempt; SpO2 decreased to 86% with minimal mobility     Objective   Pain:  Pain Assessment  Pain Assessment: 0-10  0-10 (Numeric) Pain Score: 3  Pain Type: Chronic pain  Pain Location: Back  Pain Interventions: Repositioned, Ambulation/increased activity  Response to Interventions: nsg aware  Cognition:  Cognition  Overall Cognitive Status: Within Functional Limits  Orientation Level: Oriented X4  Cognition Comments: anxious behavior  Insight: Mild  Coordination:  Coordination Comment: short inconsistent steps  Postural Control:  Postural Control  Posture Comment: rounded shoulders with head down posture and mild left lateral trunk lean    Activity Tolerance:  Activity Tolerance  Endurance: Decreased tolerance for upright activites  Activity Tolerance Comments: limited activity tolerance  Rate of Perceived Exertion (RPE): 6/10  Treatments:  Therapeutic Exercise  Therapeutic Exercise Performed: Yes  Therapeutic Exercise Activity 1: AP x 15 reps rakel  Therapeutic Exercise Activity 2: LAQ x 10 reps rakel    Therapeutic Activity  Therapeutic Activity Performed: Yes  Therapeutic Activity 1: VCs for pursed lip breathing throughout mobility; HR increasing with attempted BM; SpO2 to 86% with recovery to  92%  Therapeutic Activity 2: Pt attempting to have BM on BSC; hygiene provided    Ambulation/Gait Training  Ambulation/Gait Training Performed: Yes  Ambulation/Gait Training 1  Surface 1: Level tile  Device 1: No device  Assistance 1: Hand held assistance, Moderate assistance (x2)  Quality of Gait 1: Narrow base of support, Shuffling gait  Comments/Distance (ft) 1: 5ftx2 with HHA and modAx2; shuffled-like gait with very forward flexed posture  Transfers  Transfer: Yes  Transfer 1  Transfer From 1: Sit to, Stand to  Transfer to 1: Stand, Sit  Technique 1: Sit to stand, Stand to sit  Transfer Level of Assistance 1: Arm in arm assistance, Moderate assistance, +2  Trials/Comments 1: Transfers completed x 4 with modAx2; increased time and effort for mobility.    Outcome Measures:  [facility] Basic Mobility  Turning from your back to your side while in a flat bed without using bedrails: A lot  Moving from lying on your back to sitting on the side of a flat bed without using bedrails: A lot  Moving to and from bed to chair (including a wheelchair): A lot  Standing up from a chair using your arms (e.g. wheelchair or bedside chair): A lot  To walk in hospital room: A lot  Climbing 3-5 steps with railing: Total  Basic Mobility - Total Score: 11    FSS-ICU  Ambulation: Walks <50 feet with any assistance x1 or walks any distance with assistance x2 people  Rolling: Maximal assistance (performs 25% - 49% of task)  Sitting: Moderate assistance (performs 50 - 74% of task)  Transfer Sit-to-Stand: Maximal assistance (performs 25% - 49% of task)  Transfer Supine-to-Sit: Maximal assistance (performs 25% - 49% of task)  Total Score: 10    Education Documentation  Precautions, taught by Chrissy Granados PT at 10/4/2024 10:20 AM.  Learner: Patient  Readiness: Acceptance  Method: Explanation  Response: Verbalizes Understanding  Comment: VCs for safety awareness throughout mobility    Mobility Training, taught by Chrissy Granados PT at 10/4/2024  10:20 AM.  Learner: Patient  Readiness: Acceptance  Method: Explanation  Response: Verbalizes Understanding  Comment: VCs for safety awareness throughout mobility    Education Comments  No comments found.      Encounter Problems       Encounter Problems (Active)       Mobility       STG - Patient will ambulate 75' x 1 using rolling walker with SBA (Progressing)       Start:  10/03/24    Expected End:  10/17/24            STG - Patient will negotiate 10 stairs using 1 railing with CGA (Not Progressing)       Start:  10/03/24    Expected End:  10/17/24               PT Transfers       STG - Patient to transfer to and from sit to supine MOD INDEPENDENT (Progressing)       Start:  10/03/24    Expected End:  10/17/24            STG - Patient will transfer sit to and from stand with SUPERVISION (Progressing)       Start:  10/03/24    Expected End:  10/17/24               Pain - Adult

## 2024-10-04 NOTE — PROGRESS NOTES
TCC met with patient. Assessment complete. Patient lives with spouse and their son and his girlfriend. They reside in a house. Patient is independent. Patient reports 9-11 falls in the past week and a half. Patient uses a walker. Patient wears 3 liters of oxygen at bed, Rita is the provider. Patient treated for depression and anxiety. Patient follows Dr. Danilo Gonzalez. Patient fills prescriptions at [facility] [place]. Patient is agreeable to SNF. TCC provided patient with a list and she will review and have choices on Monday. Precert will be needed. Will follow.      10/04/24 5696   Discharge Planning   Living Arrangements Spouse/significant other;Children   Support Systems Spouse/significant other;Children   Type of Residence Private residence   Home or Post Acute Services Post acute facilities (Rehab/SNF/etc)   Type of Post Acute Facility Services Rehab   Expected Discharge Disposition SNF  (TBD)   Does the patient need discharge transport arranged? Yes   RoundTrip coordination needed? Yes   Financial Resource Strain   How hard is it for you to pay for the very basics like food, housing, medical care, and heating? Not hard   Housing Stability   In the last 12 months, was there a time when you were not able to pay the mortgage or rent on time? N   In the past 12 months, how many times have you moved where you were living? 0   At any time in the past 12 months, were you homeless or living in a shelter (including now)? N   Transportation Needs   In the past 12 months, has lack of transportation kept you from medical appointments or from getting medications? no   In the past 12 months, has lack of transportation kept you from meetings, work, or from getting things needed for daily living? No   Patient Choice   Provider Choice list and CMS website (https://medicare.gov/care-compare#search) for post-acute Quality and Resource Measure Data were provided and reviewed with: Patient   Patient / Family  choosing to utilize agency / facility established prior to hospitalization No

## 2024-10-04 NOTE — PROGRESS NOTES
Occupational Therapy    OT Treatment    Patient Name: Vickie Foster  MRN: [id number]  Department: [address] ICU  Room: 14/14-A  Today's Date: 10/4/2024  Time Calculation  Start Time: 0856  Stop Time: 0930  Time Calculation (min): 34 min        Assessment:  OT Assessment: Pt is now on less oxygen and demonstrating minimal functional progress towards her established goals with improved activity tolerance. Continue POC.  Prognosis: Fair  Barriers to Discharge: Decreased caregiver support  Evaluation/Treatment Tolerance: Patient tolerated treatment well  End of Session Communication: Bedside nurse  End of Session Patient Position: Up in chair, Alarm off, not on at start of session (Needs in reach and nurse aware.)  OT Assessment Results: Decreased ADL status, Decreased upper extremity strength, Decreased safe judgment during ADL, Decreased cognition, Decreased endurance, Decreased functional mobility, Decreased trunk control for functional activities    Plan:  Treatment Interventions: ADL retraining, Functional transfer training, UE strengthening/ROM, Endurance training, Patient/family training, Equipment evaluation/education, Neuromuscular reeducation, Compensatory technique education  OT Frequency: 5 times per week  OT Discharge Recommendations: Moderate intensity level of continued care  Equipment Recommended upon Discharge: Wheeled walker  OT Recommended Transfer Status: Assist of 2  OT - OK to Discharge: Yes      Subjective     Previous Visit Info:  OT Last Visit  OT Received On: 10/04/24    General:  General  Reason for Referral: weakness, impaired ADL's/mobility, falls  Referred By: IBIS Pantoja  Past Medical History Relevant to Rehab: HTN, COPD, a-fib, TANVIR, CVA, gout, dep, anxiety, LBP, COVID, cardioversion, cardiac ablation  Family/Caregiver Present: No  Co-Treatment: PT  Co-Treatment Reason: Medically/physically complex ICU treatment on HFNC with limited activity tolerance  Prior to Session  Communication: Bedside nurse  Patient Position Received: Up in chair, Alarm off, not on at start of session  General Comment: The patient was cleared for therapy follow-up by nsg. The patient presented seated in bedside chair and agreeable to session.    Precautions:  Hearing/Visual Limitations: wears glasses  Medical Precautions: Fall precautions, Oxygen therapy device and L/min (60% HFNC)    Vital Signs Comment: HR to 130bpm during BM attempt; SpO2 decreased to 86% with minimal mobility     Pain:  Pain Assessment  Pain Assessment: 0-10  0-10 (Numeric) Pain Score: 3  Pain Type: Chronic pain  Pain Location: Back  Pain Interventions: Repositioned, Ambulation/increased activity  Response to Interventions: nsg aware    Objective      Cognition:  Cognition  Overall Cognitive Status: Within Functional Limits  Orientation Level: Oriented X4  Cognition Comments: Pt appears anxious and hyperfocused on having a BM since it's been 2-3 days since her last one.  Insight: Mild    Activities of Daily Living: Toileting  Toileting Level of Assistance: Dependent  Where Assessed: Bedside commode  Toileting Comments: Pt required an extended amount of time on the bedside commode to attempt having a BM for 2 trials. Afterwards, pt required total A for pericare in standing after minimal success.    Bed Mobility/Transfers:   Transfers  Transfer: Yes  Transfer 1  Transfer From 1: Chair with arms to  Transfer to 1: Commode-standard  Technique 1: Sit to stand, Stand pivot  Transfer Level of Assistance 1: Arm in arm assistance, Moderate assistance, +2, Minimal verbal cues  Trials/Comments 1: Transfers completed x 4 with modAx2; increased time and effort for mobility.      Outcome Measures:[facility] Daily Activity  Putting on and taking off regular lower body clothing: Total  Bathing (including washing, rinsing, drying): A lot  Putting on and taking off regular upper body clothing: A little  Toileting, which includes using toilet, bedpan or  DISPLAY PLAN FREE TEXT urinal: Total  Taking care of personal grooming such as brushing teeth: A little  Eating Meals: None  Daily Activity - Total Score: 14      Education Documentation  Body Mechanics, taught by Sunny Rios Jr., OT at 10/3/2024  3:07 PM.  Learner: Patient  Readiness: Acceptance  Method: Explanation  Response: Verbalizes Understanding, Needs Reinforcement    Home Exercise Program, taught by Sunny Rios Jr., OT at 10/3/2024  3:07 PM.  Learner: Patient  Readiness: Acceptance  Method: Explanation  Response: Verbalizes Understanding, Needs Reinforcement    ADL Training, taught by Sunny Rios Jr., OT at 10/3/2024  3:07 PM.  Learner: Patient  Readiness: Acceptance  Method: Explanation  Response: Verbalizes Understanding, Needs Reinforcement    Education Comments  No comments found.      Goals:  Encounter Problems       Encounter Problems (Active)       OT Goals       Pt will complete all grooming tasks with setup/indep. (Progressing)       Start:  10/03/24    Expected End:  11/03/24            Pt will complete UB dressing/bathing with setup/indep and LB dressing/bathing with close S using adaptive equipment as needed. (Progressing)       Start:  10/03/24    Expected End:  11/03/24            Pt will complete all toileting tasks with close S. (Progressing)       Start:  10/03/24    Expected End:  11/03/24            Pt will complete all functional transfers and mobility with close S using a RW. (Progressing)       Start:  10/03/24    Expected End:  11/03/24

## 2024-10-05 LAB
ALBUMIN SERPL BCP-MCNC: 3.6 G/DL (ref 3.4–5)
ANION GAP SERPL CALCULATED.3IONS-SCNC: ABNORMAL MMOL/L
BASOPHILS # BLD AUTO: 0.02 X10*3/UL (ref 0–0.1)
BASOPHILS NFR BLD AUTO: 0.2 %
BUN SERPL-MCNC: 30 MG/DL (ref 6–23)
CALCIUM SERPL-MCNC: 9.6 MG/DL (ref 8.6–10.3)
CHLORIDE SERPL-SCNC: 85 MMOL/L (ref 98–107)
CO2 SERPL-SCNC: >45 MMOL/L (ref 21–32)
CREAT SERPL-MCNC: 0.58 MG/DL (ref 0.5–1.05)
EGFRCR SERPLBLD CKD-EPI 2021: >90 ML/MIN/1.73M*2
EOSINOPHIL # BLD AUTO: 0.15 X10*3/UL (ref 0–0.7)
EOSINOPHIL NFR BLD AUTO: 1.5 %
ERYTHROCYTE [DISTWIDTH] IN BLOOD BY AUTOMATED COUNT: 13.5 % (ref 11.5–14.5)
GLUCOSE BLD MANUAL STRIP-MCNC: 107 MG/DL (ref 74–99)
GLUCOSE BLD MANUAL STRIP-MCNC: 115 MG/DL (ref 74–99)
GLUCOSE BLD MANUAL STRIP-MCNC: 116 MG/DL (ref 74–99)
GLUCOSE BLD MANUAL STRIP-MCNC: 161 MG/DL (ref 74–99)
GLUCOSE BLD MANUAL STRIP-MCNC: 172 MG/DL (ref 74–99)
GLUCOSE SERPL-MCNC: 94 MG/DL (ref 74–99)
HCT VFR BLD AUTO: 38.4 % (ref 36–46)
HGB BLD-MCNC: 12.4 G/DL (ref 12–16)
IMM GRANULOCYTES # BLD AUTO: 0.03 X10*3/UL (ref 0–0.7)
IMM GRANULOCYTES NFR BLD AUTO: 0.3 % (ref 0–0.9)
LYMPHOCYTES # BLD AUTO: 1.44 X10*3/UL (ref 1.2–4.8)
LYMPHOCYTES NFR BLD AUTO: 14.2 %
MAGNESIUM SERPL-MCNC: 1.91 MG/DL (ref 1.6–2.4)
MCH RBC QN AUTO: 31 PG (ref 26–34)
MCHC RBC AUTO-ENTMCNC: 32.3 G/DL (ref 32–36)
MCV RBC AUTO: 96 FL (ref 80–100)
MONOCYTES # BLD AUTO: 0.76 X10*3/UL (ref 0.1–1)
MONOCYTES NFR BLD AUTO: 7.5 %
NEUTROPHILS # BLD AUTO: 7.77 X10*3/UL (ref 1.2–7.7)
NEUTROPHILS NFR BLD AUTO: 76.3 %
NRBC BLD-RTO: 0 /100 WBCS (ref 0–0)
PHOSPHATE SERPL-MCNC: 3.1 MG/DL (ref 2.5–4.9)
PLATELET # BLD AUTO: 289 X10*3/UL (ref 150–450)
POTASSIUM SERPL-SCNC: 2.7 MMOL/L (ref 3.5–5.3)
RBC # BLD AUTO: 4 X10*6/UL (ref 4–5.2)
SODIUM SERPL-SCNC: 141 MMOL/L (ref 136–145)
WBC # BLD AUTO: 10.2 X10*3/UL (ref 4.4–11.3)

## 2024-10-05 PROCEDURE — 94660 CPAP INITIATION&MGMT: CPT

## 2024-10-05 PROCEDURE — 2500000005 HC RX 250 GENERAL PHARMACY W/O HCPCS

## 2024-10-05 PROCEDURE — 2500000004 HC RX 250 GENERAL PHARMACY W/ HCPCS (ALT 636 FOR OP/ED): Performed by: HOSPITALIST

## 2024-10-05 PROCEDURE — 2500000001 HC RX 250 WO HCPCS SELF ADMINISTERED DRUGS (ALT 637 FOR MEDICARE OP)

## 2024-10-05 PROCEDURE — 94640 AIRWAY INHALATION TREATMENT: CPT

## 2024-10-05 PROCEDURE — 83735 ASSAY OF MAGNESIUM: CPT

## 2024-10-05 PROCEDURE — 2500000001 HC RX 250 WO HCPCS SELF ADMINISTERED DRUGS (ALT 637 FOR MEDICARE OP): Performed by: HOSPITALIST

## 2024-10-05 PROCEDURE — 99232 SBSQ HOSP IP/OBS MODERATE 35: CPT | Performed by: HOSPITALIST

## 2024-10-05 PROCEDURE — 2060000001 HC INTERMEDIATE ICU ROOM DAILY

## 2024-10-05 PROCEDURE — 2500000005 HC RX 250 GENERAL PHARMACY W/O HCPCS: Performed by: HOSPITALIST

## 2024-10-05 PROCEDURE — 36415 COLL VENOUS BLD VENIPUNCTURE: CPT

## 2024-10-05 PROCEDURE — 82947 ASSAY GLUCOSE BLOOD QUANT: CPT

## 2024-10-05 PROCEDURE — 85025 COMPLETE CBC W/AUTO DIFF WBC: CPT

## 2024-10-05 PROCEDURE — 80069 RENAL FUNCTION PANEL: CPT

## 2024-10-05 PROCEDURE — 97110 THERAPEUTIC EXERCISES: CPT | Mod: GP,CQ

## 2024-10-05 RX ORDER — TRAMADOL HYDROCHLORIDE 50 MG/1
50 TABLET ORAL EVERY 8 HOURS PRN
Status: DISPENSED | OUTPATIENT
Start: 2024-10-05

## 2024-10-05 RX ORDER — POTASSIUM CHLORIDE 14.9 MG/ML
20 INJECTION INTRAVENOUS
Status: COMPLETED | OUTPATIENT
Start: 2024-10-05 | End: 2024-10-05

## 2024-10-05 RX ORDER — POTASSIUM CHLORIDE 1.5 G/1.58G
40 POWDER, FOR SOLUTION ORAL ONCE
Status: COMPLETED | OUTPATIENT
Start: 2024-10-05 | End: 2024-10-05

## 2024-10-05 ASSESSMENT — COGNITIVE AND FUNCTIONAL STATUS - GENERAL
DRESSING REGULAR LOWER BODY CLOTHING: TOTAL
MOVING FROM LYING ON BACK TO SITTING ON SIDE OF FLAT BED WITH BEDRAILS: A LOT
MOVING TO AND FROM BED TO CHAIR: A LOT
STANDING UP FROM CHAIR USING ARMS: A LOT
DAILY ACTIVITIY SCORE: 6
TOILETING: TOTAL
WALKING IN HOSPITAL ROOM: A LOT
EATING MEALS: TOTAL
CLIMB 3 TO 5 STEPS WITH RAILING: TOTAL
WALKING IN HOSPITAL ROOM: TOTAL
CLIMB 3 TO 5 STEPS WITH RAILING: TOTAL
PERSONAL GROOMING: TOTAL
DRESSING REGULAR UPPER BODY CLOTHING: TOTAL
TURNING FROM BACK TO SIDE WHILE IN FLAT BAD: A LOT
MOVING FROM LYING ON BACK TO SITTING ON SIDE OF FLAT BED WITH BEDRAILS: A LITTLE
MOBILITY SCORE: 11
TURNING FROM BACK TO SIDE WHILE IN FLAT BAD: A LOT
MOVING TO AND FROM BED TO CHAIR: A LOT
HELP NEEDED FOR BATHING: TOTAL
MOBILITY SCORE: 11
STANDING UP FROM CHAIR USING ARMS: A LOT

## 2024-10-05 ASSESSMENT — PAIN SCALES - GENERAL
PAINLEVEL_OUTOF10: 7
PAINLEVEL_OUTOF10: 0 - NO PAIN
PAINLEVEL_OUTOF10: 10 - WORST POSSIBLE PAIN

## 2024-10-05 ASSESSMENT — PAIN SCALES - WONG BAKER: WONGBAKER_NUMERICALRESPONSE: NO HURT

## 2024-10-05 ASSESSMENT — PAIN - FUNCTIONAL ASSESSMENT: PAIN_FUNCTIONAL_ASSESSMENT: 0-10

## 2024-10-05 ASSESSMENT — PAIN DESCRIPTION - DESCRIPTORS: DESCRIPTORS: ACHING;DISCOMFORT

## 2024-10-05 ASSESSMENT — ACTIVITIES OF DAILY LIVING (ADL): EFFECT OF PAIN ON DAILY ACTIVITIES: LIMITING

## 2024-10-05 NOTE — CARE PLAN
Vickie was up for a lot of the night. She had requested the purewick, but kept moving to where she kept dislodging device and wetting bed. Two bed changes were done.  She is able to make her needs known. No additional issues at this time. Bedside report given to day shift RN. Pt stable at time of shift change.       The patient's goals for the shift include  get a good nights rest.     The clinical goals for the shift include Wean off oxygen      Problem: Skin  Goal: Decreased wound size/increased tissue granulation at next dressing change  Outcome: Progressing  Flowsheets (Taken 10/4/2024 1915)  Decreased wound size/increased tissue granulation at next dressing change: Promote sleep for wound healing  Goal: Participates in plan/prevention/treatment measures  Outcome: Progressing  Flowsheets (Taken 10/4/2024 1915)  Participates in plan/prevention/treatment measures: Elevate heels  Goal: Prevent/manage excess moisture  Outcome: Progressing  Flowsheets (Taken 10/4/2024 1915)  Prevent/manage excess moisture:   Cleanse incontinence/protect with barrier cream   Moisturize dry skin  Goal: Prevent/minimize sheer/friction injuries  Outcome: Progressing  Flowsheets (Taken 10/4/2024 1915)  Prevent/minimize sheer/friction injuries: Complete micro-shifts as needed if patient unable. Adjust patient position to relieve pressure points, not a full turn  Goal: Promote/optimize nutrition  Outcome: Progressing  Flowsheets (Taken 10/4/2024 1915)  Promote/optimize nutrition: Monitor/record intake including meals  Goal: Promote skin healing  Outcome: Progressing  Flowsheets (Taken 10/4/2024 1915)  Promote skin healing:   Assess skin/pad under line(s)/device(s)   Protective dressings over bony prominences     Problem: Fall/Injury  Goal: Not fall by end of shift  Outcome: Progressing  Goal: Be free from injury by end of the shift  Outcome: Progressing  Goal: Verbalize understanding of personal risk factors for fall in the  hospital  Outcome: Progressing  Goal: Verbalize understanding of risk factor reduction measures to prevent injury from fall in the home  Outcome: Progressing  Goal: Use assistive devices by end of the shift  Outcome: Progressing  Goal: Pace activities to prevent fatigue by end of the shift  Outcome: Progressing

## 2024-10-05 NOTE — PROGRESS NOTES
Both pt Ivs were no good this morning. She is very difficult stick, we were unable to get a new IV until 1400, replacement IV potassium re-started at that time.  Potasium lab draw re-timed for 2000.     Pt is having a lot of acute on chronic back pain. Message sent to provider requesting order for prn pain control.

## 2024-10-05 NOTE — PROGRESS NOTES
Physical Therapy    Physical Therapy Treatment    Patient Name: Vickie Foster  MRN: [id number]  Department: [address]  Room: 13/13-B  Today's Date: 10/5/2024  Time Calculation  Start Time: 1032  Stop Time: 1052  Time Calculation (min): 20 min         Assessment/Plan   PT Assessment  PT Assessment Results: Decreased strength, Decreased endurance, Impaired balance, Decreased mobility, Decreased coordination, Impaired judgement, Pain  Rehab Prognosis: Good  Barriers to Discharge: medical status  Evaluation/Treatment Tolerance: Patient limited by pain  Medical Staff Made Aware: Yes  Strengths: Ability to acquire knowledge  Barriers to Participation: Comorbidities  End of Session Communication: Bedside nurse  Assessment Comment: Pt progressions limited due to intense LB pain this visit.  End of Session Patient Position: Up in chair, Alarm off, not on at start of session  PT Plan  Inpatient/Swing Bed or Outpatient: Inpatient  PT Plan  Treatment/Interventions: Bed mobility, Transfer training, Gait training, Stair training, Balance training, Strengthening, Endurance training, Therapeutic exercise  PT Plan: Ongoing PT  PT Frequency: 6 times per week  PT Discharge Recommendations: Moderate intensity level of continued care  Equipment Recommended upon Discharge: Wheeled walker  PT Recommended Transfer Status: Assist x2  PT - OK to Discharge: Yes      General Visit Information:   PT  Visit  PT Received On: 10/05/24  Response to Previous Treatment: Patient with no complaints from previous session.  General  Reason for Referral: pt is a 67 y/o female admitted with sepsis and acute hypoxic respiratory failure; c/o SOB, and hallucinations; impaired mobility  Referred By: KEVON Pantoja-CNP  Past Medical History Relevant to Rehab: HTN, COPD, a-fib, TANVIR, CVA, gout, dep, anxiety, LBP, COVID, cardioversion, cardiac ablation; GERD; CHF; depression; asthma;  meningioma; osteoporosis  Prior to Session Communication: Bedside  nurse  Patient Position Received: Up in chair, Alarm off, not on at start of session  General Comment: Pt agreeable to ther ex, but no standing or AMB due to high level of LB pain.  Pt on 5 Litres of O2 via NC.    Subjective   Precautions:  Precautions  Medical Precautions: Fall precautions    Vital Signs (Past 2hrs)                 Objective   Pain:  Pain Assessment  0-10 (Numeric) Pain Score: 10 - Worst possible pain  Pain Type: Chronic pain  Pain Location: Back  Pain Orientation: Lower  Pain Descriptors: Aching, Discomfort  Effect of Pain on Daily Activities: Limiting  Cognition:  Cognition  Orientation Level: Oriented X4    Activity Tolerance:  Activity Tolerance  Endurance: Tolerates 10 - 20 min exercise with multiple rests  Treatments:  Therapeutic Exercise  Therapeutic Exercise Performed: Yes  Therapeutic Exercise Activity 1: APs x20  Therapeutic Exercise Activity 2: GLute sets x20  Therapeutic Exercise Activity 3: LAQs x15 L/R each  Therapeutic Exercise Activity 4: Resisted hamstring curls x15 L/R each  Therapeutic Exercise Activity 5: Isometric hip adduction x15  Therapeutic Exercise Activity 6: Isometric hip abduction x15    Outcome Measures:  [facility] Basic Mobility  Turning from your back to your side while in a flat bed without using bedrails: A lot  Moving from lying on your back to sitting on the side of a flat bed without using bedrails: A lot  Moving to and from bed to chair (including a wheelchair): A lot  Standing up from a chair using your arms (e.g. wheelchair or bedside chair): A lot  To walk in hospital room: A lot  Climbing 3-5 steps with railing: Total  Basic Mobility - Total Score: 11    Education Documentation  No documentation found.  Education Comments  No comments found.        OP EDUCATION:       Encounter Problems       Encounter Problems (Active)       Mobility       STG - Patient will ambulate 75' x 1 using rolling walker with SBA (Not Progressing)       Start:  10/03/24    Expected  End:  10/17/24            STG - Patient will negotiate 10 stairs using 1 railing with CGA (Not Progressing)       Start:  10/03/24    Expected End:  10/17/24               PT Transfers       STG - Patient to transfer to and from sit to supine MOD INDEPENDENT (Progressing)       Start:  10/03/24    Expected End:  10/17/24            STG - Patient will transfer sit to and from stand with SUPERVISION (Progressing)       Start:  10/03/24    Expected End:  10/17/24               Pain - Adult

## 2024-10-05 NOTE — SIGNIFICANT EVENT
Removed patient from HFNC and placed on 5lp n/c.  Patient is doing well with transition, spo2 94%.

## 2024-10-05 NOTE — PROGRESS NOTES
"Vickie Foster is a 68 y.o. female on day 3 of admission presenting with Acute congestive heart failure.    Subjective   Patient seen and examined at bedside, stats she is feeling overall better but feels a little depressed that she isnt at her baseline yet.  Today she has been weaned off hi flow and placed on 5LNC.  She reports chronic back pain which keeps her from sleeping through the night.  Patient reported having a cpap at one point in time but sent it back due to malfunction.  She does have an appt for a repeat sleep study       Objective     Physical Exam    Last Recorded Vitals  Blood pressure 112/66, pulse 101, temperature 35.9 °C (96.6 °F), temperature source Temporal, resp. rate 15, height 1.575 m (5' 2\"), weight 94.6 kg (208 lb 8.9 oz), SpO2 94%.  Intake/Output last 3 Shifts:  I/O last 3 completed shifts:  In: 666.3 (7 mL/kg) [P.O.:358; I.V.:53.3 (0.6 mL/kg); IV Piggyback:255]  Out: 4350 (46 mL/kg) [Urine:4350 (1.3 mL/kg/hr)]  Weight: 94.6 kg     Relevant Results  Results for orders placed or performed during the hospital encounter of 10/02/24 (from the past 24 hour(s))   POCT GLUCOSE   Result Value Ref Range    POCT Glucose 144 (H) 74 - 99 mg/dL   Renal function panel   Result Value Ref Range    Glucose 108 (H) 74 - 99 mg/dL    Sodium 140 136 - 145 mmol/L    Potassium 3.5 3.5 - 5.3 mmol/L    Chloride 89 (L) 98 - 107 mmol/L    Bicarbonate >45 (HH) 21 - 32 mmol/L    Anion Gap      Urea Nitrogen 36 (H) 6 - 23 mg/dL    Creatinine 0.69 0.50 - 1.05 mg/dL    eGFR >90 >60 mL/min/1.73m*2    Calcium 9.7 8.6 - 10.3 mg/dL    Phosphorus 2.4 (L) 2.5 - 4.9 mg/dL    Albumin 3.6 3.4 - 5.0 g/dL   POCT GLUCOSE   Result Value Ref Range    POCT Glucose 184 (H) 74 - 99 mg/dL   POCT GLUCOSE   Result Value Ref Range    POCT Glucose 91 74 - 99 mg/dL   CBC and Auto Differential   Result Value Ref Range    WBC 10.2 4.4 - 11.3 x10*3/uL    nRBC 0.0 0.0 - 0.0 /100 WBCs    RBC 4.00 4.00 - 5.20 x10*6/uL    Hemoglobin 12.4 12.0 - " 16.0 g/dL    Hematocrit 38.4 36.0 - 46.0 %    MCV 96 80 - 100 fL    MCH 31.0 26.0 - 34.0 pg    MCHC 32.3 32.0 - 36.0 g/dL    RDW 13.5 11.5 - 14.5 %    Platelets 289 150 - 450 x10*3/uL    Neutrophils % 76.3 40.0 - 80.0 %    Immature Granulocytes %, Automated 0.3 0.0 - 0.9 %    Lymphocytes % 14.2 13.0 - 44.0 %    Monocytes % 7.5 2.0 - 10.0 %    Eosinophils % 1.5 0.0 - 6.0 %    Basophils % 0.2 0.0 - 2.0 %    Neutrophils Absolute 7.77 (H) 1.20 - 7.70 x10*3/uL    Immature Granulocytes Absolute, Automated 0.03 0.00 - 0.70 x10*3/uL    Lymphocytes Absolute 1.44 1.20 - 4.80 x10*3/uL    Monocytes Absolute 0.76 0.10 - 1.00 x10*3/uL    Eosinophils Absolute 0.15 0.00 - 0.70 x10*3/uL    Basophils Absolute 0.02 0.00 - 0.10 x10*3/uL   Renal Function Panel   Result Value Ref Range    Glucose 94 74 - 99 mg/dL    Sodium 141 136 - 145 mmol/L    Potassium 2.7 (LL) 3.5 - 5.3 mmol/L    Chloride 85 (L) 98 - 107 mmol/L    Bicarbonate >45 (HH) 21 - 32 mmol/L    Anion Gap      Urea Nitrogen 30 (H) 6 - 23 mg/dL    Creatinine 0.58 0.50 - 1.05 mg/dL    eGFR >90 >60 mL/min/1.73m*2    Calcium 9.6 8.6 - 10.3 mg/dL    Phosphorus 3.1 2.5 - 4.9 mg/dL    Albumin 3.6 3.4 - 5.0 g/dL   Magnesium   Result Value Ref Range    Magnesium 1.91 1.60 - 2.40 mg/dL   POCT GLUCOSE   Result Value Ref Range    POCT Glucose 116 (H) 74 - 99 mg/dL         Imaging Results  na    Medications:  allopurinol, 200 mg, oral, BID  apixaban, 5 mg, oral, BID  atorvastatin, 40 mg, oral, Nightly  buPROPion XL, 300 mg, oral, Daily before breakfast  docusate sodium, 100 mg, oral, TID  dofetilide, 250 mcg, oral, q12h  fluticasone furoate-vilanteroL, 1 puff, inhalation, Daily  gabapentin, 300 mg, oral, TID  insulin lispro, 0-10 Units, subcutaneous, Before meals & nightly  lidocaine, 2 patch, transdermal, Daily  metoprolol tartrate, 12.5 mg, oral, BID  oxygen, , inhalation, Continuous - Inhalation  oxygen, , inhalation, Continuous - Inhalation  polyethylene glycol, 17 g, oral,  Daily  potassium chloride, 20 mEq, oral, BID  potassium chloride, 20 mEq, intravenous, q2h  torsemide, 50 mg, oral, BID       PRN medications: cyclobenzaprine, dextrose, dextrose, glucagon, glucagon, ipratropium-albuteroL, [Held by provider] metOLazone     Assessment/Plan     Acute on chronic respiratory failure with hypoxemia and hypercapnia: Patient with underlying COPD and TANVIR untreated.  Transferred out of the ICU yesterday where she is BiPAP dependent.  Patient has now been transition off of high flow to 5 L nasal cannula.  Encourage pulmonary toilet treat and continue to wean O2.  Will need home O2 eval prior to discharge.  TANVIR: Patient is noncompliant reports that she did have a CPAP at 1 point but sent it back due to malfunction.  She reports she recently followed up again and got a repeat order for sleep study test and has this scheduled she is unsure when.  Strongly advised that patient have this done and reinforced the importance of compliance.  Acute on chronic HFpEF: Patient was notably volume overloaded on admission and had been diuresed in the ICU.  She has been transition back to her home torsemide dose.  Monitor ins and outs, daily weights.  Will monitor patient's fluid intake and advised to restrict less than 2 L/day.  Hypokalemia: Potassium replacement ordered.  Will recheck potassium levels this afternoon.  Metabolic encephalopathy: Secondary to hypercapnia has resolved.  Patient is back to her baseline mentation.  Atrial fibrillation: Continue with metoprolol for rate control, Eliquis for oral anticoagulation.  Continue with Tikosyn.  Weakness: Patient reported multiple falls prior to her admission.  She also reports weakness and chronic back pain.  PT/OT.  Patient is open to discuss placement.  Advance care plan: Full code    DVT Prophylaxis:  Eliquis      I spent 25 minutes in the professional and overall care of this patient.      Halima Salguero MD  [facility]

## 2024-10-05 NOTE — CARE PLAN
Problem: Chronic Conditions and Co-morbidities  Goal: Patient's chronic conditions and co-morbidity symptoms are monitored and maintained or improved  Outcome: Progressing   The patient's goals for the shift include      The clinical goals for the shift include replace potassium

## 2024-10-06 VITALS
RESPIRATION RATE: 18 BRPM | HEART RATE: 83 BPM | HEIGHT: 62 IN | SYSTOLIC BLOOD PRESSURE: 144 MMHG | BODY MASS INDEX: 44.72 KG/M2 | TEMPERATURE: 98 F | OXYGEN SATURATION: 98 % | WEIGHT: 243 LBS | DIASTOLIC BLOOD PRESSURE: 109 MMHG

## 2024-10-06 LAB
ALBUMIN SERPL BCP-MCNC: 3.3 G/DL (ref 3.4–5)
ANION GAP SERPL CALCULATED.3IONS-SCNC: ABNORMAL MMOL/L
BACTERIA BLD CULT: NORMAL
BACTERIA BLD CULT: NORMAL
BASOPHILS # BLD AUTO: 0.02 X10*3/UL (ref 0–0.1)
BASOPHILS NFR BLD AUTO: 0.2 %
BUN SERPL-MCNC: 32 MG/DL (ref 6–23)
CALCIUM SERPL-MCNC: 9.4 MG/DL (ref 8.6–10.3)
CHLORIDE SERPL-SCNC: 85 MMOL/L (ref 98–107)
CO2 SERPL-SCNC: >45 MMOL/L (ref 21–32)
CREAT SERPL-MCNC: 0.61 MG/DL (ref 0.5–1.05)
EGFRCR SERPLBLD CKD-EPI 2021: >90 ML/MIN/1.73M*2
EOSINOPHIL # BLD AUTO: 0.16 X10*3/UL (ref 0–0.7)
EOSINOPHIL NFR BLD AUTO: 1.6 %
ERYTHROCYTE [DISTWIDTH] IN BLOOD BY AUTOMATED COUNT: 13.6 % (ref 11.5–14.5)
GLUCOSE BLD MANUAL STRIP-MCNC: 127 MG/DL (ref 74–99)
GLUCOSE BLD MANUAL STRIP-MCNC: 131 MG/DL (ref 74–99)
GLUCOSE BLD MANUAL STRIP-MCNC: 138 MG/DL (ref 74–99)
GLUCOSE SERPL-MCNC: 102 MG/DL (ref 74–99)
HCT VFR BLD AUTO: 35.5 % (ref 36–46)
HGB BLD-MCNC: 11.2 G/DL (ref 12–16)
IMM GRANULOCYTES # BLD AUTO: 0.02 X10*3/UL (ref 0–0.7)
IMM GRANULOCYTES NFR BLD AUTO: 0.2 % (ref 0–0.9)
LYMPHOCYTES # BLD AUTO: 1.67 X10*3/UL (ref 1.2–4.8)
LYMPHOCYTES NFR BLD AUTO: 16.5 %
MAGNESIUM SERPL-MCNC: 1.95 MG/DL (ref 1.6–2.4)
MCH RBC QN AUTO: 30.9 PG (ref 26–34)
MCHC RBC AUTO-ENTMCNC: 31.5 G/DL (ref 32–36)
MCV RBC AUTO: 98 FL (ref 80–100)
MONOCYTES # BLD AUTO: 0.79 X10*3/UL (ref 0.1–1)
MONOCYTES NFR BLD AUTO: 7.8 %
NEUTROPHILS # BLD AUTO: 7.44 X10*3/UL (ref 1.2–7.7)
NEUTROPHILS NFR BLD AUTO: 73.7 %
NRBC BLD-RTO: 0 /100 WBCS (ref 0–0)
PHOSPHATE SERPL-MCNC: 3.7 MG/DL (ref 2.5–4.9)
PLATELET # BLD AUTO: 279 X10*3/UL (ref 150–450)
POTASSIUM SERPL-SCNC: 3.2 MMOL/L (ref 3.5–5.3)
RBC # BLD AUTO: 3.63 X10*6/UL (ref 4–5.2)
SODIUM SERPL-SCNC: 138 MMOL/L (ref 136–145)
WBC # BLD AUTO: 10.1 X10*3/UL (ref 4.4–11.3)

## 2024-10-06 PROCEDURE — 85025 COMPLETE CBC W/AUTO DIFF WBC: CPT

## 2024-10-06 PROCEDURE — 9420000001 HC RT PATIENT EDUCATION 5 MIN

## 2024-10-06 PROCEDURE — 2500000004 HC RX 250 GENERAL PHARMACY W/ HCPCS (ALT 636 FOR OP/ED)

## 2024-10-06 PROCEDURE — 2500000005 HC RX 250 GENERAL PHARMACY W/O HCPCS

## 2024-10-06 PROCEDURE — 2500000001 HC RX 250 WO HCPCS SELF ADMINISTERED DRUGS (ALT 637 FOR MEDICARE OP)

## 2024-10-06 PROCEDURE — 94640 AIRWAY INHALATION TREATMENT: CPT

## 2024-10-06 PROCEDURE — 36415 COLL VENOUS BLD VENIPUNCTURE: CPT

## 2024-10-06 PROCEDURE — 94660 CPAP INITIATION&MGMT: CPT

## 2024-10-06 PROCEDURE — 80069 RENAL FUNCTION PANEL: CPT

## 2024-10-06 PROCEDURE — 82947 ASSAY GLUCOSE BLOOD QUANT: CPT

## 2024-10-06 PROCEDURE — 2500000005 HC RX 250 GENERAL PHARMACY W/O HCPCS: Performed by: HOSPITALIST

## 2024-10-06 PROCEDURE — 2500000001 HC RX 250 WO HCPCS SELF ADMINISTERED DRUGS (ALT 637 FOR MEDICARE OP): Performed by: HOSPITALIST

## 2024-10-06 PROCEDURE — 99232 SBSQ HOSP IP/OBS MODERATE 35: CPT | Performed by: HOSPITALIST

## 2024-10-06 PROCEDURE — 84100 ASSAY OF PHOSPHORUS: CPT

## 2024-10-06 PROCEDURE — 83735 ASSAY OF MAGNESIUM: CPT

## 2024-10-06 PROCEDURE — 2060000001 HC INTERMEDIATE ICU ROOM DAILY

## 2024-10-06 RX ORDER — POTASSIUM CHLORIDE 1.5 G/1.58G
40 POWDER, FOR SOLUTION ORAL ONCE
Status: COMPLETED | OUTPATIENT
Start: 2024-10-06 | End: 2024-10-06

## 2024-10-06 ASSESSMENT — PAIN SCALES - GENERAL
PAINLEVEL_OUTOF10: 9
PAINLEVEL_OUTOF10: 10 - WORST POSSIBLE PAIN
PAINLEVEL_OUTOF10: 0 - NO PAIN
PAINLEVEL_OUTOF10: 7

## 2024-10-06 ASSESSMENT — PAIN - FUNCTIONAL ASSESSMENT
PAIN_FUNCTIONAL_ASSESSMENT: 0-10
PAIN_FUNCTIONAL_ASSESSMENT: 0-10

## 2024-10-06 ASSESSMENT — PAIN SCALES - WONG BAKER: WONGBAKER_NUMERICALRESPONSE: HURTS LITTLE BIT

## 2024-10-06 ASSESSMENT — PAIN DESCRIPTION - DESCRIPTORS: DESCRIPTORS: ACHING

## 2024-10-06 NOTE — CARE PLAN
The patient's goals for the shift include      The clinical goals for the shift include Continue to do electrolyte replacement    Over the shift, the patient did not make progress toward the following goals. Barriers to progression include illness. Recommendations to address these barriers include education.

## 2024-10-06 NOTE — PROGRESS NOTES
"Vickie Foster is a 68 y.o. female on day 4 of admission presenting with Acute congestive heart failure.    Subjective   Patient seen and examined at bedside, sitting up in chair, reports slow improvements.  Patient is eager to start PT/OT and looking forward to rehab.  Weaning O2, denies any chest pain or SPB.  No new complaints or acute overnight events.       Objective     Physical Exam  General: Alert, awake appears to be in no acute distress  HEENT: Atraumatic normocephalic.  CV: Regular rate and rhythm, no murmurs no gallops.  Lungs: Bilateral air entry, no wheezes no rales  Extremities: Negative pedal edema    Last Recorded Vitals  Blood pressure 122/78, pulse 88, temperature 36.4 °C (97.5 °F), temperature source Oral, resp. rate 18, height 1.575 m (5' 2\"), weight 110 kg (243 lb), SpO2 94%.  Intake/Output last 3 Shifts:  I/O last 3 completed shifts:  In: 598 (5.4 mL/kg) [P.O.:598]  Out: 2150 (19.5 mL/kg) [Urine:2150 (0.5 mL/kg/hr)]  Weight: 110.2 kg     Relevant Results  Results for orders placed or performed during the hospital encounter of 10/02/24 (from the past 24 hour(s))   POCT GLUCOSE   Result Value Ref Range    POCT Glucose 115 (H) 74 - 99 mg/dL   POCT GLUCOSE   Result Value Ref Range    POCT Glucose 172 (H) 74 - 99 mg/dL   POCT GLUCOSE   Result Value Ref Range    POCT Glucose 161 (H) 74 - 99 mg/dL   POCT GLUCOSE   Result Value Ref Range    POCT Glucose 107 (H) 74 - 99 mg/dL   CBC and Auto Differential   Result Value Ref Range    WBC 10.1 4.4 - 11.3 x10*3/uL    nRBC 0.0 0.0 - 0.0 /100 WBCs    RBC 3.63 (L) 4.00 - 5.20 x10*6/uL    Hemoglobin 11.2 (L) 12.0 - 16.0 g/dL    Hematocrit 35.5 (L) 36.0 - 46.0 %    MCV 98 80 - 100 fL    MCH 30.9 26.0 - 34.0 pg    MCHC 31.5 (L) 32.0 - 36.0 g/dL    RDW 13.6 11.5 - 14.5 %    Platelets 279 150 - 450 x10*3/uL    Neutrophils % 73.7 40.0 - 80.0 %    Immature Granulocytes %, Automated 0.2 0.0 - 0.9 %    Lymphocytes % 16.5 13.0 - 44.0 %    Monocytes % 7.8 2.0 - 10.0 % "    Eosinophils % 1.6 0.0 - 6.0 %    Basophils % 0.2 0.0 - 2.0 %    Neutrophils Absolute 7.44 1.20 - 7.70 x10*3/uL    Immature Granulocytes Absolute, Automated 0.02 0.00 - 0.70 x10*3/uL    Lymphocytes Absolute 1.67 1.20 - 4.80 x10*3/uL    Monocytes Absolute 0.79 0.10 - 1.00 x10*3/uL    Eosinophils Absolute 0.16 0.00 - 0.70 x10*3/uL    Basophils Absolute 0.02 0.00 - 0.10 x10*3/uL   Renal Function Panel   Result Value Ref Range    Glucose 102 (H) 74 - 99 mg/dL    Sodium 138 136 - 145 mmol/L    Potassium 3.2 (L) 3.5 - 5.3 mmol/L    Chloride 85 (L) 98 - 107 mmol/L    Bicarbonate >45 (HH) 21 - 32 mmol/L    Anion Gap      Urea Nitrogen 32 (H) 6 - 23 mg/dL    Creatinine 0.61 0.50 - 1.05 mg/dL    eGFR >90 >60 mL/min/1.73m*2    Calcium 9.4 8.6 - 10.3 mg/dL    Phosphorus 3.7 2.5 - 4.9 mg/dL    Albumin 3.3 (L) 3.4 - 5.0 g/dL   Magnesium   Result Value Ref Range    Magnesium 1.95 1.60 - 2.40 mg/dL         Imaging Results  na    Medications:  allopurinol, 200 mg, oral, BID  apixaban, 5 mg, oral, BID  atorvastatin, 40 mg, oral, Nightly  buPROPion XL, 300 mg, oral, Daily before breakfast  docusate sodium, 100 mg, oral, TID  dofetilide, 250 mcg, oral, q12h  fluticasone furoate-vilanteroL, 1 puff, inhalation, Daily  gabapentin, 300 mg, oral, TID  insulin lispro, 0-10 Units, subcutaneous, Before meals & nightly  lidocaine, 2 patch, transdermal, Daily  metoprolol tartrate, 12.5 mg, oral, BID  oxygen, , inhalation, Continuous - Inhalation  oxygen, , inhalation, Continuous - Inhalation  polyethylene glycol, 17 g, oral, Daily  potassium chloride, 20 mEq, oral, BID  torsemide, 50 mg, oral, BID       PRN medications: cyclobenzaprine, dextrose, dextrose, glucagon, glucagon, ipratropium-albuteroL, [Held by provider] metOLazone, traMADol     Assessment/Plan     Acute on chronic respiratory failure with hypoxemia and hypercapnia: Patient with underlying COPD and TANVIR untreated.  Transferred out of the ICU yesterday where she is BiPAP dependent.   Patient has now been transitioned off of high flow and slowly weaning NC, on 4LNC today.  Encourage pulmonary toilet treat and continue to wean O2.  Will need home O2 eval prior to discharge.  TANVIR: Patient is noncompliant reports that she did have a CPAP at 1 point but sent it back due to malfunction.  She reports she recently followed up again and got a repeat order for sleep study test and has this scheduled she is unsure when.  Strongly advised that patient have this done and reinforced the importance of compliance.  Acute on chronic HFpEF: Patient was notably volume overloaded on admission and had been diuresed in the ICU.  She has been transition back to her home torsemide dose.  Monitor ins and outs, daily weights.  Will monitor patient's fluid intake and advised to restrict less than 2 L/day.  Hypokalemia: Potassium replacement ordered.  Monitor in AM  Metabolic encephalopathy: Secondary to hypercapnia has resolved.  Patient is back to her baseline mentation.  Atrial fibrillation: Continue with metoprolol for rate control, Eliquis for oral anticoagulation.  Continue with Tikosyn.  Weakness: Patient reported multiple falls prior to her admission.  She also reports weakness and chronic back pain.  PT/OT.  Patient is open to discuss placement.  Advance care plan: Full code  Dispo:  dc planning for ECF    DVT Prophylaxis:  Eliquis      I spent 25 minutes in the professional and overall care of this patient.      Halima Salguero MD  [facility]

## 2024-10-06 NOTE — CARE PLAN
Vickie had a good night. Able to make needs known.  Pt stable time of change of shift. Bedside report given to day shift RN.     The patient's goals for the shift include  tolerate CPAP through the night.     The clinical goals for the shift include replace potassium      Problem: Skin  Goal: Decreased wound size/increased tissue granulation at next dressing change  10/6/2024 0057 by Delmy Sandoval RN  Outcome: Progressing  Flowsheets (Taken 10/5/2024 1945)  Decreased wound size/increased tissue granulation at next dressing change: Promote sleep for wound healing  10/6/2024 0057 by Delmy Sandoval RN  Outcome: Progressing  Flowsheets (Taken 10/5/2024 1945)  Decreased wound size/increased tissue granulation at next dressing change: Promote sleep for wound healing  Goal: Participates in plan/prevention/treatment measures  10/6/2024 0057 by Delmy Sandoval RN  Outcome: Progressing  Flowsheets (Taken 10/5/2024 1945)  Participates in plan/prevention/treatment measures: Elevate heels  10/6/2024 0057 by Delmy Sandoval RN  Outcome: Progressing  Flowsheets (Taken 10/5/2024 1945)  Participates in plan/prevention/treatment measures: Elevate heels  Goal: Prevent/manage excess moisture  10/6/2024 0057 by Delmy Sandoval RN  Outcome: Progressing  Flowsheets (Taken 10/5/2024 1945)  Prevent/manage excess moisture: Cleanse incontinence/protect with barrier cream  10/6/2024 0057 by Delmy Sandoval RN  Outcome: Progressing  Flowsheets (Taken 10/5/2024 1945)  Prevent/manage excess moisture: Cleanse incontinence/protect with barrier cream  Goal: Prevent/minimize sheer/friction injuries  10/6/2024 0057 by Delmy Sandoval RN  Outcome: Progressing  Flowsheets (Taken 10/5/2024 1945)  Prevent/minimize sheer/friction injuries: Complete micro-shifts as needed if patient unable. Adjust patient position to relieve pressure points, not a full turn  10/6/2024 0057 by Delmy Sandoval RN  Outcome: Progressing  Flowsheets (Taken 10/5/2024  1945)  Prevent/minimize sheer/friction injuries: Complete micro-shifts as needed if patient unable. Adjust patient position to relieve pressure points, not a full turn  Goal: Promote/optimize nutrition  10/6/2024 0057 by Delmy Sandoval RN  Outcome: Progressing  Flowsheets (Taken 10/5/2024 1945)  Promote/optimize nutrition: Monitor/record intake including meals  10/6/2024 0057 by Delmy Sandoval RN  Outcome: Progressing  Flowsheets (Taken 10/5/2024 1945)  Promote/optimize nutrition: Monitor/record intake including meals  Goal: Promote skin healing  10/6/2024 0057 by Delmy Sandoval RN  Outcome: Progressing  Flowsheets (Taken 10/5/2024 1945)  Promote skin healing: Assess skin/pad under line(s)/device(s)  10/6/2024 0057 by Delmy Sandoval RN  Outcome: Progressing  Flowsheets (Taken 10/5/2024 1945)  Promote skin healing: Assess skin/pad under line(s)/device(s)     Problem: Fall/Injury  Goal: Not fall by end of shift  10/6/2024 0057 by Delmy Sandoval RN  Outcome: Progressing  10/6/2024 0057 by Delmy Sandoval RN  Outcome: Progressing  Goal: Be free from injury by end of the shift  10/6/2024 0057 by Delmy Sandoval RN  Outcome: Progressing  10/6/2024 0057 by Delmy Sandoval RN  Outcome: Progressing  Goal: Verbalize understanding of personal risk factors for fall in the hospital  10/6/2024 0057 by Delmy Sandoval RN  Outcome: Progressing  10/6/2024 0057 by Delmy Sandoval RN  Outcome: Progressing  Goal: Verbalize understanding of risk factor reduction measures to prevent injury from fall in the home  10/6/2024 0057 by Delmy Sandoval RN  Outcome: Progressing  10/6/2024 0057 by Delmy Sandoval RN  Outcome: Progressing  Goal: Use assistive devices by end of the shift  10/6/2024 0057 by Delmy Sandoval RN  Outcome: Progressing  10/6/2024 0057 by Delmy Sandoval RN  Outcome: Progressing  Goal: Pace activities to prevent fatigue by end of the shift  10/6/2024 0057 by Delmy Lori, RN  Outcome: Progressing  10/6/2024 0057 by Delmy Sandoval,  RN  Outcome: Progressing

## 2024-10-07 ENCOUNTER — TELEPHONE (OUTPATIENT)
Dept: CARDIOLOGY | Facility: CLINIC | Age: 69
End: 2024-10-07
Payer: MEDICARE

## 2024-10-07 LAB
ALBUMIN SERPL BCP-MCNC: 3.5 G/DL (ref 3.4–5)
ANION GAP SERPL CALCULATED.3IONS-SCNC: ABNORMAL MMOL/L
BASOPHILS # BLD AUTO: 0.03 X10*3/UL (ref 0–0.1)
BASOPHILS NFR BLD AUTO: 0.3 %
BUN SERPL-MCNC: 27 MG/DL (ref 6–23)
CALCIUM SERPL-MCNC: 9.5 MG/DL (ref 8.6–10.3)
CHLORIDE SERPL-SCNC: 86 MMOL/L (ref 98–107)
CO2 SERPL-SCNC: >45 MMOL/L (ref 21–32)
CREAT SERPL-MCNC: 0.52 MG/DL (ref 0.5–1.05)
EGFRCR SERPLBLD CKD-EPI 2021: >90 ML/MIN/1.73M*2
EOSINOPHIL # BLD AUTO: 0.14 X10*3/UL (ref 0–0.7)
EOSINOPHIL NFR BLD AUTO: 1.4 %
ERYTHROCYTE [DISTWIDTH] IN BLOOD BY AUTOMATED COUNT: 13.5 % (ref 11.5–14.5)
GLUCOSE BLD MANUAL STRIP-MCNC: 107 MG/DL (ref 74–99)
GLUCOSE BLD MANUAL STRIP-MCNC: 122 MG/DL (ref 74–99)
GLUCOSE BLD MANUAL STRIP-MCNC: 150 MG/DL (ref 74–99)
GLUCOSE BLD MANUAL STRIP-MCNC: 157 MG/DL (ref 74–99)
GLUCOSE SERPL-MCNC: 95 MG/DL (ref 74–99)
HCT VFR BLD AUTO: 37.1 % (ref 36–46)
HGB BLD-MCNC: 11.7 G/DL (ref 12–16)
IMM GRANULOCYTES # BLD AUTO: 0.03 X10*3/UL (ref 0–0.7)
IMM GRANULOCYTES NFR BLD AUTO: 0.3 % (ref 0–0.9)
LYMPHOCYTES # BLD AUTO: 1.12 X10*3/UL (ref 1.2–4.8)
LYMPHOCYTES NFR BLD AUTO: 11.2 %
MAGNESIUM SERPL-MCNC: 2.03 MG/DL (ref 1.6–2.4)
MCH RBC QN AUTO: 31 PG (ref 26–34)
MCHC RBC AUTO-ENTMCNC: 31.5 G/DL (ref 32–36)
MCV RBC AUTO: 98 FL (ref 80–100)
MONOCYTES # BLD AUTO: 0.65 X10*3/UL (ref 0.1–1)
MONOCYTES NFR BLD AUTO: 6.5 %
NEUTROPHILS # BLD AUTO: 8.05 X10*3/UL (ref 1.2–7.7)
NEUTROPHILS NFR BLD AUTO: 80.3 %
NRBC BLD-RTO: 0 /100 WBCS (ref 0–0)
PHOSPHATE SERPL-MCNC: 3.2 MG/DL (ref 2.5–4.9)
PLATELET # BLD AUTO: 290 X10*3/UL (ref 150–450)
POTASSIUM SERPL-SCNC: 3.4 MMOL/L (ref 3.5–5.3)
RBC # BLD AUTO: 3.77 X10*6/UL (ref 4–5.2)
SODIUM SERPL-SCNC: 137 MMOL/L (ref 136–145)
WBC # BLD AUTO: 10 X10*3/UL (ref 4.4–11.3)

## 2024-10-07 PROCEDURE — 2500000001 HC RX 250 WO HCPCS SELF ADMINISTERED DRUGS (ALT 637 FOR MEDICARE OP)

## 2024-10-07 PROCEDURE — 94660 CPAP INITIATION&MGMT: CPT

## 2024-10-07 PROCEDURE — 2500000001 HC RX 250 WO HCPCS SELF ADMINISTERED DRUGS (ALT 637 FOR MEDICARE OP): Performed by: HOSPITALIST

## 2024-10-07 PROCEDURE — 83735 ASSAY OF MAGNESIUM: CPT

## 2024-10-07 PROCEDURE — 2500000005 HC RX 250 GENERAL PHARMACY W/O HCPCS

## 2024-10-07 PROCEDURE — 97535 SELF CARE MNGMENT TRAINING: CPT | Mod: GO,CO

## 2024-10-07 PROCEDURE — 97530 THERAPEUTIC ACTIVITIES: CPT | Mod: GO,CO

## 2024-10-07 PROCEDURE — 94640 AIRWAY INHALATION TREATMENT: CPT

## 2024-10-07 PROCEDURE — 80069 RENAL FUNCTION PANEL: CPT

## 2024-10-07 PROCEDURE — 2060000001 HC INTERMEDIATE ICU ROOM DAILY

## 2024-10-07 PROCEDURE — 2500000005 HC RX 250 GENERAL PHARMACY W/O HCPCS: Performed by: HOSPITALIST

## 2024-10-07 PROCEDURE — 2500000002 HC RX 250 W HCPCS SELF ADMINISTERED DRUGS (ALT 637 FOR MEDICARE OP, ALT 636 FOR OP/ED)

## 2024-10-07 PROCEDURE — 36415 COLL VENOUS BLD VENIPUNCTURE: CPT

## 2024-10-07 PROCEDURE — 97110 THERAPEUTIC EXERCISES: CPT | Mod: GP,CQ

## 2024-10-07 PROCEDURE — 82947 ASSAY GLUCOSE BLOOD QUANT: CPT

## 2024-10-07 PROCEDURE — 97116 GAIT TRAINING THERAPY: CPT | Mod: GP,CQ

## 2024-10-07 PROCEDURE — 97530 THERAPEUTIC ACTIVITIES: CPT | Mod: GP,CQ

## 2024-10-07 PROCEDURE — 85025 COMPLETE CBC W/AUTO DIFF WBC: CPT

## 2024-10-07 PROCEDURE — 2500000004 HC RX 250 GENERAL PHARMACY W/ HCPCS (ALT 636 FOR OP/ED)

## 2024-10-07 PROCEDURE — 9420000001 HC RT PATIENT EDUCATION 5 MIN

## 2024-10-07 RX ORDER — POTASSIUM CHLORIDE 1.5 G/1.58G
20 POWDER, FOR SOLUTION ORAL 2 TIMES DAILY
Start: 2024-10-07 | End: 2024-11-06

## 2024-10-07 RX ORDER — IPRATROPIUM BROMIDE AND ALBUTEROL SULFATE 2.5; .5 MG/3ML; MG/3ML
3 SOLUTION RESPIRATORY (INHALATION) EVERY 6 HOURS PRN
Qty: 180 ML | Refills: 11 | Status: SHIPPED | OUTPATIENT
Start: 2024-10-07

## 2024-10-07 ASSESSMENT — COGNITIVE AND FUNCTIONAL STATUS - GENERAL
MOVING FROM LYING ON BACK TO SITTING ON SIDE OF FLAT BED WITH BEDRAILS: A LITTLE
STANDING UP FROM CHAIR USING ARMS: A LITTLE
DRESSING REGULAR UPPER BODY CLOTHING: A LITTLE
TOILETING: A LOT
TURNING FROM BACK TO SIDE WHILE IN FLAT BAD: A LOT
TURNING FROM BACK TO SIDE WHILE IN FLAT BAD: A LOT
CLIMB 3 TO 5 STEPS WITH RAILING: TOTAL
MOBILITY SCORE: 11
PERSONAL GROOMING: A LITTLE
WALKING IN HOSPITAL ROOM: A LITTLE
WALKING IN HOSPITAL ROOM: TOTAL
DRESSING REGULAR LOWER BODY CLOTHING: TOTAL
DAILY ACTIVITIY SCORE: 16
HELP NEEDED FOR BATHING: A LOT
DAILY ACTIVITIY SCORE: 13
HELP NEEDED FOR BATHING: A LOT
DAILY ACTIVITIY SCORE: 13
MOVING FROM LYING ON BACK TO SITTING ON SIDE OF FLAT BED WITH BEDRAILS: A LITTLE
CLIMB 3 TO 5 STEPS WITH RAILING: A LOT
MOVING TO AND FROM BED TO CHAIR: A LOT
DRESSING REGULAR UPPER BODY CLOTHING: A LOT
MOVING TO AND FROM BED TO CHAIR: A LOT
EATING MEALS: A LITTLE
TOILETING: A LOT
WALKING IN HOSPITAL ROOM: TOTAL
TURNING FROM BACK TO SIDE WHILE IN FLAT BAD: A LOT
PERSONAL GROOMING: A LITTLE
TOILETING: A LOT
EATING MEALS: A LITTLE
MOBILITY SCORE: 11
STANDING UP FROM CHAIR USING ARMS: A LOT
STANDING UP FROM CHAIR USING ARMS: A LOT
CLIMB 3 TO 5 STEPS WITH RAILING: TOTAL
MOVING FROM LYING ON BACK TO SITTING ON SIDE OF FLAT BED WITH BEDRAILS: A LITTLE
HELP NEEDED FOR BATHING: A LOT
PERSONAL GROOMING: A LITTLE
MOVING TO AND FROM BED TO CHAIR: A LOT
MOBILITY SCORE: 15
DRESSING REGULAR LOWER BODY CLOTHING: TOTAL
DRESSING REGULAR LOWER BODY CLOTHING: A LOT
DRESSING REGULAR UPPER BODY CLOTHING: A LOT

## 2024-10-07 ASSESSMENT — PAIN SCALES - GENERAL
PAINLEVEL_OUTOF10: 5 - MODERATE PAIN
PAINLEVEL_OUTOF10: 0 - NO PAIN
PAINLEVEL_OUTOF10: 0 - NO PAIN
PAINLEVEL_OUTOF10: 5 - MODERATE PAIN

## 2024-10-07 ASSESSMENT — PAIN - FUNCTIONAL ASSESSMENT
PAIN_FUNCTIONAL_ASSESSMENT: 0-10

## 2024-10-07 ASSESSMENT — ACTIVITIES OF DAILY LIVING (ADL): HOME_MANAGEMENT_TIME_ENTRY: 15

## 2024-10-07 NOTE — PROGRESS NOTES
Spiritual Care Visit    Clinical Encounter Type  Visited With: Patient  Routine Visit: Follow-up  Continue Visiting: Yes         Values/Beliefs  Spiritual Requests During Hospitalization: Vickie novak Communion today    Sacramental Encounters  Communion: Patient wants communion  Communion Given Indicator: Yes     Uziel Ambriz

## 2024-10-07 NOTE — PROGRESS NOTES
Physical Therapy    Physical Therapy Treatment    Patient Name: Vickie Foster  MRN: [id number]  Department: [address]  Room: 13/13-  Today's Date: 10/7/2024  Time Calculation  Start Time: 1113  Stop Time: 1154  Time Calculation (min): 41 min         Assessment/Plan   PT Assessment  End of Session Communication: Bedside nurse  Assessment Comment: Pt progressing well towards stated goals. Pt is limited d/t poor activity tolerance.  End of Session Patient Position: Up in chair, Alarm on  PT Plan  Inpatient/Swing Bed or Outpatient: Inpatient  PT Plan  Treatment/Interventions: Bed mobility, Transfer training, Gait training, Stair training, Balance training, Strengthening, Endurance training, Therapeutic exercise  PT Plan: Ongoing PT  PT Frequency: 6 times per week  PT Discharge Recommendations: Moderate intensity level of continued care  Equipment Recommended upon Discharge: Wheeled walker  PT Recommended Transfer Status: Assist x2  PT - OK to Discharge: Yes      General Visit Information:   PT  Visit  PT Received On: 10/07/24  General  Prior to Session Communication: Bedside nurse  Patient Position Received: Up in chair, Alarm on  General Comment: Pt cleared for PT by nursing. Pt agreeable to PT services.    Subjective   Precautions:  Precautions  Medical Precautions: Fall precautions, Oxygen therapy device and L/min (4L 02 NC)     Objective   Pain:  Pain Assessment  Pain Assessment: 0-10  0-10 (Numeric) Pain Score: 0 - No pain  Cognition:  Cognition  Overall Cognitive Status: Within Functional Limits  Coordination:     Postural Control:  Static Sitting Balance  Static Sitting-Balance Support: Feet supported, No upper extremity supported  Static Sitting-Level of Assistance: Close supervision  Static Sitting-Comment/Number of Minutes: good seated static balance  Static Standing Balance  Static Standing-Balance Support: Bilateral upper extremity supported  Static Standing-Level of Assistance: Contact guard  Static  Standing-Comment/Number of Minutes: Fair + static stand balance    Treatments:  Therapeutic Exercise  Therapeutic Exercise Performed: Yes (Rest breaks between each set d/t increased fatigue.)  Therapeutic Exercise Activity 1: Seated B LAQ x15  Therapeutic Exercise Activity 2: Seated B marches x15  Therapeutic Exercise Activity 3: Seated B hip abd/add x15  Therapeutic Exercise Activity 4: Seated B ankle pumps x15    Therapeutic Activity  Therapeutic Activity Performed: Yes  Therapeutic Activity 1: Instructed pt in PLB techniques to maintain therapeutic sp02 levels.  Therapeutic Activity 2: Static standing at FWW x~5 MINS    Ambulation/Gait Training  Ambulation/Gait Training Performed: Yes  Ambulation/Gait Training 1  Surface 1: Level tile  Device 1: Rolling walker  Assistance 1: Contact guard  Quality of Gait 1: Narrow base of support, Shuffling gait, Forward flexed posture, Soft knee(s)  Comments/Distance (ft) 1: 15 feet x3. Slow una. Cues for widening YEYO for increased stability. Pt navigates small spaces with no LOB.  Transfers  Transfer: Yes  Transfer 1  Transfer From 1: Sit to, Stand to  Transfer to 1: Sit, Stand  Technique 1: Sit to stand, Stand to sit  Transfer Device 1: Walker  Transfer Level of Assistance 1: Contact guard  Trials/Comments 1: Cues for ant scooting, hand placement, and bringing COM over YEYO to raise trunk.    Outcome Measures:  [facility] Basic Mobility  Turning from your back to your side while in a flat bed without using bedrails: A little  Moving from lying on your back to sitting on the side of a flat bed without using bedrails: A lot  Moving to and from bed to chair (including a wheelchair): A lot  Standing up from a chair using your arms (e.g. wheelchair or bedside chair): A little  To walk in hospital room: A little  Climbing 3-5 steps with railing: A lot  Basic Mobility - Total Score: 15    Encounter Problems       Encounter Problems (Active)       Mobility       STG - Patient will  ambulate 75' x 1 using rolling walker with SBA (Progressing)       Start:  10/03/24    Expected End:  10/17/24            STG - Patient will negotiate 10 stairs using 1 railing with CGA (Not Progressing)       Start:  10/03/24    Expected End:  10/17/24               PT Transfers       STG - Patient to transfer to and from sit to supine MOD INDEPENDENT (Progressing)       Start:  10/03/24    Expected End:  10/17/24            STG - Patient will transfer sit to and from stand with SUPERVISION (Progressing)       Start:  10/03/24    Expected End:  10/17/24               Pain - Adult

## 2024-10-07 NOTE — PROGRESS NOTES
10/07/24 1359   Discharge Planning   Home or Post Acute Services Post acute facilities (Rehab/SNF/etc)   Expected Discharge Disposition SNF     TCC received choices for patient   [facility]  [facility]   Referrals sent at this time    Precert needed prior to discharge     ** do not discharge without speaking to care coordination**

## 2024-10-07 NOTE — CARE PLAN
Vickie overall had a good night. Vickie is able to make needs known. Educated pt on why it is important to wear BiPap/Cpap. No other issues at this time. Bedside report given to day shift RN. No other issues at this time. Pt stable at time of change of shift.     The patient's goals for the shift include  get better and stronger.     The clinical goals for the shift include Wean down of oxygen.      Problem: Pain  Goal: Takes deep breaths with improved pain control throughout the shift  Outcome: Progressing  Goal: Turns in bed with improved pain control throughout the shift  Outcome: Progressing  Goal: Walks with improved pain control throughout the shift  Outcome: Progressing  Goal: Performs ADL's with improved pain control throughout shift  Outcome: Progressing  Goal: Participates in PT with improved pain control throughout the shift  Outcome: Progressing  Goal: Free from opioid side effects throughout the shift  Outcome: Progressing  Goal: Free from acute confusion related to pain meds throughout the shift  Outcome: Progressing     Problem: Respiratory  Goal: Verbalize decreased shortness of breath this shift  Outcome: Progressing  Goal: No signs of respiratory distress (eg. Use of accessory muscles. Peds grunting)  Outcome: Progressing

## 2024-10-07 NOTE — DISCHARGE SUMMARY
Discharge Diagnosis  Acute congestive heart failure    Issues Requiring Follow-Up  Needs short-term rehabilitation for physical therapy occupational therapy.  Respiratory status to be monitored.  Arrangements for home CPAP to be completed.    Discharge Meds     Medication List      START taking these medications     ipratropium-albuteroL 0.5-2.5 mg/3 mL nebulizer solution; Commonly known   as: Duo-Neb; Take 3 mL by nebulization every 6 hours if needed for   wheezing or shortness of breath.   oxygen gas therapy; Commonly known as: O2; Inhale 1 each every 12 hours.   potassium chloride 20 mEq packet; Commonly known as: Klor-Con; Take 20   mEq by mouth 2 times a day.; Replaces: Klor-Con M20 20 mEq ER tablet     CHANGE how you take these medications     oxygen gas therapy; Commonly known as: O2; Inhale 1 each every 12   hours.; What changed: how much to take, when to take this     CONTINUE taking these medications     allopurinoL 200 mg tablet; Take 200 mg by mouth 2 times a day.   atorvastatin 40 mg tablet; Commonly known as: Lipitor; Take 1 tablet (40   mg) by mouth once daily at bedtime.   budesonide-formoteroL 80-4.5 mcg/actuation inhaler; Commonly known as:   Symbicort   buPROPion  mg 24 hr tablet; Commonly known as: Wellbutrin XL; Take   1 tablet (300 mg) by mouth once daily in the morning. Take before meals.   calcium carb-vitamin D3-vit K2 600 mg-1,000 unit-90 mcg tablet   cyclobenzaprine 5 mg tablet; Commonly known as: Flexeril; Take 1-2   tablets (5-10 mg) by mouth 2 times a day as needed for muscle spasms.   dofetilide 250 mcg capsule; Commonly known as: Tikosyn; Take 1 capsule   (250 mcg) by mouth every 12 hours.   Eliquis 5 mg tablet; Generic drug: apixaban; Take 1 tablet (5 mg) by   mouth 2 times a day.   metoprolol succinate XL 25 mg 24 hr tablet; Commonly known as:   Toprol-XL; TAKE 1 TABLET BY MOUTH ONE TIME DAILY   semaglutide 2 mg/dose (8 mg/3 mL) pen injector   torsemide 100 mg tablet; Commonly  known as: Demadex; Take 1/2 a tablet   (50 mg) by mouth 2 times a day.     STOP taking these medications     CENTRUM SILVER ORAL   gabapentin 300 mg capsule; Commonly known as: Neurontin   Klor-Con M20 20 mEq ER tablet; Generic drug: potassium chloride CR;   Replaced by: potassium chloride 20 mEq packet   metOLazone 2.5 mg tablet; Commonly known as: Zaroxolyn   tiZANidine 2 mg tablet; Commonly known as: Zanaflex   Vitamin C With Genevieve Hips 1,000 mg tablet; Generic drug: ascorbic acid       Test Results Pending At Discharge  Pending Labs       No current pending labs.            Hospital Course   The patient was admitted to the intensive care unit for evaluation and treatment of acute hypercarbic Respiratory failure secondary to COPD exacerbation and acute on chronic diastolic congestive heart failure.  She was treated with IV diuretics beta-blocker for congestive heart failure.  Echocardiogram from July 2024 showed normal left ventricular systolic function and most likely patient has diastolic dysfunction.  Congestive heart failure is resolved on discharge.  She is euvolemic on discharge.  Her home cardiac medications are resumed on discharge including antiarrhythmics and therapeutic anticoagulation for known atrial fibrillation.  Patient also had a component of COPD exacerbation.  She was treated with nebulizers, steroids, antibiotics and BiPAP.  Condition gradually improved and she is breathing at normal baseline on discharge.  She will require evaluation for home CPAP on discharge from skilled nursing facility.  The patient has severe debility from her multiple acute and chronic medical problems.  Physical therapy and Occupational Therapy are consulted and is recommended the patient be discharged to a skilled nursing facility for short-term rehabilitation.  She will be discharged to skilled nursing facility when bed is available.    Pertinent Physical Exam At Time of Discharge  Physical Exam  Constitutional:        Appearance: Normal appearance.   HENT:      Head: Normocephalic and atraumatic.      Nose: Nose normal.      Mouth/Throat:      Mouth: Mucous membranes are moist.      Pharynx: Oropharynx is clear.   Eyes:      Extraocular Movements: Extraocular movements intact.      Conjunctiva/sclera: Conjunctivae normal.      Pupils: Pupils are equal, round, and reactive to light.   Cardiovascular:      Rate and Rhythm: Normal rate and regular rhythm.      Pulses: Normal pulses.      Heart sounds: Normal heart sounds.   Pulmonary:      Effort: Pulmonary effort is normal.      Breath sounds: Normal breath sounds.   Abdominal:      General: Abdomen is flat. Bowel sounds are normal.      Palpations: Abdomen is soft.      Tenderness: There is no abdominal tenderness.   Musculoskeletal:         General: Normal range of motion.      Cervical back: Normal range of motion and neck supple.   Skin:     General: Skin is warm and dry.   Neurological:      General: No focal deficit present.      Mental Status: She is alert and oriented to person, place, and time.   Psychiatric:         Mood and Affect: Mood normal.         Behavior: Behavior normal.         Thought Content: Thought content normal.         Outpatient Follow-Up  Future Appointments   Date Time Provider Department Center   10/10/2024 11:30 AM Joseph Kelley MD [id number] [facility]   10/10/2024  2:15 PM Carlos Urias MD [id number] [facility]   10/24/2024  1:15 PM Danilo Gonzalez DO [id number] [facility]   10/29/2024 10:00 AM PHARMACY [facility] [id number] [facility]   10/29/2024 11:45 AM Amado Ty MD [id number] [facility]   12/30/2024  3:15 PM Joseph Sanchez MD [id number] [facility]       Discharge time is 35 minutes  Jak Kam MD

## 2024-10-07 NOTE — PROGRESS NOTES
Occupational Therapy    OT Treatment    Patient Name: Vickie Foster  MRN: [id number]  Department: [address]  Room: 13/13-B  Today's Date: 10/7/2024  Time Calculation  Start Time: 0817  Stop Time: 0856  Time Calculation (min): 39 min        Assessment:  OT Assessment: Tolerated session well, demonstrating improved functional tolerance and overall cognitive status. Pt expressing she is feeling depressed while being here in hospital and becoming tearful with theraputic use of self utilized to comfort pt. Pt would benefit from continued skilled OT services to improve strength, balance, and functional tolerance to increase independence with ADL tasks  End of Session Communication: Bedside nurse  End of Session Patient Position: Up in chair, Alarm on  OT Assessment Results: Decreased ADL status, Decreased upper extremity strength, Decreased safe judgment during ADL, Decreased cognition, Decreased endurance, Decreased functional mobility, Decreased trunk control for functional activities  Plan:  Treatment Interventions: ADL retraining, Functional transfer training, UE strengthening/ROM, Endurance training, Patient/family training, Equipment evaluation/education, Neuromuscular reeducation, Compensatory technique education  OT Frequency: 5 times per week  OT Discharge Recommendations: Moderate intensity level of continued care  Equipment Recommended upon Discharge: Wheeled walker  OT Recommended Transfer Status: Assist of 1  OT - OK to Discharge: Yes  Treatment Interventions: ADL retraining, Functional transfer training, UE strengthening/ROM, Endurance training, Patient/family training, Equipment evaluation/education, Neuromuscular reeducation, Compensatory technique education    Subjective   Previous Visit Info:  OT Last Visit  OT Received On: 10/07/24  General:  General  Prior to Session Communication: Bedside nurse  Patient Position Received: Up in chair, Alarm on  General Comment: Cleared for therapy per RN. Pt seated in  chair upon arrival and agreeable to tx  Precautions:  Hearing/Visual Limitations: wears glasses  Medical Precautions: Fall precautions, Oxygen therapy device and L/min (4L O2 nc)  Precautions Comment: educated on spinal precautions d/t elevated lumbar pain    Pain:  Pain Assessment  Pain Assessment: 0-10  0-10 (Numeric) Pain Score: 5 - Moderate pain  Pain Type: Chronic pain  Pain Location: Back  Pain Orientation: Lower  Pain Interventions: Repositioned    Objective    Cognition:  Cognition  Overall Cognitive Status: Within Functional Limits  Cognition Comments: improved overall cognition, expressing she is feeling depressed d/t current status  Insight: Mild    Activities of Daily Living:    Grooming  Grooming Level of Assistance: Setup, Close supervision  Grooming Where Assessed: Chair  Grooming Comments: face washing and hair brushing    LE Dressing  LE Dressing: Yes  Sock Level of Assistance: Maximum assistance  LE Dressing Where Assessed: Chair  LE Dressing Comments: assist to don socks. Pt educated on AE for LB dressing tasks with visuals provided with pt expressing interest + understanding. AE to be trialed following date     Bed Mobility/Transfers: Bed Mobility  Bed Mobility:  (educated on logroll technique for precautionary spinal precautions d/t elevated lumbar pain)    Transfers  Transfer: Yes  Transfer 1  Technique 1: Sit to stand, Stand to sit  Transfer Device 1: Walker  Transfer Level of Assistance 1: Minimum assistance, Moderate verbal cues  Trials/Comments 1: assist for trunk elevation and forward weightshifting with cues for proper hand placement and eccentric lowering to chair    Functional Mobility:  Functional Mobility  Functional Mobility Performed: Yes  Functional Mobility 1  Device 1: Rolling walker  Assistance 1: Contact guard  Comments 1: tolerated taking steps at bedside at FWW with cues for postural alignment and pacing for safety awareness, demonstrating fair- balance    Standing  Balance:  Static Standing Balance  Static Standing-Level of Assistance: Contact guard  Static Standing-Comment/Number of Minutes: fair- balance during functional mobility task    Other Activity:  Other Activity Performed: Yes  Other Activity 1: increased education provided on use of spinal precautions d/t elevated back pain with logroll technique demonstrated. Educated on use of AE for LB dressing with visuals provided with pt expressing understanding + interest in use.    Outcome Measures:[facility] Daily Activity  Putting on and taking off regular lower body clothing: A lot  Bathing (including washing, rinsing, drying): A lot  Putting on and taking off regular upper body clothing: A little  Toileting, which includes using toilet, bedpan or urinal: A lot  Taking care of personal grooming such as brushing teeth: A little  Eating Meals: None  Daily Activity - Total Score: 16    Education Documentation  Body Mechanics, taught by LUNA Machado at 10/7/2024  9:38 AM.  Learner: Patient  Readiness: Acceptance  Method: Explanation  Response: Verbalizes Understanding, Demonstrated Understanding    Home Exercise Program, taught by LUNA Machado at 10/7/2024  9:38 AM.  Learner: Patient  Readiness: Acceptance  Method: Explanation  Response: Verbalizes Understanding, Demonstrated Understanding    ADL Training, taught by LUNA Machado at 10/7/2024  9:38 AM.  Learner: Patient  Readiness: Acceptance  Method: Explanation  Response: Verbalizes Understanding, Demonstrated Understanding    Education Comments  No comments found.      Problem: OT Goals  Goal: Pt will complete all grooming tasks with setup/indep.  Outcome: Progressing  Goal: Pt will complete UB dressing/bathing with setup/indep and LB dressing/bathing with close S using adaptive equipment as needed.  Outcome: Progressing  Goal: Pt will complete all toileting tasks with close S.  Outcome: Progressing  Goal: Pt will complete all  functional transfers and mobility with close S using a RW.  Outcome: Progressing

## 2024-10-08 VITALS
WEIGHT: 246.47 LBS | SYSTOLIC BLOOD PRESSURE: 132 MMHG | TEMPERATURE: 97 F | HEART RATE: 88 BPM | DIASTOLIC BLOOD PRESSURE: 63 MMHG | HEIGHT: 62 IN | OXYGEN SATURATION: 95 % | BODY MASS INDEX: 45.36 KG/M2 | RESPIRATION RATE: 21 BRPM

## 2024-10-08 PROBLEM — I50.33: Status: ACTIVE | Noted: 2024-10-08

## 2024-10-08 PROBLEM — J96.21 ACUTE AND CHRONIC RESPIRATORY FAILURE WITH HYPOXIA (MULTI): Status: ACTIVE | Noted: 2024-10-08

## 2024-10-08 PROBLEM — I50.9 ACUTE CONGESTIVE HEART FAILURE: Status: RESOLVED | Noted: 2023-08-30 | Resolved: 2024-10-08

## 2024-10-08 LAB
ALBUMIN SERPL BCP-MCNC: 3.5 G/DL (ref 3.4–5)
ANION GAP SERPL CALCULATED.3IONS-SCNC: 10 MMOL/L (ref 10–20)
BASOPHILS # BLD AUTO: 0.02 X10*3/UL (ref 0–0.1)
BASOPHILS NFR BLD AUTO: 0.2 %
BUN SERPL-MCNC: 24 MG/DL (ref 6–23)
CALCIUM SERPL-MCNC: 9.4 MG/DL (ref 8.6–10.3)
CHLORIDE SERPL-SCNC: 87 MMOL/L (ref 98–107)
CO2 SERPL-SCNC: 44 MMOL/L (ref 21–32)
CREAT SERPL-MCNC: 0.47 MG/DL (ref 0.5–1.05)
EGFRCR SERPLBLD CKD-EPI 2021: >90 ML/MIN/1.73M*2
EOSINOPHIL # BLD AUTO: 0.15 X10*3/UL (ref 0–0.7)
EOSINOPHIL NFR BLD AUTO: 1.6 %
ERYTHROCYTE [DISTWIDTH] IN BLOOD BY AUTOMATED COUNT: 13.3 % (ref 11.5–14.5)
GLUCOSE BLD MANUAL STRIP-MCNC: 101 MG/DL (ref 74–99)
GLUCOSE BLD MANUAL STRIP-MCNC: 112 MG/DL (ref 74–99)
GLUCOSE BLD MANUAL STRIP-MCNC: 95 MG/DL (ref 74–99)
GLUCOSE SERPL-MCNC: 104 MG/DL (ref 74–99)
HCT VFR BLD AUTO: 35.3 % (ref 36–46)
HGB BLD-MCNC: 11.6 G/DL (ref 12–16)
IMM GRANULOCYTES # BLD AUTO: 0.04 X10*3/UL (ref 0–0.7)
IMM GRANULOCYTES NFR BLD AUTO: 0.4 % (ref 0–0.9)
LYMPHOCYTES # BLD AUTO: 1.28 X10*3/UL (ref 1.2–4.8)
LYMPHOCYTES NFR BLD AUTO: 13.9 %
MAGNESIUM SERPL-MCNC: 2.11 MG/DL (ref 1.6–2.4)
MCH RBC QN AUTO: 31.3 PG (ref 26–34)
MCHC RBC AUTO-ENTMCNC: 32.9 G/DL (ref 32–36)
MCV RBC AUTO: 95 FL (ref 80–100)
MONOCYTES # BLD AUTO: 0.69 X10*3/UL (ref 0.1–1)
MONOCYTES NFR BLD AUTO: 7.5 %
NEUTROPHILS # BLD AUTO: 7.05 X10*3/UL (ref 1.2–7.7)
NEUTROPHILS NFR BLD AUTO: 76.4 %
NRBC BLD-RTO: 0 /100 WBCS (ref 0–0)
PHOSPHATE SERPL-MCNC: 3.5 MG/DL (ref 2.5–4.9)
PLATELET # BLD AUTO: 282 X10*3/UL (ref 150–450)
POTASSIUM SERPL-SCNC: 3.4 MMOL/L (ref 3.5–5.3)
RBC # BLD AUTO: 3.71 X10*6/UL (ref 4–5.2)
SODIUM SERPL-SCNC: 138 MMOL/L (ref 136–145)
WBC # BLD AUTO: 9.2 X10*3/UL (ref 4.4–11.3)

## 2024-10-08 PROCEDURE — 2500000001 HC RX 250 WO HCPCS SELF ADMINISTERED DRUGS (ALT 637 FOR MEDICARE OP)

## 2024-10-08 PROCEDURE — 2500000005 HC RX 250 GENERAL PHARMACY W/O HCPCS: Performed by: HOSPITALIST

## 2024-10-08 PROCEDURE — 2500000001 HC RX 250 WO HCPCS SELF ADMINISTERED DRUGS (ALT 637 FOR MEDICARE OP): Performed by: HOSPITALIST

## 2024-10-08 PROCEDURE — 97116 GAIT TRAINING THERAPY: CPT | Mod: GP

## 2024-10-08 PROCEDURE — 82947 ASSAY GLUCOSE BLOOD QUANT: CPT

## 2024-10-08 PROCEDURE — 2500000005 HC RX 250 GENERAL PHARMACY W/O HCPCS

## 2024-10-08 PROCEDURE — 97110 THERAPEUTIC EXERCISES: CPT | Mod: GP

## 2024-10-08 PROCEDURE — 2500000004 HC RX 250 GENERAL PHARMACY W/ HCPCS (ALT 636 FOR OP/ED)

## 2024-10-08 PROCEDURE — 97535 SELF CARE MNGMENT TRAINING: CPT | Mod: GO,CO

## 2024-10-08 PROCEDURE — 36415 COLL VENOUS BLD VENIPUNCTURE: CPT

## 2024-10-08 PROCEDURE — 83735 ASSAY OF MAGNESIUM: CPT

## 2024-10-08 PROCEDURE — 94660 CPAP INITIATION&MGMT: CPT

## 2024-10-08 PROCEDURE — 80069 RENAL FUNCTION PANEL: CPT

## 2024-10-08 PROCEDURE — 94640 AIRWAY INHALATION TREATMENT: CPT

## 2024-10-08 PROCEDURE — 97530 THERAPEUTIC ACTIVITIES: CPT | Mod: GO,CO

## 2024-10-08 PROCEDURE — 85025 COMPLETE CBC W/AUTO DIFF WBC: CPT

## 2024-10-08 RX ORDER — LIDOCAINE 560 MG/1
2 PATCH PERCUTANEOUS; TOPICAL; TRANSDERMAL DAILY
Start: 2024-10-09 | End: 2024-11-08

## 2024-10-08 ASSESSMENT — PAIN SCALES - GENERAL
PAINLEVEL_OUTOF10: 5 - MODERATE PAIN
PAINLEVEL_OUTOF10: 10 - WORST POSSIBLE PAIN
PAINLEVEL_OUTOF10: 3
PAINLEVEL_OUTOF10: 2

## 2024-10-08 ASSESSMENT — COGNITIVE AND FUNCTIONAL STATUS - GENERAL
CLIMB 3 TO 5 STEPS WITH RAILING: A LOT
DAILY ACTIVITIY SCORE: 15
DAILY ACTIVITIY SCORE: 18
TURNING FROM BACK TO SIDE WHILE IN FLAT BAD: A LITTLE
WALKING IN HOSPITAL ROOM: A LITTLE
MOVING TO AND FROM BED TO CHAIR: A LITTLE
STANDING UP FROM CHAIR USING ARMS: A LITTLE
MOBILITY SCORE: 17
MOVING FROM LYING ON BACK TO SITTING ON SIDE OF FLAT BED WITH BEDRAILS: A LITTLE
DRESSING REGULAR UPPER BODY CLOTHING: A LITTLE
HELP NEEDED FOR BATHING: A LITTLE
DRESSING REGULAR LOWER BODY CLOTHING: TOTAL
DRESSING REGULAR LOWER BODY CLOTHING: A LITTLE
MOBILITY SCORE: 16
HELP NEEDED FOR BATHING: A LOT
MOVING FROM LYING ON BACK TO SITTING ON SIDE OF FLAT BED WITH BEDRAILS: A LITTLE
TOILETING: A LOT
PERSONAL GROOMING: A LITTLE
CLIMB 3 TO 5 STEPS WITH RAILING: A LOT
PERSONAL GROOMING: A LITTLE
MOVING TO AND FROM BED TO CHAIR: A LITTLE
DRESSING REGULAR UPPER BODY CLOTHING: A LOT
WALKING IN HOSPITAL ROOM: A LITTLE
TOILETING: A LITTLE
TURNING FROM BACK TO SIDE WHILE IN FLAT BAD: A LOT
STANDING UP FROM CHAIR USING ARMS: A LITTLE

## 2024-10-08 ASSESSMENT — PAIN DESCRIPTION - LOCATION
LOCATION: BACK
LOCATION: BACK

## 2024-10-08 ASSESSMENT — ACTIVITIES OF DAILY LIVING (ADL): HOME_MANAGEMENT_TIME_ENTRY: 14

## 2024-10-08 ASSESSMENT — PAIN SCALES - WONG BAKER
WONGBAKER_NUMERICALRESPONSE: HURTS LITTLE BIT
WONGBAKER_NUMERICALRESPONSE: HURTS LITTLE BIT

## 2024-10-08 ASSESSMENT — PAIN - FUNCTIONAL ASSESSMENT
PAIN_FUNCTIONAL_ASSESSMENT: 0-10
PAIN_FUNCTIONAL_ASSESSMENT: 0-10

## 2024-10-08 ASSESSMENT — PAIN DESCRIPTION - ORIENTATION: ORIENTATION: RIGHT;LEFT;LOWER

## 2024-10-08 NOTE — CARE PLAN
The patient's goals for the shift include      The clinical goals for the shift include no falls or shortness of breath      Problem: Skin  Goal: Decreased wound size/increased tissue granulation at next dressing change  Outcome: Progressing  Flowsheets (Taken 10/8/2024 0041)  Decreased wound size/increased tissue granulation at next dressing change: Promote sleep for wound healing  Goal: Participates in plan/prevention/treatment measures  Outcome: Progressing  Flowsheets (Taken 10/8/2024 0041)  Participates in plan/prevention/treatment measures: Discuss with provider PT/OT consult  Goal: Prevent/manage excess moisture  Outcome: Progressing  Flowsheets (Taken 10/8/2024 0041)  Prevent/manage excess moisture: Cleanse incontinence/protect with barrier cream  Goal: Prevent/minimize sheer/friction injuries  Outcome: Progressing  Flowsheets (Taken 10/8/2024 0041)  Prevent/minimize sheer/friction injuries: HOB 30 degrees or less  Goal: Promote/optimize nutrition  Outcome: Progressing  Flowsheets (Taken 10/8/2024 0041)  Promote/optimize nutrition: Consume > 50% meals/supplements  Goal: Promote skin healing  Outcome: Progressing  Flowsheets (Taken 10/8/2024 0041)  Promote skin healing: Ensure correct size (line/device) and apply per  instructions     Problem: Fall/Injury  Goal: Not fall by end of shift  Outcome: Progressing  Goal: Be free from injury by end of the shift  Outcome: Progressing  Goal: Verbalize understanding of personal risk factors for fall in the hospital  Outcome: Progressing  Goal: Verbalize understanding of risk factor reduction measures to prevent injury from fall in the home  Outcome: Progressing  Goal: Use assistive devices by end of the shift  Outcome: Progressing  Goal: Pace activities to prevent fatigue by end of the shift  Outcome: Progressing     Problem: Pain  Goal: Takes deep breaths with improved pain control throughout the shift  Outcome: Progressing  Goal: Turns in bed with  improved pain control throughout the shift  Outcome: Progressing  Goal: Walks with improved pain control throughout the shift  Outcome: Progressing  Goal: Performs ADL's with improved pain control throughout shift  Outcome: Progressing  Goal: Participates in PT with improved pain control throughout the shift  Outcome: Progressing  Goal: Free from opioid side effects throughout the shift  Outcome: Progressing  Goal: Free from acute confusion related to pain meds throughout the shift  Outcome: Progressing

## 2024-10-08 NOTE — PROGRESS NOTES
Physical Therapy    Physical Therapy Treatment    Patient Name: Vickie Foster  MRN: [id number]  Department: [address]  Room: 09/09-A  Today's Date: 10/8/2024  Time Calculation  Start Time: 1111  Stop Time: 1135  Time Calculation (min): 24 min         Assessment/Plan   PT Assessment  PT Assessment Results: Decreased strength, Decreased endurance, Impaired balance, Decreased mobility, Decreased coordination, Impaired judgement, Pain  Rehab Prognosis: Good  Barriers to Discharge: medical status  Evaluation/Treatment Tolerance: Patient tolerated treatment well  Medical Staff Made Aware: Yes  Strengths: Ability to acquire knowledge  Barriers to Participation: Comorbidities  End of Session Communication: Bedside nurse  Assessment Comment: The patient is progressing well towards functional goals during hospital stay; pt requires CGA to Jo for transfers and ambulation with RW; pt would continue to benefit from skilled therapy services to address functional deficits.  End of Session Patient Position: Up in chair, Alarm off, not on at start of session (nsg aware)  PT Plan  Inpatient/Swing Bed or Outpatient: Inpatient  PT Plan  Treatment/Interventions: Bed mobility, Transfer training, Gait training, Stair training, Balance training, Strengthening, Endurance training, Therapeutic exercise  PT Plan: Ongoing PT  PT Frequency: 6 times per week  PT Discharge Recommendations: Moderate intensity level of continued care  Equipment Recommended upon Discharge: Wheeled walker  PT Recommended Transfer Status: Assist x2  PT - OK to Discharge: Yes      General Visit Information:   PT  Visit  PT Received On: 10/08/24  Response to Previous Treatment: Patient with no complaints from previous session.  General  Family/Caregiver Present: No  Prior to Session Communication: Bedside nurse  Patient Position Received: Up in chair, Alarm off, not on at start of session  Preferred Learning Style: verbal, visual  General Comment: Pt cleared for PT by  nursing. Pt agreeable to PT services.    Subjective   Precautions:  Precautions  Hearing/Visual Limitations: wears glasses  Medical Precautions: Fall precautions, Oxygen therapy device and L/min (2L O2)    Vital Signs Comment: HR at rest 74 bpm; SpO2 95% with 2L O2 donned     Objective   Pain:  Pain Assessment  Pain Assessment: 0-10  0-10 (Numeric) Pain Score: 3  Pain Type: Chronic pain  Pain Location: Back  Pain Interventions: Repositioned  Response to Interventions: nsg aware  Cognition:  Cognition  Overall Cognitive Status: Within Functional Limits  Orientation Level: Oriented X4  Insight: Mild  Coordination:  Coordination Comment: short inconsistent steps  Postural Control:  Postural Control  Posture Comment: rounded shoulders with head down posture and mild left lateral trunk lean  Extremity/Trunk Assessments:  RLE   RLE :  (grossly 3+/5)  LLE   LLE :  (grossly 3+/5)  Activity Tolerance:  Activity Tolerance  Endurance: Tolerates 10 - 20 min exercise with multiple rests  Rate of Perceived Exertion (RPE): 5/10  Treatments:  Therapeutic Exercise  Therapeutic Exercise Performed: Yes  Therapeutic Exercise Activity 1: Seated B LAQ x15  Therapeutic Exercise Activity 2: Seated B marches x15  Therapeutic Exercise Activity 3: Seated B hip abd/add x15  Therapeutic Exercise Activity 4: Seated B ankle pumps x15    Therapeutic Activity  Therapeutic Activity Performed: Yes  Therapeutic Activity 1: VCs for pursed lip breathing    Ambulation/Gait Training  Ambulation/Gait Training Performed: Yes  Ambulation/Gait Training 1  Surface 1: Level tile  Device 1: Rolling walker  Assistance 1: Contact guard  Quality of Gait 1: Narrow base of support, Shuffling gait, Forward flexed posture, Soft knee(s)  Comments/Distance (ft) 1: 20ftx2 with RW and CGA; decreased una with VCs for safe navigation of AD around objects  Transfers  Transfer: Yes  Transfer 1  Transfer From 1: Sit to, Stand to  Transfer to 1: Sit, Stand  Technique 1: Sit  to stand, Stand to sit  Transfer Device 1: Walker  Transfer Level of Assistance 1: Contact guard  Trials/Comments 1: VCs for safe sequencing    Outcome Measures:  [facility] Basic Mobility  Turning from your back to your side while in a flat bed without using bedrails: A little  Moving from lying on your back to sitting on the side of a flat bed without using bedrails: A lot  Moving to and from bed to chair (including a wheelchair): A little  Standing up from a chair using your arms (e.g. wheelchair or bedside chair): A little  To walk in hospital room: A little  Climbing 3-5 steps with railing: A lot  Basic Mobility - Total Score: 16    Education Documentation  Precautions, taught by Chrissy Granados PT at 10/8/2024  1:15 PM.  Learner: Patient  Readiness: Acceptance  Method: Explanation  Response: Verbalizes Understanding  Comment: VCs for safe sequencing of mobility    Mobility Training, taught by Chrissy Granados PT at 10/8/2024  1:15 PM.  Learner: Patient  Readiness: Acceptance  Method: Explanation  Response: Verbalizes Understanding  Comment: VCs for safe sequencing of mobility    Education Comments  No comments found.      Encounter Problems       Encounter Problems (Active)       Mobility       STG - Patient will ambulate 75' x 1 using rolling walker with SBA (Progressing)       Start:  10/03/24    Expected End:  10/17/24            STG - Patient will negotiate 10 stairs using 1 railing with CGA (Not Progressing)       Start:  10/03/24    Expected End:  10/17/24               PT Transfers       STG - Patient to transfer to and from sit to supine MOD INDEPENDENT (Progressing)       Start:  10/03/24    Expected End:  10/17/24            STG - Patient will transfer sit to and from stand with SUPERVISION (Progressing)       Start:  10/03/24    Expected End:  10/17/24               Pain - Adult

## 2024-10-08 NOTE — PROGRESS NOTES
Occupational Therapy    OT Treatment    Patient Name: Vickie Foster  MRN: [id number]  Department: [address]  Room: 09/09-A  Today's Date: 10/8/2024  Time Calculation  Start Time: 1458  Stop Time: 1522  Time Calculation (min): 24 min        Assessment:  OT Assessment: Tolerated session well with pt demonstrating good carryover of education on AE and is motivated to improve. Pt would benefit from continued skilled OT services to improve strength, balance, and functional tolerance to increase independence with ADL tasks  End of Session Communication: Bedside nurse  End of Session Patient Position: Up in chair, Alarm on  OT Assessment Results: Decreased ADL status, Decreased upper extremity strength, Decreased safe judgment during ADL, Decreased cognition, Decreased endurance, Decreased functional mobility, Decreased trunk control for functional activities  Plan:  Treatment Interventions: ADL retraining, Functional transfer training, UE strengthening/ROM, Endurance training, Patient/family training, Equipment evaluation/education, Neuromuscular reeducation, Compensatory technique education  OT Frequency: 5 times per week  OT Discharge Recommendations: Moderate intensity level of continued care  Equipment Recommended upon Discharge: Wheeled walker  OT Recommended Transfer Status: Assist of 2  OT - OK to Discharge: Yes  Treatment Interventions: ADL retraining, Functional transfer training, UE strengthening/ROM, Endurance training, Patient/family training, Equipment evaluation/education, Neuromuscular reeducation, Compensatory technique education    Subjective   Previous Visit Info:  OT Last Visit  OT Received On: 10/08/24  General:  General  Prior to Session Communication: Bedside nurse  Patient Position Received: Up in chair, Alarm on  General Comment: Cleared for therapy per RN. Pt seated in chair upon arrival and agreeable to tx  Precautions:  Hearing/Visual Limitations: wears glasses  Medical Precautions: Fall  precautions, Oxygen therapy device and L/min (2L O2 nc)  Pain:  Pain Assessment  Pain Assessment: 0-10  0-10 (Numeric) Pain Score: 2  Pain Type: Chronic pain  Pain Location: Back    Objective    Cognition:  Cognition  Overall Cognitive Status: Within Functional Limits    Activities of Daily Living:    LE Dressing  LE Dressing: Yes  LE Dressing Adaptive Equipment: Reacher, Sock aide  Sock Level of Assistance: Minimum assistance  LE Dressing Where Assessed: Chair  LE Dressing Comments: Educated pt on use of AE for LB dressing with pt demonstrating good carryover and able to don/doff socks x2 trials with increased time + effort with assist to pull socks up once on feet. Educated on use of AE to don pants with pt expressing understanding    Sitting Balance:  Dynamic Sitting Balance  Dynamic Sitting-Level of Assistance: Close supervision  Dynamic Sitting-Balance: Forward lean, Reaching for objects, Reaching across midline  Dynamic Sitting-Comments: good seated balance during BLE dressing  Other Activity:  Other Activity Performed: Yes  Other Activity 1: Educated on use of AE for LB dressing with pt demonstrating good carryover with increased time. Provided info on where adaptive equipment available + pricing of and additional AE available for ADL tasks, extended shoehorn + sponge, and dressing stick with simulated task demonstrated.    Outcome Measures:[facility] Daily Activity  Putting on and taking off regular lower body clothing: A little  Bathing (including washing, rinsing, drying): A little  Putting on and taking off regular upper body clothing: A little  Toileting, which includes using toilet, bedpan or urinal: A lot  Taking care of personal grooming such as brushing teeth: A little  Eating Meals: None  Daily Activity - Total Score: 18    Education Documentation  Body Mechanics, taught by LUNA Mcahado at 10/8/2024  3:40 PM.  Learner: Patient  Readiness: Acceptance  Method: Explanation  Response:  Verbalizes Understanding, Demonstrated Understanding    Home Exercise Program, taught by LUNA Machado at 10/8/2024  3:40 PM.  Learner: Patient  Readiness: Acceptance  Method: Explanation  Response: Verbalizes Understanding, Demonstrated Understanding    ADL Training, taught by LUNA Machado at 10/8/2024  3:40 PM.  Learner: Patient  Readiness: Acceptance  Method: Explanation  Response: Verbalizes Understanding, Demonstrated Understanding    Education Comments  No comments found.      Problem: OT Goals  Goal: Pt will complete all grooming tasks with setup/indep.  Outcome: Progressing  Goal: Pt will complete UB dressing/bathing with setup/indep and LB dressing/bathing with close S using adaptive equipment as needed.  Outcome: Progressing  Goal: Pt will complete all toileting tasks with close S.  Outcome: Progressing  Goal: Pt will complete all functional transfers and mobility with close S using a RW.  Outcome: Progressing

## 2024-10-08 NOTE — PROGRESS NOTES
Spiritual Care Visit    Clinical Encounter Type  Visited With: Patient  Routine Visit: Follow-up  Continue Visiting: Yes         Values/Beliefs  Spiritual Requests During Hospitalization: Vickie reddy for a blessing today in her new room.     Uziel Ambriz

## 2024-10-08 NOTE — NURSING NOTE
UPDATE 2340: Patient placed back in bed with assit, requesting CPAP to be placed on. Resp notified,.

## 2024-10-08 NOTE — PROGRESS NOTES
Vickie Foster is a 68 y.o. female on day 6 of admission presenting with Acute congestive heart failure.      Subjective      She denies chest pain or shortness of breath.  She has generalized weakness.    Objective     Last Recorded Vitals  /83 (BP Location: Left arm, Patient Position: Sitting)   Pulse 82   Temp 36 °C (96.8 °F) (Temporal)   Resp 25   Wt 112 kg (246 lb 7.6 oz)   SpO2 96%   Intake/Output last 3 Shifts:    Intake/Output Summary (Last 24 hours) at 10/8/2024 1403  Last data filed at 10/8/2024 0800  Gross per 24 hour   Intake 490 ml   Output 1350 ml   Net -860 ml       Admission Weight  Weight: 110 kg (243 lb) (10/02/24 1834)    Daily Weight  10/08/24 : 112 kg (246 lb 7.6 oz)    Image Results  ECG 12 lead  Atrial fibrillation with rapid ventricular response with premature ventricular or aberrantly conducted complexes  Marked ST abnormality, possible inferior subendocardial injury  Abnormal ECG  When compared with ECG of 18-SEP-2024 12:42,  ST no longer depressed in Anterior leads  T wave inversion now evident in Inferior leads  Confirmed by Phi Perez ([id number]) on 10/3/2024 10:53:12 PM  CT angio chest for pulmonary embolism  Narrative: Interpreted By:  Mikey Hough,   STUDY:  CT ANGIO CHEST FOR PULMONARY EMBOLISM;  10/3/2024 2:02 am      INDICATION:  Signs/Symptoms:hypoxic, tachypneic, tachycardic.      COMPARISON:  6/27/2024      ACCESSION NUMBER(S):  [id number]      ORDERING CLINICIAN:  BENJAMIN WELCH      TECHNIQUE:  Contiguous axial images of the chest were obtained after the  intravenous administration of  contrast. Coronal and sagittal  reformatted images were obtained from the axial images. MIPS of the  chest were also performed and reviewed.      FINDINGS:  The examination is limited secondary to patient body habitus, motion,  and beam hardening artifact secondary inferior position of the  patient's arms.      No axillary lymphadenopathy. 1.6 cm subcarinal lymph node. No  hilar  lymphadenopathy.      There is cardiomegaly. No significant pericardial effusion.  Dilatation of central pulmonary artery measuring 4.7 cm in diameter  concerning for pulmonary artery hypertension. No evidence of acute  central, main, lobar or proximal segmental pulmonary embolism.  Evaluation for more distal emboli is limited secondary to timing of  contrast bolus, respiratory motion and limitation secondary to  patient body habitus.      Bilateral consolidative/atelectatic opacities. No significant pleural  effusion. No pneumothorax.      Limited evaluation of the upper abdomen.      Multilevel degenerative change of the thoracic spine.      Impression: No evidence of acute pulmonary embolism.      Dilatation of central pulmonary artery concerning for pulmonary  artery hypertension.      Bilateral consolidative/atelectatic opacities.      MACRO:  None      Signed by: Mikey Hough 10/3/2024 3:09 AM  Dictation workstation:   [id number]      Physical Exam  Constitutional:       Appearance: Normal appearance.   HENT:      Head: Normocephalic and atraumatic.      Nose: Nose normal.      Mouth/Throat:      Mouth: Mucous membranes are moist.      Pharynx: Oropharynx is clear.   Eyes:      Extraocular Movements: Extraocular movements intact.      Conjunctiva/sclera: Conjunctivae normal.      Pupils: Pupils are equal, round, and reactive to light.   Cardiovascular:      Rate and Rhythm: Normal rate and regular rhythm.      Pulses: Normal pulses.      Heart sounds: Normal heart sounds.   Pulmonary:      Effort: Pulmonary effort is normal.      Breath sounds: Normal breath sounds.   Abdominal:      General: Abdomen is flat. Bowel sounds are normal.      Palpations: Abdomen is soft.      Tenderness: There is no abdominal tenderness.   Musculoskeletal:         General: Normal range of motion.      Cervical back: Normal range of motion and neck supple.   Skin:     General: Skin is warm and dry.   Neurological:       General: No focal deficit present.      Mental Status: She is alert and oriented to person, place, and time.   Psychiatric:         Mood and Affect: Mood normal.         Behavior: Behavior normal.         Thought Content: Thought content normal.         Relevant Results               Assessment/Plan                Assessment & Plan  Acute congestive heart failure    She is treated for acute on chronic diastolic congestive heart failure.  Acute congestive heart failure is resolved.  She is clinically euvolemic.  The present treatment is continued with home cardiac medications as ordered.  Debility  She has severe debility from multiple acute and chronic medical problems.  Physical therapy and Occupational Therapy evaluated the patient.  It is recommended the patient be discharged to skilled nursing facility for short-term rehabilitation.  She will be discharged to skilled nursing facility when insurance precertification is completed.              Jak Kam MD

## 2024-10-08 NOTE — CARE PLAN
Patient sitting upright in chair; waiting for pain medication to be effective before being helped into bed; RN will call RT when patient is ready for Bipap.

## 2024-10-08 NOTE — PROGRESS NOTES
10/08/24 1611   Discharge Planning   Expected Discharge Disposition SNF  ([facility])   Has discharge transport been arranged? Yes   What day is the transport expected? 10/08/24   What time is the transport expected? 1900     7000 Completed   AVS and goldenrod sent to facility   patient updated and agreeable to late transport    RN made aware and given report number

## 2024-10-08 NOTE — PROGRESS NOTES
10/08/24 0848   Discharge Planning   Expected Discharge Disposition SNF     TCC spoke to patient regarding accepting facilities. [facility] is [facility] at this time     Precert started     ** do not discharge without speaking to care coordination**

## 2024-10-09 ENCOUNTER — NURSING HOME VISIT (OUTPATIENT)
Dept: POST ACUTE CARE | Facility: EXTERNAL LOCATION | Age: 69
End: 2024-10-09
Payer: MEDICARE

## 2024-10-09 DIAGNOSIS — G89.29 OTHER CHRONIC PAIN: ICD-10-CM

## 2024-10-09 DIAGNOSIS — R73.03 PREDIABETES: ICD-10-CM

## 2024-10-09 DIAGNOSIS — G47.33 OBSTRUCTIVE SLEEP APNEA SYNDROME: ICD-10-CM

## 2024-10-09 DIAGNOSIS — J43.1 PANLOBULAR EMPHYSEMA (MULTI): ICD-10-CM

## 2024-10-09 DIAGNOSIS — I50.32 CHRONIC DIASTOLIC HEART FAILURE: Primary | ICD-10-CM

## 2024-10-09 PROCEDURE — 99310 SBSQ NF CARE HIGH MDM 45: CPT | Performed by: NURSE PRACTITIONER

## 2024-10-09 NOTE — LETTER
Patient: Vickie Foster  : 1955    Encounter Date: 10/09/2024    Subjective  Patient ID: Vickie Foster is a 68 y.o. female who presents for Shortness of Breath.    Following up with patient who was recently hospitalized for SOB. She was treated with Bipap, antibiotics, steroids and improved with these. She is now on continuous O2 but us asking about the Cpap she had at the hospital. She states that she has F/U with sleep medicine for a CPAP tomorrow. She states that it helped a little at the hospital She denies SOB, CP but states that she still feels congested. She has no expectorant ordered. She was on O2 at HS only at night. She came with no orders for CPAP from the hospital.     She states that she was to go to pain management tomorrow also. She is due for a procedure for her back and this was the preliminary appointment for this. She was receiving ultram at the hospital but came with not order or prescription for this medication. She has PRN flexaril ordered too. She states that at home she used no walker or cane with ambulation.    She is hoping to return home after receiving therapy.   >40 minutes spent in care coordination, chart review and patient education         Review of Systems   Respiratory:  Positive for cough and shortness of breath.    Cardiovascular:  Positive for leg swelling.   Musculoskeletal:  Positive for gait problem.   All other systems reviewed and are negative.      Objective  /73   Pulse 89   Temp 36.7 °C (98 °F)   Resp 18   Wt 110 kg (241 lb 9.6 oz)   SpO2 95%   BMI 44.19 kg/m²     Physical Exam  Constitutional:       General: She is not in acute distress.     Appearance: She is not ill-appearing.   HENT:      Mouth/Throat:      Mouth: Mucous membranes are moist.   Eyes:      Conjunctiva/sclera: Conjunctivae normal.   Cardiovascular:      Rate and Rhythm: Normal rate. Rhythm irregular.   Pulmonary:      Effort: Pulmonary effort is normal.      Breath sounds:  Examination of the right-upper field reveals decreased breath sounds. Examination of the left-upper field reveals decreased breath sounds. Examination of the right-middle field reveals rhonchi. Examination of the left-middle field reveals rhonchi. Examination of the right-lower field reveals rhonchi. Examination of the left-lower field reveals rhonchi. Decreased breath sounds and rhonchi present.   Abdominal:      General: Bowel sounds are normal. There is no distension.      Palpations: Abdomen is soft.      Tenderness: There is no abdominal tenderness.   Musculoskeletal:      Comments: Generally weak   Skin:     General: Skin is warm and dry.      Findings: Bruising present.   Neurological:      General: No focal deficit present.      Mental Status: She is alert.   Psychiatric:         Mood and Affect: Mood normal.         Assessment/Plan  Diagnoses and all orders for this visit:  Chronic diastolic heart failure  Comments:  daily weights, cardiac diet, cont with diuretic  Prediabetes  Comments:  cont with ozempic  Other chronic pain  Comments:  attempting to get transportation for appt tomorrow, will start ultram 50mg TID prn, cont with prn flexaril  Panlobular emphysema (Multi)  Comments:  cont with advair, O2, attempt to wean for SaO2 >90, add mucinex 600mg BID  Obstructive sleep apnea syndrome  Comments:  staff to contact hospital for CPAP settings to start at the building           Electronically Signed By: IBIS De La Garza   10/10/24  3:44 PM   Time-based billing (NON-critical care)

## 2024-10-09 NOTE — PROGRESS NOTES
[facility]  Follow Up Office Visit Note 10/10/2024    Patient Information: Vickie Foster, MRN: [id number], : 1955   Primary Care/Referring Physician: Danilo Gonzalez, , [address] [facility] [address] / [facility] [address]     Chief Complaint: Left knee pain  Interval History: At her last office visit I performed a right subacromial bursa injection and discussed genicular nerve blocks    Today she reports *** She never scheduled the genicular nerve blocks    ordered a right shoulder xray and discussed possible right subacromial bursa injection    Today she reports no changes since last seen. She continues to have right anterior shoulder pain, similar to before. I reviewed her right shoulder xray which shows moderate to severe glenohumeral joint OA and moderate AC joint OA. She would like to proceed with injection today    She also feels her bilateral knee pain has been worsening, right worse than left. Her last knee injection was approximately 3 months ago but is starting to wear off.     For reference, she has pain when reaching above her head, when holding her purse. She has some pain to palpation of her right anterior shoulder. She denies any pain radiating further down the arm. She denies any numbness, tingling, or weakness in the arms.     Brief History of Pain: Ms. Vickie Foster is a 68 y.o. female with a PMHx of CHF, pulmonary HTN, AFib (on Eliquis) who presents today for follow up regarding chronic low back/buttock pain.    For reference, she reports pain started approximately 2022 with no obvious inciting event. She has had this type of pain in past, but it is much more severe than normal. Has been to the ED a few times for this, had xrays performed which didn't show any fracture in the spine or hip. During one of these visits she was noted to have worsening LE edema and was admitted for CHF exacerbation. During this admission she continued to have back pain but also developed gout in her  right foot. Is having difficulty bearing weight on her right foot, as it worsens both foot pain and causes pain in her right low back and burning pain in her buttock. Has to lie a certain way in bed (moreso on her left side) to get somewhat comfortable. Denies any numbness, tingling in her legs. She is unsure about weakness as she is not walking much.    Current Pain Medications: Gabapentin 300 mg TID. Lidocaine patches - tried on both back and foot without benefit.  Previously Tried Pain Medications: Methocarbamol. Unable to use NSAID's due to history of ulcers. Tried BioFreeze without benefit    Relevant Surgeries: Denies hip or back surgery. Denies ankle or foot surgery.  Injections: Left knee injection - 60% pain relief for 8 months. Caudal ADAM - significant improvement in pain and ambulation, Right SI joint injection - 80% pain relief  Physical/Occupational Therapy: Denies any PT recently    Medications:   Current Outpatient Medications   Medication Instructions    allopurinoL 200 mg, oral, 2 times daily    atorvastatin (LIPITOR) 40 mg, oral, Nightly    budesonide-formoteroL (Symbicort) 80-4.5 mcg/actuation inhaler 2 puffs, inhalation, 2 times daily RT, Rinse mouth with water after use to reduce aftertaste and incidence of candidiasis. Do not swallow.    buPROPion XL (WELLBUTRIN XL) 300 mg, oral, Daily before breakfast    calcium carb-vitamin D3-vit K2 600 mg-1,000 unit-90 mcg tablet oral, Daily    cyclobenzaprine (FLEXERIL) 5-10 mg, oral, 2 times daily PRN    dofetilide (TIKOSYN) 250 mcg, oral, Every 12 hours    Eliquis 5 mg, oral, 2 times daily    ipratropium-albuteroL (Duo-Neb) 0.5-2.5 mg/3 mL nebulizer solution 3 mL, nebulization, Every 6 hours PRN    [START ON 10/9/2024] lidocaine 4 % patch 2 patches, transdermal, Daily, Remove & discard patch within 12 hours or as directed by MD.    metoprolol succinate XL (Toprol-XL) 25 mg 24 hr tablet TAKE 1 TABLET BY MOUTH ONE TIME DAILY    oxygen (O2) gas therapy 1  each, inhalation, Every 12 hours    oxygen (O2) gas therapy 1 each, inhalation, Every 12 hours    potassium chloride (Klor-Con) 20 mEq packet 20 mEq, oral, 2 times daily    semaglutide 2.3 mg, subcutaneous, Once Weekly    torsemide (Demadex) 100 mg tablet Take 1/2 a tablet (50 mg) by mouth 2 times a day.      Allergies: No Known Allergies    Past Medical & Surgical History:  Past Medical History:   Diagnosis Date    A-fib (Multi)     CHF (congestive heart failure) (Multi)     COVID     Heart disease     HTN (hypertension)     Low back pain       Past Surgical History:   Procedure Laterality Date    ABLATION OF DYSRHYTHMIC FOCUS      CARDIAC ELECTROPHYSIOLOGY PROCEDURE N/A 2024    Procedure: Cardioversion;  Surgeon: Amado Ty MD;  Location: [facility] Cardiac Cath Lab;  Service: Electrophysiology;  Laterality: N/A;  35395  i48.0  medical mutual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    CARDIAC ELECTROPHYSIOLOGY PROCEDURE N/A 2024    Procedure: Ablation A-Fib;  Surgeon: Amado Ty MD;  Location: [facility] Cardiac Cath Lab;  Service: Electrophysiology;  Laterality: N/A;  AFIB CRYOABLATION; CPT 69292; ICD I48.0   Medicare mutual of [place] medicare  ID - [id number]  [phone]  No Auth required  Call ref # [id number]    CARDIAC ELECTROPHYSIOLOGY PROCEDURE N/A 2024    Procedure: Cardioversion;  Surgeon: Amado Ty MD;  Location: [facility] Cardiac Cath Lab;  Service: Electrophysiology;  Laterality: N/A;  CARDIOVERSION CPT 47242, ICD I48.0 no auth required/REF # [id number]    CARDIOVERSION      2024       Family History   Problem Relation Name Age of Onset    Breast cancer Mother      Heart attack Mother           in early 80s of MI    Heart disease Mother      Colon cancer Father      Breast cancer Sister      Diabetes type II Sister      Kidney failure Sister      Other (herione addict) Sister      No Known Problems Brother      No Known Problems Daughter      Other (recovering alcoholic) Son      Breast  cancer Other Maternal Aunt      Social History     Socioeconomic History    Marital status:      Spouse name: Not on file    Number of children: Not on file    Years of education: Not on file    Highest education level: Not on file   Occupational History    Not on file   Tobacco Use    Smoking status: Former     Current packs/day: 0.00     Types: Cigarettes     Quit date:      Years since quittin.7    Smokeless tobacco: Never   Vaping Use    Vaping status: Never Used   Substance and Sexual Activity    Alcohol use: Not Currently     Comment: monthly or less    Drug use: Never    Sexual activity: Defer   Other Topics Concern    Not on file   Social History Narrative    Not on file     Social Determinants of Health     Financial Resource Strain: Low Risk  (10/4/2024)    Overall Financial Resource Strain (CARDIA)     Difficulty of Paying Living Expenses: Not hard at all   Food Insecurity: No Food Insecurity (10/4/2024)    Hunger Vital Sign     Worried About Running Out of Food in the Last Year: Never true     Ran Out of Food in the Last Year: Never true   Transportation Needs: No Transportation Needs (10/4/2024)    PRAPARE - Transportation     Lack of Transportation (Medical): No     Lack of Transportation (Non-Medical): No   Physical Activity: Inactive (10/4/2024)    Exercise Vital Sign     Days of Exercise per Week: 0 days     Minutes of Exercise per Session: 0 min   Stress: No Stress Concern Present (10/4/2024)    [employer] - Occupational Stress Questionnaire     Feeling of Stress : Not at all   Social Connections: Moderately Integrated (10/4/2024)    Social Connection and Isolation Panel [NHANES]     Frequency of Communication with Friends and Family: More than three times a week     Frequency of Social Gatherings with Friends and Family: More than three times a week     Attends Christian Services: More than 4 times per year     Active Member of Clubs or Organizations:  No     Attends Club or Organization Meetings: Never     Marital Status:    Recent Concern: Social Connections - Moderately Isolated (7/9/2024)    Social Connection and Isolation Panel [NHANES]     Frequency of Communication with Friends and Family: More than three times a week     Frequency of Social Gatherings with Friends and Family: More than three times a week     Attends Quaker Services: Never     Active Member of Clubs or Organizations: No     Attends Club or Organization Meetings: Never     Marital Status:    Intimate Partner Violence: Not At Risk (10/4/2024)    Humiliation, Afraid, Rape, and Kick questionnaire     Fear of Current or Ex-Partner: No     Emotionally Abused: No     Physically Abused: No     Sexually Abused: No   Housing Stability: Low Risk  (10/4/2024)    Housing Stability Vital Sign     Unable to Pay for Housing in the Last Year: No     Number of Times Moved in the Last Year: 0     Homeless in the Last Year: No       Problems, Past medical history, past surgical history, Medications, allergies, social and family history reviewed and as per the electronic medical record from today's encounter    Review of Systems:  CONST: No fever, chills, fatigue, weight changes  EYES: No loss of vision  ENT: No hearing loss, tinnitus  CV: No chest pain, palpitations  RESP: Reports some dyspnea on exertion  GI: No stool incontinence, nausea, vomiting  : No urinary incontinence  MSK: No joint swelling  SKIN: No rash, no hives  NEURO: No headache, dizziness  PSYCH: No anxiety, depression  HEM/LYMPH: No easy bruising or bleeding  All other systems reviewed are negative     Physical Exam:  Vitals: There were no vitals taken for this visit.  General: No apparent distress. Alert, appropriate, oriented x 3. Mood generally positive, affect congruent. Speaking in full sentences.   HENT: Normocephalic, atraumatic. Hearing intact.  Eyes: Pupils equal and round  Neck: Supple, trachea midline  Lungs:  Symmetric respiratory excursion on visual exam, nonlabored breathing.   Extremities: No cyanosis noted in extremities. Pitting edema noted in legs up to the shins  Skin: No rashes, lesions noted.  MSK: Reports significant pain with right shoulder resisted external rotation, empty can test, and shoulder impingement maneuvers. Mild weakness appreciated with right shoulder empty can test. Reports pain to palpation of right anterior shoulder.   Neuro: Alert and appropriate. Using a rollator to ambulate. Bulk and tone within normal limits.    Laboratory Data:  The following laboratory data were reviewed during this visit:   Lab Results   Component Value Date    WBC 9.2 10/08/2024    RBC 3.71 (L) 10/08/2024    HGB 11.6 (L) 10/08/2024    HCT 35.3 (L) 10/08/2024     10/08/2024      Lab Results   Component Value Date    INR 1.0 07/08/2024    INR 1.0 06/05/2023    INR 1.0 12/01/2019     Lab Results   Component Value Date    CREATININE 0.47 (L) 10/08/2024    HGBA1C 5.5 07/11/2024       Imaging:  The following imaging impressions were reviewed by me during this visit:    -5/22/24 right shoulder xray shows moderate to severe glenohumeral joint OA, moderate AC joint OA  -8/2022 lumbar MRI shows L3-L4 moderate to marked spinal canal stenosis and right foraminal stenosis without nerve root impingement. L4-L5 moderate spinal canal stenosis. L5-S1 with no significatn central or foraminal stenosis   -4/2021 left knee xray shows moderate OA  -7/20/23 cervical spine xray shows diffuse, severe degenerative changes    I also personally reviewed the images from the above studies myself. These images and my interpretation of them contributed to the management and decision making of the patient's medical plan.    ASSESSMENT:  Ms. Vickie Foster is a 68 y.o. female with low back, leg, and neck pain that is consistent with:    No diagnosis found.        PLAN:  Radiology: -Given new right shoulder pain I ordered a right shoulder xray  which shows significant glenohumeral and AC joint osteoarthritis. No new diagnostics at this time.     Physically: Continue your regular HEP    Psychologically: No needs at this time    Medication: -Continue Gabapentin 300 mg TID    Duration: Greater than 2 month    Intervention: -Reports significant benefit from recent right SI joint injection but continued to have burning buttock pain (R>L) that radiates down her thighs, likely secondary to known lumbar spinal stenosis. She subsequently underwent a caudal ADAM with significant improvement in pain. Of note, is on Eliquis  - Also reports bilateral knee pain, with prior knee x-rays showing moderate osteoarthritis. She has undergone both left and right knee joint injections with generally 60% pain relief for 3-4 months. Given her need for fairly frequent steroid injections in multiple areas of her body I recommend trial of diagnostic genicular nerve blocks utilizing live fluoroscopy and local anesthesia, with consideration for RF ablation in the future. Risks, benefits, alternatives discussed. She would like to proceed.   - I suspect her current neck pain is secondary to cervical facet arthropathy and myofascial pain/spasms. She has received some benefit from TPI's in the past  - I suspect her right shoulder pain is secondary to right shoulder subacromial bursitis, with possible contribution from shoulder joint osteoarthritis. She underwent ultrasound guided right subacromial bursa injection today, as noted below            Sincerely,  Joseph Kelley MD  [facility] [place]

## 2024-10-10 ENCOUNTER — APPOINTMENT (OUTPATIENT)
Dept: SLEEP MEDICINE | Facility: CLINIC | Age: 69
End: 2024-10-10
Payer: MEDICARE

## 2024-10-10 ENCOUNTER — APPOINTMENT (OUTPATIENT)
Dept: PAIN MEDICINE | Facility: CLINIC | Age: 69
End: 2024-10-10
Payer: MEDICARE

## 2024-10-10 ENCOUNTER — NURSING HOME VISIT (OUTPATIENT)
Dept: POST ACUTE CARE | Facility: EXTERNAL LOCATION | Age: 69
End: 2024-10-10

## 2024-10-10 VITALS
TEMPERATURE: 98 F | WEIGHT: 241.6 LBS | HEART RATE: 89 BPM | DIASTOLIC BLOOD PRESSURE: 73 MMHG | OXYGEN SATURATION: 95 % | RESPIRATION RATE: 18 BRPM | SYSTOLIC BLOOD PRESSURE: 107 MMHG | BODY MASS INDEX: 44.19 KG/M2

## 2024-10-10 DIAGNOSIS — J43.1 PANLOBULAR EMPHYSEMA (MULTI): Primary | ICD-10-CM

## 2024-10-10 DIAGNOSIS — R73.03 PREDIABETES: ICD-10-CM

## 2024-10-10 DIAGNOSIS — R68.2 DRY MOUTH: ICD-10-CM

## 2024-10-10 PROCEDURE — 99309 SBSQ NF CARE MODERATE MDM 30: CPT | Performed by: NURSE PRACTITIONER

## 2024-10-10 ASSESSMENT — ENCOUNTER SYMPTOMS
SHORTNESS OF BREATH: 1
COUGH: 1

## 2024-10-10 NOTE — PROGRESS NOTES
Subjective   Patient ID: Vickie Foster is a 68 y.o. female who presents for Shortness of Breath.    Following up with patient who was recently hospitalized for SOB. She was treated with Bipap, antibiotics, steroids and improved with these. She is now on continuous O2 but us asking about the Cpap she had at the hospital. She states that she has F/U with sleep medicine for a CPAP tomorrow. She states that it helped a little at the hospital She denies SOB, CP but states that she still feels congested. She has no expectorant ordered. She was on O2 at HS only at night. She came with no orders for CPAP from the hospital.     She states that she was to go to pain management tomorrow also. She is due for a procedure for her back and this was the preliminary appointment for this. She was receiving ultram at the hospital but came with not order or prescription for this medication. She has PRN flexaril ordered too. She states that at home she used no walker or cane with ambulation.    She is hoping to return home after receiving therapy.   >40 minutes spent in care coordination, chart review and patient education         Review of Systems   Respiratory:  Positive for cough and shortness of breath.    Cardiovascular:  Positive for leg swelling.   Musculoskeletal:  Positive for gait problem.   All other systems reviewed and are negative.      Objective   /73   Pulse 89   Temp 36.7 °C (98 °F)   Resp 18   Wt 110 kg (241 lb 9.6 oz)   SpO2 95%   BMI 44.19 kg/m²     Physical Exam  Constitutional:       General: She is not in acute distress.     Appearance: She is not ill-appearing.   HENT:      Mouth/Throat:      Mouth: Mucous membranes are moist.   Eyes:      Conjunctiva/sclera: Conjunctivae normal.   Cardiovascular:      Rate and Rhythm: Normal rate. Rhythm irregular.   Pulmonary:      Effort: Pulmonary effort is normal.      Breath sounds: Examination of the right-upper field reveals decreased breath sounds.  Examination of the left-upper field reveals decreased breath sounds. Examination of the right-middle field reveals rhonchi. Examination of the left-middle field reveals rhonchi. Examination of the right-lower field reveals rhonchi. Examination of the left-lower field reveals rhonchi. Decreased breath sounds and rhonchi present.   Abdominal:      General: Bowel sounds are normal. There is no distension.      Palpations: Abdomen is soft.      Tenderness: There is no abdominal tenderness.   Musculoskeletal:      Comments: Generally weak   Skin:     General: Skin is warm and dry.      Findings: Bruising present.   Neurological:      General: No focal deficit present.      Mental Status: She is alert.   Psychiatric:         Mood and Affect: Mood normal.         Assessment/Plan   Diagnoses and all orders for this visit:  Chronic diastolic heart failure  Comments:  daily weights, cardiac diet, cont with diuretic  Prediabetes  Comments:  cont with ozempic  Other chronic pain  Comments:  attempting to get transportation for appt tomorrow, will start ultram 50mg TID prn, cont with prn flexaril  Panlobular emphysema (Multi)  Comments:  cont with advair, O2, attempt to wean for SaO2 >90, add mucinex 600mg BID  Obstructive sleep apnea syndrome  Comments:  staff to contact hospital for CPAP settings to start at the building

## 2024-10-10 NOTE — LETTER
Patient: Vickie Foster  : 1955    Encounter Date: 10/10/2024    Subjective  Patient ID: Vickie Foster is a 68 y.o. female who presents for COPD.    Following up with patient who had CPAP last night. She states that she couldn't wear it because the mask made her mouth too dry. She started mucinex yesterday for congestion.     She states that she was receiving ozempic for weight loss. She goes to an outside clinic to receive this. She does not think that she is diabetic.     Discussed inhalers, O2 and CPAP.          Review of Systems   Respiratory:  Positive for shortness of breath.        Objective  /88   Pulse 72   Temp 36.2 °C (97.2 °F)   Resp 18   Wt 109 kg (241 lb)   SpO2 97%   BMI 44.08 kg/m²     Physical Exam  Constitutional:       General: She is not in acute distress.     Appearance: She is not ill-appearing.   HENT:      Mouth/Throat:      Mouth: Mucous membranes are moist.   Eyes:      Conjunctiva/sclera: Conjunctivae normal.   Pulmonary:      Effort: Pulmonary effort is normal.      Breath sounds: Examination of the right-upper field reveals decreased breath sounds. Examination of the left-upper field reveals decreased breath sounds. Examination of the right-middle field reveals decreased breath sounds. Examination of the left-middle field reveals decreased breath sounds. Decreased breath sounds present.   Abdominal:      General: Bowel sounds are normal.   Musculoskeletal:      Comments: Generally weak LE   Skin:     General: Skin is warm and dry.   Neurological:      Mental Status: She is alert and oriented to person, place, and time.   Psychiatric:         Mood and Affect: Mood normal.         Assessment/Plan  Diagnoses and all orders for this visit:  Panlobular emphysema (Multi)  Comments:  spoke with staff to contact [employer] for nasal mask for Cpap, encourged to wear if napping, f/u with pulmonary  Prediabetes  Comments:  staff to educate patient on how to give self  ozempic, check HGA1C 10/11/24  Dry mouth  Comments:  Biotene mouthwash TID, decrease mucinex to daily           Electronically Signed By: IBIS De La Garza   10/11/24 10:18 AM

## 2024-10-11 ENCOUNTER — TELEPHONE (OUTPATIENT)
Dept: SLEEP MEDICINE | Facility: CLINIC | Age: 69
End: 2024-10-11

## 2024-10-11 ENCOUNTER — NURSING HOME VISIT (OUTPATIENT)
Dept: POST ACUTE CARE | Facility: EXTERNAL LOCATION | Age: 69
End: 2024-10-11
Payer: MEDICARE

## 2024-10-11 VITALS
WEIGHT: 241 LBS | TEMPERATURE: 97.2 F | SYSTOLIC BLOOD PRESSURE: 128 MMHG | RESPIRATION RATE: 18 BRPM | DIASTOLIC BLOOD PRESSURE: 88 MMHG | OXYGEN SATURATION: 97 % | BODY MASS INDEX: 44.08 KG/M2 | HEART RATE: 72 BPM

## 2024-10-11 DIAGNOSIS — I50.32 CHRONIC DIASTOLIC HEART FAILURE: ICD-10-CM

## 2024-10-11 DIAGNOSIS — I25.10 CORONARY ARTERIOSCLEROSIS: ICD-10-CM

## 2024-10-11 DIAGNOSIS — I10 BENIGN ESSENTIAL HYPERTENSION: ICD-10-CM

## 2024-10-11 DIAGNOSIS — E87.6 HYPOKALEMIA: ICD-10-CM

## 2024-10-11 DIAGNOSIS — I48.11 LONGSTANDING PERSISTENT ATRIAL FIBRILLATION (MULTI): ICD-10-CM

## 2024-10-11 DIAGNOSIS — J43.1 PANLOBULAR EMPHYSEMA (MULTI): ICD-10-CM

## 2024-10-11 DIAGNOSIS — J96.11 CHRONIC HYPOXEMIC RESPIRATORY FAILURE (MULTI): Primary | ICD-10-CM

## 2024-10-11 PROCEDURE — 99306 1ST NF CARE HIGH MDM 50: CPT | Performed by: FAMILY MEDICINE

## 2024-10-11 ASSESSMENT — ENCOUNTER SYMPTOMS: SHORTNESS OF BREATH: 1

## 2024-10-11 NOTE — LETTER
Patient: Vickie Foster  : 1955    Encounter Date: 10/11/2024    Subjective  Patient ID: Vickie Foster is a 68 y.o. female who is acute skilled care being seen and evaluated for multiple medical problems.    HPI patient here for rehab after being admitted to icu for acute respfailure due to copd exacerbation and chf exacerbation. She received iv diuretics with resolution of sx. She is on eliquis for stroke prevention from a fib. She was treated with steroids and abx as well as bipap for copd. She needs home cpap. Labs here show low potassium and normal cbc with gfr 62. She has no acute complaints today.     Review of Systems   Constitutional:  Positive for fatigue. Negative for chills and fever.   HENT:  Negative for congestion, hearing loss and sore throat.    Eyes:  Negative for visual disturbance.   Respiratory:  Positive for shortness of breath. Negative for cough and wheezing.    Cardiovascular:  Negative for chest pain and palpitations.   Gastrointestinal:  Negative for abdominal pain, constipation, diarrhea, nausea and vomiting.   Genitourinary:  Positive for frequency. Negative for dysuria, hematuria and urgency.   Musculoskeletal:  Positive for arthralgias and gait problem.   Skin:  Negative for rash.   Neurological:  Negative for dizziness, syncope and headaches.   Psychiatric/Behavioral:  Negative for confusion and dysphoric mood. The patient is not nervous/anxious.        Objective  There were no vitals taken for this visit.    Physical Exam  Vitals reviewed: 126/80 97.2 72 18 wt 241.   Constitutional:       General: She is not in acute distress.     Appearance: She is not ill-appearing.   HENT:      Head: Normocephalic.      Nose: No congestion.      Mouth/Throat:      Mouth: Mucous membranes are dry.      Pharynx: Oropharynx is clear.   Eyes:      General: No scleral icterus.     Conjunctiva/sclera: Conjunctivae normal.   Cardiovascular:      Rate and Rhythm: Normal rate and regular rhythm.       Heart sounds:      No gallop.   Pulmonary:      Effort: Pulmonary effort is normal.      Breath sounds: No wheezing or rhonchi.      Comments: Diminished breath sounds throughout  Abdominal:      General: Bowel sounds are normal.      Tenderness: There is no abdominal tenderness. There is no rebound.   Musculoskeletal:      Cervical back: Neck supple.      Right lower leg: No edema.      Left lower leg: No edema.   Lymphadenopathy:      Cervical: No cervical adenopathy.   Skin:     General: Skin is warm and dry.   Neurological:      Mental Status: Mental status is at baseline.         Assessment/Plan  Problem List Items Addressed This Visit             ICD-10-CM    Benign essential hypertension I10    Chronic hypoxemic respiratory failure (Multi) - Primary J96.11    Chronic obstructive lung disease (Multi) J44.9    Diastolic heart failure I50.30    Coronary arteriosclerosis I25.10    Hypokalemia E87.6    Longstanding persistent atrial fibrillation (Multi) I48.11     Continue with pt and ot  Needs cpap at home  Home meds resumed  Add potassium and recheck bmp Monday    Goals    None           Electronically Signed By: Francisca Alvares MD   10/13/24 10:07 PM

## 2024-10-11 NOTE — PROGRESS NOTES
Subjective   Patient ID: Vickie Foster is a 68 y.o. female who presents for COPD.    Following up with patient who had CPAP last night. She states that she couldn't wear it because the mask made her mouth too dry. She started mucinex yesterday for congestion.     She states that she was receiving ozempic for weight loss. She goes to an outside clinic to receive this. She does not think that she is diabetic.     Discussed inhalers, O2 and CPAP.          Review of Systems   Respiratory:  Positive for shortness of breath.        Objective   /88   Pulse 72   Temp 36.2 °C (97.2 °F)   Resp 18   Wt 109 kg (241 lb)   SpO2 97%   BMI 44.08 kg/m²     Physical Exam  Constitutional:       General: She is not in acute distress.     Appearance: She is not ill-appearing.   HENT:      Mouth/Throat:      Mouth: Mucous membranes are moist.   Eyes:      Conjunctiva/sclera: Conjunctivae normal.   Pulmonary:      Effort: Pulmonary effort is normal.      Breath sounds: Examination of the right-upper field reveals decreased breath sounds. Examination of the left-upper field reveals decreased breath sounds. Examination of the right-middle field reveals decreased breath sounds. Examination of the left-middle field reveals decreased breath sounds. Decreased breath sounds present.   Abdominal:      General: Bowel sounds are normal.   Musculoskeletal:      Comments: Generally weak LE   Skin:     General: Skin is warm and dry.   Neurological:      Mental Status: She is alert and oriented to person, place, and time.   Psychiatric:         Mood and Affect: Mood normal.         Assessment/Plan   Diagnoses and all orders for this visit:  Panlobular emphysema (Multi)  Comments:  spoke with staff to contact [employer] for nasal mask for Cpap, encourged to wear if napping, f/u with pulmonary  Prediabetes  Comments:  staff to educate patient on how to give self ozempic, check HGA1C 10/11/24  Dry mouth  Comments:  Biotene mouthwash TID,  decrease mucinex to daily

## 2024-10-13 ASSESSMENT — ENCOUNTER SYMPTOMS
FEVER: 0
DIARRHEA: 0
VOMITING: 0
DYSPHORIC MOOD: 0
FREQUENCY: 1
DIZZINESS: 0
NAUSEA: 0
FATIGUE: 1
SORE THROAT: 0
ABDOMINAL PAIN: 0
PALPITATIONS: 0
DYSURIA: 0
WHEEZING: 0
ARTHRALGIAS: 1
CONSTIPATION: 0
COUGH: 0
HEMATURIA: 0
SHORTNESS OF BREATH: 1
HEADACHES: 0
CONFUSION: 0
CHILLS: 0
NERVOUS/ANXIOUS: 0

## 2024-10-14 ENCOUNTER — NURSING HOME VISIT (OUTPATIENT)
Dept: POST ACUTE CARE | Facility: EXTERNAL LOCATION | Age: 69
End: 2024-10-14
Payer: MEDICARE

## 2024-10-14 DIAGNOSIS — I50.32 CHRONIC DIASTOLIC HEART FAILURE: ICD-10-CM

## 2024-10-14 DIAGNOSIS — E87.6 HYPOKALEMIA: Primary | ICD-10-CM

## 2024-10-14 DIAGNOSIS — J43.1 PANLOBULAR EMPHYSEMA (MULTI): ICD-10-CM

## 2024-10-14 PROCEDURE — 99308 SBSQ NF CARE LOW MDM 20: CPT | Performed by: FAMILY MEDICINE

## 2024-10-14 ASSESSMENT — ENCOUNTER SYMPTOMS
NERVOUS/ANXIOUS: 0
DIZZINESS: 0
SHORTNESS OF BREATH: 0
SORE THROAT: 0
COUGH: 0
HEADACHES: 0
MYALGIAS: 1
NAUSEA: 0
PALPITATIONS: 0
HEMATURIA: 0
FEVER: 0
VOMITING: 0
FREQUENCY: 0
CHILLS: 0
FATIGUE: 1
WHEEZING: 0
DYSURIA: 0
DYSPHORIC MOOD: 0
DIARRHEA: 0
ARTHRALGIAS: 1
CONSTIPATION: 0
ABDOMINAL PAIN: 0
CONFUSION: 0

## 2024-10-14 NOTE — PROGRESS NOTES
Subjective   Patient ID: Vickie Foster is a 68 y.o. female who is acute skilled care being seen and evaluated for multiple medical problems.    HPI patient here for rehab after being admitted to icu for acute respfailure due to copd exacerbation and chf exacerbation. She received iv diuretics with resolution of sx. She is on eliquis for stroke prevention from a fib. She was treated with steroids and abx as well as bipap for copd. She needs home cpap. Labs here show low potassium and normal cbc with gfr 62. She has no acute complaints today.     Review of Systems   Constitutional:  Positive for fatigue. Negative for chills and fever.   HENT:  Negative for congestion, hearing loss and sore throat.    Eyes:  Negative for visual disturbance.   Respiratory:  Positive for shortness of breath. Negative for cough and wheezing.    Cardiovascular:  Negative for chest pain and palpitations.   Gastrointestinal:  Negative for abdominal pain, constipation, diarrhea, nausea and vomiting.   Genitourinary:  Positive for frequency. Negative for dysuria, hematuria and urgency.   Musculoskeletal:  Positive for arthralgias and gait problem.   Skin:  Negative for rash.   Neurological:  Negative for dizziness, syncope and headaches.   Psychiatric/Behavioral:  Negative for confusion and dysphoric mood. The patient is not nervous/anxious.        Objective   There were no vitals taken for this visit.    Physical Exam  Vitals reviewed: 126/80 97.2 72 18 wt 241.   Constitutional:       General: She is not in acute distress.     Appearance: She is not ill-appearing.   HENT:      Head: Normocephalic.      Nose: No congestion.      Mouth/Throat:      Mouth: Mucous membranes are dry.      Pharynx: Oropharynx is clear.   Eyes:      General: No scleral icterus.     Conjunctiva/sclera: Conjunctivae normal.   Cardiovascular:      Rate and Rhythm: Normal rate and regular rhythm.      Heart sounds:      No gallop.   Pulmonary:      Effort: Pulmonary  effort is normal.      Breath sounds: No wheezing or rhonchi.      Comments: Diminished breath sounds throughout  Abdominal:      General: Bowel sounds are normal.      Tenderness: There is no abdominal tenderness. There is no rebound.   Musculoskeletal:      Cervical back: Neck supple.      Right lower leg: No edema.      Left lower leg: No edema.   Lymphadenopathy:      Cervical: No cervical adenopathy.   Skin:     General: Skin is warm and dry.   Neurological:      Mental Status: Mental status is at baseline.         Assessment/Plan   Problem List Items Addressed This Visit             ICD-10-CM    Benign essential hypertension I10    Chronic hypoxemic respiratory failure (Multi) - Primary J96.11    Chronic obstructive lung disease (Multi) J44.9    Diastolic heart failure I50.30    Coronary arteriosclerosis I25.10    Hypokalemia E87.6    Longstanding persistent atrial fibrillation (Multi) I48.11     Continue with pt and ot  Needs cpap at home  Home meds resumed  Add potassium and recheck bmp Monday    Goals    None

## 2024-10-14 NOTE — LETTER
Patient: Vickie Foster  : 1955    Encounter Date: 10/14/2024    Subjective  Patient ID: Vickie Foster is a 68 y.o. female who is acute skilled care being seen and evaluated for multiple medical problems.    HPI patient here for rehab after being admitted to icu for acute respfailure due to copd exacerbation and chf exacerbation. She received iv diuretics with resolution of sx. She is on eliquis for stroke prevention from a fib. She was treated with steroids and abx as well as bipap for copd. She needs home cpap. Potassium low last week and supplementation added with recheck ordered for today and currently pending. She is seeing pain management this week as well.      Review of Systems   Constitutional:  Positive for fatigue. Negative for chills and fever.   HENT:  Negative for congestion, hearing loss and sore throat.    Eyes:  Negative for visual disturbance.   Respiratory:  Negative for cough, shortness of breath and wheezing.    Cardiovascular:  Negative for chest pain and palpitations.   Gastrointestinal:  Negative for abdominal pain, constipation, diarrhea, nausea and vomiting.   Genitourinary:  Negative for dysuria, frequency, hematuria and urgency.   Musculoskeletal:  Positive for arthralgias, gait problem and myalgias.   Skin:  Negative for rash.   Neurological:  Negative for dizziness, syncope and headaches.   Psychiatric/Behavioral:  Negative for confusion and dysphoric mood. The patient is not nervous/anxious.        Objective  There were no vitals taken for this visit.    Physical Exam  Vitals reviewed: 129/77 98.2 78 wt recheck needed.   Constitutional:       General: She is not in acute distress.     Appearance: She is not ill-appearing.   HENT:      Head: Normocephalic.      Nose: No congestion.      Mouth/Throat:      Mouth: Mucous membranes are dry.      Pharynx: Oropharynx is clear.   Eyes:      General: No scleral icterus.     Conjunctiva/sclera: Conjunctivae normal.   Cardiovascular:       Rate and Rhythm: Normal rate and regular rhythm.      Heart sounds:      No gallop.   Pulmonary:      Effort: Pulmonary effort is normal.      Breath sounds: No wheezing or rhonchi.      Comments: Diminished breath sounds throughout  Abdominal:      General: Bowel sounds are normal.      Tenderness: There is no abdominal tenderness. There is no rebound.   Musculoskeletal:      Cervical back: Neck supple.      Right lower leg: No edema.      Left lower leg: No edema.   Lymphadenopathy:      Cervical: No cervical adenopathy.   Skin:     General: Skin is warm and dry.   Neurological:      Mental Status: Mental status is at baseline.         Assessment/Plan  Problem List Items Addressed This Visit             ICD-10-CM    Chronic obstructive lung disease (Multi) J44.9    Diastolic heart failure I50.30    Hypokalemia - Primary E87.6     Bmp pending today  Continue with pt and ot  Needs cpap at home  Home meds resumed    Goals    None           Electronically Signed By: Francisca Alvares MD   10/14/24 10:20 PM

## 2024-10-15 ENCOUNTER — NURSING HOME VISIT (OUTPATIENT)
Dept: POST ACUTE CARE | Facility: EXTERNAL LOCATION | Age: 69
End: 2024-10-15

## 2024-10-15 VITALS
HEART RATE: 78 BPM | TEMPERATURE: 98.2 F | BODY MASS INDEX: 44.92 KG/M2 | SYSTOLIC BLOOD PRESSURE: 129 MMHG | RESPIRATION RATE: 18 BRPM | OXYGEN SATURATION: 99 % | WEIGHT: 245.6 LBS | DIASTOLIC BLOOD PRESSURE: 77 MMHG

## 2024-10-15 DIAGNOSIS — G47.33 OBSTRUCTIVE SLEEP APNEA SYNDROME: ICD-10-CM

## 2024-10-15 DIAGNOSIS — G89.29 OTHER CHRONIC PAIN: ICD-10-CM

## 2024-10-15 DIAGNOSIS — I50.32 CHRONIC DIASTOLIC HEART FAILURE: Primary | ICD-10-CM

## 2024-10-15 PROCEDURE — 99309 SBSQ NF CARE MODERATE MDM 30: CPT | Performed by: NURSE PRACTITIONER

## 2024-10-15 RX ORDER — TRAMADOL HYDROCHLORIDE 50 MG/1
50 TABLET ORAL EVERY 8 HOURS PRN
COMMUNITY

## 2024-10-15 RX ORDER — FLUORIDE TOOTHPASTE
15 TOOTHPASTE DENTAL 3 TIMES DAILY
COMMUNITY

## 2024-10-15 RX ORDER — FLUTICASONE PROPIONATE AND SALMETEROL 100; 50 UG/1; UG/1
1 POWDER RESPIRATORY (INHALATION)
COMMUNITY

## 2024-10-15 ASSESSMENT — ENCOUNTER SYMPTOMS: BACK PAIN: 1

## 2024-10-15 NOTE — LETTER
Patient: Vicike Foster  : 1955    Encounter Date: 10/15/2024    Subjective  Patient ID: Vickie Foster is a 68 y.o. female who presents for Leg Swelling.    Following up with patient who has been taking tramadol PRN for back pain. She states that it has been helping a little. She is following up with pain management tomorrow for evaluation for an injection. She states that she can't have semaglutide 2 weeks before an injection. Reviewed facility MAR and hospital records and it appears that patient has not received since prior to hospital stay. She had a HGA1C which was 5.6.     She has had some weight gain and increased edema in her legs. She takes torsemide 50mg BID but states that cardiology had prescribed zaroxolyn PRN in the past for when she would get edema. Reviewed labs from 10/14/24. GFR 72. She states that she has been drinking fluids but does not feel that she has been over drinking fluids. She has been avoiding salt.     She has not been wearing the CPAP due to the mask being uncomfortable. Staff was to contact [employer] regarding a different mask         Review of Systems   Cardiovascular:  Positive for leg swelling.   Musculoskeletal:  Positive for back pain.   All other systems reviewed and are negative.      Objective  /77   Pulse 78   Temp 36.8 °C (98.2 °F)   Resp 18   Wt 111 kg (245 lb 9.6 oz)   SpO2 99%   BMI 44.92 kg/m²     Physical Exam  Constitutional:       General: She is not in acute distress.     Appearance: She is not ill-appearing.   HENT:      Mouth/Throat:      Mouth: Mucous membranes are moist.   Eyes:      Conjunctiva/sclera: Conjunctivae normal.   Cardiovascular:      Rate and Rhythm: Normal rate. Rhythm irregular.   Pulmonary:      Effort: Pulmonary effort is normal.      Breath sounds: Examination of the right-upper field reveals decreased breath sounds. Examination of the left-upper field reveals decreased breath sounds. Examination of the right-middle  field reveals decreased breath sounds. Examination of the left-middle field reveals decreased breath sounds. Decreased breath sounds present.   Abdominal:      General: Bowel sounds are normal.   Musculoskeletal:      Cervical back: Neck supple.      Right lower leg: Edema present.      Left lower leg: Edema present.   Skin:     General: Skin is warm and dry.   Neurological:      General: No focal deficit present.      Mental Status: She is alert.   Psychiatric:         Mood and Affect: Mood normal.         Assessment/Plan  Diagnoses and all orders for this visit:  Chronic diastolic heart failure  Comments:  cont with torsemide, add one dose of zaroxolyn 2.5mg po on thursday prior to torsemide dose, cont with daily weights, R[place] diet  Obstructive sleep apnea syndrome  Comments:  staff to contact [employer] for another mask  Other chronic pain  Comments:  await instruction following appointment tomorrow, cont with tramadol and PRN flexaril           Electronically Signed By: IBIS De La Garza   10/15/24 12:21 PM

## 2024-10-15 NOTE — PROGRESS NOTES
Subjective   Patient ID: Vickie Foster is a 68 y.o. female who presents for Leg Swelling.    Following up with patient who has been taking tramadol PRN for back pain. She states that it has been helping a little. She is following up with pain management tomorrow for evaluation for an injection. She states that she can't have semaglutide 2 weeks before an injection. Reviewed facility MAR and hospital records and it appears that patient has not received since prior to hospital stay. She had a HGA1C which was 5.6.     She has had some weight gain and increased edema in her legs. She takes torsemide 50mg BID but states that cardiology had prescribed zaroxolyn PRN in the past for when she would get edema. Reviewed labs from 10/14/24. GFR 72. She states that she has been drinking fluids but does not feel that she has been over drinking fluids. She has been avoiding salt.     She has not been wearing the CPAP due to the mask being uncomfortable. Staff was to contact [employer] regarding a different mask         Review of Systems   Cardiovascular:  Positive for leg swelling.   Musculoskeletal:  Positive for back pain.   All other systems reviewed and are negative.      Objective   /77   Pulse 78   Temp 36.8 °C (98.2 °F)   Resp 18   Wt 111 kg (245 lb 9.6 oz)   SpO2 99%   BMI 44.92 kg/m²     Physical Exam  Constitutional:       General: She is not in acute distress.     Appearance: She is not ill-appearing.   HENT:      Mouth/Throat:      Mouth: Mucous membranes are moist.   Eyes:      Conjunctiva/sclera: Conjunctivae normal.   Cardiovascular:      Rate and Rhythm: Normal rate. Rhythm irregular.   Pulmonary:      Effort: Pulmonary effort is normal.      Breath sounds: Examination of the right-upper field reveals decreased breath sounds. Examination of the left-upper field reveals decreased breath sounds. Examination of the right-middle field reveals decreased breath sounds. Examination of the left-middle field  reveals decreased breath sounds. Decreased breath sounds present.   Abdominal:      General: Bowel sounds are normal.   Musculoskeletal:      Cervical back: Neck supple.      Right lower leg: Edema present.      Left lower leg: Edema present.   Skin:     General: Skin is warm and dry.   Neurological:      General: No focal deficit present.      Mental Status: She is alert.   Psychiatric:         Mood and Affect: Mood normal.         Assessment/Plan   Diagnoses and all orders for this visit:  Chronic diastolic heart failure  Comments:  cont with torsemide, add one dose of zaroxolyn 2.5mg po on thursday prior to torsemide dose, cont with daily weights, [place] diet  Obstructive sleep apnea syndrome  Comments:  staff to contact [employer] for another mask  Other chronic pain  Comments:  await instruction following appointment tomorrow, cont with tramadol and PRN flexaril

## 2024-10-15 NOTE — PROGRESS NOTES
Subjective   Patient ID: Vickie Foster is a 68 y.o. female who is acute skilled care being seen and evaluated for multiple medical problems.    HPI patient here for rehab after being admitted to icu for acute respfailure due to copd exacerbation and chf exacerbation. She received iv diuretics with resolution of sx. She is on eliquis for stroke prevention from a fib. She was treated with steroids and abx as well as bipap for copd. She needs home cpap. Potassium low last week and supplementation added with recheck ordered for today and currently pending. She is seeing pain management this week as well.      Review of Systems   Constitutional:  Positive for fatigue. Negative for chills and fever.   HENT:  Negative for congestion, hearing loss and sore throat.    Eyes:  Negative for visual disturbance.   Respiratory:  Negative for cough, shortness of breath and wheezing.    Cardiovascular:  Negative for chest pain and palpitations.   Gastrointestinal:  Negative for abdominal pain, constipation, diarrhea, nausea and vomiting.   Genitourinary:  Negative for dysuria, frequency, hematuria and urgency.   Musculoskeletal:  Positive for arthralgias, gait problem and myalgias.   Skin:  Negative for rash.   Neurological:  Negative for dizziness, syncope and headaches.   Psychiatric/Behavioral:  Negative for confusion and dysphoric mood. The patient is not nervous/anxious.        Objective   There were no vitals taken for this visit.    Physical Exam  Vitals reviewed: 129/77 98.2 78 wt recheck needed.   Constitutional:       General: She is not in acute distress.     Appearance: She is not ill-appearing.   HENT:      Head: Normocephalic.      Nose: No congestion.      Mouth/Throat:      Mouth: Mucous membranes are dry.      Pharynx: Oropharynx is clear.   Eyes:      General: No scleral icterus.     Conjunctiva/sclera: Conjunctivae normal.   Cardiovascular:      Rate and Rhythm: Normal rate and regular rhythm.      Heart sounds:       No gallop.   Pulmonary:      Effort: Pulmonary effort is normal.      Breath sounds: No wheezing or rhonchi.      Comments: Diminished breath sounds throughout  Abdominal:      General: Bowel sounds are normal.      Tenderness: There is no abdominal tenderness. There is no rebound.   Musculoskeletal:      Cervical back: Neck supple.      Right lower leg: No edema.      Left lower leg: No edema.   Lymphadenopathy:      Cervical: No cervical adenopathy.   Skin:     General: Skin is warm and dry.   Neurological:      Mental Status: Mental status is at baseline.         Assessment/Plan   Problem List Items Addressed This Visit             ICD-10-CM    Chronic obstructive lung disease (Multi) J44.9    Diastolic heart failure I50.30    Hypokalemia - Primary E87.6     Bmp pending today  Continue with pt and ot  Needs cpap at home  Home meds resumed    Goals    None

## 2024-10-16 ENCOUNTER — OFFICE VISIT (OUTPATIENT)
Dept: PAIN MEDICINE | Facility: CLINIC | Age: 69
End: 2024-10-16
Payer: MEDICARE

## 2024-10-16 VITALS
DIASTOLIC BLOOD PRESSURE: 70 MMHG | HEART RATE: 86 BPM | WEIGHT: 245 LBS | HEIGHT: 62 IN | SYSTOLIC BLOOD PRESSURE: 112 MMHG | OXYGEN SATURATION: 96 % | BODY MASS INDEX: 45.08 KG/M2

## 2024-10-16 DIAGNOSIS — M17.12 PRIMARY OSTEOARTHRITIS OF LEFT KNEE: ICD-10-CM

## 2024-10-16 DIAGNOSIS — Z79.891 LONG TERM CURRENT USE OF OPIATE ANALGESIC: ICD-10-CM

## 2024-10-16 DIAGNOSIS — G89.29 CHRONIC RIGHT SHOULDER PAIN: ICD-10-CM

## 2024-10-16 DIAGNOSIS — M25.511 CHRONIC RIGHT SHOULDER PAIN: ICD-10-CM

## 2024-10-16 DIAGNOSIS — M47.816 LUMBAR SPONDYLOSIS: Primary | ICD-10-CM

## 2024-10-16 DIAGNOSIS — M17.0 PRIMARY OSTEOARTHRITIS OF KNEES, BILATERAL: ICD-10-CM

## 2024-10-16 PROCEDURE — 99214 OFFICE O/P EST MOD 30 MIN: CPT | Performed by: NURSE PRACTITIONER

## 2024-10-16 PROCEDURE — 1159F MED LIST DOCD IN RCRD: CPT | Performed by: NURSE PRACTITIONER

## 2024-10-16 PROCEDURE — 3074F SYST BP LT 130 MM HG: CPT | Performed by: NURSE PRACTITIONER

## 2024-10-16 PROCEDURE — 1111F DSCHRG MED/CURRENT MED MERGE: CPT | Performed by: NURSE PRACTITIONER

## 2024-10-16 PROCEDURE — 3008F BODY MASS INDEX DOCD: CPT | Performed by: NURSE PRACTITIONER

## 2024-10-16 PROCEDURE — 1125F AMNT PAIN NOTED PAIN PRSNT: CPT | Performed by: NURSE PRACTITIONER

## 2024-10-16 PROCEDURE — 3078F DIAST BP <80 MM HG: CPT | Performed by: NURSE PRACTITIONER

## 2024-10-16 RX ORDER — HYDROCODONE BITARTRATE AND ACETAMINOPHEN 5; 325 MG/1; MG/1
1 TABLET ORAL EVERY 6 HOURS PRN
Qty: 90 TABLET | Refills: 0 | Status: SHIPPED | OUTPATIENT
Start: 2024-10-16 | End: 2024-11-15

## 2024-10-16 NOTE — PROGRESS NOTES
Subjective   Patient ID: Vickie Foster is a 68 y.o. female who presents for Back Pain.    HPI 68-year-old female with a past medical history significant for obesity, GERD, HTN, DLD, CHF, Acute on chronic heart failure with preserved ejection fraction, asthma, pulmonary hypertension, LVH, anxiety with depression, spondylosis, thoracic spondylosis, chronic pain syndrome, atrial fibrillation, coronary artery disease, PreDMII and artropathy. She has had a complicated last several months.  In July she underwent a heart ablation that resulted in an immediate 6-day ICU stay then she was discharged home and then returned for another 5 to 6 days in the ICU for sepsis.  She has been in a rehab facility since.  She reports that the relief she experienced from the procedure she had has dissipated and she is struggling to participate actively in the physical therapy at the rehab facility.     Past Pain Intervention hx: Bilateral Knee Injections: ~60% relief x 8 mo, Caudal ADAM: significant improvement in pain/ambulation (~80-85%) x 8 mo, Right Shoulder Injection: ~85% improvement in pain and Right SIJ injection: ~80% improvement in pain x 6-7 mo. she would like to proceed with repeating all of the injections.    Review of Systems Unless noted in the HPI all other systems have been reviewed and are negative for complaint.     Objective   Physical Exam  General- No acute distress, well appearing and well nourished. Obese  Eyes Conjunctiva and lids: No erythema, swelling or discharge  Neck - Supple, no cervical lymphadenopathy.   Pulmonary - Respiratory effort: dyspnea at rest. Use of supplemental O2 via NC.   Cardiovascular - Normal rate and rhythm.  Examination of extremities for edema and/or varicosities: 3-4+ BLE Pitting edema.   Abdomen: Soft, Non-tender, non-distended, no abdominal masses.   Musculoskeletal - Range of motion: Decreased ROM to the lumbar spine, right shoulder and bilateral knees.   Skin - Skin and  subcutaneous tissue: Normal without rashes or lesions.  Neurologic - Reflexes: Normal. Coordination: Antalgic gait; use of wheelchair for mobility.   Psychiatric - Orientation to person, place, and time: Normal. Mood and affect: Normal.    Assessment/Plan   Assessment & Plan  Primary osteoarthritis of left knee    Orders:    HYDROcodone-acetaminophen (Norco) 5-325 mg tablet; Take 1 tablet by mouth every 6 hours if needed for severe pain (7 - 10).    Lumbar spondylosis    Orders:    HYDROcodone-acetaminophen (Norco) 5-325 mg tablet; Take 1 tablet by mouth every 6 hours if needed for severe pain (7 - 10).    [place] pain management; Future    Epidural Steroid Injection; Future    Chronic right shoulder pain    Orders:    HYDROcodone-acetaminophen (Norco) 5-325 mg tablet; Take 1 tablet by mouth every 6 hours if needed for severe pain (7 - 10).    US guided pain procedure; Future    Joint Injection/Aspiration; Future    Long term current use of opiate analgesic    Orders:    oral drug screen; [facility]; 370075 - Miscellaneous Test    Primary osteoarthritis of knees, bilateral    Orders:    Joint Injection/Aspiration; Future    TREATMENT PLAN:  I had a nice discussion with the patient today and our plan will be as follows:  Radiology: No new imaging to review at this time.   Physically: Encouraged patient to continue with increased physical activity as able.   Psychologically: No need for psychologic intervention from my standpoint. There are no mental health issues of which I am aware that are contributing to the patient's pain. There are no substance abuse or alcohol abuse issues of which I am aware that are contributing to the patient's pain.   Medication: Discussed with collaborating physician, Dr. Kelley, and due to her current situation we will provide a prescription of Norco 5-325mg TID x 1 month. A controlled substance agreement was presented to the patient today.  The patient had the opportunity to read through the  agreement during the office visit and has signed the agreement today.  A copy of the agreement was offered to the patient to take home for their records.  A saliva specimen was obtained for toxicology screening. It was made clear this will not be a long-term treatment modality but one to help her through the acute phase of her recovery/rehab and so that she can actively participate in PT with the ultimate goal of getting home.   Duration: >3 mo.   Intervention: Due to patients complaints, physical exam findings and efficacy of past injections she is an excellent candidate for repeating the  following injections:  Caudal ADAM under fluoroscopic guidance WILL NEED ELIQUIS CLEARANCE  US guided Right Subacromial Bursa Injection  Right and Left Intra-articular Knee Injections.

## 2024-10-17 ENCOUNTER — NURSING HOME VISIT (OUTPATIENT)
Dept: POST ACUTE CARE | Facility: EXTERNAL LOCATION | Age: 69
End: 2024-10-17
Payer: MEDICARE

## 2024-10-17 VITALS
BODY MASS INDEX: 43.71 KG/M2 | WEIGHT: 239 LBS | DIASTOLIC BLOOD PRESSURE: 73 MMHG | HEART RATE: 82 BPM | RESPIRATION RATE: 18 BRPM | SYSTOLIC BLOOD PRESSURE: 113 MMHG | TEMPERATURE: 98.5 F | OXYGEN SATURATION: 95 %

## 2024-10-17 DIAGNOSIS — M47.816 LUMBAR SPONDYLOSIS: Primary | ICD-10-CM

## 2024-10-17 DIAGNOSIS — J43.1 PANLOBULAR EMPHYSEMA (MULTI): ICD-10-CM

## 2024-10-17 PROCEDURE — 99308 SBSQ NF CARE LOW MDM 20: CPT | Performed by: NURSE PRACTITIONER

## 2024-10-17 ASSESSMENT — ENCOUNTER SYMPTOMS: BACK PAIN: 1

## 2024-10-17 ASSESSMENT — PAIN SCALES - GENERAL: PAINLEVEL_OUTOF10: 8

## 2024-10-17 ASSESSMENT — PAIN - FUNCTIONAL ASSESSMENT: PAIN_FUNCTIONAL_ASSESSMENT: 0-10

## 2024-10-17 NOTE — ASSESSMENT & PLAN NOTE
Orders:    HYDROcodone-acetaminophen (Norco) 5-325 mg tablet; Take 1 tablet by mouth every 6 hours if needed for severe pain (7 - 10).    [place] pain management; Future    Epidural Steroid Injection; Future

## 2024-10-17 NOTE — ASSESSMENT & PLAN NOTE
Orders:    HYDROcodone-acetaminophen (Norco) 5-325 mg tablet; Take 1 tablet by mouth every 6 hours if needed for severe pain (7 - 10).

## 2024-10-17 NOTE — LETTER
Patient: Vickie Foster  : 1955    Encounter Date: 10/17/2024    Subjective  Patient ID: Vickie Foster is a 68 y.o. female who presents for Back Pain.    Following up patient who was seen by pain management yesterday, She states that they will call when she is scheduled for an epidural injection but they placed her on Norco. She states that she took one last night and that it was helpful and that she slept well. Reviewed taking medication, side effects and timing for efficacy. Will stop tramadol which was ordered prior to this appointment. Patient had O2 off while at bedside. Checked pulse ox which was 91% on RA. She states that she was not SOB. Reviewed the need to continue to wear her O2 especially during exertion.          Review of Systems   Cardiovascular:  Positive for leg swelling.   Musculoskeletal:  Positive for back pain.       Objective  /73   Pulse 82   Temp 36.9 °C (98.5 °F)   Resp 18   Wt 108 kg (239 lb)   SpO2 95%   BMI 43.71 kg/m²     Physical Exam  Constitutional:       General: She is not in acute distress.     Appearance: She is obese. She is not ill-appearing.   HENT:      Mouth/Throat:      Mouth: Mucous membranes are moist.   Eyes:      Conjunctiva/sclera: Conjunctivae normal.   Cardiovascular:      Rate and Rhythm: Normal rate. Rhythm irregular.   Pulmonary:      Effort: Pulmonary effort is normal.      Breath sounds: Examination of the right-upper field reveals decreased breath sounds. Examination of the left-upper field reveals decreased breath sounds. Examination of the right-middle field reveals decreased breath sounds. Examination of the left-middle field reveals decreased breath sounds. Decreased breath sounds present.   Musculoskeletal:      Right lower leg: Edema present.      Left lower leg: Edema present.   Skin:     General: Skin is warm and dry.   Neurological:      Mental Status: She is alert.   Psychiatric:         Mood and Affect: Mood normal.          Assessment/Plan  Diagnoses and all orders for this visit:  Lumbar spondylosis  Comments:  stop tramadol, cont with norco as ordered, f/u with pain management, ozempic on hold  Panlobular emphysema (Multi)  Comments:  encouraged O2, CPAP usage           Electronically Signed By: KEVON De La Garza-CNP   10/17/24  3:44 PM

## 2024-10-17 NOTE — PROGRESS NOTES
Subjective   Patient ID: Vickie Foster is a 68 y.o. female who presents for Back Pain.    Following up patient who was seen by pain management yesterday, She states that they will call when she is scheduled for an epidural injection but they placed her on Norco. She states that she took one last night and that it was helpful and that she slept well. Reviewed taking medication, side effects and timing for efficacy. Will stop tramadol which was ordered prior to this appointment. Patient had O2 off while at bedside. Checked pulse ox which was 91% on RA. She states that she was not SOB. Reviewed the need to continue to wear her O2 especially during exertion.          Review of Systems   Cardiovascular:  Positive for leg swelling.   Musculoskeletal:  Positive for back pain.       Objective   /73   Pulse 82   Temp 36.9 °C (98.5 °F)   Resp 18   Wt 108 kg (239 lb)   SpO2 95%   BMI 43.71 kg/m²     Physical Exam  Constitutional:       General: She is not in acute distress.     Appearance: She is obese. She is not ill-appearing.   HENT:      Mouth/Throat:      Mouth: Mucous membranes are moist.   Eyes:      Conjunctiva/sclera: Conjunctivae normal.   Cardiovascular:      Rate and Rhythm: Normal rate. Rhythm irregular.   Pulmonary:      Effort: Pulmonary effort is normal.      Breath sounds: Examination of the right-upper field reveals decreased breath sounds. Examination of the left-upper field reveals decreased breath sounds. Examination of the right-middle field reveals decreased breath sounds. Examination of the left-middle field reveals decreased breath sounds. Decreased breath sounds present.   Musculoskeletal:      Right lower leg: Edema present.      Left lower leg: Edema present.   Skin:     General: Skin is warm and dry.   Neurological:      Mental Status: She is alert.   Psychiatric:         Mood and Affect: Mood normal.         Assessment/Plan   Diagnoses and all orders for this visit:  Lumbar  spondylosis  Comments:  stop tramadol, cont with norco as ordered, f/u with pain management, ozempic on hold  Panlobular emphysema (Multi)  Comments:  encouraged O2, CPAP usage

## 2024-10-18 ENCOUNTER — TELEPHONE (OUTPATIENT)
Dept: CARDIOLOGY | Facility: CLINIC | Age: 69
End: 2024-10-18

## 2024-10-21 ENCOUNTER — NURSING HOME VISIT (OUTPATIENT)
Dept: POST ACUTE CARE | Facility: EXTERNAL LOCATION | Age: 69
End: 2024-10-21
Payer: MEDICARE

## 2024-10-21 DIAGNOSIS — I50.32 CHRONIC DIASTOLIC HEART FAILURE: ICD-10-CM

## 2024-10-21 DIAGNOSIS — I25.10 CORONARY ARTERIOSCLEROSIS: ICD-10-CM

## 2024-10-21 DIAGNOSIS — E87.6 HYPOKALEMIA: ICD-10-CM

## 2024-10-21 DIAGNOSIS — J96.11 CHRONIC HYPOXEMIC RESPIRATORY FAILURE (MULTI): Primary | ICD-10-CM

## 2024-10-21 DIAGNOSIS — I10 BENIGN ESSENTIAL HYPERTENSION: ICD-10-CM

## 2024-10-21 DIAGNOSIS — I10 PRIMARY HYPERTENSION: ICD-10-CM

## 2024-10-21 PROCEDURE — 99309 SBSQ NF CARE MODERATE MDM 30: CPT | Performed by: FAMILY MEDICINE

## 2024-10-21 NOTE — LETTER
Patient: Vickie Foster  : 1955    Encounter Date: 10/21/2024    Subjective  Patient ID: Vickie Foster is a 68 y.o. female who is acute skilled care being seen and evaluated for multiple medical problems.    HPI patient here for rehab after being admitted to icu for acute respfailure due to copd exacerbation and chf exacerbation. She received iv diuretics with resolution of sx. She is on eliquis for stroke prevention from a fib. She was treated with steroids and abx as well as bipap for copd. She needs home cpap. Potassium low last week and supplementation added with rechecks improved. Needs labs again this week-ordered for Wednesday. O2 lower but denies resp sx increase. Consider cxr.       Review of Systems   Constitutional:  Positive for fatigue. Negative for chills and fever.   HENT:  Negative for congestion, hearing loss and sore throat.    Eyes:  Negative for visual disturbance.   Respiratory:  Negative for cough, shortness of breath and wheezing.    Cardiovascular:  Negative for chest pain and palpitations.   Gastrointestinal:  Negative for abdominal pain, constipation, diarrhea, nausea and vomiting.   Genitourinary:  Negative for dysuria, frequency, hematuria and urgency.   Musculoskeletal:  Positive for arthralgias, gait problem and myalgias.   Skin:  Negative for rash.   Neurological:  Negative for dizziness, syncope and headaches.   Psychiatric/Behavioral:  Negative for confusion and dysphoric mood. The patient is not nervous/anxious.        Objective  There were no vitals taken for this visit.    Physical Exam  Vitals reviewed: 96/54 98.2 95 20 wt 240 94% on 2l was 3l.   Constitutional:       General: She is not in acute distress.     Appearance: She is not ill-appearing.   HENT:      Head: Normocephalic.      Nose: No congestion.      Mouth/Throat:      Mouth: Mucous membranes are dry.      Pharynx: Oropharynx is clear.   Eyes:      General: No scleral icterus.     Conjunctiva/sclera:  Conjunctivae normal.   Cardiovascular:      Rate and Rhythm: Normal rate and regular rhythm.      Heart sounds:      No gallop.   Pulmonary:      Effort: Pulmonary effort is normal.      Breath sounds: No wheezing or rhonchi.      Comments: Diminished breath sounds throughout  Abdominal:      General: Bowel sounds are normal.      Tenderness: There is no abdominal tenderness. There is no rebound.   Musculoskeletal:      Cervical back: Neck supple.      Right lower leg: No edema.      Left lower leg: No edema.   Lymphadenopathy:      Cervical: No cervical adenopathy.   Skin:     General: Skin is warm and dry.   Neurological:      Mental Status: Mental status is at baseline.         Assessment/Plan  Problem List Items Addressed This Visit             ICD-10-CM    Benign essential hypertension I10    Chronic hypoxemic respiratory failure (Multi) - Primary J96.11    Diastolic heart failure I50.30    Coronary arteriosclerosis I25.10    Hypertension I10    Hypokalemia E87.6     K better   O2 down but o2 support turned to 2l from 3l  Recheck cbc cmp this week      Electronically Signed By: Francisca Alvares MD   10/22/24  6:47 PM

## 2024-10-22 PROBLEM — G89.29 CHRONIC RIGHT SHOULDER PAIN: Status: ACTIVE | Noted: 2024-10-22

## 2024-10-22 PROBLEM — Z79.891 LONG TERM CURRENT USE OF OPIATE ANALGESIC: Status: ACTIVE | Noted: 2024-10-22

## 2024-10-22 PROBLEM — M17.0 PRIMARY OSTEOARTHRITIS OF KNEES, BILATERAL: Status: ACTIVE | Noted: 2024-10-22

## 2024-10-22 PROBLEM — M25.511 CHRONIC RIGHT SHOULDER PAIN: Status: ACTIVE | Noted: 2024-10-22

## 2024-10-22 PROBLEM — M54.16 LUMBAR RADICULOPATHY: Status: ACTIVE | Noted: 2024-10-22

## 2024-10-22 ASSESSMENT — ENCOUNTER SYMPTOMS
FATIGUE: 1
ARTHRALGIAS: 1
DYSPHORIC MOOD: 0
DYSURIA: 0
MYALGIAS: 1
CHILLS: 0
WHEEZING: 0
FREQUENCY: 0
CONSTIPATION: 0
ABDOMINAL PAIN: 0
VOMITING: 0
DIZZINESS: 0
SHORTNESS OF BREATH: 0
NAUSEA: 0
CONFUSION: 0
DIARRHEA: 0
HEMATURIA: 0
NERVOUS/ANXIOUS: 0
SORE THROAT: 0
COUGH: 0
FEVER: 0
HEADACHES: 0
PALPITATIONS: 0

## 2024-10-22 NOTE — ASSESSMENT & PLAN NOTE
Orders:    HYDROcodone-acetaminophen (Norco) 5-325 mg tablet; Take 1 tablet by mouth every 6 hours if needed for severe pain (7 - 10).    US guided pain procedure; Future    Joint Injection/Aspiration; Future

## 2024-10-22 NOTE — PROGRESS NOTES
Subjective   Patient ID: Vickie Foster is a 68 y.o. female who is acute skilled care being seen and evaluated for multiple medical problems.    HPI patient here for rehab after being admitted to icu for acute respfailure due to copd exacerbation and chf exacerbation. She received iv diuretics with resolution of sx. She is on eliquis for stroke prevention from a fib. She was treated with steroids and abx as well as bipap for copd. She needs home cpap. Potassium low last week and supplementation added with rechecks improved. Needs labs again this week-ordered for Wednesday. O2 lower but denies resp sx increase. Consider cxr.       Review of Systems   Constitutional:  Positive for fatigue. Negative for chills and fever.   HENT:  Negative for congestion, hearing loss and sore throat.    Eyes:  Negative for visual disturbance.   Respiratory:  Negative for cough, shortness of breath and wheezing.    Cardiovascular:  Negative for chest pain and palpitations.   Gastrointestinal:  Negative for abdominal pain, constipation, diarrhea, nausea and vomiting.   Genitourinary:  Negative for dysuria, frequency, hematuria and urgency.   Musculoskeletal:  Positive for arthralgias, gait problem and myalgias.   Skin:  Negative for rash.   Neurological:  Negative for dizziness, syncope and headaches.   Psychiatric/Behavioral:  Negative for confusion and dysphoric mood. The patient is not nervous/anxious.        Objective   There were no vitals taken for this visit.    Physical Exam  Vitals reviewed: 96/54 98.2 95 20 wt 240 94% on 2l was 3l.   Constitutional:       General: She is not in acute distress.     Appearance: She is not ill-appearing.   HENT:      Head: Normocephalic.      Nose: No congestion.      Mouth/Throat:      Mouth: Mucous membranes are dry.      Pharynx: Oropharynx is clear.   Eyes:      General: No scleral icterus.     Conjunctiva/sclera: Conjunctivae normal.   Cardiovascular:      Rate and Rhythm: Normal rate and  regular rhythm.      Heart sounds:      No gallop.   Pulmonary:      Effort: Pulmonary effort is normal.      Breath sounds: No wheezing or rhonchi.      Comments: Diminished breath sounds throughout  Abdominal:      General: Bowel sounds are normal.      Tenderness: There is no abdominal tenderness. There is no rebound.   Musculoskeletal:      Cervical back: Neck supple.      Right lower leg: No edema.      Left lower leg: No edema.   Lymphadenopathy:      Cervical: No cervical adenopathy.   Skin:     General: Skin is warm and dry.   Neurological:      Mental Status: Mental status is at baseline.         Assessment/Plan   Problem List Items Addressed This Visit             ICD-10-CM    Benign essential hypertension I10    Chronic hypoxemic respiratory failure (Multi) - Primary J96.11    Diastolic heart failure I50.30    Coronary arteriosclerosis I25.10    Hypertension I10    Hypokalemia E87.6     K better   O2 down but o2 support turned to 2l from 3l  Recheck cbc cmp this week

## 2024-10-23 ENCOUNTER — TELEPHONE (OUTPATIENT)
Dept: PRIMARY CARE | Facility: CLINIC | Age: 69
End: 2024-10-23
Payer: MEDICARE

## 2024-10-23 NOTE — TELEPHONE ENCOUNTER
JT from [facility] calling for verbal orders for nursing, PT & OT so they can start patient with home care.  Please call.  Thank you

## 2024-10-23 NOTE — TELEPHONE ENCOUNTER
Carina from [facility] called to reschedule Malathi followup for 10/24. She is not being discharged until 10/26, and a follow up needs to be within 1-2 weeks, but there are no openings until 12/2. I told Carina I would send a message over to you to see what we can do.    The phone number provided is Adam direct line and the voicemail is confidential so messages can be left.     I did not cancel 10/24 appointment yet

## 2024-10-24 ENCOUNTER — APPOINTMENT (OUTPATIENT)
Dept: PRIMARY CARE | Facility: CLINIC | Age: 69
End: 2024-10-24
Payer: MEDICARE

## 2024-10-24 NOTE — TELEPHONE ENCOUNTER
10/31/2024: DB at 10:45am. Can we reschedule him and put Vickie there? He is due for 6 month med FU Feb, can we make it CPE + med FU for 45 mins.

## 2024-10-24 NOTE — TELEPHONE ENCOUNTER
SONYA from [facility] ([phone]) is calling back to follow up on verbal orders for Nursing, PT and OT, so they can get her home care started.

## 2024-10-25 NOTE — TELEPHONE ENCOUNTER
Carina is calling for the follow up appt, patient being discharged tomorrow and she'd like to have follow up info before end of day. Her direct line is [phone]. I did tell her we are trying to move a patient on 10/31 to put Vickie there but we haven't reached patient yet.

## 2024-10-25 NOTE — TELEPHONE ENCOUNTER
Was able to move a patient. Scheduled patient for 10/31 at 4. Called patient to confirm she could make it. No answer as of yet. Left  message on  to call back. Provided call back number.

## 2024-10-25 NOTE — TELEPHONE ENCOUNTER
[facility] from [facility] called for verbal orders. Per Jennifer after reading  messages, told Jasmina that  gave verbal consent to continue orders

## 2024-10-28 ENCOUNTER — TELEPHONE (OUTPATIENT)
Dept: CARDIOLOGY | Facility: CLINIC | Age: 69
End: 2024-10-28
Payer: MEDICARE

## 2024-10-28 ENCOUNTER — PATIENT OUTREACH (OUTPATIENT)
Dept: PRIMARY CARE | Facility: CLINIC | Age: 69
End: 2024-10-28
Payer: MEDICARE

## 2024-10-28 LAB — SCAN RESULT: NORMAL

## 2024-10-29 ENCOUNTER — APPOINTMENT (OUTPATIENT)
Dept: PHARMACY | Facility: HOSPITAL | Age: 69
End: 2024-10-29
Payer: MEDICARE

## 2024-10-31 ENCOUNTER — OFFICE VISIT (OUTPATIENT)
Dept: PRIMARY CARE | Facility: CLINIC | Age: 69
End: 2024-10-31
Payer: MEDICARE

## 2024-10-31 VITALS
DIASTOLIC BLOOD PRESSURE: 80 MMHG | TEMPERATURE: 98 F | RESPIRATION RATE: 19 BRPM | OXYGEN SATURATION: 92 % | BODY MASS INDEX: 42.07 KG/M2 | SYSTOLIC BLOOD PRESSURE: 136 MMHG | WEIGHT: 230 LBS | HEART RATE: 86 BPM

## 2024-10-31 DIAGNOSIS — G47.33 OBSTRUCTIVE SLEEP APNEA SYNDROME: ICD-10-CM

## 2024-10-31 DIAGNOSIS — I50.32 CHRONIC DIASTOLIC (CONGESTIVE) HEART FAILURE: Primary | ICD-10-CM

## 2024-10-31 DIAGNOSIS — E87.6 HYPOKALEMIA: ICD-10-CM

## 2024-10-31 DIAGNOSIS — Z09 HOSPITAL DISCHARGE FOLLOW-UP: ICD-10-CM

## 2024-10-31 DIAGNOSIS — R06.01 ORTHOPNEA: ICD-10-CM

## 2024-10-31 DIAGNOSIS — J90 PLEURAL EFFUSION: ICD-10-CM

## 2024-10-31 DIAGNOSIS — R26.89 DECREASED MOBILITY: ICD-10-CM

## 2024-10-31 DIAGNOSIS — J43.1 PANLOBULAR EMPHYSEMA (MULTI): ICD-10-CM

## 2024-10-31 DIAGNOSIS — R29.6 FREQUENT FALLS: ICD-10-CM

## 2024-10-31 PROCEDURE — 3079F DIAST BP 80-89 MM HG: CPT | Performed by: FAMILY MEDICINE

## 2024-10-31 PROCEDURE — 3075F SYST BP GE 130 - 139MM HG: CPT | Performed by: FAMILY MEDICINE

## 2024-10-31 PROCEDURE — 1159F MED LIST DOCD IN RCRD: CPT | Performed by: FAMILY MEDICINE

## 2024-10-31 PROCEDURE — 99496 TRANSJ CARE MGMT HIGH F2F 7D: CPT | Performed by: FAMILY MEDICINE

## 2024-10-31 PROCEDURE — 1111F DSCHRG MED/CURRENT MED MERGE: CPT | Performed by: FAMILY MEDICINE

## 2024-10-31 PROCEDURE — 1126F AMNT PAIN NOTED NONE PRSNT: CPT | Performed by: FAMILY MEDICINE

## 2024-10-31 PROCEDURE — 1160F RVW MEDS BY RX/DR IN RCRD: CPT | Performed by: FAMILY MEDICINE

## 2024-10-31 PROCEDURE — 1036F TOBACCO NON-USER: CPT | Performed by: FAMILY MEDICINE

## 2024-10-31 RX ORDER — POTASSIUM CHLORIDE 1.5 G/1.58G
20 POWDER, FOR SOLUTION ORAL 3 TIMES DAILY
Start: 2024-10-31 | End: 2024-11-30

## 2024-10-31 ASSESSMENT — PATIENT HEALTH QUESTIONNAIRE - PHQ9
7. TROUBLE CONCENTRATING ON THINGS, SUCH AS READING THE NEWSPAPER OR WATCHING TELEVISION: NOT AT ALL
3. TROUBLE FALLING OR STAYING ASLEEP: NEARLY EVERY DAY
SUM OF ALL RESPONSES TO PHQ9 QUESTIONS 1 & 2: 4
SUM OF ALL RESPONSES TO PHQ QUESTIONS 1-9: 13
8. MOVING OR SPEAKING SO SLOWLY THAT OTHER PEOPLE COULD HAVE NOTICED. OR THE OPPOSITE, BEING SO FIGETY OR RESTLESS THAT YOU HAVE BEEN MOVING AROUND A LOT MORE THAN USUAL: NOT AT ALL
5. POOR APPETITE OR OVEREATING: NOT AT ALL
9. THOUGHTS THAT YOU WOULD BE BETTER OFF DEAD, OR OF HURTING YOURSELF: SEVERAL DAYS
4. FEELING TIRED OR HAVING LITTLE ENERGY: NEARLY EVERY DAY
2. FEELING DOWN, DEPRESSED OR HOPELESS: NEARLY EVERY DAY
6. FEELING BAD ABOUT YOURSELF - OR THAT YOU ARE A FAILURE OR HAVE LET YOURSELF OR YOUR FAMILY DOWN: MORE THAN HALF THE DAYS
1. LITTLE INTEREST OR PLEASURE IN DOING THINGS: SEVERAL DAYS

## 2024-10-31 ASSESSMENT — COLUMBIA-SUICIDE SEVERITY RATING SCALE - C-SSRS
2. HAVE YOU ACTUALLY HAD ANY THOUGHTS OF KILLING YOURSELF?: NO
6. HAVE YOU EVER DONE ANYTHING, STARTED TO DO ANYTHING, OR PREPARED TO DO ANYTHING TO END YOUR LIFE?: NO
1. IN THE PAST MONTH, HAVE YOU WISHED YOU WERE DEAD OR WISHED YOU COULD GO TO SLEEP AND NOT WAKE UP?: YES

## 2024-10-31 ASSESSMENT — ENCOUNTER SYMPTOMS
DEPRESSION: 1
OCCASIONAL FEELINGS OF UNSTEADINESS: 1
LOSS OF SENSATION IN FEET: 0

## 2024-10-31 ASSESSMENT — PAIN SCALES - GENERAL: PAINLEVEL_OUTOF10: 0-NO PAIN

## 2024-11-01 ENCOUNTER — PATIENT OUTREACH (OUTPATIENT)
Dept: PRIMARY CARE | Facility: CLINIC | Age: 69
End: 2024-11-01
Payer: MEDICARE

## 2024-11-01 ENCOUNTER — TELEPHONE (OUTPATIENT)
Dept: CARDIOLOGY | Facility: CLINIC | Age: 69
End: 2024-11-01
Payer: MEDICARE

## 2024-11-01 DIAGNOSIS — R26.89 DECREASED MOBILITY: ICD-10-CM

## 2024-11-01 DIAGNOSIS — R29.6 FREQUENT FALLS: ICD-10-CM

## 2024-11-01 DIAGNOSIS — R53.1 GENERALIZED WEAKNESS: Primary | ICD-10-CM

## 2024-11-01 DIAGNOSIS — M47.816 LUMBAR SPONDYLOSIS: ICD-10-CM

## 2024-11-04 ENCOUNTER — TELEPHONE (OUTPATIENT)
Dept: CARDIOLOGY | Facility: CLINIC | Age: 69
End: 2024-11-04
Payer: MEDICARE

## 2024-11-04 DIAGNOSIS — I50.32 CHRONIC DIASTOLIC HEART FAILURE: Primary | ICD-10-CM

## 2024-11-05 ENCOUNTER — PATIENT OUTREACH (OUTPATIENT)
Dept: CARE COORDINATION | Facility: CLINIC | Age: 69
End: 2024-11-05
Payer: MEDICARE

## 2024-11-07 ENCOUNTER — TELEPHONE (OUTPATIENT)
Dept: SLEEP MEDICINE | Facility: CLINIC | Age: 69
End: 2024-11-07

## 2024-11-07 ENCOUNTER — APPOINTMENT (OUTPATIENT)
Dept: CARDIOLOGY | Facility: CLINIC | Age: 69
End: 2024-11-07
Payer: MEDICARE

## 2024-11-08 ENCOUNTER — ANCILLARY PROCEDURE (OUTPATIENT)
Dept: RADIOLOGY | Facility: EXTERNAL LOCATION | Age: 69
End: 2024-11-08
Payer: MEDICARE

## 2024-11-08 DIAGNOSIS — M54.16 RADICULOPATHY, LUMBAR REGION: ICD-10-CM

## 2024-11-08 PROCEDURE — 62323 NJX INTERLAMINAR LMBR/SAC: CPT | Performed by: STUDENT IN AN ORGANIZED HEALTH CARE EDUCATION/TRAINING PROGRAM

## 2024-11-08 NOTE — PROGRESS NOTES
Procedure Note: 2024    PROCEDURE NAME: Caudal Epidural Steroid Injection    Patient Information: Vickie Foster, MRN (from [facility]) [id number], : 1955  Chief Complaint: Low back and leg pain    Pain Diagnosis: The diagnosis of Radiculopathy, lumbar region  has been made given the patient's clinical evaluation at prior evaluation.      DESCRIPTION OF PROCEDURE:    Vickie Foster was brought to the procedure room. The assistant obtained vital signs, which were found to be stable. She was then informed of the procedure, its benefits, and its risks, and her questions were answered regarding the procedure. Written informed consent was obtained from the patient. Immediately prior to starting the procedure, a time-out safety check was conducted and the patient's identification, procedure name, and procedure site were confirmed with the patient.    Caudal Epidural Steroid injection:  A time out was performed to confirm the correct site, laterality, and procedure. Patient was placed in the prone position. A BP cuff and oxygen saturation monitors were attached and the patient was monitored throughout the procedure. The skin was prepped with Chloroprep and draped in a sterile fashion    Lateral fluoroscopy was used to visualize the sacrum, the sacral hitaus, sacro-coccygeal junction and coccyx. The target point for needle placement was the sacral hiatus. The skin and subcutaneous tissue were anesthetized using 3 mL of 1% plain lidocaine using a 1.5 inch 25-gauge needle. Next, we advanced a 3.5 inch 20-gauge Touhy needle towards the target area until fluoroscopy confirmed entrance into the sacral hiatus. After negative aspiration, 2 mL of Omnipaque 240 was injected to confirm epidural spread. Next, we injected a total of 8 mL of solution containing 80 mg triamcinolone (40 mg/mL), diluted in 3 mL bupivacaine 0.25% and 3 mL sterile normal saline. The patient tolerated the procedure well      Anesthesia: local anesthesia    Complications: none      Joseph Kelley MD

## 2024-11-12 ENCOUNTER — TELEPHONE (OUTPATIENT)
Dept: PRIMARY CARE | Facility: CLINIC | Age: 69
End: 2024-11-12

## 2024-11-12 ENCOUNTER — HOSPITAL ENCOUNTER (EMERGENCY)
Facility: HOSPITAL | Age: 69
Discharge: HOME | End: 2024-11-12
Attending: EMERGENCY MEDICINE
Payer: MEDICARE

## 2024-11-12 VITALS
OXYGEN SATURATION: 97 % | SYSTOLIC BLOOD PRESSURE: 139 MMHG | TEMPERATURE: 98.6 F | BODY MASS INDEX: 44.35 KG/M2 | HEIGHT: 62 IN | WEIGHT: 241 LBS | HEART RATE: 84 BPM | RESPIRATION RATE: 20 BRPM | DIASTOLIC BLOOD PRESSURE: 82 MMHG

## 2024-11-12 DIAGNOSIS — R26.89 DECREASED MOBILITY: Primary | ICD-10-CM

## 2024-11-12 DIAGNOSIS — R44.1 HALLUCINATIONS, VISUAL: Primary | ICD-10-CM

## 2024-11-12 DIAGNOSIS — E87.6 HYPOKALEMIA: ICD-10-CM

## 2024-11-12 DIAGNOSIS — R44.3 HALLUCINATIONS: ICD-10-CM

## 2024-11-12 DIAGNOSIS — G47.00 INSOMNIA, UNSPECIFIED TYPE: ICD-10-CM

## 2024-11-12 DIAGNOSIS — R29.6 FREQUENT FALLS: ICD-10-CM

## 2024-11-12 LAB
ANION GAP SERPL CALCULATED.3IONS-SCNC: 9 MMOL/L (ref 10–20)
APPEARANCE UR: CLEAR
BASOPHILS # BLD AUTO: 0.02 X10*3/UL (ref 0–0.1)
BASOPHILS NFR BLD AUTO: 0.1 %
BILIRUB UR STRIP.AUTO-MCNC: NEGATIVE MG/DL
BUN SERPL-MCNC: 28 MG/DL (ref 6–23)
CALCIUM SERPL-MCNC: 9.4 MG/DL (ref 8.6–10.3)
CHLORIDE SERPL-SCNC: 93 MMOL/L (ref 98–107)
CO2 SERPL-SCNC: 45 MMOL/L (ref 21–32)
COLOR UR: NORMAL
CREAT SERPL-MCNC: 0.64 MG/DL (ref 0.5–1.05)
EGFRCR SERPLBLD CKD-EPI 2021: >90 ML/MIN/1.73M*2
EOSINOPHIL # BLD AUTO: 0.01 X10*3/UL (ref 0–0.7)
EOSINOPHIL NFR BLD AUTO: 0.1 %
ERYTHROCYTE [DISTWIDTH] IN BLOOD BY AUTOMATED COUNT: 14.6 % (ref 11.5–14.5)
GLUCOSE SERPL-MCNC: 108 MG/DL (ref 74–99)
GLUCOSE UR STRIP.AUTO-MCNC: NORMAL MG/DL
HCT VFR BLD AUTO: 41.9 % (ref 36–46)
HGB BLD-MCNC: 13.3 G/DL (ref 12–16)
IMM GRANULOCYTES # BLD AUTO: 0.06 X10*3/UL (ref 0–0.7)
IMM GRANULOCYTES NFR BLD AUTO: 0.4 % (ref 0–0.9)
KETONES UR STRIP.AUTO-MCNC: NEGATIVE MG/DL
LEUKOCYTE ESTERASE UR QL STRIP.AUTO: NEGATIVE
LYMPHOCYTES # BLD AUTO: 1.33 X10*3/UL (ref 1.2–4.8)
LYMPHOCYTES NFR BLD AUTO: 9.6 %
MCH RBC QN AUTO: 30.4 PG (ref 26–34)
MCHC RBC AUTO-ENTMCNC: 31.7 G/DL (ref 32–36)
MCV RBC AUTO: 96 FL (ref 80–100)
MONOCYTES # BLD AUTO: 0.96 X10*3/UL (ref 0.1–1)
MONOCYTES NFR BLD AUTO: 6.9 %
NEUTROPHILS # BLD AUTO: 11.53 X10*3/UL (ref 1.2–7.7)
NEUTROPHILS NFR BLD AUTO: 82.9 %
NITRITE UR QL STRIP.AUTO: NEGATIVE
NRBC BLD-RTO: 0 /100 WBCS (ref 0–0)
PH UR STRIP.AUTO: 6.5 [PH]
PLATELET # BLD AUTO: 270 X10*3/UL (ref 150–450)
POTASSIUM SERPL-SCNC: 2.9 MMOL/L (ref 3.5–5.3)
PROT UR STRIP.AUTO-MCNC: NEGATIVE MG/DL
RBC # BLD AUTO: 4.38 X10*6/UL (ref 4–5.2)
RBC # UR STRIP.AUTO: NEGATIVE /UL
SODIUM SERPL-SCNC: 144 MMOL/L (ref 136–145)
SP GR UR STRIP.AUTO: 1.01
UROBILINOGEN UR STRIP.AUTO-MCNC: NORMAL MG/DL
WBC # BLD AUTO: 13.9 X10*3/UL (ref 4.4–11.3)

## 2024-11-12 PROCEDURE — 81003 URINALYSIS AUTO W/O SCOPE: CPT | Performed by: EMERGENCY MEDICINE

## 2024-11-12 PROCEDURE — 85025 COMPLETE CBC W/AUTO DIFF WBC: CPT | Performed by: EMERGENCY MEDICINE

## 2024-11-12 PROCEDURE — 36415 COLL VENOUS BLD VENIPUNCTURE: CPT | Performed by: EMERGENCY MEDICINE

## 2024-11-12 PROCEDURE — 99283 EMERGENCY DEPT VISIT LOW MDM: CPT

## 2024-11-12 PROCEDURE — 80048 BASIC METABOLIC PNL TOTAL CA: CPT | Performed by: EMERGENCY MEDICINE

## 2024-11-12 PROCEDURE — 2500000002 HC RX 250 W HCPCS SELF ADMINISTERED DRUGS (ALT 637 FOR MEDICARE OP, ALT 636 FOR OP/ED): Performed by: EMERGENCY MEDICINE

## 2024-11-12 RX ORDER — POTASSIUM CHLORIDE 20 MEQ/1
20 TABLET, EXTENDED RELEASE ORAL ONCE
Status: COMPLETED | OUTPATIENT
Start: 2024-11-12 | End: 2024-11-12

## 2024-11-12 ASSESSMENT — PAIN - FUNCTIONAL ASSESSMENT: PAIN_FUNCTIONAL_ASSESSMENT: 0-10

## 2024-11-12 ASSESSMENT — PAIN SCALES - GENERAL: PAINLEVEL_OUTOF10: 0 - NO PAIN

## 2024-11-12 NOTE — TELEPHONE ENCOUNTER
Vickie is returning my call. She was advised to the ER. She is stating that when her  returns from the store she will talk to him about going to the ER.

## 2024-11-12 NOTE — TELEPHONE ENCOUNTER
Please ask her  to take her to the ER immediately or call 911, needs to be seen in the hospital for her hallucinations.  This could be something psychiatrically happening or related to worsening of her chronic conditions.

## 2024-11-12 NOTE — TELEPHONE ENCOUNTER
Patient was called. Waiting for  to get home from store and said she would talk to him about taking to ER. Patient was adivse to go to ER or call 911in unable to transport. She is aware and understanding.

## 2024-11-12 NOTE — ED PROVIDER NOTES
HPI   Chief Complaint   Patient presents with    Hallucinations     Pt to ED for hallucinations that have increased over the past few days, with increased falling at home, lower SpO2 than baseline wearing O2 prn. She was discharge from rehab facility in [place] last week; was there weakness. Burning with urination and pt is incontinent at baseline. Pt with SOB, no chest pain. Pt seeing her daughter next to her but she is in a rehab. Pt with increased fatigue and weakness. History of ICU visits for CHF and hallucinations per pt.       HPI  62-year-old female presents with complaint of hallucinations.  Patient is a history hallucinations in the past attributed to possibly electrolyte disorder.  Visiting nurse came out to see her today and she mention the hallucinations and she called her doctor but they recommended come to the emergency department.  She does not have any other specific complaints although she does states that she has not been sleeping well.  She denies any chest pain, shortness of breath, dizziness lightheadedness.  She states hallucinations are visual.  She will think that she is talking her daughter but her daughter is not actually there as well as her son.      Patient History   Past Medical History:   Diagnosis Date    A-fib (Multi)     CHF (congestive heart failure)     COVID     Heart disease     HTN (hypertension)     Low back pain      Past Surgical History:   Procedure Laterality Date    ABLATION OF DYSRHYTHMIC FOCUS      CARDIAC ELECTROPHYSIOLOGY PROCEDURE N/A 05/01/2024    Procedure: Cardioversion;  Surgeon: Amado Ty MD;  Location: [facility] Cardiac Cath Lab;  Service: Electrophysiology;  Laterality: N/A;  98662  i48.0  medical mutual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    CARDIAC ELECTROPHYSIOLOGY PROCEDURE N/A 07/08/2024    Procedure: Ablation A-Fib;  Surgeon: Amado Ty MD;  Location: [facility] Cardiac Cath Lab;  Service: Electrophysiology;  Laterality: N/A;  AFIB CRYOABLATION; CPT 77250; ICD I48.0    Medicare mutual of [place] medicare  ID - [id number]  [phone]  No Auth required  Call ref # [id number]    CARDIAC ELECTROPHYSIOLOGY PROCEDURE N/A 2024    Procedure: Cardioversion;  Surgeon: Amado Ty MD;  Location: [facility] Cardiac Cath Lab;  Service: Electrophysiology;  Laterality: N/A;  CARDIOVERSION CPT 33307, ICD I48.0 no auth required/REF # [id number]    CARDIOVERSION      2024     Family History   Problem Relation Name Age of Onset    Breast cancer Mother      Heart attack Mother           in early 80s of MI    Heart disease Mother      Colon cancer Father      Breast cancer Sister      Diabetes type II Sister      Kidney failure Sister      Other (herione addict) Sister      No Known Problems Brother      No Known Problems Daughter      Other (recovering alcoholic) Son      Breast cancer Other Maternal Aunt      Social History     Tobacco Use    Smoking status: Former     Current packs/day: 0.00     Types: Cigarettes     Quit date:      Years since quittin.8    Smokeless tobacco: Never   Vaping Use    Vaping status: Never Used   Substance Use Topics    Alcohol use: Not Currently     Comment: monthly or less    Drug use: Never       Physical Exam   ED Triage Vitals [24 1811]   Temperature Heart Rate Respirations BP   37 °C (98.6 °F) 84 20 139/82      Pulse Ox Temp Source Heart Rate Source Patient Position   (!) 85 % Temporal -- Sitting      BP Location FiO2 (%)     Left arm --       Physical Exam  General:  Awake, alert, no acute distress.  Head: Normocephalic, Atraumatic  Neck: Supple, trachea midline, no stridor  Skin: Warm and dry, no rashes   Lungs: Clear to auscultation bilaterally no acute respiratory distress, speaking in full sentences without difficulty  CV: Regular Rate Rhythm with no obvious murmurs gallops rubs noted, no jugular venous distention, no pedal edema   Abdomen: Soft, nontender, nondistended, positive bowel sounds, no peritoneal signs  Neuro:   No gross focal neurologic deficits, NIH is 0  Musculoskeletal:  Full range of motion in all 4 extremities  Psychiatric:  Alert oriented x 3, Good insight into condition.    ED Course & MDM   Diagnoses as of 11/13/24 0124   Hallucinations, visual   Hypokalemia                 No data recorded     Maddy Coma Scale Score: 15 (11/12/24 1812 : Chastity Joya RN)                           Medical Decision Making  Patient's workup in the ED is unremarkable with exception of mild hypokalemia but her doctor recently prescribed her potassium for home.  The hallucinations are not new.  She is not psychotic or delirious.  At this point I feel she is stable for discharge to home with outpatient evaluation for hallucinations.    Procedure  Procedures     Wily Sosa,   11/13/24 0125

## 2024-11-12 NOTE — TELEPHONE ENCOUNTER
Please confirm in an hour or 2 that her  is aware.  If he is on her emergency contact list I do recommend calling him directly in case she does not tell him given her hallucinations

## 2024-11-12 NOTE — TELEPHONE ENCOUNTER
Called, Left a message on VM to call us back. Also left a detailed message on daughter Jasmina's # to call us back regarding this.

## 2024-11-12 NOTE — TELEPHONE ENCOUNTER
Vickie is calling stating she has been hallucinating again for the past week. She is stating she has had occasional falls as well.  She is stating she has been feeling ok other than the hallucinations. Please call to advise Vickie's # [phone]

## 2024-11-12 NOTE — ED TRIAGE NOTES
Pt to ED for hallucinations that have increased over the past few days, with increased falling at home, lower SpO2 than baseline wearing O2 prn. She was discharge from rehab facility in [place] last week; was there weakness. Burning with urination and pt is incontinent at baseline. Pt with SOB, no chest pain. Pt seeing her daughter next to her but she is in a rehab. Pt with increased fatigue and weakness. History of ICU visits for CHF and hallucinations per pt.

## 2024-11-13 ENCOUNTER — APPOINTMENT (OUTPATIENT)
Dept: RADIOLOGY | Facility: HOSPITAL | Age: 69
End: 2024-11-13
Payer: MEDICARE

## 2024-11-13 ENCOUNTER — HOSPITAL ENCOUNTER (EMERGENCY)
Facility: HOSPITAL | Age: 69
Discharge: HOME | End: 2024-11-13
Attending: EMERGENCY MEDICINE
Payer: MEDICARE

## 2024-11-13 ENCOUNTER — PATIENT OUTREACH (OUTPATIENT)
Dept: CARE COORDINATION | Facility: CLINIC | Age: 69
End: 2024-11-13

## 2024-11-13 ENCOUNTER — APPOINTMENT (OUTPATIENT)
Dept: CARDIOLOGY | Facility: HOSPITAL | Age: 69
End: 2024-11-13
Payer: MEDICARE

## 2024-11-13 VITALS
SYSTOLIC BLOOD PRESSURE: 129 MMHG | DIASTOLIC BLOOD PRESSURE: 92 MMHG | HEIGHT: 62 IN | BODY MASS INDEX: 44.35 KG/M2 | RESPIRATION RATE: 20 BRPM | WEIGHT: 241 LBS | OXYGEN SATURATION: 96 % | HEART RATE: 120 BPM | TEMPERATURE: 97.7 F

## 2024-11-13 DIAGNOSIS — S01.01XA SCALP LACERATION, INITIAL ENCOUNTER: ICD-10-CM

## 2024-11-13 DIAGNOSIS — S09.90XA INJURY OF HEAD, INITIAL ENCOUNTER: Primary | ICD-10-CM

## 2024-11-13 LAB
BNP SERPL-MCNC: 284 PG/ML (ref 0–99)
CARDIAC TROPONIN I PNL SERPL HS: 14 NG/L (ref 0–13)
GLUCOSE BLD MANUAL STRIP-MCNC: 204 MG/DL (ref 74–99)
HOLD SPECIMEN: NORMAL
Q ONSET: 224 MS
QRS COUNT: 17 BEATS
QRS DURATION: 92 MS
QT INTERVAL: 378 MS
QTC CALCULATION(BAZETT): 495 MS
QTC FREDERICIA: 452 MS
R AXIS: 26 DEGREES
T AXIS: 162 DEGREES
T OFFSET: 413 MS
VENTRICULAR RATE: 103 BPM

## 2024-11-13 PROCEDURE — 71045 X-RAY EXAM CHEST 1 VIEW: CPT

## 2024-11-13 PROCEDURE — 70450 CT HEAD/BRAIN W/O DYE: CPT

## 2024-11-13 PROCEDURE — 93005 ELECTROCARDIOGRAM TRACING: CPT

## 2024-11-13 PROCEDURE — 36415 COLL VENOUS BLD VENIPUNCTURE: CPT | Performed by: EMERGENCY MEDICINE

## 2024-11-13 PROCEDURE — 82947 ASSAY GLUCOSE BLOOD QUANT: CPT

## 2024-11-13 PROCEDURE — 99285 EMERGENCY DEPT VISIT HI MDM: CPT | Mod: 25

## 2024-11-13 PROCEDURE — 83880 ASSAY OF NATRIURETIC PEPTIDE: CPT | Performed by: EMERGENCY MEDICINE

## 2024-11-13 PROCEDURE — 70450 CT HEAD/BRAIN W/O DYE: CPT | Performed by: RADIOLOGY

## 2024-11-13 PROCEDURE — 71045 X-RAY EXAM CHEST 1 VIEW: CPT | Performed by: STUDENT IN AN ORGANIZED HEALTH CARE EDUCATION/TRAINING PROGRAM

## 2024-11-13 PROCEDURE — 2500000004 HC RX 250 GENERAL PHARMACY W/ HCPCS (ALT 636 FOR OP/ED): Performed by: EMERGENCY MEDICINE

## 2024-11-13 PROCEDURE — 96374 THER/PROPH/DIAG INJ IV PUSH: CPT

## 2024-11-13 PROCEDURE — G0390 TRAUMA RESPONS W/HOSP CRITI: HCPCS

## 2024-11-13 PROCEDURE — 84484 ASSAY OF TROPONIN QUANT: CPT | Performed by: EMERGENCY MEDICINE

## 2024-11-13 RX ORDER — HYDROXYZINE HYDROCHLORIDE 10 MG/1
10 TABLET, FILM COATED ORAL NIGHTLY PRN
Qty: 30 TABLET | Refills: 2 | Status: SHIPPED | OUTPATIENT
Start: 2024-11-13 | End: 2025-02-11

## 2024-11-13 RX ORDER — LORAZEPAM 2 MG/ML
1 INJECTION INTRAMUSCULAR ONCE
Status: COMPLETED | OUTPATIENT
Start: 2024-11-13 | End: 2024-11-13

## 2024-11-13 ASSESSMENT — PAIN SCALES - GENERAL
PAINLEVEL_OUTOF10: 0 - NO PAIN

## 2024-11-13 ASSESSMENT — PAIN - FUNCTIONAL ASSESSMENT: PAIN_FUNCTIONAL_ASSESSMENT: 0-10

## 2024-11-13 NOTE — DISCHARGE INSTRUCTIONS
Thank you for choosing [facility] beds at the [facility] Emergency Department. It was my pleasure to be involved in your care today.         As of today's visit, based on reasonable likelihood, that it is safe for you to be discharged back to your residence to follow-up as an outpatient for ongoing management of your medical problem. You should follow-up with any referrals / primary provider as soon as possible. The contacts (number, addresses) are listed below.         Important:  Even though we think it is safe for you to go home, there is always a small chance that we are missing something that could require hospitalization.  Therefore it is very important that if you get worse or develops any new symptoms that you return here as soon as possible to be re-evaluated.  This includes return of symptoms that have resolved such as fainting, chest pain, or symptoms that could be warning signs for stroke important:  Even though we think it is safe for you to go home, there is always a small chance that we are missing something that could require hospitalization.  Therefore it is very important that if you get worse or develops any new symptoms that you return here as soon as possible to be re-evaluated.  This includes return of symptoms that have resolved such as fainting, chest pain, or symptoms that could be warning signs for stroke         Make sure your pharmacy and primary doctor is aware of any new medications prescribed today.          It is your responsibility to contact as soon as possible, and follow through with, any referrals you were given today. We do recommend you inform them you are a [facility] follow-up patient, as often they can better accommodate your need to be seen, provided their schedules allow. We will, and have, made every effort to ensure you have access to adequate follow-up specialists available.          All problems may not be able to be fixed in one ER visit. This is why timely ongoing care is important, and this  is a responsibility you share in. Further, you are free to follow up with any provider you choose, and this is not limited to our suggestion.          If cultures were obtained today, you will be contacted should anything result that would require further treatment. Please contact the ED at the number provided with questions.          Having trouble affording medications? Try [email or web address] (This is not a hospital endorsed website, merely a recommendation based on my own personal experiences with [employer]ipping)

## 2024-11-13 NOTE — TELEPHONE ENCOUNTER
Noted thanks. I see some type of an ER note about a laceration and head trauma? No details though... she ok?

## 2024-11-13 NOTE — TELEPHONE ENCOUNTER
Called . Patient is at home and OK.  stated she is still hallucinating and doesn't know what to do. He thinks it may be because she is sleep deprived. She doesn't sleep much, maybe an hour at a time, since she cannot lay down because of her back.

## 2024-11-13 NOTE — ED NOTES
Went over discharge instructions with pt. Pt verbalized understanding. Transport arranged for pt back home. No further issue at time of discharge.     Will Marte RN  11/13/24 2182

## 2024-11-13 NOTE — TELEPHONE ENCOUNTER
is updated and aware. He is understanding. Keeping a close eye on Vickie. Will go to  medication and update us tomorrow.

## 2024-11-13 NOTE — ED PROVIDER NOTES
HPI   No chief complaint on file.      HPI  68-year-old female presents with a head injury.  I saw this patient earlier in the day for hallucination she went home she states she was sitting at the kitchen table when she slid out of the chair and hit her head.  She is not sure if she passed out.  States she has had a very hard time sleeping recently and thinks maybe she fell asleep at the table.  No chest pain or shortness of breath.  No dizziness lightheadedness.  Brought in by EMS.  Complains of a headache.  No other complaints.      Patient History   Past Medical History:   Diagnosis Date    A-fib (Multi)     CHF (congestive heart failure)     COVID     Heart disease     HTN (hypertension)     Low back pain      Past Surgical History:   Procedure Laterality Date    ABLATION OF DYSRHYTHMIC FOCUS      CARDIAC ELECTROPHYSIOLOGY PROCEDURE N/A 2024    Procedure: Cardioversion;  Surgeon: Amado Ty MD;  Location: [facility] Cardiac Cath Lab;  Service: Electrophysiology;  Laterality: N/A;  77746  i48.0  medical [place]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    CARDIAC ELECTROPHYSIOLOGY PROCEDURE N/A 2024    Procedure: Ablation A-Fib;  Surgeon: Amado Ty MD;  Location: [facility] Cardiac Cath Lab;  Service: Electrophysiology;  Laterality: N/A;  AFIB CRYOABLATION; CPT 69009; ICD I48.0   Medicare [employer] of [place] medicare  ID - [id number]  [phone]  No Auth required  Call ref # [id number]    CARDIAC ELECTROPHYSIOLOGY PROCEDURE N/A 2024    Procedure: Cardioversion;  Surgeon: Amado Ty MD;  Location: [facility] Cardiac Cath Lab;  Service: Electrophysiology;  Laterality: N/A;  CARDIOVERSION CPT 89630, ICD I48.0 no auth required/REF # [id number]    CARDIOVERSION      2024     Family History   Problem Relation Name Age of Onset    Breast cancer Mother      Heart attack Mother           in early 80s of MI    Heart disease Mother      Colon cancer Father      Breast cancer Sister      Diabetes type II Sister       Kidney failure Sister      Other (herione addict) Sister      No Known Problems Brother      No Known Problems Daughter      Other (recovering alcoholic) Son      Breast cancer Other Maternal Aunt      Social History     Tobacco Use    Smoking status: Former     Current packs/day: 0.00     Types: Cigarettes     Quit date:      Years since quittin.8    Smokeless tobacco: Never   Vaping Use    Vaping status: Never Used   Substance Use Topics    Alcohol use: Not Currently     Comment: monthly or less    Drug use: Never       Physical Exam   ED Triage Vitals [24 0012]   Temperature Heart Rate Respirations BP   36.7 °C (98.1 °F) (!) 101 20 134/80      Pulse Ox Temp src Heart Rate Source Patient Position   98 % -- -- --      BP Location FiO2 (%)     -- --       Physical Exam  General:  Awake, alert, no acute distress.  Head: Normocephalic, hematoma, laceration right occiput  Neck: Supple, trachea midline, no stridor, no bony tenderness  Skin: Warm and dry, no rashes   Lungs: Clear to auscultation bilaterally no acute respiratory distress, speaking in full sentences without difficulty  CV: Regular Rate Rhythm with no obvious murmurs gallops rubs noted, no jugular venous distention, no pedal edema   Abdomen: Soft, nontender, nondistended, positive bowel sounds, no peritoneal signs  Neuro:  No gross focal neurologic deficits, NIH is 0  Musculoskeletal:  Full range of motion in all 4 extremities  Psychiatric:  Alert oriented x 3, Good insight into condition.    ED Course & MDM   Diagnoses as of 24 0252   Injury of head, initial encounter   Scalp laceration, initial encounter                 No data recorded                                 Medical Decision Making  My EKG interpretation at 0025:  Atrial fibrillation 103 bpm normal axis QTc 495 no ectopy or acute ischemic changes noted  Patient's head CT and other laboratory studies unremarkable.  Her laceration was repaired.  Patient lives with her .   She feels safe and comfortable going home.  At this point I feel comfortable going home as well follow-up with her doctor.  She also does have visiting nurse at her house.  Stable for discharge.    Procedure note:  2.5 cm linear laceration posterior parietal region was repaired with 5 staples after the area was cleansed with saline.  The wound edges were well-approximated, hemostasis was appreciated, the patient tolerated the procedure well.  Procedure  Procedures     Wily Sosa DO  11/13/24 0252

## 2024-11-13 NOTE — TELEPHONE ENCOUNTER
Okay I am going to see if I can get a [facility] nurse practitioner to check in on Vickie at home for a visit every once in a while.  We need the extra eyes on her.  We also need her to connect with psychiatry as soon as possible for these chronic hallucinations.  I have placed a stat referral and hope that someone can see her as soon as possible.  Please let  know that if she is harming herself / at risk of harming herself or saying a lot of threats that he should please call 911 immediately and they can assist with getting her psychiatric care.     She had normal kidney function recently.  I think we should go ahead and try and give her hydroxyzine to help her sleep longer than an hour.  I agree that she will have better mental clarity with better sleep.  This is an allergy medicine that makes people drowsy and sleepy.  If she has any difficulty urinating with using this medication we can stop it but otherwise lets try and take this once daily before bedtime.  See if he can pick it up today to give her a dose tonight.    Ask for an update from  tomorrow

## 2024-11-13 NOTE — DISCHARGE INSTRUCTIONS
Thank you for choosing [facility] Emergency Department. It was my pleasure to be involved in your care today.         As of today's visit, based on reasonable likelihood, that it is safe for you to be discharged back to your residence to follow-up as an outpatient for ongoing management of your medical problem. You should follow-up with any referrals / primary provider as soon as possible. The contacts (number, addresses) are listed below.         Important:  Even though we think it is safe for you to go home, there is always a small chance that we are missing something that could require hospitalization.  Therefore it is very important that if you get worse or develops any new symptoms that you return here as soon as possible to be re-evaluated.  This includes return of symptoms that have resolved such as fainting, chest pain, or symptoms that could be warning signs for stroke important:  Even though we think it is safe for you to go home, there is always a small chance that we are missing something that could require hospitalization.  Therefore it is very important that if you get worse or develops any new symptoms that you return here as soon as possible to be re-evaluated.  This includes return of symptoms that have resolved such as fainting, chest pain, or symptoms that could be warning signs for stroke         Make sure your pharmacy and primary doctor is aware of any new medications prescribed today.          It is your responsibility to contact as soon as possible, and follow through with, any referrals you were given today. We do recommend you inform them you are a [facility] follow-up patient, as often they can better accommodate your need to be seen, provided their schedules allow. We will, and have, made every effort to ensure you have access to adequate follow-up specialists available.          All problems may not be able to be fixed in one ER visit. This is why timely ongoing care is important, and this  is a responsibility you share in. Further, you are free to follow up with any provider you choose, and this is not limited to our suggestion.          If cultures were obtained today, you will be contacted should anything result that would require further treatment. Please contact the ED at the number provided with questions.          Having trouble affording medications? Try [email or web address]! (This is not a hospital endorsed website, merely a recommendation based on my own personal experiences with [facility])

## 2024-11-14 ENCOUNTER — TELEPHONE (OUTPATIENT)
Dept: PRIMARY CARE | Facility: CLINIC | Age: 69
End: 2024-11-14
Payer: MEDICARE

## 2024-11-14 NOTE — TELEPHONE ENCOUNTER
Patient referred via Dr. Gonzalez. having difficulty with her heart failure, mobility and is experiencing hallucinations which are chronic.  having frequent falls, difficulty sleeping more than 1 hour.  Unable to easily get to appts without relying on transportation from her .  [facility] will reach out to patient in effort to establish contact and offer services.

## 2024-11-15 ENCOUNTER — PATIENT OUTREACH (OUTPATIENT)
Dept: CARE COORDINATION | Facility: CLINIC | Age: 69
End: 2024-11-15
Payer: MEDICARE

## 2024-11-15 NOTE — TELEPHONE ENCOUNTER
How to do with using the hydroxyzine for Vickie?  How is her mood doing?  Is she seeing less hallucinations?  Has anyone set up for the [facility] program yet or psychiatry?

## 2024-11-15 NOTE — TELEPHONE ENCOUNTER
was called. Patient is doing good on the new med. Patient slept all through the night.  stated she was doing really well and mood was good.

## 2024-11-15 NOTE — TELEPHONE ENCOUNTER
Call placed to patient number as listed, [phone], left message requesting return call along with office telephone number.

## 2024-11-19 NOTE — TELEPHONE ENCOUNTER
Call placed to patient number as listed, no answer, left message along with office telephone number requesting return call.

## 2024-11-20 ENCOUNTER — TELEPHONE (OUTPATIENT)
Dept: PRIMARY CARE | Facility: CLINIC | Age: 69
End: 2024-11-20
Payer: MEDICARE

## 2024-11-20 ENCOUNTER — APPOINTMENT (OUTPATIENT)
Dept: PAIN MEDICINE | Facility: CLINIC | Age: 69
End: 2024-11-20
Payer: MEDICARE

## 2024-11-20 DIAGNOSIS — I63.9 ACUTE CVA (CEREBROVASCULAR ACCIDENT) (MULTI): ICD-10-CM

## 2024-11-20 NOTE — TELEPHONE ENCOUNTER
Pt called to r/s the Dec appt to 1/16/24. She would like to know about the results of the xray and CT that was done in the hospital.

## 2024-11-20 NOTE — TELEPHONE ENCOUNTER
Call placed to patient number as listed, no answer, left message requesting return call along with office telephone number.  Letter has been sent via [employer] in effort to contact patient.

## 2024-11-21 RX ORDER — ATORVASTATIN CALCIUM 40 MG/1
TABLET, FILM COATED ORAL
Qty: 90 TABLET | Refills: 0 | Status: SHIPPED | OUTPATIENT
Start: 2024-11-21

## 2024-11-26 ENCOUNTER — APPOINTMENT (OUTPATIENT)
Dept: PAIN MEDICINE | Facility: CLINIC | Age: 69
End: 2024-11-26
Payer: MEDICARE

## 2024-11-26 NOTE — TELEPHONE ENCOUNTER
Patient has not been going to her appointments.  I am very concerned about this!  Her appointment today with pain management was canceled.  Her appointment 6 days ago with pain management was canceled.  She did not show up to cardiology 2 weeks ago.  Her December appointment with me are canceled.  Her cardiology appointments for December and January are canceled.    We need to find out what is going on with patient.  She is at risk of serious complication or even death if she does not seek proper medical care for help with her conditions.    Please see if you can reach patient as well as her  to figure out what is going on.  I think she needs to transfer care to a PCP in [place] near her for easier access as driving here is going to be too difficult in light of everything

## 2024-11-26 NOTE — TELEPHONE ENCOUNTER
Attempted call patient x 3 today. Last call 1:18 pm.  called, daughter ann marie called. No answers. LM for Scott Johnston ( Spouse) and Ann Marie to call us back.

## 2024-11-26 NOTE — TELEPHONE ENCOUNTER
"Patient was finally reached. Patient stated she canceled all her appointments because it was \"just to much.\" She said she will start back after the new year. Patient was educated on risk and complications, she verbalized understanding and said she would reach out with any concerns. She said she's doing alright. Patient sounded good on the phone compared to previous times. No sounding of SOB or dyspnea. No wheezing. Clear voice. Patient was alert and oriented x 4. Patient was up and walking around with her . She said at this time she would like to stay with Dr. Gonzalez, she stated she is a really good doctor and its no trouble coming out to see her.   "

## 2024-12-05 ENCOUNTER — APPOINTMENT (OUTPATIENT)
Dept: CARDIOLOGY | Facility: CLINIC | Age: 69
End: 2024-12-05
Payer: MEDICARE

## 2024-12-05 DIAGNOSIS — I48.0 PAF (PAROXYSMAL ATRIAL FIBRILLATION) (MULTI): Primary | ICD-10-CM

## 2024-12-08 ENCOUNTER — APPOINTMENT (OUTPATIENT)
Dept: CARDIOLOGY | Facility: HOSPITAL | Age: 69
DRG: 291 | End: 2024-12-08
Payer: MEDICARE

## 2024-12-08 ENCOUNTER — APPOINTMENT (OUTPATIENT)
Dept: RADIOLOGY | Facility: HOSPITAL | Age: 69
DRG: 291 | End: 2024-12-08
Payer: MEDICARE

## 2024-12-08 ENCOUNTER — HOSPITAL ENCOUNTER (INPATIENT)
Facility: HOSPITAL | Age: 69
End: 2024-12-08
Attending: STUDENT IN AN ORGANIZED HEALTH CARE EDUCATION/TRAINING PROGRAM | Admitting: INTERNAL MEDICINE
Payer: MEDICARE

## 2024-12-08 VITALS
TEMPERATURE: 97.8 F | HEIGHT: 62 IN | SYSTOLIC BLOOD PRESSURE: 121 MMHG | WEIGHT: 231 LBS | DIASTOLIC BLOOD PRESSURE: 59 MMHG | RESPIRATION RATE: 17 BRPM | HEART RATE: 85 BPM | OXYGEN SATURATION: 97 % | BODY MASS INDEX: 42.51 KG/M2

## 2024-12-08 DIAGNOSIS — E87.3 METABOLIC ALKALOSIS: ICD-10-CM

## 2024-12-08 DIAGNOSIS — L03.115 CELLULITIS OF RIGHT LOWER LEG: ICD-10-CM

## 2024-12-08 DIAGNOSIS — L03.119 CELLULITIS OF LOWER EXTREMITY, UNSPECIFIED LATERALITY: ICD-10-CM

## 2024-12-08 DIAGNOSIS — R60.0 EDEMA LEG: ICD-10-CM

## 2024-12-08 DIAGNOSIS — I48.92 ATRIAL FIB/FLUTTER, TRANSIENT (MULTI): ICD-10-CM

## 2024-12-08 DIAGNOSIS — I50.43 CHF (CONGESTIVE HEART FAILURE), NYHA CLASS I, ACUTE ON CHRONIC, COMBINED: Primary | ICD-10-CM

## 2024-12-08 DIAGNOSIS — I48.91 ATRIAL FIB/FLUTTER, TRANSIENT (MULTI): ICD-10-CM

## 2024-12-08 DIAGNOSIS — L03.115 CELLULITIS OF RIGHT LOWER EXTREMITY: ICD-10-CM

## 2024-12-08 DIAGNOSIS — I25.10 CORONARY ARTERIOSCLEROSIS: ICD-10-CM

## 2024-12-08 DIAGNOSIS — R60.0 BILATERAL LOWER EXTREMITY EDEMA: ICD-10-CM

## 2024-12-08 DIAGNOSIS — I50.9 ACUTE ON CHRONIC CONGESTIVE HEART FAILURE, UNSPECIFIED HEART FAILURE TYPE: ICD-10-CM

## 2024-12-08 DIAGNOSIS — I51.7 CARDIOMEGALY: ICD-10-CM

## 2024-12-08 DIAGNOSIS — I50.32 CHRONIC DIASTOLIC HEART FAILURE: ICD-10-CM

## 2024-12-08 PROBLEM — J96.21 ACUTE ON CHRONIC RESPIRATORY FAILURE WITH HYPOXIA AND HYPERCAPNIA (MULTI): Status: ACTIVE | Noted: 2024-12-08

## 2024-12-08 PROBLEM — L03.90 CELLULITIS: Status: ACTIVE | Noted: 2024-12-08

## 2024-12-08 PROBLEM — E66.9 OBESITY: Status: ACTIVE | Noted: 2024-12-08

## 2024-12-08 PROBLEM — J44.9 COPD (CHRONIC OBSTRUCTIVE PULMONARY DISEASE) (MULTI): Status: ACTIVE | Noted: 2024-12-08

## 2024-12-08 PROBLEM — J96.22 ACUTE ON CHRONIC RESPIRATORY FAILURE WITH HYPOXIA AND HYPERCAPNIA (MULTI): Status: ACTIVE | Noted: 2024-12-08

## 2024-12-08 LAB
ALBUMIN SERPL BCP-MCNC: 3.7 G/DL (ref 3.4–5)
ALP SERPL-CCNC: 102 U/L (ref 33–136)
ALT SERPL W P-5'-P-CCNC: 20 U/L (ref 7–45)
ANION GAP SERPL CALCULATED.3IONS-SCNC: 11 MMOL/L (ref 10–20)
AST SERPL W P-5'-P-CCNC: 20 U/L (ref 9–39)
BASOPHILS # BLD AUTO: 0.02 X10*3/UL (ref 0–0.1)
BASOPHILS NFR BLD AUTO: 0.2 %
BILIRUB SERPL-MCNC: 0.5 MG/DL (ref 0–1.2)
BNP SERPL-MCNC: 148 PG/ML (ref 0–99)
BUN SERPL-MCNC: 29 MG/DL (ref 6–23)
CALCIUM SERPL-MCNC: 9 MG/DL (ref 8.6–10.3)
CARDIAC TROPONIN I PNL SERPL HS: 12 NG/L (ref 0–13)
CARDIAC TROPONIN I PNL SERPL HS: 12 NG/L (ref 0–13)
CHLORIDE SERPL-SCNC: 95 MMOL/L (ref 98–107)
CO2 SERPL-SCNC: 38 MMOL/L (ref 21–32)
CREAT SERPL-MCNC: 0.65 MG/DL (ref 0.5–1.05)
EGFRCR SERPLBLD CKD-EPI 2021: >90 ML/MIN/1.73M*2
EOSINOPHIL # BLD AUTO: 0.09 X10*3/UL (ref 0–0.7)
EOSINOPHIL NFR BLD AUTO: 0.8 %
ERYTHROCYTE [DISTWIDTH] IN BLOOD BY AUTOMATED COUNT: 15.7 % (ref 11.5–14.5)
FLUAV RNA RESP QL NAA+PROBE: NOT DETECTED
FLUBV RNA RESP QL NAA+PROBE: NOT DETECTED
GLUCOSE SERPL-MCNC: 97 MG/DL (ref 74–99)
HCT VFR BLD AUTO: 39.4 % (ref 36–46)
HGB BLD-MCNC: 12.6 G/DL (ref 12–16)
IMM GRANULOCYTES # BLD AUTO: 0.04 X10*3/UL (ref 0–0.7)
IMM GRANULOCYTES NFR BLD AUTO: 0.4 % (ref 0–0.9)
LYMPHOCYTES # BLD AUTO: 1.28 X10*3/UL (ref 1.2–4.8)
LYMPHOCYTES NFR BLD AUTO: 11.9 %
MCH RBC QN AUTO: 30.5 PG (ref 26–34)
MCHC RBC AUTO-ENTMCNC: 32 G/DL (ref 32–36)
MCV RBC AUTO: 95 FL (ref 80–100)
MONOCYTES # BLD AUTO: 0.83 X10*3/UL (ref 0.1–1)
MONOCYTES NFR BLD AUTO: 7.7 %
NEUTROPHILS # BLD AUTO: 8.5 X10*3/UL (ref 1.2–7.7)
NEUTROPHILS NFR BLD AUTO: 79 %
NRBC BLD-RTO: 0 /100 WBCS (ref 0–0)
PLATELET # BLD AUTO: 275 X10*3/UL (ref 150–450)
POTASSIUM SERPL-SCNC: 3.1 MMOL/L (ref 3.5–5.3)
PROT SERPL-MCNC: 6.5 G/DL (ref 6.4–8.2)
RBC # BLD AUTO: 4.13 X10*6/UL (ref 4–5.2)
SARS-COV-2 RNA RESP QL NAA+PROBE: NOT DETECTED
SODIUM SERPL-SCNC: 141 MMOL/L (ref 136–145)
WBC # BLD AUTO: 10.8 X10*3/UL (ref 4.4–11.3)

## 2024-12-08 PROCEDURE — 85025 COMPLETE CBC W/AUTO DIFF WBC: CPT

## 2024-12-08 PROCEDURE — 36415 COLL VENOUS BLD VENIPUNCTURE: CPT

## 2024-12-08 PROCEDURE — 99223 1ST HOSP IP/OBS HIGH 75: CPT | Performed by: INTERNAL MEDICINE

## 2024-12-08 PROCEDURE — 1200000002 HC GENERAL ROOM WITH TELEMETRY DAILY

## 2024-12-08 PROCEDURE — 2500000005 HC RX 250 GENERAL PHARMACY W/O HCPCS: Performed by: INTERNAL MEDICINE

## 2024-12-08 PROCEDURE — 2500000002 HC RX 250 W HCPCS SELF ADMINISTERED DRUGS (ALT 637 FOR MEDICARE OP, ALT 636 FOR OP/ED): Performed by: STUDENT IN AN ORGANIZED HEALTH CARE EDUCATION/TRAINING PROGRAM

## 2024-12-08 PROCEDURE — 84484 ASSAY OF TROPONIN QUANT: CPT

## 2024-12-08 PROCEDURE — 80053 COMPREHEN METABOLIC PANEL: CPT

## 2024-12-08 PROCEDURE — 93005 ELECTROCARDIOGRAM TRACING: CPT

## 2024-12-08 PROCEDURE — 2500000001 HC RX 250 WO HCPCS SELF ADMINISTERED DRUGS (ALT 637 FOR MEDICARE OP): Performed by: INTERNAL MEDICINE

## 2024-12-08 PROCEDURE — 2500000004 HC RX 250 GENERAL PHARMACY W/ HCPCS (ALT 636 FOR OP/ED)

## 2024-12-08 PROCEDURE — 71046 X-RAY EXAM CHEST 2 VIEWS: CPT | Performed by: RADIOLOGY

## 2024-12-08 PROCEDURE — 2500000001 HC RX 250 WO HCPCS SELF ADMINISTERED DRUGS (ALT 637 FOR MEDICARE OP)

## 2024-12-08 PROCEDURE — 99285 EMERGENCY DEPT VISIT HI MDM: CPT | Mod: 25 | Performed by: STUDENT IN AN ORGANIZED HEALTH CARE EDUCATION/TRAINING PROGRAM

## 2024-12-08 PROCEDURE — 87636 SARSCOV2 & INF A&B AMP PRB: CPT

## 2024-12-08 PROCEDURE — 71046 X-RAY EXAM CHEST 2 VIEWS: CPT

## 2024-12-08 PROCEDURE — 93010 ELECTROCARDIOGRAM REPORT: CPT | Performed by: INTERNAL MEDICINE

## 2024-12-08 PROCEDURE — 83880 ASSAY OF NATRIURETIC PEPTIDE: CPT

## 2024-12-08 RX ORDER — DOFETILIDE 0.25 MG/1
250 CAPSULE ORAL EVERY 12 HOURS
Status: DISPENSED | OUTPATIENT
Start: 2024-12-09

## 2024-12-08 RX ORDER — CEFAZOLIN SODIUM 2 G/100ML
2 INJECTION, SOLUTION INTRAVENOUS EVERY 8 HOURS
Status: DISPENSED | OUTPATIENT
Start: 2024-12-09

## 2024-12-08 RX ORDER — METOPROLOL SUCCINATE 25 MG/1
25 TABLET, EXTENDED RELEASE ORAL DAILY
Status: ACTIVE | OUTPATIENT
Start: 2024-12-09

## 2024-12-08 RX ORDER — ATORVASTATIN CALCIUM 40 MG/1
40 TABLET, FILM COATED ORAL NIGHTLY
Status: DISPENSED | OUTPATIENT
Start: 2024-12-08

## 2024-12-08 RX ORDER — ACETAMINOPHEN 650 MG/1
650 SUPPOSITORY RECTAL EVERY 6 HOURS PRN
Status: ACTIVE | OUTPATIENT
Start: 2024-12-08

## 2024-12-08 RX ORDER — FLUTICASONE FUROATE AND VILANTEROL 200; 25 UG/1; UG/1
1 POWDER RESPIRATORY (INHALATION)
Status: DISPENSED | OUTPATIENT
Start: 2024-12-09

## 2024-12-08 RX ORDER — POLYETHYLENE GLYCOL 3350 17 G/17G
17 POWDER, FOR SOLUTION ORAL DAILY
Status: ACTIVE | OUTPATIENT
Start: 2024-12-09

## 2024-12-08 RX ORDER — FUROSEMIDE 10 MG/ML
40 INJECTION INTRAMUSCULAR; INTRAVENOUS ONCE
Status: COMPLETED | OUTPATIENT
Start: 2024-12-08 | End: 2024-12-08

## 2024-12-08 RX ORDER — CEFAZOLIN SODIUM 1 G/50ML
1 SOLUTION INTRAVENOUS ONCE
Status: COMPLETED | OUTPATIENT
Start: 2024-12-08 | End: 2024-12-08

## 2024-12-08 RX ORDER — ACETAMINOPHEN 325 MG/1
650 TABLET ORAL EVERY 6 HOURS PRN
Status: ACTIVE | OUTPATIENT
Start: 2024-12-08

## 2024-12-08 RX ORDER — ALLOPURINOL 100 MG/1
200 TABLET ORAL 2 TIMES DAILY
Status: DISPENSED | OUTPATIENT
Start: 2024-12-08

## 2024-12-08 RX ORDER — ACETAMINOPHEN 160 MG/5ML
650 SOLUTION ORAL EVERY 6 HOURS PRN
Status: ACTIVE | OUTPATIENT
Start: 2024-12-08

## 2024-12-08 RX ORDER — POTASSIUM CHLORIDE 20 MEQ/1
40 TABLET, EXTENDED RELEASE ORAL ONCE
Status: COMPLETED | OUTPATIENT
Start: 2024-12-08 | End: 2024-12-08

## 2024-12-08 RX ORDER — FUROSEMIDE 10 MG/ML
40 INJECTION INTRAMUSCULAR; INTRAVENOUS 2 TIMES DAILY
Status: ACTIVE | OUTPATIENT
Start: 2024-12-09

## 2024-12-08 RX ORDER — ONDANSETRON HYDROCHLORIDE 2 MG/ML
4 INJECTION, SOLUTION INTRAVENOUS EVERY 6 HOURS PRN
Status: ACTIVE | OUTPATIENT
Start: 2024-12-08

## 2024-12-08 RX ORDER — DOFETILIDE 0.25 MG/1
250 CAPSULE ORAL EVERY 12 HOURS SCHEDULED
Status: DISCONTINUED | OUTPATIENT
Start: 2024-12-08 | End: 2024-12-08

## 2024-12-08 RX ORDER — BUPROPION HYDROCHLORIDE 300 MG/1
300 TABLET ORAL
Status: ACTIVE | OUTPATIENT
Start: 2024-12-09

## 2024-12-08 RX ADMIN — Medication 2 L/MIN: at 20:00

## 2024-12-08 RX ADMIN — ALLOPURINOL 200 MG: 100 TABLET ORAL at 23:32

## 2024-12-08 RX ADMIN — FUROSEMIDE 40 MG: 10 INJECTION, SOLUTION INTRAMUSCULAR; INTRAVENOUS at 21:48

## 2024-12-08 RX ADMIN — DOFETILIDE 250 MCG: 0.25 CAPSULE ORAL at 21:48

## 2024-12-08 RX ADMIN — ATORVASTATIN CALCIUM 40 MG: 40 TABLET, FILM COATED ORAL at 23:33

## 2024-12-08 RX ADMIN — POTASSIUM CHLORIDE 40 MEQ: 1500 TABLET, EXTENDED RELEASE ORAL at 21:50

## 2024-12-08 RX ADMIN — APIXABAN 5 MG: 5 TABLET, FILM COATED ORAL at 23:32

## 2024-12-08 RX ADMIN — CEFAZOLIN SODIUM 1 G: 1 INJECTION, SOLUTION INTRAVENOUS at 21:47

## 2024-12-08 SDOH — SOCIAL STABILITY: SOCIAL INSECURITY: ABUSE: ADULT

## 2024-12-08 SDOH — SOCIAL STABILITY: SOCIAL INSECURITY: WERE YOU ABLE TO COMPLETE ALL THE BEHAVIORAL HEALTH SCREENINGS?: YES

## 2024-12-08 SDOH — SOCIAL STABILITY: SOCIAL INSECURITY: HAVE YOU HAD ANY THOUGHTS OF HARMING ANYONE ELSE?: NO

## 2024-12-08 SDOH — SOCIAL STABILITY: SOCIAL INSECURITY: DO YOU FEEL UNSAFE GOING BACK TO THE PLACE WHERE YOU ARE LIVING?: NO

## 2024-12-08 SDOH — SOCIAL STABILITY: SOCIAL INSECURITY: DOES ANYONE TRY TO KEEP YOU FROM HAVING/CONTACTING OTHER FRIENDS OR DOING THINGS OUTSIDE YOUR HOME?: NO

## 2024-12-08 SDOH — SOCIAL STABILITY: SOCIAL INSECURITY: ARE YOU OR HAVE YOU BEEN THREATENED OR ABUSED PHYSICALLY, EMOTIONALLY, OR SEXUALLY BY ANYONE?: NO

## 2024-12-08 SDOH — SOCIAL STABILITY: SOCIAL INSECURITY: HAS ANYONE EVER THREATENED TO HURT YOUR FAMILY OR YOUR PETS?: NO

## 2024-12-08 SDOH — SOCIAL STABILITY: SOCIAL INSECURITY: ARE THERE ANY APPARENT SIGNS OF INJURIES/BEHAVIORS THAT COULD BE RELATED TO ABUSE/NEGLECT?: NO

## 2024-12-08 SDOH — SOCIAL STABILITY: SOCIAL INSECURITY: HAVE YOU HAD THOUGHTS OF HARMING ANYONE ELSE?: NO

## 2024-12-08 SDOH — SOCIAL STABILITY: SOCIAL INSECURITY: DO YOU FEEL ANYONE HAS EXPLOITED OR TAKEN ADVANTAGE OF YOU FINANCIALLY OR OF YOUR PERSONAL PROPERTY?: NO

## 2024-12-08 ASSESSMENT — ACTIVITIES OF DAILY LIVING (ADL)
PATIENT'S MEMORY ADEQUATE TO SAFELY COMPLETE DAILY ACTIVITIES?: YES
BATHING: INDEPENDENT
ASSISTIVE_DEVICE: WALKER
WALKS IN HOME: INDEPENDENT
ADEQUATE_TO_COMPLETE_ADL: YES
JUDGMENT_ADEQUATE_SAFELY_COMPLETE_DAILY_ACTIVITIES: YES
DRESSING YOURSELF: INDEPENDENT
HEARING - RIGHT EAR: FUNCTIONAL
FEEDING YOURSELF: INDEPENDENT
TOILETING: INDEPENDENT
GROOMING: INDEPENDENT
HEARING - LEFT EAR: FUNCTIONAL

## 2024-12-08 ASSESSMENT — PAIN SCALES - GENERAL
PAINLEVEL_OUTOF10: 0 - NO PAIN
PAINLEVEL_OUTOF10: 0 - NO PAIN

## 2024-12-08 ASSESSMENT — LIFESTYLE VARIABLES
AUDIT-C TOTAL SCORE: 0
HOW OFTEN DO YOU HAVE 6 OR MORE DRINKS ON ONE OCCASION: NEVER
EVER HAD A DRINK FIRST THING IN THE MORNING TO STEADY YOUR NERVES TO GET RID OF A HANGOVER: NO
SKIP TO QUESTIONS 9-10: 1
AUDIT-C TOTAL SCORE: 0
EVER FELT BAD OR GUILTY ABOUT YOUR DRINKING: NO
HOW OFTEN DO YOU HAVE A DRINK CONTAINING ALCOHOL: NEVER
HAVE YOU EVER FELT YOU SHOULD CUT DOWN ON YOUR DRINKING: NO
HAVE PEOPLE ANNOYED YOU BY CRITICIZING YOUR DRINKING: NO
HOW MANY STANDARD DRINKS CONTAINING ALCOHOL DO YOU HAVE ON A TYPICAL DAY: PATIENT DOES NOT DRINK
TOTAL SCORE: 0

## 2024-12-08 ASSESSMENT — COGNITIVE AND FUNCTIONAL STATUS - GENERAL
TURNING FROM BACK TO SIDE WHILE IN FLAT BAD: A LITTLE
TOILETING: A LITTLE
MOVING FROM LYING ON BACK TO SITTING ON SIDE OF FLAT BED WITH BEDRAILS: A LITTLE
DRESSING REGULAR UPPER BODY CLOTHING: A LITTLE
DRESSING REGULAR LOWER BODY CLOTHING: A LITTLE
CLIMB 3 TO 5 STEPS WITH RAILING: A LITTLE
PERSONAL GROOMING: A LITTLE
PATIENT BASELINE BEDBOUND: NO
MOBILITY SCORE: 18
MOVING TO AND FROM BED TO CHAIR: A LITTLE
STANDING UP FROM CHAIR USING ARMS: A LITTLE
EATING MEALS: A LITTLE
HELP NEEDED FOR BATHING: A LITTLE
DAILY ACTIVITIY SCORE: 18
WALKING IN HOSPITAL ROOM: A LITTLE

## 2024-12-08 ASSESSMENT — ENCOUNTER SYMPTOMS: SHORTNESS OF BREATH: 1

## 2024-12-08 ASSESSMENT — PATIENT HEALTH QUESTIONNAIRE - PHQ9
2. FEELING DOWN, DEPRESSED OR HOPELESS: NOT AT ALL
1. LITTLE INTEREST OR PLEASURE IN DOING THINGS: NOT AT ALL
SUM OF ALL RESPONSES TO PHQ9 QUESTIONS 1 & 2: 0

## 2024-12-08 ASSESSMENT — PAIN - FUNCTIONAL ASSESSMENT: PAIN_FUNCTIONAL_ASSESSMENT: 0-10

## 2024-12-08 NOTE — ED TRIAGE NOTES
Pt states that she has CHF and thinks she is having a flare up. She states that her leg edema is much worse. She also states SOB

## 2024-12-08 NOTE — ED PROVIDER NOTES
HPI   Chief Complaint   Patient presents with    Leg Swelling     Pt states that she has CHF and thinks she is having a flare up. She states that her leg edema is much worse. She also states SOB       Patient is a 68-year-old female presenting with bilateral leg edema.  She states has been getting worse for the last 4 days or so.  She also states that she is having increasing shortness of breath.  She is on 2-1/2 L nasal cannula at home.  The daughter at bedside states she was on this and is saturating appropriately.  She states that the patient is significantly orthopneic.  Patient states that she is on Eliquis for atrial fibrillation.  The daughter states that she believes this is a CHF exacerbation as the patient has had these in the past.  She states that the patient's bilateral lower extremities are typically swollen but not this swollen.  Patient endorses that she typically takes 100 mg total of torsemide total per day.  Patient denies any sick contacts.  Patient denies fevers, chills, cough, sore throat, runny nose, chest pain, abdominal pain, nausea, vomiting, diarrhea or urinary complaints.              Patient History   Past Medical History:   Diagnosis Date    A-fib (Multi)     CHF (congestive heart failure)     COVID     Heart disease     HTN (hypertension)     Low back pain      Past Surgical History:   Procedure Laterality Date    ABLATION OF DYSRHYTHMIC FOCUS      CARDIAC ELECTROPHYSIOLOGY PROCEDURE N/A 05/01/2024    Procedure: Cardioversion;  Surgeon: Amado yT MD;  Location: [facility] Cardiac Cath Lab;  Service: Electrophysiology;  Laterality: N/A;  23404  i48.0  medical [place]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    CARDIAC ELECTROPHYSIOLOGY PROCEDURE N/A 07/08/2024    Procedure: Ablation A-Fib;  Surgeon: Amado Ty MD;  Location: [facility] Cardiac Cath Lab;  Service: Electrophysiology;  Laterality: N/A;  AFIB CRYOABLATION; CPT 32115; ICD I48.0   [employer] [place] medicare  ID - [id number]  [phone]  No  Auth required  Call ref # [id number]    CARDIAC ELECTROPHYSIOLOGY PROCEDURE N/A 2024    Procedure: Cardioversion;  Surgeon: Amado Ty MD;  Location: [facility] Cardiac Cath Lab;  Service: Electrophysiology;  Laterality: N/A;  CARDIOVERSION CPT 54800, ICD I48.0 no auth required/REF # [id number]    CARDIOVERSION      2024     Family History   Problem Relation Name Age of Onset    Breast cancer Mother      Heart attack Mother           in early 80s of MI    Heart disease Mother      Colon cancer Father      Breast cancer Sister      Diabetes type II Sister      Kidney failure Sister      Other (herione addict) Sister      No Known Problems Brother      No Known Problems Daughter      Other (recovering alcoholic) Son      Breast cancer Other Maternal Aunt      Social History     Tobacco Use    Smoking status: Former     Current packs/day: 0.00     Types: Cigarettes     Quit date:      Years since quittin.9    Smokeless tobacco: Never   Vaping Use    Vaping status: Never Used   Substance Use Topics    Alcohol use: Not Currently     Comment: monthly or less    Drug use: Never       Physical Exam   ED Triage Vitals [24 1818]   Temperature Heart Rate Respirations BP   36.6 °C (97.8 °F) (!) 147 (!) 24 113/71      Pulse Ox Temp src Heart Rate Source Patient Position   (!) 92 % -- -- --      BP Location FiO2 (%)     -- --       Physical Exam  Vitals and nursing note reviewed.   Constitutional:       Appearance: She is well-developed.      Comments: Awake, laying in examination bed   HENT:      Head: Normocephalic and atraumatic.      Nose: Nose normal.      Mouth/Throat:      Mouth: Mucous membranes are moist.      Pharynx: Oropharynx is clear.   Eyes:      Extraocular Movements: Extraocular movements intact.      Conjunctiva/sclera: Conjunctivae normal.      Pupils: Pupils are equal, round, and reactive to light.   Cardiovascular:      Rate and Rhythm: Normal rate and regular rhythm.       Pulses: Normal pulses.      Heart sounds: Normal heart sounds. No murmur heard.  Pulmonary:      Effort: Pulmonary effort is normal. No respiratory distress.      Breath sounds: Normal breath sounds.   Abdominal:      General: Abdomen is flat.      Palpations: Abdomen is soft.      Tenderness: There is no abdominal tenderness.   Musculoskeletal:         General: Swelling present. Normal range of motion.      Cervical back: Normal range of motion and neck supple.      Comments: Significant swelling of the bilateral lower extremities with right greater than left.  There is erythema present on the right lower extremity around the ankle and foot.  Very small water blisters also present in the right lower extremity   Skin:     General: Skin is warm and dry.      Capillary Refill: Capillary refill takes less than 2 seconds.   Neurological:      General: No focal deficit present.      Mental Status: She is alert and oriented to person, place, and time.   Psychiatric:         Mood and Affect: Mood normal.         Behavior: Behavior normal.           ED Course & MDM   ED Course as of 12/09/24 0103   Sun Dec 08, 2024   1827 EKG Time:1822  EKG Interpretation time:1827  EKG Interpretation: EKG shows atrial fibrillation with a rate of 88 bpm, normal axis, QTc 491, nonspecific ST changes but no evidence of STEMI.    EKG was interpreted by myself independently [JL]   2143 I spoke with admitting provider, Dr. Coronado.  After our discussion, patient will be admitted for further management of CHF exacerbations requiring IV diuresis and IV antibiotics. [AR]      ED Course User Index  [AR] Oscar Mosley PA-C  [JL] Jayce Rothman DO         Diagnoses as of 12/09/24 0103   Acute on chronic congestive heart failure, unspecified heart failure type   Bilateral lower extremity edema   Cellulitis of right lower leg   CHF (congestive heart failure), NYHA class I, acute on chronic, combined                 No data recorded      Maddy Coma Scale Score: 15 (12/08/24 1852 : Adamaris Jonas RN)                           Medical Decision Making  Patient is a 68-year-old female presenting with bilateral leg edema.  Lab work, viral swabs, chest x-ray ordered.  Conditions considered include but are not limited: Viral illness, CHF exacerbation, pneumonia, DVT.    I saw this patient in conjunction with Dr. Rothman.  DVT is less likely as patient is on Eliquis.  CBC is without leukocytosis or anemia.  CMP without significant electrolyte abnormality but does show signs of mild hypokalemia with potassium of 3.1 and no signs of renal impairment.  BNP of 148.  Initial and repeat troponin within normal limits.  Viral swabs are negative.  Chest x-ray does show cardiomegaly with mild vascular congestive changes.  Patient did have difficulties ambulating.    I did discuss with the admitting provider as described above.  IV diuresis and IV antibiotics started.  As I deemed necessary from the patient's history, physical, laboratory and imaging findings as well as ED course, I considered the above diagnoses.    Portions of this note made with Dragon software, please be mindful of potential grammatical errors.        Medications   allopurinol (Zyloprim) tablet 200 mg (200 mg oral Given 12/8/24 2332)   atorvastatin (Lipitor) tablet 40 mg (40 mg oral Given 12/8/24 2333)   fluticasone furoate-vilanteroL (Breo Ellipta) 200-25 mcg/dose inhaler 1 puff (has no administration in time range)   buPROPion XL (Wellbutrin XL) 24 hr tablet 300 mg (has no administration in time range)   dofetilide (Tikosyn) capsule 250 mcg (has no administration in time range)   apixaban (Eliquis) tablet 5 mg (5 mg oral Given 12/8/24 2332)   metoprolol succinate XL (Toprol-XL) 24 hr tablet 25 mg (has no administration in time range)   oxygen (O2) therapy (has no administration in time range)   acetaminophen (Tylenol) tablet 650 mg (has no administration in time range)     Or    acetaminophen (Tylenol) oral liquid 650 mg (has no administration in time range)     Or   acetaminophen (Tylenol) suppository 650 mg (has no administration in time range)   polyethylene glycol (Glycolax, Miralax) packet 17 g (has no administration in time range)   furosemide (Lasix) injection 40 mg (has no administration in time range)   ondansetron (Zofran) injection 4 mg (has no administration in time range)   ceFAZolin (Ancef) 2 g in dextrose (iso)  mL (has no administration in time range)   potassium chloride CR (Klor-Con M20) ER tablet 40 mEq (40 mEq oral Given 12/8/24 2150)   ceFAZolin (Ancef) 1 g in dextrose (iso) IV 50 mL (1 g intravenous New Bag 12/8/24 2147)   furosemide (Lasix) injection 40 mg (40 mg intravenous Given 12/8/24 2148)         Labs Reviewed   CBC WITH AUTO DIFFERENTIAL - Abnormal       Result Value    WBC 10.8      nRBC 0.0      RBC 4.13      Hemoglobin 12.6      Hematocrit 39.4      MCV 95      MCH 30.5      MCHC 32.0      RDW 15.7 (*)     Platelets 275      Neutrophils % 79.0      Immature Granulocytes %, Automated 0.4      Lymphocytes % 11.9      Monocytes % 7.7      Eosinophils % 0.8      Basophils % 0.2      Neutrophils Absolute 8.50 (*)     Immature Granulocytes Absolute, Automated 0.04      Lymphocytes Absolute 1.28      Monocytes Absolute 0.83      Eosinophils Absolute 0.09      Basophils Absolute 0.02     COMPREHENSIVE METABOLIC PANEL - Abnormal    Glucose 97      Sodium 141      Potassium 3.1 (*)     Chloride 95 (*)     Bicarbonate 38 (*)     Anion Gap 11      Urea Nitrogen 29 (*)     Creatinine 0.65      eGFR >90      Calcium 9.0      Albumin 3.7      Alkaline Phosphatase 102      Total Protein 6.5      AST 20      Bilirubin, Total 0.5      ALT 20     B-TYPE NATRIURETIC PEPTIDE - Abnormal     (*)     Narrative:        <100 pg/mL - Heart failure unlikely  100-299 pg/mL - Intermediate probability of acute heart                  failure exacerbation. Correlate with  clinical                  context and patient history.    >=300 pg/mL - Heart Failure likely. Correlate with clinical                  context and patient history.    BNP testing is performed using different testing methodology at [facility] than at other [facility]. Direct result comparisons should only be made within the same method.      SARS-COV-2 AND INFLUENZA A/B PCR - Normal    Flu A Result Not Detected      Flu B Result Not Detected      Coronavirus 2019, PCR Not Detected      Narrative:     This assay has received FDA Emergency Use Authorization (EUA) and  is only authorized for the duration of time that circumstances exist to justify the authorization of the emergency use of in vitro diagnostic tests for the detection of SARS-CoV-2 virus and/or diagnosis of COVID-19 infection under section 564(b)(1) of the Act, 21 U.S.C. 360bbb-3(b)(1). Testing for SARS-CoV-2 is only recommended for patients who meet current clinical and/or epidemiological criteria as defined by federal, state, or local public health directives. This assay is an in vitro diagnostic nucleic acid amplification test for the qualitative detection of SARS-CoV-2, Influenza A, and Influenza B from nasopharyngeal specimens and has been validated for use at [facility]. Negative results do not preclude COVID-19 infections or Influenza A/B infections, and should not be used as the sole basis for diagnosis, treatment, or other management decisions. If Influenza A/B and RSV PCR results are negative, testing for Parainfluenza virus, Adenovirus and Metapneumovirus is routinely performed for [facility] pediatric oncology and intensive care inpatients, and is available on other patients by placing an add-on request.    SERIAL TROPONIN-INITIAL - Normal    Troponin I, High Sensitivity 12      Narrative:     Less than 99th percentile of normal range cutoff-  Female and children under 18 years old <14 ng/L; Male <21 ng/L:  Negative  Repeat testing should be performed if clinically indicated.     Female and children under 18 years old 14-50 ng/L; Male 21-50 ng/L:  Consistent with possible cardiac damage and possible increased clinical   risk. Serial measurements may help to assess extent of myocardial damage.     >50 ng/L: Consistent with cardiac damage, increased clinical risk and  myocardial infarction. Serial measurements may help assess extent of   myocardial damage.      NOTE: Children less than 1 year old may have higher baseline troponin   levels and results should be interpreted in conjunction with the overall   clinical context.     NOTE: Troponin I testing is performed using a different   testing methodology at [facility] than at other   [facility]. Direct result comparisons should only   be made within the same method.   SERIAL TROPONIN, 1 HOUR - Normal    Troponin I, High Sensitivity 12      Narrative:     Less than 99th percentile of normal range cutoff-  Female and children under 18 years old <14 ng/L; Male <21 ng/L: Negative  Repeat testing should be performed if clinically indicated.     Female and children under 18 years old 14-50 ng/L; Male 21-50 ng/L:  Consistent with possible cardiac damage and possible increased clinical   risk. Serial measurements may help to assess extent of myocardial damage.     >50 ng/L: Consistent with cardiac damage, increased clinical risk and  myocardial infarction. Serial measurements may help assess extent of   myocardial damage.      NOTE: Children less than 1 year old may have higher baseline troponin   levels and results should be interpreted in conjunction with the overall   clinical context.     NOTE: Troponin I testing is performed using a different   testing methodology at [facility] than at other   [facility]. Direct result comparisons should only   be made within the same method.   BLOOD CULTURE   BLOOD CULTURE   TROPONIN SERIES- (INITIAL,  1 HR)    Narrative:     The following orders were created for panel order Troponin I Series, High Sensitivity (0, 1 HR).  Procedure                               Abnormality         Status                     ---------                               -----------         ------                     Troponin I, High Sensiti...[[id number]]  Normal              Final result               Troponin, High Sensitivi...[[id number]]  Normal              Final result                 Please view results for these tests on the individual orders.         XR chest 2 views   Final Result   Cardiomegaly with mild vascular congestive change and basilar   atelectasis.        MACRO:   None        Signed by: Mikey Hough 12/8/2024 7:52 PM   Dictation workstation:   [id number]      Vascular US lower extremity venous duplex bilateral    (Results Pending)         Procedure  Procedures     Oscar Mosley PA-C  12/09/24 0103

## 2024-12-09 ENCOUNTER — DOCUMENTATION (OUTPATIENT)
Dept: CASE MANAGEMENT | Facility: HOSPITAL | Age: 69
End: 2024-12-09
Payer: MEDICARE

## 2024-12-09 ENCOUNTER — APPOINTMENT (OUTPATIENT)
Dept: CARDIOLOGY | Facility: HOSPITAL | Age: 69
DRG: 291 | End: 2024-12-09
Payer: MEDICARE

## 2024-12-09 ENCOUNTER — APPOINTMENT (OUTPATIENT)
Dept: RADIOLOGY | Facility: HOSPITAL | Age: 69
DRG: 291 | End: 2024-12-09
Payer: MEDICARE

## 2024-12-09 ENCOUNTER — APPOINTMENT (OUTPATIENT)
Dept: PRIMARY CARE | Facility: CLINIC | Age: 69
End: 2024-12-09
Payer: MEDICARE

## 2024-12-09 LAB
ANION GAP SERPL CALCULATED.3IONS-SCNC: <7 MMOL/L (ref 10–20)
BUN SERPL-MCNC: 22 MG/DL (ref 6–23)
CALCIUM SERPL-MCNC: 8.8 MG/DL (ref 8.6–10.3)
CHLORIDE SERPL-SCNC: 96 MMOL/L (ref 98–107)
CO2 SERPL-SCNC: 43 MMOL/L (ref 21–32)
CREAT SERPL-MCNC: 0.65 MG/DL (ref 0.5–1.05)
EGFRCR SERPLBLD CKD-EPI 2021: >90 ML/MIN/1.73M*2
ERYTHROCYTE [DISTWIDTH] IN BLOOD BY AUTOMATED COUNT: 15.6 % (ref 11.5–14.5)
GLUCOSE SERPL-MCNC: 112 MG/DL (ref 74–99)
HCT VFR BLD AUTO: 38.1 % (ref 36–46)
HGB BLD-MCNC: 11.8 G/DL (ref 12–16)
MAGNESIUM SERPL-MCNC: 1.93 MG/DL (ref 1.6–2.4)
MCH RBC QN AUTO: 30.1 PG (ref 26–34)
MCHC RBC AUTO-ENTMCNC: 31 G/DL (ref 32–36)
MCV RBC AUTO: 97 FL (ref 80–100)
NRBC BLD-RTO: 0 /100 WBCS (ref 0–0)
PLATELET # BLD AUTO: 242 X10*3/UL (ref 150–450)
POTASSIUM SERPL-SCNC: 3.4 MMOL/L (ref 3.5–5.3)
Q ONSET: 225 MS
QRS COUNT: 14 BEATS
QRS DURATION: 92 MS
QT INTERVAL: 406 MS
QTC CALCULATION(BAZETT): 491 MS
QTC FREDERICIA: 461 MS
R AXIS: 70 DEGREES
RBC # BLD AUTO: 3.92 X10*6/UL (ref 4–5.2)
SODIUM SERPL-SCNC: 142 MMOL/L (ref 136–145)
T AXIS: 210 DEGREES
T OFFSET: 428 MS
VENTRICULAR RATE: 88 BPM
WBC # BLD AUTO: 9.4 X10*3/UL (ref 4.4–11.3)

## 2024-12-09 PROCEDURE — 83735 ASSAY OF MAGNESIUM: CPT | Performed by: NURSE PRACTITIONER

## 2024-12-09 PROCEDURE — 85027 COMPLETE CBC AUTOMATED: CPT | Performed by: NURSE PRACTITIONER

## 2024-12-09 PROCEDURE — 9420000001 HC RT PATIENT EDUCATION 5 MIN

## 2024-12-09 PROCEDURE — 2500000005 HC RX 250 GENERAL PHARMACY W/O HCPCS: Performed by: NURSE PRACTITIONER

## 2024-12-09 PROCEDURE — 2500000004 HC RX 250 GENERAL PHARMACY W/ HCPCS (ALT 636 FOR OP/ED): Mod: JZ | Performed by: INTERNAL MEDICINE

## 2024-12-09 PROCEDURE — 36415 COLL VENOUS BLD VENIPUNCTURE: CPT | Performed by: INTERNAL MEDICINE

## 2024-12-09 PROCEDURE — 1200000002 HC GENERAL ROOM WITH TELEMETRY DAILY

## 2024-12-09 PROCEDURE — 2500000005 HC RX 250 GENERAL PHARMACY W/O HCPCS: Performed by: INTERNAL MEDICINE

## 2024-12-09 PROCEDURE — 2500000004 HC RX 250 GENERAL PHARMACY W/ HCPCS (ALT 636 FOR OP/ED): Performed by: NURSE PRACTITIONER

## 2024-12-09 PROCEDURE — 2500000001 HC RX 250 WO HCPCS SELF ADMINISTERED DRUGS (ALT 637 FOR MEDICARE OP): Performed by: INTERNAL MEDICINE

## 2024-12-09 PROCEDURE — 93970 EXTREMITY STUDY: CPT

## 2024-12-09 PROCEDURE — 36415 COLL VENOUS BLD VENIPUNCTURE: CPT | Performed by: NURSE PRACTITIONER

## 2024-12-09 PROCEDURE — 93971 EXTREMITY STUDY: CPT | Performed by: RADIOLOGY

## 2024-12-09 PROCEDURE — 93005 ELECTROCARDIOGRAM TRACING: CPT

## 2024-12-09 PROCEDURE — 99222 1ST HOSP IP/OBS MODERATE 55: CPT | Performed by: NURSE PRACTITIONER

## 2024-12-09 PROCEDURE — 80048 BASIC METABOLIC PNL TOTAL CA: CPT | Performed by: NURSE PRACTITIONER

## 2024-12-09 PROCEDURE — 2500000002 HC RX 250 W HCPCS SELF ADMINISTERED DRUGS (ALT 637 FOR MEDICARE OP, ALT 636 FOR OP/ED): Performed by: NURSE PRACTITIONER

## 2024-12-09 PROCEDURE — 99233 SBSQ HOSP IP/OBS HIGH 50: CPT | Performed by: NURSE PRACTITIONER

## 2024-12-09 PROCEDURE — 2500000004 HC RX 250 GENERAL PHARMACY W/ HCPCS (ALT 636 FOR OP/ED): Mod: JZ | Performed by: NURSE PRACTITIONER

## 2024-12-09 PROCEDURE — 94664 DEMO&/EVAL PT USE INHALER: CPT

## 2024-12-09 PROCEDURE — 87040 BLOOD CULTURE FOR BACTERIA: CPT | Mod: WESLAB | Performed by: INTERNAL MEDICINE

## 2024-12-09 RX ORDER — FUROSEMIDE 10 MG/ML
80 INJECTION INTRAMUSCULAR; INTRAVENOUS 2 TIMES DAILY
Status: DISCONTINUED | OUTPATIENT
Start: 2024-12-09 | End: 2024-12-14

## 2024-12-09 RX ORDER — POTASSIUM CHLORIDE 20 MEQ/1
40 TABLET, EXTENDED RELEASE ORAL 2 TIMES DAILY
Status: COMPLETED | OUTPATIENT
Start: 2024-12-09 | End: 2024-12-10

## 2024-12-09 RX ADMIN — DOFETILIDE 250 MCG: 0.25 CAPSULE ORAL at 09:04

## 2024-12-09 RX ADMIN — FUROSEMIDE 80 MG: 10 INJECTION, SOLUTION INTRAMUSCULAR; INTRAVENOUS at 13:50

## 2024-12-09 RX ADMIN — ALLOPURINOL 200 MG: 100 TABLET ORAL at 20:45

## 2024-12-09 RX ADMIN — METOPROLOL SUCCINATE 25 MG: 25 TABLET, EXTENDED RELEASE ORAL at 09:03

## 2024-12-09 RX ADMIN — Medication 2 L/MIN: at 07:26

## 2024-12-09 RX ADMIN — CEFAZOLIN SODIUM 2 G: 2 INJECTION, SOLUTION INTRAVENOUS at 20:45

## 2024-12-09 RX ADMIN — ALLOPURINOL 200 MG: 100 TABLET ORAL at 09:03

## 2024-12-09 RX ADMIN — Medication 2 L/MIN: at 19:52

## 2024-12-09 RX ADMIN — FUROSEMIDE 40 MG: 10 INJECTION, SOLUTION INTRAMUSCULAR; INTRAVENOUS at 09:02

## 2024-12-09 RX ADMIN — APIXABAN 5 MG: 5 TABLET, FILM COATED ORAL at 20:45

## 2024-12-09 RX ADMIN — CEFAZOLIN SODIUM 2 G: 2 INJECTION, SOLUTION INTRAVENOUS at 13:50

## 2024-12-09 RX ADMIN — DOFETILIDE 250 MCG: 0.25 CAPSULE ORAL at 20:44

## 2024-12-09 RX ADMIN — ATORVASTATIN CALCIUM 40 MG: 40 TABLET, FILM COATED ORAL at 20:44

## 2024-12-09 RX ADMIN — POTASSIUM CHLORIDE 40 MEQ: 1500 TABLET, EXTENDED RELEASE ORAL at 20:44

## 2024-12-09 RX ADMIN — FLUTICASONE FUROATE AND VILANTEROL TRIFENATATE 1 PUFF: 200; 25 POWDER RESPIRATORY (INHALATION) at 07:23

## 2024-12-09 RX ADMIN — POTASSIUM CHLORIDE 40 MEQ: 1500 TABLET, EXTENDED RELEASE ORAL at 09:03

## 2024-12-09 RX ADMIN — CEFAZOLIN SODIUM 2 G: 2 INJECTION, SOLUTION INTRAVENOUS at 04:54

## 2024-12-09 RX ADMIN — APIXABAN 5 MG: 5 TABLET, FILM COATED ORAL at 09:04

## 2024-12-09 RX ADMIN — BUPROPION HYDROCHLORIDE 300 MG: 300 TABLET, EXTENDED RELEASE ORAL at 05:16

## 2024-12-09 SDOH — ECONOMIC STABILITY: INCOME INSECURITY: IN THE PAST 12 MONTHS HAS THE ELECTRIC, GAS, OIL, OR WATER COMPANY THREATENED TO SHUT OFF SERVICES IN YOUR HOME?: NO

## 2024-12-09 SDOH — SOCIAL STABILITY: SOCIAL NETWORK: HOW OFTEN DO YOU ATTEND MEETINGS OF THE CLUBS OR ORGANIZATIONS YOU BELONG TO?: NEVER

## 2024-12-09 SDOH — ECONOMIC STABILITY: HOUSING INSECURITY: IN THE LAST 12 MONTHS, WAS THERE A TIME WHEN YOU WERE NOT ABLE TO PAY THE MORTGAGE OR RENT ON TIME?: NO

## 2024-12-09 SDOH — ECONOMIC STABILITY: FOOD INSECURITY: HOW HARD IS IT FOR YOU TO PAY FOR THE VERY BASICS LIKE FOOD, HOUSING, MEDICAL CARE, AND HEATING?: NOT VERY HARD

## 2024-12-09 SDOH — ECONOMIC STABILITY: HOUSING INSECURITY: AT ANY TIME IN THE PAST 12 MONTHS, WERE YOU HOMELESS OR LIVING IN A SHELTER (INCLUDING NOW)?: NO

## 2024-12-09 SDOH — ECONOMIC STABILITY: HOUSING INSECURITY: IN THE PAST 12 MONTHS, HOW MANY TIMES HAVE YOU MOVED WHERE YOU WERE LIVING?: 0

## 2024-12-09 SDOH — HEALTH STABILITY: MENTAL HEALTH: HOW OFTEN DO YOU HAVE SIX OR MORE DRINKS ON ONE OCCASION?: NEVER

## 2024-12-09 SDOH — SOCIAL STABILITY: SOCIAL INSECURITY: ARE YOU MARRIED, WIDOWED, DIVORCED, SEPARATED, NEVER MARRIED, OR LIVING WITH A PARTNER?: MARRIED

## 2024-12-09 SDOH — SOCIAL STABILITY: SOCIAL INSECURITY: WITHIN THE LAST YEAR, HAVE YOU BEEN AFRAID OF YOUR PARTNER OR EX-PARTNER?: NO

## 2024-12-09 SDOH — SOCIAL STABILITY: SOCIAL INSECURITY: WITHIN THE LAST YEAR, HAVE YOU BEEN HUMILIATED OR EMOTIONALLY ABUSED IN OTHER WAYS BY YOUR PARTNER OR EX-PARTNER?: NO

## 2024-12-09 SDOH — ECONOMIC STABILITY: FOOD INSECURITY: WITHIN THE PAST 12 MONTHS, THE FOOD YOU BOUGHT JUST DIDN'T LAST AND YOU DIDN'T HAVE MONEY TO GET MORE.: NEVER TRUE

## 2024-12-09 SDOH — HEALTH STABILITY: MENTAL HEALTH: HOW OFTEN DO YOU HAVE A DRINK CONTAINING ALCOHOL?: NEVER

## 2024-12-09 SDOH — HEALTH STABILITY: MENTAL HEALTH
DO YOU FEEL STRESS - TENSE, RESTLESS, NERVOUS, OR ANXIOUS, OR UNABLE TO SLEEP AT NIGHT BECAUSE YOUR MIND IS TROUBLED ALL THE TIME - THESE DAYS?: NOT AT ALL

## 2024-12-09 SDOH — SOCIAL STABILITY: SOCIAL INSECURITY
WITHIN THE LAST YEAR, HAVE YOU BEEN RAPED OR FORCED TO HAVE ANY KIND OF SEXUAL ACTIVITY BY YOUR PARTNER OR EX-PARTNER?: NO

## 2024-12-09 SDOH — ECONOMIC STABILITY: FOOD INSECURITY: WITHIN THE PAST 12 MONTHS, YOU WORRIED THAT YOUR FOOD WOULD RUN OUT BEFORE YOU GOT THE MONEY TO BUY MORE.: NEVER TRUE

## 2024-12-09 SDOH — SOCIAL STABILITY: SOCIAL NETWORK: HOW OFTEN DO YOU GET TOGETHER WITH FRIENDS OR RELATIVES?: MORE THAN THREE TIMES A WEEK

## 2024-12-09 SDOH — ECONOMIC STABILITY: TRANSPORTATION INSECURITY: IN THE PAST 12 MONTHS, HAS LACK OF TRANSPORTATION KEPT YOU FROM MEDICAL APPOINTMENTS OR FROM GETTING MEDICATIONS?: NO

## 2024-12-09 SDOH — HEALTH STABILITY: MENTAL HEALTH: HOW MANY DRINKS CONTAINING ALCOHOL DO YOU HAVE ON A TYPICAL DAY WHEN YOU ARE DRINKING?: PATIENT DOES NOT DRINK

## 2024-12-09 SDOH — SOCIAL STABILITY: SOCIAL NETWORK: HOW OFTEN DO YOU ATTEND CHURCH OR RELIGIOUS SERVICES?: MORE THAN 4 TIMES PER YEAR

## 2024-12-09 SDOH — SOCIAL STABILITY: SOCIAL NETWORK
DO YOU BELONG TO ANY CLUBS OR ORGANIZATIONS SUCH AS CHURCH GROUPS, UNIONS, FRATERNAL OR ATHLETIC GROUPS, OR SCHOOL GROUPS?: NO

## 2024-12-09 SDOH — HEALTH STABILITY: PHYSICAL HEALTH: ON AVERAGE, HOW MANY MINUTES DO YOU ENGAGE IN EXERCISE AT THIS LEVEL?: 0 MIN

## 2024-12-09 SDOH — HEALTH STABILITY: PHYSICAL HEALTH: ON AVERAGE, HOW MANY DAYS PER WEEK DO YOU ENGAGE IN MODERATE TO STRENUOUS EXERCISE (LIKE A BRISK WALK)?: 0 DAYS

## 2024-12-09 SDOH — ECONOMIC STABILITY: FOOD INSECURITY: HOW HARD IS IT FOR YOU TO PAY FOR THE VERY BASICS LIKE FOOD, HOUSING, MEDICAL CARE, AND HEATING?: NOT HARD AT ALL

## 2024-12-09 ASSESSMENT — COGNITIVE AND FUNCTIONAL STATUS - GENERAL
MOBILITY SCORE: 19
TURNING FROM BACK TO SIDE WHILE IN FLAT BAD: A LITTLE
DRESSING REGULAR LOWER BODY CLOTHING: A LITTLE
MOVING TO AND FROM BED TO CHAIR: A LITTLE
PERSONAL GROOMING: A LITTLE
TURNING FROM BACK TO SIDE WHILE IN FLAT BAD: A LITTLE
TOILETING: A LITTLE
MOVING FROM LYING ON BACK TO SITTING ON SIDE OF FLAT BED WITH BEDRAILS: A LITTLE
MOBILITY SCORE: 18
HELP NEEDED FOR BATHING: A LITTLE
STANDING UP FROM CHAIR USING ARMS: A LITTLE
WALKING IN HOSPITAL ROOM: A LITTLE
WALKING IN HOSPITAL ROOM: A LITTLE
MOVING TO AND FROM BED TO CHAIR: A LITTLE
DAILY ACTIVITIY SCORE: 18
DAILY ACTIVITIY SCORE: 20
TOILETING: A LITTLE
HELP NEEDED FOR BATHING: A LITTLE
CLIMB 3 TO 5 STEPS WITH RAILING: A LITTLE
EATING MEALS: A LITTLE
DRESSING REGULAR LOWER BODY CLOTHING: A LITTLE
CLIMB 3 TO 5 STEPS WITH RAILING: A LITTLE
DRESSING REGULAR UPPER BODY CLOTHING: A LITTLE
STANDING UP FROM CHAIR USING ARMS: A LITTLE
DRESSING REGULAR UPPER BODY CLOTHING: A LITTLE

## 2024-12-09 ASSESSMENT — LIFESTYLE VARIABLES
AUDIT-C TOTAL SCORE: 0
SKIP TO QUESTIONS 9-10: 1
AUDIT-C TOTAL SCORE: 0
SKIP TO QUESTIONS 9-10: 1

## 2024-12-09 ASSESSMENT — PAIN SCALES - GENERAL
PAINLEVEL_OUTOF10: 0 - NO PAIN
PAINLEVEL_OUTOF10: 0 - NO PAIN

## 2024-12-09 ASSESSMENT — ACTIVITIES OF DAILY LIVING (ADL)
LACK_OF_TRANSPORTATION: NO

## 2024-12-09 NOTE — ASSESSMENT & PLAN NOTE
Acute worsening.  Patient states she has been using oxygen during the day which is not normal for her  Currently on 3 L.  Wean as able

## 2024-12-09 NOTE — NURSING NOTE
Received report from [facility] ED Nurse.    2255H:  Patient arrived on the floor per stretcher from ED.  No distress or discomfort noted.  Oriented to room.  All needs attended.  Safety measures ensured and call light in reach.

## 2024-12-09 NOTE — PROGRESS NOTES
Vickie Foster is a 68 y.o. female on day 1 of admission presenting with CHF (congestive heart failure), NYHA class I, acute on chronic, combined.      Subjective   Patient states that her legs are more swollen than it usually are but states that she is breathing fine on 3 L nasal cannula.  She states that she uses oxygen at home but usually only at night and has been having to use it during the day past couple days       Objective     Last Recorded Vitals  /63 (BP Location: Right arm, Patient Position: Sitting)   Pulse 80   Temp 36.8 °C (98.2 °F) (Oral)   Resp 12   Wt 105 kg (231 lb)   SpO2 95%   Intake/Output last 3 Shifts:    Intake/Output Summary (Last 24 hours) at 12/9/2024 1451  Last data filed at 12/9/2024 1443  Gross per 24 hour   Intake 540 ml   Output 2000 ml   Net -1460 ml       Admission Weight  Weight: 105 kg (231 lb) (12/08/24 1818)    Daily Weight  12/08/24 : 105 kg (231 lb)          Physical Exam  Constitutional: No acute distress, calm, cooperative  HEENT: PERRL, normocephalic, atraumatic, mucous membranes moist  Cardiovascular: Regular rhythm and rate,   Respiratory: Rales in the bases bilaterally, diminished  Gastrointestinal: Bowel sounds positive x 4, soft, nontender  Neurologic: Alert and oriented x 3 equal strength bilaterally  Musculoskeletal: Able to move all extremities, 4 plus pitting edema  Skin: Warm, dry and intact               Assessment/Plan                  Assessment & Plan  Acute on chronic respiratory failure with hypoxia and hypercapnia (Multi)  Currently on 3 L O2  Has been on oxygen during the day at home which is new for her, she usually only uses it at night  Wean as able  CHF (congestive heart failure), NYHA class I, acute on chronic, combined  Lasix 40 mg IV twice daily  Low-salt diet  1600 cc fluid restriction  Cardiology following  Cellulitis  Right lower extremity markedly more erythematous  Lower suspicion for DVT due to compliance with Eliquis, but will  get a ultrasound to ensure not failure  Blood cultures ordered stat.  Unfortunate antibiotics already given  Continue cefazolin  Atrial fib/flutter, transient (Multi)  Continue metoprolol, Eliquis, and dofetilide.  COPD (chronic obstructive pulmonary disease) (Multi)  Do not feel that she is having a current exacerbation  Continue home maintenance inhalers.  Obesity  Major contribution                Bentley Hernandez, APRN-CNP

## 2024-12-09 NOTE — H&P
History Of Present Illness  Vickie Foster is a 68 y.o. female presenting with lower extreme edema.  Patient has been hospitalized at this facility multiple times since this summer with CHF exacerbations after a ablation for atrial fibrillation.  For the last week and a half, the patient has had worsening swelling in the lower extremities, severe enough that it has been limiting her ambulation.  Her daughter noted cracking of the skin and serous drainage, and over the last 2 days the right leg has become more erythematous and painful to the touch.  She has also had worsening shortness of breath.  She states that she is compliant with her diuresis every day, but she does not weigh herself as she cannot find a scale.  The day before presentation, she was at a children's birthday party and had pizza for lunch and later pizza for dinner.     Past Medical History  She has a past medical history of A-fib (Multi), CHF (congestive heart failure), COVID, Heart disease, HTN (hypertension), and Low back pain.    Surgical History  She has a past surgical history that includes Cardioversion; Cardiac electrophysiology procedure (N/A, 2024); Cardiac electrophysiology procedure (N/A, 2024); Ablation of dysrhythmic focus; and Cardiac electrophysiology procedure (N/A, 2024).     Social History  She reports that she quit smoking about 20 years ago. Her smoking use included cigarettes. She has never used smokeless tobacco. She reports that she does not currently use alcohol. She reports that she does not use drugs.    Family History  Family History   Problem Relation Name Age of Onset    Breast cancer Mother      Heart attack Mother           in early 80s of MI    Heart disease Mother      Colon cancer Father      Breast cancer Sister      Diabetes type II Sister      Kidney failure Sister      Other (herione addict) Sister      No Known Problems Brother      No Known Problems Daughter      Other (recovering  alcoholic) Son      Breast cancer Other Maternal Aunt         Allergies  Patient has no known allergies.    Review of Systems   Respiratory:  Positive for shortness of breath.    All other systems reviewed and are negative.    Per HPI  Physical Exam  General: Elderly female, obese, chronically ill-appearing  Eyes: Clear sclera  Neck: No current JVD  Cardio: Regular rate and rhythm.  Respiratory: 2 L nasal cannula.  Mild tachypnea.  Bilateral crackles.   Abdomen: Soft nondistended.  Obese.  Extremities: Bilateral lower extremity venous stasis changes with 2+ pitting edema.  Right leg has circumferential erythema markedly worse compared to left and more warm to the touch.  Psychiatric appropriate mood and affect  Neuro: Oriented x 3  Last Recorded Vitals  /72   Pulse 97   Temp 36.6 °C (97.8 °F)   Resp 18   Wt 105 kg (231 lb)   SpO2 94%     Relevant Results        Results for orders placed or performed during the hospital encounter of 12/08/24 (from the past 24 hours)   CBC and Auto Differential   Result Value Ref Range    WBC 10.8 4.4 - 11.3 x10*3/uL    nRBC 0.0 0.0 - 0.0 /100 WBCs    RBC 4.13 4.00 - 5.20 x10*6/uL    Hemoglobin 12.6 12.0 - 16.0 g/dL    Hematocrit 39.4 36.0 - 46.0 %    MCV 95 80 - 100 fL    MCH 30.5 26.0 - 34.0 pg    MCHC 32.0 32.0 - 36.0 g/dL    RDW 15.7 (H) 11.5 - 14.5 %    Platelets 275 150 - 450 x10*3/uL    Neutrophils % 79.0 40.0 - 80.0 %    Immature Granulocytes %, Automated 0.4 0.0 - 0.9 %    Lymphocytes % 11.9 13.0 - 44.0 %    Monocytes % 7.7 2.0 - 10.0 %    Eosinophils % 0.8 0.0 - 6.0 %    Basophils % 0.2 0.0 - 2.0 %    Neutrophils Absolute 8.50 (H) 1.20 - 7.70 x10*3/uL    Immature Granulocytes Absolute, Automated 0.04 0.00 - 0.70 x10*3/uL    Lymphocytes Absolute 1.28 1.20 - 4.80 x10*3/uL    Monocytes Absolute 0.83 0.10 - 1.00 x10*3/uL    Eosinophils Absolute 0.09 0.00 - 0.70 x10*3/uL    Basophils Absolute 0.02 0.00 - 0.10 x10*3/uL   Comprehensive metabolic panel   Result Value Ref  Range    Glucose 97 74 - 99 mg/dL    Sodium 141 136 - 145 mmol/L    Potassium 3.1 (L) 3.5 - 5.3 mmol/L    Chloride 95 (L) 98 - 107 mmol/L    Bicarbonate 38 (H) 21 - 32 mmol/L    Anion Gap 11 10 - 20 mmol/L    Urea Nitrogen 29 (H) 6 - 23 mg/dL    Creatinine 0.65 0.50 - 1.05 mg/dL    eGFR >90 >60 mL/min/1.73m*2    Calcium 9.0 8.6 - 10.3 mg/dL    Albumin 3.7 3.4 - 5.0 g/dL    Alkaline Phosphatase 102 33 - 136 U/L    Total Protein 6.5 6.4 - 8.2 g/dL    AST 20 9 - 39 U/L    Bilirubin, Total 0.5 0.0 - 1.2 mg/dL    ALT 20 7 - 45 U/L   B-type natriuretic peptide   Result Value Ref Range     (H) 0 - 99 pg/mL   Sars-CoV-2 and Influenza A/B PCR   Result Value Ref Range    Flu A Result Not Detected Not Detected    Flu B Result Not Detected Not Detected    Coronavirus 2019, PCR Not Detected Not Detected   Troponin I, High Sensitivity, Initial   Result Value Ref Range    Troponin I, High Sensitivity 12 0 - 13 ng/L   Troponin, High Sensitivity, 1 Hour   Result Value Ref Range    Troponin I, High Sensitivity 12 0 - 13 ng/L     *Note: Due to a large number of results and/or encounters for the requested time period, some results have not been displayed. A complete set of results can be found in Results Review.         Assessment/Plan   Assessment & Plan  Acute on chronic respiratory failure with hypoxia and hypercapnia (Multi)  Acute worsening.  Patient states she has been using oxygen during the day which is not normal for her  Currently on 3 L.  Wean as able    CHF (congestive heart failure), NYHA class I, acute on chronic, combined  Worsening over the last week and a half, possibly worsened by dietary indiscretion in the last 24 hours.  Patient has significant lower extremity edema which I think will require multiple days of IV diuresis  Will redose Lasix 40 mg IV BID tomorrow  Low-salt diet.  Limit fluids 1600 mL  Cardiology consult  Cellulitis  Right lower extremity markedly more erythematous  Lower suspicion for DVT due  to compliance with Eliquis, but will get a ultrasound to ensure not failure  Blood cultures ordered stat.  Unfortunate antibiotics already given  Continue cefazolin  Atrial fib/flutter, transient (Multi)  Continue metoprolol, Eliquis, and dofetilide.  COPD (chronic obstructive pulmonary disease) (Multi)  Do not feel that she is having a current exacerbation  Continue home maintenance inhalers.  Obesity  Major contribution    This is a patient with advanced comorbidities we will need multiple days of IV diuresis and IV antibiotics.  Care to cross 48 hours anticipated.  I discussed the patient, and she is full code.         Harry Coronado,

## 2024-12-09 NOTE — PROGRESS NOTES
TCC met with patient at bedside. Introduced self and explained role. Patient lives home with spouse. There is 1 step to enter and 13 steps within the home. Patient has no medical equipment and uses a walker or furniture walks in the home. No reports of smoking or alcohol use.  PCP is [facility] . [facility] [place]. Patient recently discharged from SNF and states she was not set up with [facility]. Would like [facility] if possible. TCC reached out for therapy orders.      12/09/24 1355   Discharge Planning   Living Arrangements Spouse/significant other   Support Systems Spouse/significant other   Assistance Needed Uses a walker or furniture walks in the home   Type of Residence Private residence   Number of Stairs to Enter Residence 1   Number of Stairs Within Residence 13  (5 to downstairs, 8 to upstairs)   Do you have animals or pets at home? Yes   Type of Animals or Pets dog   Who is requesting discharge planning? Provider   Home or Post Acute Services In home services   Type of Home Care Services Home PT;Home OT   Expected Discharge Disposition Home H   Does the patient need discharge transport arranged? No   Financial Resource Strain   How hard is it for you to pay for the very basics like food, housing, medical care, and heating? Not hard   Housing Stability   In the last 12 months, was there a time when you were not able to pay the mortgage or rent on time? N   In the past 12 months, how many times have you moved where you were living? 0   At any time in the past 12 months, were you homeless or living in a shelter (including now)? N   Transportation Needs   In the past 12 months, has lack of transportation kept you from medical appointments or from getting medications? no   In the past 12 months, has lack of transportation kept you from meetings, work, or from getting things needed for daily living? No   Patient Choice   Provider Choice list and CMS website  (https://medicare.gov/care-compare#search) for post-acute Quality and Resource Measure Data were provided and reviewed with: Patient   Patient / Family choosing to utilize agency / facility established prior to hospitalization No

## 2024-12-09 NOTE — CONSULTS
Nutrition Assessement Note    Nutrition Assessment    Reason for Assessment: Dietitian discretion (CHF)    Reason for Hospital Admission:  Vickie Foster is a 68 y.o. female who is admitted for CHF. Spoke with pt at bedside. Pt wanting refresher on heart healthy diet. Pt reported that she has at least 10# of fluid retention. Discussed heart healthy diet and provided handout.     Malnutrition Screening Tool (MST)  Have you recently lost weight without trying?: No  If yes, how much weight have you lost?: Unsure  Weight Loss Score: 0  Have you been eating poorly because of a decreased appetite?: No  Malnutrition Score: 0  Nutrition Screen  Stage 3 or 4 Pressure Injury or Multiple Non-Healing Wounds: No  Home Tube Feeding or Total Parenteral Nutrition (TPN): No  Dietitian Consult Needed: No    Past Medical History:   Diagnosis Date    A-fib (Multi)     CHF (congestive heart failure)     COVID     Heart disease     HTN (hypertension)     Low back pain       Past Surgical History:   Procedure Laterality Date    ABLATION OF DYSRHYTHMIC FOCUS      CARDIAC ELECTROPHYSIOLOGY PROCEDURE N/A 05/01/2024    Procedure: Cardioversion;  Surgeon: Amado Ty MD;  Location: [facility] Cardiac Cath Lab;  Service: Electrophysiology;  Laterality: N/A;  [id number]  i48.0  medical mutual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    CARDIAC ELECTROPHYSIOLOGY PROCEDURE N/A 07/08/2024    Procedure: Ablation A-Fib;  Surgeon: Amado Ty MD;  Location: [facility] Cardiac Cath Lab;  Service: Electrophysiology;  Laterality: N/A;  AFIB CRYOABLATION; CPT 78397; ICD I48.0   Medicare mutual of [place] medicare  ID - [id number]  [phone]  No Auth required  Call ref # [id number]    CARDIAC ELECTROPHYSIOLOGY PROCEDURE N/A 9/18/2024    Procedure: Cardioversion;  Surgeon: Amado Ty MD;  Location: [facility] Cardiac Cath Lab;  Service: Electrophysiology;  Laterality: N/A;  CARDIOVERSION CPT 24810, ICD I48.0 no auth required/REF # [id number]    CARDIOVERSION      05/01/2024  "      Nutrition History:  Food and Nutrient History: Pt reported good appetite  Energy Intake: Good > 75 %     Food Allergies/Intolerances:  None    Anthropometrics:  Ht: 157.5 cm (5' 2.01\"), Wt: 105 kg (231 lb), BMI: 42.24  IBW/kg (Dietitian Calculated): 50 kg  Percent of IBW: 210 %  Adjusted Body Weight (kg): 63.64 kg    Weight Change:  Daily Weight  12/08/24 : 105 kg (231 lb)  11/13/24 : 109 kg (241 lb)  11/12/24 : 109 kg (241 lb)  10/31/24 : 104 kg (230 lb)  10/17/24 : 108 kg (239 lb)  10/16/24 : 111 kg (245 lb)  10/15/24 : 111 kg (245 lb 9.6 oz)  10/11/24 : 109 kg (241 lb)  10/09/24 : 110 kg (241 lb 9.6 oz)  10/08/24 : 112 kg (246 lb 7.6 oz)     Weight History / % Weight Change: Pt reported 10# weight gain due to fluid retention. Reported UBW to be 231#             Nutrition Focused Physical Exam Findings:                       Nutrition Significant Labs:  Lab Results   Component Value Date    WBC 9.4 12/09/2024    HGB 11.8 (L) 12/09/2024    HCT 38.1 12/09/2024     12/09/2024    CHOL 186 07/11/2024    TRIG 107 07/11/2024    HDL 46.0 (L) 07/11/2024    ALT 20 12/08/2024    AST 20 12/08/2024     12/09/2024    K 3.4 (L) 12/09/2024    CL 96 (L) 12/09/2024    CREATININE 0.65 12/09/2024    BUN 22 12/09/2024    CO2 43 (HH) 12/09/2024    TSH 2.50 07/11/2024    INR 1.0 07/08/2024    HGBA1C 5.5 07/11/2024     Nutrition Specific Medications:  allopurinol, 200 mg, oral, BID  apixaban, 5 mg, oral, BID  atorvastatin, 40 mg, oral, Nightly  buPROPion XL, 300 mg, oral, Daily before breakfast  ceFAZolin, 2 g, intravenous, q8h  dofetilide, 250 mcg, oral, q12h  fluticasone furoate-vilanteroL, 1 puff, inhalation, Daily  furosemide, 80 mg, intravenous, BID  metoprolol succinate XL, 25 mg, oral, Daily  oxygen, , inhalation, Continuous - Inhalation  polyethylene glycol, 17 g, oral, Daily  potassium chloride CR, 40 mEq, oral, BID        Dietary Orders (From admission, onward)       Start     Ordered    12/09/24 0157  May " Participate in Room Service  ( ROOM SERVICE MAY PARTICIPATE)  Once        Question:  .  Answer:  Yes    12/09/24 0156    12/08/24 2229  Adult diet Cardiac; 70 gm fat; 2 - 3 grams Sodium; 1600 mL fluid  Diet effective now        Question Answer Comment   Diet type Cardiac    Fat restriction: 70 gm fat    Sodium restriction: 2 - 3 grams Sodium    Dietary fluid restriction / 24h: 1600 mL fluid        12/08/24 2230                    Estimated Needs:   Estimated Energy Needs  Total Energy Estimated Needs (kCal): 1594 kCal  Total Estimated Energy Need per Day (kCal/kg): 25 kCal/kg  Method for Estimating Needs: ABW    Estimated Protein Needs  Total Protein Estimated Needs (g):  (64-96)  Total Protein Estimated Needs (g/kg):  (1-1.5)  Method for Estimating Needs: ABW    Estimated Fluid Needs  Total Fluid Estimated Needs (mL): 1600 mL  Method for Estimating Needs: fluid restriction        Nutrition Diagnosis   Nutrition Diagnosis:       Nutrition Diagnosis  Patient has Nutrition Diagnosis: Yes  Diagnosis Status (1): New  Nutrition Diagnosis 1: Food and nutrition related knowledge deficit  Related to (1): needs review of diet education  As Evidenced by (1): requests diet information       Nutrition Interventions/Recommendations   Nutrition Interventions and Recommendations:    Nutrition Prescription:  Individualized Nutrition Prescription Provided for : 1594 kcals, 64-96 g protein via diet    Nutrition Interventions:   Food and/or Nutrient Delivery Interventions  Interventions: Meals and snacks  Meals and Snacks: Fat-modified diet, Mineral-modified diet  Goal: provide diet as ordered    Education Documentation  Nutrition Care Manual, taught by Cris Henley RDN, LD at 12/9/2024 10:45 AM.  Learner: Patient  Readiness: Acceptance  Method: Explanation, Handout  Response: Verbalizes Understanding  Comment: Discussed heart-healthy diet-- limiting saturated fats and replacing with unsaturated fats, increasing fiber intake,  decreasing sodium intake.             Nutrition Monitoring and Evaluation   Monitoring/Evaluation:   Food/Nutrient Related History Monitoring  Monitoring and Evaluation Plan: Energy intake  Energy Intake: Estimated energy intake  Criteria: pt to consume >/= 75% estimated needs  Additional Plans: pt will plan meals within prescribed guidelines            Time Spent/Follow-up:   Follow Up  Time Spent (min): 30 minutes  Last Date of Nutrition Visit: 12/09/24  Nutrition Follow-Up Needed?: 7-10 days  Follow up Comment: 12/16/24

## 2024-12-09 NOTE — CARE PLAN
The patient's goals for the shift include      The clinical goals for the shift include adequate sleep, no respiratory distress

## 2024-12-09 NOTE — CONSULTS
"Inpatient consult to Cardiology  Consult performed by: IBIS Murillo  Consult ordered by: IBIS Hawk  Reason for consult: Acute on chronic diastolic heart failure        History Of Present Illness:    Vickie Foster is a 68 y.o. female presenting with dyspnea, worsening leg edema.  Current with Dr. Sanchez and Dr. Ty.  Past medical history of diastolic heart failure with recurrent hospitalization for peripheral edema, paroxysmal atrial fibrillation with cardiac ablation this year, chronic respiratory failure requiring O2 at 3 L, hypertensive disorder, and embolic stroke.  Patient states 3 weeks of worsening peripheral edema and progressive shortness of breath over the past few days.  States that she normally wears her oxygen intermittently at home, however has been wearing it daily given her shortness of breath.  She states compliance with her outpatient medications.  EKG in the emergency department a rate controlled atrial flutter with variable ventricular conduction.  High-sensitivity troponin values of 12 and 12.  proBNP 148.  Creatinine 0.65.  Hemoglobin 12.6.  Chest x-ray with evidence of mild vascular congestion.  Most recent echocardiogram from July of this year with ejection fraction of 60 to 65% with diastolic dysfunction and increased left atrial dilatation.  Patient received IV furosemide in the emergency department and was admitted on telemetry further testing treatment.  Note patient denies complaints chest pain or pressure palpitations or feeling of rapid heart rate.     Last Recorded Vitals:  Vitals:    12/08/24 2105 12/08/24 2320 12/09/24 0726 12/09/24 0740   BP:  121/59  101/63   BP Location:  Right arm  Right arm   Patient Position:  Sitting  Sitting   Pulse:  85  80   Resp:  17  12   Temp:  36.6 °C (97.9 °F)  36.8 °C (98.2 °F)   TempSrc:  Oral  Oral   SpO2: 94% 97% 97% 95%   Weight:  105 kg (231 lb)     Height:  1.575 m (5' 2.01\")         Last Labs:  CBC - " 12/8/2024:  6:42 PM  10.8 12.6 275    39.4      CMP - 12/8/2024:  6:42 PM  9.0 6.5 20 --- 0.5   3.5 3.7 20 102      PTT - 7/8/2024:  5:26 PM  1.0   10.9 28.8     Troponin I, High Sensitivity   Date/Time Value Ref Range Status   12/08/2024 07:41 PM 12 0 - 13 ng/L Final   12/08/2024 06:42 PM 12 0 - 13 ng/L Final   11/13/2024 12:41 AM 14 (H) 0 - 13 ng/L Final     BNP   Date/Time Value Ref Range Status   12/08/2024 06:42  (H) 0 - 99 pg/mL Final   11/13/2024 12:41  (H) 0 - 99 pg/mL Final     Hemoglobin A1C   Date/Time Value Ref Range Status   07/11/2024 05:26 AM 5.5 See below % Final   09/29/2023 02:34 PM 6.1 (A) % Final     Comment:          Diagnosis of Diabetes-Adults   Non-Diabetic: < or = 5.6%   Increased risk for developing diabetes: 5.7-6.4%   Diagnostic of diabetes: > or = 6.5%  .       Monitoring of Diabetes                Age (y)     Therapeutic Goal (%)   Adults:          >18           <7.0   Pediatrics:    13-18           <7.5                   7-12           <8.0                   0- 6            7.5-8.5   American Diabetes Association. Diabetes Care 33(S1), Jan 2010.   03/29/2023 01:38 PM 6.1 (H) 4.0 - 6.0 % Final     Comment:     Hemoglobin A1C levels are related to mean blood glucose during the   preceding 2-3 months. The relationship table below may be used as a   general guide. Each 1% increase in HGB A1C is a reflection of an   increase in mean glucose of approximately 30 mg/dl.   Reference: Diabetes Care, volume 29, supplement 1 Jan. 2006                        HGB A1C ................. Approx. Mean Glucose   _______________________________________________   6%   ...............................  120 mg/dl   7%   ...............................  150 mg/dl   8%   ...............................  180 mg/dl   9%   ...............................  210 mg/dl   10%  ...............................  240 mg/dl  Performed at [facility] [id number] [facility] [id number]       LDL Calculated    Date/Time Value Ref Range Status   07/11/2024 05:26  65 - 130 mg/dL Final   03/29/2023 01:38  65 - 130 MG/DL Final   06/16/2021 07:33  65 - 130 MG/DL Final   07/28/2020 03:08  65 - 130 MG/DL Final     VLDL   Date/Time Value Ref Range Status   09/29/2023 02:34 PM 21 0 - 40 mg/dL Final      Results for orders placed or performed during the hospital encounter of 12/08/24 (from the past 24 hours)   ECG 12 lead   Result Value Ref Range    Ventricular Rate 88 BPM    QRS Duration 92 ms    QT Interval 406 ms    QTC Calculation(Bazett) 491 ms    R Axis 70 degrees    T Axis 210 degrees    QRS Count 14 beats    Q Onset 225 ms    T Offset 428 ms    QTC Fredericia 461 ms   CBC and Auto Differential   Result Value Ref Range    WBC 10.8 4.4 - 11.3 x10*3/uL    nRBC 0.0 0.0 - 0.0 /100 WBCs    RBC 4.13 4.00 - 5.20 x10*6/uL    Hemoglobin 12.6 12.0 - 16.0 g/dL    Hematocrit 39.4 36.0 - 46.0 %    MCV 95 80 - 100 fL    MCH 30.5 26.0 - 34.0 pg    MCHC 32.0 32.0 - 36.0 g/dL    RDW 15.7 (H) 11.5 - 14.5 %    Platelets 275 150 - 450 x10*3/uL    Neutrophils % 79.0 40.0 - 80.0 %    Immature Granulocytes %, Automated 0.4 0.0 - 0.9 %    Lymphocytes % 11.9 13.0 - 44.0 %    Monocytes % 7.7 2.0 - 10.0 %    Eosinophils % 0.8 0.0 - 6.0 %    Basophils % 0.2 0.0 - 2.0 %    Neutrophils Absolute 8.50 (H) 1.20 - 7.70 x10*3/uL    Immature Granulocytes Absolute, Automated 0.04 0.00 - 0.70 x10*3/uL    Lymphocytes Absolute 1.28 1.20 - 4.80 x10*3/uL    Monocytes Absolute 0.83 0.10 - 1.00 x10*3/uL    Eosinophils Absolute 0.09 0.00 - 0.70 x10*3/uL    Basophils Absolute 0.02 0.00 - 0.10 x10*3/uL   Comprehensive metabolic panel   Result Value Ref Range    Glucose 97 74 - 99 mg/dL    Sodium 141 136 - 145 mmol/L    Potassium 3.1 (L) 3.5 - 5.3 mmol/L    Chloride 95 (L) 98 - 107 mmol/L    Bicarbonate 38 (H) 21 - 32 mmol/L    Anion Gap 11 10 - 20 mmol/L    Urea Nitrogen 29 (H) 6 - 23 mg/dL    Creatinine 0.65 0.50 - 1.05 mg/dL    eGFR >90 >60  mL/min/1.73m*2    Calcium 9.0 8.6 - 10.3 mg/dL    Albumin 3.7 3.4 - 5.0 g/dL    Alkaline Phosphatase 102 33 - 136 U/L    Total Protein 6.5 6.4 - 8.2 g/dL    AST 20 9 - 39 U/L    Bilirubin, Total 0.5 0.0 - 1.2 mg/dL    ALT 20 7 - 45 U/L   B-type natriuretic peptide   Result Value Ref Range     (H) 0 - 99 pg/mL   Sars-CoV-2 and Influenza A/B PCR   Result Value Ref Range    Flu A Result Not Detected Not Detected    Flu B Result Not Detected Not Detected    Coronavirus 2019, PCR Not Detected Not Detected   Troponin I, High Sensitivity, Initial   Result Value Ref Range    Troponin I, High Sensitivity 12 0 - 13 ng/L   Troponin, High Sensitivity, 1 Hour   Result Value Ref Range    Troponin I, High Sensitivity 12 0 - 13 ng/L   CBC   Result Value Ref Range    WBC 9.4 4.4 - 11.3 x10*3/uL    nRBC 0.0 0.0 - 0.0 /100 WBCs    RBC 3.92 (L) 4.00 - 5.20 x10*6/uL    Hemoglobin 11.8 (L) 12.0 - 16.0 g/dL    Hematocrit 38.1 36.0 - 46.0 %    MCV 97 80 - 100 fL    MCH 30.1 26.0 - 34.0 pg    MCHC 31.0 (L) 32.0 - 36.0 g/dL    RDW 15.6 (H) 11.5 - 14.5 %    Platelets 242 150 - 450 x10*3/uL     *Note: Due to a large number of results and/or encounters for the requested time period, some results have not been displayed. A complete set of results can be found in Results Review.       Last I/O:  I/O last 3 completed shifts:  In: 100 (1 mL/kg) [IV Piggyback:100]  Out: 800 (7.6 mL/kg) [Urine:800 (0.2 mL/kg/hr)]  Weight: 104.8 kg     Past Cardiology Tests (Last 3 Years):  EKG:  ECG 12 lead 12/08/2024 (Preliminary) : Atrial flutter with variable ventricular conduction, rate controlled          Echo:  Transthoracic Echo (TTE) Complete 07/11/2024  Transthoracic Echo (TTE) Complete    Result Date: 7/13/2024           [place] [id number]            Phone [phone] TRANSTHORACIC ECHOCARDIOGRAM REPORT Patient Name:      SOURAV MIN       Reading Physician:    [id number] Raymond                                                                 Kapil SAUNDERS Study Date:        7/11/2024             Ordering Provider:    [id number] NIRAV MELARA MRN/PID:           [id number]              Fellow: Accession#:        [id number]          Nurse: Date of Birth/Age: 1955 / 68 years Sonographer:          Rhett Matute RDCS Gender:            F                     Additional Staff: Height:            155.00 cm             Admit Date: Weight:            112.00 kg             Admission Status:     Inpatient -                                                                Routine BSA / BMI:         2.07 m2 / 46.62 kg/m2 Department Location:  [facility] Blood Pressure: 119 /86 mmHg Study Type:    TRANSTHORACIC ECHO (TTE) COMPLETE Diagnosis/ICD: Other transient cerebral ischemic attacks and related                syndromes-G45.8 Indication:    Cerebrovascular Accident CPT Codes:     Echo Complete w Full Doppler-[id number] Patient History: Pertinent History: HTN, Hyperlipidemia, Chest Pain, CAD, CHF, Dyspnea,                    Sedentary, A-Fib and Murmur. MORLEY,TANVIR,PCI. Study Detail: The following Echo studies were performed: 2D, M-Mode, Doppler and               color flow. Technically challenging study due to poor acoustic               windows, the patient's lack of cooperation, body habitus and               patient lying in supine position.  PHYSICIAN INTERPRETATION: Left Ventricle: The left ventricular systolic function is normal, with a visually estimated ejection fraction of 60-65%. There are no regional wall motion abnormalities. The left ventricular cavity size is normal. The left ventricular septal wall thickness is mildly increased. There is mildly increased left ventricular posterior wall thickness. Left ventricular diastolic filling was indeterminate. Left Atrium: The left atrium is moderately  dilated. Right Ventricle: The right ventricle is normal in size. There is normal right ventricular global systolic function. Right Atrium: The right atrium is normal in size. Aortic Valve: The aortic valve is trileaflet. The aortic valve dimensionless index is 0.76. There is no evidence of aortic valve regurgitation. The peak instantaneous gradient of the aortic valve is 8.5 mmHg. The mean gradient of the aortic valve is 3.8 mmHg. Mitral Valve: The mitral valve is normal in structure. There is no evidence of mitral valve regurgitation. Tricuspid Valve: The tricuspid valve is structurally normal. No evidence of tricuspid regurgitation. Pulmonic Valve: The pulmonic valve is not well visualized. The pulmonic valve regurgitation was not well visualized. Pericardium: There is no pericardial effusion noted. Aorta: The aortic root is normal.  CONCLUSIONS:  1. The left ventricular systolic function is normal, with a visually estimated ejection fraction of 60-65%.  2. Left ventricular diastolic filling was indeterminate.  3. There is normal right ventricular global systolic function.  4. The left atrium is moderately dilated. QUANTITATIVE DATA SUMMARY: 2D MEASUREMENTS:                           Normal Ranges: Ao Root s:     4.11 cm IVSd:          1.32 cm    (0.6-1.1cm) LVPWd:         5.22 cm    (0.6-1.1cm) LVIDd:         1.37 cm    (3.9-5.9cm) LVIDs:         2.42 cm LV Mass Index: 198.7 g/m2 LV % FS        -76.1 % LA VOLUME:                             Normal Ranges: LA Volume Index: 57.0 ml/m2 LA Vol A4C:      164.4 ml LA Vol A2C:      160.8 ml RA VOLUME BY A/L METHOD:                       Normal Ranges: RA Area A4C: 24.0 cm2 LV SYSTOLIC FUNCTION BY 2D PLANIMETRY (MOD):                      Normal Ranges: EF-A4C View:    60 % (>=55%) EF-A2C View:    53 % EF-Biplane:     58 % EF-Visual:      63 % LV EF Reported: 63 % LV DIASTOLIC FUNCTION:                     Normal Ranges: MV Peak E: 0.78 m/s (0.7-1.2 m/s) MV Peak A: 0.01  m/s (0.42-0.7 m/s) E/A Ratio: 115.55   (1.0-2.2) MITRAL VALVE:                 Normal Ranges: MV DT: 248 msec (150-240msec) AORTIC VALVE:                                   Normal Ranges: AoV Vmax:                1.46 m/s (<=1.7m/s) AoV Peak P.5 mmHg (<20mmHg) AoV Mean PG:             3.8 mmHg (1.7-11.5mmHg) LVOT Max Sean:            0.89 m/s (<=1.1m/s) AoV VTI:                 21.84 cm (18-25cm) LVOT VTI:                16.56 cm LVOT Diameter:           1.97 cm  (1.8-2.4cm) AoV Area, VTI:           2.32 cm2 (2.5-5.5cm2) AoV Area,Vmax:           1.87 cm2 (2.5-4.5cm2) AoV Dimensionless Index: 0.76  RIGHT VENTRICLE: RV Basal 4.70 cm RV Mid   3.70 cm RV Major 6.4 cm TRICUSPID VALVE/RVSP:                             Normal Ranges: Peak TR Velocity: 2.78 m/s RV Syst Pressure: 33.8 mmHg (< 30mmHg) IVC Diam:         1.97 cm AORTA: Asc Ao Diam 3.22 cm  [id number] Raymond iDck DO Electronically signed on 2024 at 1:02:11 PM  ** Final **     Transthoracic Echo (TTE) Complete    Result Date: 2024           [place] [address]            Phone [phone] TRANSTHORACIC ECHOCARDIOGRAM REPORT Patient Name:     SOURAV Oakley Physician:   [id number] [facility]  Study Date:       2024             Ordering Provider:   [id number] MELLISSA MULLINS MRN/PID:          [id number]             Fellow: Accession#:       [id number]         Nurse: Date of           1955      Sonographer:         Dalila Kaba RDCS Birth/Age:        years Gender:           F                    Additional Staff: Height:           157.48 cm            Admit Date:          2024 Weight:           113.85 kg            Admission Status:    Inpatient - STAT BSA / BMI:        2.11 m2 / 45.91      Department Location: [facility] ICU                   kg/m2 Blood Pressure: 161 /96 mmHg Study Type:    TRANSTHORACIC ECHO (TTE)  COMPLETE Diagnosis/ICD: Dyspnea, unspecified-R06.00 Indication:    dypsnea post afib ablation, r/o pericardial disease CPT Codes:     Echo Complete w Full Doppler-[id number] Patient History: Pertinent History: HTN, CHF, cardiomegaly, coronary arteriosclerosis, AFIB,                    PHTN, COPD, GERD. Study Detail: The following Echo studies were performed: 2D, M-Mode, Doppler and               color flow.  PHYSICIAN INTERPRETATION: Left Ventricle: The left ventricular systolic function is normal, with a visually estimated ejection fraction of 65-70%. There are no regional wall motion abnormalities. The left ventricular cavity size is normal. Spectral Doppler shows an impaired relaxation pattern of left ventricular diastolic filling. Left Atrium: The left atrium is moderately dilated. Right Ventricle: The right ventricle is normal in size. There is normal right ventricular global systolic function. Right Atrium: The right atrium is mildly dilated. Aortic Valve: The aortic valve is trileaflet. The aortic valve dimensionless index is 0.87. There is no evidence of aortic valve regurgitation. The peak instantaneous gradient of the aortic valve is 18.6 mmHg. The mean gradient of the aortic valve is 8.4 mmHg. Mitral Valve: The mitral valve is normal in structure. There is mild to moderate mitral valve regurgitation. Tricuspid Valve: The tricuspid valve is structurally normal. There is mild to moderate tricuspid regurgitation. Pulmonic Valve: The pulmonic valve is not well visualized. The pulmonic valve regurgitation was not well visualized. Pericardium: There is no pericardial effusion noted. Aorta: The aortic root is normal.  CONCLUSIONS:  1. The left ventricular systolic function is normal, with a visually estimated ejection fraction of 65-70%.  2. Spectral Doppler shows an impaired relaxation pattern of left ventricular diastolic filling.  3. There is normal right ventricular global systolic function.  4. The left atrium  is moderately dilated.  5. Mild to moderate mitral valve regurgitation.  6. Mild to moderate tricuspid regurgitation. QUANTITATIVE DATA SUMMARY: 2D MEASUREMENTS:                           Normal Ranges: IVSd:          1.45 cm    (0.6-1.1cm) LVPWd:         1.14 cm    (0.6-1.1cm) LVIDd:         4.75 cm    (3.9-5.9cm) LVIDs:         3.05 cm LV Mass Index: 114.6 g/m2 LV % FS        35.7 % LA VOLUME:                               Normal Ranges: LA Vol A4C:        144.8 ml   (22+/-6mL/m2) LA Vol A2C:        183.3 ml LA Vol BP:         179.1 ml LA Vol Index A4C:  68.8 ml/m2 LA Vol Index A2C:  87.1 ml/m2 LA Vol Index BP:   85.1 ml/m2 LA Area A4C:       38.5 cm2 LA Area A2C:       39.4 cm2 LA Major Axis A4C: 8.7 cm LA Major Axis A2C: 7.2 cm LA Volume Index:   85.0 ml/m2 LA Vol A4C:        144.0 ml LA Vol A2C:        184.0 ml RA VOLUME BY A/L METHOD:                               Normal Ranges: RA Vol A4C:        97.3 ml    (8.3-19.5ml) RA Vol Index A4C:  46.2 ml/m2 RA Area A4C:       28.9 cm2 RA Major Axis A4C: 7.3 cm AORTA MEASUREMENTS:                      Normal Ranges: Ao Sinus, d: 2.90 cm (2.1-3.5cm) Ao STJ, d:   2.60 cm (1.7-3.4cm) Asc Ao, d:   3.30 cm (2.1-3.4cm) LV SYSTOLIC FUNCTION BY 2D PLANIMETRY (MOD):                      Normal Ranges: EF-A4C View:    62 % (>=55%) EF-A2C View:    64 % EF-Biplane:     63 % EF-Visual:      68 % LV EF Reported: 68 % LV DIASTOLIC FUNCTION:                         Normal Ranges: MV Peak E:    1.24 m/s  (0.7-1.2 m/s) MV Peak A:    0.01 m/s  (0.42-0.7 m/s) E/A Ratio:    106.77    (1.0-2.2) MV e'         0.109 m/s (>8.0) MV lateral e' 0.17 m/s MV medial e'  0.05 m/s E/e' Ratio:   11.36     (<8.0) MITRAL VALVE:                 Normal Ranges: MV DT: 134 msec (150-240msec) AORTIC VALVE:                                    Normal Ranges: AoV Vmax:                2.15 m/s  (<=1.7m/s) AoV Peak P.6 mmHg (<20mmHg) AoV Mean P.4 mmHg  (1.7-11.5mmHg) LVOT Max Sean:             1.64 m/s  (<=1.1m/s) AoV VTI:                 31.64 cm  (18-25cm) LVOT VTI:                27.54 cm LVOT Diameter:           2.04 cm   (1.8-2.4cm) AoV Area, VTI:           2.83 cm2  (2.5-5.5cm2) AoV Area,Vmax:           2.48 cm2  (2.5-4.5cm2) AoV Dimensionless Index: 0.87  RIGHT VENTRICLE: RV Basal 4.58 cm RV Major 7.5 cm TAPSE:   19.5 mm RV s'    0.15 m/s TRICUSPID VALVE/RVSP:                             Normal Ranges: Peak TR Velocity: 3.62 m/s RV Syst Pressure: 55.4 mmHg (< 30mmHg) IVC Diam:         2.41 cm AORTA: Asc Ao Diam 3.35 cm  [id number] Servando Lundberg DO Electronically signed on 7/8/2024 at 4:24:40 PM  ** Final **     Transthoracic echo (TTE) complete    Result Date: 6/29/2024   [place] [address]            Tel [phone] and Fax [phone] TRANSTHORACIC ECHOCARDIOGRAM REPORT  Patient Name:      SOURAV KHALIF Oakley Physician:    [id number] Demetrius Guerrero MD Study Date:        6/27/2024            Ordering Provider:    [id number] AGA CAMPBELL MRN/PID:           [id number]             Fellow: Accession#:        [id number]         Nurse: Date of Birth/Age: 1955 / 68      Sonographer:          Price parks                                      TERE Gender:            F                    Additional Staff: Height:            157.48 cm            Admit Date: Weight:            112.49 kg            Admission Status:     Outpatient BSA / BMI:         2.09 m2 / 45.36      Encounter#:           [id number]                    kg/m2 Blood Pressure:    138/84 mmHg          Department Location:  [facility] Study Type:    TRANSTHORACIC ECHO (TTE) COMPLETE Diagnosis/ICD: Paroxysmal atrial fibrillation-I48.0; Other forms of                dyspnea-R06.09 Indication:    afib, MORLEY CPT Code:      Echo Complete w  Full Doppler-[id number] Patient History: Pertinent History: HTN, diastolic HF, cardiomegaly, afib, PHTN, CHF,MORLEY, morbid                    obesity. Study Detail: The following Echo studies were performed: M-Mode, 2D, Doppler and               color flow. Technically challenging study due to body habitus,               poor acoustic windows and prominent lung artifact.  PHYSICIAN INTERPRETATION: Left Ventricle: Left ventricular ejection fraction is normal, by visual estimate at 60-65%. There are no regional wall motion abnormalities. The left ventricular cavity size is normal. There is mild to moderate concentric left ventricular hypertrophy. Left ventricular diastolic filling was not assessed. Left Atrium: The left atrium is moderate to severely dilated. Right Ventricle: The right ventricle is normal in size. There is normal right ventriclar wall thickness. There is normal right ventricular global systolic function. Right Atrium: The right atrium is normal in size. Aortic Valve: The aortic valve appears structurally normal. The aortic valve appears tricuspid and non-restricted. There is no evidence of aortic valve regurgitation. The peak instantaneous gradient of the aortic valve is 8.7 mmHg. Mitral Valve: The mitral valve is normal in structure. There is normal mitral valve leaflet mobility. There is trace to mild mitral valve regurgitation. Tricuspid Valve: The tricuspid valve is structurally normal. There is normal tricuspid valve leaflet mobility. There is mild tricuspid regurgitation. The Doppler estimated RVSP is mildly elevated at 43.1 mmHg. Pulmonic Valve: The pulmonic valve is structurally normal. There is trace pulmonic valve regurgitation. Pericardium: There is no pericardial effusion noted. Aorta: The aortic root is normal. The Ao Sinus is 3.70 cm. The Asc Ao is 3.50 cm. There is no dilatation of the aortic arch. There is no dilatation of the ascending aorta. There is no dilatation of the aortic root. There  is plaque visualized in the ascending aorta, which is classified as a Grade 2 [mild (focal or diffuse) intimal thickening of 2-3 mm] atherosclerosis. Pulmonary Artery: The pulmonary artery is normal in size. The tricuspid regurgitant velocity is 3.17 m/s, and with an estimated right atrial pressure of 3 mmHg, the estimated pulmonary artery pressure is mildly elevated with the RVSP at 43.1 mmHg. The estimated PASP is 43 mmHg. Systemic Veins: The inferior vena cava appears to be of normal size. There is IVC inspiratory collapse greater than 50%.  CONCLUSIONS:  1. Left ventricular ejection fraction is normal, by visual estimate at 60-65%.  2. There is normal right ventricular global systolic function.  3. The left atrium is moderate to severely dilated.  4. Trace to mild mitral valve regurgitation.  5. Mild tricuspid regurgitation visualized.  6. Mildly elevated RVSP.  7. The estimated PASP is 43 mmHg.  8. There is plaque visualized in the ascending aorta. QUANTITATIVE DATA SUMMARY: 2D MEASUREMENTS:                          Normal Ranges: Ao Root d:     3.70 cm   (2.0-3.7cm) LAs:           6.06 cm   (2.7-4.0cm) IVSd:          1.27 cm   (0.6-1.1cm) LVPWd:         1.09 cm   (0.6-1.1cm) LVIDd:         4.22 cm   (3.9-5.9cm) LVIDs:         3.15 cm LV Mass Index: 83.8 g/m2 LV % FS        25.2 % LA VOLUME:                                Normal Ranges: LA Vol A4C:        147.1 ml    (22+/-6mL/m2) LA Vol A2C:        216.2 ml LA Vol BP:         180.4 ml LA Vol Index A4C:  70.2 ml/m2 LA Vol Index A2C:  103.2 ml/m2 LA Vol Index BP:   86.1 ml/m2 LA Area A4C:       38.8 cm2 LA Area A2C:       46.5 cm2 LA Major Axis A4C: 8.7 cm LA Major Axis A2C: 8.5 cm LA Volume Index:   86.1 ml/m2 LA Vol A4C:        132.5 ml LA Vol A2C:        210.3 ml RA VOLUME BY A/L METHOD:                       Normal Ranges: RA Area A4C: 26.6 cm2 AORTA MEASUREMENTS:                      Normal Ranges: Ao Sinus, d: 3.70 cm (2.1-3.5cm) Asc Ao, d:   3.50 cm  (2.1-3.4cm) LV SYSTOLIC FUNCTION BY 2D PLANIMETRY (MOD):                      Normal Ranges: EF-A4C View:    62 % (>=55%) EF-A2C View:    64 % EF-Biplane:     64 % EF-Visual:      63 % LV EF Reported: 63 % LV DIASTOLIC FUNCTION:                     Normal Ranges: MV Peak E: 0.85 m/s (0.7-1.2 m/s) AORTIC VALVE:                         Normal Ranges: AoV Vmax:      1.47 m/s (<=1.7m/s) AoV Peak P.7 mmHg (<20mmHg) LVOT Max Sean:  0.94 m/s (<=1.1m/s) LVOT VTI:      17.61 cm LVOT Diameter: 2.25 cm  (1.8-2.4cm) AoV Area,Vmax: 2.53 cm2 (2.5-4.5cm2)  RIGHT VENTRICLE: RV Basal 4.40 cm TAPSE:   13.0 mm RV s'    0.09 m/s TRICUSPID VALVE/RVSP:                             Normal Ranges: Peak TR Velocity: 3.17 m/s RV Syst Pressure: 43.1 mmHg (< 30mmHg) IVC Diam:         1.80 cm AORTA: Asc Ao Diam 3.51 cm  [id number] Demetrius Guerrero MD Electronically signed on 2024 at 12:31:59 PM  ** Final **        Ejection Fractions:  EF   Date/Time Value Ref Range Status   2024 11:41 AM 63 %    2024 03:51 PM 68 %    2024 02:24 PM 63 %        Past Medical History:  She has a past medical history of A-fib (Multi), CHF (congestive heart failure), COVID, Heart disease, HTN (hypertension), and Low back pain.    Past Surgical History:  She has a past surgical history that includes Cardioversion; Cardiac electrophysiology procedure (N/A, 2024); Cardiac electrophysiology procedure (N/A, 2024); Ablation of dysrhythmic focus; and Cardiac electrophysiology procedure (N/A, 2024).      Social History:  She reports that she quit smoking about 20 years ago. Her smoking use included cigarettes. She has never used smokeless tobacco. She reports that she does not currently use alcohol. She reports that she does not use drugs.    Family History:  Family History   Problem Relation Name Age of Onset    Breast cancer Mother      Heart attack Mother           in early 80s of MI    Heart disease Mother      Colon cancer Father       Breast cancer Sister      Diabetes type II Sister      Kidney failure Sister      Other (herione addict) Sister      No Known Problems Brother      No Known Problems Daughter      Other (recovering alcoholic) Son      Breast cancer Other Maternal Aunt         Allergies:  Patient has no known allergies.    Inpatient Medications:  Scheduled medications   Medication Dose Route Frequency    allopurinol  200 mg oral BID    apixaban  5 mg oral BID    atorvastatin  40 mg oral Nightly    buPROPion XL  300 mg oral Daily before breakfast    ceFAZolin  2 g intravenous q8h    dofetilide  250 mcg oral q12h    fluticasone furoate-vilanteroL  1 puff inhalation Daily    furosemide  40 mg intravenous BID    metoprolol succinate XL  25 mg oral Daily    oxygen   inhalation Continuous - Inhalation    polyethylene glycol  17 g oral Daily     PRN medications   Medication    acetaminophen    Or    acetaminophen    Or    acetaminophen    ondansetron     Continuous Medications   Medication Dose Last Rate     Outpatient Medications:  Current Outpatient Medications   Medication Instructions    allopurinoL 200 mg, oral, 2 times daily    atorvastatin (Lipitor) 40 mg tablet TAKE 1 TABLET(40 MG) BY MOUTH DAILY AT BEDTIME    budesonide-formoteroL (Symbicort) 80-4.5 mcg/actuation inhaler 2 puffs, 2 times daily RT    buPROPion XL (WELLBUTRIN XL) 300 mg, oral, Daily before breakfast    calcium carb-vitamin D3-vit K2 600 mg-1,000 unit-90 mcg tablet Daily    dofetilide (TIKOSYN) 250 mcg, oral, Every 12 hours    Eliquis 5 mg, oral, 2 times daily    fluticasone propion-salmeteroL (Advair Diskus) 100-50 mcg/dose diskus inhaler 1 puff, 2 times daily RT    hydrOXYzine HCL (ATARAX) 10 mg, oral, Nightly PRN    ipratropium-albuteroL (Duo-Neb) 0.5-2.5 mg/3 mL nebulizer solution 3 mL, nebulization, Every 6 hours PRN    metoprolol succinate XL (Toprol-XL) 25 mg 24 hr tablet TAKE 1 TABLET BY MOUTH ONE TIME DAILY    moisturizing mouth (Biotene Dry Mouth Oral  Rinse) solution 15 mL, 3 times daily    oxygen (O2) gas therapy 1 each, inhalation, Every 12 hours    oxygen (O2) gas therapy 1 each, inhalation, Every 12 hours    semaglutide 2.3 mg, Once Weekly    torsemide (Demadex) 100 mg tablet Take 1/2 a tablet (50 mg) by mouth 2 times a day.    traMADol (ULTRAM) 50 mg, Every 8 hours PRN       Physical Exam:  General: alert, oriented x 3, very pleasant  HEENT: normal cephalic, atraumatic, no scleral icterus  Neck: No JVD, bruit or thrill, masses or tenderness   Heart: S1/S2, Rate 80, Rhythm regular, no s3 or s4, no murmur, thrill, or heaves at PMI.   Lungs: Clear in upper lobes, near absent breath sounds bilateral bases equal expansion and excursion, no wheezes, crackles, rhales or rhonci.  Oxygen via nasal cannula.  Minimal conversational dyspnea prescient and  Abdomen: Obese, bowel sounds x 4, soft, non-tender   Genitourinary: deferred   Extremities: +3 to +4 bilateral lower extremity edema noted, weeping noted.  Evidence of cellulitis to bilateral lower extremities, worse to right       Assessment/Plan     Acute on chronic diastolic heart failure  Lymphedema  Cellulitis bilateral lower extremities  Paroxysmal atrial fibrillation  Hypertensive disorder  Hyperlipidemia  Morbid obesity  History of stroke  Chronic respiratory failure with chronic O2    Overall impression:    12/9: As above, presents predominately for worsening peripheral edema for 3 weeks and worsening shortness of breath over the past week.  She has had multiple recurrences over the past year for worsening peripheral edema.  I am now suspecting that she has a component of lymphedema given her obesity.  Additionally her recurrent echocardiogram showed preserved ejection fraction with a minimal diastolic dysfunction.  She does have a chronic O2 requirement and a chest x-ray reveals mild fluid volume overload with a low BNP of 148.  Her troponins are flat negative.  Certainly diuresis is the answer to the  patient's problem currently.  She states compliance with outpatient medications.  In the outpatient setting she takes torsemide 50 mg twice daily.  She has been initiated on IV furosemide.  Will increase this.  Creatinine stable at 0.65.  Will utilize IV furosemide 80 mg twice daily.  Given the level of diuresis needed as well as a current potassium 3.1 with repletion given yesterday, will order for 40 mill colons potassium twice today and 1 time tomorrow.  Will check potassium level in the morning.  Echo from previous hospitalization is adequate for this hospitalization.  Concerning her atrial fibrillation surprisingly her EKG did show a rate controlled atrial flutter on admission.  On telemetry now she is currently in a sinus rhythm.  Will continue to monitor.  She does continue on apixaban for stroke risk reduction.  Hemoglobin stable at 12.6.  Overall stable from a respiratory perspective but is significantly fluid overloaded likely require at least 2 to 3 days inpatient hospitalization to achieve a euvolemic state.  Have discussed thoroughly with the patient the importance of reducing her sodium intake and weighing herself daily.  Have consulted cardiac rehab.  Will follow with you.       Code Status:  Full Code    I spent 60 minutes in the professional and overall care of this patient.        Jaciel Ramirez, APRN-CNP

## 2024-12-09 NOTE — NURSING NOTE
Reviewed CHF education with patient/daughter. Discussed low sodium diet, importance of daily weights, following up with physician appointments, taking medications as prescribed.

## 2024-12-09 NOTE — DISCHARGE INSTR - DIET
Recommend a heart-healthy diet-- limiting saturated fats and replacing with unsaturated fats, increasing fiber intake, decreasing sodium intake.

## 2024-12-09 NOTE — ASSESSMENT & PLAN NOTE
Currently on 3 L O2  Has been on oxygen during the day at home which is new for her, she usually only uses it at night  Wean as able

## 2024-12-09 NOTE — PROGRESS NOTES
Spiritual Care Visit    Clinical Encounter Type  Visited With: Patient  Routine Visit: Introduction  Continue Visiting: Yes         Values/Beliefs  Spiritual Requests During Hospitalization: Vickie asked to be anointed and receive Communion today. her daughter Lindsay was wt her in her room too.    Sacramental Encounters  Communion: Patient wants communion  Communion Given Indicator: Yes  Sacrament of Sick-Anointing: Anointed                             Taxonomy  Intended Effects: Build relationship of care and support, Demonstrate caring and concern, Promote sense of peace    Uziel Ambriz

## 2024-12-09 NOTE — ASSESSMENT & PLAN NOTE
Right lower extremity markedly more erythematous  Lower suspicion for DVT due to compliance with Eliquis, but will get a ultrasound to ensure not failure  Blood cultures ordered stat.  Unfortunate antibiotics already given  Continue cefazolin

## 2024-12-09 NOTE — ASSESSMENT & PLAN NOTE
Worsening over the last week and a half, possibly worsened by dietary indiscretion in the last 24 hours.  Patient has significant lower extremity edema which I think will require multiple days of IV diuresis  Will redose Lasix 40 mg IV BID tomorrow  Low-salt diet.  Limit fluids 1600 mL  Cardiology consult

## 2024-12-09 NOTE — CARE PLAN
Over the shift, the patient did not make progress toward the following goals. Barriers to progression include . Recommendations to address these barriers include .      Problem: Pain - Adult  Goal: Verbalizes/displays adequate comfort level or baseline comfort level  12/9/2024 0658 by Yair Mandel RN  Outcome: Progressing  12/9/2024 0207 by Yair Mandel RN  Outcome: Progressing     Problem: Safety - Adult  Goal: Free from fall injury  12/9/2024 0658 by Yair Mandel RN  Outcome: Progressing  12/9/2024 0207 by Yair Mandel RN  Outcome: Progressing     Problem: Discharge Planning  Goal: Discharge to home or other facility with appropriate resources  12/9/2024 0658 by Yair Mandel RN  Outcome: Progressing  12/9/2024 0207 by Yair Mandel RN  Outcome: Progressing     Problem: Chronic Conditions and Co-morbidities  Goal: Patient's chronic conditions and co-morbidity symptoms are monitored and maintained or improved  12/9/2024 0658 by Yair Mandel RN  Outcome: Progressing  12/9/2024 0207 by Yair Mandel RN  Outcome: Progressing     Problem: Fall/Injury  Goal: Verbalize understanding of personal risk factors for fall in the hospital  12/9/2024 0658 by Yair Mandel RN  Outcome: Progressing  12/9/2024 0207 by Yair Mandel RN  Outcome: Progressing  Goal: Verbalize understanding of risk factor reduction measures to prevent injury from fall in the home  12/9/2024 0658 by Yair Mandel RN  Outcome: Progressing  12/9/2024 0207 by Yair Mandel RN  Outcome: Progressing     Problem: Respiratory  Goal: Wean oxygen to maintain O2 saturation per order/standard this shift  12/9/2024 0658 by Yair Mandel RN  Outcome: Progressing  12/9/2024 0207 by Yair Mandel RN  Outcome: Progressing  Goal: Increase self care and/or family involvement in next 24 hours  12/9/2024 0658 by Yair Mandel RN  Outcome: Progressing  12/9/2024 0207 by Yair  Zach Mandel RN  Outcome: Progressing

## 2024-12-09 NOTE — CONSULTS
"Heart Failure Nurse Navigator    The role of the HF nurse navigator is to (1) characterize risk profiles of patients with heart failure transitioning from [id number] after hospitalization, (2) recommend interventions to improve care and reduce risks of worse post-hospitalization outcomes. Potential recommendations may touch base on patient/family education, additional consults that may bring value, and appointment scheduling.    History    I met with Vickie Foster at the bedside, and my impressions include: Pt admitted with c/o LE edema/SOB. Pt hx includes HFpEF, afib, HTN. I did successfully follow this pt from 8/6/24-9/18/24.    Patients Cardiologist(s): Dr. Sanchez    Patients Primary Care Provider: Dr. Gonzalez    1. Medical Domain  What is the patient's most recent LVEF? 60-65%  HFrEF (LVEF <= 40%) Quadruple therapy recommended  HFmrEF (LVEF 41-49%) Quadruple therapy recommended  HFpEF (LVEF >= 50%) Minimum recommendations: SGLT2i and MRA  Is the patient on OP GDMT for their condition?   ARNI/ACEI/ARB: No None  BB: Yes Metoprolol succinate 25 mg daily  MRA: No None  SGLT2i: No None  Is the patient prescribed a diuretic? Yes  Torsemide 50 mg BID  Does this patient have an implanted device (ie cardioMEMS, ICD, CRT-D)?  Device type: No  Could this patient have advanced heart failure (Stage D heart failure)?: No       2. Mind and Emotion  Does this patient have possible cognitive impairment?: No (The Mini-Cog score /5)  Ask the patient to memorize these 3 words: banana, sunrise, chair  Ask the patient to draw a clock with hands pointing at \"20 minutes after 8\"  Ask the patient to recall the 3 words  Score: Add number of words recalled + clock drawing (0 points for any errors, 2 points if correct)  Interpretation: A score of 0-2 suggests cognitive impairment is present, a score of 3-5 suggests cognitive impairment is absent  Does this patient have major depression?: No (PH-Q2 score /6)  Over the last 2 " weeks: Little interest or pleasure in doing things? (Not at all +0, Several days +1, More than half the days +2, Nearly every day +3)  Over the last 2 weeks: Feeling down, depressed or hopeless? (Not at all +0, Several days +1, More than half the days +2, Nearly every day +3)  Score: Add points  Interpretation: A score of 3 or more suggests that major depression is likely.     3. Physical Function  Could this patient be frail?: No   Defined by presence of all of these: slowness, weakness, shrinking, inactivity, exhaustion  Is this patient at risk for falling?: No   Defined by having experienced a fall in the last 12 months.    4. Social Determinants of Health  Transportation deficits?: No   Lack of insurance?: No   Poor financial resources?: No   Living conditions (homelessness, unstable home)?: No   Poor family/social support?: No   Poor personal care?: No   Food insecurity?: No   Lack of HCPOA?: No    I provided the following heart failure education:  - Living With Heart Failure book provided.  - HF signs and symptoms, heart failure zones and when to call cardiologist.   - Controlling Heart Failure at Home: medication adherence, following up with cardiologist at least once yearly, staying healthy and active, limiting sodium and fluid intake as directed by cardiologist.  - Daily Weight Education    Assessment  Discussed importance of low sodium diet at length with patient. Suggestions on how to reduce sodium from diet during holiday season. We also spoke about daily wts and when to reach out to the doctor. She does not have a scale but knows the importance of getting one and will obtain one for daily wts.    IP Medications:  ARNi/ACEi/ARB: None  BB: Metoprolol succinate 25 mg daily  MRA: None  SGLT2i: None  Diuretic:  Lasix IV 80 mg BID    Recommendations/ Plan  Follow up with Cardiology within 2 weeks of DC.  Obtain scale for home to complete daily wts and notify Dr. Sanchez's office with rapid weight gain or new HF  symptoms.       *Discharge Appointment Follow-up Plan: Message sent to Dr. Sanchez's office to schedule follow up appt for patient. Scheduled 12/23/24 at 3:45pm in [place].      Mireya FARMERN RN  [facility] Clinical Nurse Navigator, [facility]  [phone]

## 2024-12-10 LAB
ANION GAP SERPL CALCULATED.3IONS-SCNC: 8 MMOL/L (ref 10–20)
BUN SERPL-MCNC: 21 MG/DL (ref 6–23)
CALCIUM SERPL-MCNC: 9.1 MG/DL (ref 8.6–10.3)
CHLORIDE SERPL-SCNC: 95 MMOL/L (ref 98–107)
CO2 SERPL-SCNC: 42 MMOL/L (ref 21–32)
CREAT SERPL-MCNC: 0.68 MG/DL (ref 0.5–1.05)
EGFRCR SERPLBLD CKD-EPI 2021: >90 ML/MIN/1.73M*2
ERYTHROCYTE [DISTWIDTH] IN BLOOD BY AUTOMATED COUNT: 15.7 % (ref 11.5–14.5)
GLUCOSE SERPL-MCNC: 108 MG/DL (ref 74–99)
HCT VFR BLD AUTO: 39.2 % (ref 36–46)
HGB BLD-MCNC: 11.9 G/DL (ref 12–16)
MAGNESIUM SERPL-MCNC: 2.01 MG/DL (ref 1.6–2.4)
MCH RBC QN AUTO: 29.7 PG (ref 26–34)
MCHC RBC AUTO-ENTMCNC: 30.4 G/DL (ref 32–36)
MCV RBC AUTO: 98 FL (ref 80–100)
NRBC BLD-RTO: 0 /100 WBCS (ref 0–0)
PLATELET # BLD AUTO: 251 X10*3/UL (ref 150–450)
POTASSIUM SERPL-SCNC: 3.5 MMOL/L (ref 3.5–5.3)
RBC # BLD AUTO: 4.01 X10*6/UL (ref 4–5.2)
SODIUM SERPL-SCNC: 141 MMOL/L (ref 136–145)
WBC # BLD AUTO: 8.5 X10*3/UL (ref 4.4–11.3)

## 2024-12-10 PROCEDURE — 2500000002 HC RX 250 W HCPCS SELF ADMINISTERED DRUGS (ALT 637 FOR MEDICARE OP, ALT 636 FOR OP/ED): Performed by: NURSE PRACTITIONER

## 2024-12-10 PROCEDURE — 80048 BASIC METABOLIC PNL TOTAL CA: CPT | Performed by: NURSE PRACTITIONER

## 2024-12-10 PROCEDURE — 83735 ASSAY OF MAGNESIUM: CPT | Performed by: NURSE PRACTITIONER

## 2024-12-10 PROCEDURE — 36415 COLL VENOUS BLD VENIPUNCTURE: CPT | Performed by: NURSE PRACTITIONER

## 2024-12-10 PROCEDURE — 2500000001 HC RX 250 WO HCPCS SELF ADMINISTERED DRUGS (ALT 637 FOR MEDICARE OP): Performed by: INTERNAL MEDICINE

## 2024-12-10 PROCEDURE — 2500000004 HC RX 250 GENERAL PHARMACY W/ HCPCS (ALT 636 FOR OP/ED): Mod: JZ | Performed by: NURSE PRACTITIONER

## 2024-12-10 PROCEDURE — 94640 AIRWAY INHALATION TREATMENT: CPT

## 2024-12-10 PROCEDURE — 99233 SBSQ HOSP IP/OBS HIGH 50: CPT | Performed by: NURSE PRACTITIONER

## 2024-12-10 PROCEDURE — 2500000002 HC RX 250 W HCPCS SELF ADMINISTERED DRUGS (ALT 637 FOR MEDICARE OP, ALT 636 FOR OP/ED): Performed by: REGISTERED NURSE

## 2024-12-10 PROCEDURE — 97535 SELF CARE MNGMENT TRAINING: CPT | Mod: GO

## 2024-12-10 PROCEDURE — 99232 SBSQ HOSP IP/OBS MODERATE 35: CPT | Performed by: NURSE PRACTITIONER

## 2024-12-10 PROCEDURE — 97166 OT EVAL MOD COMPLEX 45 MIN: CPT | Mod: GO

## 2024-12-10 PROCEDURE — 85027 COMPLETE CBC AUTOMATED: CPT | Performed by: NURSE PRACTITIONER

## 2024-12-10 PROCEDURE — 1200000002 HC GENERAL ROOM WITH TELEMETRY DAILY

## 2024-12-10 PROCEDURE — 2500000001 HC RX 250 WO HCPCS SELF ADMINISTERED DRUGS (ALT 637 FOR MEDICARE OP): Performed by: NURSE PRACTITIONER

## 2024-12-10 PROCEDURE — 97116 GAIT TRAINING THERAPY: CPT | Mod: GP

## 2024-12-10 PROCEDURE — 97161 PT EVAL LOW COMPLEX 20 MIN: CPT | Mod: GP

## 2024-12-10 PROCEDURE — 2500000004 HC RX 250 GENERAL PHARMACY W/ HCPCS (ALT 636 FOR OP/ED): Performed by: NURSE PRACTITIONER

## 2024-12-10 PROCEDURE — 2500000005 HC RX 250 GENERAL PHARMACY W/O HCPCS: Performed by: NURSE PRACTITIONER

## 2024-12-10 RX ORDER — IPRATROPIUM BROMIDE AND ALBUTEROL SULFATE 2.5; .5 MG/3ML; MG/3ML
3 SOLUTION RESPIRATORY (INHALATION) EVERY 6 HOURS PRN
Status: DISCONTINUED | OUTPATIENT
Start: 2024-12-10 | End: 2024-12-17 | Stop reason: HOSPADM

## 2024-12-10 RX ADMIN — FUROSEMIDE 80 MG: 10 INJECTION, SOLUTION INTRAMUSCULAR; INTRAVENOUS at 08:40

## 2024-12-10 RX ADMIN — CEFAZOLIN SODIUM 2 G: 2 INJECTION, SOLUTION INTRAVENOUS at 05:43

## 2024-12-10 RX ADMIN — Medication 3 L/MIN: at 20:37

## 2024-12-10 RX ADMIN — IPRATROPIUM BROMIDE AND ALBUTEROL SULFATE 3 ML: 2.5; .5 SOLUTION RESPIRATORY (INHALATION) at 21:22

## 2024-12-10 RX ADMIN — FLUTICASONE FUROATE AND VILANTEROL TRIFENATATE 1 PUFF: 200; 25 POWDER RESPIRATORY (INHALATION) at 07:46

## 2024-12-10 RX ADMIN — DOFETILIDE 250 MCG: 0.25 CAPSULE ORAL at 20:38

## 2024-12-10 RX ADMIN — ALLOPURINOL 200 MG: 100 TABLET ORAL at 20:38

## 2024-12-10 RX ADMIN — Medication 2 L/MIN: at 07:48

## 2024-12-10 RX ADMIN — CEFAZOLIN SODIUM 2 G: 2 INJECTION, SOLUTION INTRAVENOUS at 14:13

## 2024-12-10 RX ADMIN — POTASSIUM CHLORIDE 40 MEQ: 1500 TABLET, EXTENDED RELEASE ORAL at 08:40

## 2024-12-10 RX ADMIN — ATORVASTATIN CALCIUM 40 MG: 40 TABLET, FILM COATED ORAL at 20:38

## 2024-12-10 RX ADMIN — APIXABAN 5 MG: 5 TABLET, FILM COATED ORAL at 20:38

## 2024-12-10 RX ADMIN — ALLOPURINOL 200 MG: 100 TABLET ORAL at 08:40

## 2024-12-10 RX ADMIN — DOFETILIDE 250 MCG: 0.25 CAPSULE ORAL at 08:40

## 2024-12-10 RX ADMIN — BUPROPION HYDROCHLORIDE 300 MG: 300 TABLET, EXTENDED RELEASE ORAL at 05:43

## 2024-12-10 RX ADMIN — FUROSEMIDE 80 MG: 10 INJECTION, SOLUTION INTRAMUSCULAR; INTRAVENOUS at 14:13

## 2024-12-10 RX ADMIN — CEFAZOLIN SODIUM 2 G: 2 INJECTION, SOLUTION INTRAVENOUS at 20:38

## 2024-12-10 RX ADMIN — APIXABAN 5 MG: 5 TABLET, FILM COATED ORAL at 08:40

## 2024-12-10 RX ADMIN — METOPROLOL SUCCINATE 25 MG: 25 TABLET, EXTENDED RELEASE ORAL at 08:40

## 2024-12-10 RX ADMIN — ACETAMINOPHEN 650 MG: 325 TABLET ORAL at 02:08

## 2024-12-10 ASSESSMENT — COGNITIVE AND FUNCTIONAL STATUS - GENERAL
DRESSING REGULAR LOWER BODY CLOTHING: A LITTLE
DAILY ACTIVITIY SCORE: 19
DRESSING REGULAR UPPER BODY CLOTHING: A LITTLE
MOVING FROM LYING ON BACK TO SITTING ON SIDE OF FLAT BED WITH BEDRAILS: A LITTLE
TURNING FROM BACK TO SIDE WHILE IN FLAT BAD: A LITTLE
MOVING TO AND FROM BED TO CHAIR: A LITTLE
MOVING TO AND FROM BED TO CHAIR: A LITTLE
DRESSING REGULAR UPPER BODY CLOTHING: A LITTLE
CLIMB 3 TO 5 STEPS WITH RAILING: A LITTLE
TURNING FROM BACK TO SIDE WHILE IN FLAT BAD: A LITTLE
HELP NEEDED FOR BATHING: A LITTLE
WALKING IN HOSPITAL ROOM: A LITTLE
DRESSING REGULAR UPPER BODY CLOTHING: A LITTLE
DRESSING REGULAR LOWER BODY CLOTHING: A LITTLE
MOBILITY SCORE: 19
TOILETING: A LITTLE
STANDING UP FROM CHAIR USING ARMS: A LITTLE
DAILY ACTIVITIY SCORE: 20
MOBILITY SCORE: 17
STANDING UP FROM CHAIR USING ARMS: A LITTLE
WALKING IN HOSPITAL ROOM: A LITTLE
TOILETING: A LITTLE
STANDING UP FROM CHAIR USING ARMS: A LITTLE
MOBILITY SCORE: 18
TURNING FROM BACK TO SIDE WHILE IN FLAT BAD: A LITTLE
DAILY ACTIVITIY SCORE: 19
CLIMB 3 TO 5 STEPS WITH RAILING: A LOT
WALKING IN HOSPITAL ROOM: A LITTLE
HELP NEEDED FOR BATHING: A LOT
MOVING TO AND FROM BED TO CHAIR: A LITTLE
DRESSING REGULAR LOWER BODY CLOTHING: A LOT
TOILETING: A LITTLE
CLIMB 3 TO 5 STEPS WITH RAILING: A LOT
HELP NEEDED FOR BATHING: A LITTLE

## 2024-12-10 ASSESSMENT — PAIN SCALES - GENERAL
PAINLEVEL_OUTOF10: 0 - NO PAIN
PAINLEVEL_OUTOF10: 0 - NO PAIN
PAINLEVEL_OUTOF10: 9
PAINLEVEL_OUTOF10: 2
PAINLEVEL_OUTOF10: 0 - NO PAIN
PAINLEVEL_OUTOF10: 0 - NO PAIN

## 2024-12-10 ASSESSMENT — PAIN DESCRIPTION - LOCATION: LOCATION: HEAD

## 2024-12-10 ASSESSMENT — ACTIVITIES OF DAILY LIVING (ADL)
ADL_ASSISTANCE: INDEPENDENT
HOME_MANAGEMENT_TIME_ENTRY: 15
BATHING_ASSISTANCE: MODERATE

## 2024-12-10 ASSESSMENT — PAIN - FUNCTIONAL ASSESSMENT
PAIN_FUNCTIONAL_ASSESSMENT: 0-10
PAIN_FUNCTIONAL_ASSESSMENT: FLACC (FACE, LEGS, ACTIVITY, CRY, CONSOLABILITY)
PAIN_FUNCTIONAL_ASSESSMENT: FLACC (FACE, LEGS, ACTIVITY, CRY, CONSOLABILITY)
PAIN_FUNCTIONAL_ASSESSMENT: 0-10

## 2024-12-10 NOTE — PROGRESS NOTES
Vickie Foster is a 68 y.o. female on day 2 of admission presenting with CHF (congestive heart failure), NYHA class I, acute on chronic, combined.      Subjective   Patient examined sitting up in chair.  States that her breathing is much better today and that the swelling in her legs has improved.       Objective     Last Recorded Vitals  /60 (BP Location: Left arm, Patient Position: Sitting)   Pulse 83   Temp 36.4 °C (97.5 °F) (Oral)   Resp 16   Wt 105 kg (231 lb)   SpO2 93%   Intake/Output last 3 Shifts:    Intake/Output Summary (Last 24 hours) at 12/10/2024 0921  Last data filed at 12/9/2024 2339  Gross per 24 hour   Intake 200 ml   Output 2100 ml   Net -1900 ml       Admission Weight  Weight: 105 kg (231 lb) (12/08/24 1818)    Daily Weight  12/08/24 : 105 kg (231 lb)          Physical Exam  Constitutional: No acute distress, calm, cooperative  Cardiovascular: Regular rhythm and rate,   Respiratory: Lungs clear to auscultation, diminished at bases  Gastrointestinal: Bowel sounds positive x 4, soft, nontender  Neurologic: Alert and oriented x 3 equal strength bilaterally  Musculoskeletal: Able to move all extremities, 3+ pitting edema  Skin: Warm, dry and intact               Assessment/Plan                  Assessment & Plan  Acute on chronic respiratory failure with hypoxia and hypercapnia (Multi)  Currently on 3 L O2  Has been on oxygen during the day at home which is new for her, she usually only uses it at night  Wean as able  CHF (congestive heart failure), NYHA class I, acute on chronic, combined  Lasix 40 mg IV twice daily  Low-salt diet  1600 cc fluid restriction  Cardiology following  Cellulitis  Right lower extremity markedly more erythematous  Ultrasound negative for DVT  Blood cultures pending  Continue cefazolin  Infectious diseases following  Atrial fib/flutter, transient (Multi)  Continue metoprolol, Eliquis, and dofetilide.  COPD (chronic obstructive pulmonary disease) (Multi)  Do not  feel that she is having a current exacerbation  Continue home maintenance inhalers.  Obesity  Major contribution  Disposition  Pending cardiology and infectious diseases recommendations  Most likely discharge home with home health care  Care coordinators are following                KEVON Hawk-CNP

## 2024-12-10 NOTE — CARE PLAN
The patient's goals for the shift include      The clinical goals for the shift include vitals will remain stable overnight and no falls.      Problem: Pain - Adult  Goal: Verbalizes/displays adequate comfort level or baseline comfort level  Outcome: Progressing     Problem: Safety - Adult  Goal: Free from fall injury  Outcome: Progressing     Problem: Discharge Planning  Goal: Discharge to home or other facility with appropriate resources  Outcome: Progressing     Problem: Chronic Conditions and Co-morbidities  Goal: Patient's chronic conditions and co-morbidity symptoms are monitored and maintained or improved  Outcome: Progressing     Problem: Fall/Injury  Goal: Verbalize understanding of personal risk factors for fall in the hospital  Outcome: Progressing  Goal: Verbalize understanding of risk factor reduction measures to prevent injury from fall in the home  Outcome: Progressing     Problem: Respiratory  Goal: Wean oxygen to maintain O2 saturation per order/standard this shift  Outcome: Progressing  Goal: Increase self care and/or family involvement in next 24 hours  Outcome: Progressing  Goal: Minimal/no exertional discomfort or dyspnea this shift  Outcome: Progressing  Goal: No signs of respiratory distress (eg. Use of accessory muscles. Peds grunting)  Outcome: Progressing     Problem: Nutrition  Goal: Oral intake greater 75%  Outcome: Progressing  Goal: Reduce weight from edema/fluid  Outcome: Progressing

## 2024-12-10 NOTE — CARE PLAN
Problem: Bathing  Goal: LTG - Patient will utilize adaptive techniques to bathe body independent with AE as needed  Outcome: Progressing     Problem: Dressings Lower Extremities  Goal: LTG - Patient will dress lower body independent with set up  Outcome: Progressing     Problem: Functional Mobility  Goal: Perform functional mobility a household distance with 2ww independent  Outcome: Progressing     Problem: Toileting  Goal: STG - Patient will complete toileting tasks with 2ww independent  Outcome: Progressing

## 2024-12-10 NOTE — PROGRESS NOTES
12/10/24 1430   Discharge Planning   Expected Discharge Disposition Home H     T[facility] spoke to pt regarding therapy recommendations for SNF. Patient refusing at this time would like to discharge home with HHC. List provided

## 2024-12-10 NOTE — CARE PLAN
Problem: Mobility  Goal: Ambulate 60 feet with rolling walker and supervision.  Outcome: Progressing     Problem: PT Transfers  Goal: Transfers including sit to stand and stand to sit modified independent.  Outcome: Progressing

## 2024-12-10 NOTE — PROGRESS NOTES
Spiritual Care Visit    Clinical Encounter Type  Visited With: Patient  Routine Visit: Follow-up  Continue Visiting: Yes         Values/Beliefs  Spiritual Requests During Hospitalization: Vickie asked for Communion today.    Sacramental Encounters  Communion: Patient wants communion  Communion Given Indicator: Yes     Uziel Ambriz

## 2024-12-10 NOTE — PROGRESS NOTES
Physical Therapy    Physical Therapy Evaluation & Treatment    Patient Name: Vickie Foster  MRN: [id number]  Department: [address]  Room: 429/429-B  Today's Date: 12/10/2024   Time Calculation  Start Time: 1022  Stop Time: 1046  Time Calculation (min): 24 min    Assessment/Plan   PT Assessment  PT Assessment Results: Decreased strength, Decreased endurance, Impaired balance, Decreased mobility, Decreased safety awareness  Rehab Prognosis: Good  Evaluation/Treatment Tolerance: Patient limited by fatigue  End of Session Communication: Bedside nurse  Assessment Comment: 68 year old female presents with decline from baseline functional mobility, impaired balance, and decreased tolerance to activity;  patient would benefit from skilled physical therapy services to maximize functional mobility to modified independent levels.  End of Session Patient Position: Up in chair, Alarm off, not on at start of session   IP OR SWING BED PT PLAN  Inpatient or Swing Bed: Inpatient  PT Plan  Treatment/Interventions: Bed mobility, Transfer training, Gait training, Balance training, Stair training, Strengthening, Endurance training, Therapeutic exercise, Therapeutic activity  PT Plan: Ongoing PT  PT Frequency: 4 times per week  PT Discharge Recommendations: Moderate intensity level of continued care  PT Recommended Transfer Status: Assist x1  PT - OK to Discharge: Yes (with skilled physical therapy services at next level of care)      Subjective     General Visit Information:  General  Reason for Referral: Impaired mobility with CHF  Past Medical History Relevant to Rehab: AFIB, CHF, COVID, HTN, heart disease, LBP, cardioversion, ablation  Patient Position Received: Up in chair, Alarm off, not on at start of session  General Comment: 68 year old female admit from home with worsening LE edema.  Home Living:  Home Living  Type of Home: House  Lives With: Spouse  Home Adaptive Equipment: Walker rolling or standard  Home Layout: Two  level  Alternate Level Stairs-Rails:  (Unilateral)  Alternate Level Stairs-Number of Steps: 5+8  Home Access: Stairs to enter without rails  Entrance Stairs-Rails: None  Entrance Stairs-Number of Steps: 1  Bathroom Shower/Tub: Tub/shower unit  Prior Level of Function:  Prior Function Per Pt/Caregiver Report  Ambulatory Assistance:  (modified independent with rolling walker)  Precautions:  Precautions  Medical Precautions: Fall precautions             Objective   Pain:  Pain Assessment  Pain Assessment: FLACC (Face, Legs, Activity, Cry, Consolability)  0-10 (Numeric) Pain Score: 2  Pain Type: Chronic pain  Pain Location: Back  Cognition:  Cognition  Overall Cognitive Status: Within Functional Limits    General Assessments:                  Activity Tolerance  Endurance: Decreased tolerance for upright activites  Activity Tolerance Comments: Fatigue    Sensation  Sensation Comment: Christine villafana jett LE    Strength  Strength Comments: Jett LE strength assessed via function  Coordination  Movements are Fluid and Coordinated: No  Lower Body Coordination: Slower rate of movement jett LE         Static Standing Balance  Static Standing-Balance Support: Bilateral upper extremity supported  Static Standing-Level of Assistance: Minimum assistance  Static Standing-Comment/Number of Minutes: Assist with trunk support and balance during static standing with rolling walker  Functional Assessments:  Bed Mobility  Bed Mobility: No    Transfers  Transfer: Yes  Transfer 1  Transfer From 1: Sit to  Transfer to 1: Stand  Technique 1: Sit to stand  Transfer Device 1: Walker  Transfer Level of Assistance 1: Minimum assistance  Trials/Comments 1: Assist with trunk support and balance;  verbal cues for hand placement  Transfers 2  Transfer From 2: Stand to  Transfer to 2: Sit  Technique 2: Stand to sit  Transfer Device 2: Walker  Transfer Level of Assistance 2: Close supervision  Trials/Comments 2: Supervision with balance;  verbal cues  for hand placement    Ambulation/Gait Training  Ambulation/Gait Training Performed: Yes  Ambulation/Gait Training 1  Surface 1: Level tile  Device 1: Rolling walker  Assistance 1: Minimum assistance  Comments/Distance (ft) 1: 12 feet x 2 with rolling walker and assist with balance;  verbal cues for posture and walker distancing;  patient ambulates with slow una, non-reciprocating gait pattern, decreased step length rakel LE, and forward flexed posture.    Stairs  Stairs: No  Extremity/Trunk Assessments:  RLE   RLE : Exceptions to WFL  Strength RLE  R Hip Flexion: 3/5  R Knee Extension: 3+/5  R Ankle Dorsiflexion: 3+/5  LLE   LLE : Exceptions to WFL  Strength LLE  L Hip Flexion: 3/5  L Knee Extension: 3+/5  L Ankle Dorsiflexion: 3+/5  Treatments:                 Bed Mobility  Bed Mobility: No    Ambulation/Gait Training  Ambulation/Gait Training Performed: Yes  Ambulation/Gait Training 1  Surface 1: Level tile  Device 1: Rolling walker  Assistance 1: Minimum assistance  Comments/Distance (ft) 1: 12 feet x 2 with rolling walker and assist with balance;  verbal cues for posture and walker distancing;  patient ambulates with slow una, non-reciprocating gait pattern, decreased step length rakel LE, and forward flexed posture.  Transfers  Transfer: Yes  Transfer 1  Transfer From 1: Sit to  Transfer to 1: Stand  Technique 1: Sit to stand  Transfer Device 1: Walker  Transfer Level of Assistance 1: Minimum assistance  Trials/Comments 1: Assist with trunk support and balance;  verbal cues for hand placement  Transfers 2  Transfer From 2: Stand to  Transfer to 2: Sit  Technique 2: Stand to sit  Transfer Device 2: Walker  Transfer Level of Assistance 2: Close supervision  Trials/Comments 2: Supervision with balance;  verbal cues for hand placement    Stairs  Stairs: No       Outcome Measures:  [facility] Basic Mobility  Turning from your back to your side while in a flat bed without using bedrails: A little  Moving from lying on  your back to sitting on the side of a flat bed without using bedrails: A little  Moving to and from bed to chair (including a wheelchair): A little  Standing up from a chair using your arms (e.g. wheelchair or bedside chair): A little  To walk in hospital room: A little  Climbing 3-5 steps with railing: A lot  Basic Mobility - Total Score: 17    Encounter Problems       Encounter Problems (Active)       Mobility       Bed mobility including supine to sit and sit to supine independently.       Start:  12/10/24    Expected End:  12/24/24            Ambulate 60 feet with rolling walker and supervision. (Progressing)       Start:  12/10/24    Expected End:  12/24/24            Negotiate 1 curb step with rolling walker and mod assist.       Start:  12/10/24    Expected End:  12/24/24               PT Transfers       Transfers including sit to stand and stand to sit modified independent. (Progressing)       Start:  12/10/24    Expected End:  12/24/24               Pain - Adult              Education Documentation  Mobility Training, taught by Clif Oliveira, PT at 12/10/2024 11:15 AM.  Learner: Patient  Readiness: Acceptance  Method: Explanation, Demonstration  Response: Needs Reinforcement    Education Comments  No comments found.

## 2024-12-10 NOTE — ASSESSMENT & PLAN NOTE
Right lower extremity markedly more erythematous  Ultrasound negative for DVT  Blood cultures pending  Continue cefazolin  Infectious diseases following

## 2024-12-10 NOTE — PROGRESS NOTES
Occupational Therapy    Evaluation/Treatment    Patient Name: Vickie Foster  MRN: [id number]  Department: [address]  Room: 429/429-B  Today's Date: 12/10/24  Time Calculation  Start Time: 1445  Stop Time: 1515  Time Calculation (min): 30 min       Assessment:  OT Assessment: Referral received, chart reviewed, and evaluation complete. Presents with LB edema, weakness, decreased aerobic capacity, and mildly impaired balance.  Would benefit from acute OT services.  Recommend low intensity rehab upon discharge  Prognosis: Good  Barriers to Discharge: None  Evaluation/Treatment Tolerance: Patient tolerated treatment well  Medical Staff Made Aware: Yes  End of Session Communication: Bedside nurse  End of Session Patient Position: Up in chair, Alarm off, not on at start of session  Prognosis: Good  Barriers to Discharge: None  Evaluation/Treatment Tolerance: Patient tolerated treatment well  Medical Staff Made Aware: Yes  Plan:  Treatment Interventions: ADL retraining, Functional transfer training, Endurance training, Patient/family training, Neuromuscular reeducation, Compensatory technique education  OT Frequency: 4 times per week  OT Discharge Recommendations: Low intensity level of continued care  OT Recommended Transfer Status: Assist of 1  OT - OK to Discharge: Yes  Treatment Interventions: ADL retraining, Functional transfer training, Endurance training, Patient/family training, Neuromuscular reeducation, Compensatory technique education    Subjective   Current Problem:  1. CHF (congestive heart failure), NYHA class I, acute on chronic, combined        2. Acute on chronic congestive heart failure, unspecified heart failure type        3. Bilateral lower extremity edema        4. Cellulitis of right lower leg        5. Cellulitis of lower extremity, unspecified laterality  Vascular US lower extremity venous duplex bilateral    Vascular US lower extremity venous duplex bilateral      6. Edema leg  Vascular US lower  extremity venous duplex bilateral    Vascular US lower extremity venous duplex bilateral        General:   OT Received On: 12/10/24  General  Reason for Referral: decreased functional status  Referred By: Clif Arora MD  Past Medical History Relevant to Rehab: AFIB, CHF, COVID, HTN, heart disease, LBP, cardioversion, ablation  Family/Caregiver Present: No  Prior to Session Communication: Bedside nurse  Patient Position Received: Up in chair, Alarm off, not on at start of session  General Comment: 68 year old WF admitted with c/o exacerbation of CHF with BLE leg swelling   Precautions:  Hearing/Visual Limitations: Hearing WFL, wears glasses  Medical Precautions: Fall precautions, Oxygen therapy device and L/min    Vital Sign (Past 2hrs)        Date/Time Vitals Session Patient Position Pulse Resp SpO2 BP MAP (mmHg)    12/10/24 1537 --  --  77  18  100 %  129/77  86                         Pain:  Pain Assessment  Pain Assessment: 0-10  0-10 (Numeric) Pain Score: 0 - No pain    Objective   Cognition:  Overall Cognitive Status: Within Functional Limits  Orientation Level: Oriented X4           Home Living:  Type of Home: House  Lives With: Spouse  Home Adaptive Equipment: Walker rolling or standard  Home Layout: Two level  Home Access: Stairs to enter with rails  Entrance Stairs-Rails: None  Entrance Stairs-Number of Steps: 1  Bathroom Shower/Tub: Tub/shower unit  Bathroom Toilet: Standard  Bathroom Equipment: None  Prior Function:  Level of [place]: Independent with ADLs and functional transfers  Receives Help From: Family  ADL Assistance: Independent  Homemaking Assistance: Needs assistance  Meal Prep: Independent  Vocational: Retired  Hand Dominance: Right  IADL History:  Homemaking Responsibilities: No  ADL:  Eating Assistance: Independent  Grooming Assistance: Minimal  Bathing Assistance: Moderate  UE Dressing Assistance: Minimal  LE Dressing Assistance: Minimal  Toileting Assistance with Device:  Minimal  Activities of Daily Living: LE Dressing  LE Dressing: Yes  Pants Level of Assistance: Minimum assistance  LE Dressing Where Assessed: Chair  LE Dressing Comments: donned pull up brief  Activity Tolerance:  Endurance: Decreased tolerance for upright activites  Functional Standing Tolerance:     Bed Mobility/Transfers: Bed Mobility  Bed Mobility: No    Transfers  Transfer: Yes  Transfer 1  Transfer From 1: Chair with arms to  Transfer to 1: Stand  Technique 1: Sit to stand  Transfer Device 1: Walker  Transfer Level of Assistance 1: Minimum assistance  Transfers 2  Transfer From 2: Stand to  Transfer to 2: Sit  Technique 2: Stand to sit  Transfer Device 2: Walker  Transfer Level of Assistance 2: Contact guard    Sitting Balance:  Static Sitting Balance  Static Sitting-Balance Support: Feet supported  Static Sitting-Level of Assistance: Independent  Standing Balance:  Static Standing Balance  Static Standing-Balance Support: Bilateral upper extremity supported  Static Standing-Level of Assistance: Contact guard        Sensation:  Light Touch: No apparent deficits  Strength:  Strength Comments: BUE 3+/5  Hand Function:  Hand Function  Gross Grasp: Functional  Coordination: Impaired  Extremities: BLE edema      Outcome Measures: [facility] Daily Activity  Putting on and taking off regular lower body clothing: A little  Bathing (including washing, rinsing, drying): A lot  Putting on and taking off regular upper body clothing: A little  Toileting, which includes using toilet, bedpan or urinal: A little  Taking care of personal grooming such as brushing teeth: None  Eating Meals: None  Daily Activity - Total Score: 19            Goals:         Problem: Bathing  Goal: LTG - Patient will utilize adaptive techniques to bathe body independent with AE as needed  Outcome: Progressing     Problem: Dressings Lower Extremities  Goal: LTG - Patient will dress lower body independent with set up  Outcome: Progressing     Problem:  Functional Mobility  Goal: Perform functional mobility a household distance with 2ww independent  Outcome: Progressing     Problem: Toileting  Goal: STG - Patient will complete toileting tasks with 2ww independent  Outcome: Progressing

## 2024-12-10 NOTE — ASSESSMENT & PLAN NOTE
Major contribution  Disposition  Pending cardiology and infectious diseases recommendations  Most likely discharge home with home health care  Care coordinators are following

## 2024-12-10 NOTE — PROGRESS NOTES
Vickie Foster is a 68 y.o. female on day 2 of admission presenting with CHF (congestive heart failure), NYHA class I, acute on chronic, combined.    Subjective   Otherwise x 3.  Denies complaints chest pain or pressure palpitations or feeling of rapid heart rate.  Currently sitting up in chair at bedside.  Peripheral edema improving.       Objective     Physical Exam  Vitals and nursing note reviewed.   Constitutional:       General: She is not in acute distress.     Appearance: She is obese. She is ill-appearing. She is not toxic-appearing.   HENT:      Head: Normocephalic and atraumatic.      Nose: Nose normal.      Mouth/Throat:      Mouth: Mucous membranes are moist.      Pharynx: Oropharynx is clear.   Cardiovascular:      Rate and Rhythm: Normal rate. Rhythm irregular.      Pulses: Normal pulses.      Heart sounds: Normal heart sounds. No murmur heard.     No friction rub. No gallop.   Pulmonary:      Effort: Pulmonary effort is normal.      Comments: Diminished breath sounds in bilateral bases.  Oxygen nasal cannula.  No significant conversational dyspnea appreciated  Abdominal:      General: Bowel sounds are normal.      Palpations: Abdomen is soft.   Musculoskeletal:         General: Normal range of motion.      Cervical back: Normal range of motion.      Right lower leg: Edema present.      Left lower leg: Edema present.      Comments: +3 bilateral extremity pitting edema.  No weeping today.  Cellulitis to right lower extremity appears to be improving.   Skin:     General: Skin is warm and dry.      Capillary Refill: Capillary refill takes less than 2 seconds.   Neurological:      Mental Status: She is alert and oriented to person, place, and time. Mental status is at baseline.   Psychiatric:         Mood and Affect: Mood normal.         Behavior: Behavior normal.         Thought Content: Thought content normal.         Judgment: Judgment normal.         Last Recorded Vitals  Blood pressure 112/60, pulse  "83, temperature 36.4 °C (97.5 °F), temperature source Oral, resp. rate 16, height 1.575 m (5' 2.01\"), weight 105 kg (231 lb), SpO2 93%.  Intake/Output last 3 Shifts:  I/O last 3 completed shifts:  In: 540 (5.2 mL/kg) [P.O.:240; IV Piggyback:300]  Out: 2900 (27.7 mL/kg) [Urine:2900 (0.8 mL/kg/hr)]  Weight: 104.8 kg     Relevant Results  Results for orders placed or performed during the hospital encounter of 12/08/24 (from the past 24 hours)   CBC   Result Value Ref Range    WBC 8.5 4.4 - 11.3 x10*3/uL    nRBC 0.0 0.0 - 0.0 /100 WBCs    RBC 4.01 4.00 - 5.20 x10*6/uL    Hemoglobin 11.9 (L) 12.0 - 16.0 g/dL    Hematocrit 39.2 36.0 - 46.0 %    MCV 98 80 - 100 fL    MCH 29.7 26.0 - 34.0 pg    MCHC 30.4 (L) 32.0 - 36.0 g/dL    RDW 15.7 (H) 11.5 - 14.5 %    Platelets 251 150 - 450 x10*3/uL   Basic Metabolic Panel   Result Value Ref Range    Glucose 108 (H) 74 - 99 mg/dL    Sodium 141 136 - 145 mmol/L    Potassium 3.5 3.5 - 5.3 mmol/L    Chloride 95 (L) 98 - 107 mmol/L    Bicarbonate 42 (HH) 21 - 32 mmol/L    Anion Gap 8 (L) 10 - 20 mmol/L    Urea Nitrogen 21 6 - 23 mg/dL    Creatinine 0.68 0.50 - 1.05 mg/dL    eGFR >90 >60 mL/min/1.73m*2    Calcium 9.1 8.6 - 10.3 mg/dL   Magnesium   Result Value Ref Range    Magnesium 2.01 1.60 - 2.40 mg/dL     *Note: Due to a large number of results and/or encounters for the requested time period, some results have not been displayed. A complete set of results can be found in Results Review.           Assessment/Plan   Assessment & Plan  CHF (congestive heart failure), NYHA class I, acute on chronic, combined    Acute on chronic respiratory failure with hypoxia and hypercapnia (Multi)    Atrial fib/flutter, transient (Multi)    Cellulitis    COPD (chronic obstructive pulmonary disease) (Multi)    Obesity      Acute on chronic diastolic heart failure  Lymphedema  Cellulitis bilateral lower extremities  Paroxysmal atrial fibrillation  Hypertensive disorder  Hyperlipidemia  Morbid " obesity  History of stroke  Chronic respiratory failure with chronic O2     Overall impression:     12/9: As above, presents predominately for worsening peripheral edema for 3 weeks and worsening shortness of breath over the past week.  She has had multiple recurrences over the past year for worsening peripheral edema.  I am now suspecting that she has a component of lymphedema given her obesity.  Additionally her recurrent echocardiogram showed preserved ejection fraction with a minimal diastolic dysfunction.  She does have a chronic O2 requirement and a chest x-ray reveals mild fluid volume overload with a low BNP of 148.  Her troponins are flat negative.  Certainly diuresis is the answer to the patient's problem currently.  She states compliance with outpatient medications.  In the outpatient setting she takes torsemide 50 mg twice daily.  She has been initiated on IV furosemide.  Will increase this.  Creatinine stable at 0.65.  Will utilize IV furosemide 80 mg twice daily.  Given the level of diuresis needed as well as a current potassium 3.1 with repletion given yesterday, will order for 40 mill colons potassium twice today and 1 time tomorrow.  Will check potassium level in the morning.  Echo from previous hospitalization is adequate for this hospitalization.  Concerning her atrial fibrillation surprisingly her EKG did show a rate controlled atrial flutter on admission.  On telemetry now she is currently in a sinus rhythm.  Will continue to monitor.  She does continue on apixaban for stroke risk reduction.  Hemoglobin stable at 12.6.  Overall stable from a respiratory perspective but is significantly fluid overloaded likely require at least 2 to 3 days inpatient hospitalization to achieve a euvolemic state.  Have discussed thoroughly with the patient the importance of reducing her sodium intake and weighing herself daily.  Have consulted cardiac rehab.  Will follow with you.    12/10: Improved over the past 24  hours.  Continues on IV diuresis.  Diuresed approximately 1650 mL over the past 24 hours.  Patient's bicarb was noted to have risen yesterday to 43.  Has minimally improved today to 42.  Will continue to monitor this, if the patient's bicarb rises further we will likely reduce the IV diuretic dosing, however would like to remain aggressive given the degree of fluid volume overload.  Her lungs are diminished but clear otherwise.  Breathing has improved.  At baseline per patient.  Her peripheral edema is now a +3 with no weeping and the cellulitis to her right lower extremity appears to be improving.  She is chest pain-free.  Her blood pressure is stable most recent 112/60.  Creatinine 0.68 with a hemoglobin 11.9.  On telemetry monitor she remained in a rate controlled atrial fibrillation without significant ectopy.  Dissipated further 3 to 4 days hospitalization to achieve a euvolemic state.  Will follow with you.    I spent 35 minutes in the professional and overall care of this patient.      Jaciel Ramirez, APRN-CNP

## 2024-12-11 ENCOUNTER — TELEPHONE (OUTPATIENT)
Dept: PRIMARY CARE | Facility: CLINIC | Age: 69
End: 2024-12-11
Payer: MEDICARE

## 2024-12-11 LAB
ANION GAP SERPL CALCULATED.3IONS-SCNC: 9 MMOL/L (ref 10–20)
BUN SERPL-MCNC: 22 MG/DL (ref 6–23)
CALCIUM SERPL-MCNC: 9 MG/DL (ref 8.6–10.3)
CHLORIDE SERPL-SCNC: 93 MMOL/L (ref 98–107)
CO2 SERPL-SCNC: 42 MMOL/L (ref 21–32)
CREAT SERPL-MCNC: 0.6 MG/DL (ref 0.5–1.05)
EGFRCR SERPLBLD CKD-EPI 2021: >90 ML/MIN/1.73M*2
ERYTHROCYTE [DISTWIDTH] IN BLOOD BY AUTOMATED COUNT: 15.5 % (ref 11.5–14.5)
GLUCOSE SERPL-MCNC: 93 MG/DL (ref 74–99)
HCT VFR BLD AUTO: 34.2 % (ref 36–46)
HGB BLD-MCNC: 11.2 G/DL (ref 12–16)
MCH RBC QN AUTO: 29.6 PG (ref 26–34)
MCHC RBC AUTO-ENTMCNC: 32.7 G/DL (ref 32–36)
MCV RBC AUTO: 91 FL (ref 80–100)
NRBC BLD-RTO: 0 /100 WBCS (ref 0–0)
PLATELET # BLD AUTO: 247 X10*3/UL (ref 150–450)
POTASSIUM SERPL-SCNC: 3.3 MMOL/L (ref 3.5–5.3)
RBC # BLD AUTO: 3.78 X10*6/UL (ref 4–5.2)
SODIUM SERPL-SCNC: 141 MMOL/L (ref 136–145)
WBC # BLD AUTO: 11 X10*3/UL (ref 4.4–11.3)

## 2024-12-11 PROCEDURE — 2500000001 HC RX 250 WO HCPCS SELF ADMINISTERED DRUGS (ALT 637 FOR MEDICARE OP): Performed by: INTERNAL MEDICINE

## 2024-12-11 PROCEDURE — 2500000005 HC RX 250 GENERAL PHARMACY W/O HCPCS: Performed by: NURSE PRACTITIONER

## 2024-12-11 PROCEDURE — 2500000001 HC RX 250 WO HCPCS SELF ADMINISTERED DRUGS (ALT 637 FOR MEDICARE OP): Performed by: NURSE PRACTITIONER

## 2024-12-11 PROCEDURE — 2500000004 HC RX 250 GENERAL PHARMACY W/ HCPCS (ALT 636 FOR OP/ED): Mod: JZ | Performed by: NURSE PRACTITIONER

## 2024-12-11 PROCEDURE — 82374 ASSAY BLOOD CARBON DIOXIDE: CPT | Performed by: NURSE PRACTITIONER

## 2024-12-11 PROCEDURE — 1200000002 HC GENERAL ROOM WITH TELEMETRY DAILY

## 2024-12-11 PROCEDURE — 2500000004 HC RX 250 GENERAL PHARMACY W/ HCPCS (ALT 636 FOR OP/ED): Performed by: NURSE PRACTITIONER

## 2024-12-11 PROCEDURE — 99233 SBSQ HOSP IP/OBS HIGH 50: CPT | Performed by: NURSE PRACTITIONER

## 2024-12-11 PROCEDURE — 36415 COLL VENOUS BLD VENIPUNCTURE: CPT | Performed by: NURSE PRACTITIONER

## 2024-12-11 PROCEDURE — 99232 SBSQ HOSP IP/OBS MODERATE 35: CPT | Performed by: NURSE PRACTITIONER

## 2024-12-11 PROCEDURE — 94640 AIRWAY INHALATION TREATMENT: CPT

## 2024-12-11 PROCEDURE — 85027 COMPLETE CBC AUTOMATED: CPT | Performed by: NURSE PRACTITIONER

## 2024-12-11 RX ORDER — CEFTRIAXONE 1 G/50ML
1 INJECTION, SOLUTION INTRAVENOUS EVERY 24 HOURS
Status: DISCONTINUED | OUTPATIENT
Start: 2024-12-11 | End: 2024-12-17 | Stop reason: HOSPADM

## 2024-12-11 RX ADMIN — BUPROPION HYDROCHLORIDE 300 MG: 300 TABLET, EXTENDED RELEASE ORAL at 05:41

## 2024-12-11 RX ADMIN — ALLOPURINOL 200 MG: 100 TABLET ORAL at 20:39

## 2024-12-11 RX ADMIN — APIXABAN 5 MG: 5 TABLET, FILM COATED ORAL at 09:04

## 2024-12-11 RX ADMIN — FUROSEMIDE 80 MG: 10 INJECTION, SOLUTION INTRAMUSCULAR; INTRAVENOUS at 15:42

## 2024-12-11 RX ADMIN — APIXABAN 5 MG: 5 TABLET, FILM COATED ORAL at 20:39

## 2024-12-11 RX ADMIN — DOFETILIDE 250 MCG: 0.25 CAPSULE ORAL at 09:04

## 2024-12-11 RX ADMIN — METOPROLOL SUCCINATE 25 MG: 25 TABLET, EXTENDED RELEASE ORAL at 09:04

## 2024-12-11 RX ADMIN — CEFTRIAXONE SODIUM 1 G: 1 INJECTION, SOLUTION INTRAVENOUS at 15:42

## 2024-12-11 RX ADMIN — FUROSEMIDE 80 MG: 10 INJECTION, SOLUTION INTRAMUSCULAR; INTRAVENOUS at 09:04

## 2024-12-11 RX ADMIN — Medication 3 L/MIN: at 20:39

## 2024-12-11 RX ADMIN — DOFETILIDE 250 MCG: 0.25 CAPSULE ORAL at 20:39

## 2024-12-11 RX ADMIN — FLUTICASONE FUROATE AND VILANTEROL TRIFENATATE 1 PUFF: 200; 25 POWDER RESPIRATORY (INHALATION) at 07:16

## 2024-12-11 RX ADMIN — Medication 2 L/MIN: at 07:17

## 2024-12-11 RX ADMIN — ACETAMINOPHEN 650 MG: 325 TABLET ORAL at 00:55

## 2024-12-11 RX ADMIN — ATORVASTATIN CALCIUM 40 MG: 40 TABLET, FILM COATED ORAL at 20:39

## 2024-12-11 RX ADMIN — ALLOPURINOL 200 MG: 100 TABLET ORAL at 09:04

## 2024-12-11 RX ADMIN — CEFAZOLIN SODIUM 2 G: 2 INJECTION, SOLUTION INTRAVENOUS at 05:41

## 2024-12-11 ASSESSMENT — COGNITIVE AND FUNCTIONAL STATUS - GENERAL
TURNING FROM BACK TO SIDE WHILE IN FLAT BAD: A LITTLE
DAILY ACTIVITIY SCORE: 19
DRESSING REGULAR UPPER BODY CLOTHING: A LITTLE
MOVING TO AND FROM BED TO CHAIR: A LITTLE
DRESSING REGULAR LOWER BODY CLOTHING: A LOT
DAILY ACTIVITIY SCORE: 19
HELP NEEDED FOR BATHING: A LITTLE
STANDING UP FROM CHAIR USING ARMS: A LITTLE
WALKING IN HOSPITAL ROOM: A LITTLE
TOILETING: A LITTLE
HELP NEEDED FOR BATHING: A LITTLE
MOBILITY SCORE: 18
CLIMB 3 TO 5 STEPS WITH RAILING: A LOT
DRESSING REGULAR UPPER BODY CLOTHING: A LITTLE
TURNING FROM BACK TO SIDE WHILE IN FLAT BAD: A LITTLE
WALKING IN HOSPITAL ROOM: A LITTLE
CLIMB 3 TO 5 STEPS WITH RAILING: A LOT
TOILETING: A LITTLE
MOVING TO AND FROM BED TO CHAIR: A LITTLE
MOBILITY SCORE: 18
STANDING UP FROM CHAIR USING ARMS: A LITTLE
DRESSING REGULAR LOWER BODY CLOTHING: A LOT

## 2024-12-11 ASSESSMENT — ENCOUNTER SYMPTOMS
VOMITING: 0
CHEST TIGHTNESS: 0
FATIGUE: 1
ABDOMINAL PAIN: 0
WOUND: 1
FEVER: 0
COLOR CHANGE: 1
NAUSEA: 0
DIARRHEA: 0
CHILLS: 1
DIAPHORESIS: 0
SHORTNESS OF BREATH: 0
COUGH: 1
DIFFICULTY URINATING: 0

## 2024-12-11 ASSESSMENT — PAIN SCALES - GENERAL
PAINLEVEL_OUTOF10: 0 - NO PAIN
PAINLEVEL_OUTOF10: 5 - MODERATE PAIN
PAINLEVEL_OUTOF10: 7
PAINLEVEL_OUTOF10: 0 - NO PAIN

## 2024-12-11 ASSESSMENT — PAIN DESCRIPTION - ORIENTATION: ORIENTATION: OTHER (COMMENT)

## 2024-12-11 ASSESSMENT — PAIN - FUNCTIONAL ASSESSMENT
PAIN_FUNCTIONAL_ASSESSMENT: 0-10
PAIN_FUNCTIONAL_ASSESSMENT: 0-10

## 2024-12-11 ASSESSMENT — PAIN DESCRIPTION - LOCATION: LOCATION: LEG

## 2024-12-11 NOTE — ASSESSMENT & PLAN NOTE
Lasix 80 mg IV twice daily  Low-salt diet  1600 cc fluid restriction  Bilateral lower extremity compression  Cardiology following

## 2024-12-11 NOTE — CARE PLAN
The patient's goals for the shift include      The clinical goals for the shift include vitals will remain stable overnight and patient will keep BLE elevated.      Problem: Pain - Adult  Goal: Verbalizes/displays adequate comfort level or baseline comfort level  Outcome: Progressing     Problem: Safety - Adult  Goal: Free from fall injury  Outcome: Progressing     Problem: Discharge Planning  Goal: Discharge to home or other facility with appropriate resources  Outcome: Progressing     Problem: Chronic Conditions and Co-morbidities  Goal: Patient's chronic conditions and co-morbidity symptoms are monitored and maintained or improved  Outcome: Progressing     Problem: Fall/Injury  Goal: Verbalize understanding of personal risk factors for fall in the hospital  Outcome: Progressing  Goal: Verbalize understanding of risk factor reduction measures to prevent injury from fall in the home  Outcome: Progressing  Goal: Not fall by end of shift  Outcome: Progressing  Goal: Be free from injury by end of the shift  Outcome: Progressing  Goal: Use assistive devices by end of the shift  Outcome: Progressing  Goal: Pace activities to prevent fatigue by end of the shift  Outcome: Progressing     Problem: Respiratory  Goal: Wean oxygen to maintain O2 saturation per order/standard this shift  Outcome: Progressing  Goal: Increase self care and/or family involvement in next 24 hours  Outcome: Progressing  Goal: Minimal/no exertional discomfort or dyspnea this shift  Outcome: Progressing  Goal: No signs of respiratory distress (eg. Use of accessory muscles. Peds grunting)  Outcome: Progressing     Problem: Nutrition  Goal: Oral intake greater 75%  Outcome: Progressing  Goal: Reduce weight from edema/fluid  Outcome: Progressing     Problem: Heart Failure  Goal: Improved gas exchange this shift  Outcome: Progressing  Goal: Improved urinary output this shift  Outcome: Progressing  Goal: Reduction in peripheral edema within 24  hours  Outcome: Progressing  Goal: Report improvement of dyspnea/breathlessness this shift  Outcome: Progressing  Goal: Weight from fluid excess reduced over 2-3 days, then stabilize  Outcome: Progressing  Goal: Increase self care and/or family involvement in 24 hours  Outcome: Progressing     Problem: Pain  Goal: Takes deep breaths with improved pain control throughout the shift  Outcome: Progressing  Goal: Turns in bed with improved pain control throughout the shift  Outcome: Progressing  Goal: Walks with improved pain control throughout the shift  Outcome: Progressing  Goal: Performs ADL's with improved pain control throughout shift  Outcome: Progressing  Goal: Participates in PT with improved pain control throughout the shift  Outcome: Progressing  Goal: Free from opioid side effects throughout the shift  Outcome: Progressing  Goal: Free from acute confusion related to pain meds throughout the shift  Outcome: Progressing     Problem: Bathing  Goal: LTG - Patient will utilize adaptive techniques to bathe body independent with AE as needed  Outcome: Progressing     Problem: Dressings Lower Extremities  Goal: LTG - Patient will dress lower body independent with set up  Outcome: Progressing     Problem: Toileting  Goal: STG - Patient will complete toileting tasks with 2ww independent  Outcome: Progressing

## 2024-12-11 NOTE — DOCUMENTATION CLARIFICATION NOTE
"    PATIENT:               SOURAV MIN  ACCT #:                  [id number]  MRN:                       [id number]  :                       1955  ADMIT DATE:       2024 6:25 PM  DISCH DATE:  RESPONDING PROVIDER #:        [id number]          PROVIDER RESPONSE TEXT:    Acute on chronic diastolic CHF    CDI QUERY TEXT:    Clarification        Instruction:    Based on your assessment of the patient and the clinical information, please provide the requested documentation by clicking on the appropriate radio button and enter any additional information if prompted.    Question: Based on your medical judgment, can you please clarify which of these conditions is the most clinically supported    When answering this query, please exercise your independent professional judgment. The fact that a question is being asked, does not imply that any particular answer is desired or expected.    The patient's clinical indicators include:  Clinical Information: There is conflicting documentation in the medical record which requires clarification.    -The diagnosis of CHF: congestive heart failure, NYHA class I, acute on chronic, combined  was documented on 12/10 by ANGELICA Ramirez CNP  and MELE Hernandez CNP    -The diagnosis of Acute on chronic diastolic heart failure was documented on 12/10 by ANGELICA Ramirez CNP    Clinical Indicators:    : Cardiology note: \"Chest x-ray with evidence of mild vascular congestion.  Most recent echocardiogram from July of this year with ejection fraction of 60 to 65% with diastolic dysfunction and increased left atrial dilatation.\"    Treatment: Cardiology consult, Lasix iv 40 mg x 2, 80 mg BID    Risk Factors: Afib, CHF, Covid, HTN  Options provided:  -- Acute on chronic diastolic CHF  -- Acute on chronic combined systolic/ diastolic CHF  -- Other - I will add my own diagnosis  -- Refer to Clinical Documentation Reviewer    Query created by: Stefano Diaz on 12/10/2024 2:46 PM      Electronically " signed by:  COMPA LUND-CNP 12/11/2024 7:13 AM

## 2024-12-11 NOTE — ASSESSMENT & PLAN NOTE
Major contribution  Disposition  Pending cardiology and infectious diseases recommendations  Most likely discharge home with home health care  Will plan for discharge on Thursday or Friday pending cardiology and ID recommendations

## 2024-12-11 NOTE — CONSULTS
Inpatient consult to Infectious Diseases  Consult performed by: Michelle Modi APRN-CNP  Consult ordered by: KEVON Hawk-CNP  Reason for consult: right leg cellulitis          Primary MD: Danilo Gonzalez DO      History Of Present Illness  Vickie Foster is a 68 y.o. female presenting with bilateral lower extremity edema on 24.  Onset was about a week prior to admission-constant gradual interval worsening.  She reports difficulty ambulating due to the swelling.  States she developed yellow drainage from her right lower extremity.  She presented to the emergency department for further evaluation and management.  She was admitted for CHF exacerbation.  She has been receiving IV Lasix.  Factious disease consult was requested for concern of right lower extremity cellulitis.  She is on IV cefazolin.  Right lower extremity with tenderness to palpation, erythema, increased warmth.  Right posterior lower leg with serous drainage and superficial appearing ulceration from ruptured blisters.  She reports moderate pain and tenderness to her right lower extremity that worsens with ambulation and palpation.  She does report some improvement in the pain today compared to yesterday.  Ultrasound of the lower extremities were negative for DVT     Past Medical History  She has a past medical history of A-fib (Multi), CHF (congestive heart failure), COVID, Heart disease, HTN (hypertension), and Low back pain.    Surgical History  She has a past surgical history that includes Cardioversion; Cardiac electrophysiology procedure (N/A, 2024); Cardiac electrophysiology procedure (N/A, 2024); Ablation of dysrhythmic focus; and Cardiac electrophysiology procedure (N/A, 2024).     Social History     Occupational History    Not on file   Tobacco Use    Smoking status: Former     Current packs/day: 0.00     Types: Cigarettes     Quit date:      Years since quittin.9    Smokeless tobacco: Never   Vaping  Use    Vaping status: Never Used   Substance and Sexual Activity    Alcohol use: Not Currently     Comment: monthly or less    Drug use: Never    Sexual activity: Defer     Travel History   Travel since 24    No documented travel since 24              Family History  Family History   Problem Relation Name Age of Onset    Breast cancer Mother      Heart attack Mother           in early 80s of MI    Heart disease Mother      Colon cancer Father      Breast cancer Sister      Diabetes type II Sister      Kidney failure Sister      Other (herione addict) Sister      No Known Problems Brother      No Known Problems Daughter      Other (recovering alcoholic) Son      Breast cancer Other Maternal Aunt      Allergies  Patient has no known allergies.     Immunization History   Administered Date(s) Administered    Flu vaccine (IIV4), preservative free *Check age/dose* 2018, 2019, 10/30/2020, 2021    Flu vaccine, quadrivalent, high-dose, preservative free, age 65y+ (FLUZONE) 2022, 10/28/2022    Flu vaccine, trivalent, preservative free, age 6 months and greater (Fluarix/Fluzone/Flulaval) 2011, 2014    Influenza, injectable, quadrivalent 11/15/2016    Influenza, seasonal, injectable 2014    Zuly SARS-CoV-2 Vaccination 2021    Novel influenza-H1N1-09, preservative-free 2009    Pfizer COVID-19 vaccine, 12 years and older, (30mcg/0.3mL) (Comirnaty) 2023    Pfizer COVID-19 vaccine, bivalent, age 12 years and older (30 mcg/0.3 mL) 10/28/2022    Pfizer Gray Cap SARS-CoV-2 2022    Pneumococcal conjugate vaccine, 13-valent (PREVNAR 13) 10/30/2020    Pneumococcal polysaccharide vaccine, 23-valent, age 2 years and older (PNEUMOVAX 23) 2019    Tdap vaccine, age 7 year and older (BOOSTRIX, ADACEL) 12/15/2022     Medications  Home medications:  Medications Prior to Admission   Medication Sig Dispense Refill Last Dose/Taking    allopurinol 200 mg  tablet Take 200 mg by mouth 2 times a day. 180 tablet 0     atorvastatin (Lipitor) 40 mg tablet TAKE 1 TABLET(40 MG) BY MOUTH DAILY AT BEDTIME 90 tablet 0     budesonide-formoteroL (Symbicort) 80-4.5 mcg/actuation inhaler Inhale 2 puffs 2 times a day. Rinse mouth with water after use to reduce aftertaste and incidence of candidiasis. Do not swallow.       buPROPion XL (Wellbutrin XL) 300 mg 24 hr tablet Take 1 tablet (300 mg) by mouth once daily in the morning. Take before meals. 90 tablet 1     calcium carb-vitamin D3-vit K2 600 mg-1,000 unit-90 mcg tablet Take by mouth once daily.       dofetilide (Tikosyn) 250 mcg capsule Take 1 capsule (250 mcg) by mouth every 12 hours. 180 capsule 1     Eliquis 5 mg tablet Take 1 tablet (5 mg) by mouth 2 times a day. 180 tablet 1     fluticasone propion-salmeteroL (Advair Diskus) 100-50 mcg/dose diskus inhaler Inhale 1 puff 2 times a day. Rinse mouth with water after use to reduce aftertaste and incidence of candidiasis. Do not swallow.       hydrOXYzine HCL (Atarax) 10 mg tablet Take 1 tablet (10 mg) by mouth as needed at bedtime (insomnia). 30 tablet 2     ipratropium-albuteroL (Duo-Neb) 0.5-2.5 mg/3 mL nebulizer solution Take 3 mL by nebulization every 6 hours if needed for wheezing or shortness of breath. 180 mL 11     metoprolol succinate XL (Toprol-XL) 25 mg 24 hr tablet TAKE 1 TABLET BY MOUTH ONE TIME DAILY 90 tablet 3     moisturizing mouth (Biotene Dry Mouth Oral Rinse) solution Swish and spit 15 mL 3 times a day.       oxygen (O2) gas therapy Inhale 1 each every 12 hours.       oxygen (O2) gas therapy Inhale 1 each every 12 hours.       semaglutide 2 mg/dose (8 mg/3 mL) pen injector Inject 2.3 mg under the skin 1 (one) time per week. (Patient not taking: Reported on 10/31/2024)       torsemide (Demadex) 100 mg tablet Take 1/2 a tablet (50 mg) by mouth 2 times a day. 30 tablet 0     traMADol (Ultram) 50 mg tablet Take 1 tablet (50 mg) by mouth every 8 hours if needed  for severe pain (7 - 10).        Current medications:  Scheduled medications  allopurinol, 200 mg, oral, BID  apixaban, 5 mg, oral, BID  atorvastatin, 40 mg, oral, Nightly  buPROPion XL, 300 mg, oral, Daily before breakfast  cefTRIAXone, 1 g, intravenous, q24h  dofetilide, 250 mcg, oral, q12h  fluticasone furoate-vilanteroL, 1 puff, inhalation, Daily  furosemide, 80 mg, intravenous, BID  metoprolol succinate XL, 25 mg, oral, Daily  oxygen, , inhalation, Continuous - Inhalation  polyethylene glycol, 17 g, oral, Daily      Continuous medications     PRN medications  PRN medications: acetaminophen **OR** acetaminophen **OR** acetaminophen, ipratropium-albuteroL, ondansetron    Review of Systems   Constitutional:  Positive for chills and fatigue. Negative for diaphoresis and fever.   Respiratory:  Positive for cough. Negative for chest tightness and shortness of breath.    Cardiovascular:  Positive for leg swelling.   Gastrointestinal:  Negative for abdominal pain, diarrhea, nausea and vomiting.   Genitourinary:  Negative for difficulty urinating.   Skin:  Positive for color change and wound.        Objective  Range of Vitals (last 24 hours)  Heart Rate:  []   Temp:  [36.5 °C (97.7 °F)]   Resp:  [18]   BP: (102-129)/(72-79)   SpO2:  [93 %-100 %]   Daily Weight  12/08/24 : 105 kg (231 lb)    Body mass index is 42.24 kg/m².     Physical Exam  Constitutional:       Appearance: Normal appearance.   HENT:      Head: Normocephalic and atraumatic.   Eyes:      Extraocular Movements: Extraocular movements intact.      Conjunctiva/sclera: Conjunctivae normal.   Cardiovascular:      Rate and Rhythm: Normal rate.   Pulmonary:      Effort: Pulmonary effort is normal.      Breath sounds: Normal breath sounds.      Comments: Diminished bilaterally at the bases  Abdominal:      General: Bowel sounds are normal.      Palpations: Abdomen is soft.      Tenderness: There is no abdominal tenderness.   Musculoskeletal:          General: Normal range of motion.      Cervical back: Normal range of motion and neck supple.      Right lower leg: Edema present.      Left lower leg: Edema present.   Skin:     General: Skin is warm.      Comments: Right lower extremity erythema, increased warmth, tenderness.  Posterior right lower leg with superficial appearing ulcerations with serous drainage, secondary to ruptured blisters   Neurological:      General: No focal deficit present.      Mental Status: She is alert and oriented to person, place, and time.   Psychiatric:         Mood and Affect: Mood normal.         Behavior: Behavior normal.          Relevant Results    Labs  Results from last 72 hours   Lab Units 12/11/24  0421 12/10/24  0555 12/09/24  0818 12/08/24  1842   WBC AUTO x10*3/uL 11.0 8.5 9.4 10.8   HEMOGLOBIN g/dL 11.2* 11.9* 11.8* 12.6   HEMATOCRIT % 34.2* 39.2 38.1 39.4   PLATELETS AUTO x10*3/uL 247 251 242 275   NEUTROS PCT AUTO %  --   --   --  79.0   LYMPHS PCT AUTO %  --   --   --  11.9   MONOS PCT AUTO %  --   --   --  7.7   EOS PCT AUTO %  --   --   --  0.8     Results from last 72 hours   Lab Units 12/11/24  0421 12/10/24  0555 12/09/24  0818   SODIUM mmol/L 141 141 142   POTASSIUM mmol/L 3.3* 3.5 3.4*   CHLORIDE mmol/L 93* 95* 96*   CO2 mmol/L 42* 42* 43*   BUN mg/dL 22 21 22   CREATININE mg/dL 0.60 0.68 0.65   GLUCOSE mg/dL 93 108* 112*   CALCIUM mg/dL 9.0 9.1 8.8   ANION GAP mmol/L 9* 8* <7*   EGFR mL/min/1.73m*2 >90 >90 >90     Results from last 72 hours   Lab Units 12/08/24  1842   ALK PHOS U/L 102   BILIRUBIN TOTAL mg/dL 0.5   PROTEIN TOTAL g/dL 6.5   ALT U/L 20   AST U/L 20   ALBUMIN g/dL 3.7     Estimated Creatinine Clearance: 103.6 mL/min (by C-G formula based on SCr of 0.6 mg/dL).  CRP   Date Value Ref Range Status   12/09/2021 5.0 (H) 0 - 2.0 MG/DL Final     Comment:     Performed at [address] [address]   12/08/2021 12.3 (H) 0 - 2.0 MG/DL Final     Comment:     Performed at [place] [address]  "[facility] [address]   12/07/2021 23.2 (H) 0 - 2.0 MG/DL Final     Comment:     Performed at [place] [address] [address] [facility] [address]     No results found for: \"HIV1X2\", \"HIVCONF\", \"VNAHQK6ZM\"  No results found for: \"HEPCABINIT\", \"HEPCAB\", \"HCVPCRQUANT\"  Microbiology  Blood cultures pending with no growth x 2 days  Imaging  Vascular US lower extremity venous duplex bilateral    Result Date: 12/9/2024  Interpreted By:  Savita Dominguez, STUDY: [facility] US LOWER EXTREMITY VENOUS DUPLEX BILATERAL;  12/9/2024 1:27 pm   INDICATION: Signs/Symptoms:asymmetric edema.   ,L03.119 Cellulitis of unspecified part of limb,R60.0 Localized edema   COMPARISON: None.   ACCESSION NUMBER(S): [id number]   ORDERING CLINICIAN: GARRISON AGUAYO   TECHNIQUE: Vascular ultrasound of the bilateral lower extremities was performed. Real-time compression views as well as Gray scale, color Doppler and spectral Doppler waveform analysis was performed.   FINDINGS: Evaluation of the visualized portions of the bilateral common femoral vein, proximal, mid, and distal femoral vein, and popliteal vein were performed.  Evaluation of the visualized portions of the  posterior tibial and peroneal veins were also performed.     The evaluated veins demonstrate normal compressibility. There is intact venous flow demonstrating normal respiratory variability and normal augmentation of flow with calf compression. Therefore, there is no ultrasonographic evidence for deep vein thrombosis within the evaluated veins.         No sonographic evidence for deep vein thrombosis within the evaluated veins of the bilateral lower extremity.   MACRO: None   Signed by: Savita Dominguez 12/9/2024 2:29 PM Dictation workstation:   [id number]    ECG 12 lead    Result Date: 12/9/2024  Atrial fibrillation ST & T wave abnormality, consider inferior ischemia Prolonged QT Abnormal ECG When compared with ECG of 13-NOV-2024 00:25, ST more depressed in Lateral leads T wave " inversion less evident in Lateral leads Confirmed by Robert Carvalho ([id number]) on 12/9/2024 10:16:24 AM    XR chest 2 views    Result Date: 12/8/2024  Interpreted By:  Mikey Hough, STUDY: XR CHEST 2 VIEWS;  12/8/2024 7:14 pm   INDICATION: Signs/Symptoms:shortness of breath, concern for worsening CHF vs pneumonia.   COMPARISON: 11/13/2024   ACCESSION NUMBER(S): [id number]   ORDERING CLINICIAN: FLAQUITO TOMAS   FINDINGS: There is stable cardiomegaly. There is mild vascular congestive change and basilar atelectasis. No significant pleural effusion. No pneumothorax.       Cardiomegaly with mild vascular congestive change and basilar atelectasis.   MACRO: None   Signed by: Mikey Hough 12/8/2024 7:52 PM Dictation workstation:   [id number]    ECG 12 lead    Result Date: 11/13/2024  Atrial fibrillation with rapid ventricular response with premature ventricular or aberrantly conducted complexes Nonspecific ST and T wave abnormality Abnormal ECG When compared with ECG of 02-OCT-2024 19:25, ST less depressed in Lateral leads Confirmed by Servando Lundberg ([id number]) on 11/13/2024 12:17:34 PM    CT head wo IV contrast    Result Date: 11/13/2024  Interpreted By:  Mikey Hough, STUDY: CT HEAD WO IV CONTRAST;  11/13/2024 1:52 am   INDICATION: Signs/Symptoms:Trauma.   COMPARISON: 10/2/2024   ACCESSION NUMBER(S): [id number]   ORDERING CLINICIAN: IMER PAGE   TECHNIQUE: Contiguous axial images of the head were obtained without intravenous contrast.   FINDINGS: BRAIN PARENCHYMA:   The gray white matter differentiation is preserved.  No mass effect or midline shift.   HEMORRHAGE:  No evidence of acute intracranial hemorrhage. VENTRICLES AND EXTRA-AXIAL SPACES:  The ventricles are within normal limits in size for brain volume.  No evidence of abnormal extraaxial fluid collection. EXTRACRANIAL SOFT TISSUES:  Posterior scalp hematoma and laceration. PARANASAL SINUSES/MASTOIDS:  Mild peripheral mucosal thickening of maxillary sinuses.  CALVARIUM:  No evidence of depressed calvarial fracture.   OTHER FINDINGS:  None       No evidence of acute intracranial hemorrhage or depressed calvarial fracture.   Posterior scalp hematoma and soft tissue laceration.   MACRO: None   Signed by: Mikey Hough 11/13/2024 2:00 AM Dictation workstation:   [id number]    XR chest 1 view    Result Date: 11/13/2024  Interpreted By:  Arden Berkowitz, STUDY: XR CHEST 1 VIEW;  11/13/2024 1:15 am   INDICATION: Signs/Symptoms:Syncope.   COMPARISON: CXR 10/02/2024   ACCESSION NUMBER(S): [id number]   ORDERING CLINICIAN: IMER PAGE   FINDINGS:     Patchy opacities in the bilateral bases, similar to slightly decreased compared to prior. Low lung volumes.   No pleural effusion or pneumothorax.   Cardiomediastinal silhouette is stable in size and configuration.   Upper abdomen is unremarkable.   No acute osseous abnormality.       1. Similar to slight decrease in the bilateral patchy opacities compared to 10/02/2024. Differential diagnosis includes residual pneumonia, aspiration pneumonitis, or atelectasis.   MACRO: None   Signed by: Arden Berkowitz 11/13/2024 1:33 AM Dictation workstation:   [id number]     Assessment/Plan   Acute on chronic respiratory failure  Acute on chronic diastolic heart failure  Right lower extremity cellulitis-risk factor is lymphedema   Lymphedema    Discontinue IV cefazolin  Begin IV ceftriaxone  Follow-up blood cultures  Bilateral lower extremity compression once patient is able to tolerate  Monitor WBC and temperature  Diuresis per cardiology  Oxygen as needed  Evaluate for transition to oral antibiotic therapy in 1 to 2 days    Discussed with Dr. Talley    Total time spent caring for the patient today was 45 minutes.  This includes time spent before the visit reviewing the chart, time spent during the visit, and time spent after the visit on documentation.        Michelle Modi, APRN-CNP

## 2024-12-11 NOTE — PROGRESS NOTES
"Vickie Foster is a 68 y.o. female on day 3 of admission presenting with CHF (congestive heart failure), NYHA class I, acute on chronic, combined.    Subjective   Alert and oriented x 3.  Denies complaints chest pain or pressure palpitations or feeling rapid heart rate.  Peripheral edema is improving.       Objective     Physical Exam  Vitals and nursing note reviewed.   Constitutional:       General: She is not in acute distress.     Appearance: She is obese. She is not ill-appearing or toxic-appearing.   HENT:      Head: Normocephalic and atraumatic.      Mouth/Throat:      Mouth: Mucous membranes are moist.      Pharynx: Oropharynx is clear.   Cardiovascular:      Rate and Rhythm: Normal rate and regular rhythm.      Pulses: Normal pulses.      Heart sounds: No murmur heard.     No friction rub. No gallop.   Pulmonary:      Effort: Pulmonary effort is normal.      Breath sounds: Normal breath sounds.      Comments: Oxygen nasal cannula.  Mildly diminished in bases  Abdominal:      General: Bowel sounds are normal.      Palpations: Abdomen is soft.   Musculoskeletal:      Cervical back: Normal range of motion.      Right lower leg: Edema present.      Left lower leg: Edema present.      Comments: +2 to +3 bilateral extremity pitting edema   Skin:     General: Skin is warm and dry.      Capillary Refill: Capillary refill takes less than 2 seconds.   Neurological:      Mental Status: She is alert and oriented to person, place, and time. Mental status is at baseline.   Psychiatric:         Mood and Affect: Mood normal.         Behavior: Behavior normal.         Thought Content: Thought content normal.         Last Recorded Vitals  Blood pressure 122/72, pulse 77, temperature 36.5 °C (97.7 °F), temperature source Oral, resp. rate 18, height 1.575 m (5' 2.01\"), weight 105 kg (231 lb), SpO2 100%.  Intake/Output last 3 Shifts:  I/O last 3 completed shifts:  In: 1250 (11.9 mL/kg) [P.O.:850; IV Piggyback:400]  Out: 1200 " (11.5 mL/kg) [Urine:1200 (0.3 mL/kg/hr)]  Weight: 104.8 kg     Relevant Results  Results for orders placed or performed during the hospital encounter of 12/08/24 (from the past 24 hours)   Basic Metabolic Panel   Result Value Ref Range    Glucose 93 74 - 99 mg/dL    Sodium 141 136 - 145 mmol/L    Potassium 3.3 (L) 3.5 - 5.3 mmol/L    Chloride 93 (L) 98 - 107 mmol/L    Bicarbonate 42 (HH) 21 - 32 mmol/L    Anion Gap 9 (L) 10 - 20 mmol/L    Urea Nitrogen 22 6 - 23 mg/dL    Creatinine 0.60 0.50 - 1.05 mg/dL    eGFR >90 >60 mL/min/1.73m*2    Calcium 9.0 8.6 - 10.3 mg/dL   CBC   Result Value Ref Range    WBC 11.0 4.4 - 11.3 x10*3/uL    nRBC 0.0 0.0 - 0.0 /100 WBCs    RBC 3.78 (L) 4.00 - 5.20 x10*6/uL    Hemoglobin 11.2 (L) 12.0 - 16.0 g/dL    Hematocrit 34.2 (L) 36.0 - 46.0 %    MCV 91 80 - 100 fL    MCH 29.6 26.0 - 34.0 pg    MCHC 32.7 32.0 - 36.0 g/dL    RDW 15.5 (H) 11.5 - 14.5 %    Platelets 247 150 - 450 x10*3/uL     *Note: Due to a large number of results and/or encounters for the requested time period, some results have not been displayed. A complete set of results can be found in Results Review.                Assessment/Plan   Assessment & Plan  CHF (congestive heart failure), NYHA class I, acute on chronic, combined    Acute on chronic respiratory failure with hypoxia and hypercapnia (Multi)    Atrial fib/flutter, transient (Multi)    Cellulitis    COPD (chronic obstructive pulmonary disease) (Multi)    Obesity      Acute on chronic diastolic heart failure  Lymphedema  Cellulitis bilateral lower extremities  Paroxysmal atrial fibrillation  Hypertensive disorder  Hyperlipidemia  Morbid obesity  History of stroke  Chronic respiratory failure with chronic O2     Overall impression:     12/9: As above, presents predominately for worsening peripheral edema for 3 weeks and worsening shortness of breath over the past week.  She has had multiple recurrences over the past year for worsening peripheral edema.  I am now  suspecting that she has a component of lymphedema given her obesity.  Additionally her recurrent echocardiogram showed preserved ejection fraction with a minimal diastolic dysfunction.  She does have a chronic O2 requirement and a chest x-ray reveals mild fluid volume overload with a low BNP of 148.  Her troponins are flat negative.  Certainly diuresis is the answer to the patient's problem currently.  She states compliance with outpatient medications.  In the outpatient setting she takes torsemide 50 mg twice daily.  She has been initiated on IV furosemide.  Will increase this.  Creatinine stable at 0.65.  Will utilize IV furosemide 80 mg twice daily.  Given the level of diuresis needed as well as a current potassium 3.1 with repletion given yesterday, will order for 40 mill colons potassium twice today and 1 time tomorrow.  Will check potassium level in the morning.  Echo from previous hospitalization is adequate for this hospitalization.  Concerning her atrial fibrillation surprisingly her EKG did show a rate controlled atrial flutter on admission.  On telemetry now she is currently in a sinus rhythm.  Will continue to monitor.  She does continue on apixaban for stroke risk reduction.  Hemoglobin stable at 12.6.  Overall stable from a respiratory perspective but is significantly fluid overloaded likely require at least 2 to 3 days inpatient hospitalization to achieve a euvolemic state.  Have discussed thoroughly with the patient the importance of reducing her sodium intake and weighing herself daily.  Have consulted cardiac rehab.  Will follow with you.     12/10: Improved over the past 24 hours.  Continues on IV diuresis.  Diuresed approximately 1650 mL over the past 24 hours.  Patient's bicarb was noted to have risen yesterday to 43.  Has minimally improved today to 42.  Will continue to monitor this, if the patient's bicarb rises further we will likely reduce the IV diuretic dosing, however would like to  remain aggressive given the degree of fluid volume overload.  Her lungs are diminished but clear otherwise.  Breathing has improved.  At baseline per patient.  Her peripheral edema is now a +3 with no weeping and the cellulitis to her right lower extremity appears to be improving.  She is chest pain-free.  Her blood pressure is stable most recent 112/60.  Creatinine 0.68 with a hemoglobin 11.9.  On telemetry monitor she remained in a rate controlled atrial fibrillation without significant ectopy.  Dissipated further 3 to 4 days hospitalization to achieve a euvolemic state.  Will follow with you.    12/11: Continues to slowly improve.  Chest pain-free.  Breathing comfortably nasal cannula oxygen.  Lungs probably clear but mildly diminished in bases.  Remains with peripheral edema which is improving to now +2 to +3.  Continue on IV diuresis.  Her bicarb remains unchanged at 42.  Creatinine has actually improved to 0.60.  Her fluid balance is listed as +950 over the past 24 hours which I do not believe is accurate.  I believe the patient is actually negative.  Will continue with current IV diuresis at 80 mg twice daily.  Anticipate a further 2 to 3 days.  Will follow with you.      I spent 35 minutes in the professional and overall care of this patient.      Jaciel Ramirez, APRN-CNP

## 2024-12-11 NOTE — TELEPHONE ENCOUNTER
Nicholas with [facility] calling asking if  Would be willing to follow PT for home health care services. Please call Nicholas [phone]   Pt states midsternal chest pain x 3 days with worsening dizziness today. pt denies sob or cough. hx htn

## 2024-12-11 NOTE — PROGRESS NOTES
12/11/24 1001   Discharge Planning   Expected Discharge Disposition Home H  ([facility])     Patient updated and agreeable at this time     Referral received and sent to agency     ADOD 1-2 days     **Patient has a  safe discharge plan**

## 2024-12-11 NOTE — PROGRESS NOTES
Vickie Foster is a 68 y.o. female on day 3 of admission presenting with CHF (congestive heart failure), NYHA class I, acute on chronic, combined.      Subjective   Patient examined sitting up in chair with no new complaints.  She states that her legs feel little bit better today.       Objective     Last Recorded Vitals  /72 (BP Location: Left arm, Patient Position: Sitting)   Pulse 77   Temp 36.5 °C (97.7 °F) (Oral)   Resp 18   Wt 105 kg (231 lb)   SpO2 100%   Intake/Output last 3 Shifts:    Intake/Output Summary (Last 24 hours) at 12/11/2024 1225  Last data filed at 12/11/2024 1050  Gross per 24 hour   Intake 1050 ml   Output --   Net 1050 ml       Admission Weight  Weight: 105 kg (231 lb) (12/08/24 1818)    Daily Weight  12/08/24 : 105 kg (231 lb)          Physical Exam    Relevant Results               Assessment/Plan                  Assessment & Plan  Acute on chronic respiratory failure with hypoxia and hypercapnia (Multi)  Currently on 3 L O2  Has been on oxygen during the day at home which is new for her, she usually only uses it at night  Wean as able  CHF (congestive heart failure), NYHA class I, acute on chronic, combined  Lasix 80 mg IV twice daily  Low-salt diet  1600 cc fluid restriction  Bilateral lower extremity compression  Cardiology following  Cellulitis  Right lower extremity markedly more erythematous  Ultrasound negative for DVT  Blood cultures pending  Continue cefazolin  Infectious diseases following  Atrial fib/flutter, transient (Multi)  Continue metoprolol, Eliquis, and dofetilide.  COPD (chronic obstructive pulmonary disease) (Multi)  Do not feel that she is having a current exacerbation  Continue home maintenance inhalers.  Obesity  Major contribution  Disposition  Pending cardiology and infectious diseases recommendations  Most likely discharge home with home health care  Will plan for discharge on Thursday or Friday pending cardiology and ID recommendations                 Bentley Hernandez, APRN-CNP

## 2024-12-11 NOTE — DISCHARGE INSTR - OTHER ORDERS
[facility] (formerly [facility])  [address] [address] [place] [address] [phone]

## 2024-12-12 ENCOUNTER — APPOINTMENT (OUTPATIENT)
Dept: RADIOLOGY | Facility: HOSPITAL | Age: 69
DRG: 291 | End: 2024-12-12
Payer: MEDICARE

## 2024-12-12 LAB
ANION GAP SERPL CALCULATED.3IONS-SCNC: 8 MMOL/L (ref 10–20)
BUN SERPL-MCNC: 22 MG/DL (ref 6–23)
CALCIUM SERPL-MCNC: 9.1 MG/DL (ref 8.6–10.3)
CHLORIDE SERPL-SCNC: 94 MMOL/L (ref 98–107)
CO2 SERPL-SCNC: 41 MMOL/L (ref 21–32)
CREAT SERPL-MCNC: 0.57 MG/DL (ref 0.5–1.05)
EGFRCR SERPLBLD CKD-EPI 2021: >90 ML/MIN/1.73M*2
ERYTHROCYTE [DISTWIDTH] IN BLOOD BY AUTOMATED COUNT: 15.5 % (ref 11.5–14.5)
GLUCOSE SERPL-MCNC: 93 MG/DL (ref 74–99)
HCT VFR BLD AUTO: 37.6 % (ref 36–46)
HGB BLD-MCNC: 11.7 G/DL (ref 12–16)
MCH RBC QN AUTO: 29.8 PG (ref 26–34)
MCHC RBC AUTO-ENTMCNC: 31.1 G/DL (ref 32–36)
MCV RBC AUTO: 96 FL (ref 80–100)
NRBC BLD-RTO: 0 /100 WBCS (ref 0–0)
PLATELET # BLD AUTO: 269 X10*3/UL (ref 150–450)
POTASSIUM SERPL-SCNC: 3.1 MMOL/L (ref 3.5–5.3)
RBC # BLD AUTO: 3.92 X10*6/UL (ref 4–5.2)
SODIUM SERPL-SCNC: 140 MMOL/L (ref 136–145)
WBC # BLD AUTO: 10 X10*3/UL (ref 4.4–11.3)

## 2024-12-12 PROCEDURE — 80048 BASIC METABOLIC PNL TOTAL CA: CPT | Performed by: NURSE PRACTITIONER

## 2024-12-12 PROCEDURE — 97535 SELF CARE MNGMENT TRAINING: CPT | Mod: GO,CO

## 2024-12-12 PROCEDURE — 1200000002 HC GENERAL ROOM WITH TELEMETRY DAILY

## 2024-12-12 PROCEDURE — 94640 AIRWAY INHALATION TREATMENT: CPT

## 2024-12-12 PROCEDURE — 2500000001 HC RX 250 WO HCPCS SELF ADMINISTERED DRUGS (ALT 637 FOR MEDICARE OP): Performed by: NURSE PRACTITIONER

## 2024-12-12 PROCEDURE — 2500000004 HC RX 250 GENERAL PHARMACY W/ HCPCS (ALT 636 FOR OP/ED): Performed by: NURSE PRACTITIONER

## 2024-12-12 PROCEDURE — 36415 COLL VENOUS BLD VENIPUNCTURE: CPT | Performed by: NURSE PRACTITIONER

## 2024-12-12 PROCEDURE — 2500000005 HC RX 250 GENERAL PHARMACY W/O HCPCS: Performed by: NURSE PRACTITIONER

## 2024-12-12 PROCEDURE — 99232 SBSQ HOSP IP/OBS MODERATE 35: CPT

## 2024-12-12 PROCEDURE — 71045 X-RAY EXAM CHEST 1 VIEW: CPT | Performed by: RADIOLOGY

## 2024-12-12 PROCEDURE — 97530 THERAPEUTIC ACTIVITIES: CPT | Mod: GO,CO

## 2024-12-12 PROCEDURE — 2500000001 HC RX 250 WO HCPCS SELF ADMINISTERED DRUGS (ALT 637 FOR MEDICARE OP): Performed by: INTERNAL MEDICINE

## 2024-12-12 PROCEDURE — 85027 COMPLETE CBC AUTOMATED: CPT | Performed by: NURSE PRACTITIONER

## 2024-12-12 PROCEDURE — 99233 SBSQ HOSP IP/OBS HIGH 50: CPT | Performed by: NURSE PRACTITIONER

## 2024-12-12 PROCEDURE — 71045 X-RAY EXAM CHEST 1 VIEW: CPT

## 2024-12-12 PROCEDURE — 2500000002 HC RX 250 W HCPCS SELF ADMINISTERED DRUGS (ALT 637 FOR MEDICARE OP, ALT 636 FOR OP/ED): Performed by: NURSE PRACTITIONER

## 2024-12-12 RX ORDER — POTASSIUM CHLORIDE 20 MEQ/1
40 TABLET, EXTENDED RELEASE ORAL ONCE
Status: COMPLETED | OUTPATIENT
Start: 2024-12-12 | End: 2024-12-12

## 2024-12-12 RX ORDER — POTASSIUM CHLORIDE 20 MEQ/1
20 TABLET, EXTENDED RELEASE ORAL DAILY
Status: DISCONTINUED | OUTPATIENT
Start: 2024-12-12 | End: 2024-12-14

## 2024-12-12 RX ADMIN — FLUTICASONE FUROATE AND VILANTEROL TRIFENATATE 1 PUFF: 200; 25 POWDER RESPIRATORY (INHALATION) at 08:45

## 2024-12-12 RX ADMIN — POTASSIUM CHLORIDE 20 MEQ: 1500 TABLET, EXTENDED RELEASE ORAL at 11:50

## 2024-12-12 RX ADMIN — FUROSEMIDE 80 MG: 10 INJECTION, SOLUTION INTRAMUSCULAR; INTRAVENOUS at 15:26

## 2024-12-12 RX ADMIN — CEFTRIAXONE SODIUM 1 G: 1 INJECTION, SOLUTION INTRAVENOUS at 15:29

## 2024-12-12 RX ADMIN — ACETAMINOPHEN 650 MG: 325 TABLET ORAL at 23:32

## 2024-12-12 RX ADMIN — ATORVASTATIN CALCIUM 40 MG: 40 TABLET, FILM COATED ORAL at 20:55

## 2024-12-12 RX ADMIN — POTASSIUM CHLORIDE 40 MEQ: 1500 TABLET, EXTENDED RELEASE ORAL at 08:12

## 2024-12-12 RX ADMIN — Medication 2 L/MIN: at 20:30

## 2024-12-12 RX ADMIN — APIXABAN 5 MG: 5 TABLET, FILM COATED ORAL at 08:12

## 2024-12-12 RX ADMIN — METOPROLOL SUCCINATE 25 MG: 25 TABLET, EXTENDED RELEASE ORAL at 08:12

## 2024-12-12 RX ADMIN — ALLOPURINOL 200 MG: 100 TABLET ORAL at 20:55

## 2024-12-12 RX ADMIN — DOFETILIDE 250 MCG: 0.25 CAPSULE ORAL at 08:12

## 2024-12-12 RX ADMIN — BUPROPION HYDROCHLORIDE 300 MG: 300 TABLET, EXTENDED RELEASE ORAL at 05:07

## 2024-12-12 RX ADMIN — ACETAMINOPHEN 650 MG: 325 TABLET ORAL at 01:22

## 2024-12-12 RX ADMIN — FUROSEMIDE 80 MG: 10 INJECTION, SOLUTION INTRAMUSCULAR; INTRAVENOUS at 08:11

## 2024-12-12 RX ADMIN — APIXABAN 5 MG: 5 TABLET, FILM COATED ORAL at 20:55

## 2024-12-12 RX ADMIN — DOFETILIDE 250 MCG: 0.25 CAPSULE ORAL at 20:55

## 2024-12-12 RX ADMIN — ALLOPURINOL 200 MG: 100 TABLET ORAL at 08:12

## 2024-12-12 RX ADMIN — Medication 2 L/MIN: at 08:16

## 2024-12-12 ASSESSMENT — ACTIVITIES OF DAILY LIVING (ADL): HOME_MANAGEMENT_TIME_ENTRY: 25

## 2024-12-12 ASSESSMENT — PAIN SCALES - GENERAL
PAINLEVEL_OUTOF10: 0 - NO PAIN
PAINLEVEL_OUTOF10: 0 - NO PAIN
PAINLEVEL_OUTOF10: 5 - MODERATE PAIN
PAINLEVEL_OUTOF10: 9
PAINLEVEL_OUTOF10: 0 - NO PAIN

## 2024-12-12 ASSESSMENT — COGNITIVE AND FUNCTIONAL STATUS - GENERAL
HELP NEEDED FOR BATHING: A LOT
DAILY ACTIVITIY SCORE: 17
TURNING FROM BACK TO SIDE WHILE IN FLAT BAD: A LITTLE
DAILY ACTIVITIY SCORE: 17
DRESSING REGULAR UPPER BODY CLOTHING: A LITTLE
TOILETING: A LITTLE
PERSONAL GROOMING: A LITTLE
MOBILITY SCORE: 19
DRESSING REGULAR UPPER BODY CLOTHING: A LITTLE
EATING MEALS: A LITTLE
DRESSING REGULAR LOWER BODY CLOTHING: A LOT
CLIMB 3 TO 5 STEPS WITH RAILING: A LITTLE
HELP NEEDED FOR BATHING: A LITTLE
DRESSING REGULAR LOWER BODY CLOTHING: A LOT
MOVING TO AND FROM BED TO CHAIR: A LITTLE
STANDING UP FROM CHAIR USING ARMS: A LITTLE
PERSONAL GROOMING: A LITTLE
WALKING IN HOSPITAL ROOM: A LITTLE
TOILETING: A LITTLE

## 2024-12-12 ASSESSMENT — PAIN SCALES - WONG BAKER: WONGBAKER_NUMERICALRESPONSE: HURTS LITTLE BIT

## 2024-12-12 ASSESSMENT — PAIN - FUNCTIONAL ASSESSMENT
PAIN_FUNCTIONAL_ASSESSMENT: 0-10
PAIN_FUNCTIONAL_ASSESSMENT: 0-10
PAIN_FUNCTIONAL_ASSESSMENT: FLACC (FACE, LEGS, ACTIVITY, CRY, CONSOLABILITY)

## 2024-12-12 ASSESSMENT — PAIN DESCRIPTION - LOCATION
LOCATION: HEAD
LOCATION: OTHER (COMMENT)

## 2024-12-12 ASSESSMENT — PAIN SCALES - PAIN ASSESSMENT IN ADVANCED DEMENTIA (PAINAD): TOTALSCORE: MEDICATION (SEE MAR)

## 2024-12-12 NOTE — TELEPHONE ENCOUNTER
Nicholas with [facility] calling to follow up and is asking if  Would be willing to follow PT for home health care services. Please call Nicholas [phone]

## 2024-12-12 NOTE — NURSING NOTE
Heart Failure Nurse Navigator IP Rounding Note    The role of the HF nurse navigator is to (1) characterize risk profiles of patients with heart failure transitioning from [id number] after hospitalization, (2) recommend interventions to improve care and reduce risks of worse post-hospitalization outcomes.     Met with pt in room who was sitting at bedside. We reviewed HF education in regards to low sodium diet. Pt was receptive to education and is in good spirits.    She is still being diuresed with IV lasix. DC plan is to return home when medically ready.     Mireya FARMERN RN  [facility] Clinical Nurse Navigator, CHF  [phone]

## 2024-12-12 NOTE — PROGRESS NOTES
12/12/24 0836   Discharge Planning   Expected Discharge Disposition Home H  ([facility])     Will need external referral for home health upon discharge

## 2024-12-12 NOTE — PROGRESS NOTES
Vickie Foster is a 68 y.o. female on day 4 of admission presenting with CHF (congestive heart failure), NYHA class I, acute on chronic, combined.    Subjective   Interval History:   Sitting up In chair  Reports bilateral lower extremity swelling  Reports bilateral lower extremity tenderness with palpation  Afebrile, no chills  Reports dyspnea on exertion  No nausea vomiting or diarrhea            Objective   Range of Vitals (last 24 hours)  Heart Rate:  [85-86]   Temp:  [36.3 °C (97.3 °F)-36.6 °C (97.9 °F)]   Resp:  [16-18]   BP: (109-115)/(60-69)   SpO2:  [90 %-97 %]   Daily Weight  12/08/24 : 105 kg (231 lb)    Body mass index is 42.24 kg/m².    Physical Exam  Constitutional:       Appearance: Normal appearance.   HENT:      Head: Normocephalic and atraumatic.   Eyes:      Extraocular Movements: Extraocular movements intact.      Conjunctiva/sclera: Conjunctivae normal.   Cardiovascular:      Rate and Rhythm: Normal rate.   Pulmonary:      Effort: Pulmonary effort is normal.      Breath sounds: Normal breath sounds.      Comments: Decreased bilateral breath sounds  Abdominal:      General: Bowel sounds are normal.      Palpations: Abdomen is soft.      Tenderness: There is no abdominal tenderness.   Musculoskeletal:         General: Normal range of motion.      Cervical back: Normal range of motion and neck supple.      Right lower leg: Edema present.      Left lower leg: Edema present.   Skin:     General: Skin is warm.      Comments: Right lower extremity dressing intact   neurological:      General: No focal deficit present.      Mental Status: She is alert and oriented to person, place, and time.   Psychiatric:         Mood and Affect: Mood normal.         Behavior: Behavior normal.     Antibiotics  cefTRIAXone - 1 gram/50 mL    Relevant Results  Labs  Results from last 72 hours   Lab Units 12/12/24  0512 12/11/24  0421 12/10/24  0555   WBC AUTO x10*3/uL 10.0 11.0 8.5   HEMOGLOBIN g/dL 11.7* 11.2* 11.9*    HEMATOCRIT % 37.6 34.2* 39.2   PLATELETS AUTO x10*3/uL 269 247 251     Results from last 72 hours   Lab Units 12/12/24  0512 12/11/24  0421 12/10/24  0555   SODIUM mmol/L 140 141 141   POTASSIUM mmol/L 3.1* 3.3* 3.5   CHLORIDE mmol/L 94* 93* 95*   CO2 mmol/L 41* 42* 42*   BUN mg/dL 22 22 21   CREATININE mg/dL 0.57 0.60 0.68   GLUCOSE mg/dL 93 93 108*   CALCIUM mg/dL 9.1 9.0 9.1   ANION GAP mmol/L 8* 9* 8*   EGFR mL/min/1.73m*2 >90 >90 >90         Estimated Creatinine Clearance: 109 mL/min (by C-G formula based on SCr of 0.57 mg/dL).  CRP   Date Value Ref Range Status   12/09/2021 5.0 (H) 0 - 2.0 MG/DL Final     Comment:     Performed at [place] [id number]   12/08/2021 12.3 (H) 0 - 2.0 MG/DL Final     Comment:     Performed at [address] [address]   12/07/2021 23.2 (H) 0 - 2.0 MG/DL Final     Comment:     Performed at [place] [id number]     Microbiology  Blood Culture pending      Imaging  Vascular US lower extremity venous duplex bilateral    Result Date: 12/9/2024  Interpreted By:  Savita Dominguez, STUDY: [place] [facility] US LOWER EXTREMITY VENOUS DUPLEX BILATERAL;  12/9/2024 1:27 pm   INDICATION: Signs/Symptoms:asymmetric edema.   ,L03.119 Cellulitis of unspecified part of limb,R60.0 Localized edema   COMPARISON: None.   ACCESSION NUMBER(S): [id number]   ORDERING CLINICIAN: GARRISON AGUAYO   TECHNIQUE: Vascular ultrasound of the bilateral lower extremities was performed. Real-time compression views as well as Gray scale, color Doppler and spectral Doppler waveform analysis was performed.   FINDINGS: Evaluation of the visualized portions of the bilateral common femoral vein, proximal, mid, and distal femoral vein, and popliteal vein were performed.  Evaluation of the visualized portions of the  posterior tibial and peroneal veins were also performed.     The evaluated veins demonstrate normal compressibility. There is intact venous flow  demonstrating normal respiratory variability and normal augmentation of flow with calf compression. Therefore, there is no ultrasonographic evidence for deep vein thrombosis within the evaluated veins.         No sonographic evidence for deep vein thrombosis within the evaluated veins of the bilateral lower extremity.   MACRO: None   Signed by: Savita Dominguez 12/9/2024 2:29 PM Dictation workstation:   [id number]    ECG 12 lead    Result Date: 12/9/2024  Atrial fibrillation ST & T wave abnormality, consider inferior ischemia Prolonged QT Abnormal ECG When compared with ECG of 13-NOV-2024 00:25, ST more depressed in Lateral leads T wave inversion less evident in Lateral leads Confirmed by Robert Carvalho ([id number]) on 12/9/2024 10:16:24 AM    XR chest 2 views    Result Date: 12/8/2024  Interpreted By:  Mikey Hough, STUDY: XR CHEST 2 VIEWS;  12/8/2024 7:14 pm   INDICATION: Signs/Symptoms:shortness of breath, concern for worsening CHF vs pneumonia.   COMPARISON: 11/13/2024   ACCESSION NUMBER(S): [id number]   ORDERING CLINICIAN: FLAQUITO TOMAS   FINDINGS: There is stable cardiomegaly. There is mild vascular congestive change and basilar atelectasis. No significant pleural effusion. No pneumothorax.       Cardiomegaly with mild vascular congestive change and basilar atelectasis.   MACRO: None   Signed by: Mikey Hough 12/8/2024 7:52 PM Dictation workstation:   [id number]    ECG 12 lead    Result Date: 11/13/2024  Atrial fibrillation with rapid ventricular response with premature ventricular or aberrantly conducted complexes Nonspecific ST and T wave abnormality Abnormal ECG When compared with ECG of 02-OCT-2024 19:25, ST less depressed in Lateral leads Confirmed by Servando Lundberg ([id number]) on 11/13/2024 12:17:34 PM    CT head wo IV contrast    Result Date: 11/13/2024  Interpreted By:  Mikey Hough, STUDY: CT HEAD WO IV CONTRAST;  11/13/2024 1:52 am   INDICATION: Signs/Symptoms:Trauma.   COMPARISON: 10/2/2024   ACCESSION  NUMBER(S): [id number]   ORDERING CLINICIAN: IMER PAGE   TECHNIQUE: Contiguous axial images of the head were obtained without intravenous contrast.   FINDINGS: BRAIN PARENCHYMA:   The gray white matter differentiation is preserved.  No mass effect or midline shift.   HEMORRHAGE:  No evidence of acute intracranial hemorrhage. VENTRICLES AND EXTRA-AXIAL SPACES:  The ventricles are within normal limits in size for brain volume.  No evidence of abnormal extraaxial fluid collection. EXTRACRANIAL SOFT TISSUES:  Posterior scalp hematoma and laceration. PARANASAL SINUSES/MASTOIDS:  Mild peripheral mucosal thickening of maxillary sinuses. CALVARIUM:  No evidence of depressed calvarial fracture.   OTHER FINDINGS:  None       No evidence of acute intracranial hemorrhage or depressed calvarial fracture.   Posterior scalp hematoma and soft tissue laceration.   MACRO: None   Signed by: Mikey Hough 11/13/2024 2:00 AM Dictation workstation:   [id number]    XR chest 1 view    Result Date: 11/13/2024  Interpreted By:  Arden Berkowitz, STUDY: XR CHEST 1 VIEW;  11/13/2024 1:15 am   INDICATION: Signs/Symptoms:Syncope.   COMPARISON: CXR 10/02/2024   ACCESSION NUMBER(S): [id number]   ORDERING CLINICIAN: IMER PAGE   FINDINGS:     Patchy opacities in the bilateral bases, similar to slightly decreased compared to prior. Low lung volumes.   No pleural effusion or pneumothorax.   Cardiomediastinal silhouette is stable in size and configuration.   Upper abdomen is unremarkable.   No acute osseous abnormality.       1. Similar to slight decrease in the bilateral patchy opacities compared to 10/02/2024. Differential diagnosis includes residual pneumonia, aspiration pneumonitis, or atelectasis.   MACRO: None   Signed by: Arden Berkowitz 11/13/2024 1:33 AM Dictation workstation:   [id number]           Assessment/Plan   Acute on chronic respiratory failure  Acute on chronic diastolic heart failure  Right lower extremity cellulitis-risk  factor is lymphedema   Lymphedema       IV ceftriaxone  Follow-up blood cultures  Bilateral lower extremity compression once patient is able to tolerate  Monitor WBC and temperature  Diuresis per cardiology  Oxygen as needed  Evaluate for transition to oral antibiotic therapy in 1 to 2 days        Total time spent caring for the patient today was 20 minutes.  This includes time spent before the visit reviewing the chart, time spent during the visit, and time spent after the visit on documentation.  Greer Mccoy, APRN-CNP

## 2024-12-12 NOTE — NURSING NOTE
Assumed care of patient at this time, patient is resting in bed with brake in place and call light in reach denies any needs. Bentley BRANNON made aware of need for supplemental K

## 2024-12-12 NOTE — CARE PLAN
The patient's goals for the shift include      The clinical goals for the shift include vitals will remain stable and patient will elevate BLE      Problem: Pain - Adult  Goal: Verbalizes/displays adequate comfort level or baseline comfort level  Outcome: Progressing     Problem: Safety - Adult  Goal: Free from fall injury  Outcome: Progressing     Problem: Discharge Planning  Goal: Discharge to home or other facility with appropriate resources  Outcome: Progressing     Problem: Chronic Conditions and Co-morbidities  Goal: Patient's chronic conditions and co-morbidity symptoms are monitored and maintained or improved  Outcome: Progressing     Problem: Fall/Injury  Goal: Verbalize understanding of personal risk factors for fall in the hospital  Outcome: Progressing  Goal: Verbalize understanding of risk factor reduction measures to prevent injury from fall in the home  Outcome: Progressing  Goal: Not fall by end of shift  Outcome: Progressing  Goal: Be free from injury by end of the shift  Outcome: Progressing  Goal: Use assistive devices by end of the shift  Outcome: Progressing  Goal: Pace activities to prevent fatigue by end of the shift  Outcome: Progressing     Problem: Respiratory  Goal: Wean oxygen to maintain O2 saturation per order/standard this shift  Outcome: Progressing  Goal: Increase self care and/or family involvement in next 24 hours  Outcome: Progressing  Goal: Minimal/no exertional discomfort or dyspnea this shift  Outcome: Progressing  Goal: No signs of respiratory distress (eg. Use of accessory muscles. Peds grunting)  Outcome: Progressing     Problem: Nutrition  Goal: Oral intake greater 75%  Outcome: Progressing  Goal: Reduce weight from edema/fluid  Outcome: Progressing     Problem: Heart Failure  Goal: Improved gas exchange this shift  Outcome: Progressing  Goal: Improved urinary output this shift  Outcome: Progressing  Goal: Reduction in peripheral edema within 24 hours  Outcome:  Progressing  Goal: Report improvement of dyspnea/breathlessness this shift  Outcome: Progressing  Goal: Weight from fluid excess reduced over 2-3 days, then stabilize  Outcome: Progressing  Goal: Increase self care and/or family involvement in 24 hours  Outcome: Progressing     Problem: Pain  Goal: Takes deep breaths with improved pain control throughout the shift  Outcome: Progressing  Goal: Turns in bed with improved pain control throughout the shift  Outcome: Progressing  Goal: Walks with improved pain control throughout the shift  Outcome: Progressing  Goal: Performs ADL's with improved pain control throughout shift  Outcome: Progressing  Goal: Participates in PT with improved pain control throughout the shift  Outcome: Progressing  Goal: Free from opioid side effects throughout the shift  Outcome: Progressing  Goal: Free from acute confusion related to pain meds throughout the shift  Outcome: Progressing     Problem: Bathing  Goal: LTG - Patient will utilize adaptive techniques to bathe body independent with AE as needed  Outcome: Progressing     Problem: Dressings Lower Extremities  Goal: LTG - Patient will dress lower body independent with set up  Outcome: Progressing     Problem: Toileting  Goal: STG - Patient will complete toileting tasks with 2ww independent  Outcome: Progressing

## 2024-12-12 NOTE — ASSESSMENT & PLAN NOTE
Major contribution  Disposition  Pending cardiology and infectious diseases recommendations  Most likely discharge home with home health care  External referral for home health care has been ordered

## 2024-12-12 NOTE — PROGRESS NOTES
Vickie Foster is a 68 y.o. female on day 4 of admission presenting with CHF (congestive heart failure), NYHA class I, acute on chronic, combined.      Subjective   Patient is sitting up in chair with no new complaints.  She states that she thinks her legs are much better and they feel much better.  She feels that the compression wraps are helping.  She denies any chest pain, shortness breath, or abdominal pain.  She denies headache, dizziness, nausea/vomiting.       Objective     Last Recorded Vitals  /69 (BP Location: Right arm, Patient Position: Sitting)   Pulse 85   Temp 36.3 °C (97.3 °F) (Oral)   Resp 18   Wt 105 kg (231 lb)   SpO2 94%   Intake/Output last 3 Shifts:    Intake/Output Summary (Last 24 hours) at 12/12/2024 1046  Last data filed at 12/12/2024 1005  Gross per 24 hour   Intake 1175 ml   Output --   Net 1175 ml       Admission Weight  Weight: 105 kg (231 lb) (12/08/24 1818)    Daily Weight  12/08/24 : 105 kg (231 lb)          Physical Exam    Relevant Results               Assessment/Plan                  Assessment & Plan  Acute on chronic respiratory failure with hypoxia and hypercapnia (Multi)  Wean oxygen as able  CHF (congestive heart failure), NYHA class I, acute on chronic, combined  Lasix 80 mg IV twice daily  Low-salt diet  1600 cc fluid restriction  Bilateral lower extremity compression  Cardiology following  Cellulitis  Right lower extremity markedly more erythematous  Ultrasound negative for DVT  Blood cultures pending  Continue cefazolin  Infectious diseases following  Atrial fib/flutter, transient (Multi)  Continue metoprolol, Eliquis, and dofetilide.  COPD (chronic obstructive pulmonary disease) (Multi)  Do not feel that she is having a current exacerbation  Continue home maintenance inhalers.  Obesity  Major contribution  Disposition  Pending cardiology and infectious diseases recommendations  Most likely discharge home with home health care  External referral for home  health care has been ordered                KEVON Hawk-CNP

## 2024-12-12 NOTE — PROGRESS NOTES
Occupational Therapy    Occupational Therapy Treatment    Name: Vickie Foster  MRN: [id number]  Department: [address]  Room: [facility]429  Date: 12/12/24  Time Calculation  Start Time: 0217  Stop Time: 0258  Time Calculation (min): 41 min    Assessment:  Barriers to Discharge Home: No anticipated barriers  Medical Staff Made Aware: Yes  End of Session Communication: Bedside nurse  End of Session Patient Position: Up in chair, Alarm off, not on at start of session  Plan:  Treatment Interventions: ADL retraining, Functional transfer training, Endurance training, Patient/family training, Neuromuscular reeducation, Compensatory technique education  OT Frequency: 4 times per week  OT Discharge Recommendations: Low intensity level of continued care  OT Recommended Transfer Status: Assist of 1  OT - OK to Discharge: Yes    Subjective   Previous Visit Info:  OT Last Visit  OT Received On: 12/12/24  General:  General  Patient Position Received: Up in chair, Alarm off, not on at start of session  Preferred Learning Style: visual, verbal, written  General Comment: pleasant and agreeable to therapy  Precautions:       Vital Signs (Past 2hrs)        Date/Time Vitals Session Patient Position Pulse Resp SpO2 BP MAP (mmHg)    12/12/24 1417 --  --  --  --  93 %  --  --                   Vital Signs Comment: 2 liters oxygen PRN, not on at this time due to nosebleed    Pain Assessment:  Pain Assessment  Pain Assessment: 0-10  0-10 (Numeric) Pain Score: 0 - No pain  Pain Interventions:  (n/a,n pain)     Objective   Cognition:  Overall Cognitive Status: Within Functional Limits  Activities of Daily Living: LE Dressing  LE Dressing Comments: reviewed AE with patient, educated pt in LB dressing with sock aide and [id number], pt plans on purchasing for home use.    Functional Standing Tolerance:     Bed Mobility/Transfers: Transfer 1  Transfer From 1: Sit to  Transfer to 1: Stand  Technique 1: Sit to stand  Transfer Device 1: Walker  Transfer  Level of Assistance 1: Close supervision  Trials/Comments 1: to arrange chair    Tub Transfers  Tub Transfer Technique: Ambulating  Tub Transfers Comments: writer educated pt through writer agueda of safe tub chair transfer tech, pt verbalized understanding    Outcome Measures:  [facility] Daily Activity  Putting on and taking off regular lower body clothing: A lot  Bathing (including washing, rinsing, drying): A lot  Putting on and taking off regular upper body clothing: A little  Toileting, which includes using toilet, bedpan or urinal: A little  Taking care of personal grooming such as brushing teeth: A little  Eating Meals: None  Daily Activity - Total Score: 17        Education Documentation  Body Mechanics, taught by LUNA Cheema at 12/12/2024  2:53 PM.  Learner: Patient  Readiness: Acceptance  Method: Explanation, Handout  Response: Verbalizes Understanding    Home Exercise Program, taught by LUNA Cheema at 12/12/2024  2:53 PM.  Learner: Patient  Readiness: Acceptance  Method: Explanation, Handout  Response: Verbalizes Understanding    ADL Training, taught by LUNA Cheema at 12/12/2024  2:53 PM.  Learner: Patient  Readiness: Acceptance  Method: Explanation, Handout  Response: Verbalizes Understanding    Education Comments  No comments found.      Goals:  Encounter Problems       Encounter Problems (Active)       Bathing       LTG - Patient will utilize adaptive techniques to bathe body independent with AE as needed (Progressing)       Start:  12/10/24    Expected End:  01/10/25               Dressings Lower Extremities       LTG - Patient will dress lower body independent with set up (Progressing)       Start:  12/10/24    Expected End:  01/10/25               Functional Mobility       Perform functional mobility a household distance with 2ww independent (Progressing)       Start:  12/10/24    Expected End:  01/10/25               Toileting       STG - Patient will complete toileting tasks  with 2ww independent (Progressing)       Start:  12/10/24    Expected End:  01/10/25

## 2024-12-12 NOTE — PROGRESS NOTES
"Vickie Foster is a 68 y.o. female on day 4 of admission presenting with CHF (congestive heart failure), NYHA class I, acute on chronic, combined.    Subjective   Patient sitting up in chair this morning.  Denies chest pain, pressure or palpitations.       Objective     Physical Exam  Cardiovascular:      Rate and Rhythm: Normal rate and regular rhythm.      Heart sounds: No murmur heard.     No friction rub. No gallop.   Pulmonary:      Effort: Pulmonary effort is normal.      Breath sounds: Normal breath sounds. No wheezing, rhonchi or rales.      Comments: Diminished breath sounds in bilateral lung bases.  Abdominal:      General: Bowel sounds are normal.      Palpations: Abdomen is soft.   Musculoskeletal:      Right lower leg: Edema present.      Left lower leg: Edema present.   Skin:     General: Skin is warm and dry.   Neurological:      Mental Status: She is alert.         Last Recorded Vitals  Blood pressure 109/69, pulse 85, temperature 36.3 °C (97.3 °F), temperature source Oral, resp. rate 18, height 1.575 m (5' 2.01\"), weight 105 kg (231 lb), SpO2 94%.  Intake/Output last 3 Shifts:  I/O last 3 completed shifts:  In: 925 (8.8 mL/kg) [P.O.:825; IV Piggyback:100]  Out: - (0 mL/kg)   Weight: 104.8 kg     Relevant Results  Results for orders placed or performed during the hospital encounter of 12/08/24 (from the past 24 hours)   Basic Metabolic Panel   Result Value Ref Range    Glucose 93 74 - 99 mg/dL    Sodium 140 136 - 145 mmol/L    Potassium 3.1 (L) 3.5 - 5.3 mmol/L    Chloride 94 (L) 98 - 107 mmol/L    Bicarbonate 41 (HH) 21 - 32 mmol/L    Anion Gap 8 (L) 10 - 20 mmol/L    Urea Nitrogen 22 6 - 23 mg/dL    Creatinine 0.57 0.50 - 1.05 mg/dL    eGFR >90 >60 mL/min/1.73m*2    Calcium 9.1 8.6 - 10.3 mg/dL   CBC   Result Value Ref Range    WBC 10.0 4.4 - 11.3 x10*3/uL    nRBC 0.0 0.0 - 0.0 /100 WBCs    RBC 3.92 (L) 4.00 - 5.20 x10*6/uL    Hemoglobin 11.7 (L) 12.0 - 16.0 g/dL    Hematocrit 37.6 36.0 - 46.0 % "    MCV 96 80 - 100 fL    MCH 29.8 26.0 - 34.0 pg    MCHC 31.1 (L) 32.0 - 36.0 g/dL    RDW 15.5 (H) 11.5 - 14.5 %    Platelets 269 150 - 450 x10*3/uL     *Note: Due to a large number of results and/or encounters for the requested time period, some results have not been displayed. A complete set of results can be found in Results Review.                   Assessment/Plan   Assessment & Plan  CHF (congestive heart failure), NYHA class I, acute on chronic, combined    Acute on chronic respiratory failure with hypoxia and hypercapnia (Multi)    Atrial fib/flutter, transient (Multi)    Cellulitis    COPD (chronic obstructive pulmonary disease) (Multi)    Obesity      Acute on chronic diastolic heart failure  Lymphedema  Cellulitis bilateral lower extremities  Paroxysmal atrial fibrillation  Hypertensive disorder  Hyperlipidemia  Morbid obesity  History of stroke  Chronic respiratory failure with chronic O2     Overall impression:     12/9: As above, presents predominately for worsening peripheral edema for 3 weeks and worsening shortness of breath over the past week.  She has had multiple recurrences over the past year for worsening peripheral edema.  I am now suspecting that she has a component of lymphedema given her obesity.  Additionally her recurrent echocardiogram showed preserved ejection fraction with a minimal diastolic dysfunction.  She does have a chronic O2 requirement and a chest x-ray reveals mild fluid volume overload with a low BNP of 148.  Her troponins are flat negative.  Certainly diuresis is the answer to the patient's problem currently.  She states compliance with outpatient medications.  In the outpatient setting she takes torsemide 50 mg twice daily.  She has been initiated on IV furosemide.  Will increase this.  Creatinine stable at 0.65.  Will utilize IV furosemide 80 mg twice daily.  Given the level of diuresis needed as well as a current potassium 3.1 with repletion given yesterday, will order  for 40 mill colons potassium twice today and 1 time tomorrow.  Will check potassium level in the morning.  Echo from previous hospitalization is adequate for this hospitalization.  Concerning her atrial fibrillation surprisingly her EKG did show a rate controlled atrial flutter on admission.  On telemetry now she is currently in a sinus rhythm.  Will continue to monitor.  She does continue on apixaban for stroke risk reduction.  Hemoglobin stable at 12.6.  Overall stable from a respiratory perspective but is significantly fluid overloaded likely require at least 2 to 3 days inpatient hospitalization to achieve a euvolemic state.  Have discussed thoroughly with the patient the importance of reducing her sodium intake and weighing herself daily.  Have consulted cardiac rehab.  Will follow with you.     12/10: Improved over the past 24 hours.  Continues on IV diuresis.  Diuresed approximately 1650 mL over the past 24 hours.  Patient's bicarb was noted to have risen yesterday to 43.  Has minimally improved today to 42.  Will continue to monitor this, if the patient's bicarb rises further we will likely reduce the IV diuretic dosing, however would like to remain aggressive given the degree of fluid volume overload.  Her lungs are diminished but clear otherwise.  Breathing has improved.  At baseline per patient.  Her peripheral edema is now a +3 with no weeping and the cellulitis to her right lower extremity appears to be improving.  She is chest pain-free.  Her blood pressure is stable most recent 112/60.  Creatinine 0.68 with a hemoglobin 11.9.  On telemetry monitor she remained in a rate controlled atrial fibrillation without significant ectopy.  Dissipated further 3 to 4 days hospitalization to achieve a euvolemic state.  Will follow with you.     12/11: Continues to slowly improve.  Chest pain-free.  Breathing comfortably nasal cannula oxygen.  Lungs probably clear but mildly diminished in bases.  Remains with  peripheral edema which is improving to now +2 to +3.  Continue on IV diuresis.  Her bicarb remains unchanged at 42.  Creatinine has actually improved to 0.60.  Her fluid balance is listed as +950 over the past 24 hours which I do not believe is accurate.  I believe the patient is actually negative.  Will continue with current IV diuresis at 80 mg twice daily.  Anticipate a further 2 to 3 days.  Will follow with you.      12/12: Patient continues to improve daily.  Sitting up in the chair this morning and denies chest pain or pressure.  She is currently on room air, but states she is still dyspneic with ambulation. Remains with bilateral lower extremity edema. Compression wraps were applied by the bedside RN today. Again, intakes and outputs have not been accurately recorded unfortunately due to the patient's incontinence.  Blood pressures have been normotensive last recorded 109/69.  I reviewed lab work this morning revealed sodium of 140, potassium 3.1, creatinine of 0.57.  Bicarb remains elevated but improved at 41.  I am going to order a chest x-ray to be completed at the bedside to evaluate for pulmonary vascular congestion.  Will continue IV furosemide 80 mg twice daily and follow this patient with you.        Wendy Rodrigues, APRN-CNP

## 2024-12-13 LAB
ANION GAP BLDA CALCULATED.4IONS-SCNC: 0 MMO/L (ref 10–25)
ANION GAP SERPL CALCULATED.3IONS-SCNC: 9 MMOL/L (ref 10–20)
APPARATUS: ABNORMAL
ARTERIAL PATENCY WRIST A: POSITIVE
BACTERIA BLD CULT: NORMAL
BACTERIA BLD CULT: NORMAL
BASE EXCESS BLDA CALC-SCNC: 16.7 MMOL/L (ref -2–3)
BODY TEMPERATURE: 37 DEGREES CELSIUS
BUN SERPL-MCNC: 20 MG/DL (ref 6–23)
CA-I BLDA-SCNC: 1.21 MMOL/L (ref 1.1–1.33)
CALCIUM SERPL-MCNC: 9.3 MG/DL (ref 8.6–10.3)
CHLORIDE BLDA-SCNC: 96 MMOL/L (ref 98–107)
CHLORIDE SERPL-SCNC: 94 MMOL/L (ref 98–107)
CO2 SERPL-SCNC: 40 MMOL/L (ref 21–32)
CREAT SERPL-MCNC: 0.54 MG/DL (ref 0.5–1.05)
EGFRCR SERPLBLD CKD-EPI 2021: >90 ML/MIN/1.73M*2
ERYTHROCYTE [DISTWIDTH] IN BLOOD BY AUTOMATED COUNT: 15.4 % (ref 11.5–14.5)
FLOW: 2 LPM
GLUCOSE BLDA-MCNC: 120 MG/DL (ref 74–99)
GLUCOSE SERPL-MCNC: 95 MG/DL (ref 74–99)
HCO3 BLDA-SCNC: 42.7 MMOL/L (ref 22–26)
HCT VFR BLD AUTO: 36.3 % (ref 36–46)
HCT VFR BLD EST: 38 % (ref 36–46)
HGB BLD-MCNC: 11.7 G/DL (ref 12–16)
HGB BLDA-MCNC: 12.5 G/DL (ref 12–16)
INHALED O2 CONCENTRATION: 28 %
LACTATE BLDA-SCNC: 1.1 MMOL/L (ref 0.4–2)
MCH RBC QN AUTO: 29.9 PG (ref 26–34)
MCHC RBC AUTO-ENTMCNC: 32.2 G/DL (ref 32–36)
MCV RBC AUTO: 93 FL (ref 80–100)
NRBC BLD-RTO: 0 /100 WBCS (ref 0–0)
OXYHGB MFR BLDA: 95.7 % (ref 94–98)
PCO2 BLDA: 56 MM HG (ref 38–42)
PH BLDA: 7.49 PH (ref 7.38–7.42)
PLATELET # BLD AUTO: 268 X10*3/UL (ref 150–450)
PO2 BLDA: 91 MM HG (ref 85–95)
POTASSIUM BLDA-SCNC: 3.5 MMOL/L (ref 3.5–5.3)
POTASSIUM SERPL-SCNC: 3.2 MMOL/L (ref 3.5–5.3)
RBC # BLD AUTO: 3.91 X10*6/UL (ref 4–5.2)
SAO2 % BLDA: 97 % (ref 94–100)
SODIUM BLDA-SCNC: 135 MMOL/L (ref 136–145)
SODIUM SERPL-SCNC: 140 MMOL/L (ref 136–145)
SPECIMEN DRAWN FROM PATIENT: ABNORMAL
WBC # BLD AUTO: 9.8 X10*3/UL (ref 4.4–11.3)

## 2024-12-13 PROCEDURE — 97110 THERAPEUTIC EXERCISES: CPT | Mod: GP

## 2024-12-13 PROCEDURE — 80048 BASIC METABOLIC PNL TOTAL CA: CPT | Performed by: NURSE PRACTITIONER

## 2024-12-13 PROCEDURE — 97110 THERAPEUTIC EXERCISES: CPT | Mod: GO,CO

## 2024-12-13 PROCEDURE — 1200000002 HC GENERAL ROOM WITH TELEMETRY DAILY

## 2024-12-13 PROCEDURE — 2500000005 HC RX 250 GENERAL PHARMACY W/O HCPCS: Performed by: NURSE PRACTITIONER

## 2024-12-13 PROCEDURE — 97116 GAIT TRAINING THERAPY: CPT | Mod: GP

## 2024-12-13 PROCEDURE — 2500000004 HC RX 250 GENERAL PHARMACY W/ HCPCS (ALT 636 FOR OP/ED): Performed by: NURSE PRACTITIONER

## 2024-12-13 PROCEDURE — 84132 ASSAY OF SERUM POTASSIUM: CPT

## 2024-12-13 PROCEDURE — 99232 SBSQ HOSP IP/OBS MODERATE 35: CPT

## 2024-12-13 PROCEDURE — 94640 AIRWAY INHALATION TREATMENT: CPT

## 2024-12-13 PROCEDURE — 85027 COMPLETE CBC AUTOMATED: CPT | Performed by: NURSE PRACTITIONER

## 2024-12-13 PROCEDURE — 2500000001 HC RX 250 WO HCPCS SELF ADMINISTERED DRUGS (ALT 637 FOR MEDICARE OP)

## 2024-12-13 PROCEDURE — 2500000001 HC RX 250 WO HCPCS SELF ADMINISTERED DRUGS (ALT 637 FOR MEDICARE OP): Performed by: INTERNAL MEDICINE

## 2024-12-13 PROCEDURE — 36600 WITHDRAWAL OF ARTERIAL BLOOD: CPT

## 2024-12-13 PROCEDURE — 36415 COLL VENOUS BLD VENIPUNCTURE: CPT | Performed by: NURSE PRACTITIONER

## 2024-12-13 PROCEDURE — 2500000002 HC RX 250 W HCPCS SELF ADMINISTERED DRUGS (ALT 637 FOR MEDICARE OP, ALT 636 FOR OP/ED): Performed by: NURSE PRACTITIONER

## 2024-12-13 PROCEDURE — 99233 SBSQ HOSP IP/OBS HIGH 50: CPT | Performed by: NURSE PRACTITIONER

## 2024-12-13 RX ORDER — POTASSIUM CHLORIDE 20 MEQ/1
40 TABLET, EXTENDED RELEASE ORAL ONCE
Status: COMPLETED | OUTPATIENT
Start: 2024-12-13 | End: 2024-12-13

## 2024-12-13 RX ORDER — ACETAZOLAMIDE 500 MG/5ML
250 INJECTION, POWDER, LYOPHILIZED, FOR SOLUTION INTRAVENOUS ONCE
Status: CANCELLED | OUTPATIENT
Start: 2024-12-13 | End: 2024-12-13

## 2024-12-13 RX ORDER — ACETAZOLAMIDE 250 MG/1
250 TABLET ORAL 2 TIMES DAILY
Status: COMPLETED | OUTPATIENT
Start: 2024-12-13 | End: 2024-12-13

## 2024-12-13 RX ADMIN — Medication 2 L/MIN: at 08:34

## 2024-12-13 RX ADMIN — POTASSIUM CHLORIDE 20 MEQ: 1500 TABLET, EXTENDED RELEASE ORAL at 09:04

## 2024-12-13 RX ADMIN — METOPROLOL SUCCINATE 25 MG: 25 TABLET, EXTENDED RELEASE ORAL at 09:04

## 2024-12-13 RX ADMIN — APIXABAN 5 MG: 5 TABLET, FILM COATED ORAL at 20:59

## 2024-12-13 RX ADMIN — CEFTRIAXONE SODIUM 1 G: 1 INJECTION, SOLUTION INTRAVENOUS at 14:08

## 2024-12-13 RX ADMIN — ATORVASTATIN CALCIUM 40 MG: 40 TABLET, FILM COATED ORAL at 20:59

## 2024-12-13 RX ADMIN — DOFETILIDE 250 MCG: 0.25 CAPSULE ORAL at 09:04

## 2024-12-13 RX ADMIN — ACETAZOLAMIDE 250 MG: 250 TABLET ORAL at 20:59

## 2024-12-13 RX ADMIN — FUROSEMIDE 80 MG: 10 INJECTION, SOLUTION INTRAMUSCULAR; INTRAVENOUS at 09:04

## 2024-12-13 RX ADMIN — ALLOPURINOL 200 MG: 100 TABLET ORAL at 09:04

## 2024-12-13 RX ADMIN — POTASSIUM CHLORIDE 40 MEQ: 1500 TABLET, EXTENDED RELEASE ORAL at 11:18

## 2024-12-13 RX ADMIN — DOFETILIDE 250 MCG: 0.25 CAPSULE ORAL at 20:59

## 2024-12-13 RX ADMIN — Medication 2 L/MIN: at 21:00

## 2024-12-13 RX ADMIN — ACETAZOLAMIDE 250 MG: 250 TABLET ORAL at 14:08

## 2024-12-13 RX ADMIN — BUPROPION HYDROCHLORIDE 300 MG: 300 TABLET, EXTENDED RELEASE ORAL at 05:32

## 2024-12-13 RX ADMIN — ALLOPURINOL 200 MG: 100 TABLET ORAL at 20:59

## 2024-12-13 RX ADMIN — APIXABAN 5 MG: 5 TABLET, FILM COATED ORAL at 09:04

## 2024-12-13 RX ADMIN — FLUTICASONE FUROATE AND VILANTEROL TRIFENATATE 1 PUFF: 200; 25 POWDER RESPIRATORY (INHALATION) at 08:34

## 2024-12-13 ASSESSMENT — PAIN SCALES - GENERAL
PAINLEVEL_OUTOF10: 2
PAINLEVEL_OUTOF10: 0 - NO PAIN

## 2024-12-13 ASSESSMENT — COGNITIVE AND FUNCTIONAL STATUS - GENERAL
DRESSING REGULAR UPPER BODY CLOTHING: A LITTLE
MOBILITY SCORE: 17
TOILETING: A LITTLE
TURNING FROM BACK TO SIDE WHILE IN FLAT BAD: A LITTLE
STANDING UP FROM CHAIR USING ARMS: A LITTLE
WALKING IN HOSPITAL ROOM: A LITTLE
DRESSING REGULAR LOWER BODY CLOTHING: A LITTLE
DRESSING REGULAR UPPER BODY CLOTHING: A LITTLE
MOVING TO AND FROM BED TO CHAIR: A LITTLE
DRESSING REGULAR LOWER BODY CLOTHING: A LOT
PERSONAL GROOMING: A LITTLE
TOILETING: A LITTLE
PERSONAL GROOMING: A LITTLE
WALKING IN HOSPITAL ROOM: A LITTLE
WALKING IN HOSPITAL ROOM: A LITTLE
TURNING FROM BACK TO SIDE WHILE IN FLAT BAD: A LITTLE
DAILY ACTIVITIY SCORE: 19
MOVING FROM LYING ON BACK TO SITTING ON SIDE OF FLAT BED WITH BEDRAILS: A LITTLE
DRESSING REGULAR UPPER BODY CLOTHING: A LITTLE
HELP NEEDED FOR BATHING: A LITTLE
CLIMB 3 TO 5 STEPS WITH RAILING: A LOT
CLIMB 3 TO 5 STEPS WITH RAILING: A LITTLE
CLIMB 3 TO 5 STEPS WITH RAILING: A LITTLE
HELP NEEDED FOR BATHING: A LITTLE
TOILETING: A LITTLE
TURNING FROM BACK TO SIDE WHILE IN FLAT BAD: A LITTLE
HELP NEEDED FOR BATHING: A LITTLE
PERSONAL GROOMING: A LITTLE
MOVING TO AND FROM BED TO CHAIR: A LITTLE
STANDING UP FROM CHAIR USING ARMS: A LITTLE
STANDING UP FROM CHAIR USING ARMS: A LITTLE
DAILY ACTIVITIY SCORE: 18
DAILY ACTIVITIY SCORE: 19
MOVING TO AND FROM BED TO CHAIR: A LITTLE
MOBILITY SCORE: 19
MOBILITY SCORE: 19
DRESSING REGULAR LOWER BODY CLOTHING: A LITTLE

## 2024-12-13 ASSESSMENT — PAIN - FUNCTIONAL ASSESSMENT
PAIN_FUNCTIONAL_ASSESSMENT: 0-10

## 2024-12-13 ASSESSMENT — PAIN SCALES - WONG BAKER: WONGBAKER_NUMERICALRESPONSE: NO HURT

## 2024-12-13 NOTE — PROGRESS NOTES
"Vickie Foster is a 68 y.o. female on day 5 of admission presenting with CHF (congestive heart failure), NYHA class I, acute on chronic, combined.    Subjective   Patient sitting up in chair.  Denies chest pain, pressure or palpitations.       Objective     Physical Exam  Vitals reviewed.   Cardiovascular:      Rate and Rhythm: Normal rate and regular rhythm.      Heart sounds: No murmur heard.     No friction rub. No gallop.   Pulmonary:      Effort: Pulmonary effort is normal.      Breath sounds: Normal breath sounds. No wheezing, rhonchi or rales.   Abdominal:      General: Bowel sounds are normal.      Palpations: Abdomen is soft.   Musculoskeletal:      Right lower leg: Edema present.      Left lower leg: Edema present.   Skin:     General: Skin is warm and dry.      Comments: Right lower extremity redness noted   Neurological:      Mental Status: She is alert and oriented to person, place, and time.   Psychiatric:         Mood and Affect: Mood normal.         Behavior: Behavior normal.         Last Recorded Vitals  Blood pressure 114/72, pulse 87, temperature 36.9 °C (98.4 °F), temperature source Oral, resp. rate 17, height 1.575 m (5' 2.01\"), weight 105 kg (231 lb), SpO2 95%.  Intake/Output last 3 Shifts:  I/O last 3 completed shifts:  In: 825 (7.9 mL/kg) [P.O.:775; IV Piggyback:50]  Out: - (0 mL/kg)   Weight: 104.8 kg     Relevant Results  Results for orders placed or performed during the hospital encounter of 12/08/24 (from the past 24 hours)   Basic Metabolic Panel   Result Value Ref Range    Glucose 95 74 - 99 mg/dL    Sodium 140 136 - 145 mmol/L    Potassium 3.2 (L) 3.5 - 5.3 mmol/L    Chloride 94 (L) 98 - 107 mmol/L    Bicarbonate 40 (HH) 21 - 32 mmol/L    Anion Gap 9 (L) 10 - 20 mmol/L    Urea Nitrogen 20 6 - 23 mg/dL    Creatinine 0.54 0.50 - 1.05 mg/dL    eGFR >90 >60 mL/min/1.73m*2    Calcium 9.3 8.6 - 10.3 mg/dL   CBC   Result Value Ref Range    WBC 9.8 4.4 - 11.3 x10*3/uL    nRBC 0.0 0.0 - 0.0 " /100 WBCs    RBC 3.91 (L) 4.00 - 5.20 x10*6/uL    Hemoglobin 11.7 (L) 12.0 - 16.0 g/dL    Hematocrit 36.3 36.0 - 46.0 %    MCV 93 80 - 100 fL    MCH 29.9 26.0 - 34.0 pg    MCHC 32.2 32.0 - 36.0 g/dL    RDW 15.4 (H) 11.5 - 14.5 %    Platelets 268 150 - 450 x10*3/uL     *Note: Due to a large number of results and/or encounters for the requested time period, some results have not been displayed. A complete set of results can be found in Results Review.                   Assessment/Plan   Assessment & Plan  CHF (congestive heart failure), NYHA class I, acute on chronic, combined    Acute on chronic respiratory failure with hypoxia and hypercapnia (Multi)    Atrial fib/flutter, transient (Multi)    Cellulitis    COPD (chronic obstructive pulmonary disease) (Multi)    Obesity    Acute on chronic diastolic heart failure  Lymphedema  Cellulitis bilateral lower extremities  Paroxysmal atrial fibrillation  Hypertensive disorder  Hyperlipidemia  Morbid obesity  History of stroke  Chronic respiratory failure with chronic O2     Overall impression:     12/9: As above, presents predominately for worsening peripheral edema for 3 weeks and worsening shortness of breath over the past week.  She has had multiple recurrences over the past year for worsening peripheral edema.  I am now suspecting that she has a component of lymphedema given her obesity.  Additionally her recurrent echocardiogram showed preserved ejection fraction with a minimal diastolic dysfunction.  She does have a chronic O2 requirement and a chest x-ray reveals mild fluid volume overload with a low BNP of 148.  Her troponins are flat negative.  Certainly diuresis is the answer to the patient's problem currently.  She states compliance with outpatient medications.  In the outpatient setting she takes torsemide 50 mg twice daily.  She has been initiated on IV furosemide.  Will increase this.  Creatinine stable at 0.65.  Will utilize IV furosemide 80 mg twice daily.   Given the level of diuresis needed as well as a current potassium 3.1 with repletion given yesterday, will order for 40 mill colons potassium twice today and 1 time tomorrow.  Will check potassium level in the morning.  Echo from previous hospitalization is adequate for this hospitalization.  Concerning her atrial fibrillation surprisingly her EKG did show a rate controlled atrial flutter on admission.  On telemetry now she is currently in a sinus rhythm.  Will continue to monitor.  She does continue on apixaban for stroke risk reduction.  Hemoglobin stable at 12.6.  Overall stable from a respiratory perspective but is significantly fluid overloaded likely require at least 2 to 3 days inpatient hospitalization to achieve a euvolemic state.  Have discussed thoroughly with the patient the importance of reducing her sodium intake and weighing herself daily.  Have consulted cardiac rehab.  Will follow with you.     12/10: Improved over the past 24 hours.  Continues on IV diuresis.  Diuresed approximately 1650 mL over the past 24 hours.  Patient's bicarb was noted to have risen yesterday to 43.  Has minimally improved today to 42.  Will continue to monitor this, if the patient's bicarb rises further we will likely reduce the IV diuretic dosing, however would like to remain aggressive given the degree of fluid volume overload.  Her lungs are diminished but clear otherwise.  Breathing has improved.  At baseline per patient.  Her peripheral edema is now a +3 with no weeping and the cellulitis to her right lower extremity appears to be improving.  She is chest pain-free.  Her blood pressure is stable most recent 112/60.  Creatinine 0.68 with a hemoglobin 11.9.  On telemetry monitor she remained in a rate controlled atrial fibrillation without significant ectopy.  Dissipated further 3 to 4 days hospitalization to achieve a euvolemic state.  Will follow with you.     12/11: Continues to slowly improve.  Chest pain-free.   Breathing comfortably nasal cannula oxygen.  Lungs probably clear but mildly diminished in bases.  Remains with peripheral edema which is improving to now +2 to +3.  Continue on IV diuresis.  Her bicarb remains unchanged at 42.  Creatinine has actually improved to 0.60.  Her fluid balance is listed as +950 over the past 24 hours which I do not believe is accurate.  I believe the patient is actually negative.  Will continue with current IV diuresis at 80 mg twice daily.  Anticipate a further 2 to 3 days.  Will follow with you.       12/12: Patient continues to improve daily.  Sitting up in the chair this morning and denies chest pain or pressure.  She is currently on room air, but states she is still dyspneic with ambulation. Remains with bilateral lower extremity edema. Compression wraps were applied by the bedside RN today. Again, intakes and outputs have not been accurately recorded unfortunately due to the patient's incontinence.  Blood pressures have been normotensive last recorded 109/69.  I reviewed lab work this morning revealed sodium of 140, potassium 3.1, creatinine of 0.57.  Bicarb remains elevated but improved at 41.  I am going to order a chest x-ray to be completed at the bedside to evaluate for pulmonary vascular congestion.  Will continue IV furosemide 80 mg twice daily and follow this patient with you.    12/13: As above.  Patient sitting up in chair this morning denies chest pain, pressure or palpitations. Patient remains on supplemental oxygen via nasal cannula with pulse oximetry of 95%.  Of note, she does use nasal cannula oxygen at night at home and intermittently during the day.  Blood pressures overall normotensive last 1 at 114/72.  Chest x-ray completed yesterday similar to the prior study with patchy bibasilar atelectasis and/or infiltrate.  She does continue to have bilateral lower extremity edema, but the patient does have chronic lymphedema at baseline. Additionally, her bicarbonate  level remains elevated at 40. ABG was drawn revealing pH of 7.49 and pCO2 of 56. Will give the patient oral diamox 250 mg BID x2 doses. Additionally, will hold her IV lasix at this time. Will continue to follow this patient with you.       Wendy Rodrigues, KEVON-CNP

## 2024-12-13 NOTE — PROGRESS NOTES
Physical Therapy    Physical Therapy Treatment    Patient Name: Vickie Foster  MRN: [id number]  Department: [address]  Room: [address] [place] [address]  Today's Date: 12/13/2024  Time Calculation  Start Time: 1208  Stop Time: 1231  Time Calculation (min): 23 min         Assessment/Plan   PT Assessment  End of Session Communication: Bedside nurse  Assessment Comment: pt demonstrating slow improvement in functional mobility; supervision for safe ambulation with RW, increasing distance ambulated. remains a fall risk.  End of Session Patient Position: Up in chair, Alarm off, not on at start of session  PT Plan  Inpatient/Swing Bed or Outpatient: Inpatient  PT Plan  Treatment/Interventions: Bed mobility, Transfer training, Gait training, Balance training, Stair training, Strengthening, Endurance training, Therapeutic exercise, Therapeutic activity  PT Plan: Ongoing PT  PT Frequency: 4 times per week  PT Discharge Recommendations: Moderate intensity level of continued care  PT Recommended Transfer Status: Assist x1  PT - OK to Discharge: Yes (with skilled physical therapy services at next level of care)      General Visit Information:   PT  Visit  PT Received On: 12/13/24  Response to Previous Treatment: Patient with no complaints from previous session.  General  Prior to Session Communication: Bedside nurse  Patient Position Received: Up in chair, Alarm off, not on at start of session  General Comment: pt cleared for therapy by nursing, seated in chair upon arrival and agreeable to therapy.    Subjective   Precautions:  Precautions  Medical Precautions: Fall precautions, Oxygen therapy device and L/min    Objective   Pain:  Pain Assessment  Pain Assessment: 0-10  0-10 (Numeric) Pain Score: 0 - No pain  Cognition:  Cognition  Overall Cognitive Status: Within Functional Limits  Orientation Level: Oriented X4    Activity Tolerance:  Activity Tolerance  Endurance: Decreased tolerance for upright activites  Treatments:  Therapeutic  Exercise  Therapeutic Exercise Performed: Yes (performed seated in chair, BLEs)  Therapeutic Exercise Activity 1: AP x 20  Therapeutic Exercise Activity 2: LAQ x 15  Therapeutic Exercise Activity 3: hip flex x 15  Therapeutic Exercise Activity 4:  (lunch tray arrived, educated on more ther ex to perform throughout the day, benefits of mobility/risks of bed rest, encouraged to ambulate with assist)    Bed Mobility  Bed Mobility: No    Ambulation/Gait Training  Ambulation/Gait Training Performed: Yes  Ambulation/Gait Training 1  Surface 1: Level tile  Device 1: Rolling walker  Assistance 1: Close supervision  Comments/Distance (ft) 1: 30'x2 with RW; demonstrates decreased una, forward flexed posture, decreased step length, decreased heel strike.  Transfers  Transfer: Yes  Transfer 1  Technique 1: Sit to stand, Stand to sit  Transfer Device 1: Walker  Transfer Level of Assistance 1: Close supervision  Trials/Comments 1: cues for safe hand placement, forward flexed posture.    Stairs  Stairs: No    Outcome Measures:  [facility] Basic Mobility  Turning from your back to your side while in a flat bed without using bedrails: A little  Moving from lying on your back to sitting on the side of a flat bed without using bedrails: A little  Moving to and from bed to chair (including a wheelchair): A little  Standing up from a chair using your arms (e.g. wheelchair or bedside chair): A little  To walk in hospital room: A little  Climbing 3-5 steps with railing: A lot  Basic Mobility - Total Score: 17    IP EDUCATION:  Outpatient Education  Individual(s) Educated: Patient  Education Provided: Body Mechanics, Fall Risk, Posture, Home Exercise Program  Risk and Benefits Discussed with Patient/Caregiver/Other: yes  Patient/Caregiver Demonstrated Understanding: yes  Plan of Care Discussed and Agreed Upon: yes    Encounter Problems       Encounter Problems (Active)       Mobility       Bed mobility including supine to sit and sit to  supine independently. (Progressing)       Start:  12/10/24    Expected End:  12/24/24            Ambulate 60 feet with rolling walker and supervision. (Progressing)       Start:  12/10/24    Expected End:  12/24/24            Negotiate 1 curb step with rolling walker and mod assist. (Progressing)       Start:  12/10/24    Expected End:  12/24/24               PT Transfers       Transfers including sit to stand and stand to sit modified independent. (Progressing)       Start:  12/10/24    Expected End:  12/24/24               Pain - Adult

## 2024-12-13 NOTE — PROGRESS NOTES
12/13/24 0813   Discharge Planning   Expected Discharge Disposition Home H  ([facility])     Will need external referral for home health upon discharge

## 2024-12-13 NOTE — ASSESSMENT & PLAN NOTE
Lasix 80 mg IV twice daily-most likely transition to oral diuretics tomorrow  Low-salt diet  1600 cc fluid restriction  Bilateral lower extremity compression  Cardiology following

## 2024-12-13 NOTE — NURSING NOTE
Wendy Rodrigues, KEVON-CNP in with pt at this time, pt being assisted tot he chaunceyoodamian, walker being employed...

## 2024-12-13 NOTE — ASSESSMENT & PLAN NOTE
Major contribution  Disposition  Be ready for discharge once transition to oral diuretics and oral antibiotics  Most likely discharge home with home health care  External referral for home health care has been ordered

## 2024-12-13 NOTE — PROGRESS NOTES
Occupational Therapy    Occupational Therapy Treatment    Name: Vickie Foster  MRN: [id number]  Department: [address]  Room: [facility]-  Date: 12/13/24  Time Calculation  Start Time: 0133  Stop Time: 0158  Time Calculation (min): 25 min    Assessment:  Barriers to Discharge Home: No anticipated barriers  Medical Staff Made Aware: Yes  End of Session Communication: Bedside nurse  End of Session Patient Position: Up in chair, Alarm off, not on at start of session  Plan:  Treatment Interventions: ADL retraining, Functional transfer training, Endurance training, Patient/family training, Neuromuscular reeducation, Compensatory technique education  OT Frequency: 4 times per week  OT Discharge Recommendations: Low intensity level of continued care  OT Recommended Transfer Status: Assist of 1  OT - OK to Discharge: Yes    Subjective   Previous Visit Info:  OT Last Visit  OT Received On: 12/13/24  General:  General  Prior to Session Communication: Bedside nurse  Patient Position Received: Up in chair, Alarm off, not on at start of session  Preferred Learning Style: visual, verbal, written  General Comment: pt cleared for therapy by nursing, seated in chair upon arrival and agreeable to therapy.  Precautions:  Medical Precautions: Fall precautions    Vital Signs (Past 2hrs)           Vital Signs Comment: 2 liters oxygen PRN, not on at this time due to nosebleed    Pain Assessment:  Pain Assessment  Pain Assessment: 0-10  0-10 (Numeric) Pain Score: 0 - No pain  Pain Interventions:  (n/a,n pain)  Response to Interventions: Other (Comment) (n/a)     Objective   Cognition:  Overall Cognitive Status: Within Functional Limits   Therapy/Activity: Therapeutic Exercise  Therapeutic Exercise Performed: Yes  Therapeutic Exercise Activity 1: seated tricep punches with B UE's, no resistance, 1 set of 10 reps  Therapeutic Exercise Activity 2: educated pt in/completed seated R shoulder ER , flexion and abduction at table top level due to  shoulder limits x 5 reps with 5 sec hold.  Outcome Measures:  [facility] Daily Activity  Putting on and taking off regular lower body clothing: A little  Bathing (including washing, rinsing, drying): A little  Putting on and taking off regular upper body clothing: A little  Toileting, which includes using toilet, bedpan or urinal: A little  Taking care of personal grooming such as brushing teeth: A little  Eating Meals: None  Daily Activity - Total Score: 19        Education Documentation  Body Mechanics, taught by LUNA Cheema at 12/13/2024  1:56 PM.  Learner: Patient  Readiness: Acceptance  Method: Explanation, Demonstration  Response: Verbalizes Understanding, Demonstrated Understanding, Needs Reinforcement    Home Exercise Program, taught by LUNA Cheema at 12/13/2024  1:56 PM.  Learner: Patient  Readiness: Acceptance  Method: Explanation, Demonstration  Response: Verbalizes Understanding, Demonstrated Understanding, Needs Reinforcement    ADL Training, taught by LUNA Cheema at 12/13/2024  1:56 PM.  Learner: Patient  Readiness: Acceptance  Method: Explanation, Demonstration  Response: Verbalizes Understanding, Demonstrated Understanding, Needs Reinforcement    Body Mechanics, taught by LUNA Cheema at 12/12/2024  2:53 PM.  Learner: Patient  Readiness: Acceptance  Method: Explanation, Handout  Response: Verbalizes Understanding    Home Exercise Program, taught by LUNA Cheema at 12/12/2024  2:53 PM.  Learner: Patient  Readiness: Acceptance  Method: Explanation, Handout  Response: Verbalizes Understanding    ADL Training, taught by LUNA Cheema at 12/12/2024  2:53 PM.  Learner: Patient  Readiness: Acceptance  Method: Explanation, Handout  Response: Verbalizes Understanding    Education Comments  No comments found.      Goals:  Encounter Problems       Encounter Problems (Active)       Bathing       LTG - Patient will utilize adaptive techniques to bathe body independent  with AE as needed (Progressing)       Start:  12/10/24    Expected End:  01/10/25               Dressings Lower Extremities       LTG - Patient will dress lower body independent with set up (Progressing)       Start:  12/10/24    Expected End:  01/10/25               Functional Mobility       Perform functional mobility a household distance with 2ww independent (Progressing)       Start:  12/10/24    Expected End:  01/10/25               Toileting       STG - Patient will complete toileting tasks with 2ww independent (Progressing)       Start:  12/10/24    Expected End:  01/10/25

## 2024-12-13 NOTE — TELEPHONE ENCOUNTER
Tyler is calling to get verbal order for home care for patient.  Patient was d/c'd to home yesterday.  Please call Tyler at [phone], opt 1

## 2024-12-13 NOTE — TELEPHONE ENCOUNTER
Yes that should be fine.  If anything cardiac related is needed though those orders should come through the cardiologist

## 2024-12-13 NOTE — CARE PLAN
The patient's goals for the shift include      The clinical goals for the shift include decrease compression, elevate legs    Over the shift, the patient did not make progress toward the following goals. Barriers to progression include . Recommendations to address these barriers include   Problem: Pain - Adult  Goal: Verbalizes/displays adequate comfort level or baseline comfort level  Outcome: Progressing     Problem: Safety - Adult  Goal: Free from fall injury  Outcome: Progressing     Problem: Fall/Injury  Goal: Verbalize understanding of personal risk factors for fall in the hospital  Outcome: Progressing  Goal: Verbalize understanding of risk factor reduction measures to prevent injury from fall in the home  Outcome: Progressing  Goal: Not fall by end of shift  Outcome: Progressing  Goal: Be free from injury by end of the shift  Outcome: Progressing  Goal: Use assistive devices by end of the shift  Outcome: Progressing  Goal: Pace activities to prevent fatigue by end of the shift  Outcome: Progressing     Problem: Respiratory  Goal: Wean oxygen to maintain O2 saturation per order/standard this shift  Outcome: Progressing  Goal: Increase self care and/or family involvement in next 24 hours  Outcome: Progressing  Goal: Minimal/no exertional discomfort or dyspnea this shift  Outcome: Progressing  Goal: No signs of respiratory distress (eg. Use of accessory muscles. Peds grunting)  Outcome: Progressing     Problem: Nutrition  Goal: Oral intake greater 75%  Outcome: Progressing  Goal: Reduce weight from edema/fluid  Outcome: Progressing     Problem: Heart Failure  Goal: Improved gas exchange this shift  Outcome: Progressing  Goal: Improved urinary output this shift  Outcome: Progressing  Goal: Reduction in peripheral edema within 24 hours  Outcome: Progressing  Goal: Report improvement of dyspnea/breathlessness this shift  Outcome: Progressing  Goal: Weight from fluid excess reduced over 2-3 days, then  stabilize  Outcome: Progressing  Goal: Increase self care and/or family involvement in 24 hours  Outcome: Progressing     Problem: Pain  Goal: Takes deep breaths with improved pain control throughout the shift  Outcome: Progressing  Goal: Turns in bed with improved pain control throughout the shift  Outcome: Progressing  Goal: Walks with improved pain control throughout the shift  Outcome: Progressing  Goal: Performs ADL's with improved pain control throughout shift  Outcome: Progressing  Goal: Participates in PT with improved pain control throughout the shift  Outcome: Progressing  Goal: Free from opioid side effects throughout the shift  Outcome: Progressing  Goal: Free from acute confusion related to pain meds throughout the shift  Outcome: Progressing   .

## 2024-12-13 NOTE — TELEPHONE ENCOUNTER
Called [phone]. Let them know Dr. Gonzalez is willing to follow and anything cardiac needs to come from cardiologist.    7

## 2024-12-13 NOTE — PROGRESS NOTES
Vickie Foster is a 68 y.o. female on day 5 of admission presenting with CHF (congestive heart failure), NYHA class I, acute on chronic, combined.      Subjective   Patient examined sitting she states that her legs feel much better.  She denies pain.       Objective     Last Recorded Vitals  /72 (BP Location: Right arm, Patient Position: Sitting)   Pulse 87   Temp 36.9 °C (98.4 °F) (Oral)   Resp 17   Wt 105 kg (231 lb)   SpO2 95%   Intake/Output last 3 Shifts:    Intake/Output Summary (Last 24 hours) at 12/13/2024 1043  Last data filed at 12/12/2024 1818  Gross per 24 hour   Intake 575 ml   Output --   Net 575 ml       Admission Weight  Weight: 105 kg (231 lb) (12/08/24 1818)    Daily Weight  12/08/24 : 105 kg (231 lb)          Physical Exam  Constitutional: No acute distress, calm, cooperative  HEENT: PERRL, normocephalic, atraumatic, mucous membranes moist  Cardiovascular: Regular rhythm and rate,   Respiratory: Lungs clear to auscultation,   Gastrointestinal: Bowel sounds positive x 4, soft, nontender  Neurologic: Alert and oriented x 3 equal strength bilaterally  Musculoskeletal: Able to move all extremities, bilateral lower extremity edema much improved  Skin: Warm, dry and intact               Assessment/Plan                  Assessment & Plan  Acute on chronic respiratory failure with hypoxia and hypercapnia (Multi)  Wean oxygen as able  CHF (congestive heart failure), NYHA class I, acute on chronic, combined  Lasix 80 mg IV twice daily-most likely transition to oral diuretics tomorrow  Low-salt diet  1600 cc fluid restriction  Bilateral lower extremity compression  Cardiology following  Cellulitis  Right lower extremity markedly more erythematous  Ultrasound negative for DVT  Blood cultures pending  Continue cefazolin  Infectious diseases following  Atrial fib/flutter, transient (Multi)  Continue metoprolol, Eliquis, and dofetilide.  COPD (chronic obstructive pulmonary disease) (Multi)  Do not  feel that she is having a current exacerbation  Continue home maintenance inhalers.  Obesity  Major contribution  Disposition  Be ready for discharge once transition to oral diuretics and oral antibiotics  Most likely discharge home with home health care  External referral for home health care has been ordered                KEVON Hawk-CNP

## 2024-12-13 NOTE — PROGRESS NOTES
Vickie Foster is a 68 y.o. female on day 5 of admission presenting with CHF (congestive heart failure), NYHA class I, acute on chronic, combined.    Subjective   Interval History:   Afebrile, denies chills  Denies shortness of breath  Denies nausea, vomiting, diarrhea  States pain to the right leg is improved.  She was able to ambulate to the bathroom without pain.  Right leg is nontender to palpation, erythema is resolving            Objective   Range of Vitals (last 24 hours)  Heart Rate:  [76-87]   Temp:  [36.4 °C (97.5 °F)-36.9 °C (98.4 °F)]   Resp:  [17-18]   BP: (113-123)/(59-72)   SpO2:  [93 %-97 %]   Daily Weight  12/08/24 : 105 kg (231 lb)    Body mass index is 42.24 kg/m².    Physical Exam  Constitutional:       Appearance: Normal appearance.   HENT:      Head: Normocephalic and atraumatic.   Eyes:      Extraocular Movements: Extraocular movements intact.      Conjunctiva/sclera: Conjunctivae normal.   Cardiovascular:      Rate and Rhythm: Normal rate.   Pulmonary:      Effort: Pulmonary effort is normal.      Breath sounds: Normal breath sounds.      Comments: Decreased bilateral breath sounds  Abdominal:      General: Bowel sounds are normal.      Palpations: Abdomen is soft.      Tenderness: There is no abdominal tenderness.   Musculoskeletal:         General: Normal range of motion.      Cervical back: Normal range of motion and neck supple.      Right lower leg: Edema present.      Left lower leg: Edema present.   Skin:     General: Skin is warm.      Comments: Right lower extremity dressing intact   neurological:      General: No focal deficit present.      Mental Status: She is alert and oriented to person, place, and time.   Psychiatric:         Mood and Affect: Mood normal.         Behavior: Behavior normal.     Antibiotics  cefTRIAXone - 1 gram/50 mL    Relevant Results  Labs  Results from last 72 hours   Lab Units 12/13/24  0552 12/12/24  0512 12/11/24  0421   WBC AUTO x10*3/uL 9.8 10.0 11.0    HEMOGLOBIN g/dL 11.7* 11.7* 11.2*   HEMATOCRIT % 36.3 37.6 34.2*   PLATELETS AUTO x10*3/uL 268 269 247     Results from last 72 hours   Lab Units 12/13/24  0552 12/12/24  0512 12/11/24  0421   SODIUM mmol/L 140 140 141   POTASSIUM mmol/L 3.2* 3.1* 3.3*   CHLORIDE mmol/L 94* 94* 93*   CO2 mmol/L 40* 41* 42*   BUN mg/dL 20 22 22   CREATININE mg/dL 0.54 0.57 0.60   GLUCOSE mg/dL 95 93 93   CALCIUM mg/dL 9.3 9.1 9.0   ANION GAP mmol/L 9* 8* 9*   EGFR mL/min/1.73m*2 >90 >90 >90         Estimated Creatinine Clearance: 115.1 mL/min (by C-G formula based on SCr of 0.54 mg/dL).  CRP   Date Value Ref Range Status   12/09/2021 5.0 (H) 0 - 2.0 MG/DL Final     Comment:     Performed at [address] [id number]   12/08/2021 12.3 (H) 0 - 2.0 MG/DL Final     Comment:     Performed at [address] [address]   12/07/2021 23.2 (H) 0 - 2.0 MG/DL Final     Comment:     Performed at [address] [address]     Microbiology  Blood cultures finalized with no growth      Imaging  XR chest 1 view    Result Date: 12/12/2024  Interpreted By:  Clif Benavidez, STUDY: XR CHEST 1 VIEW; 12/12/2024 4:00 pm   INDICATION: CLINICAL INFORMATION: Signs/Symptoms:CHF. Leg swelling   COMPARISON: 12/08/2024   ACCESSION NUMBER(S): [id number]   ORDERING CLINICIAN: ANICETO CHAPIN   TECHNIQUE: Portable chest one view.   FINDINGS: The cardiac size is indeterminate in view of the AP projection. Patchy bibasilar atelectasis and/or infiltrate is present, similar compared to the prior study.       Patchy bibasilar atelectasis and/or infiltrate is present, similar compared to the prior study.   MACRO: none   Signed by: Clif Benavidez 12/12/2024 4:27 PM Dictation workstation:   [id number]    Vascular US lower extremity venous duplex bilateral    Result Date: 12/9/2024  Interpreted By:  Savita Dominguez, STUDY: [facility] US LOWER EXTREMITY VENOUS DUPLEX BILATERAL;  12/9/2024 1:27 pm   INDICATION:  Signs/Symptoms:asymmetric edema.   ,L03.119 Cellulitis of unspecified part of limb,R60.0 Localized edema   COMPARISON: None.   ACCESSION NUMBER(S): [id number]   ORDERING CLINICIAN: GARRISON AGUAYO   TECHNIQUE: Vascular ultrasound of the bilateral lower extremities was performed. Real-time compression views as well as Gray scale, color Doppler and spectral Doppler waveform analysis was performed.   FINDINGS: Evaluation of the visualized portions of the bilateral common femoral vein, proximal, mid, and distal femoral vein, and popliteal vein were performed.  Evaluation of the visualized portions of the  posterior tibial and peroneal veins were also performed.     The evaluated veins demonstrate normal compressibility. There is intact venous flow demonstrating normal respiratory variability and normal augmentation of flow with calf compression. Therefore, there is no ultrasonographic evidence for deep vein thrombosis within the evaluated veins.         No sonographic evidence for deep vein thrombosis within the evaluated veins of the bilateral lower extremity.   MACRO: None   Signed by: Savita Dominguez 12/9/2024 2:29 PM Dictation workstation:   [id number]    ECG 12 lead    Result Date: 12/9/2024  Atrial fibrillation ST & T wave abnormality, consider inferior ischemia Prolonged QT Abnormal ECG When compared with ECG of 13-NOV-2024 00:25, ST more depressed in Lateral leads T wave inversion less evident in Lateral leads Confirmed by Robert Carvalho ([id number]) on 12/9/2024 10:16:24 AM    XR chest 2 views    Result Date: 12/8/2024  Interpreted By:  Mikey Hough, STUDY: XR CHEST 2 VIEWS;  12/8/2024 7:14 pm   INDICATION: Signs/Symptoms:shortness of breath, concern for worsening CHF vs pneumonia.   COMPARISON: 11/13/2024   ACCESSION NUMBER(S): [id number]   ORDERING CLINICIAN: FLAQUITO TOMAS   FINDINGS: There is stable cardiomegaly. There is mild vascular congestive change and basilar atelectasis. No significant pleural  effusion. No pneumothorax.       Cardiomegaly with mild vascular congestive change and basilar atelectasis.   MACRO: None   Signed by: Mikey Hough 12/8/2024 7:52 PM Dictation workstation:   [id number]         Assessment/Plan   Acute on chronic respiratory failure  Acute on chronic diastolic heart failure  Right lower extremity cellulitis-risk factor is lymphedema-resolving  Lymphedema       Continue IV ceftriaxone inpatient  Follow-up blood cultures  Bilateral lower extremity compression once patient is able to tolerate  Monitor WBC and temperature  Diuresis per cardiology  Oxygen as needed  Discharge plan: transition to p.o. Keflex 500 mg 4 times a day for total of 14 days through 12/23/2024        Total time spent caring for the patient today was 20 minutes.  This includes time spent before the visit reviewing the chart, time spent during the visit, and time spent after the visit on documentation.  Michelle Modi, APRN-CNP

## 2024-12-14 PROBLEM — E87.3 METABOLIC ALKALOSIS: Status: ACTIVE | Noted: 2024-12-14

## 2024-12-14 LAB
ANION GAP BLDV CALCULATED.4IONS-SCNC: 5 MMOL/L (ref 10–25)
ANION GAP SERPL CALCULATED.3IONS-SCNC: 10 MMOL/L (ref 10–20)
BASE EXCESS BLDV CALC-SCNC: 11.4 MMOL/L (ref -2–3)
BODY TEMPERATURE: 37 DEGREES CELSIUS
BUN SERPL-MCNC: 21 MG/DL (ref 6–23)
CA-I BLDV-SCNC: 1.23 MMOL/L (ref 1.1–1.33)
CALCIUM SERPL-MCNC: 9.2 MG/DL (ref 8.6–10.3)
CHLORIDE BLDV-SCNC: 96 MMOL/L (ref 98–107)
CHLORIDE SERPL-SCNC: 96 MMOL/L (ref 98–107)
CO2 SERPL-SCNC: 37 MMOL/L (ref 21–32)
CREAT SERPL-MCNC: 0.6 MG/DL (ref 0.5–1.05)
EGFRCR SERPLBLD CKD-EPI 2021: >90 ML/MIN/1.73M*2
ERYTHROCYTE [DISTWIDTH] IN BLOOD BY AUTOMATED COUNT: 15.2 % (ref 11.5–14.5)
GLUCOSE BLD MANUAL STRIP-MCNC: 102 MG/DL (ref 74–99)
GLUCOSE BLDV-MCNC: 149 MG/DL (ref 74–99)
GLUCOSE SERPL-MCNC: 97 MG/DL (ref 74–99)
HCO3 BLDV-SCNC: 37.8 MMOL/L (ref 22–26)
HCT VFR BLD AUTO: 38.1 % (ref 36–46)
HCT VFR BLD EST: 36 % (ref 36–46)
HGB BLD-MCNC: 12.1 G/DL (ref 12–16)
HGB BLDV-MCNC: 12 G/DL (ref 12–16)
INHALED O2 CONCENTRATION: 28 %
LACTATE BLDV-SCNC: 1.7 MMOL/L (ref 0.4–2)
MAGNESIUM SERPL-MCNC: 2.11 MG/DL (ref 1.6–2.4)
MCH RBC QN AUTO: 30.6 PG (ref 26–34)
MCHC RBC AUTO-ENTMCNC: 31.8 G/DL (ref 32–36)
MCV RBC AUTO: 97 FL (ref 80–100)
NRBC BLD-RTO: 0 /100 WBCS (ref 0–0)
OXYHGB MFR BLDV: 83.3 % (ref 45–75)
PCO2 BLDV: 57 MM HG (ref 41–51)
PH BLDV: 7.43 PH (ref 7.33–7.43)
PLATELET # BLD AUTO: 263 X10*3/UL (ref 150–450)
PO2 BLDV: 51 MM HG (ref 35–45)
POTASSIUM BLDV-SCNC: 3.2 MMOL/L (ref 3.5–5.3)
POTASSIUM SERPL-SCNC: 2.7 MMOL/L (ref 3.5–5.3)
POTASSIUM SERPL-SCNC: 3.5 MMOL/L (ref 3.5–5.3)
RBC # BLD AUTO: 3.95 X10*6/UL (ref 4–5.2)
SAO2 % BLDV: 85 % (ref 45–75)
SODIUM BLDV-SCNC: 136 MMOL/L (ref 136–145)
SODIUM SERPL-SCNC: 140 MMOL/L (ref 136–145)
WBC # BLD AUTO: 8.8 X10*3/UL (ref 4.4–11.3)

## 2024-12-14 PROCEDURE — 84132 ASSAY OF SERUM POTASSIUM: CPT

## 2024-12-14 PROCEDURE — 99233 SBSQ HOSP IP/OBS HIGH 50: CPT | Performed by: NURSE PRACTITIONER

## 2024-12-14 PROCEDURE — 36415 COLL VENOUS BLD VENIPUNCTURE: CPT

## 2024-12-14 PROCEDURE — 2500000001 HC RX 250 WO HCPCS SELF ADMINISTERED DRUGS (ALT 637 FOR MEDICARE OP): Performed by: INTERNAL MEDICINE

## 2024-12-14 PROCEDURE — 2500000002 HC RX 250 W HCPCS SELF ADMINISTERED DRUGS (ALT 637 FOR MEDICARE OP, ALT 636 FOR OP/ED): Performed by: INTERNAL MEDICINE

## 2024-12-14 PROCEDURE — 2500000001 HC RX 250 WO HCPCS SELF ADMINISTERED DRUGS (ALT 637 FOR MEDICARE OP)

## 2024-12-14 PROCEDURE — 82947 ASSAY GLUCOSE BLOOD QUANT: CPT

## 2024-12-14 PROCEDURE — 83735 ASSAY OF MAGNESIUM: CPT | Performed by: NURSE PRACTITIONER

## 2024-12-14 PROCEDURE — 2500000004 HC RX 250 GENERAL PHARMACY W/ HCPCS (ALT 636 FOR OP/ED): Performed by: NURSE PRACTITIONER

## 2024-12-14 PROCEDURE — 2500000002 HC RX 250 W HCPCS SELF ADMINISTERED DRUGS (ALT 637 FOR MEDICARE OP, ALT 636 FOR OP/ED)

## 2024-12-14 PROCEDURE — 2500000005 HC RX 250 GENERAL PHARMACY W/O HCPCS: Performed by: NURSE PRACTITIONER

## 2024-12-14 PROCEDURE — 1200000002 HC GENERAL ROOM WITH TELEMETRY DAILY

## 2024-12-14 PROCEDURE — 2500000002 HC RX 250 W HCPCS SELF ADMINISTERED DRUGS (ALT 637 FOR MEDICARE OP, ALT 636 FOR OP/ED): Performed by: NURSE PRACTITIONER

## 2024-12-14 PROCEDURE — 2500000001 HC RX 250 WO HCPCS SELF ADMINISTERED DRUGS (ALT 637 FOR MEDICARE OP): Performed by: NURSE PRACTITIONER

## 2024-12-14 PROCEDURE — 85027 COMPLETE CBC AUTOMATED: CPT

## 2024-12-14 PROCEDURE — 99232 SBSQ HOSP IP/OBS MODERATE 35: CPT

## 2024-12-14 PROCEDURE — 80048 BASIC METABOLIC PNL TOTAL CA: CPT

## 2024-12-14 PROCEDURE — 94640 AIRWAY INHALATION TREATMENT: CPT

## 2024-12-14 RX ORDER — POTASSIUM CHLORIDE 1.5 G/1.58G
40 POWDER, FOR SOLUTION ORAL ONCE
Status: COMPLETED | OUTPATIENT
Start: 2024-12-14 | End: 2024-12-14

## 2024-12-14 RX ORDER — POTASSIUM CHLORIDE 20 MEQ/1
40 TABLET, EXTENDED RELEASE ORAL ONCE
Status: DISCONTINUED | OUTPATIENT
Start: 2024-12-14 | End: 2024-12-14

## 2024-12-14 RX ORDER — POTASSIUM CHLORIDE 20 MEQ/1
20 TABLET, EXTENDED RELEASE ORAL ONCE
Status: COMPLETED | OUTPATIENT
Start: 2024-12-14 | End: 2024-12-14

## 2024-12-14 RX ORDER — SPIRONOLACTONE 25 MG/1
25 TABLET ORAL DAILY
Status: DISCONTINUED | OUTPATIENT
Start: 2024-12-14 | End: 2024-12-16

## 2024-12-14 RX ORDER — POTASSIUM CHLORIDE 14.9 MG/ML
20 INJECTION INTRAVENOUS ONCE
Status: DISCONTINUED | OUTPATIENT
Start: 2024-12-14 | End: 2024-12-14

## 2024-12-14 RX ORDER — FUROSEMIDE 10 MG/ML
40 INJECTION INTRAMUSCULAR; INTRAVENOUS 2 TIMES DAILY
Status: DISCONTINUED | OUTPATIENT
Start: 2024-12-15 | End: 2024-12-16

## 2024-12-14 RX ORDER — ACETAZOLAMIDE 250 MG/1
250 TABLET ORAL 2 TIMES DAILY
Status: COMPLETED | OUTPATIENT
Start: 2024-12-14 | End: 2024-12-16

## 2024-12-14 RX ORDER — POTASSIUM CHLORIDE 20 MEQ/1
40 TABLET, EXTENDED RELEASE ORAL
Status: DISCONTINUED | OUTPATIENT
Start: 2024-12-15 | End: 2024-12-17 | Stop reason: HOSPADM

## 2024-12-14 RX ADMIN — CEFTRIAXONE SODIUM 1 G: 1 INJECTION, SOLUTION INTRAVENOUS at 14:54

## 2024-12-14 RX ADMIN — APIXABAN 5 MG: 5 TABLET, FILM COATED ORAL at 20:41

## 2024-12-14 RX ADMIN — ALLOPURINOL 200 MG: 100 TABLET ORAL at 09:41

## 2024-12-14 RX ADMIN — SPIRONOLACTONE 25 MG: 25 TABLET ORAL at 09:43

## 2024-12-14 RX ADMIN — ACETAMINOPHEN 650 MG: 325 TABLET ORAL at 21:23

## 2024-12-14 RX ADMIN — DOFETILIDE 250 MCG: 0.25 CAPSULE ORAL at 09:41

## 2024-12-14 RX ADMIN — POTASSIUM CHLORIDE 40 MEQ: 1.5 FOR SOLUTION ORAL at 09:41

## 2024-12-14 RX ADMIN — ATORVASTATIN CALCIUM 40 MG: 40 TABLET, FILM COATED ORAL at 20:41

## 2024-12-14 RX ADMIN — METOPROLOL SUCCINATE 25 MG: 25 TABLET, EXTENDED RELEASE ORAL at 09:41

## 2024-12-14 RX ADMIN — Medication 2 L/MIN: at 20:00

## 2024-12-14 RX ADMIN — POTASSIUM CHLORIDE 20 MEQ: 1500 TABLET, EXTENDED RELEASE ORAL at 09:41

## 2024-12-14 RX ADMIN — Medication 21 PERCENT: at 07:34

## 2024-12-14 RX ADMIN — POTASSIUM CHLORIDE 20 MEQ: 1500 TABLET, EXTENDED RELEASE ORAL at 10:34

## 2024-12-14 RX ADMIN — ACETAZOLAMIDE 250 MG: 250 TABLET ORAL at 20:41

## 2024-12-14 RX ADMIN — POTASSIUM CHLORIDE 20 MEQ: 1500 TABLET, EXTENDED RELEASE ORAL at 18:23

## 2024-12-14 RX ADMIN — ACETAZOLAMIDE 250 MG: 250 TABLET ORAL at 09:41

## 2024-12-14 RX ADMIN — ALLOPURINOL 200 MG: 100 TABLET ORAL at 20:40

## 2024-12-14 RX ADMIN — APIXABAN 5 MG: 5 TABLET, FILM COATED ORAL at 09:42

## 2024-12-14 RX ADMIN — BUPROPION HYDROCHLORIDE 300 MG: 300 TABLET, EXTENDED RELEASE ORAL at 05:34

## 2024-12-14 RX ADMIN — ACETAMINOPHEN 650 MG: 325 TABLET ORAL at 01:41

## 2024-12-14 RX ADMIN — FLUTICASONE FUROATE AND VILANTEROL TRIFENATATE 1 PUFF: 200; 25 POWDER RESPIRATORY (INHALATION) at 07:32

## 2024-12-14 RX ADMIN — DOFETILIDE 250 MCG: 0.25 CAPSULE ORAL at 20:41

## 2024-12-14 ASSESSMENT — PAIN SCALES - GENERAL
PAINLEVEL_OUTOF10: 1
PAINLEVEL_OUTOF10: 0 - NO PAIN
PAINLEVEL_OUTOF10: 3
PAINLEVEL_OUTOF10: 6

## 2024-12-14 ASSESSMENT — COGNITIVE AND FUNCTIONAL STATUS - GENERAL
TOILETING: A LITTLE
MOBILITY SCORE: 20
DRESSING REGULAR UPPER BODY CLOTHING: A LITTLE
HELP NEEDED FOR BATHING: A LITTLE
CLIMB 3 TO 5 STEPS WITH RAILING: A LITTLE
MOVING TO AND FROM BED TO CHAIR: A LITTLE
STANDING UP FROM CHAIR USING ARMS: A LITTLE
WALKING IN HOSPITAL ROOM: A LITTLE
DRESSING REGULAR LOWER BODY CLOTHING: A LITTLE
DAILY ACTIVITIY SCORE: 20

## 2024-12-14 ASSESSMENT — PAIN - FUNCTIONAL ASSESSMENT
PAIN_FUNCTIONAL_ASSESSMENT: 0-10
PAIN_FUNCTIONAL_ASSESSMENT: 0-10

## 2024-12-14 ASSESSMENT — PAIN SCALES - WONG BAKER: WONGBAKER_NUMERICALRESPONSE: HURTS EVEN MORE

## 2024-12-14 ASSESSMENT — PAIN DESCRIPTION - LOCATION
LOCATION: HEAD
LOCATION: HEAD

## 2024-12-14 ASSESSMENT — PAIN DESCRIPTION - DESCRIPTORS: DESCRIPTORS: ACHING;DULL;HEADACHE

## 2024-12-14 NOTE — ASSESSMENT & PLAN NOTE
Currently on Diamox and spironolactone  Low-salt diet  1600 cc fluid restriction  Bilateral lower extremity compression  Cardiology following

## 2024-12-14 NOTE — PROGRESS NOTES
Vickie Foster is a 68 y.o. female on day 6 of admission presenting with CHF (congestive heart failure), NYHA class I, acute on chronic, combined.    Subjective   Interval History:   Afebrile, denies chills  Denies shortness of breath  Denies nausea, vomiting, diarrhea  Right leg pain has resolved.  Able to ambulate without any pain          Objective   Range of Vitals (last 24 hours)  Heart Rate:  [75-77]   Temp:  [36.6 °C (97.9 °F)-36.7 °C (98.1 °F)]   Resp:  [18-19]   BP: (111-114)/(53-68)   SpO2:  [95 %-100 %]   Daily Weight  12/08/24 : 105 kg (231 lb)    Body mass index is 42.24 kg/m².    Physical Exam  Constitutional:       Appearance: Normal appearance.   HENT:      Head: Normocephalic and atraumatic.   Eyes:      Extraocular Movements: Extraocular movements intact.      Conjunctiva/sclera: Conjunctivae normal.   Cardiovascular:      Rate and Rhythm: Normal rate.   Pulmonary:      Effort: Pulmonary effort is normal.      Breath sounds: Normal breath sounds.      Comments: Decreased bilateral breath sounds  Abdominal:      General: Bowel sounds are normal.      Palpations: Abdomen is soft.      Tenderness: There is no abdominal tenderness.   Musculoskeletal:         General: Normal range of motion.      Cervical back: Normal range of motion and neck supple.      Right lower leg: Edema present.      Left lower leg: Edema present.   Skin:     General: Skin is warm.      Comments: Right lower extremity dressing intact   neurological:      General: No focal deficit present.      Mental Status: She is alert and oriented to person, place, and time.   Psychiatric:         Mood and Affect: Mood normal.         Behavior: Behavior normal.     Antibiotics  cefTRIAXone - 1 gram/50 mL    Relevant Results  Labs  Results from last 72 hours   Lab Units 12/14/24  0732 12/13/24  0552 12/12/24  0512   WBC AUTO x10*3/uL 8.8 9.8 10.0   HEMOGLOBIN g/dL 12.1 11.7* 11.7*   HEMATOCRIT % 38.1 36.3 37.6   PLATELETS AUTO x10*3/uL 263  268 269     Results from last 72 hours   Lab Units 12/14/24  0732 12/13/24  0552 12/12/24  0512   SODIUM mmol/L 140 140 140   POTASSIUM mmol/L 2.7* 3.2* 3.1*   CHLORIDE mmol/L 96* 94* 94*   CO2 mmol/L 37* 40* 41*   BUN mg/dL 21 20 22   CREATININE mg/dL 0.60 0.54 0.57   GLUCOSE mg/dL 97 95 93   CALCIUM mg/dL 9.2 9.3 9.1   ANION GAP mmol/L 10 9* 8*   EGFR mL/min/1.73m*2 >90 >90 >90         Estimated Creatinine Clearance: 103.6 mL/min (by C-G formula based on SCr of 0.6 mg/dL).  CRP   Date Value Ref Range Status   12/09/2021 5.0 (H) 0 - 2.0 MG/DL Final     Comment:     Performed at [address] [id number]   12/08/2021 12.3 (H) 0 - 2.0 MG/DL Final     Comment:     Performed at [address] [address]   12/07/2021 23.2 (H) 0 - 2.0 MG/DL Final     Comment:     Performed at [place] [id number]     Microbiology  Blood cultures finalized with no growth      Imaging  XR chest 1 view    Result Date: 12/12/2024  Interpreted By:  Clif Benavidez, STUDY: XR CHEST 1 VIEW; 12/12/2024 4:00 pm   INDICATION: CLINICAL INFORMATION: Signs/Symptoms:CHF. Leg swelling   COMPARISON: 12/08/2024   ACCESSION NUMBER(S): [id number]   ORDERING CLINICIAN: ANICETO CHAPIN   TECHNIQUE: Portable chest one view.   FINDINGS: The cardiac size is indeterminate in view of the AP projection. Patchy bibasilar atelectasis and/or infiltrate is present, similar compared to the prior study.       Patchy bibasilar atelectasis and/or infiltrate is present, similar compared to the prior study.   MACRO: none   Signed by: Clif Benavidez 12/12/2024 4:27 PM Dictation workstation:   [id number]    Vascular US lower extremity venous duplex bilateral    Result Date: 12/9/2024  Interpreted By:  Savita Dominguez, STUDY: [facility] US LOWER EXTREMITY VENOUS DUPLEX BILATERAL;  12/9/2024 1:27 pm   INDICATION: Signs/Symptoms:asymmetric edema.   ,L03.119 Cellulitis of unspecified part of limb,R60.0 Localized edema    COMPARISON: None.   ACCESSION NUMBER(S): [id number]   ORDERING CLINICIAN: GARRISON AGUAYO   TECHNIQUE: Vascular ultrasound of the bilateral lower extremities was performed. Real-time compression views as well as Gray scale, color Doppler and spectral Doppler waveform analysis was performed.   FINDINGS: Evaluation of the visualized portions of the bilateral common femoral vein, proximal, mid, and distal femoral vein, and popliteal vein were performed.  Evaluation of the visualized portions of the  posterior tibial and peroneal veins were also performed.     The evaluated veins demonstrate normal compressibility. There is intact venous flow demonstrating normal respiratory variability and normal augmentation of flow with calf compression. Therefore, there is no ultrasonographic evidence for deep vein thrombosis within the evaluated veins.         No sonographic evidence for deep vein thrombosis within the evaluated veins of the bilateral lower extremity.   MACRO: None   Signed by: Savita Dominguez 12/9/2024 2:29 PM Dictation workstation:   [id number]    ECG 12 lead    Result Date: 12/9/2024  Atrial fibrillation ST & T wave abnormality, consider inferior ischemia Prolonged QT Abnormal ECG When compared with ECG of 13-NOV-2024 00:25, ST more depressed in Lateral leads T wave inversion less evident in Lateral leads Confirmed by Robert Carvalho ([id number]) on 12/9/2024 10:16:24 AM    XR chest 2 views    Result Date: 12/8/2024  Interpreted By:  Mikey Hough, STUDY: XR CHEST 2 VIEWS;  12/8/2024 7:14 pm   INDICATION: Signs/Symptoms:shortness of breath, concern for worsening CHF vs pneumonia.   COMPARISON: 11/13/2024   ACCESSION NUMBER(S): [id number]   ORDERING CLINICIAN: FLAQUITO TOMAS   FINDINGS: There is stable cardiomegaly. There is mild vascular congestive change and basilar atelectasis. No significant pleural effusion. No pneumothorax.       Cardiomegaly with mild vascular congestive change and basilar atelectasis.    MACRO: None   Signed by: Mikey Hough 12/8/2024 7:52 PM Dictation workstation:   [id number]      Assessment/Plan   Acute on chronic respiratory failure  Acute on chronic diastolic heart failure  Right lower extremity cellulitis-risk factor is lymphedema-resolving  Lymphedema       Continue IV ceftriaxone while inpatient  Bilateral lower extremity compression once patient is able to tolerate  Monitor WBC and temperature  Diuresis per cardiology  Oxygen as needed  Change to p.o. Keflex 500 mg 4 times a day for total of 14 days through 12/23/2024 at discharge, OK to discharge from infectious disease standpoint        Total time spent caring for the patient today was 20 minutes.  This includes time spent before the visit reviewing the chart, time spent during the visit, and time spent after the visit on documentation.  Michelle Modi, APRN-CNP

## 2024-12-14 NOTE — CARE PLAN
The patient's goals for the shift include      The clinical goals for the shift include safety    Over the shift, the patient did not make progress toward the following goals. Barriers to progression include . Recommendations to address these barriers include   Problem: Pain - Adult  Goal: Verbalizes/displays adequate comfort level or baseline comfort level  Outcome: Progressing     Problem: Safety - Adult  Goal: Free from fall injury  Outcome: Progressing     Problem: Fall/Injury  Goal: Verbalize understanding of personal risk factors for fall in the hospital  Outcome: Progressing  Goal: Verbalize understanding of risk factor reduction measures to prevent injury from fall in the home  Outcome: Progressing  Goal: Not fall by end of shift  Outcome: Progressing  Goal: Be free from injury by end of the shift  Outcome: Progressing  Goal: Use assistive devices by end of the shift  Outcome: Progressing  Goal: Pace activities to prevent fatigue by end of the shift  Outcome: Progressing     Problem: Respiratory  Goal: Wean oxygen to maintain O2 saturation per order/standard this shift  Outcome: Progressing  Goal: Increase self care and/or family involvement in next 24 hours  Outcome: Progressing  Goal: Minimal/no exertional discomfort or dyspnea this shift  Outcome: Progressing  Goal: No signs of respiratory distress (eg. Use of accessory muscles. Peds grunting)  Outcome: Progressing     Problem: Nutrition  Goal: Oral intake greater 75%  Outcome: Progressing  Goal: Reduce weight from edema/fluid  Outcome: Progressing     Problem: Heart Failure  Goal: Improved gas exchange this shift  Outcome: Progressing  Goal: Improved urinary output this shift  Outcome: Progressing  Goal: Reduction in peripheral edema within 24 hours  Outcome: Progressing  Goal: Report improvement of dyspnea/breathlessness this shift  Outcome: Progressing  Goal: Weight from fluid excess reduced over 2-3 days, then stabilize  Outcome: Progressing  Goal:  Increase self care and/or family involvement in 24 hours  Outcome: Progressing   .

## 2024-12-14 NOTE — PROGRESS NOTES
Vickie Foster is a 68 y.o. female on day 6 of admission presenting with CHF (congestive heart failure), NYHA class I, acute on chronic, combined.      Subjective   Patient examined sitting up in chair no complaints of pain.  She states that her legs feel much better.       Objective     Last Recorded Vitals  /53 (BP Location: Left arm, Patient Position: Sitting)   Pulse 77   Temp 36.6 °C (97.9 °F) (Oral)   Resp 19   Wt 105 kg (231 lb)   SpO2 95%   Intake/Output last 3 Shifts:    Intake/Output Summary (Last 24 hours) at 12/14/2024 1008  Last data filed at 12/13/2024 1502  Gross per 24 hour   Intake 325 ml   Output --   Net 325 ml       Admission Weight  Weight: 105 kg (231 lb) (12/08/24 1818)    Daily Weight  12/08/24 : 105 kg (231 lb)        Physical Exam  Constitutional: No acute distress, calm, cooperative  Cardiovascular: Regular rhythm and rate,   Respiratory: Lungs clear to auscultation,   Gastrointestinal: Bowel sounds positive x 4, soft, nontender  Neurologic: Alert and oriented x 3 equal strength bilaterally  Musculoskeletal: Able to move all extremities, bilateral lower  Skin: Warm, dry and intact               Assessment/Plan                  Assessment & Plan  Acute on chronic respiratory failure with hypoxia and hypercapnia (Multi)  Wean oxygen as able  CHF (congestive heart failure), NYHA class I, acute on chronic, combined  Currently on Diamox and spironolactone  Low-salt diet  1600 cc fluid restriction  Bilateral lower extremity compression  Cardiology following  Hypokalemia  Likely due to diuretic use  2.7 this morning  Replaced  Follow electrolytes  Metabolic alkalosis  Bicarb was as high as 43 on December 9  Now improved to 37  Was most likely due to heavy diuretic use  Cardiology has requested nephrology consult  Will consult Dr. Kaur  Cellulitis  Right lower extremity markedly more erythematous  Ultrasound negative for DVT  Blood cultures pending  Currently on  Rocephin  Infectious diseases following  Atrial fib/flutter, transient (Multi)  Continue metoprolol, Eliquis, and dofetilide.  COPD (chronic obstructive pulmonary disease) (Multi)  Do not feel that she is having a current exacerbation  Continue home maintenance inhalers.  Obesity  Major contribution  Disposition  Will be ready for discharge once transition to oral diuretics and oral antibiotics  Most likely discharge home with home health care  External referral for home health care has been ordered                Bentley Hernandez, APRN-CNP       home

## 2024-12-14 NOTE — ASSESSMENT & PLAN NOTE
Major contribution  Disposition  Will be ready for discharge once transition to oral diuretics and oral antibiotics  Most likely discharge home with home health care  External referral for home health care has been ordered

## 2024-12-14 NOTE — ASSESSMENT & PLAN NOTE
Right lower extremity markedly more erythematous  Ultrasound negative for DVT  Blood cultures pending  Currently on Rocephin  Infectious diseases following

## 2024-12-14 NOTE — ASSESSMENT & PLAN NOTE
Bicarb was as high as 43 on December 9  Now improved to 37  Was most likely due to heavy diuretic use  Cardiology has requested nephrology consult  Will consult Dr. Kaur

## 2024-12-14 NOTE — CONSULTS
Inpatient consult to Nephrology  Consult performed by: Montrell Paz MD  Consult ordered by: KEVON Hawk-CNP  Reason for consult: Metabolic alkalosis        History Of Present Illness  Vickie Foster is a 68-year-old female with history of hypertension, atrial fibrillation and HFpEF, admitted with ADHF after presenting with worsening bilateral lower extremity edema associated with dyspnea.    Renal consultation has been requested for evaluation and management of metabolic alkalosis.  She has chronic metabolic alkalosis and her serum bicarbonate has typically been in the mid 30s.  Serum bicarbonate was 38 mEq/L at presentation but danna as high as 43 mEq/L a couple days ago and was down to 37 mEq/L today.  This has been associated with hypokalemia with serum K dropping as low as 2.7 mEq/L this morning.  Her renal function has been stable with SCr at her baseline around 0.6 mg/dL.  Of    Of note, she has been getting diuresed with furosemide 80 mg IV twice daily and acetazolamide was added yesterday.         Past Medical History  She has a past medical history of A-fib (Multi), CHF (congestive heart failure), COVID, Heart disease, HTN (hypertension), and Low back pain.    Surgical History  She has a past surgical history that includes Cardioversion; Cardiac electrophysiology procedure (N/A, 2024); Cardiac electrophysiology procedure (N/A, 2024); Ablation of dysrhythmic focus; and Cardiac electrophysiology procedure (N/A, 2024).     Social History  She reports that she quit smoking about 20 years ago. Her smoking use included cigarettes. She has never used smokeless tobacco. She reports that she does not currently use alcohol. She reports that she does not use drugs.  She lives with her .    Family History  Family History   Problem Relation Name Age of Onset    Breast cancer Mother      Heart attack Mother           in early 80s of MI    Heart disease Mother      Colon cancer  Father      Breast cancer Sister      Diabetes type II Sister      Kidney failure Sister      Other (herione addict) Sister      No Known Problems Brother      No Known Problems Daughter      Other (recovering alcoholic) Son      Breast cancer Other Maternal Aunt         Medications  Scheduled medications  acetaZOLAMIDE, 250 mg, oral, BID  allopurinol, 200 mg, oral, BID  apixaban, 5 mg, oral, BID  atorvastatin, 40 mg, oral, Nightly  buPROPion XL, 300 mg, oral, Daily before breakfast  cefTRIAXone, 1 g, intravenous, q24h  dofetilide, 250 mcg, oral, q12h  fluticasone furoate-vilanteroL, 1 puff, inhalation, Daily  [Held by provider] furosemide, 80 mg, intravenous, BID  metoprolol succinate XL, 25 mg, oral, Daily  oxygen, , inhalation, Continuous - Inhalation  polyethylene glycol, 17 g, oral, Daily  [START ON 12/15/2024] potassium chloride CR, 40 mEq, oral, Daily with breakfast  spironolactone, 25 mg, oral, Daily      Continuous medications     PRN medications  PRN medications: acetaminophen **OR** acetaminophen **OR** acetaminophen, ipratropium-albuteroL, ondansetron    Allergies  Patient has no known allergies.    Review of Systems  10 point ROS negative except as stated in HPI.     Physical Exam  Vitals 24HR  Heart Rate:  [75-77]   Temp:  [36.6 °C (97.9 °F)-36.7 °C (98.1 °F)]   Resp:  [18-19]   BP: (111-114)/(53-68)   SpO2:  [95 %-100 %]      General: Pleasant elderly woman, not in acute distress  Eyes: Not pale, anicteric  Lungs: Clear bilaterally  Heart: S1 and S2, regular  Abdomen: Obese, soft  Extremities: Bilateral lower extremity edema  Neuro: Awake and interactive       I&O 24HR    Intake/Output Summary (Last 24 hours) at 12/14/2024 1418  Last data filed at 12/14/2024 0900  Gross per 24 hour   Intake 170 ml   Output --   Net 170 ml       Relevant Results  Results for orders placed or performed during the hospital encounter of 12/08/24 (from the past 24 hours)   Basic metabolic panel   Result Value Ref Range     Glucose 97 74 - 99 mg/dL    Sodium 140 136 - 145 mmol/L    Potassium 2.7 (LL) 3.5 - 5.3 mmol/L    Chloride 96 (L) 98 - 107 mmol/L    Bicarbonate 37 (H) 21 - 32 mmol/L    Anion Gap 10 10 - 20 mmol/L    Urea Nitrogen 21 6 - 23 mg/dL    Creatinine 0.60 0.50 - 1.05 mg/dL    eGFR >90 >60 mL/min/1.73m*2    Calcium 9.2 8.6 - 10.3 mg/dL   CBC   Result Value Ref Range    WBC 8.8 4.4 - 11.3 x10*3/uL    nRBC 0.0 0.0 - 0.0 /100 WBCs    RBC 3.95 (L) 4.00 - 5.20 x10*6/uL    Hemoglobin 12.1 12.0 - 16.0 g/dL    Hematocrit 38.1 36.0 - 46.0 %    MCV 97 80 - 100 fL    MCH 30.6 26.0 - 34.0 pg    MCHC 31.8 (L) 32.0 - 36.0 g/dL    RDW 15.2 (H) 11.5 - 14.5 %    Platelets 263 150 - 450 x10*3/uL   Magnesium   Result Value Ref Range    Magnesium 2.11 1.60 - 2.40 mg/dL   BLOOD GAS VENOUS FULL PANEL   Result Value Ref Range    POCT pH, Venous 7.43 7.33 - 7.43 pH    POCT pCO2, Venous 57 (H) 41 - 51 mm Hg    POCT pO2, Venous 51 (H) 35 - 45 mm Hg    POCT SO2, Venous 85 (H) 45 - 75 %    POCT Oxy Hemoglobin, Venous 83.3 (H) 45.0 - 75.0 %    POCT Hematocrit Calculated, Venous 36.0 36.0 - 46.0 %    POCT Sodium, Venous 136 136 - 145 mmol/L    POCT Potassium, Venous 3.2 (L) 3.5 - 5.3 mmol/L    POCT Chloride, Venous 96 (L) 98 - 107 mmol/L    POCT Ionized Calicum, Venous 1.23 1.10 - 1.33 mmol/L    POCT Glucose, Venous 149 (H) 74 - 99 mg/dL    POCT Lactate, Venous 1.7 0.4 - 2.0 mmol/L    POCT Base Excess, Venous 11.4 (H) -2.0 - 3.0 mmol/L    POCT HCO3 Calculated, Venous 37.8 (H) 22.0 - 26.0 mmol/L    POCT Hemoglobin, Venous 12.0 12.0 - 16.0 g/dL    POCT Anion Gap, Venous 5.0 (L) 10.0 - 25.0 mmol/L    Patient Temperature 37.0 degrees Celsius    FiO2 28 %     *Note: Due to a large number of results and/or encounters for the requested time period, some results have not been displayed. A complete set of results can be found in Results Review.        Assessment/Plan   68-year-old female with history of hypertension, atrial fibrillation and HFpEF, admitted with  ADHF.  Hospital course notable for worsening metabolic alkalosis.    Metabolic alkalosis  Hypokalemia  Hypertension    She has a history of chronic respiratory acidosis with compensatory metabolic alkalosis (her serum bicarbonate has typically been in the mid 30s), but now has superimposed contraction alkalosis in the setting of diuresis.  Metabolic alkalosis has been further exacerbated by hypokalemia.    She has significant peripheral edema and needs ongoing diuresis.  I will restart IV furosemide at decreased dose of 40 mg twice daily and I agree with using acetazolamide.  pH was up to 7.49 yesterday but was down to 7.43 today, and I will keep her on acetazolamide for another day.    She has received multiple doses of KCl this morning and serum K has improved to 3.5 mEq/L.  I will give additional 20 mEq tonight.    Continue other care.    Thank you for the opportunity to participate in her care.      Montrell Paz MD

## 2024-12-14 NOTE — PROGRESS NOTES
"Vickie Foster is a 68 y.o. female on day 6 of admission presenting with CHF (congestive heart failure), NYHA class I, acute on chronic, combined.    Subjective   Patient sitting up in chair.  Denies chest pain, pressure or palpitations.       Objective     Physical Exam  Vitals reviewed.   Constitutional:       Appearance: She is obese.   Cardiovascular:      Rate and Rhythm: Normal rate. Rhythm irregular.      Heart sounds: No murmur heard.     No friction rub. No gallop.   Pulmonary:      Effort: Pulmonary effort is normal.      Comments: Diminished breath sounds throughout.  No conversational dyspnea noted.  Abdominal:      General: Bowel sounds are normal.      Palpations: Abdomen is soft.   Musculoskeletal:      Right lower leg: Edema present.      Left lower leg: Edema present.   Skin:     General: Skin is warm and dry.      Capillary Refill: Capillary refill takes less than 2 seconds.   Neurological:      Mental Status: She is alert and oriented to person, place, and time.   Psychiatric:         Mood and Affect: Mood normal.         Behavior: Behavior normal.         Last Recorded Vitals  Blood pressure 114/68, pulse 75, temperature 36.7 °C (98.1 °F), temperature source Oral, resp. rate 18, height 1.575 m (5' 2.01\"), weight 105 kg (231 lb), SpO2 100%.  Intake/Output last 3 Shifts:  I/O last 3 completed shifts:  In: 325 (3.1 mL/kg) [P.O.:275; IV Piggyback:50]  Out: - (0 mL/kg)   Weight: 104.8 kg     Relevant Results  Results for orders placed or performed during the hospital encounter of 12/08/24 (from the past 24 hours)   BLOOD GAS ARTERIAL FULL PANEL   Result Value Ref Range    POCT pH, Arterial 7.49 (H) 7.38 - 7.42 pH    POCT pCO2, Arterial 56 (H) 38 - 42 mm Hg    POCT pO2, Arterial 91 85 - 95 mm Hg    POCT SO2, Arterial 97 94 - 100 %    POCT Oxy Hemoglobin, Arterial 95.7 94.0 - 98.0 %    POCT Hematocrit Calculated, Arterial 38.0 36.0 - 46.0 %    POCT Sodium, Arterial 135 (L) 136 - 145 mmol/L    POCT " Potassium, Arterial 3.5 3.5 - 5.3 mmol/L    POCT Chloride, Arterial 96 (L) 98 - 107 mmol/L    POCT Ionized Calcium, Arterial 1.21 1.10 - 1.33 mmol/L    POCT Glucose, Arterial 120 (H) 74 - 99 mg/dL    POCT Lactate, Arterial 1.1 0.4 - 2.0 mmol/L    POCT Base Excess, Arterial 16.7 (H) -2.0 - 3.0 mmol/L    POCT HCO3 Calculated, Arterial 42.7 (H) 22.0 - 26.0 mmol/L    POCT Hemoglobin, Arterial 12.5 12.0 - 16.0 g/dL    POCT Anion Gap, Arterial 0 (L) 10 - 25 mmo/L    Patient Temperature 37.0 degrees Celsius    FiO2 28 %    Apparatus CANNULA     Flow 2.0 LPM    Site of Arterial Puncture Radial Right     Cricket's Test Positive    CBC   Result Value Ref Range    WBC 8.8 4.4 - 11.3 x10*3/uL    nRBC 0.0 0.0 - 0.0 /100 WBCs    RBC 3.95 (L) 4.00 - 5.20 x10*6/uL    Hemoglobin 12.1 12.0 - 16.0 g/dL    Hematocrit 38.1 36.0 - 46.0 %    MCV 97 80 - 100 fL    MCH 30.6 26.0 - 34.0 pg    MCHC 31.8 (L) 32.0 - 36.0 g/dL    RDW 15.2 (H) 11.5 - 14.5 %    Platelets 263 150 - 450 x10*3/uL     *Note: Due to a large number of results and/or encounters for the requested time period, some results have not been displayed. A complete set of results can be found in Results Review.                 Assessment/Plan   Assessment & Plan  CHF (congestive heart failure), NYHA class I, acute on chronic, combined    Acute on chronic respiratory failure with hypoxia and hypercapnia (Multi)    Atrial fib/flutter, transient (Multi)    Cellulitis    COPD (chronic obstructive pulmonary disease) (Multi)    Obesity      Acute on chronic diastolic heart failure  Lymphedema  Cellulitis bilateral lower extremities  Paroxysmal atrial fibrillation  Hypertensive disorder  Hyperlipidemia  Morbid obesity  History of stroke  Chronic respiratory failure with chronic O2     Overall impression:     12/9: As above, presents predominately for worsening peripheral edema for 3 weeks and worsening shortness of breath over the past week.  She has had multiple recurrences over the past  year for worsening peripheral edema.  I am now suspecting that she has a component of lymphedema given her obesity.  Additionally her recurrent echocardiogram showed preserved ejection fraction with a minimal diastolic dysfunction.  She does have a chronic O2 requirement and a chest x-ray reveals mild fluid volume overload with a low BNP of 148.  Her troponins are flat negative.  Certainly diuresis is the answer to the patient's problem currently.  She states compliance with outpatient medications.  In the outpatient setting she takes torsemide 50 mg twice daily.  She has been initiated on IV furosemide.  Will increase this.  Creatinine stable at 0.65.  Will utilize IV furosemide 80 mg twice daily.  Given the level of diuresis needed as well as a current potassium 3.1 with repletion given yesterday, will order for 40 mill colons potassium twice today and 1 time tomorrow.  Will check potassium level in the morning.  Echo from previous hospitalization is adequate for this hospitalization.  Concerning her atrial fibrillation surprisingly her EKG did show a rate controlled atrial flutter on admission.  On telemetry now she is currently in a sinus rhythm.  Will continue to monitor.  She does continue on apixaban for stroke risk reduction.  Hemoglobin stable at 12.6.  Overall stable from a respiratory perspective but is significantly fluid overloaded likely require at least 2 to 3 days inpatient hospitalization to achieve a euvolemic state.  Have discussed thoroughly with the patient the importance of reducing her sodium intake and weighing herself daily.  Have consulted cardiac rehab.  Will follow with you.     12/10: Improved over the past 24 hours.  Continues on IV diuresis.  Diuresed approximately 1650 mL over the past 24 hours.  Patient's bicarb was noted to have risen yesterday to 43.  Has minimally improved today to 42.  Will continue to monitor this, if the patient's bicarb rises further we will likely reduce the  IV diuretic dosing, however would like to remain aggressive given the degree of fluid volume overload.  Her lungs are diminished but clear otherwise.  Breathing has improved.  At baseline per patient.  Her peripheral edema is now a +3 with no weeping and the cellulitis to her right lower extremity appears to be improving.  She is chest pain-free.  Her blood pressure is stable most recent 112/60.  Creatinine 0.68 with a hemoglobin 11.9.  On telemetry monitor she remained in a rate controlled atrial fibrillation without significant ectopy.  Dissipated further 3 to 4 days hospitalization to achieve a euvolemic state.  Will follow with you.     12/11: Continues to slowly improve.  Chest pain-free.  Breathing comfortably nasal cannula oxygen.  Lungs probably clear but mildly diminished in bases.  Remains with peripheral edema which is improving to now +2 to +3.  Continue on IV diuresis.  Her bicarb remains unchanged at 42.  Creatinine has actually improved to 0.60.  Her fluid balance is listed as +950 over the past 24 hours which I do not believe is accurate.  I believe the patient is actually negative.  Will continue with current IV diuresis at 80 mg twice daily.  Anticipate a further 2 to 3 days.  Will follow with you.       12/12: Patient continues to improve daily.  Sitting up in the chair this morning and denies chest pain or pressure.  She is currently on room air, but states she is still dyspneic with ambulation. Remains with bilateral lower extremity edema. Compression wraps were applied by the bedside RN today. Again, intakes and outputs have not been accurately recorded unfortunately due to the patient's incontinence.  Blood pressures have been normotensive last recorded 109/69.  I reviewed lab work this morning revealed sodium of 140, potassium 3.1, creatinine of 0.57.  Bicarb remains elevated but improved at 41.  I am going to order a chest x-ray to be completed at the bedside to evaluate for pulmonary  vascular congestion.  Will continue IV furosemide 80 mg twice daily and follow this patient with you.     12/13: As above.  Patient sitting up in chair this morning denies chest pain, pressure or palpitations. Patient remains on supplemental oxygen via nasal cannula with pulse oximetry of 95%.  Of note, she does use nasal cannula oxygen at night at home and intermittently during the day.  Blood pressures overall normotensive last 1 at 114/72.  Chest x-ray completed yesterday similar to the prior study with patchy bibasilar atelectasis and/or infiltrate.  She does continue to have bilateral lower extremity edema, but the patient does have chronic lymphedema at baseline. Additionally, her bicarbonate level remains elevated at 40. ABG was drawn revealing pH of 7.49 and pCO2 of 56. Will give the patient oral diamox 250 mg BID x2 doses. Additionally, will hold her IV lasix at this time. Will continue to follow this patient with you.     12/14: Patient sitting up in chair this morning.  Denies chest pain, pressure or palpitations.  States she is still intermittently short of breath.  Currently in rate controlled atrial fibrillation on telemetry without significant ectopy.  On nasal cannula oxygen with pulse oximetry 95%.  Blood pressures overall normotensive last reported 111/53.  Labs morning reveal sodium of 140, testing 2.7, creatinine 0.60 and hemoglobin of 12.1.  Bicarbonate is improved from yesterday but remains elevated at 37.  Will continue Diamox 250 mg twice daily x 2 today.  I am going to order spironolactone 25 mg daily for this patient as well.  Additionally have ordered potassium supplementation and will redraw potassium around noon today.  I am going to consult nephrology services for their assistance in managing the patient's metabolic alkalosis. Discussed this case with my collaborating physician Dr. Augustine and Dr. Farnsworth from the hospitalist service. Will continue to follow with you.         Wendy HALL  Ravi, APRN-CNP

## 2024-12-15 VITALS
TEMPERATURE: 98.1 F | RESPIRATION RATE: 18 BRPM | HEIGHT: 62 IN | BODY MASS INDEX: 42.19 KG/M2 | OXYGEN SATURATION: 95 % | DIASTOLIC BLOOD PRESSURE: 61 MMHG | WEIGHT: 229.28 LBS | HEART RATE: 68 BPM | SYSTOLIC BLOOD PRESSURE: 101 MMHG

## 2024-12-15 LAB
ANION GAP SERPL CALCULATED.3IONS-SCNC: 9 MMOL/L (ref 10–20)
BUN SERPL-MCNC: 24 MG/DL (ref 6–23)
CALCIUM SERPL-MCNC: 9.1 MG/DL (ref 8.6–10.3)
CHLORIDE SERPL-SCNC: 99 MMOL/L (ref 98–107)
CO2 SERPL-SCNC: 35 MMOL/L (ref 21–32)
CREAT SERPL-MCNC: 0.65 MG/DL (ref 0.5–1.05)
EGFRCR SERPLBLD CKD-EPI 2021: >90 ML/MIN/1.73M*2
ERYTHROCYTE [DISTWIDTH] IN BLOOD BY AUTOMATED COUNT: 15.3 % (ref 11.5–14.5)
GLUCOSE SERPL-MCNC: 97 MG/DL (ref 74–99)
HCT VFR BLD AUTO: 37.1 % (ref 36–46)
HGB BLD-MCNC: 11.6 G/DL (ref 12–16)
MAGNESIUM SERPL-MCNC: 2.3 MG/DL (ref 1.6–2.4)
MCH RBC QN AUTO: 29.7 PG (ref 26–34)
MCHC RBC AUTO-ENTMCNC: 31.3 G/DL (ref 32–36)
MCV RBC AUTO: 95 FL (ref 80–100)
NRBC BLD-RTO: 0 /100 WBCS (ref 0–0)
PLATELET # BLD AUTO: 260 X10*3/UL (ref 150–450)
POTASSIUM SERPL-SCNC: 3.4 MMOL/L (ref 3.5–5.3)
RBC # BLD AUTO: 3.91 X10*6/UL (ref 4–5.2)
SODIUM SERPL-SCNC: 140 MMOL/L (ref 136–145)
WBC # BLD AUTO: 8.6 X10*3/UL (ref 4.4–11.3)

## 2024-12-15 PROCEDURE — 2500000004 HC RX 250 GENERAL PHARMACY W/ HCPCS (ALT 636 FOR OP/ED): Performed by: NURSE PRACTITIONER

## 2024-12-15 PROCEDURE — 99232 SBSQ HOSP IP/OBS MODERATE 35: CPT

## 2024-12-15 PROCEDURE — 2500000001 HC RX 250 WO HCPCS SELF ADMINISTERED DRUGS (ALT 637 FOR MEDICARE OP): Performed by: INTERNAL MEDICINE

## 2024-12-15 PROCEDURE — 2500000004 HC RX 250 GENERAL PHARMACY W/ HCPCS (ALT 636 FOR OP/ED): Performed by: INTERNAL MEDICINE

## 2024-12-15 PROCEDURE — 2500000002 HC RX 250 W HCPCS SELF ADMINISTERED DRUGS (ALT 637 FOR MEDICARE OP, ALT 636 FOR OP/ED)

## 2024-12-15 PROCEDURE — 1200000002 HC GENERAL ROOM WITH TELEMETRY DAILY

## 2024-12-15 PROCEDURE — 99232 SBSQ HOSP IP/OBS MODERATE 35: CPT | Performed by: NURSE PRACTITIONER

## 2024-12-15 PROCEDURE — 83735 ASSAY OF MAGNESIUM: CPT | Performed by: INTERNAL MEDICINE

## 2024-12-15 PROCEDURE — 2500000002 HC RX 250 W HCPCS SELF ADMINISTERED DRUGS (ALT 637 FOR MEDICARE OP, ALT 636 FOR OP/ED): Performed by: NURSE PRACTITIONER

## 2024-12-15 PROCEDURE — 85027 COMPLETE CBC AUTOMATED: CPT

## 2024-12-15 PROCEDURE — 2500000001 HC RX 250 WO HCPCS SELF ADMINISTERED DRUGS (ALT 637 FOR MEDICARE OP): Performed by: NURSE PRACTITIONER

## 2024-12-15 PROCEDURE — 36415 COLL VENOUS BLD VENIPUNCTURE: CPT

## 2024-12-15 PROCEDURE — 94640 AIRWAY INHALATION TREATMENT: CPT

## 2024-12-15 PROCEDURE — 2500000005 HC RX 250 GENERAL PHARMACY W/O HCPCS: Performed by: NURSE PRACTITIONER

## 2024-12-15 PROCEDURE — 80048 BASIC METABOLIC PNL TOTAL CA: CPT

## 2024-12-15 RX ADMIN — POTASSIUM CHLORIDE 40 MEQ: 1500 TABLET, EXTENDED RELEASE ORAL at 08:48

## 2024-12-15 RX ADMIN — ALLOPURINOL 200 MG: 100 TABLET ORAL at 20:05

## 2024-12-15 RX ADMIN — DOFETILIDE 250 MCG: 0.25 CAPSULE ORAL at 08:47

## 2024-12-15 RX ADMIN — ALLOPURINOL 200 MG: 100 TABLET ORAL at 08:47

## 2024-12-15 RX ADMIN — ATORVASTATIN CALCIUM 40 MG: 40 TABLET, FILM COATED ORAL at 20:05

## 2024-12-15 RX ADMIN — ACETAZOLAMIDE 250 MG: 250 TABLET ORAL at 20:05

## 2024-12-15 RX ADMIN — APIXABAN 5 MG: 5 TABLET, FILM COATED ORAL at 20:05

## 2024-12-15 RX ADMIN — SPIRONOLACTONE 25 MG: 25 TABLET ORAL at 08:47

## 2024-12-15 RX ADMIN — Medication 2 L/MIN: at 19:08

## 2024-12-15 RX ADMIN — DOFETILIDE 250 MCG: 0.25 CAPSULE ORAL at 20:05

## 2024-12-15 RX ADMIN — BUPROPION HYDROCHLORIDE 300 MG: 300 TABLET, EXTENDED RELEASE ORAL at 05:40

## 2024-12-15 RX ADMIN — ACETAZOLAMIDE 250 MG: 250 TABLET ORAL at 08:47

## 2024-12-15 RX ADMIN — CEFTRIAXONE SODIUM 1 G: 1 INJECTION, SOLUTION INTRAVENOUS at 14:40

## 2024-12-15 RX ADMIN — FUROSEMIDE 40 MG: 10 INJECTION, SOLUTION INTRAMUSCULAR; INTRAVENOUS at 08:47

## 2024-12-15 RX ADMIN — METOPROLOL SUCCINATE 25 MG: 25 TABLET, EXTENDED RELEASE ORAL at 08:48

## 2024-12-15 RX ADMIN — ACETAMINOPHEN 650 MG: 325 TABLET ORAL at 08:47

## 2024-12-15 RX ADMIN — FUROSEMIDE 40 MG: 10 INJECTION, SOLUTION INTRAMUSCULAR; INTRAVENOUS at 14:40

## 2024-12-15 RX ADMIN — FLUTICASONE FUROATE AND VILANTEROL TRIFENATATE 1 PUFF: 200; 25 POWDER RESPIRATORY (INHALATION) at 07:49

## 2024-12-15 RX ADMIN — APIXABAN 5 MG: 5 TABLET, FILM COATED ORAL at 08:48

## 2024-12-15 RX ADMIN — Medication 2 L/MIN: at 07:52

## 2024-12-15 ASSESSMENT — COGNITIVE AND FUNCTIONAL STATUS - GENERAL
STANDING UP FROM CHAIR USING ARMS: A LITTLE
DRESSING REGULAR UPPER BODY CLOTHING: A LITTLE
MOBILITY SCORE: 20
DRESSING REGULAR LOWER BODY CLOTHING: A LITTLE
DRESSING REGULAR UPPER BODY CLOTHING: A LITTLE
HELP NEEDED FOR BATHING: A LITTLE
WALKING IN HOSPITAL ROOM: A LITTLE
MOVING TO AND FROM BED TO CHAIR: A LITTLE
MOVING TO AND FROM BED TO CHAIR: A LITTLE
TOILETING: A LITTLE
CLIMB 3 TO 5 STEPS WITH RAILING: A LITTLE
CLIMB 3 TO 5 STEPS WITH RAILING: A LITTLE
WALKING IN HOSPITAL ROOM: A LITTLE
DRESSING REGULAR LOWER BODY CLOTHING: A LITTLE
TOILETING: A LITTLE
DAILY ACTIVITIY SCORE: 20
MOBILITY SCORE: 20
STANDING UP FROM CHAIR USING ARMS: A LITTLE
DAILY ACTIVITIY SCORE: 20
HELP NEEDED FOR BATHING: A LITTLE

## 2024-12-15 ASSESSMENT — PAIN SCALES - GENERAL
PAINLEVEL_OUTOF10: 3
PAINLEVEL_OUTOF10: 0 - NO PAIN

## 2024-12-15 ASSESSMENT — PAIN DESCRIPTION - DESCRIPTORS
DESCRIPTORS: HEADACHE;THROBBING
DESCRIPTORS: HEADACHE;THROBBING

## 2024-12-15 ASSESSMENT — PAIN DESCRIPTION - LOCATION: LOCATION: HEAD

## 2024-12-15 ASSESSMENT — PAIN - FUNCTIONAL ASSESSMENT
PAIN_FUNCTIONAL_ASSESSMENT: 0-10
PAIN_FUNCTIONAL_ASSESSMENT: 0-10

## 2024-12-15 ASSESSMENT — PAIN DESCRIPTION - ORIENTATION: ORIENTATION: ANTERIOR;POSTERIOR

## 2024-12-15 NOTE — PROGRESS NOTES
Vickie Foster is a 68 y.o. female on day 7 of admission presenting with CHF (congestive heart failure), NYHA class I, acute on chronic, combined.      Subjective   No new overnight events.  Diuresis.  No dyspnea at rest.       Scheduled medications  acetaZOLAMIDE, 250 mg, oral, BID  allopurinol, 200 mg, oral, BID  apixaban, 5 mg, oral, BID  atorvastatin, 40 mg, oral, Nightly  buPROPion XL, 300 mg, oral, Daily before breakfast  cefTRIAXone, 1 g, intravenous, q24h  dofetilide, 250 mcg, oral, q12h  fluticasone furoate-vilanteroL, 1 puff, inhalation, Daily  furosemide, 40 mg, intravenous, BID  metoprolol succinate XL, 25 mg, oral, Daily  oxygen, , inhalation, Continuous - Inhalation  polyethylene glycol, 17 g, oral, Daily  potassium chloride CR, 40 mEq, oral, Daily with breakfast  spironolactone, 25 mg, oral, Daily      Continuous medications     PRN medications  PRN medications: acetaminophen **OR** acetaminophen **OR** acetaminophen, ipratropium-albuteroL, ondansetron      Objective     Vitals 24HR  Heart Rate:  [71-90]   Temp:  [36.5 °C (97.7 °F)-36.8 °C (98.2 °F)]   Resp:  [16-20]   BP: (100-118)/(59-68)   Weight:  [104 kg (229 lb 4.5 oz)]   SpO2:  [93 %-98 %]     General: Pleasant elderly female, not in distress  Eyes: Not pale, anicteric  Lungs: Clear bilaterally  Heart: S1 and S2, regular  Abdomen: Obese, soft  Extremities: Bilateral lower extremity edema  Neuro: Awake and interactive      Intake/Output last 3 Shifts:    Intake/Output Summary (Last 24 hours) at 12/15/2024 1014  Last data filed at 12/15/2024 0932  Gross per 24 hour   Intake 460 ml   Output --   Net 460 ml       Relevant Results    Results for orders placed or performed during the hospital encounter of 12/08/24 (from the past 24 hours)   Potassium - 4 hours after oral dose   Result Value Ref Range    Potassium 3.5 3.5 - 5.3 mmol/L   POCT GLUCOSE   Result Value Ref Range    POCT Glucose 102 (H) 74 - 99 mg/dL   Basic metabolic panel   Result Value  Ref Range    Glucose 97 74 - 99 mg/dL    Sodium 140 136 - 145 mmol/L    Potassium 3.4 (L) 3.5 - 5.3 mmol/L    Chloride 99 98 - 107 mmol/L    Bicarbonate 35 (H) 21 - 32 mmol/L    Anion Gap 9 (L) 10 - 20 mmol/L    Urea Nitrogen 24 (H) 6 - 23 mg/dL    Creatinine 0.65 0.50 - 1.05 mg/dL    eGFR >90 >60 mL/min/1.73m*2    Calcium 9.1 8.6 - 10.3 mg/dL   CBC   Result Value Ref Range    WBC 8.6 4.4 - 11.3 x10*3/uL    nRBC 0.0 0.0 - 0.0 /100 WBCs    RBC 3.91 (L) 4.00 - 5.20 x10*6/uL    Hemoglobin 11.6 (L) 12.0 - 16.0 g/dL    Hematocrit 37.1 36.0 - 46.0 %    MCV 95 80 - 100 fL    MCH 29.7 26.0 - 34.0 pg    MCHC 31.3 (L) 32.0 - 36.0 g/dL    RDW 15.3 (H) 11.5 - 14.5 %    Platelets 260 150 - 450 x10*3/uL   Magnesium   Result Value Ref Range    Magnesium 2.30 1.60 - 2.40 mg/dL     *Note: Due to a large number of results and/or encounters for the requested time period, some results have not been displayed. A complete set of results can be found in Results Review.       Assessment/Plan      Metabolic alkalosis, improving  Hypokalemia  Edema  Hypertension    Serum bicarb is back to the mid 30s, around her previous baseline.  Will watch off acetazolamide and continue furosemide 40 mg IV twice daily as recently revised.    She was still mildly hypokalemic this morning and will continue scheduled KCl as ordered.  She is also on spironolactone which should help with K control.    Continue other care.    Dr. Kaur will resume service tomorrow.      Montrell Paz MD

## 2024-12-15 NOTE — ASSESSMENT & PLAN NOTE
Major contribution  Disposition  Will be ready for discharge once transition to oral diuretics and oral antibiotics  Most likely discharge home with home health care, PT recommended SNF, but patient wants to go home.   External referral for home health care has been ordered

## 2024-12-15 NOTE — ASSESSMENT & PLAN NOTE
Bicarb was as high as 43 on December 9  Now improved   Was most likely due to heavy diuretic use  Cardiology has requested nephrology consult  Will consult Dr. Kaur

## 2024-12-15 NOTE — PROGRESS NOTES
Vickie Foster is a 68 y.o. female on day 7 of admission presenting with CHF (congestive heart failure), NYHA class I, acute on chronic, combined.      Subjective   Feels less swollen in legs today. Denies dyspnea on oxygen. No nausea or vomiting.        Objective     Last Recorded Vitals  /68 (BP Location: Right arm, Patient Position: Sitting)   Pulse 75   Temp 36.7 °C (98.1 °F) (Oral)   Resp 17   Wt 104 kg (229 lb 4.5 oz)   SpO2 96%   Intake/Output last 3 Shifts:    Intake/Output Summary (Last 24 hours) at 12/15/2024 1453  Last data filed at 12/15/2024 1440  Gross per 24 hour   Intake 990 ml   Output --   Net 990 ml       Admission Weight  Weight: 105 kg (231 lb) (12/08/24 1818)    Daily Weight  12/15/24 : 104 kg (229 lb 4.5 oz)        Physical Exam  Constitutional: No acute distress, calm, cooperative  Cardiovascular: Regular rhythm and rate,   Respiratory: Lungs clear to auscultation,   Gastrointestinal: Bowel sounds positive x 4, soft, nontender  Neurologic: Alert and oriented x 3 equal strength bilaterally  Musculoskeletal: Able to move all extremities, bilateral lower  Skin: Warm, dry and intact, Leg edema, pitting, 2+      Assessment/Plan        Assessment & Plan  Acute on chronic respiratory failure with hypoxia and hypercapnia (Multi)  Wean oxygen as able  CHF (congestive heart failure), NYHA class I, acute on chronic, combined  Currently on Diamox and spironolactone  Low-salt diet  1600 cc fluid restriction  Bilateral lower extremity compression  Cardiology following, discussed with cardiology  Hypokalemia  Likely due to diuretic use  3.4 this morning  Replaced  Follow electrolytes  Metabolic alkalosis  Bicarb was as high as 43 on December 9  Now improved   Was most likely due to heavy diuretic use  Cardiology has requested nephrology consult  Will consult Dr. Kaur  Cellulitis  Right lower extremity markedly more erythematous  Ultrasound negative for DVT  Blood cultures pending  Currently  on Rocephin  Infectious diseases following  Atrial fib/flutter, transient (Multi)  Continue metoprolol, Eliquis, and dofetilide.  COPD (chronic obstructive pulmonary disease) (Multi)  Do not feel that she is having a current exacerbation  Continue home maintenance inhalers.  Obesity  Major contribution  Disposition  Will be ready for discharge once transition to oral diuretics and oral antibiotics  Most likely discharge home with home health care, PT recommended SNF, but patient wants to go home.   External referral for home health care has been ordered    Jessi Gonzalez, APRN-CNP

## 2024-12-15 NOTE — PROGRESS NOTES
"Vickie Foster is a 68 y.o. female on day 7 of admission presenting with CHF (congestive heart failure), NYHA class I, acute on chronic, combined.    Subjective   Patient sitting up in chair. Denies chest pain, pressure or palpitations.        Objective     Physical Exam  Vitals reviewed.   Cardiovascular:      Rate and Rhythm: Normal rate. Rhythm irregular.      Heart sounds: No murmur heard.     No friction rub. No gallop.   Pulmonary:      Effort: Pulmonary effort is normal.      Breath sounds: Normal breath sounds.      Comments: Breath sounds clear but diminished throughout  Abdominal:      General: Bowel sounds are normal.      Palpations: Abdomen is soft.   Musculoskeletal:      Right lower leg: Edema present.      Left lower leg: Edema present.   Skin:     General: Skin is warm and dry.      Comments: Bilateral lower extremities wrapped in Ace bandages, dry and intact.   Neurological:      Mental Status: She is alert and oriented to person, place, and time.   Psychiatric:         Mood and Affect: Mood normal.         Behavior: Behavior normal.         Last Recorded Vitals  Blood pressure 108/68, pulse 75, temperature 36.7 °C (98.1 °F), temperature source Oral, resp. rate 17, height 1.575 m (5' 2.01\"), weight 104 kg (229 lb 4.5 oz), SpO2 96%.  Intake/Output last 3 Shifts:  I/O last 3 completed shifts:  In: 460 (4.4 mL/kg) [P.O.:360; IV Piggyback:100]  Out: - (0 mL/kg)   Weight: 104 kg     Relevant Results  Results for orders placed or performed during the hospital encounter of 12/08/24 (from the past 24 hours)   BLOOD GAS VENOUS FULL PANEL   Result Value Ref Range    POCT pH, Venous 7.43 7.33 - 7.43 pH    POCT pCO2, Venous 57 (H) 41 - 51 mm Hg    POCT pO2, Venous 51 (H) 35 - 45 mm Hg    POCT SO2, Venous 85 (H) 45 - 75 %    POCT Oxy Hemoglobin, Venous 83.3 (H) 45.0 - 75.0 %    POCT Hematocrit Calculated, Venous 36.0 36.0 - 46.0 %    POCT Sodium, Venous 136 136 - 145 mmol/L    POCT Potassium, Venous 3.2 (L) " 3.5 - 5.3 mmol/L    POCT Chloride, Venous 96 (L) 98 - 107 mmol/L    POCT Ionized Calicum, Venous 1.23 1.10 - 1.33 mmol/L    POCT Glucose, Venous 149 (H) 74 - 99 mg/dL    POCT Lactate, Venous 1.7 0.4 - 2.0 mmol/L    POCT Base Excess, Venous 11.4 (H) -2.0 - 3.0 mmol/L    POCT HCO3 Calculated, Venous 37.8 (H) 22.0 - 26.0 mmol/L    POCT Hemoglobin, Venous 12.0 12.0 - 16.0 g/dL    POCT Anion Gap, Venous 5.0 (L) 10.0 - 25.0 mmol/L    Patient Temperature 37.0 degrees Celsius    FiO2 28 %   Potassium - 4 hours after oral dose   Result Value Ref Range    Potassium 3.5 3.5 - 5.3 mmol/L   POCT GLUCOSE   Result Value Ref Range    POCT Glucose 102 (H) 74 - 99 mg/dL   Basic metabolic panel   Result Value Ref Range    Glucose 97 74 - 99 mg/dL    Sodium 140 136 - 145 mmol/L    Potassium 3.4 (L) 3.5 - 5.3 mmol/L    Chloride 99 98 - 107 mmol/L    Bicarbonate 35 (H) 21 - 32 mmol/L    Anion Gap 9 (L) 10 - 20 mmol/L    Urea Nitrogen 24 (H) 6 - 23 mg/dL    Creatinine 0.65 0.50 - 1.05 mg/dL    eGFR >90 >60 mL/min/1.73m*2    Calcium 9.1 8.6 - 10.3 mg/dL   CBC   Result Value Ref Range    WBC 8.6 4.4 - 11.3 x10*3/uL    nRBC 0.0 0.0 - 0.0 /100 WBCs    RBC 3.91 (L) 4.00 - 5.20 x10*6/uL    Hemoglobin 11.6 (L) 12.0 - 16.0 g/dL    Hematocrit 37.1 36.0 - 46.0 %    MCV 95 80 - 100 fL    MCH 29.7 26.0 - 34.0 pg    MCHC 31.3 (L) 32.0 - 36.0 g/dL    RDW 15.3 (H) 11.5 - 14.5 %    Platelets 260 150 - 450 x10*3/uL   Magnesium   Result Value Ref Range    Magnesium 2.30 1.60 - 2.40 mg/dL     *Note: Due to a large number of results and/or encounters for the requested time period, some results have not been displayed. A complete set of results can be found in Results Review.                   Assessment/Plan   Assessment & Plan  CHF (congestive heart failure), NYHA class I, acute on chronic, combined    Acute on chronic respiratory failure with hypoxia and hypercapnia (Multi)    Atrial fib/flutter, transient (Multi)    Cellulitis    COPD (chronic obstructive  pulmonary disease) (Multi)    Obesity    Hypokalemia    Metabolic alkalosis    Acute on chronic diastolic heart failure  Lymphedema  Cellulitis bilateral lower extremities  Paroxysmal atrial fibrillation  Hypertensive disorder  Hyperlipidemia  Morbid obesity  History of stroke  Chronic respiratory failure with chronic O2     Overall impression:     12/9: As above, presents predominately for worsening peripheral edema for 3 weeks and worsening shortness of breath over the past week.  She has had multiple recurrences over the past year for worsening peripheral edema.  I am now suspecting that she has a component of lymphedema given her obesity.  Additionally her recurrent echocardiogram showed preserved ejection fraction with a minimal diastolic dysfunction.  She does have a chronic O2 requirement and a chest x-ray reveals mild fluid volume overload with a low BNP of 148.  Her troponins are flat negative.  Certainly diuresis is the answer to the patient's problem currently.  She states compliance with outpatient medications.  In the outpatient setting she takes torsemide 50 mg twice daily.  She has been initiated on IV furosemide.  Will increase this.  Creatinine stable at 0.65.  Will utilize IV furosemide 80 mg twice daily.  Given the level of diuresis needed as well as a current potassium 3.1 with repletion given yesterday, will order for 40 mill colons potassium twice today and 1 time tomorrow.  Will check potassium level in the morning.  Echo from previous hospitalization is adequate for this hospitalization.  Concerning her atrial fibrillation surprisingly her EKG did show a rate controlled atrial flutter on admission.  On telemetry now she is currently in a sinus rhythm.  Will continue to monitor.  She does continue on apixaban for stroke risk reduction.  Hemoglobin stable at 12.6.  Overall stable from a respiratory perspective but is significantly fluid overloaded likely require at least 2 to 3 days inpatient  hospitalization to achieve a euvolemic state.  Have discussed thoroughly with the patient the importance of reducing her sodium intake and weighing herself daily.  Have consulted cardiac rehab.  Will follow with you.     12/10: Improved over the past 24 hours.  Continues on IV diuresis.  Diuresed approximately 1650 mL over the past 24 hours.  Patient's bicarb was noted to have risen yesterday to 43.  Has minimally improved today to 42.  Will continue to monitor this, if the patient's bicarb rises further we will likely reduce the IV diuretic dosing, however would like to remain aggressive given the degree of fluid volume overload.  Her lungs are diminished but clear otherwise.  Breathing has improved.  At baseline per patient.  Her peripheral edema is now a +3 with no weeping and the cellulitis to her right lower extremity appears to be improving.  She is chest pain-free.  Her blood pressure is stable most recent 112/60.  Creatinine 0.68 with a hemoglobin 11.9.  On telemetry monitor she remained in a rate controlled atrial fibrillation without significant ectopy.  Dissipated further 3 to 4 days hospitalization to achieve a euvolemic state.  Will follow with you.     12/11: Continues to slowly improve.  Chest pain-free.  Breathing comfortably nasal cannula oxygen.  Lungs probably clear but mildly diminished in bases.  Remains with peripheral edema which is improving to now +2 to +3.  Continue on IV diuresis.  Her bicarb remains unchanged at 42.  Creatinine has actually improved to 0.60.  Her fluid balance is listed as +950 over the past 24 hours which I do not believe is accurate.  I believe the patient is actually negative.  Will continue with current IV diuresis at 80 mg twice daily.  Anticipate a further 2 to 3 days.  Will follow with you.       12/12: Patient continues to improve daily.  Sitting up in the chair this morning and denies chest pain or pressure.  She is currently on room air, but states she is still  dyspneic with ambulation. Remains with bilateral lower extremity edema. Compression wraps were applied by the bedside RN today. Again, intakes and outputs have not been accurately recorded unfortunately due to the patient's incontinence.  Blood pressures have been normotensive last recorded 109/69.  I reviewed lab work this morning revealed sodium of 140, potassium 3.1, creatinine of 0.57.  Bicarb remains elevated but improved at 41.  I am going to order a chest x-ray to be completed at the bedside to evaluate for pulmonary vascular congestion.  Will continue IV furosemide 80 mg twice daily and follow this patient with you.     12/13: As above.  Patient sitting up in chair this morning denies chest pain, pressure or palpitations. Patient remains on supplemental oxygen via nasal cannula with pulse oximetry of 95%.  Of note, she does use nasal cannula oxygen at night at home and intermittently during the day.  Blood pressures overall normotensive last 1 at 114/72.  Chest x-ray completed yesterday similar to the prior study with patchy bibasilar atelectasis and/or infiltrate.  She does continue to have bilateral lower extremity edema, but the patient does have chronic lymphedema at baseline. Additionally, her bicarbonate level remains elevated at 40. ABG was drawn revealing pH of 7.49 and pCO2 of 56. Will give the patient oral diamox 250 mg BID x2 doses. Additionally, will hold her IV lasix at this time. Will continue to follow this patient with you.      12/14: Patient sitting up in chair this morning.  Denies chest pain, pressure or palpitations.  States she is still intermittently short of breath.  Currently in rate controlled atrial fibrillation on telemetry without significant ectopy.  On nasal cannula oxygen with pulse oximetry 95%.  Blood pressures overall normotensive last reported 111/53.  Labs morning reveal sodium of 140, testing 2.7, creatinine 0.60 and hemoglobin of 12.1.  Bicarbonate is improved from  yesterday but remains elevated at 37.  Will continue Diamox 250 mg twice daily x 2 today.  I am going to order spironolactone 25 mg daily for this patient as well.  Additionally have ordered potassium supplementation and will redraw potassium around noon today.  I am going to consult nephrology services for their assistance in managing the patient's metabolic alkalosis. Discussed this case with my collaborating physician Dr. Augustine and Dr. Farnsworth from the hospitalist service. Will continue to follow with you.     12/15: Patient sitting up in chair this morning.  She denies chest pain, pressure or palpitations.  Currently in a rate controlled atrial fibrillation on telemetry with ventricular rates in the 70s.  Blood pressures overall normotensive with last recorded 108/68.  Patient remains on 2 L nasal cannula pulse oximetry of 96%.  She does remain with significant lower extremity edema, though at least some component of this is chronic lymphedema.  I reviewed lab work this morning which revealed a sodium of 140, potassium 3.4, creatinine 0.65 and hemoglobin of 11.6.  Bicarbonate level continues to improve and is at 35 today.  She was evaluated by nephrology services yesterday and was restarted on 40 mg of IV Lasix twice daily.  Additionally, she will continue on acetazolamide per nephrology for 1 more day.  Appreciate their recommendations.  Anticipate another 24 to 48 hours of inpatient hospitalization for this patient.  Will continue to follow with you.      Wendy Rodrigues, APRN-CNP

## 2024-12-15 NOTE — ASSESSMENT & PLAN NOTE
Currently on Diamox and spironolactone  Low-salt diet  1600 cc fluid restriction  Bilateral lower extremity compression  Cardiology following, discussed with cardiology

## 2024-12-15 NOTE — CARE PLAN
The patient's goals for the shift include  to get some rest    The clinical goals for the shift include pt will remain safe and free from injury        Problem: Pain - Adult  Goal: Verbalizes/displays adequate comfort level or baseline comfort level  Outcome: Progressing     Problem: Safety - Adult  Goal: Free from fall injury  Outcome: Progressing     Problem: Discharge Planning  Goal: Discharge to home or other facility with appropriate resources  Outcome: Progressing     Problem: Chronic Conditions and Co-morbidities  Goal: Patient's chronic conditions and co-morbidity symptoms are monitored and maintained or improved  Outcome: Progressing     Problem: Fall/Injury  Goal: Verbalize understanding of personal risk factors for fall in the hospital  Outcome: Progressing  Goal: Verbalize understanding of risk factor reduction measures to prevent injury from fall in the home  Outcome: Progressing  Goal: Not fall by end of shift  Outcome: Progressing  Goal: Be free from injury by end of the shift  Outcome: Progressing  Goal: Use assistive devices by end of the shift  Outcome: Progressing  Goal: Pace activities to prevent fatigue by end of the shift  Outcome: Progressing     Problem: Respiratory  Goal: Wean oxygen to maintain O2 saturation per order/standard this shift  Outcome: Progressing  Goal: Increase self care and/or family involvement in next 24 hours  Outcome: Progressing  Goal: Minimal/no exertional discomfort or dyspnea this shift  Outcome: Progressing  Goal: No signs of respiratory distress (eg. Use of accessory muscles. Peds grunting)  Outcome: Progressing     Problem: Nutrition  Goal: Oral intake greater 75%  Outcome: Progressing  Goal: Reduce weight from edema/fluid  Outcome: Progressing     Problem: Heart Failure  Goal: Improved gas exchange this shift  Outcome: Progressing  Goal: Improved urinary output this shift  Outcome: Progressing  Goal: Reduction in peripheral edema within 24 hours  Outcome:  Progressing  Goal: Report improvement of dyspnea/breathlessness this shift  Outcome: Progressing  Goal: Weight from fluid excess reduced over 2-3 days, then stabilize  Outcome: Progressing  Goal: Increase self care and/or family involvement in 24 hours  Outcome: Progressing     Problem: Pain  Goal: Takes deep breaths with improved pain control throughout the shift  Outcome: Progressing  Goal: Turns in bed with improved pain control throughout the shift  Outcome: Progressing  Goal: Walks with improved pain control throughout the shift  Outcome: Progressing  Goal: Performs ADL's with improved pain control throughout shift  Outcome: Progressing  Goal: Participates in PT with improved pain control throughout the shift  Outcome: Progressing  Goal: Free from opioid side effects throughout the shift  Outcome: Progressing  Goal: Free from acute confusion related to pain meds throughout the shift  Outcome: Progressing     Problem: Bathing  Goal: LTG - Patient will utilize adaptive techniques to bathe body independent with AE as needed  Outcome: Progressing     Problem: Dressings Lower Extremities  Goal: LTG - Patient will dress lower body independent with set up  Outcome: Progressing     Problem: Toileting  Goal: STG - Patient will complete toileting tasks with 2ww independent  Outcome: Progressing   .

## 2024-12-16 ENCOUNTER — APPOINTMENT (OUTPATIENT)
Dept: PRIMARY CARE | Facility: CLINIC | Age: 69
End: 2024-12-16
Payer: MEDICARE

## 2024-12-16 ENCOUNTER — DOCUMENTATION (OUTPATIENT)
Dept: RESEARCH | Age: 69
End: 2024-12-16
Payer: MEDICARE

## 2024-12-16 ENCOUNTER — APPOINTMENT (OUTPATIENT)
Dept: CARDIOLOGY | Facility: HOSPITAL | Age: 69
DRG: 291 | End: 2024-12-16
Payer: MEDICARE

## 2024-12-16 ENCOUNTER — PHARMACY VISIT (OUTPATIENT)
Dept: PHARMACY | Facility: CLINIC | Age: 69
End: 2024-12-16
Payer: COMMERCIAL

## 2024-12-16 PROBLEM — J96.21 ACUTE ON CHRONIC RESPIRATORY FAILURE WITH HYPOXIA AND HYPERCAPNIA (MULTI): Status: RESOLVED | Noted: 2024-12-08 | Resolved: 2024-12-16

## 2024-12-16 PROBLEM — E87.3 METABOLIC ALKALOSIS: Status: RESOLVED | Noted: 2024-12-14 | Resolved: 2024-12-16

## 2024-12-16 PROBLEM — J96.22 ACUTE ON CHRONIC RESPIRATORY FAILURE WITH HYPOXIA AND HYPERCAPNIA (MULTI): Status: RESOLVED | Noted: 2024-12-08 | Resolved: 2024-12-16

## 2024-12-16 PROBLEM — E87.6 HYPOKALEMIA: Status: RESOLVED | Noted: 2023-08-30 | Resolved: 2024-12-16

## 2024-12-16 LAB
ANION GAP SERPL CALCULATED.3IONS-SCNC: 10 MMOL/L (ref 10–20)
AORTIC VALVE PEAK VELOCITY: 1.84 M/S
AV PEAK GRADIENT: 14 MMHG
AVA (PEAK VEL): 2.38 CM2
BUN SERPL-MCNC: 26 MG/DL (ref 6–23)
CALCIUM SERPL-MCNC: 8.9 MG/DL (ref 8.6–10.3)
CHLORIDE SERPL-SCNC: 99 MMOL/L (ref 98–107)
CO2 SERPL-SCNC: 34 MMOL/L (ref 21–32)
CREAT SERPL-MCNC: 0.75 MG/DL (ref 0.5–1.05)
EGFRCR SERPLBLD CKD-EPI 2021: 87 ML/MIN/1.73M*2
EJECTION FRACTION APICAL 4 CHAMBER: 75.1
EJECTION FRACTION: 63 %
ERYTHROCYTE [DISTWIDTH] IN BLOOD BY AUTOMATED COUNT: 15.3 % (ref 11.5–14.5)
GLUCOSE SERPL-MCNC: 86 MG/DL (ref 74–99)
HCT VFR BLD AUTO: 36.2 % (ref 36–46)
HGB BLD-MCNC: 11.4 G/DL (ref 12–16)
LEFT ATRIUM VOLUME AREA LENGTH INDEX BSA: 104 ML/M2
LEFT VENTRICLE INTERNAL DIMENSION DIASTOLE: 4.68 CM (ref 3.5–6)
LEFT VENTRICULAR OUTFLOW TRACT DIAMETER: 2.04 CM
LV EJECTION FRACTION BIPLANE: 68 %
MAGNESIUM SERPL-MCNC: 2.31 MG/DL (ref 1.6–2.4)
MCH RBC QN AUTO: 30 PG (ref 26–34)
MCHC RBC AUTO-ENTMCNC: 31.5 G/DL (ref 32–36)
MCV RBC AUTO: 95 FL (ref 80–100)
MITRAL VALVE E/A RATIO: 3.12
NRBC BLD-RTO: 0 /100 WBCS (ref 0–0)
PLATELET # BLD AUTO: 261 X10*3/UL (ref 150–450)
POTASSIUM SERPL-SCNC: 3.2 MMOL/L (ref 3.5–5.3)
POTASSIUM SERPL-SCNC: 3.5 MMOL/L (ref 3.5–5.3)
RBC # BLD AUTO: 3.8 X10*6/UL (ref 4–5.2)
RIGHT VENTRICLE PEAK SYSTOLIC PRESSURE: 63.7 MMHG
SODIUM SERPL-SCNC: 140 MMOL/L (ref 136–145)
WBC # BLD AUTO: 11.6 X10*3/UL (ref 4.4–11.3)

## 2024-12-16 PROCEDURE — 85027 COMPLETE CBC AUTOMATED: CPT

## 2024-12-16 PROCEDURE — 2500000001 HC RX 250 WO HCPCS SELF ADMINISTERED DRUGS (ALT 637 FOR MEDICARE OP): Performed by: INTERNAL MEDICINE

## 2024-12-16 PROCEDURE — 97530 THERAPEUTIC ACTIVITIES: CPT | Mod: GO,CO

## 2024-12-16 PROCEDURE — 2500000004 HC RX 250 GENERAL PHARMACY W/ HCPCS (ALT 636 FOR OP/ED): Performed by: INTERNAL MEDICINE

## 2024-12-16 PROCEDURE — 2500000001 HC RX 250 WO HCPCS SELF ADMINISTERED DRUGS (ALT 637 FOR MEDICARE OP)

## 2024-12-16 PROCEDURE — 99232 SBSQ HOSP IP/OBS MODERATE 35: CPT

## 2024-12-16 PROCEDURE — 94760 N-INVAS EAR/PLS OXIMETRY 1: CPT

## 2024-12-16 PROCEDURE — 2500000002 HC RX 250 W HCPCS SELF ADMINISTERED DRUGS (ALT 637 FOR MEDICARE OP, ALT 636 FOR OP/ED): Performed by: NURSE PRACTITIONER

## 2024-12-16 PROCEDURE — 93306 TTE W/DOPPLER COMPLETE: CPT | Performed by: INTERNAL MEDICINE

## 2024-12-16 PROCEDURE — 36415 COLL VENOUS BLD VENIPUNCTURE: CPT

## 2024-12-16 PROCEDURE — 2500000005 HC RX 250 GENERAL PHARMACY W/O HCPCS: Performed by: NURSE PRACTITIONER

## 2024-12-16 PROCEDURE — 80048 BASIC METABOLIC PNL TOTAL CA: CPT

## 2024-12-16 PROCEDURE — 2500000001 HC RX 250 WO HCPCS SELF ADMINISTERED DRUGS (ALT 637 FOR MEDICARE OP): Performed by: NURSE PRACTITIONER

## 2024-12-16 PROCEDURE — 84132 ASSAY OF SERUM POTASSIUM: CPT

## 2024-12-16 PROCEDURE — C8929 TTE W OR WO FOL WCON,DOPPLER: HCPCS

## 2024-12-16 PROCEDURE — 2500000004 HC RX 250 GENERAL PHARMACY W/ HCPCS (ALT 636 FOR OP/ED): Performed by: NURSE PRACTITIONER

## 2024-12-16 PROCEDURE — 94640 AIRWAY INHALATION TREATMENT: CPT

## 2024-12-16 PROCEDURE — 1200000002 HC GENERAL ROOM WITH TELEMETRY DAILY

## 2024-12-16 PROCEDURE — 2500000002 HC RX 250 W HCPCS SELF ADMINISTERED DRUGS (ALT 637 FOR MEDICARE OP, ALT 636 FOR OP/ED)

## 2024-12-16 PROCEDURE — 97535 SELF CARE MNGMENT TRAINING: CPT | Mod: GO,CO

## 2024-12-16 PROCEDURE — RXMED WILLOW AMBULATORY MEDICATION CHARGE

## 2024-12-16 PROCEDURE — 83735 ASSAY OF MAGNESIUM: CPT | Performed by: NURSE PRACTITIONER

## 2024-12-16 PROCEDURE — 2500000004 HC RX 250 GENERAL PHARMACY W/ HCPCS (ALT 636 FOR OP/ED)

## 2024-12-16 PROCEDURE — 99222 1ST HOSP IP/OBS MODERATE 55: CPT | Performed by: REGISTERED NURSE

## 2024-12-16 RX ORDER — POTASSIUM CHLORIDE 20 MEQ/1
40 TABLET, EXTENDED RELEASE ORAL
Qty: 60 TABLET | Refills: 0 | Status: SHIPPED | OUTPATIENT
Start: 2024-12-17 | End: 2025-01-16

## 2024-12-16 RX ORDER — SPIRONOLACTONE 25 MG/1
25 TABLET ORAL 2 TIMES DAILY
Status: DISCONTINUED | OUTPATIENT
Start: 2024-12-16 | End: 2024-12-17

## 2024-12-16 RX ORDER — TORSEMIDE 100 MG/1
50 TABLET ORAL
Status: DISCONTINUED | OUTPATIENT
Start: 2024-12-16 | End: 2024-12-17 | Stop reason: HOSPADM

## 2024-12-16 RX ORDER — POTASSIUM CHLORIDE 1.5 G/1.58G
20 POWDER, FOR SOLUTION ORAL ONCE
Status: COMPLETED | OUTPATIENT
Start: 2024-12-16 | End: 2024-12-16

## 2024-12-16 RX ORDER — SPIRONOLACTONE 25 MG/1
25 TABLET ORAL 2 TIMES DAILY
Qty: 60 TABLET | Refills: 0 | Status: SHIPPED | OUTPATIENT
Start: 2024-12-16 | End: 2025-01-15

## 2024-12-16 RX ORDER — CEFUROXIME AXETIL 250 MG/1
250 TABLET ORAL 2 TIMES DAILY
Qty: 14 TABLET | Refills: 0 | Status: SHIPPED | OUTPATIENT
Start: 2024-12-16 | End: 2024-12-23

## 2024-12-16 ASSESSMENT — PAIN - FUNCTIONAL ASSESSMENT
PAIN_FUNCTIONAL_ASSESSMENT: UNABLE TO SELF-REPORT
PAIN_FUNCTIONAL_ASSESSMENT: 0-10
PAIN_FUNCTIONAL_ASSESSMENT: 0-10

## 2024-12-16 ASSESSMENT — ACTIVITIES OF DAILY LIVING (ADL): HOME_MANAGEMENT_TIME_ENTRY: 25

## 2024-12-16 ASSESSMENT — COGNITIVE AND FUNCTIONAL STATUS - GENERAL
DAILY ACTIVITIY SCORE: 20
WALKING IN HOSPITAL ROOM: A LITTLE
DRESSING REGULAR LOWER BODY CLOTHING: A LITTLE
MOBILITY SCORE: 20
DAILY ACTIVITIY SCORE: 20
CLIMB 3 TO 5 STEPS WITH RAILING: A LITTLE
DRESSING REGULAR UPPER BODY CLOTHING: A LITTLE
DRESSING REGULAR UPPER BODY CLOTHING: A LITTLE
MOBILITY SCORE: 20
DRESSING REGULAR UPPER BODY CLOTHING: A LITTLE
HELP NEEDED FOR BATHING: A LITTLE
PERSONAL GROOMING: A LITTLE
TOILETING: A LITTLE
TOILETING: A LITTLE
HELP NEEDED FOR BATHING: A LITTLE
DRESSING REGULAR LOWER BODY CLOTHING: A LITTLE
MOVING TO AND FROM BED TO CHAIR: A LITTLE
TOILETING: A LITTLE
HELP NEEDED FOR BATHING: A LITTLE
STANDING UP FROM CHAIR USING ARMS: A LITTLE
WALKING IN HOSPITAL ROOM: A LITTLE
STANDING UP FROM CHAIR USING ARMS: A LITTLE
DAILY ACTIVITIY SCORE: 19
MOVING TO AND FROM BED TO CHAIR: A LITTLE
CLIMB 3 TO 5 STEPS WITH RAILING: A LITTLE
DRESSING REGULAR LOWER BODY CLOTHING: A LITTLE

## 2024-12-16 ASSESSMENT — PAIN DESCRIPTION - LOCATION
LOCATION: HEAD
LOCATION: HEAD

## 2024-12-16 ASSESSMENT — PAIN SCALES - WONG BAKER
WONGBAKER_NUMERICALRESPONSE: NO HURT
WONGBAKER_NUMERICALRESPONSE: HURTS EVEN MORE

## 2024-12-16 ASSESSMENT — PAIN SCALES - GENERAL
PAINLEVEL_OUTOF10: 3
PAINLEVEL_OUTOF10: 0 - NO PAIN
PAINLEVEL_OUTOF10: 0 - NO PAIN
PAINLEVEL_OUTOF10: 8
PAINLEVEL_OUTOF10: 0 - NO PAIN

## 2024-12-16 ASSESSMENT — PAIN DESCRIPTION - DESCRIPTORS
DESCRIPTORS: HEADACHE;THROBBING
DESCRIPTORS: NAGGING;ACHING

## 2024-12-16 NOTE — RESEARCH NOTES
Artificial Intelligence Monitoring in Nursing (AIMS Nursing) Study    Principle Investigator - Dr. Bentley Ellison  Research Coordinator - JUNIE Mariano     Patient Name - Vickie Foster  Date - 12/16/2024 11:52 AM  Location - [place]    Vickie Foster was approached by JUNIE Mariano to talk about participating in the AIMS Nursing Study. The consent form was signed by the patient and they were given a copy for their records. Study protocol was followed and patient was given study contact information.     JUNIE Mariano

## 2024-12-16 NOTE — NURSING NOTE
This RN observing pt having a nosebleed, pt attempting to stop flow by holding wadded toilet paper to her nostrils. Evidence of bright red blood on front of gown. This RN entering suite. Pt is alert and oriented x 4, currently on room air with unlabored respirations, symmetrical chest expansions, with exposed skin appearing appropriate for ethnic background. This RN making inquiries, pt reporting she gets nosebleeds often, and that they are related to her overnight oxygen therapy (2-3 liters) which is delivered via nasal cannula. Pt clarifies that when the therapy is not moisturized, is when she gets nosebleeds. Pt reporting that therapy was not moisturized overnight. Pt given education to assist with staunching blood flow.     Pt informed this RN will closely observe and intervene as needed. Labs, vitals and medications being reviewed....    Pt reporting she should be heading home today... this RN reviewing notes, discussing written plan of care, awaiting for conversion to oral antibiotic and oral diuretic...

## 2024-12-16 NOTE — PROGRESS NOTES
"Nutrition Follow up Note    Nutrition Assessment      Pt has been eating well. Pt will be discharged once transitioned to oral diuretics and antibiotics.     Nutrition History:  Food and Nutrient History: Pt reported good appetite  Energy Intake: Good > 75 %  Food Allergies/Intolerances:  None      Anthropometrics:  Ht: 157.5 cm (5' 2.01\"), Wt: 104 kg (229 lb 4.5 oz), BMI: 41.92  IBW/kg (Dietitian Calculated): 50 kg  Percent of IBW: 210 %  Adjusted Body Weight (kg): 63.64 kg    Weight Change:  Daily Weight  12/15/24 : 104 kg (229 lb 4.5 oz)  11/13/24 : 109 kg (241 lb)  11/12/24 : 109 kg (241 lb)  10/31/24 : 104 kg (230 lb)  10/17/24 : 108 kg (239 lb)  10/16/24 : 111 kg (245 lb)  10/15/24 : 111 kg (245 lb 9.6 oz)  10/11/24 : 109 kg (241 lb)  10/09/24 : 110 kg (241 lb 9.6 oz)  10/08/24 : 112 kg (246 lb 7.6 oz)     Weight History / % Weight Change: Pt reported 10# weight gain due to fluid retention. Reported UBW to be 231#             Nutrition Focused Physical Exam Findings:             Edema  Edema: +3 moderate  Edema Location: BLE         Nutrition Significant Labs:  Lab Results   Component Value Date    WBC 11.6 (H) 12/16/2024    HGB 11.4 (L) 12/16/2024    HCT 36.2 12/16/2024     12/16/2024    CHOL 186 07/11/2024    TRIG 107 07/11/2024    HDL 46.0 (L) 07/11/2024    ALT 20 12/08/2024    AST 20 12/08/2024     12/16/2024    K 3.2 (L) 12/16/2024    CL 99 12/16/2024    CREATININE 0.75 12/16/2024    BUN 26 (H) 12/16/2024    CO2 34 (H) 12/16/2024    TSH 2.50 07/11/2024    INR 1.0 07/08/2024    HGBA1C 5.5 07/11/2024     Nutrition Specific Medications:  allopurinol, 200 mg, oral, BID  apixaban, 5 mg, oral, BID  atorvastatin, 40 mg, oral, Nightly  buPROPion XL, 300 mg, oral, Daily before breakfast  cefTRIAXone, 1 g, intravenous, q24h  dofetilide, 250 mcg, oral, q12h  fluticasone furoate-vilanteroL, 1 puff, inhalation, Daily  furosemide, 40 mg, intravenous, BID  metoprolol succinate XL, 25 mg, oral, " Daily  oxygen, , inhalation, Continuous - Inhalation  polyethylene glycol, 17 g, oral, Daily  potassium chloride CR, 40 mEq, oral, Daily with breakfast  spironolactone, 25 mg, oral, Daily        Dietary Orders (From admission, onward)       Start     Ordered    12/09/24 0157  May Participate in Room Service  ( ROOM SERVICE MAY PARTICIPATE)  Once        Question:  .  Answer:  Yes    12/09/24 0156    12/08/24 2229  Adult diet Cardiac; 70 gm fat; 2 - 3 grams Sodium; 1600 mL fluid  Diet effective now        Question Answer Comment   Diet type Cardiac    Fat restriction: 70 gm fat    Sodium restriction: 2 - 3 grams Sodium    Dietary fluid restriction / 24h: 1600 mL fluid        12/08/24 2230                    Estimated Needs:   Estimated Energy Needs  Total Energy Estimated Needs (kCal): 1594 kCal  Total Estimated Energy Need per Day (kCal/kg): 25 kCal/kg  Method for Estimating Needs: ABW    Estimated Protein Needs  Total Protein Estimated Needs (g):  (64-96)  Total Protein Estimated Needs (g/kg):  (1-1.5)  Method for Estimating Needs: ABW    Estimated Fluid Needs  Total Fluid Estimated Needs (mL): 1600 mL  Method for Estimating Needs: fluid restriction        Nutrition Diagnosis   Nutrition Diagnosis:       Nutrition Diagnosis  Patient has Nutrition Diagnosis: Yes  Diagnosis Status (1): Resolved  Nutrition Diagnosis 1: Food and nutrition related knowledge deficit  Related to (1): needs review of diet education  As Evidenced by (1): requests diet information       Nutrition Interventions/Recommendations   Nutrition Interventions and Recommendations:    Nutrition Prescription:  Individualized Nutrition Prescription Provided for : 1594 kcals, 64-96 g protein via diet    Nutrition Interventions:   Food and/or Nutrient Delivery Interventions  Interventions: Meals and snacks  Meals and Snacks: Fat-modified diet, Mineral-modified diet  Goal: provide diet as ordered    Education Documentation  No documentation found.            Nutrition Monitoring and Evaluation   Monitoring/Evaluation:   Food/Nutrient Related History Monitoring  Monitoring and Evaluation Plan: Energy intake  Energy Intake: Estimated energy intake  Criteria: pt to consume >/= 75% estimated needs  Additional Plans: pt will plan meals within prescribed guidelines         Time Spent/Follow-up:   Follow Up  Time Spent (min): 30 minutes  Last Date of Nutrition Visit: 12/16/24  Nutrition Follow-Up Needed?: 7-10 days  Follow up Comment: 12/24/24

## 2024-12-16 NOTE — DISCHARGE SUMMARY
Discharge Diagnosis  CHF (congestive heart failure), NYHA class I, acute on chronic, combined  Right lower extremity Cellulitis  Metabolic Alkalosis     Issues Requiring Follow-Up  Discharge home with [facility] for continued rehabilitation  Repeat BMP next week--results to be sent to PCP  Finish PO ABX     Discharge Meds     Medication List      START taking these medications     cefuroxime 250 mg tablet; Commonly known as: Ceftin; Take 1 tablet (250   mg) by mouth 2 times a day for 7 days.; Notes to patient: 2 TIMES DAILY,   FOR 7 DAYS   potassium chloride CR 20 mEq ER tablet; Commonly known as: Klor-Con M20;   Take 2 tablets (40 mEq) by mouth once daily with breakfast. Do not crush   or chew.; Start taking on: December 17, 2024   spironolactone 25 mg tablet; Commonly known as: Aldactone; Take 1 tablet   (25 mg) by mouth 2 times a day.     CONTINUE taking these medications     allopurinoL 200 mg tablet; Take 200 mg by mouth 2 times a day.; Notes to   patient: 2 TIMES DAILY   atorvastatin 40 mg tablet; Commonly known as: Lipitor; TAKE 1 TABLET(40   MG) BY MOUTH DAILY AT BEDTIME; Notes to patient: ONCE DAILY, AT BEDTIME   Biotene Dry Mouth Oral Rinse solution; Generic drug: moisturizing mouth;   Notes to patient: 3 TIMES DAILY   budesonide-formoteroL 80-4.5 mcg/actuation inhaler; Commonly known as:   Symbicort; Notes to patient: 2 PUFFS 2 TIMES DAILY   buPROPion  mg 24 hr tablet; Commonly known as: Wellbutrin XL; Take   1 tablet (300 mg) by mouth once daily in the morning. Take before meals.;   Notes to patient: ONCE DAILY, IN THE MORNING   calcium carb-vitamin D3-vit K2 600 mg-1,000 unit-90 mcg tablet; Notes to   patient: ONCE DAILY   dofetilide 250 mcg capsule; Commonly known as: Tikosyn; Take 1 capsule   (250 mcg) by mouth every 12 hours.; Notes to patient: 2 TIMES DAILY (EVERY   12 HOURS)   Eliquis 5 mg tablet; Generic drug: apixaban; Take 1 tablet (5 mg) by   mouth 2 times a day.; Notes to patient: 2 TIMES  DAILY   fluticasone propion-salmeteroL 100-50 mcg/dose diskus inhaler; Commonly   known as: Advair Diskus; Notes to patient: 2 TIMES DAILY   hydrOXYzine HCL 10 mg tablet; Commonly known as: Atarax; Take 1 tablet   (10 mg) by mouth as needed at bedtime (insomnia).; Notes to patient: IF   NEEDED   ipratropium-albuteroL 0.5-2.5 mg/3 mL nebulizer solution; Commonly known   as: Duo-Neb; Take 3 mL by nebulization every 6 hours if needed for   wheezing or shortness of breath.; Notes to patient: IF NEEDED   metoprolol succinate XL 25 mg 24 hr tablet; Commonly known as:   Toprol-XL; TAKE 1 TABLET BY MOUTH ONE TIME DAILY; Notes to patient: ONCE   DAILY   oxygen gas therapy; Commonly known as: O2; Inhale 1 each every 12 hours.   oxygen gas therapy; Commonly known as: O2; Inhale 1 each every 12 hours.   torsemide 100 mg tablet; Commonly known as: Demadex; Take 1/2 a tablet   (50 mg) by mouth 2 times a day.   traMADol 50 mg tablet; Commonly known as: Ultram     ASK your doctor about these medications     semaglutide 2 mg/dose (8 mg/3 mL) pen injector       Test Results Pending At Discharge  Pending Labs       No current pending labs.            Hospital Course  68 year old female with a pmhx of chronic respiratory failure with hypoxia baseline 2l of o2 at bedtime, Atrial fibrillation, COPD, admitted to [facility] 12/8/24 with bilateral lower extremity edema. Found to be in an acute on chronic respiratory failure 2/2 to acute CHF exacerbation and started on continuous supplemental oxygen. Also with RLE cellulitis on admission and electrolyte imbalances. Started on IV ABX, Received consultation with cardiology, ID, and nephrology. Now with resolved shortness of breath, patient states she uses o2 at bedtime and as needed. Blood cultures negative and final. Improved and resolved electrolyte imbalances. Cleared to dc from ID, cardiology, and nephrology. Discharge home with home health. Follow up with PCP, nephrology 1-2  weeks and cardiology as scheduled for 12/23/24. Repeat BMP next week with results to be sent to PCP.     Pertinent Physical Exam At Time of Discharge  Physical Exam  Vitals and nursing note reviewed.   Constitutional:       Appearance: Normal appearance. She is obese.   HENT:      Head: Normocephalic and atraumatic.      Mouth/Throat:      Mouth: Mucous membranes are moist.   Eyes:      Extraocular Movements: Extraocular movements intact.   Cardiovascular:      Rate and Rhythm: Normal rate.   Pulmonary:      Effort: Pulmonary effort is normal.      Breath sounds: Normal breath sounds.      Comments: Diminshed lower fields but no signs of acute distress  Abdominal:      General: Bowel sounds are normal.      Palpations: Abdomen is soft.   Musculoskeletal:      Cervical back: Normal range of motion and neck supple.      Right lower leg: Edema present.      Left lower leg: Edema present.   Skin:     General: Skin is warm and dry.      Capillary Refill: Capillary refill takes less than 2 seconds.      Comments: Blle wrapped, clean dry intact   Neurological:      General: No focal deficit present.      Mental Status: She is alert and oriented to person, place, and time.   Psychiatric:         Mood and Affect: Mood normal.         Behavior: Behavior normal.         Outpatient Follow-Up  Future Appointments   Date Time Provider Department Center   12/23/2024  3:45 PM Joseph Sanchez MD [id number] [facility]   1/9/2025  1:45 PM Joseph Kelley MD [id number] [facility]   1/16/2025  1:30 PM Danilo Gonzalez DO [id number] [facility]     Time spent on discharge 35 minutes.     Kate Johansen, APRN-CNP

## 2024-12-16 NOTE — NURSING NOTE
Pt observed having a second nosebleed since start of shift...    Pt holding wad of toilet paper to nose with ere head down. 2 medium sized spots from blood drops observed on gown in the chest area...

## 2024-12-16 NOTE — ASSESSMENT & PLAN NOTE
Diuretics  Low-salt diet  1600 cc fluid restriction  Bilateral lower extremity compression  Cardiology following

## 2024-12-16 NOTE — DISCHARGE INSTRUCTIONS
Follow up PCP 1-2 weeks  Follow up cardiology as scheduled 12/23/24  Follow up electrophysiology 4 weeks   Follow up nephrology 1-2 weeks     Repeat BMP next week with results sent to PCP

## 2024-12-16 NOTE — PROGRESS NOTES
Vickie Foster is a 68 y.o. female on day 8 of admission presenting with CHF (congestive heart failure), NYHA class I, acute on chronic, combined.      Subjective   Denies complaints. Asking about discharge.        Objective     Last Recorded Vitals  /63 (BP Location: Right arm, Patient Position: Sitting)   Pulse 67   Temp 36.2 °C (97.2 °F) (Oral)   Resp 18   Wt 104 kg (229 lb 4.5 oz)   SpO2 97%   Intake/Output last 3 Shifts:    Intake/Output Summary (Last 24 hours) at 12/16/2024 1216  Last data filed at 12/16/2024 0900  Gross per 24 hour   Intake 1200 ml   Output --   Net 1200 ml       Admission Weight  Weight: 105 kg (231 lb) (12/08/24 1818)    Daily Weight  12/15/24 : 104 kg (229 lb 4.5 oz)    Image Results  XR chest 1 view  Narrative: Interpreted By:  Clif Benavidez,   STUDY:  XR CHEST 1 VIEW; 12/12/2024 4:00 pm      INDICATION:  CLINICAL INFORMATION: Signs/Symptoms:CHF. Leg swelling      COMPARISON:  12/08/2024      ACCESSION NUMBER(S):  [id number]      ORDERING CLINICIAN:  ANICETO CHAPIN      TECHNIQUE:  Portable chest one view.      FINDINGS:  The cardiac size is indeterminate in view of the AP projection.  Patchy bibasilar atelectasis and/or infiltrate is present, similar  compared to the prior study.      Impression: Patchy bibasilar atelectasis and/or infiltrate is present, similar  compared to the prior study.      MACRO:  none      Signed by: Clif Benavidez 12/12/2024 4:27 PM  Dictation workstation:   [id number]      Physical Exam  Vitals and nursing note reviewed.   Constitutional:       Appearance: Normal appearance.   HENT:      Head: Normocephalic and atraumatic.      Nose: Nose normal.      Mouth/Throat:      Mouth: Mucous membranes are moist.   Eyes:      Extraocular Movements: Extraocular movements intact.   Cardiovascular:      Rate and Rhythm: Normal rate. Rhythm irregular.      Pulses: Normal pulses.   Pulmonary:      Effort: Pulmonary effort is normal.      Comments: On 2l 02 nc  chronically at baseline  Abdominal:      General: Bowel sounds are normal.      Palpations: Abdomen is soft.   Musculoskeletal:         General: Normal range of motion.      Cervical back: Normal range of motion and neck supple.      Right lower leg: Edema present.      Left lower leg: Edema present.   Skin:     General: Skin is warm and dry.      Capillary Refill: Capillary refill takes less than 2 seconds.      Findings: Erythema present.      Comments: BLLE dressing clean dry intact   Neurological:      General: No focal deficit present.      Mental Status: She is alert and oriented to person, place, and time.   Psychiatric:         Mood and Affect: Mood normal.         Behavior: Behavior normal.         Relevant Results               Assessment/Plan                  Assessment & Plan  Acute on chronic respiratory failure with hypoxia and hypercapnia (Multi)  Wean oxygen as able  CHF (congestive heart failure), NYHA class I, acute on chronic, combined  Diuretics  Low-salt diet  1600 cc fluid restriction  Bilateral lower extremity compression  Cardiology following  Hypokalemia  Likely due to diuretic use  Replaced  Follow electrolytes  Metabolic alkalosis  Now improved   Cellulitis  Right lower extremity markedly more erythematous  Ultrasound negative for DVT  Blood cultures pending  IV ABX to oral on discharge  Infectious diseases following  Atrial fib/flutter, transient (Multi)  Continue metoprolol, Eliquis, and dofetilide.  COPD (chronic obstructive pulmonary disease) (Multi)  Do not feel that she is having a current exacerbation  Continue home maintenance inhalers.  Obesity  Major contribution  Disposition  Home with [facility] once repeat K is back today                KEVON Lopez-CNP

## 2024-12-16 NOTE — ASSESSMENT & PLAN NOTE
Right lower extremity markedly more erythematous  Ultrasound negative for DVT  Blood cultures pending  IV ABX to oral on discharge  Infectious diseases following

## 2024-12-16 NOTE — CARE PLAN
The patient's goals for the shift include  rest    The clinical goals for the shift include Pt will remain safe and free from injury      Problem: Pain - Adult  Goal: Verbalizes/displays adequate comfort level or baseline comfort level  Outcome: Progressing     Problem: Safety - Adult  Goal: Free from fall injury  Outcome: Progressing     Problem: Discharge Planning  Goal: Discharge to home or other facility with appropriate resources  Outcome: Progressing     Problem: Chronic Conditions and Co-morbidities  Goal: Patient's chronic conditions and co-morbidity symptoms are monitored and maintained or improved  Outcome: Progressing     Problem: Fall/Injury  Goal: Verbalize understanding of personal risk factors for fall in the hospital  Outcome: Progressing  Goal: Verbalize understanding of risk factor reduction measures to prevent injury from fall in the home  Outcome: Progressing  Goal: Not fall by end of shift  Outcome: Progressing  Goal: Be free from injury by end of the shift  Outcome: Progressing  Goal: Use assistive devices by end of the shift  Outcome: Progressing  Goal: Pace activities to prevent fatigue by end of the shift  Outcome: Progressing     Problem: Respiratory  Goal: Wean oxygen to maintain O2 saturation per order/standard this shift  Outcome: Progressing  Goal: Increase self care and/or family involvement in next 24 hours  Outcome: Progressing  Goal: Minimal/no exertional discomfort or dyspnea this shift  Outcome: Progressing  Goal: No signs of respiratory distress (eg. Use of accessory muscles. Peds grunting)  Outcome: Progressing     Problem: Nutrition  Goal: Oral intake greater 75%  Outcome: Progressing  Goal: Reduce weight from edema/fluid  Outcome: Progressing     Problem: Heart Failure  Goal: Improved gas exchange this shift  Outcome: Progressing  Goal: Improved urinary output this shift  Outcome: Progressing  Goal: Reduction in peripheral edema within 24 hours  Outcome: Progressing  Goal:  Report improvement of dyspnea/breathlessness this shift  Outcome: Progressing  Goal: Weight from fluid excess reduced over 2-3 days, then stabilize  Outcome: Progressing  Goal: Increase self care and/or family involvement in 24 hours  Outcome: Progressing     Problem: Pain  Goal: Takes deep breaths with improved pain control throughout the shift  Outcome: Progressing  Goal: Turns in bed with improved pain control throughout the shift  Outcome: Progressing  Goal: Walks with improved pain control throughout the shift  Outcome: Progressing  Goal: Performs ADL's with improved pain control throughout shift  Outcome: Progressing  Goal: Participates in PT with improved pain control throughout the shift  Outcome: Progressing  Goal: Free from opioid side effects throughout the shift  Outcome: Progressing  Goal: Free from acute confusion related to pain meds throughout the shift  Outcome: Progressing     Problem: Bathing  Goal: LTG - Patient will utilize adaptive techniques to bathe body independent with AE as needed  Outcome: Progressing     Problem: Dressings Lower Extremities  Goal: LTG - Patient will dress lower body independent with set up  Outcome: Progressing     Problem: Toileting  Goal: STG - Patient will complete toileting tasks with 2ww independent  Outcome: Progressing

## 2024-12-16 NOTE — PROGRESS NOTES
"Vickie Foster is a 68 y.o. female on day 8 of admission presenting with CHF (congestive heart failure), NYHA class I, acute on chronic, combined.    Subjective   Patient sitting up in chair. Denies chest pain, pressure or palpitations.        Objective     Physical Exam  Vitals reviewed.   Constitutional:       Appearance: She is obese.   Cardiovascular:      Rate and Rhythm: Normal rate. Rhythm irregular.      Heart sounds: No murmur heard.     No friction rub. No gallop.   Pulmonary:      Effort: Pulmonary effort is normal.      Breath sounds: Normal breath sounds. No wheezing, rhonchi or rales.      Comments: No conversational dyspnea noted  Abdominal:      General: Bowel sounds are normal.      Palpations: Abdomen is soft.   Musculoskeletal:      Right lower leg: Edema present.      Left lower leg: Edema present.   Skin:     General: Skin is warm and dry.      Capillary Refill: Capillary refill takes less than 2 seconds.   Neurological:      Mental Status: She is alert and oriented to person, place, and time.   Psychiatric:         Mood and Affect: Mood normal.         Behavior: Behavior normal.         Last Recorded Vitals  Blood pressure 101/63, pulse 67, temperature 36.2 °C (97.2 °F), temperature source Oral, resp. rate 18, height 1.575 m (5' 2.01\"), weight 104 kg (229 lb 4.5 oz), SpO2 97%.  Intake/Output last 3 Shifts:  I/O last 3 completed shifts:  In: 1320 (12.7 mL/kg) [P.O.:1220; IV Piggyback:100]  Out: - (0 mL/kg)   Weight: 104 kg     Relevant Results  Results for orders placed or performed during the hospital encounter of 12/08/24 (from the past 24 hours)   Basic metabolic panel   Result Value Ref Range    Glucose 86 74 - 99 mg/dL    Sodium 140 136 - 145 mmol/L    Potassium 3.2 (L) 3.5 - 5.3 mmol/L    Chloride 99 98 - 107 mmol/L    Bicarbonate 34 (H) 21 - 32 mmol/L    Anion Gap 10 10 - 20 mmol/L    Urea Nitrogen 26 (H) 6 - 23 mg/dL    Creatinine 0.75 0.50 - 1.05 mg/dL    eGFR 87 >60 mL/min/1.73m*2    " Calcium 8.9 8.6 - 10.3 mg/dL   CBC   Result Value Ref Range    WBC 11.6 (H) 4.4 - 11.3 x10*3/uL    nRBC 0.0 0.0 - 0.0 /100 WBCs    RBC 3.80 (L) 4.00 - 5.20 x10*6/uL    Hemoglobin 11.4 (L) 12.0 - 16.0 g/dL    Hematocrit 36.2 36.0 - 46.0 %    MCV 95 80 - 100 fL    MCH 30.0 26.0 - 34.0 pg    MCHC 31.5 (L) 32.0 - 36.0 g/dL    RDW 15.3 (H) 11.5 - 14.5 %    Platelets 261 150 - 450 x10*3/uL   Magnesium   Result Value Ref Range    Magnesium 2.31 1.60 - 2.40 mg/dL     *Note: Due to a large number of results and/or encounters for the requested time period, some results have not been displayed. A complete set of results can be found in Results Review.                   Assessment/Plan   Assessment & Plan  CHF (congestive heart failure), NYHA class I, acute on chronic, combined    Acute on chronic respiratory failure with hypoxia and hypercapnia (Multi)    Atrial fib/flutter, transient (Multi)    Cellulitis    COPD (chronic obstructive pulmonary disease) (Multi)    Obesity    Hypokalemia    Metabolic alkalosis    Acute on chronic diastolic heart failure  Lymphedema  Cellulitis bilateral lower extremities  Paroxysmal atrial fibrillation  Hypertensive disorder  Hyperlipidemia  Morbid obesity  History of stroke  Chronic respiratory failure with chronic O2     Overall impression:     12/9: As above, presents predominately for worsening peripheral edema for 3 weeks and worsening shortness of breath over the past week.  She has had multiple recurrences over the past year for worsening peripheral edema.  I am now suspecting that she has a component of lymphedema given her obesity.  Additionally her recurrent echocardiogram showed preserved ejection fraction with a minimal diastolic dysfunction.  She does have a chronic O2 requirement and a chest x-ray reveals mild fluid volume overload with a low BNP of 148.  Her troponins are flat negative.  Certainly diuresis is the answer to the patient's problem currently.  She states compliance  with outpatient medications.  In the outpatient setting she takes torsemide 50 mg twice daily.  She has been initiated on IV furosemide.  Will increase this.  Creatinine stable at 0.65.  Will utilize IV furosemide 80 mg twice daily.  Given the level of diuresis needed as well as a current potassium 3.1 with repletion given yesterday, will order for 40 mill colons potassium twice today and 1 time tomorrow.  Will check potassium level in the morning.  Echo from previous hospitalization is adequate for this hospitalization.  Concerning her atrial fibrillation surprisingly her EKG did show a rate controlled atrial flutter on admission.  On telemetry now she is currently in a sinus rhythm.  Will continue to monitor.  She does continue on apixaban for stroke risk reduction.  Hemoglobin stable at 12.6.  Overall stable from a respiratory perspective but is significantly fluid overloaded likely require at least 2 to 3 days inpatient hospitalization to achieve a euvolemic state.  Have discussed thoroughly with the patient the importance of reducing her sodium intake and weighing herself daily.  Have consulted cardiac rehab.  Will follow with you.     12/10: Improved over the past 24 hours.  Continues on IV diuresis.  Diuresed approximately 1650 mL over the past 24 hours.  Patient's bicarb was noted to have risen yesterday to 43.  Has minimally improved today to 42.  Will continue to monitor this, if the patient's bicarb rises further we will likely reduce the IV diuretic dosing, however would like to remain aggressive given the degree of fluid volume overload.  Her lungs are diminished but clear otherwise.  Breathing has improved.  At baseline per patient.  Her peripheral edema is now a +3 with no weeping and the cellulitis to her right lower extremity appears to be improving.  She is chest pain-free.  Her blood pressure is stable most recent 112/60.  Creatinine 0.68 with a hemoglobin 11.9.  On telemetry monitor she remained  in a rate controlled atrial fibrillation without significant ectopy.  Dissipated further 3 to 4 days hospitalization to achieve a euvolemic state.  Will follow with you.     12/11: Continues to slowly improve.  Chest pain-free.  Breathing comfortably nasal cannula oxygen.  Lungs probably clear but mildly diminished in bases.  Remains with peripheral edema which is improving to now +2 to +3.  Continue on IV diuresis.  Her bicarb remains unchanged at 42.  Creatinine has actually improved to 0.60.  Her fluid balance is listed as +950 over the past 24 hours which I do not believe is accurate.  I believe the patient is actually negative.  Will continue with current IV diuresis at 80 mg twice daily.  Anticipate a further 2 to 3 days.  Will follow with you.       12/12: Patient continues to improve daily.  Sitting up in the chair this morning and denies chest pain or pressure.  She is currently on room air, but states she is still dyspneic with ambulation. Remains with bilateral lower extremity edema. Compression wraps were applied by the bedside RN today. Again, intakes and outputs have not been accurately recorded unfortunately due to the patient's incontinence.  Blood pressures have been normotensive last recorded 109/69.  I reviewed lab work this morning revealed sodium of 140, potassium 3.1, creatinine of 0.57.  Bicarb remains elevated but improved at 41.  I am going to order a chest x-ray to be completed at the bedside to evaluate for pulmonary vascular congestion.  Will continue IV furosemide 80 mg twice daily and follow this patient with you.     12/13: As above.  Patient sitting up in chair this morning denies chest pain, pressure or palpitations. Patient remains on supplemental oxygen via nasal cannula with pulse oximetry of 95%.  Of note, she does use nasal cannula oxygen at night at home and intermittently during the day.  Blood pressures overall normotensive last 1 at 114/72.  Chest x-ray completed yesterday  similar to the prior study with patchy bibasilar atelectasis and/or infiltrate.  She does continue to have bilateral lower extremity edema, but the patient does have chronic lymphedema at baseline. Additionally, her bicarbonate level remains elevated at 40. ABG was drawn revealing pH of 7.49 and pCO2 of 56. Will give the patient oral diamox 250 mg BID x2 doses. Additionally, will hold her IV lasix at this time. Will continue to follow this patient with you.      12/14: Patient sitting up in chair this morning.  Denies chest pain, pressure or palpitations.  States she is still intermittently short of breath.  Currently in rate controlled atrial fibrillation on telemetry without significant ectopy.  On nasal cannula oxygen with pulse oximetry 95%.  Blood pressures overall normotensive last reported 111/53.  Labs morning reveal sodium of 140, testing 2.7, creatinine 0.60 and hemoglobin of 12.1.  Bicarbonate is improved from yesterday but remains elevated at 37.  Will continue Diamox 250 mg twice daily x 2 today.  I am going to order spironolactone 25 mg daily for this patient as well.  Additionally have ordered potassium supplementation and will redraw potassium around noon today.  I am going to consult nephrology services for their assistance in managing the patient's metabolic alkalosis. Discussed this case with my collaborating physician Dr. Augustine and Dr. Farnsworth from the hospitalist service. Will continue to follow with you.      12/15: Patient sitting up in chair this morning.  She denies chest pain, pressure or palpitations.  Currently in a rate controlled atrial fibrillation on telemetry with ventricular rates in the 70s.  Blood pressures overall normotensive with last recorded 108/68.  Patient remains on 2 L nasal cannula pulse oximetry of 96%.  She does remain with significant lower extremity edema, though at least some component of this is chronic lymphedema.  I reviewed lab work this morning which  revealed a sodium of 140, potassium 3.4, creatinine 0.65 and hemoglobin of 11.6.  Bicarbonate level continues to improve and is at 35 today.  She was evaluated by nephrology services yesterday and was restarted on 40 mg of IV Lasix twice daily.  Additionally, she will continue on acetazolamide per nephrology for 1 more day.  Appreciate their recommendations.  Anticipate another 24 to 48 hours of inpatient hospitalization for this patient.  Will continue to follow with you.    12/16: As above. Patient breathing comfortably on room air this morning. Remains with lower extremity edema which is likely chronic for her. Have discussed this with Dr. Kaur from nephrology and will resume the patient's torsemide at 50 mg BID, with the addition of spironolactone 25 mg twice daily. Continue metoprolol succinate 25 mg daily as well as Eliquis 5 mg twice daily for stroke risk reduction in the setting of atrial fibrillation. Potassium was low again this morning at 3.2, supplementation ordered per admitting team. Additionally, the patient is on Tikosyn per EP services, but has been in atrial fibrillation since admission. I have consulted EP for their recommendations regarding Tikosyn management. Otherwise no further recommendations from the cardiac perspective and we will sign off. Patient has a follow up appointment scheduled for Dr. Sanchez on 12/23, which I have advised her to keep. Will order a BMP which she should have prior to this visit.         Wendy Rodrigues, KEVON-CNP

## 2024-12-16 NOTE — PROGRESS NOTES
Vickie Foster is a 68 y.o. female on day 8 of admission presenting with CHF (congestive heart failure), NYHA class I, acute on chronic, combined.    Patient's K+ remains low. She will not dc today. [facility] was notified via [employer].     Yessica Perez RN

## 2024-12-16 NOTE — CONSULTS
Inpatient consult to Cardiology  Consult performed by: KEVON Heller-CNP  Consult ordered by: IBIS Bautista  Reason for consult: AF        History Of Present Illness:    Vickie Foster is a 68 y.o. female presenting with worsening lower extremity edema and shortness of breath.  Patient has a history of atrial fibrillation with a history of multiple cardioversions thereafter 6/23 started on dofetilide with cardioversion and was able to maintain attain sinus for period time.  With recurrence patient underwent catheter-based procedure in June of this year subsequent flash pulmonary edema hypoxia patient was grossly volume overloaded mobilize fluid after procedure with worsening shortness of breath and hypoxia immediately treated with IV furosemide O2 support and improved.  She remained in the hospital over the next few days with significant volume overload with stable hemodynamics and was thereafter discharged home.  Patient has known normal left ventricular function moderately dilated left atrium diastolic dysfunction prior echocardiograms.  She has undergone cardioversion x 2 last 1 on 9/18 with probably recurrence within the next 2 weeks.  The patient was admitted now on 12/ 8 worsening lower extremity edema and shortness of breath on admission the patient was in atrial fibrillation with relatively controlled rates additionally was hypokalemic as in the last admission in November.  We have been consulted by cardiology for further recommendations in terms of her now more persistent atrial fibrillation.  She has been continued on her dofetilide every 12 had any sinus rhythm.  She is on low-dose metoprolol 25 mg daily with rates anywhere from 60-80 bpm.  On chronic anticoagulation, unable to tolerate CPAP with utilization of O2 at night.  Was called for metabolic alkalosis which has been corrected.  Additional laboratories troponins were negative though has not had nuclear stress testing for many  years though patient, patient had a reaction to what ever medication utilized at that time and has not had since.  Additionally states she is compliant with her medications though could be a bit better with dietary restrictions  On admission noted to have right lower extremity cellulitis, lysed ulcerations with drainage were changed and continued throughout hospital course with improvement.    Last Recorded Vitals:  Vitals:    12/15/24 2306 12/16/24 0734 12/16/24 0735 12/16/24 1318   BP: 101/61  101/63    BP Location: Right arm  Right arm    Patient Position: Lying  Sitting    Pulse: 68  67    Resp: 18  18    Temp: 36.7 °C (98.1 °F)  36.2 °C (97.2 °F)    TempSrc: Oral  Oral    SpO2: 95% 95% 97% 94%   Weight:       Height:           Last Labs:  CBC - 12/16/2024:  4:18 AM  11.6 11.4 261    36.2      CMP - 12/16/2024:  4:18 AM  8.9 6.5 20 --- 0.5   3.5 3.7 20 102      PTT - 7/8/2024:  5:26 PM  1.0   10.9 28.8     Troponin I, High Sensitivity   Date/Time Value Ref Range Status   12/08/2024 07:41 PM 12 0 - 13 ng/L Final   12/08/2024 06:42 PM 12 0 - 13 ng/L Final   11/13/2024 12:41 AM 14 (H) 0 - 13 ng/L Final     BNP   Date/Time Value Ref Range Status   12/08/2024 06:42  (H) 0 - 99 pg/mL Final   11/13/2024 12:41  (H) 0 - 99 pg/mL Final     Hemoglobin A1C   Date/Time Value Ref Range Status   07/11/2024 05:26 AM 5.5 See below % Final   09/29/2023 02:34 PM 6.1 (A) % Final     Comment:          Diagnosis of Diabetes-Adults   Non-Diabetic: < or = 5.6%   Increased risk for developing diabetes: 5.7-6.4%   Diagnostic of diabetes: > or = 6.5%  .       Monitoring of Diabetes                Age (y)     Therapeutic Goal (%)   Adults:          >18           <7.0   Pediatrics:    13-18           <7.5                   7-12           <8.0                   0- 6            7.5-8.5   American Diabetes Association. Diabetes Care 33(S1), Jan 2010.   03/29/2023 01:38 PM 6.1 (H) 4.0 - 6.0 % Final     Comment:     Hemoglobin A1C  levels are related to mean blood glucose during the   preceding 2-3 months. The relationship table below may be used as a   general guide. Each 1% increase in HGB A1C is a reflection of an   increase in mean glucose of approximately 30 mg/dl.   Reference: Diabetes Care, volume 29, supplement 1 Jan. 2006                        HGB A1C ................. Approx. Mean Glucose   _______________________________________________   6%   ...............................  120 mg/dl   7%   ...............................  150 mg/dl   8%   ...............................  180 mg/dl   9%   ...............................  210 mg/dl   10%  ...............................  240 mg/dl  Performed at [address] [address]       LDL Calculated   Date/Time Value Ref Range Status   07/11/2024 05:26  65 - 130 mg/dL Final   03/29/2023 01:38  65 - 130 MG/DL Final   06/16/2021 07:33  65 - 130 MG/DL Final   07/28/2020 03:08  65 - 130 MG/DL Final     VLDL   Date/Time Value Ref Range Status   09/29/2023 02:34 PM 21 0 - 40 mg/dL Final      Last I/O:  I/O last 3 completed shifts:  In: 1320 (12.7 mL/kg) [P.O.:1220; IV Piggyback:100]  Out: - (0 mL/kg)   Weight: 104 kg     Past Cardiology Tests (Last 3 Years):  EKG:  ECG 12 lead 12/08/2024      ECG 12 lead 11/13/2024      ECG 12 lead 10/02/2024      ECG 12 lead 09/18/2024      ECG 12 lead (Clinic Performed) 09/05/2024      ECG 12 Lead 08/05/2024      ECG 12 Lead 07/11/2024      ECG 12 lead (Clinic Performed) 05/30/2024      ECG 12 lead (Clinic Performed) 04/16/2024      ECG 12 lead (Clinic Performed) 11/13/2023 (Preliminary)    Echo:  Transthoracic Echo (TTE) Complete 07/11/2024      Transthoracic Echo (TTE) Complete 07/08/2024      Transthoracic echo (TTE) complete 06/27/2024    Ejection Fractions:  EF   Date/Time Value Ref Range Status   07/11/2024 11:41 AM 63 %    07/08/2024 03:51 PM 68 %    06/27/2024 02:24 PM 63 %      US  IMPRESSION:  No  sonographic evidence for deep vein thrombosis within the evaluated  veins of the bilateral lower extremity.    CXR   FINDINGS:  The cardiac size is indeterminate in view of the AP projection.  Patchy bibasilar atelectasis and/or infiltrate is present, similar  compared to the prior study.      IMPRESSION:  Patchy bibasilar atelectasis and/or infiltrate is present, similar  compared to the prior study.  Past Medical History:  She has a past medical history of A-fib (Multi), CHF (congestive heart failure), COVID, Heart disease, HTN (hypertension), and Low back pain.    Past Surgical History:  She has a past surgical history that includes Cardioversion; Cardiac electrophysiology procedure (N/A, 2024); Cardiac electrophysiology procedure (N/A, 2024); Ablation of dysrhythmic focus; and Cardiac electrophysiology procedure (N/A, 2024).      Social History:  She reports that she quit smoking about 20 years ago. Her smoking use included cigarettes. She has never used smokeless tobacco. She reports that she does not currently use alcohol. She reports that she does not use drugs.    Family History:  Family History   Problem Relation Name Age of Onset    Breast cancer Mother      Heart attack Mother           in early 80s of MI    Heart disease Mother      Colon cancer Father      Breast cancer Sister      Diabetes type II Sister      Kidney failure Sister      Other (herione addict) Sister      No Known Problems Brother      No Known Problems Daughter      Other (recovering alcoholic) Son      Breast cancer Other Maternal Aunt         Allergies:  Patient has no known allergies.    Inpatient Medications:  Scheduled medications   Medication Dose Route Frequency    allopurinol  200 mg oral BID    apixaban  5 mg oral BID    atorvastatin  40 mg oral Nightly    buPROPion XL  300 mg oral Daily before breakfast    cefTRIAXone  1 g intravenous q24h    dofetilide  250 mcg oral q12h    fluticasone  furoate-vilanteroL  1 puff inhalation Daily    metoprolol succinate XL  25 mg oral Daily    oxygen   inhalation Continuous - Inhalation    polyethylene glycol  17 g oral Daily    potassium chloride CR  40 mEq oral Daily with breakfast    spironolactone  25 mg oral BID    torsemide  50 mg oral BID     PRN medications   Medication    acetaminophen    Or    acetaminophen    Or    acetaminophen    ipratropium-albuteroL    ondansetron     Continuous Medications   Medication Dose Last Rate     Outpatient Medications:  Current Outpatient Medications   Medication Instructions    allopurinoL 200 mg, oral, 2 times daily    atorvastatin (Lipitor) 40 mg tablet TAKE 1 TABLET(40 MG) BY MOUTH DAILY AT BEDTIME    budesonide-formoteroL (Symbicort) 80-4.5 mcg/actuation inhaler 2 puffs, 2 times daily RT    buPROPion XL (WELLBUTRIN XL) 300 mg, oral, Daily before breakfast    calcium carb-vitamin D3-vit K2 600 mg-1,000 unit-90 mcg tablet Daily    cefuroxime (CEFTIN) 250 mg, oral, 2 times daily    dofetilide (TIKOSYN) 250 mcg, oral, Every 12 hours    Eliquis 5 mg, oral, 2 times daily    fluticasone propion-salmeteroL (Advair Diskus) 100-50 mcg/dose diskus inhaler 1 puff, 2 times daily RT    hydrOXYzine HCL (ATARAX) 10 mg, oral, Nightly PRN    ipratropium-albuteroL (Duo-Neb) 0.5-2.5 mg/3 mL nebulizer solution 3 mL, nebulization, Every 6 hours PRN    metoprolol succinate XL (Toprol-XL) 25 mg 24 hr tablet TAKE 1 TABLET BY MOUTH ONE TIME DAILY    moisturizing mouth (Biotene Dry Mouth Oral Rinse) solution 15 mL, 3 times daily    oxygen (O2) gas therapy 1 each, inhalation, Every 12 hours    oxygen (O2) gas therapy 1 each, inhalation, Every 12 hours    [START ON 12/17/2024] potassium chloride CR (Klor-Con M20) 20 mEq ER tablet 40 mEq, oral, Daily with breakfast, Do not crush or chew.    semaglutide 2.3 mg, Once Weekly    spironolactone (ALDACTONE) 25 mg, oral, 2 times daily    torsemide (Demadex) 100 mg tablet Take 1/2 a tablet (50 mg) by mouth  2 times a day.    traMADol (ULTRAM) 50 mg, Every 8 hours PRN       Physical Exam:  Gen: A+O x 3, no acute distress  HEENT: Normocephalic/atraumatic pupils equal react light  Neck: No JVD, upstrokes and volumes nl, no bruits   Lung: CTA, nl AP diameter  CV:  nl sounding S1, S2, no murmur, PMI nondisplaced  Abdomen: soft, non tender, + BS x 4   Extremities: Remedies bilaterally 3+ edema up through calves with ace wraps difficult to palpate pulses.   Neuro: no neurologic deficits     Assessment/Plan   68-year-old female with refractory atrial fibrillation despite cardioversions and antiarrhythmic drug and pulmonary vein isolation with again recurrence and worsening symptoms of lower extremity edema and dyspnea.  Patient is period of sinus versus atrial fibrillation has been increased despite above.  Additionally her potassium is continued to run quite low with the use of antiarrhythmic drug particular dofetilide.  Lower extremity edema has improved with IV diuresis and suspect related to A-fib and abnormal relaxation though may have a component of lymphedema.  BMPs have been on proportionately low compared with the amount of volume overload.  Will repeat echocardiogram if left atrial size is larger will favor discontinuing dofetilide with a rate control strategy and ongoing anticoagulation.  Best to maintain potassium closer to 4.0, additionally has been started on spironolactone which will help with unloading and bearing a potassium  Addendum: Patient's echocardiogram with preserved left ventricular function diastolic dysfunction severely dilated left atrium see results as above.  At this time we will discontinue dofetilide with unlikely ability to maintain sinus rhythm.  Will take a rate control strategy for her atrial fibrillation.  Patient can be discharged home and to be seen in follow-up in 4 to 6 weeks.  Would place Holter monitor prior to discharge to assess A-fib rates.  Thank you for the consult, seen on  rounds and discussed with Dr. Ty    Code Status:  Full Code    I spent 60 minutes in the professional and overall care of this patient.        Minnie Mcfarlane, APRN-CNP

## 2024-12-16 NOTE — PROGRESS NOTES
Vickie Foster is a 68 y.o. female on day 8 of admission presenting with CHF (congestive heart failure), NYHA class I, acute on chronic, combined.    [facility] was notified by SYDNEY Lima that discharge delayed. Dr Ty wants an echo done. Updated [facility].     Yessica Perez RN

## 2024-12-16 NOTE — PROGRESS NOTES
Occupational Therapy    OT Treatment    Patient Name: Vickie Foster  MRN: [id number]  Department: [address]  Room: [facility]429  Today's Date: 12/16/2024  Time Calculation  Start Time: 1318  Stop Time: 1358  Time Calculation (min): 40 min        Assessment:  OT Assessment: Pt rominaley participating, highly motivated and progressing with established POC.  Will continue to address remaining deficits with skilled OT intervention.  Prognosis: Good  Barriers to Discharge Home: No anticipated barriers  Evaluation/Treatment Tolerance: Patient tolerated treatment well  Medical Staff Made Aware: Yes  End of Session Communication: Bedside nurse  End of Session Patient Position: Up in chair, Alarm off, not on at start of session (on RA per NSG all needs met call light in reach)  OT Assessment Results: Decreased ADL status, Decreased functional mobility, Decreased IADLs  Prognosis: Good  Barriers to Discharge: None  Evaluation/Treatment Tolerance: Patient tolerated treatment well  Medical Staff Made Aware: Yes  Strengths: Ability to acquire knowledge, Attitude of self, Coping skills  Plan:  Treatment Interventions: ADL retraining, Functional transfer training, Endurance training, Patient/family training, Equipment evaluation/education, Compensatory technique education  OT Frequency: 4 times per week  OT Discharge Recommendations: Low intensity level of continued care  Equipment Recommended upon Discharge: Wheeled walker  OT Recommended Transfer Status: Assist of 1  OT - OK to Discharge: Yes  Treatment Interventions: ADL retraining, Functional transfer training, Endurance training, Patient/family training, Equipment evaluation/education, Compensatory technique education    Subjective   Previous Visit Info:  OT Last Visit  OT Received On: 12/16/24  General:  General  Reason for Referral: decreased functional status  Referred By: Clif Arora MD  Past Medical History Relevant to Rehab: AFIB, CHF, COVID, HTN, heart disease, LBP,  cardioversion, ablation  Prior to Session Communication: Bedside nurse  Patient Position Received: Up in chair, Alarm off, not on at start of session  Preferred Learning Style: visual, verbal, written  General Comment: Pt cleared by NSG and is agreeable to skilled OT intervention.  Precautions:  Hearing/Visual Limitations: Hearing WFL, wears glasses  Medical Precautions: Fall precautions    Vital Signs (Past 2hrs)        Date/Time Vitals Session Patient Position Pulse Resp SpO2 BP MAP (mmHg)    12/16/24 1318 Pre OT  --  --  --  94 %  --  --                   Vital Signs Comment: during session range 88-93     Pain:  Pain Assessment  Pain Assessment: 0-10  0-10 (Numeric) Pain Score: 0 - No pain    Objective    Cognition:  Cognition  Overall Cognitive Status: Within Functional Limits  Orientation Level: Oriented X4  Coordination:  Movements are Fluid and Coordinated: No  Lower Body Coordination: Slower rate of movement rakel LE  Coordination Comment: short inconsistent steps  Activities of Daily Living:      Grooming  Grooming Level of Assistance: Modified independent  Grooming Where Assessed: Standing sinkside  Grooming Comments: Pt washing hands seated    UE Bathing  UE Bathing Comments: Pt declined completed prior however recommendation seated TTB for ec and safetyproviede handout for porcurement    LE Bathing  LE Bathing Comments: Pt declined completed prior however recommendation seated TTB for ec and safetyproviede handout for porcurement, with [facility] sponge buttocks and B feet    UE Dressing  UE Dressing Level of Assistance: Setup  UE Dressing Where Assessed: Recliner  UE Dressing Comments: Pt [facility] gown vc fasten prior to over head    LE Dressing  LE Dressing: Yes  LE Dressing Adaptive Equipment: Reacher, Sock aide (wide sock aid d/t edema BLE)  Pants Level of Assistance: Close supervision (seated commode to thread instructed  reacher  stance at RW)  Sock Level of Assistance: Setup (Instructed Pt  "with wide sock aid trial standard however marked edema prohibiting use)  LE Dressing Where Assessed: Recliner  LE Dressing Comments: instructed Pt with AE use and ec conceps good follow through handout issuance for acquisition    Toileting  Toileting Level of Assistance: Distant supervision  Where Assessed: Toilet  Toileting Comments: Pt adjusting clothing prior/after/hygiene  Functional Standing Tolerance:  Time: 3 minutes  Activity: ADL skills/transfer  Functional Standing Tolerance Comments: unilateral saray support vc posture  Bed Mobility/Transfers: Bed Mobility  Bed Mobility: No (Pt state \"I don't sleep in bed ever\")    Transfers  Transfer: Yes  Transfer 1  Transfer From 1: Chair with arms to, Stand to  Transfer to 1: Sit, Chair with arms  Technique 1: Sit to stand, Stand to sit  Transfer Device 1: Walker  Transfer Level of Assistance 1: Close supervision  Trials/Comments 1: vc hand placement    Toilet Transfers  Toilet Transfer From: Rolling walker  Toilet Transfer Type: To and from  Toilet Transfer to: Standard toilet  Toilet Transfer Technique: Ambulating  Toilet Transfers: Supervision  Toilet Transfers Comments: grab bar use  Tub Transfers  Tub Transfers Comments: educated Pt with use and  handout issuance for TTB, grab bar and hand held shower head acquisition Pt receptive    Functional Mobility:  Functional Mobility  Functional Mobility Performed: Yes  Functional Mobility 1  Surface 1: Level tile  Device 1: Rolling walker  Assistance 1: Close supervision  Comments 1: 15 x 2 ' slow una, to include turns, backing and doorways  Sitting Balance:  Dynamic Sitting Balance  Dynamic Sitting-Balance Support: Feet supported  Dynamic Sitting-Level of Assistance: Modified independent  Dynamic Sitting-Balance: Forward lean, Reaching for objects, Reaching across midline  Dynamic Sitting-Comments: with LB ADL skills    Therapy/Activity:    Therapeutic Activity  Therapeutic Activity Performed: Yes  Therapeutic " Activity 1: Instructed Pt with energy conservation deep breathing application good follow thorugh  Outcome Measures:[facility] Daily Activity  Putting on and taking off regular lower body clothing: A little  Bathing (including washing, rinsing, drying): A little  Putting on and taking off regular upper body clothing: A little  Toileting, which includes using toilet, bedpan or urinal: A little  Taking care of personal grooming such as brushing teeth: A little  Eating Meals: None  Daily Activity - Total Score: 19    Education Documentation  No documentation found.  Education Comments  No comments found.      OP EDUCATION:  Education  Individual(s) Educated: Patient  Education Provided: POC discussed and agreed upon, Risk and benefits of OT discussed with patient or other, Fall precautons  Equipment: Other (TTB, grab bar tub)  Community Resources: [place] on aging  Risk and Benefits Discussed with Patient/Caregiver/Other: yes  Patient/Caregiver Demonstrated Understanding: yes  Plan of Care Discussed and Agreed Upon: yes  Patient Response to Education: Patient/Caregiver Verbalized Understanding of Information, Patient/Caregiver Performed Return Demonstration of Exercises/Activities, Patient/Caregiver Asked Appropriate Questions  Education Comment: Instructed pt with applicaation energy conservation to safe transfers, adaptive ADL skills, balance strategies wtih good teach back    Goals:  Encounter Problems       Encounter Problems (Active)       Bathing       LTG - Patient will utilize adaptive techniques to bathe body independent with AE as needed (Progressing)       Start:  12/10/24    Expected End:  01/10/25               Dressings Lower Extremities       LTG - Patient will dress lower body independent with set up (Progressing)       Start:  12/10/24    Expected End:  01/10/25               Functional Mobility       Perform functional mobility a household distance with 2ww independent (Progressing)       Start:  12/10/24     Expected End:  01/10/25               Toileting       STG - Patient will complete toileting tasks with 2ww independent (Progressing)       Start:  12/10/24    Expected End:  01/10/25

## 2024-12-16 NOTE — PROGRESS NOTES
CONSULT PROGRESS NOTES    SERVICE DATE: 12/16/2024   SERVICE TIME: 10:53 AM    CONSULTING SERVICE: Nephrology    ASSESSMENT AND PLAN   1.  Metabolic alkalosis  2.  Hypokalemia  3.  Edema    No objection to switching her over to oral diuretics at this point.  Okay to use torsemide 50 mg p.o. twice daily, I believe that is what she was on.  I would add spironolactone 25 mg p.o. twice daily as well.  If she stays here, can trend daily labs and continue supportive care otherwise.  She would likely need to go on a potassium regimen, consider 40 milequivalents p.o. daily.  Case discussed with Wendy Rodrigues CNP.  No objection to discharge from my standpoint.    SUBJECTIVE  INTERVAL HPI: She feels as if her swelling is improved.  She is in negative fluid balance.  Vital signs been stable.  She has no major cough, no chest pain, no abdominal pain.  She has a good appetite.    MEDICATIONS:  allopurinol, 200 mg, oral, BID  apixaban, 5 mg, oral, BID  atorvastatin, 40 mg, oral, Nightly  buPROPion XL, 300 mg, oral, Daily before breakfast  cefTRIAXone, 1 g, intravenous, q24h  dofetilide, 250 mcg, oral, q12h  fluticasone furoate-vilanteroL, 1 puff, inhalation, Daily  furosemide, 40 mg, intravenous, BID  metoprolol succinate XL, 25 mg, oral, Daily  oxygen, , inhalation, Continuous - Inhalation  polyethylene glycol, 17 g, oral, Daily  potassium chloride CR, 40 mEq, oral, Daily with breakfast  spironolactone, 25 mg, oral, Daily           PRN medications: acetaminophen **OR** acetaminophen **OR** acetaminophen, ipratropium-albuteroL, ondansetron     OBJECTIVE  PHYSICAL EXAM:   Heart Rate:  [67-75]   Temp:  [36.2 °C (97.2 °F)-36.8 °C (98.2 °F)]   Resp:  [17-18]   BP: (101-104)/(61-67)   SpO2:  [95 %-98 %]   Body mass index is 41.92 kg/m².  This is an obese white woman who appears in no acute distress  Her lungs are clear to auscultation  She has somewhat distant heart sounds, but regular rate  Soft abdomen, positive bowel sounds  There  is no Cuellar catheter in place  She has massive bilateral lower extremity pitting edema with both legs wrapped  Moves 4 limbs spontaneously  Hearing intact  Phonation intact  No obvious skin rashes  Appropriate affect    DATA:   Labs:  Results for orders placed or performed during the hospital encounter of 12/08/24 (from the past 96 hours)   Basic Metabolic Panel   Result Value Ref Range    Glucose 95 74 - 99 mg/dL    Sodium 140 136 - 145 mmol/L    Potassium 3.2 (L) 3.5 - 5.3 mmol/L    Chloride 94 (L) 98 - 107 mmol/L    Bicarbonate 40 (HH) 21 - 32 mmol/L    Anion Gap 9 (L) 10 - 20 mmol/L    Urea Nitrogen 20 6 - 23 mg/dL    Creatinine 0.54 0.50 - 1.05 mg/dL    eGFR >90 >60 mL/min/1.73m*2    Calcium 9.3 8.6 - 10.3 mg/dL   CBC   Result Value Ref Range    WBC 9.8 4.4 - 11.3 x10*3/uL    nRBC 0.0 0.0 - 0.0 /100 WBCs    RBC 3.91 (L) 4.00 - 5.20 x10*6/uL    Hemoglobin 11.7 (L) 12.0 - 16.0 g/dL    Hematocrit 36.3 36.0 - 46.0 %    MCV 93 80 - 100 fL    MCH 29.9 26.0 - 34.0 pg    MCHC 32.2 32.0 - 36.0 g/dL    RDW 15.4 (H) 11.5 - 14.5 %    Platelets 268 150 - 450 x10*3/uL   BLOOD GAS ARTERIAL FULL PANEL   Result Value Ref Range    POCT pH, Arterial 7.49 (H) 7.38 - 7.42 pH    POCT pCO2, Arterial 56 (H) 38 - 42 mm Hg    POCT pO2, Arterial 91 85 - 95 mm Hg    POCT SO2, Arterial 97 94 - 100 %    POCT Oxy Hemoglobin, Arterial 95.7 94.0 - 98.0 %    POCT Hematocrit Calculated, Arterial 38.0 36.0 - 46.0 %    POCT Sodium, Arterial 135 (L) 136 - 145 mmol/L    POCT Potassium, Arterial 3.5 3.5 - 5.3 mmol/L    POCT Chloride, Arterial 96 (L) 98 - 107 mmol/L    POCT Ionized Calcium, Arterial 1.21 1.10 - 1.33 mmol/L    POCT Glucose, Arterial 120 (H) 74 - 99 mg/dL    POCT Lactate, Arterial 1.1 0.4 - 2.0 mmol/L    POCT Base Excess, Arterial 16.7 (H) -2.0 - 3.0 mmol/L    POCT HCO3 Calculated, Arterial 42.7 (H) 22.0 - 26.0 mmol/L    POCT Hemoglobin, Arterial 12.5 12.0 - 16.0 g/dL    POCT Anion Gap, Arterial 0 (L) 10 - 25 mmo/L    Patient  Temperature 37.0 degrees Celsius    FiO2 28 %    Apparatus CANNULA     Flow 2.0 LPM    Site of Arterial Puncture Radial Right     Cricket's Test Positive    Basic metabolic panel   Result Value Ref Range    Glucose 97 74 - 99 mg/dL    Sodium 140 136 - 145 mmol/L    Potassium 2.7 (LL) 3.5 - 5.3 mmol/L    Chloride 96 (L) 98 - 107 mmol/L    Bicarbonate 37 (H) 21 - 32 mmol/L    Anion Gap 10 10 - 20 mmol/L    Urea Nitrogen 21 6 - 23 mg/dL    Creatinine 0.60 0.50 - 1.05 mg/dL    eGFR >90 >60 mL/min/1.73m*2    Calcium 9.2 8.6 - 10.3 mg/dL   CBC   Result Value Ref Range    WBC 8.8 4.4 - 11.3 x10*3/uL    nRBC 0.0 0.0 - 0.0 /100 WBCs    RBC 3.95 (L) 4.00 - 5.20 x10*6/uL    Hemoglobin 12.1 12.0 - 16.0 g/dL    Hematocrit 38.1 36.0 - 46.0 %    MCV 97 80 - 100 fL    MCH 30.6 26.0 - 34.0 pg    MCHC 31.8 (L) 32.0 - 36.0 g/dL    RDW 15.2 (H) 11.5 - 14.5 %    Platelets 263 150 - 450 x10*3/uL   Magnesium   Result Value Ref Range    Magnesium 2.11 1.60 - 2.40 mg/dL   BLOOD GAS VENOUS FULL PANEL   Result Value Ref Range    POCT pH, Venous 7.43 7.33 - 7.43 pH    POCT pCO2, Venous 57 (H) 41 - 51 mm Hg    POCT pO2, Venous 51 (H) 35 - 45 mm Hg    POCT SO2, Venous 85 (H) 45 - 75 %    POCT Oxy Hemoglobin, Venous 83.3 (H) 45.0 - 75.0 %    POCT Hematocrit Calculated, Venous 36.0 36.0 - 46.0 %    POCT Sodium, Venous 136 136 - 145 mmol/L    POCT Potassium, Venous 3.2 (L) 3.5 - 5.3 mmol/L    POCT Chloride, Venous 96 (L) 98 - 107 mmol/L    POCT Ionized Calicum, Venous 1.23 1.10 - 1.33 mmol/L    POCT Glucose, Venous 149 (H) 74 - 99 mg/dL    POCT Lactate, Venous 1.7 0.4 - 2.0 mmol/L    POCT Base Excess, Venous 11.4 (H) -2.0 - 3.0 mmol/L    POCT HCO3 Calculated, Venous 37.8 (H) 22.0 - 26.0 mmol/L    POCT Hemoglobin, Venous 12.0 12.0 - 16.0 g/dL    POCT Anion Gap, Venous 5.0 (L) 10.0 - 25.0 mmol/L    Patient Temperature 37.0 degrees Celsius    FiO2 28 %   Potassium - 4 hours after oral dose   Result Value Ref Range    Potassium 3.5 3.5 - 5.3 mmol/L    POCT GLUCOSE   Result Value Ref Range    POCT Glucose 102 (H) 74 - 99 mg/dL   Basic metabolic panel   Result Value Ref Range    Glucose 97 74 - 99 mg/dL    Sodium 140 136 - 145 mmol/L    Potassium 3.4 (L) 3.5 - 5.3 mmol/L    Chloride 99 98 - 107 mmol/L    Bicarbonate 35 (H) 21 - 32 mmol/L    Anion Gap 9 (L) 10 - 20 mmol/L    Urea Nitrogen 24 (H) 6 - 23 mg/dL    Creatinine 0.65 0.50 - 1.05 mg/dL    eGFR >90 >60 mL/min/1.73m*2    Calcium 9.1 8.6 - 10.3 mg/dL   CBC   Result Value Ref Range    WBC 8.6 4.4 - 11.3 x10*3/uL    nRBC 0.0 0.0 - 0.0 /100 WBCs    RBC 3.91 (L) 4.00 - 5.20 x10*6/uL    Hemoglobin 11.6 (L) 12.0 - 16.0 g/dL    Hematocrit 37.1 36.0 - 46.0 %    MCV 95 80 - 100 fL    MCH 29.7 26.0 - 34.0 pg    MCHC 31.3 (L) 32.0 - 36.0 g/dL    RDW 15.3 (H) 11.5 - 14.5 %    Platelets 260 150 - 450 x10*3/uL   Magnesium   Result Value Ref Range    Magnesium 2.30 1.60 - 2.40 mg/dL   Basic metabolic panel   Result Value Ref Range    Glucose 86 74 - 99 mg/dL    Sodium 140 136 - 145 mmol/L    Potassium 3.2 (L) 3.5 - 5.3 mmol/L    Chloride 99 98 - 107 mmol/L    Bicarbonate 34 (H) 21 - 32 mmol/L    Anion Gap 10 10 - 20 mmol/L    Urea Nitrogen 26 (H) 6 - 23 mg/dL    Creatinine 0.75 0.50 - 1.05 mg/dL    eGFR 87 >60 mL/min/1.73m*2    Calcium 8.9 8.6 - 10.3 mg/dL   CBC   Result Value Ref Range    WBC 11.6 (H) 4.4 - 11.3 x10*3/uL    nRBC 0.0 0.0 - 0.0 /100 WBCs    RBC 3.80 (L) 4.00 - 5.20 x10*6/uL    Hemoglobin 11.4 (L) 12.0 - 16.0 g/dL    Hematocrit 36.2 36.0 - 46.0 %    MCV 95 80 - 100 fL    MCH 30.0 26.0 - 34.0 pg    MCHC 31.5 (L) 32.0 - 36.0 g/dL    RDW 15.3 (H) 11.5 - 14.5 %    Platelets 261 150 - 450 x10*3/uL   Magnesium   Result Value Ref Range    Magnesium 2.31 1.60 - 2.40 mg/dL     *Note: Due to a large number of results and/or encounters for the requested time period, some results have not been displayed. A complete set of results can be found in Results Review.         SIGNATURE: Refugio Kaur MD PATIENT NAME:  Vickie Foster   DATE: December 16, 2024 MRN: [id number]   TIME: 10:53 AM PAGER: [phone]

## 2024-12-17 ENCOUNTER — APPOINTMENT (OUTPATIENT)
Dept: CARDIOLOGY | Facility: HOSPITAL | Age: 69
DRG: 291 | End: 2024-12-17
Payer: MEDICARE

## 2024-12-17 ENCOUNTER — PHARMACY VISIT (OUTPATIENT)
Dept: PHARMACY | Facility: CLINIC | Age: 69
End: 2024-12-17
Payer: COMMERCIAL

## 2024-12-17 VITALS
TEMPERATURE: 97.5 F | WEIGHT: 229.28 LBS | HEART RATE: 78 BPM | HEIGHT: 62 IN | OXYGEN SATURATION: 94 % | BODY MASS INDEX: 42.19 KG/M2 | RESPIRATION RATE: 17 BRPM | SYSTOLIC BLOOD PRESSURE: 130 MMHG | DIASTOLIC BLOOD PRESSURE: 90 MMHG

## 2024-12-17 LAB
ANION GAP SERPL CALCULATED.3IONS-SCNC: 12 MMOL/L (ref 10–20)
BASOPHILS # BLD AUTO: 0.02 X10*3/UL (ref 0–0.1)
BASOPHILS NFR BLD AUTO: 0.2 %
BODY SURFACE AREA: 2.13 M2
BUN SERPL-MCNC: 27 MG/DL (ref 6–23)
CALCIUM SERPL-MCNC: 9 MG/DL (ref 8.6–10.3)
CHLORIDE SERPL-SCNC: 101 MMOL/L (ref 98–107)
CO2 SERPL-SCNC: 30 MMOL/L (ref 21–32)
CREAT SERPL-MCNC: 0.69 MG/DL (ref 0.5–1.05)
EGFRCR SERPLBLD CKD-EPI 2021: >90 ML/MIN/1.73M*2
EOSINOPHIL # BLD AUTO: 0.07 X10*3/UL (ref 0–0.7)
EOSINOPHIL NFR BLD AUTO: 0.8 %
ERYTHROCYTE [DISTWIDTH] IN BLOOD BY AUTOMATED COUNT: 15.4 % (ref 11.5–14.5)
GLUCOSE SERPL-MCNC: 100 MG/DL (ref 74–99)
HCT VFR BLD AUTO: 37.6 % (ref 36–46)
HGB BLD-MCNC: 11.9 G/DL (ref 12–16)
IMM GRANULOCYTES # BLD AUTO: 0.03 X10*3/UL (ref 0–0.7)
IMM GRANULOCYTES NFR BLD AUTO: 0.3 % (ref 0–0.9)
LYMPHOCYTES # BLD AUTO: 1.3 X10*3/UL (ref 1.2–4.8)
LYMPHOCYTES NFR BLD AUTO: 14.7 %
MCH RBC QN AUTO: 30.1 PG (ref 26–34)
MCHC RBC AUTO-ENTMCNC: 31.6 G/DL (ref 32–36)
MCV RBC AUTO: 95 FL (ref 80–100)
MONOCYTES # BLD AUTO: 0.72 X10*3/UL (ref 0.1–1)
MONOCYTES NFR BLD AUTO: 8.2 %
NEUTROPHILS # BLD AUTO: 6.69 X10*3/UL (ref 1.2–7.7)
NEUTROPHILS NFR BLD AUTO: 75.8 %
NRBC BLD-RTO: 0 /100 WBCS (ref 0–0)
PLATELET # BLD AUTO: 267 X10*3/UL (ref 150–450)
POTASSIUM SERPL-SCNC: 3.5 MMOL/L (ref 3.5–5.3)
RBC # BLD AUTO: 3.96 X10*6/UL (ref 4–5.2)
SODIUM SERPL-SCNC: 139 MMOL/L (ref 136–145)
WBC # BLD AUTO: 8.8 X10*3/UL (ref 4.4–11.3)

## 2024-12-17 PROCEDURE — 97110 THERAPEUTIC EXERCISES: CPT | Mod: GP

## 2024-12-17 PROCEDURE — 99239 HOSP IP/OBS DSCHRG MGMT >30: CPT

## 2024-12-17 PROCEDURE — 2500000004 HC RX 250 GENERAL PHARMACY W/ HCPCS (ALT 636 FOR OP/ED): Performed by: NURSE PRACTITIONER

## 2024-12-17 PROCEDURE — 36415 COLL VENOUS BLD VENIPUNCTURE: CPT

## 2024-12-17 PROCEDURE — 2500000002 HC RX 250 W HCPCS SELF ADMINISTERED DRUGS (ALT 637 FOR MEDICARE OP, ALT 636 FOR OP/ED): Performed by: NURSE PRACTITIONER

## 2024-12-17 PROCEDURE — RXMED WILLOW AMBULATORY MEDICATION CHARGE

## 2024-12-17 PROCEDURE — 2500000001 HC RX 250 WO HCPCS SELF ADMINISTERED DRUGS (ALT 637 FOR MEDICARE OP): Performed by: INTERNAL MEDICINE

## 2024-12-17 PROCEDURE — 94640 AIRWAY INHALATION TREATMENT: CPT

## 2024-12-17 PROCEDURE — 93246 EXT ECG>7D<15D RECORDING: CPT

## 2024-12-17 PROCEDURE — 97530 THERAPEUTIC ACTIVITIES: CPT | Mod: GP

## 2024-12-17 PROCEDURE — 2500000002 HC RX 250 W HCPCS SELF ADMINISTERED DRUGS (ALT 637 FOR MEDICARE OP, ALT 636 FOR OP/ED)

## 2024-12-17 PROCEDURE — 85025 COMPLETE CBC W/AUTO DIFF WBC: CPT

## 2024-12-17 PROCEDURE — 2500000005 HC RX 250 GENERAL PHARMACY W/O HCPCS: Performed by: NURSE PRACTITIONER

## 2024-12-17 PROCEDURE — 97530 THERAPEUTIC ACTIVITIES: CPT | Mod: GO,CO

## 2024-12-17 PROCEDURE — 80048 BASIC METABOLIC PNL TOTAL CA: CPT

## 2024-12-17 PROCEDURE — 97535 SELF CARE MNGMENT TRAINING: CPT | Mod: GO,CO

## 2024-12-17 RX ORDER — TORSEMIDE 100 MG/1
50 TABLET ORAL 2 TIMES DAILY
Qty: 60 TABLET | Refills: 0 | Status: SHIPPED | OUTPATIENT
Start: 2024-12-17 | End: 2025-02-15

## 2024-12-17 RX ORDER — SPIRONOLACTONE 25 MG/1
25 TABLET ORAL 2 TIMES DAILY
Status: DISCONTINUED | OUTPATIENT
Start: 2024-12-17 | End: 2024-12-17 | Stop reason: HOSPADM

## 2024-12-17 ASSESSMENT — COGNITIVE AND FUNCTIONAL STATUS - GENERAL
HELP NEEDED FOR BATHING: A LITTLE
MOVING FROM LYING ON BACK TO SITTING ON SIDE OF FLAT BED WITH BEDRAILS: A LITTLE
CLIMB 3 TO 5 STEPS WITH RAILING: A LITTLE
MOVING TO AND FROM BED TO CHAIR: A LITTLE
DRESSING REGULAR UPPER BODY CLOTHING: A LITTLE
WALKING IN HOSPITAL ROOM: A LITTLE
CLIMB 3 TO 5 STEPS WITH RAILING: A LOT
MOVING TO AND FROM BED TO CHAIR: A LITTLE
WALKING IN HOSPITAL ROOM: A LITTLE
TURNING FROM BACK TO SIDE WHILE IN FLAT BAD: A LITTLE
STANDING UP FROM CHAIR USING ARMS: A LITTLE
DAILY ACTIVITIY SCORE: 20
TOILETING: A LITTLE
DRESSING REGULAR UPPER BODY CLOTHING: A LITTLE
DRESSING REGULAR LOWER BODY CLOTHING: A LITTLE
DRESSING REGULAR LOWER BODY CLOTHING: A LITTLE
HELP NEEDED FOR BATHING: A LITTLE
DAILY ACTIVITIY SCORE: 20
STANDING UP FROM CHAIR USING ARMS: A LITTLE
MOBILITY SCORE: 17
MOBILITY SCORE: 20
TOILETING: A LITTLE

## 2024-12-17 ASSESSMENT — PAIN SCALES - GENERAL
PAINLEVEL_OUTOF10: 0 - NO PAIN
PAINLEVEL_OUTOF10: 4

## 2024-12-17 ASSESSMENT — PAIN - FUNCTIONAL ASSESSMENT
PAIN_FUNCTIONAL_ASSESSMENT: 0-10

## 2024-12-17 ASSESSMENT — ACTIVITIES OF DAILY LIVING (ADL): HOME_MANAGEMENT_TIME_ENTRY: 15

## 2024-12-17 NOTE — PROGRESS NOTES
12/17/24 1117   Discharge Planning   Expected Discharge Disposition Home H  ([facility], OR paperwork already sent. Awaiting med clearance. Will need to update [facility] upon dc.)     Safe dc plan secured home with [facility] HH-orders already sent. Will need to update when pt dc.    Do not send pt without speaking to [facility].

## 2024-12-17 NOTE — PROGRESS NOTES
Vickie Foster is a 68 y.o. female on day 9 of admission presenting with CHF (congestive heart failure), NYHA class I, acute on chronic, combined.    Subjective   Interval History:   Afebrile, no chills  No neck pain  No chest pain or shortness of breath  No nausea vomiting or diarrhea        Review of Systems   Cardiovascular:  Positive for leg swelling.   All other systems reviewed and are negative.      Objective   Range of Vitals (last 24 hours)  Heart Rate:  [74-81]   Temp:  [36.2 °C (97.2 °F)-36.6 °C (97.9 °F)]   Resp:  [18]   BP: (102-125)/(63-69)   SpO2:  [92 %-100 %]   Daily Weight  12/15/24 : 104 kg (229 lb 4.5 oz)    Body mass index is 41.92 kg/m².    Physical Exam  Constitutional:       Appearance: Normal appearance.   HENT:      Head: Normocephalic and atraumatic.   Eyes:      Extraocular Movements: Extraocular movements intact.      Conjunctiva/sclera: Conjunctivae normal.   Cardiovascular:      Rate and Rhythm: Normal rate.   Pulmonary:      Effort: Pulmonary effort is normal.      Breath sounds: Normal breath sounds.      Comments: Decreased bilateral breath sounds  Abdominal:      General: Bowel sounds are normal.      Palpations: Abdomen is soft.      Tenderness: There is no abdominal tenderness.   Musculoskeletal:         General: Normal range of motion.      Cervical back: Normal range of motion and neck supple.      Right lower leg: Edema present.      Left lower leg: Edema present.   Skin:     General: Skin is warm.      Comments: Right lower extremity dressing intact   neurological:      General: No focal deficit present.      Mental Status: She is alert and oriented to person, place, and time.   Psychiatric:         Mood and Affect: Mood normal.         Behavior: Behavior normal.        Antibiotics  cefTRIAXone - 1 gram/50 mL  cefuroxime - 250 mg    Relevant Results  Labs  Results from last 72 hours   Lab Units 12/16/24  0418 12/15/24  0546   WBC AUTO x10*3/uL 11.6* 8.6   HEMOGLOBIN g/dL  11.4* 11.6*   HEMATOCRIT % 36.2 37.1   PLATELETS AUTO x10*3/uL 261 260     Results from last 72 hours   Lab Units 12/16/24  1244 12/16/24  0418 12/15/24  0546   SODIUM mmol/L  --  140 140   POTASSIUM mmol/L 3.5 3.2* 3.4*   CHLORIDE mmol/L  --  99 99   CO2 mmol/L  --  34* 35*   BUN mg/dL  --  26* 24*   CREATININE mg/dL  --  0.75 0.65   GLUCOSE mg/dL  --  86 97   CALCIUM mg/dL  --  8.9 9.1   ANION GAP mmol/L  --  10 9*   EGFR mL/min/1.73m*2  --  87 >90         Estimated Creatinine Clearance: 82.4 mL/min (by C-G formula based on SCr of 0.75 mg/dL).  CRP   Date Value Ref Range Status   12/09/2021 5.0 (H) 0 - 2.0 MG/DL Final     Comment:     Performed at [place] [id number]   12/08/2021 12.3 (H) 0 - 2.0 MG/DL Final     Comment:     Performed at [place] [id number]   12/07/2021 23.2 (H) 0 - 2.0 MG/DL Final     Comment:     Performed at [place] [id number]     Microbiology  Reviewed-negative blood culture  Imaging  Transthoracic Echo (TTE) Complete    Result Date: 12/16/2024           [place] [address]            Phone [phone] TRANSTHORACIC ECHOCARDIOGRAM REPORT Patient Name:       SOURAV Oakley Physician:    [id number] Phi Perez DO Study Date:         12/16/2024          Ordering Provider:    [id number] ANICETO CHAPIN MRN/PID:            [id number]            Fellow: Accession#:         [id number]        Nurse: Date of Birth/Age:  1955 / 68     Sonographer:          Patricia OMALLEY Gender Assigned at  F                   Additional Staff: Birth: Height:             157.50 cm           Admit Date:           12/16/2024 Weight:             103.87 kg           Admission Status:     Inpatient -                                                                Routine BSA / BMI:          2.02 m2 / 41.87     Department Location:                     kg/m2 Blood Pressure: 101 /63 mmHg Study Type:    TRANSTHORACIC ECHO (TTE) COMPLETE Diagnosis/ICD: Acute on chronic combined systolic (congestive) and diastolic                (congestive) heart failure (CHF)-I50.43 Indication:    Assess LV Function CPT Codes:     Echo Complete w Full Doppler-12213 Patient History: Pertinent History: HTN Cardiomegaly COPD Former Smoker CAD PAF A-fib HF Coronary                    Arteriosclerosis. Study Detail: The following Echo studies were performed: 2D, M-Mode, Doppler and               color flow. Technically challenging study due to patient lying in               supine position, prominent lung artifact and body habitus.               Definity used as a contrast agent for endocardial border               definition. Unable to obtain suprasternal notch view.  PHYSICIAN INTERPRETATION: Left Ventricle: Left ventricular ejection fraction is normal, by visual estimate at 60-65%. There is mild concentric left ventricular hypertrophy. There are no regional wall motion abnormalities. The left ventricular cavity size is normal. There is mild increased septal and mildly increased posterior left ventricular wall thickness. There is left ventricular concentric remodeling. Spectral Doppler shows a Grade I (impaired relaxation pattern) of left ventricular diastolic filling with normal left atrial filling pressure. Left Atrium: The left atrium is severely dilated. Right Ventricle: The right ventricle is normal in size. There is normal right ventricular global systolic function. Right Atrium: The right atrium is normal in size. Aortic Valve: The aortic valve appears abnormal. There is mild to moderate aortic valve cusp calcification. There is mild aortic valve thickening. There is evidence of mildly elevated transaortic gradients consistent with  sclerosis of the aortic valve. There is no evidence of aortic valve regurgitation. The peak instantaneous gradient of the aortic valve is 14 mmHg. Mitral Valve: The mitral valve is abnormal. There is moderate mitral annular dilatation. There is mild mitral valve regurgitation. Tricuspid Valve: The tricuspid valve is structurally normal. There is moderate tricuspid regurgitation. Pulmonic Valve: The pulmonic valve is structurally normal. There is no indication of pulmonic valve regurgitation. Pericardium: No pericardial effusion noted. Aorta: The aortic root is normal. Systemic Veins: The inferior vena cava appears dilated.  CONCLUSIONS:  1. Left ventricular ejection fraction is normal, by visual estimate at 60-65%.  2. Spectral Doppler shows a Grade I (impaired relaxation pattern) of left ventricular diastolic filling with normal left atrial filling pressure.  3. There is normal right ventricular global systolic function.  4. The left atrium is severely dilated.  5. There is moderate mitral annular dilatation.  6. Moderate tricuspid regurgitation.  7. Aortic valve sclerosis. QUANTITATIVE DATA SUMMARY:  2D MEASUREMENTS:           Normal Ranges: Ao Root s:       3.41 cm IVSd:            1.16 cm   (0.6-1.1cm) LVPWd:           1.03 cm   (0.6-1.1cm) LVIDd:           4.68 cm   (3.9-5.9cm) LVIDs:           2.78 cm LV Mass Index:   91.4 g/m2 LV % FS          40.6 %  LA VOLUME:                     Normal Ranges: LA Vol A4C:        215.5 ml    (22+/-6mL/m2) LA Vol A2C:        193.1 ml LA Vol BP:         210.6 ml LA Vol Index A4C:  106.4ml/m2 LA Vol Index A2C:  95.4 ml/m2 LA Vol Index BP:   104.0 ml/m2 LA Area A4C:       43.9 cm2 LA Area A2C:       42.9 cm2 LA Major Axis A4C: 7.6 cm LA Major Axis A2C: 8.1 cm LA Volume Index:   104.5 ml/m2 LA Vol A4C:        194.3 ml LA Vol A2C:        183.4 ml LA Vol Index BSA:  93.3 ml/m2  RA VOLUME BY A/L METHOD:            Normal Ranges: RA Vol A4C:              72.0 ml    (8.3-19.5ml) RA  Vol Index A4C:        35.6 ml/m2 RA Area A4C:             24.0 cm2 RA Major Axis A4C:       6.8 cm  M-MODE MEASUREMENTS:         Normal Ranges: LAs:                 5.65 cm (2.7-4.0cm)  AORTA MEASUREMENTS:         Normal Ranges: Ao Sinus, d:        3.40 cm (2.1-3.5cm) Ao STJ, d:          3.20 cm (1.7-3.4cm) Asc Ao, d:          3.30 cm (2.1-3.4cm)  LV SYSTOLIC FUNCTION BY 2D PLANIMETRY (MOD):                      Normal Ranges: EF-A4C View:    75 % (>=55%) EF-A2C View:    53 % EF-Biplane:     68 % EF-Visual:      63 % LV EF Reported: 63 %  LV DIASTOLIC FUNCTION:             Normal Ranges: MV Peak E:             0.88 m/s    (0.7-1.2 m/s) MV Peak A:             0.28 m/s    (0.42-0.7 m/s) E/A Ratio:             3.12        (1.0-2.2) MV e'                  0.102 m/s   (>8.0) MV lateral e'          0.13 m/s MV medial e'           0.07 m/s MV A Dur:              110.37 msec E/e' Ratio:            8.66        (<8.0) PulmV Sys Sean:         44.95 cm/s PulmV Vizcarra Sean:        25.40 cm/s PulmV S/D Sean:         1.77  MITRAL VALVE:          Normal Ranges: MV DT:        175 msec (150-240msec)  AORTIC VALVE:            Normal Ranges: AoV Vmax:      1.84 m/s  (<=1.7m/s) AoV Peak P.6 mmHg (<20mmHg) LVOT Max Sean:  1.34 m/s  (<=1.1m/s) LVOT VTI:      21.54 cm LVOT Diameter: 2.04 cm   (1.8-2.4cm) AoV Area,Vmax: 2.38 cm2  (2.5-4.5cm2)  RIGHT VENTRICLE: RV Basal 4.20 cm RV Mid   3.10 cm RV Major 6.3 cm  TRICUSPID VALVE/RVSP:          Normal Ranges: Peak TR Velocity:     3.90 m/s RV Syst Pressure:     64 mmHg  (< 30mmHg) IVC Diam:             2.29 cm  PULMONIC VALVE:          Normal Ranges: PV Accel Time:  32 msec  (>120ms) PV Max Sean:     1.1 m/s  (0.6-0.9m/s) PV Max P.6 mmHg  Pulmonary Veins: PulmV Vizcarra Sean: 25.40 cm/s PulmV S/D Sean:  1.77 PulmV Sys Sean:  44.95 cm/s  AORTA: Asc Ao Diam 3.31 cm  [id number] Phi Perez DO Electronically signed on 2024 at 4:50:47 PM  ** Final **     XR chest 1 view    Result Date:  12/12/2024  Interpreted By:  Clif Benavidez, STUDY: XR CHEST 1 VIEW; 12/12/2024 4:00 pm   INDICATION: CLINICAL INFORMATION: Signs/Symptoms:CHF. Leg swelling   COMPARISON: 12/08/2024   ACCESSION NUMBER(S): [id number]   ORDERING CLINICIAN: ANICETO CHAPIN   TECHNIQUE: Portable chest one view.   FINDINGS: The cardiac size is indeterminate in view of the AP projection. Patchy bibasilar atelectasis and/or infiltrate is present, similar compared to the prior study.       Patchy bibasilar atelectasis and/or infiltrate is present, similar compared to the prior study.   MACRO: none   Signed by: Clif Benavidez 12/12/2024 4:27 PM Dictation workstation:   [id number]    Vascular US lower extremity venous duplex bilateral    Result Date: 12/9/2024  Interpreted By:  Savita Dominguez, STUDY: VASC US LOWER EXTREMITY VENOUS DUPLEX BILATERAL;  12/9/2024 1:27 pm   INDICATION: Signs/Symptoms:asymmetric edema.   ,L03.119 Cellulitis of unspecified part of limb,R60.0 Localized edema   COMPARISON: None.   ACCESSION NUMBER(S): [id number]   ORDERING CLINICIAN: GARRISON AGUAYO   TECHNIQUE: Vascular ultrasound of the bilateral lower extremities was performed. Real-time compression views as well as Gray scale, color Doppler and spectral Doppler waveform analysis was performed.   FINDINGS: Evaluation of the visualized portions of the bilateral common femoral vein, proximal, mid, and distal femoral vein, and popliteal vein were performed.  Evaluation of the visualized portions of the  posterior tibial and peroneal veins were also performed.     The evaluated veins demonstrate normal compressibility. There is intact venous flow demonstrating normal respiratory variability and normal augmentation of flow with calf compression. Therefore, there is no ultrasonographic evidence for deep vein thrombosis within the evaluated veins.         No sonographic evidence for deep vein thrombosis within the evaluated veins of the bilateral lower extremity.   MACRO:  None   Signed by: Savita Dominguez 12/9/2024 2:29 PM Dictation workstation:   [id number]    ECG 12 lead    Result Date: 12/9/2024  Atrial fibrillation ST & T wave abnormality, consider inferior ischemia Prolonged QT Abnormal ECG When compared with ECG of 13-NOV-2024 00:25, ST more depressed in Lateral leads T wave inversion less evident in Lateral leads Confirmed by Robert Carvalho ([id number]) on 12/9/2024 10:16:24 AM    XR chest 2 views    Result Date: 12/8/2024  Interpreted By:  Mikey Hough, STUDY: XR CHEST 2 VIEWS;  12/8/2024 7:14 pm   INDICATION: Signs/Symptoms:shortness of breath, concern for worsening CHF vs pneumonia.   COMPARISON: 11/13/2024   ACCESSION NUMBER(S): [id number]   ORDERING CLINICIAN: FLAQUITO TOMAS   FINDINGS: There is stable cardiomegaly. There is mild vascular congestive change and basilar atelectasis. No significant pleural effusion. No pneumothorax.       Cardiomegaly with mild vascular congestive change and basilar atelectasis.   MACRO: None   Signed by: Mikey Hough 12/8/2024 7:52 PM Dictation workstation:   [id number]        Assessment/Plan   Acute on chronic respiratory failure, resolved, on room air  Acute on chronic diastolic heart failure  Right lower extremity cellulitis-risk factor is lymphedema-resolving  Lymphedema        Continue IV ceftriaxone while inpatient  Bilateral lower extremity compression once patient is able to tolerate  Monitor WBC and temperature  Diuresis per cardiology  Oxygen as needed  Change to p.o. cefuroxime (avoiding cephalexin because of interaction with Tikosyn) 500 mg p.o. twice daily for total of 14 days through 12/23/2024 at discharge, OK to discharge from infectious disease standpoint      Malachi Talley MD

## 2024-12-17 NOTE — DISCHARGE SUMMARY
Discharge Diagnosis  CHF (congestive heart failure), NYHA class I, acute on chronic, combined  Right lower extremity Cellulitis  Metabolic Alkalosis   Atrial fibrillation       Issues Requiring Follow-Up  Discharge home with [facility] for continued rehabilitation  Repeat BMP next week--results to be sent to PCP  Finish PO ABX   Atrial fibrillation-rate control strategy- Holter monitor--follow up cardiology and EP     Discharge Meds     Medication List      START taking these medications     cefuroxime 250 mg tablet; Commonly known as: Ceftin; Take 1 tablet (250   mg) by mouth 2 times a day for 7 days.; Notes to patient: 2 TIMES DAILY,   FOR 7 DAYS   potassium chloride CR 20 mEq ER tablet; Commonly known as: Klor-Con M20;   Take 2 tablets (40 mEq) by mouth once daily with breakfast. Do not crush   or chew.   spironolactone 25 mg tablet; Commonly known as: Aldactone; Take 1 tablet   (25 mg) by mouth 2 times a day.     CONTINUE taking these medications     allopurinoL 200 mg tablet; Take 200 mg by mouth 2 times a day.; Notes to   patient: 2 TIMES DAILY   atorvastatin 40 mg tablet; Commonly known as: Lipitor; TAKE 1 TABLET(40   MG) BY MOUTH DAILY AT BEDTIME; Notes to patient: ONCE DAILY, AT BEDTIME   Biotene Dry Mouth Oral Rinse solution; Generic drug: moisturizing mouth;   Notes to patient: 3 TIMES DAILY   budesonide-formoteroL 80-4.5 mcg/actuation inhaler; Commonly known as:   Symbicort; Notes to patient: 2 PUFFS 2 TIMES DAILY   buPROPion  mg 24 hr tablet; Commonly known as: Wellbutrin XL; Take   1 tablet (300 mg) by mouth once daily in the morning. Take before meals.;   Notes to patient: ONCE DAILY, IN THE MORNING   calcium carb-vitamin D3-vit K2 600 mg-1,000 unit-90 mcg tablet; Notes to   patient: ONCE DAILY   Eliquis 5 mg tablet; Generic drug: apixaban; Take 1 tablet (5 mg) by   mouth 2 times a day.; Notes to patient: 2 TIMES DAILY   fluticasone propion-salmeteroL 100-50 mcg/dose diskus inhaler; Commonly   known  as: Advair Diskus; Notes to patient: 2 TIMES DAILY   hydrOXYzine HCL 10 mg tablet; Commonly known as: Atarax; Take 1 tablet   (10 mg) by mouth as needed at bedtime (insomnia).; Notes to patient: IF   NEEDED   ipratropium-albuteroL 0.5-2.5 mg/3 mL nebulizer solution; Commonly known   as: Duo-Neb; Take 3 mL by nebulization every 6 hours if needed for   wheezing or shortness of breath.; Notes to patient: IF NEEDED   metoprolol succinate XL 25 mg 24 hr tablet; Commonly known as:   Toprol-XL; TAKE 1 TABLET BY MOUTH ONE TIME DAILY; Notes to patient: ONCE   DAILY   oxygen gas therapy; Commonly known as: O2; Inhale 1 each every 12 hours.   oxygen gas therapy; Commonly known as: O2; Inhale 1 each every 12 hours.   torsemide 100 mg tablet; Commonly known as: Demadex; Take 1/2 a tablet   (50 mg) by mouth 2 times a day.   traMADol 50 mg tablet; Commonly known as: Ultram     STOP taking these medications     dofetilide 250 mcg capsule; Commonly known as: Tikosyn     ASK your doctor about these medications     semaglutide 2 mg/dose (8 mg/3 mL) pen injector       Test Results Pending At Discharge  Pending Labs       No current pending labs.            Hospital Course  68 year old female with a pmhx of chronic respiratory failure with hypoxia baseline 2l of o2 at bedtime, Atrial fibrillation, COPD, admitted to [facility] 12/8/24 with bilateral lower extremity edema. Found to be in an acute on chronic respiratory failure 2/2 to acute CHF exacerbation and started on continuous supplemental oxygen. Also with RLE cellulitis on admission and electrolyte imbalances. Started on IV ABX, Received consultation with cardiology, ID, and nephrology. Now with resolved shortness of breath, patient states she uses o2 at bedtime and as needed. Blood cultures negative and final. Improved and resolved electrolyte imbalances. Cleared to dc from ID, cardiology, and nephrology. Discharge home with home health. Follow up with PCP, nephrology 1-2  weeks and cardiology as scheduled for 12/23/24. EP discontinued tikosyn yesterday, as unable to main NSR, with strategy focused on rate control. With normal echo with ef 60-65%, will discharge with holter monitor, follow up with EP 4-6 weeks. Repeat BMP next week with results to be sent to PCP.     Pertinent Physical Exam At Time of Discharge  Physical Exam  Vitals and nursing note reviewed.   Constitutional:       Appearance: Normal appearance. She is obese.   HENT:      Head: Normocephalic and atraumatic.      Mouth/Throat:      Mouth: Mucous membranes are moist.   Eyes:      Extraocular Movements: Extraocular movements intact.   Cardiovascular:      Rate and Rhythm: Normal rate.      Pulses: Normal pulses.      Heart sounds: Normal heart sounds.   Pulmonary:      Effort: Pulmonary effort is normal.      Breath sounds: Normal breath sounds.      Comments: Diminshed lower fields but no signs of acute distress  Abdominal:      General: Bowel sounds are normal.      Palpations: Abdomen is soft.   Musculoskeletal:      Cervical back: Normal range of motion and neck supple.      Right lower leg: Edema present.      Left lower leg: Edema present.   Skin:     General: Skin is warm and dry.      Capillary Refill: Capillary refill takes less than 2 seconds.      Comments: Blle wrapped, clean dry intact   Neurological:      General: No focal deficit present.      Mental Status: She is alert and oriented to person, place, and time.   Psychiatric:         Mood and Affect: Mood normal.         Behavior: Behavior normal.         Outpatient Follow-Up  Future Appointments   Date Time Provider Department Center   12/17/2024  1:50 PM [facility] [address]   12/23/2024  3:45 PM Joseph Sanchez MD [id number] [facility]   1/9/2025  1:45 PM Joseph Kelley MD [id number] [facility]   1/16/2025  1:30 PM Danilo Gonzlaez DO [id number] [facility]     Time spent on discharge 35 minutes.     Kate Johansen, APRN-CNP

## 2024-12-17 NOTE — PROGRESS NOTES
Occupational Therapy    OT Treatment    Patient Name: Vickie Foster  MRN: [id number]  Department: [address]  Room: [facility]  Today's Date: 12/17/2024  Time Calculation  Start Time: 1052  Stop Time: 1115  Time Calculation (min): 23 min        Assessment:  OT Assessment: Pt activley participating, highly motivated and progressing with established POC. Will continue to address remaining deficits with skilled OT intervention.  Prognosis: Good  Barriers to Discharge Home: No anticipated barriers  Evaluation/Treatment Tolerance: Patient tolerated treatment well  Medical Staff Made Aware: Yes  End of Session Communication: Bedside nurse  End of Session Patient Position: Up in room, Up in chair, Alarm off, not on at start of session (call light in reach Pt demonstrate use all needs met.)  OT Assessment Results: Decreased ADL status, Decreased functional mobility, Decreased IADLs  Prognosis: Good  Barriers to Discharge: None  Evaluation/Treatment Tolerance: Patient tolerated treatment well  Medical Staff Made Aware: Yes  Strengths: Ability to acquire knowledge, Attitude of self, Coping skills, Living arrangement secure, Support of extended family/friends  Plan:  Treatment Interventions: ADL retraining, Functional transfer training, Endurance training, Patient/family training, Equipment evaluation/education, Compensatory technique education  OT Frequency: 4 times per week  OT Discharge Recommendations: Low intensity level of continued care  Equipment Recommended upon Discharge: Wheeled walker  OT Recommended Transfer Status: Assist of 1  OT - OK to Discharge: Yes  Treatment Interventions: ADL retraining, Functional transfer training, Endurance training, Patient/family training, Equipment evaluation/education, Compensatory technique education    Subjective   Previous Visit Info:  OT Last Visit  OT Received On: 12/17/24  General:  General  Reason for Referral: decreased functional status  Referred By: Clif Arora MD  Past  Medical History Relevant to Rehab: AFIB, CHF, COVID, HTN, heart disease, LBP, cardioversion, ablation  Prior to Session Communication: Bedside nurse  Patient Position Received: Up in chair, Alarm off, not on at start of session  Preferred Learning Style: visual, verbal, written  General Comment: Pt cleared by NSG and is agreeable to skilled OT intervention.  Precautions:  Hearing/Visual Limitations: Hearing WFL, wears glasses  Medical Precautions: Fall precautions    Vital Signs (Past 2hrs)           Vital Signs Comment: during session range 88-93     Pain:  Pain Assessment  Pain Assessment: 0-10  0-10 (Numeric) Pain Score: 0 - No pain    Objective    Cognition:  Cognition  Overall Cognitive Status: Within Functional Limits  Orientation Level: Oriented X4  Coordination:  Movements are Fluid and Coordinated: No  Lower Body Coordination: Slower rate of movement rakel LE  Coordination Comment: short inconsistent steps  Activities of Daily Living:      Grooming  Grooming Level of Assistance: Modified independent  Grooming Where Assessed: Standing sinkside  Grooming Comments: Pt washing hands seated    UE Bathing  UE Bathing Comments: Pt declined completed prior however recommendation seated TTB for ec and safetyproviede handout for porcurement    LE Bathing  LE Bathing Comments: Pt declined completed prior however recommendation seated TTB for ec and safety proviede handout for procurement, with [facility] sponge buttocks and B feet    UE Dressing  UE Dressing Level of Assistance: Setup  UE Dressing Where Assessed: Recliner  UE Dressing Comments: Pt doff/don hospital gown vc fasten prior to over head    LE Dressing  LE Dressing: Yes  LE Dressing Adaptive Equipment: Reacher, Sock aide  Pants Level of Assistance: Distant supervision (Pt thread/remove with use  reacher)  Sock Level of Assistance: Modified independent (Pt doff/don socks wide sock aid to don efficiently procurrement of device provided)  LE Dressing Where  Assessed: Recliner  LE Dressing Comments: Pt efficient with use AE    Toileting  Toileting Level of Assistance: Modified independent  Where Assessed: Toilet  Toileting Comments: Pt adjusting clothing prior/after/hygiene  Functional Standing Tolerance:  Time: 2 minutes  Activity: ADL skills/ transfers  Functional Standing Tolerance Comments: unilateral hand support vc posture as Pt presenting wih kyphotic posture in stance with extended task  Bed Mobility/Transfers: Bed Mobility  Bed Mobility: No    Transfers  Transfer: Yes  Transfer 1  Transfer From 1: Chair with arms to, Stand to  Transfer to 1: Chair with arms  Technique 1: Sit to stand, Stand to sit  Transfer Device 1: Walker  Transfer Level of Assistance 1: Distant supervision  Trials/Comments 1: vc hand placement    Toilet Transfers  Toilet Transfer From: Rolling walker  Toilet Transfer Type: To and from  Toilet Transfer to: Standard toilet  Toilet Transfer Technique: Ambulating  Toilet Transfers: Supervision  Toilet Transfers Comments: grab bar use  Tub Transfers  Tub Transfers Comments: educated Pt with use and  handout issuance for TTB, grab bar and hand held shower head acquisition Pt receptive    Functional Mobility:  Functional Mobility  Functional Mobility Performed: Yes  Functional Mobility 1  Surface 1: Level tile  Device 1: Rolling walker  Assistance 1: Distant supervision  Comments 1: 15 x 2 ' slow una, to include turns, backing and doorways  Sitting Balance:  Dynamic Sitting Balance  Dynamic Sitting-Balance Support: Feet supported  Dynamic Sitting-Level of Assistance: Modified independent  Dynamic Sitting-Balance: Forward lean, Reaching for objects, Reaching across midline  Dynamic Sitting-Comments: with LB ADL skills  Therapy/Activity:    Therapeutic Activity  Therapeutic Activity Performed: Yes  Therapeutic Activity 1: Instructed Pt with energy conservation deep breathing application good follow thorugh  Outcome Measures:[facility] Daily  Activity  Putting on and taking off regular lower body clothing: A little  Bathing (including washing, rinsing, drying): A little  Putting on and taking off regular upper body clothing: A little  Toileting, which includes using toilet, bedpan or urinal: A little  Taking care of personal grooming such as brushing teeth: None  Eating Meals: None  Daily Activity - Total Score: 20    Education Documentation  No documentation found.  Education Comments  No comments found.        OP EDUCATION:  Education  Individual(s) Educated: Patient  Education Provided: POC discussed and agreed upon, Risk and benefits of OT discussed with patient or other, Fall precautons  Home Program: Edema control  Equipment: Other (TTB, wide sock aid)  Risk and Benefits Discussed with Patient/Caregiver/Other: yes  Patient/Caregiver Demonstrated Understanding: yes  Plan of Care Discussed and Agreed Upon: yes  Patient Response to Education: Patient/Caregiver Verbalized Understanding of Information, Patient/Caregiver Performed Return Demonstration of Exercises/Activities, Patient/Caregiver Asked Appropriate Questions  Education Comment: Instructed Pt with safety in transfers, adaptive ADL skills, balance strategies    Goals:  Encounter Problems       Encounter Problems (Active)       Bathing       LTG - Patient will utilize adaptive techniques to bathe body independent with AE as needed (Progressing)       Start:  12/10/24    Expected End:  01/10/25               Dressings Lower Extremities       LTG - Patient will dress lower body independent with set up (Progressing)       Start:  12/10/24    Expected End:  01/10/25               Functional Mobility       Perform functional mobility a household distance with 2ww independent (Progressing)       Start:  12/10/24    Expected End:  01/10/25               Toileting       STG - Patient will complete toileting tasks with 2ww independent (Progressing)       Start:  12/10/24    Expected End:  01/10/25

## 2024-12-17 NOTE — PROGRESS NOTES
Vickie Foster is a 68 y.o. female on day 9 of admission presenting with CHF (congestive heart failure), NYHA class I, acute on chronic, combined.    Subjective   Interval History:   Afebrile, no chills  No leg pain  No chest pain or shortness of breath.  No nausea vomiting or diarrhea    Review of Systems   Cardiovascular:  Positive for leg swelling.   All other systems reviewed and are negative.      Objective   Range of Vitals (last 24 hours)  Heart Rate:  [67-74]   Temp:  [36.2 °C (97.2 °F)-36.6 °C (97.9 °F)]   Resp:  [18]   BP: (101-111)/(63)   SpO2:  [92 %-97 %]   Daily Weight  12/15/24 : 104 kg (229 lb 4.5 oz)    Body mass index is 41.92 kg/m².    Physical Exam  Constitutional:       Appearance: Normal appearance.   HENT:      Head: Normocephalic and atraumatic.   Eyes:      Extraocular Movements: Extraocular movements intact.      Conjunctiva/sclera: Conjunctivae normal.   Cardiovascular:      Rate and Rhythm: Normal rate.   Pulmonary:      Effort: Pulmonary effort is normal.      Breath sounds: Normal breath sounds.      Comments: Decreased bilateral breath sounds  Abdominal:      General: Bowel sounds are normal.      Palpations: Abdomen is soft.      Tenderness: There is no abdominal tenderness.   Musculoskeletal:         General: Normal range of motion.      Cervical back: Normal range of motion and neck supple.      Right lower leg: Edema present.      Left lower leg: Edema present.   Skin:     General: Skin is warm.      Comments: Right lower extremity dressing intact   neurological:      General: No focal deficit present.      Mental Status: She is alert and oriented to person, place, and time.   Psychiatric:         Mood and Affect: Mood normal.         Behavior: Behavior normal.     Antibiotics  cefTRIAXone - 1 gram/50 mL  cefuroxime - 250 mg    Relevant Results  Labs  Results from last 72 hours   Lab Units 12/16/24  0418 12/15/24  0546 12/14/24  0732   WBC AUTO x10*3/uL 11.6* 8.6 8.8   HEMOGLOBIN  g/dL 11.4* 11.6* 12.1   HEMATOCRIT % 36.2 37.1 38.1   PLATELETS AUTO x10*3/uL 261 260 263     Results from last 72 hours   Lab Units 12/16/24  1244 12/16/24  0418 12/15/24  0546 12/14/24  1359 12/14/24  0732   SODIUM mmol/L  --  140 140  --  140   POTASSIUM mmol/L 3.5 3.2* 3.4*   < > 2.7*   CHLORIDE mmol/L  --  99 99  --  96*   CO2 mmol/L  --  34* 35*  --  37*   BUN mg/dL  --  26* 24*  --  21   CREATININE mg/dL  --  0.75 0.65  --  0.60   GLUCOSE mg/dL  --  86 97  --  97   CALCIUM mg/dL  --  8.9 9.1  --  9.2   ANION GAP mmol/L  --  10 9*  --  10   EGFR mL/min/1.73m*2  --  87 >90  --  >90    < > = values in this interval not displayed.         Estimated Creatinine Clearance: 82.4 mL/min (by C-G formula based on SCr of 0.75 mg/dL).  CRP   Date Value Ref Range Status   12/09/2021 5.0 (H) 0 - 2.0 MG/DL Final     Comment:     Performed at [place] [id number]   12/08/2021 12.3 (H) 0 - 2.0 MG/DL Final     Comment:     Performed at [place] [address]   12/07/2021 23.2 (H) 0 - 2.0 MG/DL Final     Comment:     Performed at [place] [address]     Microbiology  Reviewed  Imaging  Transthoracic Echo (TTE) Complete    Result Date: 12/16/2024           [place] [address]            Phone [phone] TRANSTHORACIC ECHOCARDIOGRAM REPORT Patient Name:       SOURAV KHALIF Oakley Physician:    [id number] Phi Perez DO Study Date:         12/16/2024          Ordering Provider:    [id number] ANICETO CHAPIN MRN/PID:            [id number]            Fellow: Accession#:         [id number]        Nurse: Date of Birth/Age:  1955 / 68     Sonographer:          Patricia parks                                     [facility] Gender Assigned at  F                    Additional Staff: Birth: Height:             157.50 cm           Admit Date:           12/16/2024 Weight:             103.87 kg           Admission Status:     Inpatient -                                                               Routine BSA / BMI:          2.02 m2 / 41.87     Department Location:                     kg/m2 Blood Pressure: 101 /63 mmHg Study Type:    TRANSTHORACIC ECHO (TTE) COMPLETE Diagnosis/ICD: Acute on chronic combined systolic (congestive) and diastolic                (congestive) heart failure (CHF)-I50.43 Indication:    Assess LV Function CPT Codes:     Echo Complete w Full Doppler-[id number] Patient History: Pertinent History: HTN Cardiomegaly COPD Former Smoker CAD PAF A-fib HF Coronary                    Arteriosclerosis. Study Detail: The following Echo studies were performed: 2D, M-Mode, Doppler and               color flow. Technically challenging study due to patient lying in               supine position, prominent lung artifact and body habitus.               Definity used as a contrast agent for endocardial border               definition. Unable to obtain suprasternal notch view.  PHYSICIAN INTERPRETATION: Left Ventricle: Left ventricular ejection fraction is normal, by visual estimate at 60-65%. There is mild concentric left ventricular hypertrophy. There are no regional wall motion abnormalities. The left ventricular cavity size is normal. There is mild increased septal and mildly increased posterior left ventricular wall thickness. There is left ventricular concentric remodeling. Spectral Doppler shows a Grade I (impaired relaxation pattern) of left ventricular diastolic filling with normal left atrial filling pressure. Left Atrium: The left atrium is severely dilated. Right Ventricle: The right ventricle is normal in size. There is normal right ventricular global systolic function. Right Atrium: The right atrium is normal in size. Aortic Valve: The aortic valve appears abnormal.  There is mild to moderate aortic valve cusp calcification. There is mild aortic valve thickening. There is evidence of mildly elevated transaortic gradients consistent with sclerosis of the aortic valve. There is no evidence of aortic valve regurgitation. The peak instantaneous gradient of the aortic valve is 14 mmHg. Mitral Valve: The mitral valve is abnormal. There is moderate mitral annular dilatation. There is mild mitral valve regurgitation. Tricuspid Valve: The tricuspid valve is structurally normal. There is moderate tricuspid regurgitation. Pulmonic Valve: The pulmonic valve is structurally normal. There is no indication of pulmonic valve regurgitation. Pericardium: No pericardial effusion noted. Aorta: The aortic root is normal. Systemic Veins: The inferior vena cava appears dilated.  CONCLUSIONS:  1. Left ventricular ejection fraction is normal, by visual estimate at 60-65%.  2. Spectral Doppler shows a Grade I (impaired relaxation pattern) of left ventricular diastolic filling with normal left atrial filling pressure.  3. There is normal right ventricular global systolic function.  4. The left atrium is severely dilated.  5. There is moderate mitral annular dilatation.  6. Moderate tricuspid regurgitation.  7. Aortic valve sclerosis. QUANTITATIVE DATA SUMMARY:  2D MEASUREMENTS:           Normal Ranges: Ao Root s:       3.41 cm IVSd:            1.16 cm   (0.6-1.1cm) LVPWd:           1.03 cm   (0.6-1.1cm) LVIDd:           4.68 cm   (3.9-5.9cm) LVIDs:           2.78 cm LV Mass Index:   91.4 g/m2 LV % FS          40.6 %  LA VOLUME:                     Normal Ranges: LA Vol A4C:        215.5 ml    (22+/-6mL/m2) LA Vol A2C:        193.1 ml LA Vol BP:         210.6 ml LA Vol Index A4C:  106.4ml/m2 LA Vol Index A2C:  95.4 ml/m2 LA Vol Index BP:   104.0 ml/m2 LA Area A4C:       43.9 cm2 LA Area A2C:       42.9 cm2 LA Major Axis A4C: 7.6 cm LA Major Axis A2C: 8.1 cm LA Volume Index:   104.5 ml/m2 LA Vol A4C:         194.3 ml LA Vol A2C:        183.4 ml LA Vol Index BSA:  93.3 ml/m2  RA VOLUME BY A/L METHOD:            Normal Ranges: RA Vol A4C:              72.0 ml    (8.3-19.5ml) RA Vol Index A4C:        35.6 ml/m2 RA Area A4C:             24.0 cm2 RA Major Axis A4C:       6.8 cm  M-MODE MEASUREMENTS:         Normal Ranges: LAs:                 5.65 cm (2.7-4.0cm)  AORTA MEASUREMENTS:         Normal Ranges: Ao Sinus, d:        3.40 cm (2.1-3.5cm) Ao STJ, d:          3.20 cm (1.7-3.4cm) Asc Ao, d:          3.30 cm (2.1-3.4cm)  LV SYSTOLIC FUNCTION BY 2D PLANIMETRY (MOD):                      Normal Ranges: EF-A4C View:    75 % (>=55%) EF-A2C View:    53 % EF-Biplane:     68 % EF-Visual:      63 % LV EF Reported: 63 %  LV DIASTOLIC FUNCTION:             Normal Ranges: MV Peak E:             0.88 m/s    (0.7-1.2 m/s) MV Peak A:             0.28 m/s    (0.42-0.7 m/s) E/A Ratio:             3.12        (1.0-2.2) MV e'                  0.102 m/s   (>8.0) MV lateral e'          0.13 m/s MV medial e'           0.07 m/s MV A Dur:              110.37 msec E/e' Ratio:            8.66        (<8.0) PulmV Sys Sean:         44.95 cm/s PulmV Vizcarra Sean:        25.40 cm/s PulmV S/D Sean:         1.77  MITRAL VALVE:          Normal Ranges: MV DT:        175 msec (150-240msec)  AORTIC VALVE:            Normal Ranges: AoV Vmax:      1.84 m/s  (<=1.7m/s) AoV Peak P.6 mmHg (<20mmHg) LVOT Max Sean:  1.34 m/s  (<=1.1m/s) LVOT VTI:      21.54 cm LVOT Diameter: 2.04 cm   (1.8-2.4cm) AoV Area,Vmax: 2.38 cm2  (2.5-4.5cm2)  RIGHT VENTRICLE: RV Basal 4.20 cm RV Mid   3.10 cm RV Major 6.3 cm  TRICUSPID VALVE/RVSP:          Normal Ranges: Peak TR Velocity:     3.90 m/s RV Syst Pressure:     64 mmHg  (< 30mmHg) IVC Diam:             2.29 cm  PULMONIC VALVE:          Normal Ranges: PV Accel Time:  32 msec  (>120ms) PV Max Sean:     1.1 m/s  (0.6-0.9m/s) PV Max P.6 mmHg  Pulmonary Veins: PulmV Vizcarra Sean: 25.40 cm/s PulmV S/D Sean:  1.77 PulmV Sys Sean:   44.95 cm/s  AORTA: Asc Ao Diam 3.31 cm  [id number] Phi Perez DO Electronically signed on 12/16/2024 at 4:50:47 PM  ** Final **     XR chest 1 view    Result Date: 12/12/2024  Interpreted By:  Clif Benavidez, STUDY: XR CHEST 1 VIEW; 12/12/2024 4:00 pm   INDICATION: CLINICAL INFORMATION: Signs/Symptoms:CHF. Leg swelling   COMPARISON: 12/08/2024   ACCESSION NUMBER(S): [id number]   ORDERING CLINICIAN: ANICETO CHAPIN   TECHNIQUE: Portable chest one view.   FINDINGS: The cardiac size is indeterminate in view of the AP projection. Patchy bibasilar atelectasis and/or infiltrate is present, similar compared to the prior study.       Patchy bibasilar atelectasis and/or infiltrate is present, similar compared to the prior study.   MACRO: none   Signed by: Clif Benavidez 12/12/2024 4:27 PM Dictation workstation:   [id number]    Vascular US lower extremity venous duplex bilateral    Result Date: 12/9/2024  Interpreted By:  Savita Dominguez, STUDY: VAS US LOWER EXTREMITY VENOUS DUPLEX BILATERAL;  12/9/2024 1:27 pm   INDICATION: Signs/Symptoms:asymmetric edema.   ,L03.119 Cellulitis of unspecified part of limb,R60.0 Localized edema   COMPARISON: None.   ACCESSION NUMBER(S): [id number]   ORDERING CLINICIAN: GARRISON AGUAYO   TECHNIQUE: Vascular ultrasound of the bilateral lower extremities was performed. Real-time compression views as well as Gray scale, color Doppler and spectral Doppler waveform analysis was performed.   FINDINGS: Evaluation of the visualized portions of the bilateral common femoral vein, proximal, mid, and distal femoral vein, and popliteal vein were performed.  Evaluation of the visualized portions of the  posterior tibial and peroneal veins were also performed.     The evaluated veins demonstrate normal compressibility. There is intact venous flow demonstrating normal respiratory variability and normal augmentation of flow with calf compression. Therefore, there is no ultrasonographic evidence for deep vein  thrombosis within the evaluated veins.         No sonographic evidence for deep vein thrombosis within the evaluated veins of the bilateral lower extremity.   MACRO: None   Signed by: Savita Dominguez 12/9/2024 2:29 PM Dictation workstation:   [id number]    ECG 12 lead    Result Date: 12/9/2024  Atrial fibrillation ST & T wave abnormality, consider inferior ischemia Prolonged QT Abnormal ECG When compared with ECG of 13-NOV-2024 00:25, ST more depressed in Lateral leads T wave inversion less evident in Lateral leads Confirmed by Robert Carvalho ([id number]) on 12/9/2024 10:16:24 AM    XR chest 2 views    Result Date: 12/8/2024  Interpreted By:  Mikey Hough, STUDY: XR CHEST 2 VIEWS;  12/8/2024 7:14 pm   INDICATION: Signs/Symptoms:shortness of breath, concern for worsening CHF vs pneumonia.   COMPARISON: 11/13/2024   ACCESSION NUMBER(S): [id number]   ORDERING CLINICIAN: FLAQUITO TOMAS   FINDINGS: There is stable cardiomegaly. There is mild vascular congestive change and basilar atelectasis. No significant pleural effusion. No pneumothorax.       Cardiomegaly with mild vascular congestive change and basilar atelectasis.   MACRO: None   Signed by: Mikey Hough 12/8/2024 7:52 PM Dictation workstation:   [id number]     Assessment/Plan   Acute on chronic respiratory failure  Acute on chronic diastolic heart failure  Right lower extremity cellulitis-risk factor is lymphedema-resolving  Lymphedema        Continue IV ceftriaxone while inpatient  Bilateral lower extremity compression once patient is able to tolerate  Monitor WBC and temperature  Diuresis per cardiology  Oxygen as needed  Change to p.o. cefuroxime (avoiding cephalexin because of interaction with Tikosyn) 500 mg p.o. twice daily for total of 14 days through 12/23/2024 at discharge, OK to discharge from infectious disease standpoint    Malachi Talley MD

## 2024-12-17 NOTE — PROGRESS NOTES
Physical Therapy    Physical Therapy Treatment    Patient Name: Vickie Foster  MRN: [id number]  Department: [address]  Room: [address] [place] [address]  Today's Date: 12/17/2024  Time Calculation  Start Time: 0855  Stop Time: 0920  Time Calculation (min): 25 min         Assessment/Plan   PT Assessment  End of Session Communication: Bedside nurse  Assessment Comment: pt demonstrating slowly improving overall functional mobility tolerance and ease however still demonstrates increased effort and difficulty with extended amb distances and likely stairs.  End of Session Patient Position: Up in chair, Alarm on (call button in reach)  PT Plan  Inpatient/Swing Bed or Outpatient: Inpatient  PT Plan  Treatment/Interventions: Bed mobility, Transfer training, Gait training, Balance training, Stair training, Strengthening, Endurance training, Therapeutic exercise, Therapeutic activity  PT Plan: Ongoing PT  PT Frequency: 4 times per week  PT Discharge Recommendations: Moderate intensity level of continued care  Equipment Recommended upon Discharge: Wheeled walker  PT Recommended Transfer Status: Assist x1, Assistive device (FWW)  PT - OK to Discharge: Yes      General Visit Information:   PT  Visit  PT Received On: 12/17/24  General  Family/Caregiver Present: No  Prior to Session Communication: Bedside nurse  Patient Position Received: Up in chair, Alarm off, not on at start of session  Preferred Learning Style: verbal, visual  General Comment: cleared by nurse for therapy; pt agreeable to therapy; + telemetry    Subjective   Precautions:  Precautions  Hearing/Visual Limitations: Hearing WFL, wears glasses  Medical Precautions: Fall precautions    Vital Signs (Past 2hrs)        Date/Time Vitals Session Patient Position Pulse Resp SpO2 BP MAP (mmHg)    12/17/24 1100 --  --  78  --  89 %  111/81  92                   Vital Signs Comment: O2 sat 90% with  bpm after amb trial on room air; O2 sat recovered to 94% with HR 91 bpm with seated pursed  lip breathing x 30 to 40 secs     Objective   Pain:  Pain Assessment  Pain Assessment: 0-10  0-10 (Numeric) Pain Score: 0 - No pain  Cognition:  Cognition  Overall Cognitive Status: Within Functional Limits  Orientation Level: Oriented X4  Safety/Judgement:  (intermittant decreased safety insight during functional mobility)  Coordination:  Movements are Fluid and Coordinated: No  Lower Body Coordination: Slower rate of movement rakel LE  Postural Control:  Postural Control  Posture Comment: obese with mild forward head, protracted shldrs, mild trunk flexion on hips during amb with FWW  Extremity/Trunk Assessments:    Activity Tolerance:  Activity Tolerance  Endurance: Decreased tolerance for upright activites  Activity Tolerance Comments: fair + due to SOB/fatigues  Treatments:  Therapeutic Exercise  Therapeutic Exercise Performed: Yes  Therapeutic Exercise Activity 1: seated rakel AP/heel raises x 30 reps each  Therapeutic Exercise Activity 2: seated rakel LAQ's x 30 reps  Therapeutic Exercise Activity 3: seated rakel marching x 30 reps  Therapeutic Exercise Activity 4: seated unilateral hip abd/add x 20 reps  Therapeutic Exercise Activity 5: seated deep/pursed lip breathing x 3 total sets of 5 to 7 reps    Therapeutic Activity  Therapeutic Activity Performed: Yes (see transfers, amb with FWW comments)    Bed Mobility  Bed Mobility: No    Ambulation/Gait Training  Ambulation/Gait Training Performed: Yes  Ambulation/Gait Training 1  Surface 1: Level tile  Device 1: Rolling walker  Assistance 1: Close supervision, Minimal verbal cues  Quality of Gait 1: Forward flexed posture, Diminished heel strike, Decreased step length (decreased una.)  Comments/Distance (ft) 1: 38 ft x 2, + turns, verbal cues for erect posture, pursed lip breathing  Transfers  Transfer: Yes  Transfer 1  Transfer From 1: Chair with arms to  Transfer to 1: Stand  Technique 1: Sit to stand  Transfer Device 1: Walker  Transfer Level of Assistance 1:  Close supervision  Trials/Comments 1: good hand placement  Transfers 2  Transfer From 2: Stand to  Transfer to 2: Chair with arms  Technique 2: Stand to sit  Transfer Device 2: Walker  Transfer Level of Assistance 2: Close supervision, Minimal verbal cues  Trials/Comments 2: verbal cues for reaching back with both hands for arms of chair    Stairs  Stairs: No    Outcome Measures:  [facility] Basic Mobility  Turning from your back to your side while in a flat bed without using bedrails: A little  Moving from lying on your back to sitting on the side of a flat bed without using bedrails: A little  Moving to and from bed to chair (including a wheelchair): A little  Standing up from a chair using your arms (e.g. wheelchair or bedside chair): A little  To walk in hospital room: A little  Climbing 3-5 steps with railing: A lot  Basic Mobility - Total Score: 17    Education Documentation  Precautions, taught by Jess Milton PT at 12/17/2024 12:09 PM.  Learner: Patient  Readiness: Acceptance  Method: Explanation, Demonstration  Response: Verbalizes Understanding, Needs Reinforcement    Mobility Training, taught by Jess Milton PT at 12/17/2024 12:09 PM.  Learner: Patient  Readiness: Acceptance  Method: Explanation, Demonstration  Response: Verbalizes Understanding, Needs Reinforcement    Education Comments  No comments found.             Encounter Problems       Encounter Problems (Active)       Mobility       Bed mobility including supine to sit and sit to supine independently. (Progressing)       Start:  12/10/24    Expected End:  12/24/24            Ambulate 60 feet with rolling walker and supervision. (Progressing)       Start:  12/10/24    Expected End:  12/24/24            Negotiate 1 curb step with rolling walker and mod assist. (Progressing)       Start:  12/10/24    Expected End:  12/24/24               PT Transfers       Transfers including sit to stand and stand to sit modified independent. (Progressing)        Start:  12/10/24    Expected End:  12/24/24               Pain - Adult

## 2024-12-17 NOTE — CARE PLAN
The patient's goals for the shift include      The clinical goals for the shift include rest    Over the shift, the patient did not make progress toward the following goals. Barriers to progression include . Recommendations to address these barriers include   Problem: Pain - Adult  Goal: Verbalizes/displays adequate comfort level or baseline comfort level  Outcome: Progressing     Problem: Safety - Adult  Goal: Free from fall injury  Outcome: Progressing     Problem: Fall/Injury  Goal: Verbalize understanding of personal risk factors for fall in the hospital  Outcome: Progressing  Goal: Verbalize understanding of risk factor reduction measures to prevent injury from fall in the home  Outcome: Progressing  Goal: Not fall by end of shift  Outcome: Progressing  Goal: Be free from injury by end of the shift  Outcome: Progressing  Goal: Use assistive devices by end of the shift  Outcome: Progressing  Goal: Pace activities to prevent fatigue by end of the shift  Outcome: Progressing     Problem: Respiratory  Goal: Wean oxygen to maintain O2 saturation per order/standard this shift  Outcome: Progressing  Goal: Increase self care and/or family involvement in next 24 hours  Outcome: Progressing  Goal: Minimal/no exertional discomfort or dyspnea this shift  Outcome: Progressing  Goal: No signs of respiratory distress (eg. Use of accessory muscles. Peds grunting)  Outcome: Progressing     Problem: Heart Failure  Goal: Improved gas exchange this shift  Outcome: Progressing  Goal: Improved urinary output this shift  Outcome: Progressing  Goal: Reduction in peripheral edema within 24 hours  Outcome: Progressing  Goal: Report improvement of dyspnea/breathlessness this shift  Outcome: Progressing  Goal: Weight from fluid excess reduced over 2-3 days, then stabilize  Outcome: Progressing  Goal: Increase self care and/or family involvement in 24 hours  Outcome: Progressing   .

## 2024-12-17 NOTE — PROGRESS NOTES
Spiritual Care Visit  Spiritual Care Request    Reason for Visit:  Routine Visit: Follow-up     Request Received From:       Focus of Care:  Visited With: Patient         Refer to :          Spiritual Care Assessment    Spiritual Assessment:                      Care Provided:       Sense of Community and or Anab[employer]ism Affiliation:  [employer]   Values/Beliefs  Spiritual Requests During Hospitalization: Vickie asked for Communion today.     Addressed Needs/Concerns and/or Gianfranco Through:     Sacramental Encounters  Communion: Patient wants communion  Communion Given Indicator: Yes    Outcome:        Advance Directives:         Spiritual Care Annotation    Annotation:  Vickie asked for Communion today.  Uziel Ambriz

## 2024-12-18 ENCOUNTER — PATIENT OUTREACH (OUTPATIENT)
Dept: PRIMARY CARE | Facility: CLINIC | Age: 69
End: 2024-12-18
Payer: MEDICARE

## 2024-12-18 NOTE — PROGRESS NOTES
Discharge Facility: [facility]  Discharge Diagnosis: CHF  Admission Date: 12/8/24  Discharge Date: 12/17/24    PCP Appointment Date: 1/16/25  Specialist Appointment Date: Unknown  Hospital Encounter and Summary Linked: Yes    Two attempts were made to reach patient within two business days after discharge. Voicemail left with contact information for patient to call back with any non-emergent questions or concerns.    Uziel Thompson LPN

## 2024-12-22 NOTE — PROGRESS NOTES
Subjective      Chief Complaint   Patient presents with    Hospital Follow-up          She was recent hospital with complaints of shortness of breath.  She does have her chronic COPD and is on 2 L of oxygen at bedtime she also has her atrial fibrillation.  Is felt for the atrial fibrillation she can continue with rate control.  Is felt she had some lymphedema.  Her bicarb was markedly elevated and she was placed on Diamox she was seen by nephrology.  The legs are still swollen and the cellulitis is being treated.  Her greathig is doing well.  Is sleeping well and no PND or orthropnea.  She sleeps in a chair and the legs are not elevated  No chest discomfort is watching her salt now  They put a zio patch to check for the afib           ROS     Past Surgical History:   Procedure Laterality Date    ABLATION OF DYSRHYTHMIC FOCUS      CARDIAC ELECTROPHYSIOLOGY PROCEDURE N/A 05/01/2024    Procedure: Cardioversion;  Surgeon: Amado Ty MD;  Location: [facility] Cardiac Cath Lab;  Service: Electrophysiology;  Laterality: N/A;  [id number]  i48.0  medical mutual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    CARDIAC ELECTROPHYSIOLOGY PROCEDURE N/A 07/08/2024    Procedure: Ablation A-Fib;  Surgeon: Amado Ty MD;  Location: [facility] Cardiac Cath Lab;  Service: Electrophysiology;  Laterality: N/A;  AFIB CRYOABLATION; CPT 90350; ICD I48.0   Medicare mutual of [place] medicare  ID - [id number]  [phone]  No Auth required  Call ref # [id number]    CARDIAC ELECTROPHYSIOLOGY PROCEDURE N/A 9/18/2024    Procedure: Cardioversion;  Surgeon: Amado Ty MD;  Location: [facility] Cardiac Cath Lab;  Service: Electrophysiology;  Laterality: N/A;  CARDIOVERSION CPT 83145, ICD I48.0 no auth required/REF # [id number]    CARDIOVERSION      05/01/2024        Active Ambulatory Problems     Diagnosis Date Noted    Epistaxis 08/30/2023    Benign essential hypertension 08/30/2023    Chronic gout without tophus 08/30/2023    Chronic hypoxemic respiratory failure (Multi)  08/30/2023    Chronic obstructive lung disease (Multi) 08/30/2023    Diastolic heart failure 08/30/2023    Cardiomegaly 08/30/2023    Coronary arteriosclerosis 08/30/2023    Dyspnea on exertion 08/30/2023    Gastroesophageal reflux disease 08/30/2023    Hypertension 08/30/2023    Hypertensive heart disease without congestive heart failure 08/30/2023    Longstanding persistent atrial fibrillation (Multi) 08/30/2023    Mixed anxiety depressive disorder 08/30/2023    Moderately severe depression 08/30/2023    Obesity with body mass index 30 or greater 08/30/2023    Morbid (severe) obesity due to excess calories (Multi) 08/30/2023    Other chronic pain 08/30/2023    Obstructive sleep apnea syndrome 08/30/2023    Osteoporosis 08/30/2023    Pleural effusion 08/30/2023    Prediabetes 08/30/2023    Primary osteoarthritis of left knee 08/30/2023    Pulmonary hypertension (Multi) 08/30/2023    Lumbar spondylosis 08/30/2023    Thoracic spondylosis 08/30/2023    Asthma 08/30/2023    Arthropathy of both sacroiliac joints 08/30/2023    Anxiety 08/30/2023    Age-related osteoporosis without current pathological fracture 08/30/2023    Acute URI 02/09/2024    PAF (paroxysmal atrial fibrillation) (Multi) 04/16/2024    Meningioma (Multi) 07/18/2024    History of embolic stroke 07/19/2024    Shortness of breath 08/06/2024    Sepsis with acute hypoxic respiratory failure without septic shock, due to unspecified organism (Multi) 10/02/2024    Diastolic CHF, acute on chronic (Multi) 10/08/2024    Acute and chronic respiratory failure with hypoxia (Multi) 10/08/2024    Chronic right shoulder pain 10/22/2024    Long term current use of opiate analgesic 10/22/2024    Primary osteoarthritis of knees, bilateral 10/22/2024    Lumbar radiculopathy 10/22/2024    Hallucinations, visual 11/12/2024    Head injury 11/13/2024    CHF (congestive heart failure), NYHA class I, acute on chronic, combined 12/08/2024    Atrial fib/flutter, transient (Multi)  12/08/2024    Cellulitis 12/08/2024    COPD (chronic obstructive pulmonary disease) (Multi) 12/08/2024    Obesity 12/08/2024     Resolved Ambulatory Problems     Diagnosis Date Noted    Acute congestive heart failure 08/30/2023    Hypokalemia 08/30/2023    Acute on chronic respiratory failure with hypoxia and hypercapnia (Multi) 12/08/2024    Metabolic alkalosis 12/14/2024     Past Medical History:   Diagnosis Date    A-fib (Multi)     CHF (congestive heart failure)     COVID     Heart disease     HTN (hypertension)     Low back pain         Visit Vitals  /74   Pulse 87   Wt 107 kg (236 lb)   SpO2 92%   BMI 43.15 kg/m²   OB Status Postmenopausal   Smoking Status Former   BSA 2.16 m²        Objective     Constitutional:       Appearance: Healthy appearance.   Neck:      Vascular: No JVR.   Pulmonary:      Effort: Pulmonary effort is normal.      Breath sounds: Normal breath sounds.   Cardiovascular:      PMI at left midclavicular line. Normal rate. Regular rhythm. Normal S1. Normal S2.       Murmurs: There is no murmur.      No gallop.  No click. No rub.   Pulses:     Intact distal pulses.   Edema:     Pretibial: bilateral 1+ pitting edema of the pretibial area.     Ankle: bilateral 1+ pitting edema of the ankle.     Feet: bilateral 1+ pitting edema of the feet.  Abdominal:      Palpations: Abdomen is soft.   Musculoskeletal: Normal range of motion. Skin:     General: Skin is warm and dry.   Neurological:      General: No focal deficit present.            Lab Review:         Lab Results   Component Value Date    CHOL 186 07/11/2024    CHOL 203 (H) 09/29/2023    CHOL 179 03/29/2023     Lab Results   Component Value Date    HDL 46.0 (L) 07/11/2024    HDL 45.9 09/29/2023    HDL 43 (L) 03/29/2023     Lab Results   Component Value Date    LDLCALC 119 07/11/2024    LDLCALC 119 03/29/2023    LDLCALC 114 06/16/2021     Lab Results   Component Value Date    TRIG 107 07/11/2024    TRIG 104 09/29/2023    TRIG 84  "03/29/2023     No components found for: \"CHOLHDL\"     Assessment/Plan     Diastolic CHF, acute on chronic (Multi)  She was in the hops and is still fluid overloaded.  The legs have sill swollen and does have some lymphatic problems and some cellulitis.  Will try to add metolazone for 2 days and see if can get rid of the fluid.  Take an extra KCL that day.  She is sleeping in a chair and does not help with the legs and the drainage.  See in 1 month    Hypertension  Is doing alright     "

## 2024-12-23 ENCOUNTER — OFFICE VISIT (OUTPATIENT)
Dept: CARDIOLOGY | Facility: CLINIC | Age: 69
End: 2024-12-23
Payer: MEDICARE

## 2024-12-23 VITALS
BODY MASS INDEX: 43.15 KG/M2 | SYSTOLIC BLOOD PRESSURE: 110 MMHG | WEIGHT: 236 LBS | HEART RATE: 87 BPM | OXYGEN SATURATION: 92 % | DIASTOLIC BLOOD PRESSURE: 74 MMHG

## 2024-12-23 DIAGNOSIS — I10 PRIMARY HYPERTENSION: ICD-10-CM

## 2024-12-23 DIAGNOSIS — I50.33 DIASTOLIC CHF, ACUTE ON CHRONIC (MULTI): Primary | ICD-10-CM

## 2024-12-23 PROCEDURE — 1111F DSCHRG MED/CURRENT MED MERGE: CPT | Performed by: INTERNAL MEDICINE

## 2024-12-23 PROCEDURE — 99214 OFFICE O/P EST MOD 30 MIN: CPT | Performed by: INTERNAL MEDICINE

## 2024-12-23 PROCEDURE — 1126F AMNT PAIN NOTED NONE PRSNT: CPT | Performed by: INTERNAL MEDICINE

## 2024-12-23 PROCEDURE — 1159F MED LIST DOCD IN RCRD: CPT | Performed by: INTERNAL MEDICINE

## 2024-12-23 PROCEDURE — 1036F TOBACCO NON-USER: CPT | Performed by: INTERNAL MEDICINE

## 2024-12-23 PROCEDURE — 3074F SYST BP LT 130 MM HG: CPT | Performed by: INTERNAL MEDICINE

## 2024-12-23 PROCEDURE — 3078F DIAST BP <80 MM HG: CPT | Performed by: INTERNAL MEDICINE

## 2024-12-23 RX ORDER — BISMUTH SUBSALICYLATE 262 MG
1 TABLET,CHEWABLE ORAL DAILY
COMMUNITY

## 2024-12-23 RX ORDER — ASCORBIC ACID 500 MG
500 TABLET ORAL DAILY
COMMUNITY

## 2024-12-23 ASSESSMENT — PATIENT HEALTH QUESTIONNAIRE - PHQ9
SUM OF ALL RESPONSES TO PHQ9 QUESTIONS 1 AND 2: 0
1. LITTLE INTEREST OR PLEASURE IN DOING THINGS: NOT AT ALL
2. FEELING DOWN, DEPRESSED OR HOPELESS: NOT AT ALL
SUM OF ALL RESPONSES TO PHQ9 QUESTIONS 1 AND 2: 0
1. LITTLE INTEREST OR PLEASURE IN DOING THINGS: NOT AT ALL
2. FEELING DOWN, DEPRESSED OR HOPELESS: NOT AT ALL

## 2024-12-23 ASSESSMENT — PAIN SCALES - GENERAL: PAINLEVEL_OUTOF10: 0-NO PAIN

## 2024-12-23 ASSESSMENT — ENCOUNTER SYMPTOMS
LOSS OF SENSATION IN FEET: 0
OCCASIONAL FEELINGS OF UNSTEADINESS: 1
DEPRESSION: 0

## 2024-12-23 NOTE — ASSESSMENT & PLAN NOTE
She was in the hops and is still fluid overloaded.  The legs have sill swollen and does have some lymphatic problems and some cellulitis.  Will try to add metolazone for 2 days and see if can get rid of the fluid.  Take an extra KCL that day.  She is sleeping in a chair and does not help with the legs and the drainage.  See in 1 month

## 2024-12-23 NOTE — PROGRESS NOTES
Called patient. No answer. LM to call us back and left message to follow up with cardiologist ASAP. Will try calling again at later time.

## 2024-12-23 NOTE — PROGRESS NOTES
Looks like patient has apt with cardiology on the 2nd. I left VM to patient to make sure she goes to her appointments.

## 2024-12-24 ENCOUNTER — PATIENT OUTREACH (OUTPATIENT)
Dept: CASE MANAGEMENT | Facility: HOSPITAL | Age: 69
End: 2024-12-24
Payer: MEDICARE

## 2024-12-24 NOTE — PROGRESS NOTES
Post Operative Visit     History of Present Illness   Today I had the pleasure of seeing Mary Wright who underwent left oncoplastic reconstruction with right breast reduction for symmetry on 3/30/21 and returns today for a post operative visit. She is feeling well overall and without complaints. She will be starting radiation therapy soon.    Physical Exam   Vital signs:   Visit Vitals  /79 (BP Location: LUE - Left upper extremity)   Pulse 86   Temp 97.8 °F (36.6 °C) (Temporal)      General appearance: Alert, well appearing, and in no distress.    Skin: Skin color, texture and turgor are overall normal for age.   Focused detailed exam of breasts: bilateral breast soft and in good position, healing well, nipples viable, no hematoma or seroma     Assessment and Recommendations   Mary Wright is a 73 year old female here for a post operative visit. She is doing well. No concerns, FUV 3 months post op or sooner RAYSA Shah MD  Plastic & Reconstructive Surgery     Heart Failure Nurse Navigator Transition of Care Phone Call    The role of the HF nurse navigator is to (1) characterize risk profiles of patients with heart failure transitioning from [id number] after hospitalization, (2) recommend interventions to improve care and reduce risks of worse post-hospitalization outcomes.     HF Nurse Navigator outreach to patient via phone call to review HF education and check on progress. No answer, message left with contact information to return my call.     Mireya KESSLER RN  [facility] Clinical Nurse Navigator, CHF  [phone]

## 2024-12-30 ENCOUNTER — PATIENT OUTREACH (OUTPATIENT)
Dept: CASE MANAGEMENT | Facility: HOSPITAL | Age: 69
End: 2024-12-30
Payer: MEDICARE

## 2024-12-30 ENCOUNTER — APPOINTMENT (OUTPATIENT)
Dept: CARDIOLOGY | Facility: CLINIC | Age: 69
End: 2024-12-30
Payer: MEDICARE

## 2024-12-30 ENCOUNTER — PATIENT OUTREACH (OUTPATIENT)
Dept: PRIMARY CARE | Facility: CLINIC | Age: 69
End: 2024-12-30
Payer: MEDICARE

## 2024-12-30 NOTE — PROGRESS NOTES
Unable to reach patient for call back after recent hospitalization.   LVM with call back number for patient to call if needed   If no voicemail available call attempts x 2 were made to contact the patient to assist with any questions or concerns patient may have.    Uziel Thompson LPN

## 2024-12-30 NOTE — PROGRESS NOTES
Heart Failure Nurse Navigator Transition of Care Phone Call    The role of the HF nurse navigator is to (1) characterize risk profiles of patients with heart failure transitioning from [id number] after hospitalization, (2) recommend interventions to improve care and reduce risks of worse post-hospitalization outcomes.     HF Nurse Navigator outreach to patient via phone call to review HF education and check on progress. No answer, message left with contact information to return my call.     Mireya KESSLER RN  [facility] Clinical Nurse Navigator, CHF  [phone]

## 2025-01-02 ENCOUNTER — APPOINTMENT (OUTPATIENT)
Dept: CARDIOLOGY | Facility: CLINIC | Age: 70
End: 2025-01-02
Payer: MEDICARE

## 2025-01-02 ENCOUNTER — TELEPHONE (OUTPATIENT)
Dept: CARDIOLOGY | Facility: CLINIC | Age: 70
End: 2025-01-02
Payer: MEDICARE

## 2025-01-02 DIAGNOSIS — I48.0 PAF (PAROXYSMAL ATRIAL FIBRILLATION) (MULTI): Primary | ICD-10-CM

## 2025-01-06 ENCOUNTER — APPOINTMENT (OUTPATIENT)
Dept: CARDIOLOGY | Facility: CLINIC | Age: 70
End: 2025-01-06
Payer: MEDICARE

## 2025-01-07 NOTE — PROGRESS NOTES
"[facility] Pain Management  Follow Up Office Visit Note 2025    Patient Information: Vickie Foster, MRN: [id number], : 1955   Primary Care/Referring Physician: Danilo Gonzalez, , [address] / [facility] [address]     Chief Complaint: Left knee pain  Interval History: At her last visit I performed a caudal ADAM    Today she reports 95% improvement from this procedure and is overall pleased. She has been having worsening left neck/shoulder pain recently. This started approximately 2 weeks ago with no obvious inciting event. She has been sleeping sitting up and bending over a pillow on a table because she can't lie flat due to her pulmonary issues. She has focal pain over her left neck/trapezius that worsens with turning her head to the left or \"sitting in the wrong position\". She denies any pain radiating down her arms. She has tried massage from her  and son with only very transient benefit. She has tried Tylenol and aspirin without benefit.     Brief History of Pain: Ms. Vickie Foster is a 69 y.o. female with a PMHx of CHF, pulmonary HTN, AFib (on Eliquis) who presents today for follow up regarding chronic low back/buttock pain.    For reference, she reports pain started approximately 2022 with no obvious inciting event. She has had this type of pain in past, but it is much more severe than normal. Has been to the ED a few times for this, had xrays performed which didn't show any fracture in the spine or hip. During one of these visits she was noted to have worsening LE edema and was admitted for CHF exacerbation. During this admission she continued to have back pain but also developed gout in her right foot. Is having difficulty bearing weight on her right foot, as it worsens both foot pain and causes pain in her right low back and burning pain in her buttock. Has to lie a certain way in bed (moreso on her left side) to get somewhat comfortable. Denies any numbness, tingling in her " legs. She is unsure about weakness as she is not walking much.    Current Pain Medications: Lidocaine patches - tried on both back and foot without benefit.  Previously Tried Pain Medications: Methocarbamol. Unable to use NSAID's due to history of ulcers. Tried BioFreeze without benefit. Gabapentin, Cyclobenzaprine    Relevant Surgeries: Denies hip or back surgery. Denies ankle or foot surgery.  Injections: Left knee injection - 60% pain relief for 8 months. Caudal ADAM - 95% improvement, Right subacromial bursa injection -85% relief. Right SI joint injection - 80% pain relief  Physical/Occupational Therapy: Denies any PT recently    Medications:   Current Outpatient Medications   Medication Instructions    allopurinoL 200 mg, oral, 2 times daily    ascorbic acid (VITAMIN C) 500 mg, Daily    atorvastatin (Lipitor) 40 mg tablet TAKE 1 TABLET(40 MG) BY MOUTH DAILY AT BEDTIME    buPROPion XL (WELLBUTRIN XL) 300 mg, oral, Daily before breakfast    calcium carb-vitamin D3-vit K2 600 mg-1,000 unit-90 mcg tablet Daily    Eliquis 5 mg, oral, 2 times daily    ergocalciferol, vitamin D2, (VITAMIN D2 ORAL) Take by mouth.    fluticasone propion-salmeteroL (Advair Diskus) 100-50 mcg/dose diskus inhaler 1 puff, 2 times daily RT    hydrOXYzine HCL (ATARAX) 10 mg, oral, Nightly PRN    metoprolol succinate XL (TOPROL-XL) 25 mg, oral, Daily    multivitamin tablet 1 tablet, Daily    NON FORMULARY Biotin    oxygen (O2) gas therapy 1 each, inhalation, Every 12 hours    oxygen (O2) gas therapy 1 each, inhalation, Every 12 hours    potassium chloride CR (Klor-Con M20) 20 mEq ER tablet 40 mEq, oral, Daily with breakfast, Do not crush or chew.    spironolactone (ALDACTONE) 25 mg, oral, 2 times daily    torsemide (Demadex) 100 mg tablet Take 1/2 a tablet (50 mg) by mouth 2 times a day.    traMADol (ULTRAM) 50 mg, Every 8 hours PRN      Allergies: No Known Allergies    Past Medical & Surgical History:  Past Medical History:   Diagnosis Date     A-fib (Multi)     Cellulitis     CHF (congestive heart failure)     COVID     Heart disease     HTN (hypertension)     Low back pain       Past Surgical History:   Procedure Laterality Date    ABLATION OF DYSRHYTHMIC FOCUS      CARDIAC ELECTROPHYSIOLOGY PROCEDURE N/A 2024    Procedure: Cardioversion;  Surgeon: Amado yT MD;  Location: [facility] Cardiac Cath Lab;  Service: Electrophysiology;  Laterality: N/A;  53837  i48.0  medical mutual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    CARDIAC ELECTROPHYSIOLOGY PROCEDURE N/A 2024    Procedure: Ablation A-Fib;  Surgeon: Amado Ty MD;  Location: [facility] Cardiac Cath Lab;  Service: Electrophysiology;  Laterality: N/A;  AFIB CRYOABLATION; CPT 73134; ICD I48.0   Medicare mutual of [place] medicare  ID - [id number]  [phone]  No Auth required  Call ref # [id number]    CARDIAC ELECTROPHYSIOLOGY PROCEDURE N/A 2024    Procedure: Cardioversion;  Surgeon: Amado Ty MD;  Location: [facility] Cardiac Cath Lab;  Service: Electrophysiology;  Laterality: N/A;  CARDIOVERSION CPT 84294, ICD I48.0 no auth required/REF # [id number]    CARDIOVERSION      2024       Family History   Problem Relation Name Age of Onset    Breast cancer Mother      Heart attack Mother           in early 80s of MI    Heart disease Mother      Colon cancer Father      Breast cancer Sister      Diabetes type II Sister      Kidney failure Sister      Other (herione addict) Sister      No Known Problems Brother      No Known Problems Daughter      Other (recovering alcoholic) Son      Breast cancer Other Maternal Aunt      Social History     Socioeconomic History    Marital status:      Spouse name: Not on file    Number of children: Not on file    Years of education: Not on file    Highest education level: Not on file   Occupational History    Not on file   Tobacco Use    Smoking status: Former     Current packs/day: 0.00     Types: Cigarettes     Quit date:      Years since quitting:  21.0    Smokeless tobacco: Never   Vaping Use    Vaping status: Never Used   Substance and Sexual Activity    Alcohol use: Not Currently     Comment: monthly or less    Drug use: Never    Sexual activity: Defer   Other Topics Concern    Not on file   Social History Narrative    Not on file     Social Drivers of Health     Financial Resource Strain: Low Risk  (12/9/2024)    Overall Financial Resource Strain (CARDIA)     Difficulty of Paying Living Expenses: Not hard at all   Food Insecurity: No Food Insecurity (12/9/2024)    Hunger Vital Sign     Worried About Running Out of Food in the Last Year: Never true     Ran Out of Food in the Last Year: Never true   Transportation Needs: No Transportation Needs (12/9/2024)    PRAPARE - Transportation     Lack of Transportation (Medical): No     Lack of Transportation (Non-Medical): No   Physical Activity: Inactive (12/9/2024)    Exercise Vital Sign     Days of Exercise per Week: 0 days     Minutes of Exercise per Session: 0 min   Stress: No Stress Concern Present (12/9/2024)    [employer] - Occupational Stress Questionnaire     Feeling of Stress : Not at all   Social Connections: Moderately Integrated (12/9/2024)    Social Connection and Isolation Panel [NHANES]     Frequency of Communication with Friends and Family: More than three times a week     Frequency of Social Gatherings with Friends and Family: More than three times a week     Attends Zoroastrian Services: More than 4 times per year     Active Member of Clubs or Organizations: No     Attends Club or Organization Meetings: Never     Marital Status:    Intimate Partner Violence: Not At Risk (12/9/2024)    Humiliation, Afraid, Rape, and Kick questionnaire     Fear of Current or Ex-Partner: No     Emotionally Abused: No     Physically Abused: No     Sexually Abused: No   Housing Stability: Low Risk  (12/9/2024)    Housing Stability Vital Sign     Unable to Pay for Housing in the Last  "Year: No     Number of Times Moved in the Last Year: 0     Homeless in the Last Year: No       Problems, Past medical history, past surgical history, Medications, allergies, social and family history reviewed and as per the electronic medical record from today's encounter    Review of Systems:  CONST: No fever, chills, fatigue, weight changes  EYES: No loss of vision  ENT: No hearing loss, tinnitus  CV: No chest pain, palpitations  RESP: Reports some dyspnea on exertion  GI: No stool incontinence, nausea, vomiting  : No urinary incontinence  MSK: No joint swelling  SKIN: No rash, no hives  NEURO: No headache, dizziness  PSYCH: No anxiety, depression  HEM/LYMPH: No easy bruising or bleeding  All other systems reviewed are negative     Physical Exam:  Vitals: /65   Pulse 69   Ht 1.575 m (5' 2\")   Wt 107 kg (236 lb)   SpO2 95%   BMI 43.16 kg/m²   General: No apparent distress. Alert, appropriate, oriented x 3. Mood generally positive, affect congruent. Speaking in full sentences.   HENT: Normocephalic, atraumatic. Hearing intact.  Eyes: Pupils equal and round  Neck: Supple, trachea midline  Lungs: Symmetric respiratory excursion on visual exam, nonlabored breathing.   Extremities: No cyanosis noted in extremities. Pitting edema noted in legs up to the shins  Skin: No rashes, lesions noted.  Neck: Reports pain with leftward rotation and left lateral bending. Reports tenderness to palpation of left trapezius, cervical paraspinal, and rhomboid muscles  Neuro: Alert and appropriate. Using a wheelchair to ambulate. Bulk and tone within normal limits.    Laboratory Data:  The following laboratory data were reviewed during this visit:   Lab Results   Component Value Date    WBC 8.8 12/17/2024    RBC 3.96 (L) 12/17/2024    HGB 11.9 (L) 12/17/2024    HCT 37.6 12/17/2024     12/17/2024      Lab Results   Component Value Date    INR 1.0 07/08/2024    INR 1.0 06/05/2023    INR 1.0 12/01/2019     Lab Results "   Component Value Date    CREATININE 0.69 12/17/2024    HGBA1C 5.5 07/11/2024       Imaging:  The following imaging impressions were reviewed by me during this visit:    -5/22/24 right shoulder xray shows moderate to severe glenohumeral joint OA, moderate AC joint OA  -8/2022 lumbar MRI shows L3-L4 moderate to marked spinal canal stenosis and right foraminal stenosis without nerve root impingement. L4-L5 moderate spinal canal stenosis. L5-S1 with no significatn central or foraminal stenosis   -4/2021 left knee xray shows moderate OA  -7/20/23 cervical spine xray shows diffuse, severe degenerative changes    I also personally reviewed the images from the above studies myself. These images and my interpretation of them contributed to the management and decision making of the patient's medical plan.    ASSESSMENT:  Ms. Vickie Foster is a 69 y.o. female with low back, leg, and neck pain that is consistent with:    1. Cervicalgia    2. Myofascial pain    3. Lumbar radiculopathy    4. Primary osteoarthritis of knees, bilateral    5. Kyphosis of cervical region, unspecified kyphosis type            PLAN:  Radiology: -No new diagnostics at this time.     Physically: -Referral for PT provided today to focus on neck posture and myofascial release given apparently progression of cervical kyphosis.     Psychologically: No needs at this time    Medication: -Recommend OTC lidocaine patches applied to her left neck/trapezius    Duration: Greater than 2 month    Intervention: -Reports significant benefit from recent right SI joint injection but continued to have burning buttock pain (R>L) that radiates down her thighs, likely secondary to known lumbar spinal stenosis. She subsequently underwent a caudal ADAM with 95% improvement. Of note, is on Eliquis  - Also reports bilateral knee pain, with prior knee x-rays showing moderate osteoarthritis. She has undergone both left and right knee joint injections with generally 60% pain relief  for 3-4 months. Would consider genicular nerve blocks/RFA in the future  - I suspect her current neck pain is secondary to muscle strain/myofascial pain in the setting of poor posture while sleeping. She sleeps sitting up and bending over a pillow at a table due to difficulty breathing while lying flat. Recommend trying to sleep in a recliner and continue working with your cardiology/pulmonology teams to optimize your breathing. She has significant tenderness to palpation of multiple muscles in her left neck/shoulder. I recommend trigger point injections to 3 or more muscle groups at her next office visit. Risks, benefits, alternatives discussed. She would like to proceed            Sincerely,  Joseph Kelley MD  [facility] - [place]

## 2025-01-08 ENCOUNTER — PATIENT OUTREACH (OUTPATIENT)
Dept: CASE MANAGEMENT | Facility: HOSPITAL | Age: 70
End: 2025-01-08
Payer: MEDICARE

## 2025-01-08 NOTE — PROGRESS NOTES
Heart Failure Nurse Navigator Transition of Care Phone Call    The role of the HF nurse navigator is to (1) characterize risk profiles of patients with heart failure transitioning from [id number]-to-wibfzchvt after hospitalization, (2) recommend interventions to improve care and reduce risks of worse post-hospitalization outcomes.     HF Nurse Navigator outreach to patient via phone call to review HF education and check on progress. No answer, message left with contact information to return my call.     Mireya KESSLER RN  [facility] Clinical Nurse Navigator, CHF  [phone]

## 2025-01-09 ENCOUNTER — OFFICE VISIT (OUTPATIENT)
Dept: PAIN MEDICINE | Facility: CLINIC | Age: 70
End: 2025-01-09
Payer: MEDICARE

## 2025-01-09 ENCOUNTER — OFFICE VISIT (OUTPATIENT)
Dept: CARDIOLOGY | Facility: CLINIC | Age: 70
End: 2025-01-09
Payer: MEDICARE

## 2025-01-09 VITALS
HEIGHT: 62 IN | WEIGHT: 236 LBS | BODY MASS INDEX: 43.43 KG/M2 | OXYGEN SATURATION: 95 % | DIASTOLIC BLOOD PRESSURE: 65 MMHG | SYSTOLIC BLOOD PRESSURE: 101 MMHG | HEART RATE: 69 BPM

## 2025-01-09 DIAGNOSIS — M40.202 KYPHOSIS OF CERVICAL REGION, UNSPECIFIED KYPHOSIS TYPE: ICD-10-CM

## 2025-01-09 DIAGNOSIS — M17.0 PRIMARY OSTEOARTHRITIS OF KNEES, BILATERAL: ICD-10-CM

## 2025-01-09 DIAGNOSIS — M54.2 CERVICALGIA: Primary | ICD-10-CM

## 2025-01-09 DIAGNOSIS — I48.0 PAF (PAROXYSMAL ATRIAL FIBRILLATION) (MULTI): ICD-10-CM

## 2025-01-09 DIAGNOSIS — M54.16 LUMBAR RADICULOPATHY: ICD-10-CM

## 2025-01-09 DIAGNOSIS — I48.0 PAF (PAROXYSMAL ATRIAL FIBRILLATION) (MULTI): Primary | ICD-10-CM

## 2025-01-09 DIAGNOSIS — M79.18 MYOFASCIAL PAIN: ICD-10-CM

## 2025-01-09 PROCEDURE — 99214 OFFICE O/P EST MOD 30 MIN: CPT | Performed by: STUDENT IN AN ORGANIZED HEALTH CARE EDUCATION/TRAINING PROGRAM

## 2025-01-09 PROCEDURE — G2211 COMPLEX E/M VISIT ADD ON: HCPCS | Performed by: INTERNAL MEDICINE

## 2025-01-09 PROCEDURE — 99213 OFFICE O/P EST LOW 20 MIN: CPT | Mod: 25 | Performed by: INTERNAL MEDICINE

## 2025-01-09 PROCEDURE — 1125F AMNT PAIN NOTED PAIN PRSNT: CPT | Performed by: STUDENT IN AN ORGANIZED HEALTH CARE EDUCATION/TRAINING PROGRAM

## 2025-01-09 PROCEDURE — 1036F TOBACCO NON-USER: CPT | Performed by: INTERNAL MEDICINE

## 2025-01-09 PROCEDURE — 1111F DSCHRG MED/CURRENT MED MERGE: CPT | Performed by: INTERNAL MEDICINE

## 2025-01-09 PROCEDURE — 3078F DIAST BP <80 MM HG: CPT | Performed by: STUDENT IN AN ORGANIZED HEALTH CARE EDUCATION/TRAINING PROGRAM

## 2025-01-09 PROCEDURE — 1159F MED LIST DOCD IN RCRD: CPT | Performed by: STUDENT IN AN ORGANIZED HEALTH CARE EDUCATION/TRAINING PROGRAM

## 2025-01-09 PROCEDURE — 99213 OFFICE O/P EST LOW 20 MIN: CPT | Performed by: INTERNAL MEDICINE

## 2025-01-09 PROCEDURE — G2211 COMPLEX E/M VISIT ADD ON: HCPCS | Performed by: STUDENT IN AN ORGANIZED HEALTH CARE EDUCATION/TRAINING PROGRAM

## 2025-01-09 PROCEDURE — 1036F TOBACCO NON-USER: CPT | Performed by: STUDENT IN AN ORGANIZED HEALTH CARE EDUCATION/TRAINING PROGRAM

## 2025-01-09 PROCEDURE — 3074F SYST BP LT 130 MM HG: CPT | Performed by: STUDENT IN AN ORGANIZED HEALTH CARE EDUCATION/TRAINING PROGRAM

## 2025-01-09 PROCEDURE — 3008F BODY MASS INDEX DOCD: CPT | Performed by: STUDENT IN AN ORGANIZED HEALTH CARE EDUCATION/TRAINING PROGRAM

## 2025-01-09 PROCEDURE — 93010 ELECTROCARDIOGRAM REPORT: CPT | Performed by: INTERNAL MEDICINE

## 2025-01-09 PROCEDURE — 93005 ELECTROCARDIOGRAM TRACING: CPT | Performed by: INTERNAL MEDICINE

## 2025-01-09 PROCEDURE — 1159F MED LIST DOCD IN RCRD: CPT | Performed by: INTERNAL MEDICINE

## 2025-01-09 PROCEDURE — 1160F RVW MEDS BY RX/DR IN RCRD: CPT | Performed by: STUDENT IN AN ORGANIZED HEALTH CARE EDUCATION/TRAINING PROGRAM

## 2025-01-09 PROCEDURE — 1111F DSCHRG MED/CURRENT MED MERGE: CPT | Performed by: STUDENT IN AN ORGANIZED HEALTH CARE EDUCATION/TRAINING PROGRAM

## 2025-01-09 RX ORDER — METOPROLOL SUCCINATE 25 MG/1
25 TABLET, EXTENDED RELEASE ORAL DAILY
Qty: 90 TABLET | Refills: 3 | Status: SHIPPED | OUTPATIENT
Start: 2025-01-09

## 2025-01-09 ASSESSMENT — PATIENT HEALTH QUESTIONNAIRE - PHQ9
SUM OF ALL RESPONSES TO PHQ9 QUESTIONS 1 AND 2: 0
2. FEELING DOWN, DEPRESSED OR HOPELESS: NOT AT ALL
1. LITTLE INTEREST OR PLEASURE IN DOING THINGS: NOT AT ALL
2. FEELING DOWN, DEPRESSED OR HOPELESS: NOT AT ALL
SUM OF ALL RESPONSES TO PHQ9 QUESTIONS 1 AND 2: 0
1. LITTLE INTEREST OR PLEASURE IN DOING THINGS: NOT AT ALL

## 2025-01-09 ASSESSMENT — PAIN SCALES - GENERAL
PAINLEVEL_OUTOF10: 7
PAINLEVEL_OUTOF10: 7

## 2025-01-09 ASSESSMENT — PAIN DESCRIPTION - DESCRIPTORS: DESCRIPTORS: ACHING

## 2025-01-09 ASSESSMENT — ENCOUNTER SYMPTOMS
LOSS OF SENSATION IN FEET: 0
OCCASIONAL FEELINGS OF UNSTEADINESS: 0
DEPRESSION: 0

## 2025-01-09 ASSESSMENT — PAIN - FUNCTIONAL ASSESSMENT: PAIN_FUNCTIONAL_ASSESSMENT: 0-10

## 2025-01-09 NOTE — ASSESSMENT & PLAN NOTE
History as above.  The patient can resume her Ozempic.  Continue to take a rate control approach and anticoagulation for persistent atrial fibrillation

## 2025-01-09 NOTE — PROGRESS NOTES
No chief complaint on file.           The patient is well-known to me.  She is a 69-year-old female with a history of hypertension, diastolic heart failure, and persistent atrial fibrillation.  We have attempted on several occasions to restore sinus rhythm with a hybrid approach of catheter-based therapy as well as antiarrhythmic drugs including dofetilide.  However she has severe LA dilatation and continues to be in atrial fibrillation.  During her last hospitalization just a month ago we opted to take a rate control approach.  She is seeing me today in follow post her hospitalization for heart failure.  She continues to slowly and progressively improve and hopes to initiate physical therapy in the coming weeks.         Active Ambulatory Problems     Diagnosis Date Noted    Epistaxis 08/30/2023    Benign essential hypertension 08/30/2023    Chronic gout without tophus 08/30/2023    Chronic hypoxemic respiratory failure (Multi) 08/30/2023    Chronic obstructive lung disease (Multi) 08/30/2023    Diastolic heart failure 08/30/2023    Cardiomegaly 08/30/2023    Coronary arteriosclerosis 08/30/2023    Dyspnea on exertion 08/30/2023    Gastroesophageal reflux disease 08/30/2023    Hypertension 08/30/2023    Hypertensive heart disease without congestive heart failure 08/30/2023    Longstanding persistent atrial fibrillation (Multi) 08/30/2023    Mixed anxiety depressive disorder 08/30/2023    Moderately severe depression 08/30/2023    Obesity with body mass index 30 or greater 08/30/2023    Morbid (severe) obesity due to excess calories (Multi) 08/30/2023    Other chronic pain 08/30/2023    Obstructive sleep apnea syndrome 08/30/2023    Osteoporosis 08/30/2023    Pleural effusion 08/30/2023    Prediabetes 08/30/2023    Primary osteoarthritis of left knee 08/30/2023    Pulmonary hypertension (Multi) 08/30/2023    Lumbar spondylosis 08/30/2023    Thoracic spondylosis 08/30/2023    Asthma 08/30/2023    Arthropathy of both  sacroiliac joints 08/30/2023    Anxiety 08/30/2023    Age-related osteoporosis without current pathological fracture 08/30/2023    Acute URI 02/09/2024    PAF (paroxysmal atrial fibrillation) (Multi) 04/16/2024    Meningioma (Multi) 07/18/2024    History of embolic stroke 07/19/2024    Shortness of breath 08/06/2024    Sepsis with acute hypoxic respiratory failure without septic shock, due to unspecified organism (Multi) 10/02/2024    Diastolic CHF, acute on chronic (Multi) 10/08/2024    Acute and chronic respiratory failure with hypoxia (Multi) 10/08/2024    Chronic right shoulder pain 10/22/2024    Long term current use of opiate analgesic 10/22/2024    Primary osteoarthritis of knees, bilateral 10/22/2024    Lumbar radiculopathy 10/22/2024    Hallucinations, visual 11/12/2024    Head injury 11/13/2024    CHF (congestive heart failure), NYHA class I, acute on chronic, combined 12/08/2024    Atrial fib/flutter, transient (Multi) 12/08/2024    Cellulitis 12/08/2024    COPD (chronic obstructive pulmonary disease) (Multi) 12/08/2024    Obesity 12/08/2024     Resolved Ambulatory Problems     Diagnosis Date Noted    Acute congestive heart failure 08/30/2023    Hypokalemia 08/30/2023    Acute on chronic respiratory failure with hypoxia and hypercapnia (Multi) 12/08/2024    Metabolic alkalosis 12/14/2024     Past Medical History:   Diagnosis Date    A-fib (Multi)     CHF (congestive heart failure)     COVID     Heart disease     HTN (hypertension)     Low back pain         Review of Systems   All other systems reviewed and are negative.       Objective     There were no vitals filed for this visit.     Constitutional:       Appearance: Healthy appearance.   Pulmonary:      Effort: Pulmonary effort is normal.      Breath sounds: Normal breath sounds.   Cardiovascular:      PMI at left midclavicular line. Normal rate. Irregularly irregular rhythm.      Murmurs: There is a grade 1/6 holosystolic murmur.      No gallop.  No  click. No rub.   Pulses:     Intact distal pulses.   Edema:     Thigh: bilateral 3+ edema of the thigh.     Pretibial: bilateral 3+ edema of the pretibial area.     Ankle: bilateral 3+ edema of the ankle.  Abdominal:      General: Bowel sounds are normal.   Musculoskeletal:      Cervical back: Neck supple. Skin:     General: Skin is warm and dry.   Neurological:      General: No focal deficit present.            Lab Review:   No results displayed because visit has over 200 results.      Admission on 11/13/2024, Discharged on 11/13/2024   Component Date Value    Ventricular Rate 11/13/2024 103     QRS Duration 11/13/2024 92     QT Interval 11/13/2024 378     QTC Calculation(Bazett) 11/13/2024 495     R Independence 11/13/2024 26     T Independence 11/13/2024 162     QRS Count 11/13/2024 17     Q Onset 11/13/2024 224     T Offset 11/13/2024 413     QTC Fredericia 11/13/2024 452     BNP 11/13/2024 284 (H)     Troponin I, High Sensiti* 11/13/2024 14 (H)     POCT Glucose 11/13/2024 204 (H)    Admission on 11/12/2024, Discharged on 11/12/2024   Component Date Value    WBC 11/12/2024 13.9 (H)     nRBC 11/12/2024 0.0     RBC 11/12/2024 4.38     Hemoglobin 11/12/2024 13.3     Hematocrit 11/12/2024 41.9     MCV 11/12/2024 96     MCH 11/12/2024 30.4     MCHC 11/12/2024 31.7 (L)     RDW 11/12/2024 14.6 (H)     Platelets 11/12/2024 270     Neutrophils % 11/12/2024 82.9     Immature Granulocytes %,* 11/12/2024 0.4     Lymphocytes % 11/12/2024 9.6     Monocytes % 11/12/2024 6.9     Eosinophils % 11/12/2024 0.1     Basophils % 11/12/2024 0.1     Neutrophils Absolute 11/12/2024 11.53 (H)     Immature Granulocytes Ab* 11/12/2024 0.06     Lymphocytes Absolute 11/12/2024 1.33     Monocytes Absolute 11/12/2024 0.96     Eosinophils Absolute 11/12/2024 0.01     Basophils Absolute 11/12/2024 0.02     Glucose 11/12/2024 108 (H)     Sodium 11/12/2024 144     Potassium 11/12/2024 2.9 (LL)     Chloride 11/12/2024 93 (L)     Bicarbonate 11/12/2024 45 (HH)      Anion Gap 11/12/2024 9 (L)     Urea Nitrogen 11/12/2024 28 (H)     Creatinine 11/12/2024 0.64     eGFR 11/12/2024 >90     Calcium 11/12/2024 9.4     Color, Urine 11/12/2024 Light-Yellow     Appearance, Urine 11/12/2024 Clear     Specific Gravity, Urine 11/12/2024 1.011     pH, Urine 11/12/2024 6.5     Protein, Urine 11/12/2024 NEGATIVE     Glucose, Urine 11/12/2024 Normal     Blood, Urine 11/12/2024 NEGATIVE     Ketones, Urine 11/12/2024 NEGATIVE     Bilirubin, Urine 11/12/2024 NEGATIVE     Urobilinogen, Urine 11/12/2024 Normal     Nitrite, Urine 11/12/2024 NEGATIVE     Leukocyte Esterase, Urine 11/12/2024 NEGATIVE     Extra Tube 11/12/2024 Hold for add-ons.        ECG:  Atrial fibrillation with ventricular response in the 80s QRS duration is 98 ms QTc 470 ms    Assessment/plan:  History as above.  The patient can resume her Ozempic.  Continue to take a rate control approach and anticoagulation for persistent atrial fibrillation    Problem List Items Addressed This Visit       PAF (paroxysmal atrial fibrillation) (Multi) - Primary    Relevant Orders    ECG 12 lead (Clinic Performed)    Basic metabolic panel    Magnesium

## 2025-01-14 ENCOUNTER — TELEPHONE (OUTPATIENT)
Dept: PAIN MEDICINE | Facility: CLINIC | Age: 70
End: 2025-01-14
Payer: MEDICARE

## 2025-01-14 NOTE — TELEPHONE ENCOUNTER
Left vm with patient re scheduling TPI with Dr. Kelley.  TPI can be scheduled with Dr. RAE at any time.  No prior auth req.

## 2025-01-16 ENCOUNTER — APPOINTMENT (OUTPATIENT)
Dept: PRIMARY CARE | Facility: CLINIC | Age: 70
End: 2025-01-16
Payer: MEDICARE

## 2025-01-17 ENCOUNTER — PATIENT OUTREACH (OUTPATIENT)
Dept: CASE MANAGEMENT | Facility: HOSPITAL | Age: 70
End: 2025-01-17
Payer: MEDICARE

## 2025-01-17 NOTE — PROGRESS NOTES
Heart Failure Nurse Navigator Transition of Care Phone Call    The role of the HF nurse navigator is to (1) characterize risk profiles of patients with heart failure transitioning from [id number] after hospitalization, (2) recommend interventions to improve care and reduce risks of worse post-hospitalization outcomes.     Assessment  Call out to patient .     HF Symptoms  Chest pain? No  Shortness of breath? none  Orthopnea? No  Paroxysmal nocturnal dyspnea? No  Edema? No  Weight gain >2lbs in 3 days or 5lbs in 1 week? No    Medications  Is the patient prescribed the following medications?  ACEi/ARB/ARNi? No  BB? Yes  MRA? Yes  SGLT2i? No  Diuretic? Yes  Is the patient adherent to prescribed medications? YES    Management    Is the patient obtaining daily weights? YES  If yes, current weight? 231  Is the patient following diet limitations (2-3g Na, fluid restriction)? YES  Does the patient have a  cardiology follow-up scheduled? YES/Completed  If yes, appointment details? Dr. Sanchez 12/23/24  If no, what is the reason?   Does the patient require HF education reinforcement? No  If yes, what was discussed?     Recommendations/Comments:  Spoke with patient who states she is doing very well. She has been compliant with meds and follow up appts as well as HF education provided to her. She has no issues or questions at this time.    She was DC from [facility] on 12/17/24 with no readmission in 30 days. I am closing her case at this time.     Mireya KESSLER RN  [facility] Clinical Nurse Navigator, CHF  [phone]

## 2025-01-22 ENCOUNTER — TELEPHONE (OUTPATIENT)
Dept: CARDIOLOGY | Facility: CLINIC | Age: 70
End: 2025-01-22
Payer: MEDICARE

## 2025-01-23 ENCOUNTER — LAB (OUTPATIENT)
Dept: LAB | Facility: LAB | Age: 70
End: 2025-01-23
Payer: MEDICARE

## 2025-01-23 DIAGNOSIS — G47.33 OBSTRUCTIVE SLEEP APNEA SYNDROME: ICD-10-CM

## 2025-01-23 DIAGNOSIS — M1A.9XX0 CHRONIC GOUT WITHOUT TOPHUS, UNSPECIFIED CAUSE, UNSPECIFIED SITE: ICD-10-CM

## 2025-01-23 DIAGNOSIS — I50.32 CHRONIC DIASTOLIC (CONGESTIVE) HEART FAILURE: ICD-10-CM

## 2025-01-23 DIAGNOSIS — R06.01 ORTHOPNEA: ICD-10-CM

## 2025-01-23 DIAGNOSIS — J43.1 PANLOBULAR EMPHYSEMA (MULTI): ICD-10-CM

## 2025-01-23 DIAGNOSIS — Z09 HOSPITAL DISCHARGE FOLLOW-UP: ICD-10-CM

## 2025-01-23 DIAGNOSIS — E87.6 HYPOKALEMIA: ICD-10-CM

## 2025-01-23 LAB
ERYTHROCYTE [DISTWIDTH] IN BLOOD BY AUTOMATED COUNT: 16 % (ref 11.5–14.5)
HCT VFR BLD AUTO: 40.6 % (ref 36–46)
HGB BLD-MCNC: 12.6 G/DL (ref 12–16)
MCH RBC QN AUTO: 29.7 PG (ref 26–34)
MCHC RBC AUTO-ENTMCNC: 31 G/DL (ref 32–36)
MCV RBC AUTO: 96 FL (ref 80–100)
NRBC BLD-RTO: 0 /100 WBCS (ref 0–0)
PLATELET # BLD AUTO: 294 X10*3/UL (ref 150–450)
RBC # BLD AUTO: 4.24 X10*6/UL (ref 4–5.2)
WBC # BLD AUTO: 12.2 X10*3/UL (ref 4.4–11.3)

## 2025-01-23 PROCEDURE — 85027 COMPLETE CBC AUTOMATED: CPT

## 2025-01-23 PROCEDURE — 84550 ASSAY OF BLOOD/URIC ACID: CPT

## 2025-01-23 PROCEDURE — 80048 BASIC METABOLIC PNL TOTAL CA: CPT

## 2025-01-23 NOTE — PROGRESS NOTES
Subjective      Chief Complaint   Patient presents with    1 MONTH FOLLOW UP           she has a history of hypertension atrial fibrillation and heart failure with preserved ejection fraction.  Is felt she also has some lymphedema she was seen approximate month ago she was to try metolazone to see if she can be diuresed.  She will start PT soon.  The legs are the same and do not go down much. The cellulitis is better.  She is not walking much and hopefully PT will help.  Her breathing is doing well.  The BMP is alright with the Na 140 the bicarb is still high  She is sleeping alright and is sleeping sitting up   The BP is doing well  She continues with the afib           ROS     Past Surgical History:   Procedure Laterality Date    ABLATION OF DYSRHYTHMIC FOCUS      CARDIAC ELECTROPHYSIOLOGY PROCEDURE N/A 05/01/2024    Procedure: Cardioversion;  Surgeon: Amado Ty MD;  Location: [facility] Cardiac [facility] Lab;  Service: Electrophysiology;  Laterality: N/A;  03813  i48.0  medical mutual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    CARDIAC ELECTROPHYSIOLOGY PROCEDURE N/A 07/08/2024    Procedure: Ablation A-Fib;  Surgeon: Amado Ty MD;  Location: [facility] Cardiac [facility];  Service: Electrophysiology;  Laterality: N/A;  AFIB CRYOABLATION; CPT 76805; ICD I48.0   Medicare mutual of [place] medicare  ID - [id number]  [phone]  No Auth required  Call ref # [id number]    CARDIAC ELECTROPHYSIOLOGY PROCEDURE N/A 9/18/2024    Procedure: Cardioversion;  Surgeon: Amado Ty MD;  Location: [facility] Cardiac Cath [facility];  Service: Electrophysiology;  Laterality: N/A;  CARDIOVERSION CPT 79409, ICD I48.0 no auth required/REF # [id number]    CARDIOVERSION      05/01/2024        Active Ambulatory Problems     Diagnosis Date Noted    Epistaxis 08/30/2023    Benign essential hypertension 08/30/2023    Chronic gout without tophus 08/30/2023    Chronic hypoxemic respiratory failure (Multi) 08/30/2023    Chronic obstructive lung disease (Multi) 08/30/2023     Diastolic heart failure 08/30/2023    Cardiomegaly 08/30/2023    Coronary arteriosclerosis 08/30/2023    Dyspnea on exertion 08/30/2023    Gastroesophageal reflux disease 08/30/2023    Hypertension 08/30/2023    Hypertensive heart disease without congestive heart failure 08/30/2023    Longstanding persistent atrial fibrillation (Multi) 08/30/2023    Mixed anxiety depressive disorder 08/30/2023    Moderately severe depression 08/30/2023    Obesity with body mass index 30 or greater 08/30/2023    Morbid (severe) obesity due to excess calories (Multi) 08/30/2023    Other chronic pain 08/30/2023    Obstructive sleep apnea syndrome 08/30/2023    Osteoporosis 08/30/2023    Pleural effusion 08/30/2023    Prediabetes 08/30/2023    Primary osteoarthritis of left knee 08/30/2023    Pulmonary hypertension (Multi) 08/30/2023    Lumbar spondylosis 08/30/2023    Thoracic spondylosis 08/30/2023    Asthma 08/30/2023    Arthropathy of both sacroiliac joints 08/30/2023    Anxiety 08/30/2023    Age-related osteoporosis without current pathological fracture 08/30/2023    Acute URI 02/09/2024    PAF (paroxysmal atrial fibrillation) (Multi) 04/16/2024    Meningioma (Multi) 07/18/2024    History of embolic stroke 07/19/2024    Shortness of breath 08/06/2024    Sepsis with acute hypoxic respiratory failure without septic shock, due to unspecified organism (Multi) 10/02/2024    Diastolic CHF, acute on chronic (Multi) 10/08/2024    Acute and chronic respiratory failure with hypoxia (Multi) 10/08/2024    Chronic right shoulder pain 10/22/2024    Long term current use of opiate analgesic 10/22/2024    Primary osteoarthritis of knees, bilateral 10/22/2024    Lumbar radiculopathy 10/22/2024    Hallucinations, visual 11/12/2024    Head injury 11/13/2024    CHF (congestive heart failure), NYHA class I, acute on chronic, combined 12/08/2024    Atrial fib/flutter, transient (Multi) 12/08/2024    Cellulitis 12/08/2024    COPD (chronic obstructive  "pulmonary disease) (Multi) 12/08/2024    Obesity 12/08/2024     Resolved Ambulatory Problems     Diagnosis Date Noted    Acute congestive heart failure 08/30/2023    Hypokalemia 08/30/2023    Acute on chronic respiratory failure with hypoxia and hypercapnia (Multi) 12/08/2024    Metabolic alkalosis 12/14/2024     Past Medical History:   Diagnosis Date    A-fib (Multi)     CHF (congestive heart failure)     COVID     Heart disease     HTN (hypertension)     Low back pain         Visit Vitals  /64   Pulse 85   Wt 109 kg (241 lb)   SpO2 93%   BMI 44.08 kg/m²   OB Status Postmenopausal   Smoking Status Former   BSA 2.18 m²        Objective     Constitutional:       Appearance: Healthy appearance.   Neck:      Vascular: No JVR.   Pulmonary:      Effort: Pulmonary effort is normal.      Breath sounds: Normal breath sounds.   Cardiovascular:      PMI at left midclavicular line. Normal rate. Irregularly irregular rhythm. Normal S1. Normal S2.       Murmurs: There is no murmur.      No gallop.  No click. No rub.      Comments: Lymphedema noted  Pulses:     Intact distal pulses.   Abdominal:      Palpations: Abdomen is soft.   Musculoskeletal: Normal range of motion. Skin:     General: Skin is warm and dry.   Neurological:      General: No focal deficit present.            Lab Review:         Lab Results   Component Value Date    CHOL 186 07/11/2024    CHOL 203 (H) 09/29/2023    CHOL 179 03/29/2023     Lab Results   Component Value Date    HDL 46.0 (L) 07/11/2024    HDL 45.9 09/29/2023    HDL 43 (L) 03/29/2023     Lab Results   Component Value Date    LDLCALC 119 07/11/2024    LDLCALC 119 03/29/2023    LDLCALC 114 06/16/2021     Lab Results   Component Value Date    TRIG 107 07/11/2024    TRIG 104 09/29/2023    TRIG 84 03/29/2023     No components found for: \"CHOLHDL\"     Assessment/Plan     Diastolic CHF, acute on chronic (Multi)  She is about the same and the legs are swelling and feel a lot is from lymphedema.  The " Na is doing well.    Hypertension  Is controlled    Longstanding persistent atrial fibrillation (Multi)  The afib is controlled and will be rate controlled

## 2025-01-24 LAB
ANION GAP SERPL CALCULATED.3IONS-SCNC: 15 MMOL/L (ref 10–20)
BUN SERPL-MCNC: 45 MG/DL (ref 6–23)
CALCIUM SERPL-MCNC: 9.5 MG/DL (ref 8.6–10.3)
CHLORIDE SERPL-SCNC: 91 MMOL/L (ref 98–107)
CO2 SERPL-SCNC: 38 MMOL/L (ref 21–32)
CREAT SERPL-MCNC: 0.93 MG/DL (ref 0.5–1.05)
EGFRCR SERPLBLD CKD-EPI 2021: 67 ML/MIN/1.73M*2
GLUCOSE SERPL-MCNC: 77 MG/DL (ref 74–99)
POTASSIUM SERPL-SCNC: 3.5 MMOL/L (ref 3.5–5.3)
SODIUM SERPL-SCNC: 140 MMOL/L (ref 136–145)
URATE SERPL-MCNC: 4.2 MG/DL (ref 2.3–6.7)

## 2025-01-27 ENCOUNTER — OFFICE VISIT (OUTPATIENT)
Dept: CARDIOLOGY | Facility: CLINIC | Age: 70
End: 2025-01-27
Payer: MEDICARE

## 2025-01-27 VITALS
DIASTOLIC BLOOD PRESSURE: 64 MMHG | WEIGHT: 241 LBS | OXYGEN SATURATION: 93 % | SYSTOLIC BLOOD PRESSURE: 101 MMHG | BODY MASS INDEX: 44.08 KG/M2 | HEART RATE: 85 BPM

## 2025-01-27 DIAGNOSIS — I48.11 LONGSTANDING PERSISTENT ATRIAL FIBRILLATION (MULTI): Primary | ICD-10-CM

## 2025-01-27 DIAGNOSIS — I50.33 DIASTOLIC CHF, ACUTE ON CHRONIC (MULTI): ICD-10-CM

## 2025-01-27 DIAGNOSIS — I10 PRIMARY HYPERTENSION: ICD-10-CM

## 2025-01-27 PROCEDURE — 3078F DIAST BP <80 MM HG: CPT | Performed by: INTERNAL MEDICINE

## 2025-01-27 PROCEDURE — 1126F AMNT PAIN NOTED NONE PRSNT: CPT | Performed by: INTERNAL MEDICINE

## 2025-01-27 PROCEDURE — 1159F MED LIST DOCD IN RCRD: CPT | Performed by: INTERNAL MEDICINE

## 2025-01-27 PROCEDURE — 1036F TOBACCO NON-USER: CPT | Performed by: INTERNAL MEDICINE

## 2025-01-27 PROCEDURE — 3074F SYST BP LT 130 MM HG: CPT | Performed by: INTERNAL MEDICINE

## 2025-01-27 PROCEDURE — 99213 OFFICE O/P EST LOW 20 MIN: CPT | Performed by: INTERNAL MEDICINE

## 2025-01-27 ASSESSMENT — ENCOUNTER SYMPTOMS
DEPRESSION: 0
LOSS OF SENSATION IN FEET: 0
OCCASIONAL FEELINGS OF UNSTEADINESS: 1

## 2025-01-27 ASSESSMENT — LIFESTYLE VARIABLES: TOTAL SCORE: 0

## 2025-01-27 ASSESSMENT — PAIN SCALES - GENERAL: PAINLEVEL_OUTOF10: 0-NO PAIN

## 2025-01-27 NOTE — ASSESSMENT & PLAN NOTE
She is about the same and the legs are swelling and feel a lot is from lymphedema.  The Na is doing well.

## 2025-02-06 NOTE — PROGRESS NOTES
Physical Therapy Evaluation    Patient Name: Vickie Foster  MRN: [id number]  Evaluation Date: 2/7/2025  Time Calculation  Start Time: 1430  Stop Time: 1515  Time Calculation (min): 45 min  PT Evaluation Time Entry  PT Evaluation (Moderate) Time Entry: 25     PT Therapeutic Procedures Time Entry  Therapeutic Exercise Time Entry: 12     Insurance Information:  Problem List Items Addressed This Visit    None  Visit Diagnoses         Codes    Cervicalgia    -  Primary M54.2    Relevant Orders    Follow Up In Physical Therapy    Myofascial pain     M79.18    Kyphosis of cervical region, unspecified kyphosis type     M40.202            Subjective   General:  Patient is a 68 yo female with diagnosis of cervicalgia/myofascial pain.  Patient reports onset of symptoms 6 weeks ago.  Patient reports left > right neck/shoulder pain after sleeping in chair secondary to unable to lie flat secondary to pulmonary issues.  Patient denies radicular pain.  Patient recently s/p injection right SI.  Patient to have injection per pain management 2/11.  Patient arrived via w/c.    atient is motivated to participate.       Precautions:  A-fib  Poor supine tolerance secondary to pulmonary issues  Skin cancer    Relevant PMH:  CHF  Pulmonary HTN  A-fib  COPD  CVA  HA  Neuropathy      Pain:  Neck pain  At worst  6/10  Worse with  Cervical rotation  ADLs    At best  0/10  Better with  rest       Home Living:  Home type: House  Stairs: Yes  Lives with: Spouse  Occupation: retired  Hobbies/activities: quilting/cooking    Prior Function Per Pt/Caregiver Report:  Limited secondary to LBP/pulmonary issues       Imaging:  X-ray right shoulder  Fairly advanced osteoarthritis right glenohumeral joint.     Objective   Posture:  FHP- sever  Flexed hips and knees- sleeps sitting at table     Range of Motion:  Cervical AROM Range   Flexion WFL   Extension 50% limited - tightness pain   R rotation 25 degrees   L rotation 40 degrees   R sidebend 15  degrees   L sidebend 20 degrees      Shoulder AROM L R   Flexion 160 deg 90 deg   Extension WNL deg WNL deg   Abduction 120 deg 80 deg   External Rotation 650 deg 50 deg   Internal Rotation 50 deg 40 deg         Strength:  Shoulder MMT L R   Flexion 4-/5 3/5   Extension 4/5 4-/5   Abduction 4-/5 3/5   External Rotation 4-/5 4-/5   Internal Rotation 4-/5 4-/5         Flexibility:  ++FHP     Palpation:  ++tenderness left >> right UT     Special Tests:  SS- ++ right      Gait:  Uses walker when letting dog out  Severly flexed trunk    Transfers:  [facility] for safety    Outcome Measures:  NDI  18/50    Oswestry  26/50    Assessment  Pt is a 69 y.o. female who presents with impairments of neck pain. These impairments have led to functional limitations including ADLs involving cervical AROM. Pt would benefit from skilled physical therapy intervention to improve above impairments and facilitate return to function.    Complexity of Evaluation: low    Based on the history including personal factors and/or comorbidities, examination of body systems including body structures and function, activity limitations, and/or participation restrictions, as well as clinical presentation, patient meets criteria for above complexity evaluation.    Plan  1-2x/week  Up to 10 visits  Therapeutic exercise  Manual       Insurance Plan: Payor: [employer] [facility] MEDICARE / Plan: [employer] [facility] MEDICARE / Product Type: *No Product type* /     Plan for next visit:   Progress HEP  Add manual PRN    OP EDUCATION:  HEP  Postural awareness       Today's Treatment:  Therapeutic Exercise  HEP to be completed daily, exercises include:    Seated  chin tucks  Shoulder pinches  Seated pelvis tilts    Goals:  STG (3 visits)  Patient to be independent with HEP    LTG (10 visits)   NDI to < 36% impaired   Patient to perform ADLs with pain < 3/10   Patient to quilt/cook with pain < 3/10   Cervical AROM left = right

## 2025-02-07 ENCOUNTER — EVALUATION (OUTPATIENT)
Dept: PHYSICAL THERAPY | Facility: CLINIC | Age: 70
End: 2025-02-07
Payer: MEDICARE

## 2025-02-07 DIAGNOSIS — M79.18 MYOFASCIAL PAIN: ICD-10-CM

## 2025-02-07 DIAGNOSIS — M54.2 CERVICALGIA: Primary | ICD-10-CM

## 2025-02-07 DIAGNOSIS — M40.202 KYPHOSIS OF CERVICAL REGION, UNSPECIFIED KYPHOSIS TYPE: ICD-10-CM

## 2025-02-07 PROCEDURE — 97110 THERAPEUTIC EXERCISES: CPT | Mod: GP

## 2025-02-07 PROCEDURE — 97162 PT EVAL MOD COMPLEX 30 MIN: CPT | Mod: GP

## 2025-02-10 NOTE — PROGRESS NOTES
"[facility]  Follow Up Office Visit Note 2025    Patient Information: Vickie Foster, MRN: [id number], : 1955   Primary Care/Referring Physician: Danilo Gonzalez, , [address] / [facility] [address]     Chief Complaint: Left knee pain  Interval History: At her last visit I referred her to PT and planned for TPI's today    Today she reports ***    performed a caudal ADAM    Today she reports 95% improvement from this procedure and is overall pleased. She has been having worsening left neck/shoulder pain recently. This started approximately 2 weeks ago with no obvious inciting event. She has been sleeping sitting up and bending over a pillow on a table because she can't lie flat due to her pulmonary issues. She has focal pain over her left neck/trapezius that worsens with turning her head to the left or \"sitting in the wrong position\". She denies any pain radiating down her arms. She has tried massage from her  and son with only very transient benefit. She has tried Tylenol and aspirin without benefit.     Brief History of Pain: Ms. Vickie Foster is a 69 y.o. female with a PMHx of CHF, pulmonary HTN, AFib (on Eliquis) who presents today for follow up regarding chronic low back/buttock pain.    For reference, she reports pain started approximately 2022 with no obvious inciting event. She has had this type of pain in past, but it is much more severe than normal. Has been to the ED a few times for this, had xrays performed which didn't show any fracture in the spine or hip. During one of these visits she was noted to have worsening LE edema and was admitted for CHF exacerbation. During this admission she continued to have back pain but also developed gout in her right foot. Is having difficulty bearing weight on her right foot, as it worsens both foot pain and causes pain in her right low back and burning pain in her buttock. Has to lie a certain way in bed (moreso on her " left side) to get somewhat comfortable. Denies any numbness, tingling in her legs. She is unsure about weakness as she is not walking much.    Current Pain Medications: Lidocaine patches - tried on both back and foot without benefit.  Previously Tried Pain Medications: Methocarbamol. Unable to use NSAID's due to history of ulcers. Tried BioFreeze without benefit. Gabapentin, Cyclobenzaprine    Relevant Surgeries: Denies hip or back surgery. Denies ankle or foot surgery.  Injections: Left knee injection - 60% pain relief for 8 months. Caudal ADAM - 95% improvement, Right subacromial bursa injection -85% relief. Right SI joint injection - 80% pain relief  Physical/Occupational Therapy: Denies any PT recently    Medications:   Current Outpatient Medications   Medication Instructions    allopurinoL 200 mg, oral, 2 times daily    ascorbic acid (VITAMIN C) 500 mg, Daily    atorvastatin (Lipitor) 40 mg tablet TAKE 1 TABLET(40 MG) BY MOUTH DAILY AT BEDTIME    buPROPion XL (WELLBUTRIN XL) 300 mg, oral, Daily before breakfast    Eliquis 5 mg, oral, 2 times daily    ergocalciferol, vitamin D2, (VITAMIN D2 ORAL) Take by mouth.    fluticasone propion-salmeteroL (Advair Diskus) 100-50 mcg/dose diskus inhaler 1 puff, 2 times daily RT    hydrOXYzine HCL (ATARAX) 10 mg, oral, Nightly PRN    metoprolol succinate XL (TOPROL-XL) 25 mg, oral, Daily    multivitamin tablet 1 tablet, Daily    NON FORMULARY Biotin    oxygen (O2) gas therapy 1 each, inhalation, Every 12 hours    spironolactone (ALDACTONE) 25 mg, oral, 2 times daily    torsemide (Demadex) 100 mg tablet Take 1/2 a tablet (50 mg) by mouth 2 times a day.    traMADol (ULTRAM) 50 mg, Every 8 hours PRN      Allergies: No Known Allergies    Past Medical & Surgical History:  Past Medical History:   Diagnosis Date    A-fib (Multi)     Cellulitis     CHF (congestive heart failure)     COVID     Heart disease     HTN (hypertension)     Low back pain       Past Surgical History:    Procedure Laterality Date    ABLATION OF DYSRHYTHMIC FOCUS      CARDIAC ELECTROPHYSIOLOGY PROCEDURE N/A 2024    Procedure: Cardioversion;  Surgeon: Amado Ty MD;  Location: [facility] Cardiac Cath Lab;  Service: Electrophysiology;  Laterality: N/A;  85844  i48.0  medical mutual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    CARDIAC ELECTROPHYSIOLOGY PROCEDURE N/A 2024    Procedure: Ablation A-Fib;  Surgeon: Amado Ty MD;  Location: [facility] Cardiac [facility];  Service: Electrophysiology;  Laterality: N/A;  AFIB CRYOABLATION; CPT 48800; ICD I48.0   Medicare mutual of [place] medicare  ID - [id number]  [phone]  No Auth required  Call ref # [id number]    CARDIAC ELECTROPHYSIOLOGY PROCEDURE N/A 2024    Procedure: Cardioversion;  Surgeon: Amado Ty MD;  Location: [facility] Cardiac [facility];  Service: Electrophysiology;  Laterality: N/A;  CARDIOVERSION CPT 94223, ICD I48.0 no auth required/REF # [id number]    CARDIOVERSION      2024       Family History   Problem Relation Name Age of Onset    Breast cancer Mother      Heart attack Mother           in early 80s of MI    Heart disease Mother      Colon cancer Father      Breast cancer Sister      Diabetes type II Sister      Kidney failure Sister      Other (herione addict) Sister      No Known Problems Brother      No Known Problems Daughter      Other (recovering alcoholic) Son      Breast cancer Other Maternal Aunt      Social History     Socioeconomic History    Marital status:      Spouse name: Not on file    Number of children: Not on file    Years of education: Not on file    Highest education level: Not on file   Occupational History    Not on file   Tobacco Use    Smoking status: Former     Current packs/day: 0.00     Types: Cigarettes     Quit date:      Years since quittin.1    Smokeless tobacco: Never   Vaping Use    Vaping status: Never Used   Substance and Sexual Activity    Alcohol use: Not Currently     Comment: monthly or  less    Drug use: Never    Sexual activity: Defer   Other Topics Concern    Not on file   Social History Narrative    Not on file     Social Drivers of Health     Financial Resource Strain: Low Risk  (12/9/2024)    Overall Financial Resource Strain (CARDIA)     Difficulty of Paying Living Expenses: Not hard at all   Food Insecurity: No Food Insecurity (12/9/2024)    Hunger Vital Sign     Worried About Running Out of Food in the Last Year: Never true     Ran Out of Food in the Last Year: Never true   Transportation Needs: No Transportation Needs (12/9/2024)    PRAPARE - Transportation     Lack of Transportation (Medical): No     Lack of Transportation (Non-Medical): No   Physical Activity: Inactive (12/9/2024)    Exercise Vital Sign     Days of Exercise per Week: 0 days     Minutes of Exercise per Session: 0 min   Stress: No Stress Concern Present (12/9/2024)    [employer] [place] [employer] - Occupational Stress Questionnaire     Feeling of Stress : Not at all   Social Connections: Moderately Integrated (12/9/2024)    Social Connection and Isolation Panel [NHANES]     Frequency of Communication with Friends and Family: More than three times a week     Frequency of Social Gatherings with Friends and Family: More than three times a week     Attends Catholic Services: More than 4 times per year     Active Member of Clubs or Organizations: No     Attends Club or Organization Meetings: Never     Marital Status:    Intimate Partner Violence: Not At Risk (12/9/2024)    Humiliation, Afraid, Rape, and Kick questionnaire     Fear of Current or Ex-Partner: No     Emotionally Abused: No     Physically Abused: No     Sexually Abused: No   Housing Stability: Low Risk  (12/9/2024)    Housing Stability Vital Sign     Unable to Pay for Housing in the Last Year: No     Number of Times Moved in the Last Year: 0     Homeless in the Last Year: No       Problems, Past medical history, past surgical history,  Medications, allergies, social and family history reviewed and as per the electronic medical record from today's encounter    Review of Systems:  CONST: No fever, chills, fatigue, weight changes  EYES: No loss of vision  ENT: No hearing loss, tinnitus  CV: No chest pain, palpitations  RESP: Reports some dyspnea on exertion  GI: No stool incontinence, nausea, vomiting  : No urinary incontinence  MSK: No joint swelling  SKIN: No rash, no hives  NEURO: No headache, dizziness  PSYCH: No anxiety, depression  HEM/LYMPH: No easy bruising or bleeding  All other systems reviewed are negative     Physical Exam:  Vitals: There were no vitals taken for this visit.  General: No apparent distress. Alert, appropriate, oriented x 3. Mood generally positive, affect congruent. Speaking in full sentences.   HENT: Normocephalic, atraumatic. Hearing intact.  Eyes: Pupils equal and round  Neck: Supple, trachea midline  Lungs: Symmetric respiratory excursion on visual exam, nonlabored breathing.   Extremities: No cyanosis noted in extremities. Pitting edema noted in legs up to the shins  Skin: No rashes, lesions noted.  Neck: Reports pain with leftward rotation and left lateral bending. Reports tenderness to palpation of left trapezius, cervical paraspinal, and rhomboid muscles  Neuro: Alert and appropriate. Using a wheelchair to ambulate. Bulk and tone within normal limits.    Laboratory Data:  The following laboratory data were reviewed during this visit:   Lab Results   Component Value Date    WBC 12.2 (H) 01/23/2025    RBC 4.24 01/23/2025    HGB 12.6 01/23/2025    HCT 40.6 01/23/2025     01/23/2025      Lab Results   Component Value Date    INR 1.0 07/08/2024    INR 1.0 06/05/2023    INR 1.0 12/01/2019     Lab Results   Component Value Date    CREATININE 0.93 01/23/2025    HGBA1C 5.5 07/11/2024       Imaging:  The following imaging impressions were reviewed by me during this visit:    -5/22/24 right shoulder xray shows  moderate to severe glenohumeral joint OA, moderate AC joint OA  -8/2022 lumbar MRI shows L3-L4 moderate to marked spinal canal stenosis and right foraminal stenosis without nerve root impingement. L4-L5 moderate spinal canal stenosis. L5-S1 with no significatn central or foraminal stenosis   -4/2021 left knee xray shows moderate OA  -7/20/23 cervical spine xray shows diffuse, severe degenerative changes    I also personally reviewed the images from the above studies myself. These images and my interpretation of them contributed to the management and decision making of the patient's medical plan.    ASSESSMENT:  Ms. Vickie Foster is a 69 y.o. female with low back, leg, and neck pain that is consistent with:    No diagnosis found.          PLAN:  Radiology: -No new diagnostics at this time.     Physically: -Referral for PT provided today to focus on neck posture and myofascial release given apparent progression of cervical kyphosis.     Psychologically: No needs at this time    Medication: -Recommend OTC lidocaine patches applied to her left neck/trapezius    Duration: Greater than 2 month    Intervention: -Reports significant benefit from recent right SI joint injection but continued to have burning buttock pain (R>L) that radiates down her thighs, likely secondary to known lumbar spinal stenosis. She subsequently underwent a caudal ADAM with 95% improvement. Of note, is on Eliquis  - Also reports bilateral knee pain, with prior knee x-rays showing moderate osteoarthritis. She has undergone both left and right knee joint injections with generally 60% pain relief for 3-4 months. Would consider genicular nerve blocks/RFA in the future  - I suspect her current neck pain is secondary to muscle strain/myofascial pain in the setting of poor posture while sleeping. She sleeps sitting up and bending over a pillow at a table due to difficulty breathing while lying flat. Recommend trying to sleep in a recliner and continue  working with your cardiology/pulmonology teams to optimize your breathing. She has significant tenderness to palpation of multiple muscles in her left neck/shoulder. I recommend trigger point injections to 3 or more muscle groups at her next office visit. Risks, benefits, alternatives discussed. She would like to proceed            Sincerely,  Joseph Kelley MD  [facility] [place]

## 2025-02-11 ENCOUNTER — APPOINTMENT (OUTPATIENT)
Dept: PAIN MEDICINE | Facility: CLINIC | Age: 70
End: 2025-02-11
Payer: MEDICARE

## 2025-02-13 ENCOUNTER — APPOINTMENT (OUTPATIENT)
Dept: PHYSICAL THERAPY | Facility: CLINIC | Age: 70
End: 2025-02-13
Payer: MEDICARE

## 2025-02-17 NOTE — PROGRESS NOTES
"    Physical Therapy Treatment    Patient Name: Vickie Foster  MRN: [id number]  Encounter date:  2/20/2025  Time Calculation  Start Time: 1645  Stop Time: 1523  Time Calculation (min): 1358 min     PT Therapeutic Procedures Time Entry  Manual Therapy Time Entry: 10  Therapeutic Exercise Time Entry: 28       Visit Number:  2 (including evaluation)  Planned total visits: 10  Visits Authorized/Insurance Coverage:  NO AUTH, 30.00 CO PAY, [place]% [place], [address] OOP, M[place]    Plan for next visit:   Progress HEP  Add manual PRN    Current Problem  Problem List Items Addressed This Visit    None  Visit Diagnoses         Codes    Cervicalgia     M54.2          Precautions   A-fib  Poor supine tolerance secondary to pulmonary issues  Skin cancer     Relevant PMH:  CHF  Pulmonary HTN  A-fib  COPD  CVA  HA  Neuropathy    Pain   7/10 R shoulder and R side of neck     Subjective  General  \"I am getting an injection from Dr. Arrieta in the neck.\"     Objective  Forward head and rounded shoulders     Treatment:  Poor supine tolerance secondary to pulmonary issues    Assisted patient back to PT via wheel chair   Pt stood to doff coat    Therapeutic exercises  - seated edge of w/c   Cervical rotation x 10   Cervical flex/ext x 10  Shoulder pinches x 5 - became painful R shoulder     Cervical lateral flexion x 10    Seated supported    pelvis tilts x 10 - visual and verbal cues for form   Cervical retraction x 10   AAROM with cane  - chest press 2x5   - flexion to 90 degrees 2x5     Manual   Gentle STM over sweat shirt right upper trap, right proximal UE, cervical paraspinal    Current HEP:  HEP to be completed daily, exercises include:  Access Code: [email or web address]  URL: [email or web address]  Date: 02/20/2025  Prepared by: Smita Lima    Exercises  - Seated Posterior Pelvic Tilt  - 1 x daily - 5 x weekly - 1-2 sets - 10 reps  - Seated Cervical Retraction  - 1 x daily - 5 x weekly - 1-2 sets - 10 reps  - Seated Scapular Retraction  - 1 " "x daily - 5 x weekly - 1-2 sets - 10 reps  - Seated Cervical Rotation AROM  - 1 x daily - 5 x weekly - 1-2 sets - 10 reps  - Seated Cervical Sidebending AROM  - 1 x daily - 5 x weekly - 1-2 sets - 10 reps    Has patient been compliant with HEP? Yes - twice a day       OP EDUCATION:       Assessment:   HEP updated, instructed and handouts issued. Low repetitions for most exercises instructed.   Pt's response to treatment:  Good   Areas of improvements:  Pain reduction  Limitations/deficits:  Postural weakness, limited cervical ROM    Pain end of session: \"Nothing, you rubbed it out.\"     Plan:     Continue with current POC/no changes  1-2x/week  Up to 10 visits  Therapeutic exercise  Manua    Assessment of current progress against goals:  Insufficient treatment time to assess progress    Goals:  STG (3 visits)  Patient to be independent with HEP     LTG (10 visits)   NDI to < 36% impaired   Patient to perform ADLs with pain < 3/10   Patient to quilt/cook with pain < 3/10   Cervical AROM left = right     "

## 2025-02-20 ENCOUNTER — TELEPHONE (OUTPATIENT)
Dept: OPHTHALMOLOGY | Facility: CLINIC | Age: 70
End: 2025-02-20
Payer: MEDICARE

## 2025-02-20 ENCOUNTER — TREATMENT (OUTPATIENT)
Dept: PHYSICAL THERAPY | Facility: CLINIC | Age: 70
End: 2025-02-20
Payer: MEDICARE

## 2025-02-20 DIAGNOSIS — M54.2 CERVICALGIA: ICD-10-CM

## 2025-02-20 PROCEDURE — 97110 THERAPEUTIC EXERCISES: CPT | Mod: GP,CQ

## 2025-02-20 PROCEDURE — 97140 MANUAL THERAPY 1/> REGIONS: CPT | Mod: GP,CQ

## 2025-02-21 ENCOUNTER — OFFICE VISIT (OUTPATIENT)
Dept: PAIN MEDICINE | Facility: CLINIC | Age: 70
End: 2025-02-21
Payer: MEDICARE

## 2025-02-21 VITALS — HEART RATE: 89 BPM | DIASTOLIC BLOOD PRESSURE: 70 MMHG | OXYGEN SATURATION: 97 % | SYSTOLIC BLOOD PRESSURE: 122 MMHG

## 2025-02-21 DIAGNOSIS — M75.51 SUBACROMIAL BURSITIS OF RIGHT SHOULDER JOINT: Primary | ICD-10-CM

## 2025-02-21 DIAGNOSIS — M79.18 MYOFASCIAL PAIN: ICD-10-CM

## 2025-02-21 PROCEDURE — 99214 OFFICE O/P EST MOD 30 MIN: CPT | Performed by: STUDENT IN AN ORGANIZED HEALTH CARE EDUCATION/TRAINING PROGRAM

## 2025-02-21 PROCEDURE — 96372 THER/PROPH/DIAG INJ SC/IM: CPT | Performed by: STUDENT IN AN ORGANIZED HEALTH CARE EDUCATION/TRAINING PROGRAM

## 2025-02-21 PROCEDURE — 2500000004 HC RX 250 GENERAL PHARMACY W/ HCPCS (ALT 636 FOR OP/ED): Performed by: STUDENT IN AN ORGANIZED HEALTH CARE EDUCATION/TRAINING PROGRAM

## 2025-02-21 PROCEDURE — 20553 NJX 1/MLT TRIGGER POINTS 3/>: CPT | Performed by: STUDENT IN AN ORGANIZED HEALTH CARE EDUCATION/TRAINING PROGRAM

## 2025-02-21 RX ORDER — TRIAMCINOLONE ACETONIDE 40 MG/ML
40 INJECTION, SUSPENSION INTRA-ARTICULAR; INTRAMUSCULAR ONCE
Status: COMPLETED | OUTPATIENT
Start: 2025-02-21 | End: 2025-02-21

## 2025-02-21 RX ORDER — BUPIVACAINE HYDROCHLORIDE 7.5 MG/ML
2.5 INJECTION, SOLUTION EPIDURAL; RETROBULBAR ONCE
Status: COMPLETED | OUTPATIENT
Start: 2025-02-21 | End: 2025-02-21

## 2025-02-21 RX ORDER — TRIAMCINOLONE ACETONIDE 40 MG/ML
10 INJECTION, SUSPENSION INTRA-ARTICULAR; INTRAMUSCULAR ONCE
Status: COMPLETED | OUTPATIENT
Start: 2025-02-21 | End: 2025-02-21

## 2025-02-21 RX ORDER — ROPIVACAINE HYDROCHLORIDE 2 MG/ML
3 INJECTION, SOLUTION EPIDURAL; INFILTRATION; PERINEURAL ONCE
Status: COMPLETED | OUTPATIENT
Start: 2025-02-21 | End: 2025-02-21

## 2025-02-21 RX ADMIN — TRIAMCINOLONE ACETONIDE 10 MG: 40 INJECTION, SUSPENSION INTRA-ARTICULAR; INTRAMUSCULAR at 16:12

## 2025-02-21 RX ADMIN — TRIAMCINOLONE ACETONIDE 40 MG: 40 INJECTION, SUSPENSION INTRA-ARTICULAR; INTRAMUSCULAR at 16:14

## 2025-02-21 RX ADMIN — ROPIVACAINE HYDROCHLORIDE 6 MG: 2 INJECTION, SOLUTION EPIDURAL; INFILTRATION at 16:12

## 2025-02-21 RX ADMIN — BUPIVACAINE HYDROCHLORIDE 18.75 MG: 7.5 INJECTION, SOLUTION EPIDURAL; RETROBULBAR at 16:11

## 2025-02-21 ASSESSMENT — PAIN - FUNCTIONAL ASSESSMENT: PAIN_FUNCTIONAL_ASSESSMENT: 0-10

## 2025-02-21 ASSESSMENT — ENCOUNTER SYMPTOMS
OCCASIONAL FEELINGS OF UNSTEADINESS: 0
LOSS OF SENSATION IN FEET: 0
DEPRESSION: 0

## 2025-02-21 ASSESSMENT — PAIN DESCRIPTION - DESCRIPTORS: DESCRIPTORS: ACHING

## 2025-02-21 ASSESSMENT — PAIN SCALES - GENERAL
PAINLEVEL_OUTOF10: 10 - WORST POSSIBLE PAIN
PAINLEVEL_OUTOF10: 10-WORST PAIN EVER

## 2025-02-21 NOTE — PROGRESS NOTES
[facility]  Follow Up Office Visit Note 2025    Patient Information: Vickie Foster, MRN: [id number], : 1955   Primary Care/Referring Physician: Jamison Maurice DO, [address] / [place] [address]     Chief Complaint: Left knee pain  Interval History: At her last visit I referred her to PT and planned for TPI's today    Today she reports doing slightly better since last seen. She has been doing PT with some benefit, but continues to have left neck/trapezius pain as well as worsening right shoulder pain. She is interested in repeating the right subacromial bursa injection today as well    Brief History of Pain: Ms. Vickie Foster is a 69 y.o. female with a PMHx of CHF, pulmonary HTN, AFib (on Eliquis) who presents today for follow up regarding chronic low back/buttock pain.    For reference, she reports pain started approximately 2022 with no obvious inciting event. She has had this type of pain in past, but it is much more severe than normal. Has been to the ED a few times for this, had xrays performed which didn't show any fracture in the spine or hip. During one of these visits she was noted to have worsening LE edema and was admitted for CHF exacerbation. During this admission she continued to have back pain but also developed gout in her right foot. Is having difficulty bearing weight on her right foot, as it worsens both foot pain and causes pain in her right low back and burning pain in her buttock. Has to lie a certain way in bed (moreso on her left side) to get somewhat comfortable. Denies any numbness, tingling in her legs. She is unsure about weakness as she is not walking much.    Current Pain Medications: Lidocaine patches - tried on both back and foot without benefit.  Previously Tried Pain Medications: Methocarbamol. Unable to use NSAID's due to history of ulcers. Tried BioFreeze without benefit. Gabapentin, Cyclobenzaprine    Relevant Surgeries: Denies hip or back surgery.  Denies ankle or foot surgery.  Injections: Left knee injection - 60% pain relief for 8 months. Caudal ADAM - 95% improvement, Right subacromial bursa injection -85% relief. Right SI joint injection - 80% pain relief  Physical/Occupational Therapy: Denies any PT recently    Medications:   Current Outpatient Medications   Medication Instructions    allopurinoL 200 mg, oral, 2 times daily    ascorbic acid (VITAMIN C) 500 mg, Daily    atorvastatin (Lipitor) 40 mg tablet TAKE 1 TABLET(40 MG) BY MOUTH DAILY AT BEDTIME    buPROPion XL (WELLBUTRIN XL) 300 mg, oral, Daily before breakfast    Eliquis 5 mg, oral, 2 times daily    ergocalciferol, vitamin D2, (VITAMIN D2 ORAL) Take by mouth.    fluticasone propion-salmeteroL (Advair Diskus) 100-50 mcg/dose diskus inhaler 1 puff, 2 times daily RT    hydrOXYzine HCL (ATARAX) 10 mg, oral, Nightly PRN    metoprolol succinate XL (TOPROL-XL) 25 mg, oral, Daily    multivitamin tablet 1 tablet, Daily    NON FORMULARY Biotin    oxygen (O2) gas therapy 1 each, inhalation, Every 12 hours    spironolactone (ALDACTONE) 25 mg, oral, 2 times daily    torsemide (Demadex) 100 mg tablet Take 1/2 a tablet (50 mg) by mouth 2 times a day.    traMADol (ULTRAM) 50 mg, Every 8 hours PRN      Allergies: No Known Allergies    Past Medical & Surgical History:  Past Medical History:   Diagnosis Date    A-fib (Multi)     Cellulitis     CHF (congestive heart failure)     COVID     Heart disease     HTN (hypertension)     Low back pain       Past Surgical History:   Procedure Laterality Date    ABLATION OF DYSRHYTHMIC FOCUS      CARDIAC ELECTROPHYSIOLOGY PROCEDURE N/A 05/01/2024    Procedure: Cardioversion;  Surgeon: Amado Ty MD;  Location: [facility] Cardiac Cath Lab;  Service: Electrophysiology;  Laterality: N/A;  18666  i48.0  medical mutual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    CARDIAC ELECTROPHYSIOLOGY PROCEDURE N/A 07/08/2024    Procedure: Ablation A-Fib;  Surgeon: Amado Ty MD;  Location: [facility] Cardiac Cath Lab;   Service: Electrophysiology;  Laterality: N/A;  AFIB CRYOABLATION; CPT 88338; ICD I48.0   Medicare mutual of [place] medicare  ID - [id number]  [phone]  No Auth required  Call ref # [id number]    CARDIAC ELECTROPHYSIOLOGY PROCEDURE N/A 2024    Procedure: Cardioversion;  Surgeon: Amado Ty MD;  Location: [facility] Cardiac Cath Lab;  Service: Electrophysiology;  Laterality: N/A;  CARDIOVERSION CPT 50738, ICD I48.0 no auth required/REF # [id number]    CARDIOVERSION      2024       Family History   Problem Relation Name Age of Onset    Breast cancer Mother      Heart attack Mother           in early 80s of MI    Heart disease Mother      Colon cancer Father      Breast cancer Sister      Diabetes type II Sister      Kidney failure Sister      Other (herione addict) Sister      No Known Problems Brother      No Known Problems Daughter      Other (recovering alcoholic) Son      Breast cancer Other Maternal Aunt      Social History     Socioeconomic History    Marital status:      Spouse name: Not on file    Number of children: Not on file    Years of education: Not on file    Highest education level: Not on file   Occupational History    Not on file   Tobacco Use    Smoking status: Former     Current packs/day: 0.00     Types: Cigarettes     Quit date:      Years since quittin.1    Smokeless tobacco: Never   Vaping Use    Vaping status: Never Used   Substance and Sexual Activity    Alcohol use: Not Currently     Comment: monthly or less    Drug use: Never    Sexual activity: Defer   Other Topics Concern    Not on file   Social History Narrative    Not on file     Social Drivers of Health     Financial Resource Strain: Low Risk  (2024)    Overall Financial Resource Strain (CARDIA)     Difficulty of Paying Living Expenses: Not hard at all   Food Insecurity: No Food Insecurity (2024)    Hunger Vital Sign     Worried About Running Out of Food in the Last Year: Never true      Ran Out of Food in the Last Year: Never true   Transportation Needs: No Transportation Needs (12/9/2024)    PRAPARE - Transportation     Lack of Transportation (Medical): No     Lack of Transportation (Non-Medical): No   Physical Activity: Inactive (12/9/2024)    Exercise Vital Sign     Days of Exercise per Week: 0 days     Minutes of Exercise per Session: 0 min   Stress: No Stress Concern Present (12/9/2024)    [employer] - Occupational Stress Questionnaire     Feeling of Stress : Not at all   Social Connections: Moderately Integrated (12/9/2024)    Social Connection and Isolation Panel [NHANES]     Frequency of Communication with Friends and Family: More than three times a week     Frequency of Social Gatherings with Friends and Family: More than three times a week     Attends Temple Services: More than 4 times per year     Active Member of Clubs or Organizations: No     Attends Club or Organization Meetings: Never     Marital Status:    Intimate Partner Violence: Not At Risk (12/9/2024)    Humiliation, Afraid, Rape, and Kick questionnaire     Fear of Current or Ex-Partner: No     Emotionally Abused: No     Physically Abused: No     Sexually Abused: No   Housing Stability: Low Risk  (12/9/2024)    Housing Stability Vital Sign     Unable to Pay for Housing in the Last Year: No     Number of Times Moved in the Last Year: 0     Homeless in the Last Year: No       Problems, Past medical history, past surgical history, Medications, allergies, social and family history reviewed and as per the electronic medical record from today's encounter    Review of Systems:  CONST: No fever, chills, fatigue, weight changes  EYES: No loss of vision  ENT: No hearing loss, tinnitus  CV: No chest pain, palpitations  RESP: Reports some dyspnea on exertion  GI: No stool incontinence, nausea, vomiting  : No urinary incontinence  MSK: No joint swelling  SKIN: No rash, no hives  NEURO: No headache,  dizziness  PSYCH: No anxiety, depression  HEM/LYMPH: No easy bruising or bleeding  All other systems reviewed are negative     Physical Exam:  Vitals: /70   Pulse 89   SpO2 97%   General: No apparent distress. Alert, appropriate, oriented x 3. Mood generally positive, affect congruent. Speaking in full sentences.   HENT: Normocephalic, atraumatic. Hearing intact.  Eyes: Pupils equal and round  Neck: Supple, trachea midline  Lungs: Symmetric respiratory excursion on visual exam, nonlabored breathing.   Extremities: No cyanosis noted in extremities. Pitting edema noted in legs up to the shins  Skin: No rashes, lesions noted.  Neck: Reports tenderness to palpation of left trapezius, cervical paraspinal, and rhomboid muscles  Neuro: Alert and appropriate. Using a wheelchair to ambulate. Bulk and tone within normal limits.    Laboratory Data:  The following laboratory data were reviewed during this visit:   Lab Results   Component Value Date    WBC 12.2 (H) 01/23/2025    RBC 4.24 01/23/2025    HGB 12.6 01/23/2025    HCT 40.6 01/23/2025     01/23/2025      Lab Results   Component Value Date    INR 1.0 07/08/2024    INR 1.0 06/05/2023    INR 1.0 12/01/2019     Lab Results   Component Value Date    CREATININE 0.93 01/23/2025    HGBA1C 5.5 07/11/2024       Imaging:  The following imaging impressions were reviewed by me during this visit:    -5/22/24 right shoulder xray shows moderate to severe glenohumeral joint OA, moderate AC joint OA  -8/2022 lumbar MRI shows L3-L4 moderate to marked spinal canal stenosis and right foraminal stenosis without nerve root impingement. L4-L5 moderate spinal canal stenosis. L5-S1 with no significatn central or foraminal stenosis   -4/2021 left knee xray shows moderate OA  -7/20/23 cervical spine xray shows diffuse, severe degenerative changes    I also personally reviewed the images from the above studies myself. These images and my interpretation of them contributed to the  management and decision making of the patient's medical plan.    ASSESSMENT:  Ms. Vickie Foster is a 69 y.o. female with low back, leg, and neck pain that is consistent with:    1. Subacromial bursitis of right shoulder joint    2. Myofascial pain        PLAN:  Radiology: -No new diagnostics at this time.     Physically: -Continue PT to focus on neck posture and myofascial release given apparent progression of cervical kyphosis.     Psychologically: No needs at this time    Medication: -Recommend OTC lidocaine patches applied to her left neck/trapezius    Duration: Greater than 2 month    Intervention: -Reports significant benefit from recent right SI joint injection but continued to have burning buttock pain (R>L) that radiates down her thighs, likely secondary to known lumbar spinal stenosis. She subsequently underwent a caudal ADAM with 95% improvement. Of note, is on Eliquis  - Also reports bilateral knee pain, with prior knee x-rays showing moderate osteoarthritis. She has undergone both left and right knee joint injections with generally 60% pain relief for 3-4 months. Would consider genicular nerve blocks/RFA in the future  - I suspect her current neck pain is secondary to muscle strain/myofascial pain in the setting of poor posture while sleeping. She sleeps sitting up and bending over a pillow at a table due to difficulty breathing while lying flat. Recommend trying to sleep in a recliner and continue working with your cardiology/pulmonology teams to optimize your breathing. She has significant tenderness to palpation of multiple muscles in her left neck/shoulder. She underwent trigger point injections today, as noted below  - Her right shoulder pain has returned. The prior right subacromial bursa injection provided 85% improvement for 5 months. This was repeated today, as noted below    Inject Trigger Points, >3    Date/Time: 2/21/2025 4:00 PM    Performed by: Joseph Kelley MD  Authorized by: Joseph STEEN  MD Allie  Preparation: Patient was prepped and draped in the usual sterile fashion.  Local anesthesia used: no    Anesthesia:  Local anesthesia used: no    Sedation:  Patient sedated: no    Patient tolerance: patient tolerated the procedure well with no immediate complications  Comments: Procedure Note: Trigger Point Injections  The correct site and laterality were confirmed with the patient.  Next, I palpated and marked 3 trigger points in the left trapezius, left cervical paraspinal, left rhomboid muscles.  The sites were then cleaned with alcohol and a 1.5 inch 25 gauge needle was used to infiltrate each site with 1 mL of a mixture of 10 mg triamcinolone diluted in 2.5 mL bupivacaine 0.75%.  Dry needling was then performed at each site for 5-10 seconds.  The patient tolerated this procedure well and there were no immediate complications      Joint Injection/Aspiration    Date/Time: 2/22/2025 12:06 AM    Performed by: Joseph Kelley MD  Authorized by: Joseph Kelley MD    Consent:     Consent obtained:  Written    Consent given by:  Patient    Risks, benefits, and alternatives were discussed: yes      Risks discussed:  Bleeding, infection and pain    Alternatives discussed:  Alternative treatment  Universal protocol:     Procedure explained and questions answered to patient or proxy's satisfaction: yes      Relevant documents present and verified: yes      Imaging studies available: yes      Site/side marked: yes      Immediately prior to procedure, a time out was called: yes      Patient identity confirmed:  Verbally with patient  Location:     Location:  Shoulder    Shoulder:  R subacromial bursa  Anesthesia:     Anesthesia method:  Local infiltration    Local anesthetic: ropivacaine 0.2%  Procedure details:     Preparation: Patient was prepped and draped in usual sterile fashion      Needle gauge:  22 G    Ultrasound guidance: yes      Approach:  Lateral    Steroid injected: yes    Post-procedure  details:     Dressing:  Adhesive bandage    Procedure completion:  Tolerated well, no immediate complications  Comments:      Ultrasound guided right subacromial bursa injection  After informed written consent was obtained, the patient was placed in the sitting position with the right arm resting in the lap. The correct site and laterality were confirmed with the patient and marked. The skin was prepped with chlorhexidine, allowed to dry for a minimum of 3 minutes, and draped in a sterile fashion.    A linear ultrasound probe (10-12mHz) was positioned over the right shoulder. The supraspinatus tendon and the subacromial space and bursa were located. Next, a 25 gauge 1.5 inch needle was used to anesthesize the skin with 1 mL of ropivacaine 0.2%. Next a 22 gauge 1.5 inch needle was advanced slowly until its tip was visualized under ultrasound to be in the right subacromial/subdeltoid bursal space. Next, a mixture of 40 mg triamcinolone diluted in 3 mL of ropivacaine 0.2% was injected and the needle was removed.   The skin was cleansed and a sterile bandage was applied. The patient tolerated the procedure well and no complications were encountered.    Performed by: Joseph Kelley MD  Authorized by: Joseph Kelley MD      Comments: Ultrasound guidance utilized for subacromial bursa injection            Sincerely,  Joseph Kelley MD  [facility] [place]

## 2025-02-25 ENCOUNTER — APPOINTMENT (OUTPATIENT)
Dept: PRIMARY CARE | Facility: CLINIC | Age: 70
End: 2025-02-25
Payer: MEDICARE

## 2025-02-25 NOTE — PROGRESS NOTES
"    Physical Therapy Treatment    Patient Name: Vickie Foster  MRN: [id number]  Encounter date:  2/27/2025  Time Calculation  Start Time: 1445  Stop Time: 1529  Time Calculation (min): 44 min     PT Therapeutic Procedures Time Entry  Manual Therapy Time Entry: 15  Therapeutic Exercise Time Entry: 28       Visit Number:  3 (including evaluation)  Planned total visits: 10  Visits Authorized/Insurance Coverage:  NO AUTH, 30.00 CO PAY, [place]% [employer], 33[address] OOP, M[place]    Plan for next visit:   Progress HEP  Add manual PRN    Current Problem  Problem List Items Addressed This Visit    None  Visit Diagnoses         Codes    Cervicalgia     M54.2            Precautions   A-fib  Poor supine tolerance secondary to pulmonary issues  Skin cancer     Relevant PMH:  CHF  Pulmonary HTN  A-fib  COPD  CVA  HA  Neuropathy    Pain   9/10 R shoulder    0/10 R side cervical region    Subjective  General  Patient reports decreased cervical pain following the pain management injections, but notes no change to her RIGHT shoulder pain, stating she feels \"Blah\" today. Patient states she has been complaint with the HEP, noting the exercises are difficult to perform. Patient voiced frustration over lack of pain relief in shoulder following the injection.        Objective  Patient remained seated in WC for therex w/ forward/rounded shoulders posture.  RUE weakness w/ pain vs LUE.     Treatment:  Poor supine tolerance secondary to pulmonary issues    Assisted patient back to PT via wheel chair   Pt sat to doff coat    Therapeutic exercises  - seated edge of w/c- Towel roll behind low back   Cervical rotation x 10   Cervical flex/ext x 10  Shoulder pinches x 10 - R shoulder aching \"but I can tolerate it.      Cervical lateral flexion x 10    Seated supported:  pelvis tilts x 10 - visual and verbal cues for form   Cervical retraction x 10   AAROM with cane  - chest press 2x5   - flexion to 90 degrees 2x5 R shoulder weakness and pain    Therex " well paced to reduce pain in R shoulder    Manual: Performed after exercise  Gentle STM over sweat shirt right upper trap, right proximal UE, cervical paraspinal    Current HEP:  HEP to be completed daily, exercises include:  Access Code: [email or web address]  URL: [email or web address]  Date: 02/20/2025  Prepared by: Smita Lima    Exercises  - Seated Posterior Pelvic Tilt  - 1 x daily - 5 x weekly - 1-2 sets - 10 reps  - Seated Cervical Retraction  - 1 x daily - 5 x weekly - 1-2 sets - 10 reps  - Seated Scapular Retraction  - 1 x daily - 5 x weekly - 1-2 sets - 10 reps  - Seated Cervical Rotation AROM  - 1 x daily - 5 x weekly - 1-2 sets - 10 reps  - Seated Cervical Sidebending AROM  - 1 x daily - 5 x weekly - 1-2 sets - 10 reps    Has patient been compliant with HEP? Yes - twice a day       OP EDUCATION:  Patient questions regarding shoulder bursitis and arthritis answered verbally and visually using illustrations from hospital web pages. Patient advised to contact pain management physician regarding ineffectiveness of injections into shoulder asking about next step in POC, pt verbalized understanding.     Assessment:    Pt's response to treatment:  Good. Pt able to increase repetitions performed on UE therex. Seated posture improved and low back lordosis w/ use of towel roll. Decreased R shoulder pain following STM to end appointment.   Areas of improvements: R shoulder pain reduction w/ STM, Improved BUE exercise tolerance   Limitations/deficits:  Seated postural weakness, decreased cervical ROM, RIGHT shoulder pain, decrease ability to perform ADLs    Pain end of session: <2/10     Plan:     Continue with current POC/no changes  1-2x/week  Up to 10 visits  Therapeutic exercise  Manual    Assessment of current progress against goals:  Insufficient treatment time to assess progress    Goals:  STG (3 visits)  Patient to be independent with HEP     LTG (10 visits)   NDI to < 36% impaired   Patient to perform ADLs with  pain < 3/10   Patient to quilt/cook with pain < 3/10   Cervical AROM left = right

## 2025-02-27 ENCOUNTER — TREATMENT (OUTPATIENT)
Dept: PHYSICAL THERAPY | Facility: CLINIC | Age: 70
End: 2025-02-27
Payer: MEDICARE

## 2025-02-27 DIAGNOSIS — M54.2 CERVICALGIA: ICD-10-CM

## 2025-02-27 PROCEDURE — 97110 THERAPEUTIC EXERCISES: CPT | Mod: CQ,GP | Performed by: SPECIALIST/TECHNOLOGIST

## 2025-02-27 PROCEDURE — 97140 MANUAL THERAPY 1/> REGIONS: CPT | Mod: CQ,GP | Performed by: SPECIALIST/TECHNOLOGIST

## 2025-02-28 ENCOUNTER — TELEPHONE (OUTPATIENT)
Dept: PAIN MEDICINE | Facility: CLINIC | Age: 70
End: 2025-02-28

## 2025-02-28 ENCOUNTER — OFFICE VISIT (OUTPATIENT)
Dept: PRIMARY CARE | Facility: CLINIC | Age: 70
End: 2025-02-28
Payer: MEDICARE

## 2025-02-28 VITALS
WEIGHT: 244 LBS | OXYGEN SATURATION: 91 % | HEIGHT: 62 IN | DIASTOLIC BLOOD PRESSURE: 76 MMHG | BODY MASS INDEX: 44.9 KG/M2 | SYSTOLIC BLOOD PRESSURE: 116 MMHG | HEART RATE: 87 BPM

## 2025-02-28 DIAGNOSIS — F32.A MODERATELY SEVERE DEPRESSION: ICD-10-CM

## 2025-02-28 DIAGNOSIS — M1A.9XX0 CHRONIC GOUT WITHOUT TOPHUS, UNSPECIFIED CAUSE, UNSPECIFIED SITE: ICD-10-CM

## 2025-02-28 DIAGNOSIS — I67.9 CVD (CEREBROVASCULAR DISEASE): ICD-10-CM

## 2025-02-28 DIAGNOSIS — I50.43 CHF (CONGESTIVE HEART FAILURE), NYHA CLASS I, ACUTE ON CHRONIC, COMBINED: ICD-10-CM

## 2025-02-28 DIAGNOSIS — Z11.59 SCREENING FOR VIRAL DISEASE: ICD-10-CM

## 2025-02-28 DIAGNOSIS — M25.511 CHRONIC RIGHT SHOULDER PAIN: ICD-10-CM

## 2025-02-28 DIAGNOSIS — Z12.31 SCREENING MAMMOGRAM FOR BREAST CANCER: ICD-10-CM

## 2025-02-28 DIAGNOSIS — Z00.00 MEDICARE ANNUAL WELLNESS VISIT, SUBSEQUENT: Primary | ICD-10-CM

## 2025-02-28 DIAGNOSIS — J41.0 SIMPLE CHRONIC BRONCHITIS (MULTI): ICD-10-CM

## 2025-02-28 DIAGNOSIS — G89.29 CHRONIC RIGHT SHOULDER PAIN: ICD-10-CM

## 2025-02-28 DIAGNOSIS — I51.7 CARDIOMEGALY: ICD-10-CM

## 2025-02-28 DIAGNOSIS — E66.01 CLASS 3 SEVERE OBESITY DUE TO EXCESS CALORIES WITH SERIOUS COMORBIDITY AND BODY MASS INDEX (BMI) OF 40.0 TO 44.9 IN ADULT: ICD-10-CM

## 2025-02-28 DIAGNOSIS — E66.813 CLASS 3 SEVERE OBESITY DUE TO EXCESS CALORIES WITH SERIOUS COMORBIDITY AND BODY MASS INDEX (BMI) OF 40.0 TO 44.9 IN ADULT: ICD-10-CM

## 2025-02-28 DIAGNOSIS — Z13.29 SCREENING FOR THYROID DISORDER: ICD-10-CM

## 2025-02-28 DIAGNOSIS — G47.00 INSOMNIA, UNSPECIFIED TYPE: ICD-10-CM

## 2025-02-28 DIAGNOSIS — I25.10 CORONARY ARTERIOSCLEROSIS: ICD-10-CM

## 2025-02-28 DIAGNOSIS — R79.9 ABNORMAL FINDING OF BLOOD CHEMISTRY, UNSPECIFIED: ICD-10-CM

## 2025-02-28 DIAGNOSIS — Z13.1 SCREENING FOR DIABETES MELLITUS: ICD-10-CM

## 2025-02-28 DIAGNOSIS — I50.32 CHRONIC DIASTOLIC HEART FAILURE: ICD-10-CM

## 2025-02-28 DIAGNOSIS — Z00.00 WELLNESS EXAMINATION: ICD-10-CM

## 2025-02-28 DIAGNOSIS — I48.0 PAF (PAROXYSMAL ATRIAL FIBRILLATION) (MULTI): ICD-10-CM

## 2025-02-28 DIAGNOSIS — E55.9 VITAMIN D DEFICIENCY: ICD-10-CM

## 2025-02-28 DIAGNOSIS — Z13.6 ENCOUNTER FOR SCREENING FOR CARDIOVASCULAR DISORDERS: ICD-10-CM

## 2025-02-28 PROCEDURE — 3078F DIAST BP <80 MM HG: CPT | Performed by: FAMILY MEDICINE

## 2025-02-28 PROCEDURE — 99397 PER PM REEVAL EST PAT 65+ YR: CPT | Performed by: FAMILY MEDICINE

## 2025-02-28 PROCEDURE — 3074F SYST BP LT 130 MM HG: CPT | Performed by: FAMILY MEDICINE

## 2025-02-28 PROCEDURE — G0444 DEPRESSION SCREEN ANNUAL: HCPCS | Performed by: FAMILY MEDICINE

## 2025-02-28 PROCEDURE — 1159F MED LIST DOCD IN RCRD: CPT | Performed by: FAMILY MEDICINE

## 2025-02-28 PROCEDURE — 3008F BODY MASS INDEX DOCD: CPT | Performed by: FAMILY MEDICINE

## 2025-02-28 PROCEDURE — 99215 OFFICE O/P EST HI 40 MIN: CPT | Performed by: FAMILY MEDICINE

## 2025-02-28 PROCEDURE — 99214 OFFICE O/P EST MOD 30 MIN: CPT | Mod: 25 | Performed by: FAMILY MEDICINE

## 2025-02-28 PROCEDURE — 1125F AMNT PAIN NOTED PAIN PRSNT: CPT | Performed by: FAMILY MEDICINE

## 2025-02-28 PROCEDURE — 1170F FXNL STATUS ASSESSED: CPT | Performed by: FAMILY MEDICINE

## 2025-02-28 PROCEDURE — G0447 BEHAVIOR COUNSEL OBESITY 15M: HCPCS | Performed by: FAMILY MEDICINE

## 2025-02-28 PROCEDURE — 1036F TOBACCO NON-USER: CPT | Performed by: FAMILY MEDICINE

## 2025-02-28 PROCEDURE — G0439 PPPS, SUBSEQ VISIT: HCPCS | Performed by: FAMILY MEDICINE

## 2025-02-28 PROCEDURE — G0446 INTENS BEHAVE THER CARDIO DX: HCPCS | Performed by: FAMILY MEDICINE

## 2025-02-28 PROCEDURE — 99214 OFFICE O/P EST MOD 30 MIN: CPT | Performed by: FAMILY MEDICINE

## 2025-02-28 RX ORDER — BUPROPION HYDROCHLORIDE 300 MG/1
300 TABLET ORAL
Qty: 90 TABLET | Refills: 1 | Status: SHIPPED | OUTPATIENT
Start: 2025-02-28

## 2025-02-28 RX ORDER — ATORVASTATIN CALCIUM 40 MG/1
40 TABLET, FILM COATED ORAL DAILY
Qty: 90 TABLET | Refills: 0 | Status: SHIPPED | OUTPATIENT
Start: 2025-02-28 | End: 2025-05-29

## 2025-02-28 RX ORDER — HYDROXYZINE HYDROCHLORIDE 10 MG/1
10 TABLET, FILM COATED ORAL NIGHTLY PRN
Qty: 30 TABLET | Refills: 2 | Status: SHIPPED | OUTPATIENT
Start: 2025-02-28 | End: 2025-05-29

## 2025-02-28 RX ORDER — FLUTICASONE PROPIONATE AND SALMETEROL 100; 50 UG/1; UG/1
1 POWDER RESPIRATORY (INHALATION)
Qty: 180 EACH | Refills: 0 | Status: SHIPPED | OUTPATIENT
Start: 2025-02-28 | End: 2025-05-29

## 2025-02-28 RX ORDER — TORSEMIDE 100 MG/1
50 TABLET ORAL 2 TIMES DAILY
Qty: 90 TABLET | Refills: 0 | Status: SHIPPED | OUTPATIENT
Start: 2025-02-28 | End: 2025-05-29

## 2025-02-28 RX ORDER — METOPROLOL SUCCINATE 25 MG/1
25 TABLET, EXTENDED RELEASE ORAL DAILY
Qty: 90 TABLET | Refills: 0 | Status: SHIPPED | OUTPATIENT
Start: 2025-02-28 | End: 2025-05-29

## 2025-02-28 RX ORDER — APIXABAN 5 MG/1
5 TABLET, FILM COATED ORAL 2 TIMES DAILY
Qty: 180 TABLET | Refills: 1 | Status: SHIPPED | OUTPATIENT
Start: 2025-02-28 | End: 2025-08-27

## 2025-02-28 RX ORDER — ALLOPURINOL 300 MG/1
300 TABLET ORAL DAILY
Qty: 90 TABLET | Refills: 0 | Status: SHIPPED | OUTPATIENT
Start: 2025-02-28 | End: 2026-02-28

## 2025-02-28 ASSESSMENT — ACTIVITIES OF DAILY LIVING (ADL)
GROCERY_SHOPPING: INDEPENDENT
DRESSING: INDEPENDENT
DOING_HOUSEWORK: INDEPENDENT
BATHING: INDEPENDENT
TAKING_MEDICATION: INDEPENDENT
MANAGING_FINANCES: INDEPENDENT

## 2025-02-28 ASSESSMENT — ENCOUNTER SYMPTOMS
DEPRESSION: 1
LOSS OF SENSATION IN FEET: 0
OCCASIONAL FEELINGS OF UNSTEADINESS: 1

## 2025-02-28 ASSESSMENT — PATIENT HEALTH QUESTIONNAIRE - PHQ9
2. FEELING DOWN, DEPRESSED OR HOPELESS: NOT AT ALL
SUM OF ALL RESPONSES TO PHQ9 QUESTIONS 1 AND 2: 0
2. FEELING DOWN, DEPRESSED OR HOPELESS: NOT AT ALL
1. LITTLE INTEREST OR PLEASURE IN DOING THINGS: NOT AT ALL
SUM OF ALL RESPONSES TO PHQ9 QUESTIONS 1 AND 2: 0
1. LITTLE INTEREST OR PLEASURE IN DOING THINGS: NOT AT ALL

## 2025-02-28 ASSESSMENT — PAIN SCALES - GENERAL: PAINLEVEL_OUTOF10: 4

## 2025-02-28 NOTE — PROGRESS NOTES
69-year presents to [facility] care, for yearly physical, Medicare annual wellness visit, follow chronic medical conditions    Health Maintenance:  Eats a standard American diet.  Gets minimal exercise.  Does not drink, smoke, use illicit substances.  Due for mammogram and Otherwise up-to-date on all routine health maintenance screenings.  Due for immunizations.  Due for yearly lab work.    1. Medicare annual wellness visit, subsequent    2. Wellness examination    3. Chronic gout without tophus, unspecified cause, unspecified site   On allopurinol needs uric acid checked no recent gout flares   4. CVD (cerebrovascular disease)   Currently anticoagulated on Eliquis, does not follow with neurology regularly   5. Moderately severe depression   On Wellbutrin symptoms fairly well-controlled presently no SI or HI   6. PAF (paroxysmal atrial fibrillation) (Multi)   Anticoagulated and rate controlled presently , needs a new cardiologist who she was following with previously   7. Insomnia, unspecified type    8. CHF (congestive heart failure), NYHA class I, acute on chronic, combined   Not currently taking spironolactone as previously prescribed currently on Demadex, metoprolol.  Not on SGLT2 inhibitor.  Needs new cardiologist   9. Metabolic alkalosis    10. Chronic diastolic heart failure    11. Cardiomegaly    12. Coronary arteriosclerosis    13. Simple chronic bronchitis (Multi)   Currently on Advair, breathing stable presently.  Needs new pulmonologist.   14. Screening for diabetes mellitus    15. Encounter for screening for cardiovascular disorders    16. Screening for viral disease    17. Screening for thyroid disorder    18. Vitamin D deficiency    19. Screening mammogram for breast cancer    20. Abnormal finding of blood chemistry, unspecified    21. Chronic right shoulder pain   Currently following with pain management status post injection x 1 and physical therapy presently but not noticing any relief from the  injection.  No recent x-rays.       All pertinent positive symptoms are included in history of present illness.    All other systems have been reviewed and are negative and noncontributory to this patient's current ailments.      CONSTITUTIONAL - INAD. Not ill appearing.  SKIN - No lesions or rashes visualized. Good skin turgor.  HEENT- Head is atraumatic and normocephalic. Nasal turbinates are nonerythematous and without drainage. .  RESP - CTAB. No wheezing, rhonchi, or crackles.   CARDIAC - RRR. No murmurs, gallops, or rubs.  ABDOMEN - Soft, nontender, nondistended. No organomegaly.  NEURO - CNs 2-12 grossly intact.  PSYCH - Normal mood and affect      1. Medicare annual wellness visit, subsequent (Primary)  Obesity discussion was had. 5 minutes was spent in discussion. , Cardiovascular disease discussion was had including discussions of chronic medical conditions such as hypertension, hyperlipidemia, CAD, or others. 15 minutes was spent in discussion. , and Depression screening was completed. 5 minutes was spent in this discussion.    - Hepatitis C antibody; Future  - CBC and Auto Differential; Future  - Comprehensive Metabolic Panel; Future  - Lipid Panel; Future  - Hemoglobin A1C; Future  - TSH with reflex to Free T4 if abnormal; Future  - Hepatitis C antibody  - CBC and Auto Differential  - Comprehensive Metabolic Panel  - Lipid Panel  - Hemoglobin A1C  - TSH with reflex to Free T4 if abnormal    2. Wellness examination  Health and wellness topics discussed today.  Recommended eating a varied and healthy diet and made dietary recommendations, also discussed exercise and exercising regularly 30 minutes a day 3 days a week.  Immunizations recommended and updated.  Health screening guidelines discussed with patient including possible things such as colonoscopies, mammograms, prostate screenings, lung cancer screenings, bone densitometry, and other wellness topics.  Yearly lab work ordered today.  - Hepatitis C  antibody; Future  - CBC and Auto Differential; Future  - Comprehensive Metabolic Panel; Future  - Lipid Panel; Future  - Hemoglobin A1C; Future  - TSH with reflex to Free T4 if abnormal; Future  - Hepatitis C antibody  - CBC and Auto Differential  - Comprehensive Metabolic Panel  - Lipid Panel  - Hemoglobin A1C  - TSH with reflex to Free T4 if abnormal    3. Chronic gout without tophus, unspecified cause, unspecified site  Check uric acid continue allopurinol  - allopurinol (Zyloprim) 300 mg tablet; Take 1 tablet (300 mg) by mouth once daily.  Dispense: 90 tablet; Refill: 0  - Hepatitis C antibody; Future  - CBC and Auto Differential; Future  - Comprehensive Metabolic Panel; Future  - Lipid Panel; Future  - Hemoglobin A1C; Future  - TSH with reflex to Free T4 if abnormal; Future  - Uric acid; Future  - Hepatitis C antibody  - CBC and Auto Differential  - Comprehensive Metabolic Panel  - Lipid Panel  - Hemoglobin A1C  - TSH with reflex to Free T4 if abnormal  - Uric acid    4. CVD (cerebrovascular disease)  Continue high intensity statin therapy, Eliquis  - atorvastatin (Lipitor) 40 mg tablet; Take 1 tablet (40 mg) by mouth once daily.  Dispense: 90 tablet; Refill: 0  - Hepatitis C antibody; Future  - CBC and Auto Differential; Future  - Comprehensive Metabolic Panel; Future  - Lipid Panel; Future  - Hemoglobin A1C; Future  - TSH with reflex to Free T4 if abnormal; Future  - Hepatitis C antibody  - CBC and Auto Differential  - Comprehensive Metabolic Panel  - Lipid Panel  - Hemoglobin A1C  - TSH with reflex to Free T4 if abnormal    5. Moderately severe depression  Stable well-controlled continue Wellbutrin  - buPROPion XL (Wellbutrin XL) 300 mg 24 hr tablet; Take 1 tablet (300 mg) by mouth once daily in the morning. Take before meals.  Dispense: 90 tablet; Refill: 1  - Hepatitis C antibody; Future  - CBC and Auto Differential; Future  - Comprehensive Metabolic Panel; Future  - Lipid Panel; Future  - Hemoglobin A1C;  Future  - TSH with reflex to Free T4 if abnormal; Future  - Hepatitis C antibody  - CBC and Auto Differential  - Comprehensive Metabolic Panel  - Lipid Panel  - Hemoglobin A1C  - TSH with reflex to Free T4 if abnormal    6. PAF (paroxysmal atrial fibrillation) (Multi)  Rate controlled appropriately and anticoagulated appropriately.  Continue medications and follow-up with cardiology  - Eliquis 5 mg tablet; Take 1 tablet (5 mg) by mouth 2 times a day.  Dispense: 180 tablet; Refill: 1  - metoprolol succinate XL (Toprol-XL) 25 mg 24 hr tablet; Take 1 tablet (25 mg) by mouth once daily.  Dispense: 90 tablet; Refill: 0  - Hepatitis C antibody; Future  - CBC and Auto Differential; Future  - Comprehensive Metabolic Panel; Future  - Lipid Panel; Future  - Hemoglobin A1C; Future  - TSH with reflex to Free T4 if abnormal; Future  - Hepatitis C antibody  - CBC and Auto Differential  - Comprehensive Metabolic Panel  - Lipid Panel  - Hemoglobin A1C  - TSH with reflex to Free T4 if abnormal    7. Insomnia, unspecified type  Continue hydroxyzine as needed  - hydrOXYzine HCL (Atarax) 10 mg tablet; Take 1 tablet (10 mg) by mouth as needed at bedtime (insomnia).  Dispense: 30 tablet; Refill: 2  - Hepatitis C antibody; Future  - CBC and Auto Differential; Future  - Comprehensive Metabolic Panel; Future  - Lipid Panel; Future  - Hemoglobin A1C; Future  - TSH with reflex to Free T4 if abnormal; Future  - Hepatitis C antibody  - CBC and Auto Differential  - Comprehensive Metabolic Panel  - Lipid Panel  - Hemoglobin A1C  - TSH with reflex to Free T4 if abnormal    8. CHF (congestive heart failure), NYHA class I, acute on chronic, combined  Needs referral to cardiology.  Not on optimized appropriate management.  Would benefit from SGLT2 inhibitor or MRA  - Hepatitis C antibody; Future  - CBC and Auto Differential; Future  - Comprehensive Metabolic Panel; Future  - Lipid Panel; Future  - Hemoglobin A1C; Future  - TSH with reflex to Free T4  if abnormal; Future  - Referral to Cardiology; Future  - Hepatitis C antibody  - CBC and Auto Differential  - Comprehensive Metabolic Panel  - Lipid Panel  - Hemoglobin A1C  - TSH with reflex to Free T4 if abnormal    9. Metabolic alkalosis    - Hepatitis C antibody; Future  - CBC and Auto Differential; Future  - Comprehensive Metabolic Panel; Future  - Lipid Panel; Future  - Hemoglobin A1C; Future  - TSH with reflex to Free T4 if abnormal; Future  - Hepatitis C antibody  - CBC and Auto Differential  - Comprehensive Metabolic Panel  - Lipid Panel  - Hemoglobin A1C  - TSH with reflex to Free T4 if abnormal    10. Chronic diastolic heart failure    - torsemide (Demadex) 100 mg tablet; Take 0.5 tablets (50 mg) by mouth 2 times a day.  Dispense: 90 tablet; Refill: 0  - Hepatitis C antibody; Future  - CBC and Auto Differential; Future  - Comprehensive Metabolic Panel; Future  - Lipid Panel; Future  - Hemoglobin A1C; Future  - TSH with reflex to Free T4 if abnormal; Future  - Hepatitis C antibody  - CBC and Auto Differential  - Comprehensive Metabolic Panel  - Lipid Panel  - Hemoglobin A1C  - TSH with reflex to Free T4 if abnormal    11. Cardiomegaly    - torsemide (Demadex) 100 mg tablet; Take 0.5 tablets (50 mg) by mouth 2 times a day.  Dispense: 90 tablet; Refill: 0  - Hepatitis C antibody; Future  - CBC and Auto Differential; Future  - Comprehensive Metabolic Panel; Future  - Lipid Panel; Future  - Hemoglobin A1C; Future  - TSH with reflex to Free T4 if abnormal; Future  - Hepatitis C antibody  - CBC and Auto Differential  - Comprehensive Metabolic Panel  - Lipid Panel  - Hemoglobin A1C  - TSH with reflex to Free T4 if abnormal    12. Coronary arteriosclerosis  High intensity statin therapy  - torsemide (Demadex) 100 mg tablet; Take 0.5 tablets (50 mg) by mouth 2 times a day.  Dispense: 90 tablet; Refill: 0  - Hepatitis C antibody; Future  - CBC and Auto Differential; Future  - Comprehensive Metabolic Panel; Future  -  Lipid Panel; Future  - Hemoglobin A1C; Future  - TSH with reflex to Free T4 if abnormal; Future  - Hepatitis C antibody  - CBC and Auto Differential  - Comprehensive Metabolic Panel  - Lipid Panel  - Hemoglobin A1C  - TSH with reflex to Free T4 if abnormal    13. Simple chronic bronchitis (Multi)  Continue inhaler follow-up with pulmonary  - fluticasone propion-salmeteroL (Advair Diskus) 100-50 mcg/dose diskus inhaler; Inhale 1 puff 2 times a day. Rinse mouth with water after use to reduce aftertaste and incidence of candidiasis. Do not swallow.  Dispense: 180 each; Refill: 0  - Hepatitis C antibody; Future  - CBC and Auto Differential; Future  - Comprehensive Metabolic Panel; Future  - Lipid Panel; Future  - Hemoglobin A1C; Future  - TSH with reflex to Free T4 if abnormal; Future  - Referral to Pulmonology; Future  - Hepatitis C antibody  - CBC and Auto Differential  - Comprehensive Metabolic Panel  - Lipid Panel  - Hemoglobin A1C  - TSH with reflex to Free T4 if abnormal    14. Screening for diabetes mellitus    - Hepatitis C antibody; Future  - CBC and Auto Differential; Future  - Comprehensive Metabolic Panel; Future  - Lipid Panel; Future  - Hemoglobin A1C; Future  - TSH with reflex to Free T4 if abnormal; Future  - Hepatitis C antibody  - CBC and Auto Differential  - Comprehensive Metabolic Panel  - Lipid Panel  - Hemoglobin A1C  - TSH with reflex to Free T4 if abnormal    15. Encounter for screening for cardiovascular disorders    - Hepatitis C antibody; Future  - CBC and Auto Differential; Future  - Comprehensive Metabolic Panel; Future  - Lipid Panel; Future  - Hemoglobin A1C; Future  - TSH with reflex to Free T4 if abnormal; Future  - Hepatitis C antibody  - CBC and Auto Differential  - Comprehensive Metabolic Panel  - Lipid Panel  - Hemoglobin A1C  - TSH with reflex to Free T4 if abnormal    16. Screening for viral disease    - Hepatitis C antibody; Future  - CBC and Auto Differential; Future  -  Comprehensive Metabolic Panel; Future  - Lipid Panel; Future  - Hemoglobin A1C; Future  - TSH with reflex to Free T4 if abnormal; Future  - Hepatitis C antibody  - CBC and Auto Differential  - Comprehensive Metabolic Panel  - Lipid Panel  - Hemoglobin A1C  - TSH with reflex to Free T4 if abnormal    17. Screening for thyroid disorder    - Hepatitis C antibody; Future  - CBC and Auto Differential; Future  - Comprehensive Metabolic Panel; Future  - Lipid Panel; Future  - Hemoglobin A1C; Future  - TSH with reflex to Free T4 if abnormal; Future  - Hepatitis C antibody  - CBC and Auto Differential  - Comprehensive Metabolic Panel  - Lipid Panel  - Hemoglobin A1C  - TSH with reflex to Free T4 if abnormal    18. Vitamin D deficiency    - Hepatitis C antibody; Future  - CBC and Auto Differential; Future  - Comprehensive Metabolic Panel; Future  - Lipid Panel; Future  - Hemoglobin A1C; Future  - TSH with reflex to Free T4 if abnormal; Future  - Vitamin D 25-Hydroxy,Total (for eval of Vitamin D levels); Future  - Hepatitis C antibody  - CBC and Auto Differential  - Comprehensive Metabolic Panel  - Lipid Panel  - Hemoglobin A1C  - TSH with reflex to Free T4 if abnormal  - Vitamin D 25-Hydroxy,Total (for eval of Vitamin D levels)    19. Screening mammogram for breast cancer    - BI mammo bilateral screening tomosynthesis; Future    20. Abnormal finding of blood chemistry, unspecified    - Hemoglobin A1C; Future  - Hemoglobin A1C    21. Chronic right shoulder pain  We had a long discussion in regards to your shoulder pain.  We discussed the differential diagnosis of shoulder impingement, rotator cuff tendinopathy, rotator cuff tearing, and biceps tendinopathy as all being potential sources of the pain.  We discussed the non-operative and operative treatment options for each of these conditions with you.     We will start off by treating your shoulder conservatively (AKA non-operatively).  We gave you a prescription for physical  therapy to work on increasing the range of motion and strength in the shoulder.     X-rays of the shoulder were ordered/reviewed today to look for any occult fractures or other pathology that may be causing the pain.    Shoulder injection was also discussed today.    We will see you back in 4-6 weeks to reevaluate your shoulder pain. If you are still having pain at that time, we would then need to order an MRI to look for possible rotator cuff tears or biceps tearing in the shoulder, as well as provide a referral to orthopedic surgery. We will see you back in 4-6 weeks, or sooner if necessary. Please call with any questions or problems.    - XR shoulder right 2+ views; Future    22. Class 3 severe obesity due to excess calories with serious comorbidity and body mass index (BMI) of 40.0 to 44.9 in adult  Recommend low carbohydrate diet

## 2025-02-28 NOTE — TELEPHONE ENCOUNTER
Pt calling stating no relief in her Right shoulder from the injection on 02/21/2025. Pt asking what should she do.

## 2025-03-03 NOTE — PROGRESS NOTES
[facility]  Follow Up Office Visit Note 3/4/2025    Patient Information: Vickie Foster, MRN: [id number], : 1955   Primary Care/Referring Physician: Jamison Maurice DO, [address] [facility] / [place] [address]     Chief Complaint: Left knee pain  Interval History: At her last visit I performed a right subacromial bursa injection and TPI's    Today she reports relief of her left sided neck pain from the TPI's but no relief from the right subacromial bursa injection. She continues to have severe right shoulder/upper arm pain when trying to raise her arm above her head. She continues to participate in PT. A right shoulder xray was ordered by her PCP but has not yet been completed.     Brief History of Pain: Ms. Vickie Foster is a 69 y.o. female with a PMHx of CHF, pulmonary HTN, AFib (on Eliquis) who presents today for follow up regarding chronic low back/buttock pain.    For reference, she reports pain started approximately 2022 with no obvious inciting event. She has had this type of pain in past, but it is much more severe than normal. Has been to the ED a few times for this, had xrays performed which didn't show any fracture in the spine or hip. During one of these visits she was noted to have worsening LE edema and was admitted for CHF exacerbation. During this admission she continued to have back pain but also developed gout in her right foot. Is having difficulty bearing weight on her right foot, as it worsens both foot pain and causes pain in her right low back and burning pain in her buttock. Has to lie a certain way in bed (moreso on her left side) to get somewhat comfortable. Denies any numbness, tingling in her legs. She is unsure about weakness as she is not walking much.    Current Pain Medications: Lidocaine patches - tried on both back and foot without benefit.  Previously Tried Pain Medications: Methocarbamol. Unable to use NSAID's due to history of ulcers. Tried BioFreeze without  benefit. Gabapentin, Cyclobenzaprine    Relevant Surgeries: Denies hip or back surgery. Denies ankle or foot surgery.  Injections: Left knee injection - 60% pain relief for 8 months. Caudal ADAM - 95% improvement, Right subacromial bursa injection -85% relief. Right SI joint injection - 80% pain relief  Physical/Occupational Therapy: Currently doing PT    Medications:   Current Outpatient Medications   Medication Instructions    allopurinol (ZYLOPRIM) 300 mg, oral, Daily    ascorbic acid (VITAMIN C) 500 mg, Daily    atorvastatin (LIPITOR) 40 mg, oral, Daily    buPROPion XL (WELLBUTRIN XL) 300 mg, oral, Daily before breakfast    Eliquis 5 mg, oral, 2 times daily    ergocalciferol, vitamin D2, (VITAMIN D2 ORAL) Take by mouth.    fluticasone propion-salmeteroL (Advair Diskus) 100-50 mcg/dose diskus inhaler 1 puff, inhalation, 2 times daily RT, Rinse mouth with water after use to reduce aftertaste and incidence of candidiasis. Do not swallow.    HYDROcodone-acetaminophen (Norco) 5-325 mg tablet 1 tablet, oral, Every 8 hours PRN    hydrOXYzine HCL (ATARAX) 10 mg, oral, Nightly PRN    metoprolol succinate XL (TOPROL-XL) 25 mg, oral, Daily    multivitamin tablet 1 tablet, Daily    NON FORMULARY Biotin    oxygen (O2) gas therapy 1 each, inhalation, Every 12 hours    torsemide (DEMADEX) 50 mg, oral, 2 times daily    traMADol (ULTRAM) 50 mg, Every 8 hours PRN      Allergies: No Known Allergies    Past Medical & Surgical History:  Past Medical History:   Diagnosis Date    A-fib (Multi)     Cellulitis     CHF (congestive heart failure)     COVID     Heart disease     HTN (hypertension)     Low back pain       Past Surgical History:   Procedure Laterality Date    ABLATION OF DYSRHYTHMIC FOCUS      CARDIAC ELECTROPHYSIOLOGY PROCEDURE N/A 05/01/2024    Procedure: Cardioversion;  Surgeon: Amado Ty MD;  Location: [facility] Cardiac Cath Lab;  Service: Electrophysiology;  Laterality: N/A;  63804  i48.0  medical mutual- no auth  req.    CARDIAC ELECTROPHYSIOLOGY PROCEDURE N/A 2024    Procedure: Ablation A-Fib;  Surgeon: Amado Ty MD;  Location: [facility] Cardiac Cath Lab;  Service: Electrophysiology;  Laterality: N/A;  AFIB CRYOABLATION; CPT 44208; ICD I48.0   Medicare mutual of [place] medicare  ID - [id number]  [phone]  No Auth required  Call ref # [id number]    CARDIAC ELECTROPHYSIOLOGY PROCEDURE N/A 2024    Procedure: Cardioversion;  Surgeon: Amado Ty MD;  Location: [facility] Cardiac Cath Lab;  Service: Electrophysiology;  Laterality: N/A;  CARDIOVERSION CPT 47838, ICD I48.0 no auth required/REF # [id number]    CARDIOVERSION      2024       Family History   Problem Relation Name Age of Onset    Breast cancer Mother      Heart attack Mother           in early 80s of MI    Heart disease Mother      Colon cancer Father      Breast cancer Sister      Diabetes type II Sister      Kidney failure Sister      Other (herione addict) Sister      No Known Problems Brother      No Known Problems Daughter      Other (recovering alcoholic) Son      Breast cancer Other Maternal Aunt      Social History     Socioeconomic History    Marital status:      Spouse name: Not on file    Number of children: Not on file    Years of education: Not on file    Highest education level: Not on file   Occupational History    Not on file   Tobacco Use    Smoking status: Former     Current packs/day: 0.00     Types: Cigarettes     Quit date:      Years since quittin.1    Smokeless tobacco: Never   Vaping Use    Vaping status: Never Used   Substance and Sexual Activity    Alcohol use: Not Currently     Comment: monthly or less    Drug use: Never    Sexual activity: Defer   Other Topics Concern    Not on file   Social History Narrative    Not on file     Social Drivers of Health     Financial Resource Strain: Low Risk  (2024)    Overall Financial Resource Strain (CARDIA)     Difficulty of Paying Living Expenses:  Not hard at all   Food Insecurity: No Food Insecurity (12/9/2024)    Hunger Vital Sign     Worried About Running Out of Food in the Last Year: Never true     Ran Out of Food in the Last Year: Never true   Transportation Needs: No Transportation Needs (12/9/2024)    PRAPARE - Transportation     Lack of Transportation (Medical): No     Lack of Transportation (Non-Medical): No   Physical Activity: Inactive (12/9/2024)    Exercise Vital Sign     Days of Exercise per Week: 0 days     Minutes of Exercise per Session: 0 min   Stress: No Stress Concern Present (12/9/2024)    [employer] [place] [employer] - Occupational Stress Questionnaire     Feeling of Stress : Not at all   Social Connections: Moderately Integrated (12/9/2024)    Social Connection and Isolation Panel [NHANES]     Frequency of Communication with Friends and Family: More than three times a week     Frequency of Social Gatherings with Friends and Family: More than three times a week     Attends Zoroastrian Services: More than 4 times per year     Active Member of Clubs or Organizations: No     Attends Club or Organization Meetings: Never     Marital Status:    Intimate Partner Violence: Not At Risk (12/9/2024)    Humiliation, Afraid, Rape, and Kick questionnaire     Fear of Current or Ex-Partner: No     Emotionally Abused: No     Physically Abused: No     Sexually Abused: No   Housing Stability: Low Risk  (12/9/2024)    Housing Stability Vital Sign     Unable to Pay for Housing in the Last Year: No     Number of Times Moved in the Last Year: 0     Homeless in the Last Year: No       Problems, Past medical history, past surgical history, Medications, allergies, social and family history reviewed and as per the electronic medical record from today's encounter    Review of Systems:  CONST: No fever, chills, fatigue, weight changes  EYES: No loss of vision  ENT: No hearing loss, tinnitus  CV: No chest pain, palpitations  RESP: Reports some  "dyspnea on exertion  GI: No stool incontinence, nausea, vomiting  : No urinary incontinence  MSK: No joint swelling  SKIN: No rash, no hives  NEURO: No headache, dizziness  PSYCH: No anxiety, depression  HEM/LYMPH: No easy bruising or bleeding  All other systems reviewed are negative     Physical Exam:  Vitals: /76   Pulse 87   Resp 18   Ht 1.575 m (5' 2\")   Wt 111 kg (244 lb)   SpO2 92%   BMI 44.63 kg/m²   General: No apparent distress. Alert, appropriate, oriented x 3. Mood generally positive, affect congruent. Speaking in full sentences.   HENT: Normocephalic, atraumatic. Hearing intact.  Eyes: Pupils equal and round  Neck: Supple, trachea midline  Lungs: Symmetric respiratory excursion on visual exam, nonlabored breathing.   Extremities: No cyanosis noted in extremities. Pitting edema noted in legs up to the shins  Skin: No rashes, lesions noted.  MSK: Reports pain with passive abduction of right shoulder above 80 degrees.   Neuro: Alert and appropriate. Using a wheelchair to ambulate. Bulk and tone within normal limits.    Laboratory Data:  The following laboratory data were reviewed during this visit:   Lab Results   Component Value Date    WBC 12.2 (H) 01/23/2025    RBC 4.24 01/23/2025    HGB 12.6 01/23/2025    HCT 40.6 01/23/2025     01/23/2025      Lab Results   Component Value Date    INR 1.0 07/08/2024    INR 1.0 06/05/2023    INR 1.0 12/01/2019     Lab Results   Component Value Date    CREATININE 0.93 01/23/2025    HGBA1C 5.5 07/11/2024       Imaging:  The following imaging impressions were reviewed by me during this visit:    -5/22/24 right shoulder xray shows moderate to severe glenohumeral joint OA, moderate AC joint OA  -8/2022 lumbar MRI shows L3-L4 moderate to marked spinal canal stenosis and right foraminal stenosis without nerve root impingement. L4-L5 moderate spinal canal stenosis. L5-S1 with no significatn central or foraminal stenosis   -4/2021 left knee xray shows " moderate OA  -7/20/23 cervical spine xray shows diffuse, severe degenerative changes    I also personally reviewed the images from the above studies myself. These images and my interpretation of them contributed to the management and decision making of the patient's medical plan.    ASSESSMENT:  Ms. Vickie Foster is a 69 y.o. female with low back, leg, and neck pain that is consistent with:    1. Chronic right shoulder pain    2. Primary osteoarthritis, right shoulder          PLAN:  Radiology: -Recommend she have her right shoulder xray completed    Physically: -Continue PT to focus on neck posture and myofascial release given apparent progression of cervical kyphosis.     Psychologically: No needs at this time    Medication: -She previously entered into a controlled substance agreement with our office but has not needed these medications consistently. Given recently worsening of right shoulder pain I did provide a refill of Norco today    Duration: Greater than 2 month    Intervention: -Reports significant benefit from recent right SI joint injection but continued to have burning buttock pain (R>L) that radiates down her thighs, likely secondary to known lumbar spinal stenosis. She subsequently underwent a caudal ADAM with 95% improvement. Of note, is on Eliquis  - Also reports bilateral knee pain, with prior knee x-rays showing moderate osteoarthritis. She has undergone both left and right knee joint injections with generally 60% pain relief for 3-4 months. Would consider genicular nerve blocks/RFA in the future  - I suspect her current neck pain is secondary to muscle strain/myofascial pain in the setting of poor posture while sleeping. She sleeps sitting up and bending over a pillow at a table due to difficulty breathing while lying flat. Recommend trying to sleep in a recliner and continue working with your cardiology/pulmonology teams to optimize your breathing. She underwent TPI's to the left neck with  significant improvement. Will monitor for duration of relief  - Her right shoulder pain remains severe.  She previously received relief from right subacromial bursa injection.  This was repeated with no pain relief at all.  Given presence of fairly severe glenohumeral joint arthritis I recommend a right shoulder joint steroid injection utilizing live fluoroscopy and local anesthesia.  Risk and benefits, alternative discussed.  She would like to proceed.              Sincerely,  Joseph Kelley MD  [facility] [place]

## 2025-03-03 NOTE — TELEPHONE ENCOUNTER
I would have her continue her current physical therapy. I see that a right shoulder xray was ordered. She should contact our office once she has had this study completed so I can review it and discuss the best next step. I do not have any immediate medication recommendations right now

## 2025-03-03 NOTE — H&P (VIEW-ONLY)
[facility]  Follow Up Office Visit Note 3/4/2025    Patient Information: Vickie Foster, MRN: [id number], : 1955   Primary Care/Referring Physician: Jamison Maurice DO, [address] [facility] / [place] [address]     Chief Complaint: Left knee pain  Interval History: At her last visit I performed a right subacromial bursa injection and TPI's    Today she reports relief of her left sided neck pain from the TPI's but no relief from the right subacromial bursa injection. She continues to have severe right shoulder/upper arm pain when trying to raise her arm above her head. She continues to participate in PT. A right shoulder xray was ordered by her PCP but has not yet been completed.     Brief History of Pain: Ms. Vickie Foster is a 69 y.o. female with a PMHx of CHF, pulmonary HTN, AFib (on Eliquis) who presents today for follow up regarding chronic low back/buttock pain.    For reference, she reports pain started approximately 2022 with no obvious inciting event. She has had this type of pain in past, but it is much more severe than normal. Has been to the ED a few times for this, had xrays performed which didn't show any fracture in the spine or hip. During one of these visits she was noted to have worsening LE edema and was admitted for CHF exacerbation. During this admission she continued to have back pain but also developed gout in her right foot. Is having difficulty bearing weight on her right foot, as it worsens both foot pain and causes pain in her right low back and burning pain in her buttock. Has to lie a certain way in bed (moreso on her left side) to get somewhat comfortable. Denies any numbness, tingling in her legs. She is unsure about weakness as she is not walking much.    Current Pain Medications: Lidocaine patches - tried on both back and foot without benefit.  Previously Tried Pain Medications: Methocarbamol. Unable to use NSAID's due to history of ulcers. Tried BioFreeze without  benefit. Gabapentin, Cyclobenzaprine    Relevant Surgeries: Denies hip or back surgery. Denies ankle or foot surgery.  Injections: Left knee injection - 60% pain relief for 8 months. Caudal ADAM - 95% improvement, Right subacromial bursa injection -85% relief. Right SI joint injection - 80% pain relief  Physical/Occupational Therapy: Currently doing PT    Medications:   Current Outpatient Medications   Medication Instructions    allopurinol (ZYLOPRIM) 300 mg, oral, Daily    ascorbic acid (VITAMIN C) 500 mg, Daily    atorvastatin (LIPITOR) 40 mg, oral, Daily    buPROPion XL (WELLBUTRIN XL) 300 mg, oral, Daily before breakfast    Eliquis 5 mg, oral, 2 times daily    ergocalciferol, vitamin D2, (VITAMIN D2 ORAL) Take by mouth.    fluticasone propion-salmeteroL (Advair Diskus) 100-50 mcg/dose diskus inhaler 1 puff, inhalation, 2 times daily RT, Rinse mouth with water after use to reduce aftertaste and incidence of candidiasis. Do not swallow.    HYDROcodone-acetaminophen (Norco) 5-325 mg tablet 1 tablet, oral, Every 8 hours PRN    hydrOXYzine HCL (ATARAX) 10 mg, oral, Nightly PRN    metoprolol succinate XL (TOPROL-XL) 25 mg, oral, Daily    multivitamin tablet 1 tablet, Daily    NON FORMULARY Biotin    oxygen (O2) gas therapy 1 each, inhalation, Every 12 hours    torsemide (DEMADEX) 50 mg, oral, 2 times daily    traMADol (ULTRAM) 50 mg, Every 8 hours PRN      Allergies: No Known Allergies    Past Medical & Surgical History:  Past Medical History:   Diagnosis Date    A-fib (Multi)     Cellulitis     CHF (congestive heart failure)     COVID     Heart disease     HTN (hypertension)     Low back pain       Past Surgical History:   Procedure Laterality Date    ABLATION OF DYSRHYTHMIC FOCUS      CARDIAC ELECTROPHYSIOLOGY PROCEDURE N/A 05/01/2024    Procedure: Cardioversion;  Surgeon: Amado Ty MD;  Location: [facility] Cardiac Cath Lab;  Service: Electrophysiology;  Laterality: N/A;  63852  i48.0  medical mutual- no auth  req.    CARDIAC ELECTROPHYSIOLOGY PROCEDURE N/A 2024    Procedure: Ablation A-Fib;  Surgeon: Amado Ty MD;  Location: [facility] Cardiac Cath Lab;  Service: Electrophysiology;  Laterality: N/A;  AFIB CRYOABLATION; CPT 02566; ICD I48.0   Medicare mutual of [place] medicare  ID - [id number]  [phone]  No Auth required  Call ref # [id number]    CARDIAC ELECTROPHYSIOLOGY PROCEDURE N/A 2024    Procedure: Cardioversion;  Surgeon: Amado Ty MD;  Location: [facility] Cardiac Cath Lab;  Service: Electrophysiology;  Laterality: N/A;  CARDIOVERSION CPT 70736, ICD I48.0 no auth required/REF # [id number]    CARDIOVERSION      2024       Family History   Problem Relation Name Age of Onset    Breast cancer Mother      Heart attack Mother           in early 80s of MI    Heart disease Mother      Colon cancer Father      Breast cancer Sister      Diabetes type II Sister      Kidney failure Sister      Other (herione addict) Sister      No Known Problems Brother      No Known Problems Daughter      Other (recovering alcoholic) Son      Breast cancer Other Maternal Aunt      Social History     Socioeconomic History    Marital status:      Spouse name: Not on file    Number of children: Not on file    Years of education: Not on file    Highest education level: Not on file   Occupational History    Not on file   Tobacco Use    Smoking status: Former     Current packs/day: 0.00     Types: Cigarettes     Quit date:      Years since quittin.1    Smokeless tobacco: Never   Vaping Use    Vaping status: Never Used   Substance and Sexual Activity    Alcohol use: Not Currently     Comment: monthly or less    Drug use: Never    Sexual activity: Defer   Other Topics Concern    Not on file   Social History Narrative    Not on file     Social Drivers of Health     Financial Resource Strain: Low Risk  (2024)    Overall Financial Resource Strain (CARDIA)     Difficulty of Paying Living Expenses:  Not hard at all   Food Insecurity: No Food Insecurity (12/9/2024)    Hunger Vital Sign     Worried About Running Out of Food in the Last Year: Never true     Ran Out of Food in the Last Year: Never true   Transportation Needs: No Transportation Needs (12/9/2024)    PRAPARE - Transportation     Lack of Transportation (Medical): No     Lack of Transportation (Non-Medical): No   Physical Activity: Inactive (12/9/2024)    Exercise Vital Sign     Days of Exercise per Week: 0 days     Minutes of Exercise per Session: 0 min   Stress: No Stress Concern Present (12/9/2024)    [employer] - Occupational Stress Questionnaire     Feeling of Stress : Not at all   Social Connections: Moderately Integrated (12/9/2024)    Social Connection and Isolation Panel [NHANES]     Frequency of Communication with Friends and Family: More than three times a week     Frequency of Social Gatherings with Friends and Family: More than three times a week     Attends Catholic Services: More than 4 times per year     Active Member of Clubs or Organizations: No     Attends Club or Organization Meetings: Never     Marital Status:    Intimate Partner Violence: Not At Risk (12/9/2024)    Humiliation, Afraid, Rape, and Kick questionnaire     Fear of Current or Ex-Partner: No     Emotionally Abused: No     Physically Abused: No     Sexually Abused: No   Housing Stability: Low Risk  (12/9/2024)    Housing Stability Vital Sign     Unable to Pay for Housing in the Last Year: No     Number of Times Moved in the Last Year: 0     Homeless in the Last Year: No       Problems, Past medical history, past surgical history, Medications, allergies, social and family history reviewed and as per the electronic medical record from today's encounter    Review of Systems:  CONST: No fever, chills, fatigue, weight changes  EYES: No loss of vision  ENT: No hearing loss, tinnitus  CV: No chest pain, palpitations  RESP: Reports some  "dyspnea on exertion  GI: No stool incontinence, nausea, vomiting  : No urinary incontinence  MSK: No joint swelling  SKIN: No rash, no hives  NEURO: No headache, dizziness  PSYCH: No anxiety, depression  HEM/LYMPH: No easy bruising or bleeding  All other systems reviewed are negative     Physical Exam:  Vitals: /76   Pulse 87   Resp 18   Ht 1.575 m (5' 2\")   Wt 111 kg (244 lb)   SpO2 92%   BMI 44.63 kg/m²   General: No apparent distress. Alert, appropriate, oriented x 3. Mood generally positive, affect congruent. Speaking in full sentences.   HENT: Normocephalic, atraumatic. Hearing intact.  Eyes: Pupils equal and round  Neck: Supple, trachea midline  Lungs: Symmetric respiratory excursion on visual exam, nonlabored breathing.   Extremities: No cyanosis noted in extremities. Pitting edema noted in legs up to the shins  Skin: No rashes, lesions noted.  MSK: Reports pain with passive abduction of right shoulder above 80 degrees.   Neuro: Alert and appropriate. Using a wheelchair to ambulate. Bulk and tone within normal limits.    Laboratory Data:  The following laboratory data were reviewed during this visit:   Lab Results   Component Value Date    WBC 12.2 (H) 01/23/2025    RBC 4.24 01/23/2025    HGB 12.6 01/23/2025    HCT 40.6 01/23/2025     01/23/2025      Lab Results   Component Value Date    INR 1.0 07/08/2024    INR 1.0 06/05/2023    INR 1.0 12/01/2019     Lab Results   Component Value Date    CREATININE 0.93 01/23/2025    HGBA1C 5.5 07/11/2024       Imaging:  The following imaging impressions were reviewed by me during this visit:    -5/22/24 right shoulder xray shows moderate to severe glenohumeral joint OA, moderate AC joint OA  -8/2022 lumbar MRI shows L3-L4 moderate to marked spinal canal stenosis and right foraminal stenosis without nerve root impingement. L4-L5 moderate spinal canal stenosis. L5-S1 with no significatn central or foraminal stenosis   -4/2021 left knee xray shows " moderate OA  -7/20/23 cervical spine xray shows diffuse, severe degenerative changes    I also personally reviewed the images from the above studies myself. These images and my interpretation of them contributed to the management and decision making of the patient's medical plan.    ASSESSMENT:  Ms. Vickie Foster is a 69 y.o. female with low back, leg, and neck pain that is consistent with:    1. Chronic right shoulder pain    2. Primary osteoarthritis, right shoulder          PLAN:  Radiology: -Recommend she have her right shoulder xray completed    Physically: -Continue PT to focus on neck posture and myofascial release given apparent progression of cervical kyphosis.     Psychologically: No needs at this time    Medication: -She previously entered into a controlled substance agreement with our office but has not needed these medications consistently. Given recently worsening of right shoulder pain I did provide a refill of Norco today    Duration: Greater than 2 month    Intervention: -Reports significant benefit from recent right SI joint injection but continued to have burning buttock pain (R>L) that radiates down her thighs, likely secondary to known lumbar spinal stenosis. She subsequently underwent a caudal ADAM with 95% improvement. Of note, is on Eliquis  - Also reports bilateral knee pain, with prior knee x-rays showing moderate osteoarthritis. She has undergone both left and right knee joint injections with generally 60% pain relief for 3-4 months. Would consider genicular nerve blocks/RFA in the future  - I suspect her current neck pain is secondary to muscle strain/myofascial pain in the setting of poor posture while sleeping. She sleeps sitting up and bending over a pillow at a table due to difficulty breathing while lying flat. Recommend trying to sleep in a recliner and continue working with your cardiology/pulmonology teams to optimize your breathing. She underwent TPI's to the left neck with  significant improvement. Will monitor for duration of relief  - Her right shoulder pain remains severe.  She previously received relief from right subacromial bursa injection.  This was repeated with no pain relief at all.  Given presence of fairly severe glenohumeral joint arthritis I recommend a right shoulder joint steroid injection utilizing live fluoroscopy and local anesthesia.  Risk and benefits, alternative discussed.  She would like to proceed.              Sincerely,  Joseph Kelley MD  [facility] [place]

## 2025-03-04 ENCOUNTER — OFFICE VISIT (OUTPATIENT)
Dept: PAIN MEDICINE | Facility: CLINIC | Age: 70
End: 2025-03-04
Payer: MEDICARE

## 2025-03-04 VITALS
HEART RATE: 87 BPM | DIASTOLIC BLOOD PRESSURE: 76 MMHG | RESPIRATION RATE: 18 BRPM | HEIGHT: 62 IN | SYSTOLIC BLOOD PRESSURE: 134 MMHG | BODY MASS INDEX: 44.9 KG/M2 | OXYGEN SATURATION: 92 % | WEIGHT: 244 LBS

## 2025-03-04 DIAGNOSIS — M19.011 PRIMARY OSTEOARTHRITIS, RIGHT SHOULDER: ICD-10-CM

## 2025-03-04 DIAGNOSIS — G89.29 CHRONIC RIGHT SHOULDER PAIN: Primary | ICD-10-CM

## 2025-03-04 DIAGNOSIS — M25.511 CHRONIC RIGHT SHOULDER PAIN: Primary | ICD-10-CM

## 2025-03-04 PROCEDURE — 3078F DIAST BP <80 MM HG: CPT | Performed by: STUDENT IN AN ORGANIZED HEALTH CARE EDUCATION/TRAINING PROGRAM

## 2025-03-04 PROCEDURE — 1159F MED LIST DOCD IN RCRD: CPT | Performed by: STUDENT IN AN ORGANIZED HEALTH CARE EDUCATION/TRAINING PROGRAM

## 2025-03-04 PROCEDURE — 1036F TOBACCO NON-USER: CPT | Performed by: STUDENT IN AN ORGANIZED HEALTH CARE EDUCATION/TRAINING PROGRAM

## 2025-03-04 PROCEDURE — 1125F AMNT PAIN NOTED PAIN PRSNT: CPT | Performed by: STUDENT IN AN ORGANIZED HEALTH CARE EDUCATION/TRAINING PROGRAM

## 2025-03-04 PROCEDURE — 99214 OFFICE O/P EST MOD 30 MIN: CPT | Performed by: STUDENT IN AN ORGANIZED HEALTH CARE EDUCATION/TRAINING PROGRAM

## 2025-03-04 PROCEDURE — 3008F BODY MASS INDEX DOCD: CPT | Performed by: STUDENT IN AN ORGANIZED HEALTH CARE EDUCATION/TRAINING PROGRAM

## 2025-03-04 PROCEDURE — 3075F SYST BP GE 130 - 139MM HG: CPT | Performed by: STUDENT IN AN ORGANIZED HEALTH CARE EDUCATION/TRAINING PROGRAM

## 2025-03-04 PROCEDURE — 1160F RVW MEDS BY RX/DR IN RCRD: CPT | Performed by: STUDENT IN AN ORGANIZED HEALTH CARE EDUCATION/TRAINING PROGRAM

## 2025-03-04 PROCEDURE — G2211 COMPLEX E/M VISIT ADD ON: HCPCS | Performed by: STUDENT IN AN ORGANIZED HEALTH CARE EDUCATION/TRAINING PROGRAM

## 2025-03-04 RX ORDER — HYDROCODONE BITARTRATE AND ACETAMINOPHEN 5; 325 MG/1; MG/1
1 TABLET ORAL EVERY 8 HOURS PRN
Qty: 90 TABLET | Refills: 0 | Status: SHIPPED | OUTPATIENT
Start: 2025-03-04 | End: 2025-04-03

## 2025-03-04 ASSESSMENT — PATIENT HEALTH QUESTIONNAIRE - PHQ9
10. IF YOU CHECKED OFF ANY PROBLEMS, HOW DIFFICULT HAVE THESE PROBLEMS MADE IT FOR YOU TO DO YOUR WORK, TAKE CARE OF THINGS AT HOME, OR GET ALONG WITH OTHER PEOPLE: SOMEWHAT DIFFICULT
1. LITTLE INTEREST OR PLEASURE IN DOING THINGS: SEVERAL DAYS
2. FEELING DOWN, DEPRESSED OR HOPELESS: SEVERAL DAYS
SUM OF ALL RESPONSES TO PHQ9 QUESTIONS 1 AND 2: 2

## 2025-03-04 ASSESSMENT — PAIN SCALES - GENERAL
PAINLEVEL_OUTOF10: 10 - WORST POSSIBLE PAIN
PAINLEVEL_OUTOF10: 10-WORST PAIN EVER

## 2025-03-04 ASSESSMENT — PAIN DESCRIPTION - DESCRIPTORS: DESCRIPTORS: ACHING;THROBBING;SHOOTING

## 2025-03-04 ASSESSMENT — PAIN - FUNCTIONAL ASSESSMENT: PAIN_FUNCTIONAL_ASSESSMENT: 0-10

## 2025-03-06 ENCOUNTER — TREATMENT (OUTPATIENT)
Dept: PHYSICAL THERAPY | Facility: CLINIC | Age: 70
End: 2025-03-06
Payer: MEDICARE

## 2025-03-06 DIAGNOSIS — M54.2 CERVICALGIA: Primary | ICD-10-CM

## 2025-03-06 PROCEDURE — 97110 THERAPEUTIC EXERCISES: CPT | Mod: GP

## 2025-03-06 PROCEDURE — 97140 MANUAL THERAPY 1/> REGIONS: CPT | Mod: GP

## 2025-03-06 NOTE — PROGRESS NOTES
Physical Therapy Treatment    Patient Name: Vickie Foster  MRN: [id number]  Encounter date:  3/6/2025  Time Calculation  Start Time: 1505  Stop Time: 1545  Time Calculation (min): 40 min     PT Therapeutic Procedures Time Entry  Manual Therapy Time Entry: 15  Therapeutic Exercise Time Entry: 28       Visit Number:  4 (including evaluation)  Planned total visits: 10  Visits Authorized/Insurance Coverage:  NO AUTH, 30.00 CO PAY, [employer]% [employer], [place], [address] [employer], M[employer]    Current Problem  Problem List Items Addressed This Visit    None  Visit Diagnoses         Codes    Cervicalgia    -  Primary M54.2                   Precautions   A-fib  Poor supine tolerance secondary to pulmonary issues  Skin cancer     Relevant PMH:  CHF  Pulmonary HTN  A-fib  COPD  CVA  HA  Neuropathy     Pain  At rest  0/10    Right shoulder pain with elevation  Up to 8/10       Subjective  General Patient reports good management of left sided neck pain.  Patient continues to have significant pain right shoulder.  Patient to have right shoulder steroid injection under flouroscopy 3/31.     Objective  AROM right shoulder elevation 90  Crepitus right shoulder    Treatment:  Therapeutic Exercise  Poor supine tolerance secondary to pulmonary issues     Assisted patient back to PT via wheel chair   Pt sat to doff coat     Therapeutic exercises  - seated edge of w/c- Towel roll behind low back   Cervical rotation x 10   Cervical flex/ext x 10  Shoulder pinches x 10t.      Cervical lateral flexion x 10     Seated supported:  pelvis tilts x 10 - visual and verbal cues for form   Cervical retraction x 10   AAROM with cane  - chest press x 10  - flexion to 90 degrees x 10- crepitus right shoulder       Manual: Performed after exercise  Gentle STM over sweat shirt right upper trap, right proximal UE, cervical paraspinal     Current HEP:  HEP to be completed daily, exercises include:  Access Code: [email or web address]  URL: [email or web address]  Date:  02/20/2025  Prepared by: Smita Lima     Exercises  - Seated Posterior Pelvic Tilt  - 1 x daily - 5 x weekly - 1-2 sets - 10 reps  - Seated Cervical Retraction  - 1 x daily - 5 x weekly - 1-2 sets - 10 reps  - Seated Scapular Retraction  - 1 x daily - 5 x weekly - 1-2 sets - 10 reps  - Seated Cervical Rotation AROM  - 1 x daily - 5 x weekly - 1-2 sets - 10 reps  - Seated Cervical Sidebending AROM  - 1 x daily - 5 x weekly - 1-2 sets - 10 reps    Has patient been compliant with HEP? Yes    Activity tolerance:  good    OP EDUCATION:  HEP    Assessment:  Pt's response to treatment: good  Areas of improvements:  pain/function/cervical AROM  Limitations/deficits:  ROM right shoulder    Pain end of session:   No gross changes    Plan:  Continue with current POC with the following changes advance as able    Assessment of current progress against goals:  Progressing toward functional goals and Symptomatic relief with treatment       Goals:  STG (3 visits)  Patient to be independent with HEP     LTG (10 visits)   NDI to < 36% impaired   Patient to perform ADLs with pain < 3/10   Patient to quilt/cook with pain < 3/10   Cervical AROM left = right

## 2025-03-12 NOTE — PROGRESS NOTES
Physical Therapy Treatment    Patient Name: Vickie Foster  MRN: [id number]  Encounter date:  3/13/2025          Visit Number:  5 (including evaluation)  Planned total visits: 10  Visits Authorized/Insurance Coverage:  NO AUTH, 30.00 [employer] PAY, [employer]% [place], [place], [employer] [employer], M[employer]    Current Problem  Problem List Items Addressed This Visit    None       Precautions   A-fib  Poor supine tolerance secondary to pulmonary issues  Skin cancer     Relevant PMH:  CHF  Pulmonary HTN  A-fib  COPD  CVA  HA  Neuropathy          Pain       Subjective  General        ***    Objective  ***       Treatment:  Therapeutic Exercise  Poor supine tolerance secondary to pulmonary issues     Assisted patient back to PT via wheel chair   Pt sat to doff coat     Therapeutic exercises  - seated edge of w/c- Towel roll behind low back   Cervical rotation x 10   Cervical flex/ext x 10  Shoulder pinches x 10t.      Cervical lateral flexion x 10     Seated supported:  pelvis tilts x 10 - visual and verbal cues for form   Cervical retraction x 10   AAROM with cane  - chest press x 10  - flexion to 90 degrees x 10- crepitus right shoulder        Manual: Performed after exercise  Gentle STM over sweat shirt right upper trap, right proximal UE, cervical paraspinal     Current HEP:  HEP to be completed daily, exercises include:  Access Code: [email or web address]  URL: [email or web address]  Date: 02/20/2025  Prepared by: Smita Lima     Exercises  - Seated Posterior Pelvic Tilt  - 1 x daily - 5 x weekly - 1-2 sets - 10 reps  - Seated Cervical Retraction  - 1 x daily - 5 x weekly - 1-2 sets - 10 reps  - Seated Scapular Retraction  - 1 x daily - 5 x weekly - 1-2 sets - 10 reps  - Seated Cervical Rotation AROM  - 1 x daily - 5 x weekly - 1-2 sets - 10 reps  - Seated Cervical Sidebending AROM  - 1 x daily - 5 x weekly - 1-2 sets - 10 reps       Has patient been compliant with HEP? {Yes/No:57791}    Activity tolerance:  {Activity:62729}    OP  EDUCATION:  HEP    Assessment:  Pt's response to treatment:  ***  Areas of improvements:  ***  Limitations/deficits:  ***    Pain end of session: ***    Plan:  {BASPLAN:22911}    Assessment of current progress against goals:  {BASPTNOTEGOALASSESSMENT:49895}         Goals:  STG (3 visits)  Patient to be independent with HEP     LTG (10 visits)   NDI to < 36% impaired   Patient to perform ADLs with pain < 3/10   Patient to quilt/cook with pain < 3/10   Cervical AROM left = right

## 2025-03-13 ENCOUNTER — APPOINTMENT (OUTPATIENT)
Dept: PHYSICAL THERAPY | Facility: CLINIC | Age: 70
End: 2025-03-13
Payer: MEDICARE

## 2025-03-13 ENCOUNTER — DOCUMENTATION (OUTPATIENT)
Dept: PHYSICAL THERAPY | Facility: CLINIC | Age: 70
End: 2025-03-13
Payer: MEDICARE

## 2025-03-13 NOTE — PROGRESS NOTES
Physical Therapy                 Therapy Communication Note    Patient Name: Vickie Foster  MRN: [id number]  Department:   Room: Room/bed info not found  Today's Date: 3/13/2025     Discipline: Physical Therapy    Missed Time: Cancel

## 2025-03-20 ENCOUNTER — TREATMENT (OUTPATIENT)
Dept: PHYSICAL THERAPY | Facility: CLINIC | Age: 70
End: 2025-03-20
Payer: MEDICARE

## 2025-03-20 ENCOUNTER — DOCUMENTATION (OUTPATIENT)
Dept: PHYSICAL THERAPY | Facility: CLINIC | Age: 70
End: 2025-03-20
Payer: MEDICARE

## 2025-03-20 DIAGNOSIS — M54.2 CERVICALGIA: Primary | ICD-10-CM

## 2025-03-20 NOTE — PROGRESS NOTES
Physical Therapy                 Therapy Communication Note    Patient Name: Vickie Foster  MRN: [id number]  Department:   Room: Room/bed info not found  Today's Date: 3/20/2025     Discipline: Physical Therapy    Patient arrived to scheduled PT session 1515.  Patient reports increased LE edema last several days/13# weight gain.  Patient mentioned it is the first time it has progressed up to her thighs. Patient not SOB with rest. Patient reports SOB with exertion. Patient reports she was instructed by [facility] nurse call line to go to ER if it does not improve.  Out of an abundance of caution/new symptoms patient instructed to go to to ER.  Patient's  picked patient up 1620 to go to ER.  Dr. Dick and Dr. Maurice messaged regarding situation.    Missed Time: Cancel

## 2025-03-20 NOTE — PROGRESS NOTES
"    Physical Therapy Treatment    Patient Name: Vickie Foster  MRN: [id number]  Encounter date:  3/20/2025                Visit Number:  5 (including evaluation)  Planned total visits: 10  Insurance Information : NO AUTH, 30.00 CO PAY, [employer]% [place], [address] O[employer], M[employer]    Current Problem  Problem List Items Addressed This Visit    None  Visit Diagnoses         Codes    Cervicalgia    -  Primary M54.2              Precautions   A-fib  Poor supine tolerance secondary to pulmonary issues  Skin cancer     Relevant PMH:  CHF  Pulmonary HTN  A-fib  COPD  CVA  HA  Neuropathy       Pain  Right shoulder   Up to 9/10    Left sided neck pain    Subjective  General  Patient \"not having a good day\".  Patient to have injection right shoulder 3/31.    Objective  Crepitus right shoulder > 90 degrees  LE edema- cardiac team aware- diuretics increased- to fgo to ER if no changes tomorrow    SOB with exertion  Treatment:  Therapeutic Exercise  Poor supine tolerance secondary to pulmonary issues     Assisted patient back to PT via wheel chair   Pt sat to doff coat     Therapeutic exercises  - seated edge of w/c- Towel roll behind low back   Cervical rotation x 10   Cervical flex/ext x 10  Shoulder pinches x 10t.      Cervical lateral flexion x 10     Seated supported:  pelvis tilts x 10 - visual and verbal cues for form   Cervical retraction x 10   AAROM with cane  - chest press x 10  - flexion to 90 degrees x 10- crepitus right shoulder        Manual: Performed after exercise  Gentle STM over sweat shirt right upper trap, right proximal UE, cervical paraspinal     Current HEP:  HEP to be completed daily, exercises include:  Access Code: [email or web address]  URL: [email or web address]  Date: 02/20/2025  Prepared by: Smita Lima     Exercises  - Seated Posterior Pelvic Tilt  - 1 x daily - 5 x weekly - 1-2 sets - 10 reps  - Seated Cervical Retraction  - 1 x daily - 5 x weekly - 1-2 sets - 10 reps  - Seated Scapular Retraction  - 1 x daily - 5 " x weekly - 1-2 sets - 10 reps  - Seated Cervical Rotation AROM  - 1 x daily - 5 x weekly - 1-2 sets - 10 reps  - Seated Cervical Sidebending AROM  - 1 x daily - 5 x weekly - 1-2 sets - 10 reps    Has patient been compliant with HEP? {Yes/No:20717}    Activity tolerance:  {Activity:32369}    OP EDUCATION:       Assessment:  Pt's response to treatment:  ***  Areas of improvements:  ***  Limitations/deficits:  ***    Pain end of session: ***    Plan:  {BASPLAN:95180}    Assessment of current progress against goals:  {BASPTNOTEGOALASSESSMENT:08785}      Goals:  STG (3 visits)  Patient to be independent with HEP     LTG (10 visits)   NDI to < 36% impaired   Patient to perform ADLs with pain < 3/10   Patient to quilt/cook with pain < 3/10   Cervical AROM left = right

## 2025-03-21 ENCOUNTER — TELEPHONE (OUTPATIENT)
Facility: CLINIC | Age: 70
End: 2025-03-21
Payer: MEDICARE

## 2025-03-21 NOTE — TELEPHONE ENCOUNTER
Patient calling because she is having swelling and when she would have swelling dr pagan advised her to take metolazone 2.5,  3 days a week patient is having swelling again and is wondering if she can take her extra metolazone please advise

## 2025-03-24 DIAGNOSIS — I50.32 CHRONIC DIASTOLIC HEART FAILURE: Primary | ICD-10-CM

## 2025-03-24 NOTE — TELEPHONE ENCOUNTER
Called patient and she states she had increased welling took metolazone for a short time and swelling is gone down significantly and she is lost 7 pounds.  Feels better at this time.  I instructed her to use the metolazone as a booster only as needed.  She should take it approximately 30 minutes before her morning torsemide dose.  On the day she takes that she should also take an extra potassium chloride 20 mill equivalents on those days.  If she does not have significant increase in swelling she does not need to take it at all.  I will place an order for a basic metabolic panel just to make sure potassium and creatinine levels are reasonable.  She states she has an appointment to see Dr. Dick in the future.

## 2025-03-24 NOTE — TELEPHONE ENCOUNTER
Attempted to contact the patient by phone on multiple occasions and never any answer.  Left messages on answering machine but once again I have had no response.  Last message I told her I assume that her question has been answered by somebody else but if she still had questions for our practice to give us another call.

## 2025-03-24 NOTE — TELEPHONE ENCOUNTER
Patient just returned call [phone] is the husbands number she is having trouble with her line.

## 2025-03-27 ENCOUNTER — HOSPITAL ENCOUNTER (OUTPATIENT)
Dept: RADIOLOGY | Facility: CLINIC | Age: 70
Discharge: HOME | End: 2025-03-27
Payer: MEDICARE

## 2025-03-27 ENCOUNTER — APPOINTMENT (OUTPATIENT)
Dept: PRIMARY CARE | Facility: CLINIC | Age: 70
End: 2025-03-27
Payer: MEDICARE

## 2025-03-27 VITALS — BODY MASS INDEX: 44.63 KG/M2 | WEIGHT: 244 LBS

## 2025-03-27 DIAGNOSIS — M25.511 CHRONIC RIGHT SHOULDER PAIN: ICD-10-CM

## 2025-03-27 DIAGNOSIS — G89.29 CHRONIC RIGHT SHOULDER PAIN: ICD-10-CM

## 2025-03-27 DIAGNOSIS — Z12.31 SCREENING MAMMOGRAM FOR BREAST CANCER: ICD-10-CM

## 2025-03-27 PROCEDURE — 77063 BREAST TOMOSYNTHESIS BI: CPT | Performed by: RADIOLOGY

## 2025-03-27 PROCEDURE — 77067 SCR MAMMO BI INCL CAD: CPT | Performed by: RADIOLOGY

## 2025-03-27 PROCEDURE — 73030 X-RAY EXAM OF SHOULDER: CPT | Mod: RT

## 2025-03-27 PROCEDURE — 77063 BREAST TOMOSYNTHESIS BI: CPT

## 2025-03-28 ENCOUNTER — TREATMENT (OUTPATIENT)
Dept: PHYSICAL THERAPY | Facility: CLINIC | Age: 70
End: 2025-03-28
Payer: MEDICARE

## 2025-03-28 DIAGNOSIS — M54.2 CERVICALGIA: Primary | ICD-10-CM

## 2025-03-28 PROCEDURE — 97110 THERAPEUTIC EXERCISES: CPT | Mod: GP

## 2025-03-28 PROCEDURE — 97140 MANUAL THERAPY 1/> REGIONS: CPT | Mod: GP

## 2025-03-28 NOTE — PROGRESS NOTES
"    Physical Therapy Treatment    Patient Name: Vickie Foster  MRN: [id number]  Encounter date:  3/28/2025  Time Calculation  Start Time: 1355  Stop Time: 1445  Time Calculation (min): 50 min     PT Therapeutic Procedures Time Entry  Manual Therapy Time Entry: 15  Therapeutic Exercise Time Entry: 26       Visit Number:  5 (including evaluation)  Planned total visits: 10  Visits Authorized/Insurance Coverage:  NO AUTH, 30.00 CO PAY, [place]% [place], [place], 3300 OOP, MMO    Current Problem  Problem List Items Addressed This Visit    None  Visit Diagnoses         Codes    Cervicalgia    -  Primary M54.2            Precautions   A-fib  Poor supine tolerance secondary to pulmonary issues  Skin cancer     Relevant PMH:  CHF  Pulmonary HTN  A-fib  COPD  CVA  HA  Neuropathy       Pain  Right shoulder  Up to 8-9/10    Cervical pain  Up to 7-8/10      Subjective  General  Patient to cardiologist for LE edema; much improved with medication.  Patient to have right shoudler injection next week -3/31.   Patient reports chief complaint of right shoulder pain.  Patient diligent with HEP-\"feel better when I loosen up\".    Objective  FHP    Treatment:  Therapeutic Exercise  Poor supine tolerance secondary to pulmonary issues     Assisted patient back to PT via wheel chair   Pt sat to doff coat     Therapeutic exercises  - seated edge of w/c- Towel roll behind low back   Cervical rotation x 10   Cervical flex/ext x 10  Cervical lateral flexion x 10    Shoulder pinches x 12- OTB- right shoulder pain       pelvis tilts x 10 - visual and verbal cues for form   Cervical retraction x 10   AAROM flexion to 90 x 10  AAROM with cane- DNP  - chest press x 10- DNP  - flexion to 90 degrees x 10- crepitus right shoulder        Manual: Performed after exercise  Pillow under right UE  Gentle STM with right upper trap, right proximal UE, cervical paraspinal     Current HEP:  HEP to be completed daily, exercises include:  Access Code: [email or web address]  URL: " [email or web address]  Date: 02/20/2025  Prepared by: Smita Lima     Exercises  - Seated Posterior Pelvic Tilt  - 1 x daily - 5 x weekly - 1-2 sets - 10 reps  - Seated Cervical Retraction  - 1 x daily - 5 x weekly - 1-2 sets - 10 reps  - Seated Scapular Retraction  - 1 x daily - 5 x weekly - 1-2 sets - 10 reps  - Seated Cervical Rotation AROM  - 1 x daily - 5 x weekly - 1-2 sets - 10 reps  - Seated Cervical Sidebending AROM  - 1 x daily - 5 x weekly - 1-2 sets - 10 reps     Has patient been compliant with HEP? Yes    Activity tolerance:  good    OP EDUCATION:  HEP  Postural training    Assessment:  Pt's response to treatment:  good  Areas of improvements:  neck pain  Limitations/deficits:  right shoulder pain     Pain end of session:   Modest reduction soft tissue symptoms    Plan:  Continue with current POC with the following changes advance as able after injection 3/31.    Assessment of current progress against goals:  Progressing toward functional goals       Goals:  STG (3 visits)  Patient to be independent with HEP     LTG (10 visits)   NDI to < 36% impaired   Patient to perform ADLs with pain < 3/10   Patient to quilt/cook with pain < 3/10   Cervical AROM left = right

## 2025-03-29 LAB
25(OH)D3+25(OH)D2 SERPL-MCNC: 32 NG/ML (ref 30–100)
ALBUMIN SERPL-MCNC: 4.3 G/DL (ref 3.6–5.1)
ALP SERPL-CCNC: 86 U/L (ref 37–153)
ALT SERPL-CCNC: 15 U/L (ref 6–29)
ANION GAP SERPL CALCULATED.4IONS-SCNC: 11 MMOL/L (CALC) (ref 7–17)
AST SERPL-CCNC: 19 U/L (ref 10–35)
BASOPHILS # BLD AUTO: 19 CELLS/UL (ref 0–200)
BASOPHILS NFR BLD AUTO: 0.2 %
BILIRUB SERPL-MCNC: 0.9 MG/DL (ref 0.2–1.2)
BUN SERPL-MCNC: 38 MG/DL (ref 7–25)
CALCIUM SERPL-MCNC: 9.9 MG/DL (ref 8.6–10.4)
CHLORIDE SERPL-SCNC: 88 MMOL/L (ref 98–110)
CHOLEST SERPL-MCNC: 137 MG/DL
CHOLEST/HDLC SERPL: 2.6 (CALC)
CO2 SERPL-SCNC: 44 MMOL/L (ref 20–32)
CREAT SERPL-MCNC: 0.7 MG/DL (ref 0.5–1.05)
EGFRCR SERPLBLD CKD-EPI 2021: 94 ML/MIN/1.73M2
EOSINOPHIL # BLD AUTO: 158 CELLS/UL (ref 15–500)
EOSINOPHIL NFR BLD AUTO: 1.7 %
ERYTHROCYTE [DISTWIDTH] IN BLOOD BY AUTOMATED COUNT: 14.3 % (ref 11–15)
EST. AVERAGE GLUCOSE BLD GHB EST-MCNC: 126 MG/DL
EST. AVERAGE GLUCOSE BLD GHB EST-SCNC: 7 MMOL/L
GLUCOSE SERPL-MCNC: 98 MG/DL (ref 65–139)
HBA1C MFR BLD: 6 % OF TOTAL HGB
HCT VFR BLD AUTO: 40.1 % (ref 35–45)
HCV AB SERPL QL IA: NORMAL
HDLC SERPL-MCNC: 52 MG/DL
HGB BLD-MCNC: 13 G/DL (ref 11.7–15.5)
LDLC SERPL CALC-MCNC: 72 MG/DL (CALC)
LYMPHOCYTES # BLD AUTO: 1367 CELLS/UL (ref 850–3900)
LYMPHOCYTES NFR BLD AUTO: 14.7 %
MCH RBC QN AUTO: 30.4 PG (ref 27–33)
MCHC RBC AUTO-ENTMCNC: 32.4 G/DL (ref 32–36)
MCV RBC AUTO: 93.7 FL (ref 80–100)
MONOCYTES # BLD AUTO: 846 CELLS/UL (ref 200–950)
MONOCYTES NFR BLD AUTO: 9.1 %
NEUTROPHILS # BLD AUTO: 6910 CELLS/UL (ref 1500–7800)
NEUTROPHILS NFR BLD AUTO: 74.3 %
NONHDLC SERPL-MCNC: 85 MG/DL (CALC)
PLATELET # BLD AUTO: 285 THOUSAND/UL (ref 140–400)
PMV BLD REES-ECKER: 10.8 FL (ref 7.5–12.5)
POTASSIUM SERPL-SCNC: 3 MMOL/L (ref 3.5–5.3)
PROT SERPL-MCNC: 7.3 G/DL (ref 6.1–8.1)
RBC # BLD AUTO: 4.28 MILLION/UL (ref 3.8–5.1)
SODIUM SERPL-SCNC: 143 MMOL/L (ref 135–146)
TRIGL SERPL-MCNC: 58 MG/DL
TSH SERPL-ACNC: 1.94 MIU/L (ref 0.4–4.5)
URATE SERPL-MCNC: 8.1 MG/DL (ref 2.5–7)
WBC # BLD AUTO: 9.3 THOUSAND/UL (ref 3.8–10.8)

## 2025-03-31 ENCOUNTER — HOSPITAL ENCOUNTER (OUTPATIENT)
Dept: GASTROENTEROLOGY | Facility: HOSPITAL | Age: 70
Discharge: HOME | End: 2025-03-31
Payer: MEDICARE

## 2025-03-31 VITALS
BODY MASS INDEX: 44.9 KG/M2 | HEART RATE: 86 BPM | HEIGHT: 62 IN | SYSTOLIC BLOOD PRESSURE: 120 MMHG | DIASTOLIC BLOOD PRESSURE: 67 MMHG | TEMPERATURE: 97.9 F | OXYGEN SATURATION: 95 % | RESPIRATION RATE: 17 BRPM | WEIGHT: 244 LBS

## 2025-03-31 DIAGNOSIS — M54.16 LUMBAR RADICULOPATHY: Primary | ICD-10-CM

## 2025-03-31 DIAGNOSIS — M19.011 PRIMARY OSTEOARTHRITIS, RIGHT SHOULDER: ICD-10-CM

## 2025-03-31 PROCEDURE — 2500000004 HC RX 250 GENERAL PHARMACY W/ HCPCS (ALT 636 FOR OP/ED): Performed by: STUDENT IN AN ORGANIZED HEALTH CARE EDUCATION/TRAINING PROGRAM

## 2025-03-31 PROCEDURE — 2500000001 HC RX 250 WO HCPCS SELF ADMINISTERED DRUGS (ALT 637 FOR MEDICARE OP): Performed by: STUDENT IN AN ORGANIZED HEALTH CARE EDUCATION/TRAINING PROGRAM

## 2025-03-31 PROCEDURE — 2550000001 HC RX 255 CONTRASTS: Performed by: STUDENT IN AN ORGANIZED HEALTH CARE EDUCATION/TRAINING PROGRAM

## 2025-03-31 PROCEDURE — 20610 DRAIN/INJ JOINT/BURSA W/O US: CPT | Performed by: STUDENT IN AN ORGANIZED HEALTH CARE EDUCATION/TRAINING PROGRAM

## 2025-03-31 RX ORDER — ROPIVACAINE HYDROCHLORIDE 2 MG/ML
INJECTION, SOLUTION EPIDURAL; INFILTRATION; PERINEURAL AS NEEDED
Status: COMPLETED | OUTPATIENT
Start: 2025-03-31 | End: 2025-03-31

## 2025-03-31 RX ORDER — ALPRAZOLAM 0.5 MG/1
0.5 TABLET ORAL ONCE
Status: COMPLETED | OUTPATIENT
Start: 2025-03-31 | End: 2025-03-31

## 2025-03-31 RX ORDER — GABAPENTIN 300 MG/1
300 CAPSULE ORAL 3 TIMES DAILY
Qty: 270 CAPSULE | Refills: 1 | Status: SHIPPED | OUTPATIENT
Start: 2025-03-31 | End: 2025-09-27

## 2025-03-31 RX ORDER — TRIAMCINOLONE ACETONIDE 40 MG/ML
INJECTION, SUSPENSION INTRA-ARTICULAR; INTRAMUSCULAR AS NEEDED
Status: COMPLETED | OUTPATIENT
Start: 2025-03-31 | End: 2025-03-31

## 2025-03-31 RX ORDER — LIDOCAINE HYDROCHLORIDE 10 MG/ML
INJECTION, SOLUTION EPIDURAL; INFILTRATION; INTRACAUDAL; PERINEURAL AS NEEDED
Status: COMPLETED | OUTPATIENT
Start: 2025-03-31 | End: 2025-03-31

## 2025-03-31 RX ADMIN — IOHEXOL 1 ML: 240 INJECTION, SOLUTION INTRATHECAL; INTRAVASCULAR; INTRAVENOUS; ORAL at 10:16

## 2025-03-31 RX ADMIN — TRIAMCINOLONE ACETONIDE 40 MG: 40 INJECTION, SUSPENSION INTRA-ARTICULAR; INTRAMUSCULAR at 10:16

## 2025-03-31 RX ADMIN — LIDOCAINE HYDROCHLORIDE 3 ML: 10 INJECTION, SOLUTION EPIDURAL; INFILTRATION; INTRACAUDAL; PERINEURAL at 10:16

## 2025-03-31 RX ADMIN — ALPRAZOLAM 0.5 MG: 0.5 TABLET ORAL at 09:31

## 2025-03-31 RX ADMIN — ROPIVACAINE HYDROCHLORIDE 3 ML: 2 INJECTION, SOLUTION EPIDURAL; INFILTRATION at 10:16

## 2025-03-31 ASSESSMENT — PAIN SCALES - GENERAL: PAINLEVEL_OUTOF10: 0 - NO PAIN

## 2025-03-31 ASSESSMENT — PAIN - FUNCTIONAL ASSESSMENT
PAIN_FUNCTIONAL_ASSESSMENT: 0-10
PAIN_FUNCTIONAL_ASSESSMENT: 0-10

## 2025-03-31 NOTE — DISCHARGE INSTRUCTIONS
DISCHARGE INSTRUCTIONS FOR INJECTIONS     You underwent a right shoulder joint steroid injection today    Aftermost injections, it is recommended that you relax and limit your activity for the remainder of the day unless you have been told otherwise by your pain physician.  You should not drive a car, operate machinery, or make important legal decisions unless otherwise directed by your pain physician.  You may resume your normal activity, including exercise, tomorrow.      Keep a written pain diary of how much pain relief you experienced following the injection procedure and the length of time of pain relief you experienced pain relief. Following diagnostic injections like medial branch nerve blocks, sacroiliac joint blocks, stellate ganglion injections and other blocks, it is very important you record the specific amount of pain relief you experienced immediately after the injectionand how long it lasted. Your doctor will ask you for this information at your follow up visit.     For all injections, please keep the injection site dry and inspect the site for a couple of days. You may remove the Band-Aid the day of the injection at any time.     Some discomfort, bruising or slight swelling may occur at the injection site. This is not abnormal if it occurs.  If needed you may:    -Take over the counter medication such as Tylenol or Motrin.   -Apply an ice pack for 30 minutes, 2 to 3 times a day for the first 24 hours.     You may shower today; no soaking baths, hot tubs, whirlpools or swimming pools for two days.      If you are given steroids in your injection, it may take 3-5 days for the steroid medication to take effect. You may notice a worsening of your symptoms for 1-2 days after the injection. This is not abnormal.  You may use acetaminophen, ibuprofen, or prescription medication that your doctor may have prescribed for you if you need to do so.     A few common side effects of steroids include facial  flushing, sweating, restlessness, irritability,difficulty sleeping, increase in blood sugar, and increased blood pressure. If you have diabetes, please monitor your blood sugar at least once a day for at least 5 days. If you have poorly controlled high blood pressure, monitoryour blood pressure for at least 2 days and contact your primary care physician if these numbers are unusually high for you.      If you take aspirin or non-steroidal anti-inflammatory drugs (examples are Motrin, Advil, ibuprofen, Naprosyn, Voltaren, Relafen, etc.) you may restart these this evening, but stop taking it 3 days before your next appointment, unless instructed otherwiseby your physician.      You do not need to discontinue non-aspirin-containing pain medications prior to an injection (examples: Celebrex, tramadol, hydrocodone and acetaminophen).      If you take a blood thinning medication (Coumadin, Lovenox, Fragmin,Ticlid, Plavix, Pradaxa, etc.), please discuss this with your primary care physician/cardiologist and your pain physician. These medications MUST be discontinued before you can have an injection safely, without the risk of uncontrolled bleeding. If these medications are not discontinued for an appropriate period of time, you will not be able to receivean injection.      If you are taking Coumadin, please have your INR checked the morning of your procedure and bringthe result to your appointment unless otherwise instructed. If your INR is over 1.2, your injection may need to be rescheduled to avoid uncontrolled bleeding from the needle placement.     Call [facility] at [phone] between 8am-4pm Monday - Friday if you are experiencing the following:    If you received an epidural or spinal injection:    -Headache that doesnot go away with medicine, is worse when sitting or standing up, and is greatly relieved upon lying down.   -Severe pain worse than or different than your baseline pain.   -Chills  or fever (101º F or greater).   -Drainage or signs of infection at the injection site     Go directly to the Emergency Department if you are experiencing the following and received an epidural or spinal injection:   -Abrupt weakness or progressive weakness in your legs that starts after you leave the clinic.   -Abrupt severe or worsening numbness in your legs.   -Inability to urinate after the injection or loss of bowel or bladder control without the urge to defecate or urinate.     If you have a clinical question that cannot wait until your next appointment, please call [phone] between 8am-4pm Monday - Friday or send a [employer] message. We do our best to return all non-emergency messages within 24 hours, Monday - Friday. A nurse or physician will return your message.      If you need to cancel an appointment, please call the scheduling staff at [phone] during normal business hours or leave a message at least 24 hours in advance.     If you are going to be sedated for your next procedure, you MUST have responsible adult who can legally drive accompany you home. You cannot eat or drink for eight hours prior to the planned procedure if you are going to receive sedation. You may take your non-blood thinning medications with a small sip of water.

## 2025-03-31 NOTE — PERIOPERATIVE NURSING NOTE
Patient alert and awake.   Band aid dry and intact bandaid dry and intact to right shoulder. Discharge instructions reviewed with patient. Patient states she has more mobility to right shoulder

## 2025-04-01 DIAGNOSIS — E87.6 HYPOKALEMIA: Primary | ICD-10-CM

## 2025-04-01 RX ORDER — POTASSIUM CHLORIDE 20 MEQ/1
20 TABLET, EXTENDED RELEASE ORAL DAILY
Qty: 90 TABLET | Refills: 0 | Status: SHIPPED | OUTPATIENT
Start: 2025-04-01 | End: 2025-06-30

## 2025-04-03 ENCOUNTER — DOCUMENTATION (OUTPATIENT)
Dept: PHYSICAL THERAPY | Facility: CLINIC | Age: 70
End: 2025-04-03
Payer: MEDICARE

## 2025-04-03 ENCOUNTER — HOSPITAL ENCOUNTER (EMERGENCY)
Facility: HOSPITAL | Age: 70
Discharge: HOME | End: 2025-04-03
Payer: MEDICARE

## 2025-04-03 ENCOUNTER — PHARMACY VISIT (OUTPATIENT)
Dept: PHARMACY | Facility: CLINIC | Age: 70
End: 2025-04-03
Payer: COMMERCIAL

## 2025-04-03 ENCOUNTER — TREATMENT (OUTPATIENT)
Dept: PHYSICAL THERAPY | Facility: CLINIC | Age: 70
End: 2025-04-03
Payer: MEDICARE

## 2025-04-03 VITALS
HEIGHT: 64 IN | TEMPERATURE: 97.5 F | RESPIRATION RATE: 18 BRPM | OXYGEN SATURATION: 92 % | HEART RATE: 86 BPM | SYSTOLIC BLOOD PRESSURE: 120 MMHG | BODY MASS INDEX: 41.66 KG/M2 | WEIGHT: 244 LBS | DIASTOLIC BLOOD PRESSURE: 72 MMHG

## 2025-04-03 DIAGNOSIS — M54.2 CERVICALGIA: ICD-10-CM

## 2025-04-03 DIAGNOSIS — I87.2 VENOUS STASIS DERMATITIS OF RIGHT LOWER EXTREMITY: Primary | ICD-10-CM

## 2025-04-03 DIAGNOSIS — L03.115 CELLULITIS OF RIGHT LOWER EXTREMITY: ICD-10-CM

## 2025-04-03 PROCEDURE — RXMED WILLOW AMBULATORY MEDICATION CHARGE

## 2025-04-03 PROCEDURE — 99282 EMERGENCY DEPT VISIT SF MDM: CPT

## 2025-04-03 PROCEDURE — 99283 EMERGENCY DEPT VISIT LOW MDM: CPT

## 2025-04-03 PROCEDURE — 2500000001 HC RX 250 WO HCPCS SELF ADMINISTERED DRUGS (ALT 637 FOR MEDICARE OP)

## 2025-04-03 RX ORDER — CEPHALEXIN 500 MG/1
500 CAPSULE ORAL ONCE
Status: COMPLETED | OUTPATIENT
Start: 2025-04-03 | End: 2025-04-03

## 2025-04-03 RX ORDER — CEPHALEXIN 500 MG/1
500 CAPSULE ORAL 4 TIMES DAILY
Qty: 28 CAPSULE | Refills: 0 | Status: SHIPPED | OUTPATIENT
Start: 2025-04-03 | End: 2025-04-10

## 2025-04-03 RX ADMIN — CEPHALEXIN 500 MG: 500 CAPSULE ORAL at 16:38

## 2025-04-03 ASSESSMENT — PAIN - FUNCTIONAL ASSESSMENT: PAIN_FUNCTIONAL_ASSESSMENT: 0-10

## 2025-04-03 ASSESSMENT — PAIN SCALES - GENERAL: PAINLEVEL_OUTOF10: 0 - NO PAIN

## 2025-04-03 NOTE — ED PROVIDER NOTES
HPI   Chief Complaint   Patient presents with    Leg Swelling     Pt reports leg swelling.Hx CHF        HPI  Patient is a 69-year-old female with history of hypertension, CAD, A-fib, CHF presenting for evaluation of a right lower extremity wound and seepage increased over the past few days.  Patient reports watery drainage and some cracking of the skin of the right lower shin.  States that her physical therapist thought she should come to the ER for concerns of developing cellulitis.  Patient reports that her lower extremity edema is actually improved over the past week compared to normal.  She reports that she does use 2 L of oxygen as needed for shortness of breath and does not endorse any worsening of her shortness of breath compared to baseline.  She denies chest pain.  Denies fevers or chills.  She states her dog has been licking the wound and she is concerned for infection.  She otherwise has no acute complaints.      Patient History   Past Medical History:   Diagnosis Date    A-fib (Multi)     Cellulitis     CHF (congestive heart failure)     COVID     Heart disease     HTN (hypertension)     Low back pain      Past Surgical History:   Procedure Laterality Date    ABLATION OF DYSRHYTHMIC FOCUS      BREAST BIOPSY  2000    CARDIAC ELECTROPHYSIOLOGY PROCEDURE N/A 05/01/2024    Procedure: Cardioversion;  Surgeon: Amado Ty MD;  Location: [facility] Cardiac Cath Lab;  Service: Electrophysiology;  Laterality: N/A;  13426  i48.0  medical mutual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    CARDIAC ELECTROPHYSIOLOGY PROCEDURE N/A 07/08/2024    Procedure: Ablation A-Fib;  Surgeon: Amado Ty MD;  Location: [facility] Cardiac Cath Lab;  Service: Electrophysiology;  Laterality: N/A;  AFIB CRYOABLATION; CPT 28175; ICD I48.0   Medicare mutual of [place] medicare  ID - [id number]  [phone]  No Auth required  Call ref # [id number]    CARDIAC ELECTROPHYSIOLOGY PROCEDURE N/A 09/18/2024    Procedure: Cardioversion;  Surgeon: Amado Ty  MD;  Location: [facility] Cardiac Cath Lab;  Service: Electrophysiology;  Laterality: N/A;  CARDIOVERSION CPT 02724, ICD I48.0 no auth required/REF # [id number]    CARDIOVERSION      2024     Family History   Problem Relation Name Age of Onset    Breast cancer Mother      Heart attack Mother           in early 80s of MI    Heart disease Mother      Colon cancer Father      Breast cancer Sister      Diabetes type II Sister      Kidney failure Sister      Other (herione addict) Sister      No Known Problems Brother      No Known Problems Daughter      Other (recovering alcoholic) Son      Breast cancer Other Maternal Aunt      Social History     Tobacco Use    Smoking status: Former     Current packs/day: 0.00     Types: Cigarettes     Quit date:      Years since quittin.2    Smokeless tobacco: Never   Vaping Use    Vaping status: Never Used   Substance Use Topics    Alcohol use: Not Currently     Comment: monthly or less    Drug use: Never       Physical Exam   ED Triage Vitals [25 1533]   Temperature Heart Rate Respirations BP   36.4 °C (97.5 °F) 86 18 120/72      Pulse Ox Temp Source Heart Rate Source Patient Position   (!) 92 % Temporal Monitor Sitting      BP Location FiO2 (%)     Left arm --       Physical Exam  Vitals and nursing note reviewed.   Constitutional:       General: She is not in acute distress.     Appearance: She is well-developed.   HENT:      Head: Normocephalic and atraumatic.   Eyes:      Conjunctiva/sclera: Conjunctivae normal.   Cardiovascular:      Rate and Rhythm: Normal rate and regular rhythm.      Heart sounds: No murmur heard.  Pulmonary:      Effort: Pulmonary effort is normal. No respiratory distress.      Breath sounds: Normal breath sounds.      Comments: In no respiratory distress, lungs clear to auscultation bilaterally  Abdominal:      Palpations: Abdomen is soft.      Tenderness: There is no abdominal tenderness.   Musculoskeletal:      Cervical back: Neck  supple.      Comments: 3+ pitting edema bilateral lower extremities with evidence of chronic bases stasis dermatitis of the right lower extremity with some serous drainage of the right lower extremity   Skin:     General: Skin is warm and dry.      Capillary Refill: Capillary refill takes less than 2 seconds.   Neurological:      Mental Status: She is alert.   Psychiatric:         Mood and Affect: Mood normal.           ED Course & MDM   Diagnoses as of 04/03/25 1648   Venous stasis dermatitis of right lower extremity   Cellulitis of right lower extremity                 No data recorded     Maddy Coma Scale Score: 15 (04/03/25 1639 : Zuri Kong LPN)                           Medical Decision Making  Parts of this chart have been completed using voice recognition software. Please excuse any errors of transcription.  My thought process and reason for plan has been formulated from the time that I saw the patient until the time of disposition and is not specific to one specific moment during their visit and furthermore my MDM encompasses this entire chart and not only this text box.      HPI: Detailed above.    Exam: A medically appropriate exam performed, outlined above, given the known history and presentation.    History obtained from: Patient    Medications given during visit:  Medications   cephalexin (Keflex) capsule 500 mg (500 mg oral Given 4/3/25 1638)        Diagnostic/tests  Labs Reviewed - No data to display   No orders to display        Considerations/further MDM:  Patient is a 69-year-old female presenting for evaluation of right leg wound, redness    Patient awake alert in no apparent distress.  She has no evidence of airway compromise or respiratory distress.  She does have 3+ pitting edema of her lower extremities bilaterally and evidence of chronic venous stasis skin changes with clear fluid seepage from her right lower extremity.  There is mild erythema around the wound with warmth to the  touch.  There is no purulent discharge.  Patient's lower extremity edema is actually improved compared to normal she states she has no chest pain or shortness of breath she is not hypoxic and she is on room air upon arrival to the ER.  Does have 2 L to use as needed at home.  My suspicion for acute CHF exacerbation requiring hospital stay is low I do feel this patient is stable for outpatient follow-up with wound clinic and Rx for Keflex provided for treatment of developing cellulitis.  Patient felt to be stable for discharge.  The patient is educated on all findings and agreeable with this plan of care.  I discussed the laboratory and imaging findings with the patient at bedside. Patient's questions and concerns were addressed. Patient was released in good condition, discharged with instructions to follow up with primary care provider and appropriate specialist, and to return to ED at any time for worsening symptoms or any other concerns. Patient demonstrates understanding of the findings and the importance of appropriate follow up care.         Procedure  Procedures     Starr Dozier PA-C  04/03/25 5370

## 2025-04-03 NOTE — PROGRESS NOTES
Physical Therapy Treatment    Patient Name: Vickie Foster  MRN: [id number]  Encounter date:  4/3/2025  Time Calculation  Start Time: 1430  Stop Time: 1500  Time Calculation (min): 30 min             Visit Number:  6 (including evaluation)  Planned total visits: 10  Visits Authorized/Insurance Coverage:  NO AUTH, 30.00 CO PAY, [place]% [place], [place], 3300 OOP, MMO    Current Problem  Problem List Items Addressed This Visit    None  Visit Diagnoses         Codes    Cervicalgia     M54.2            Precautions   A-fib  Poor supine tolerance secondary to pulmonary issues  Skin cancer     Relevant PMH:  CHF  Pulmonary HTN  A-fib  COPD  CVA  HA  Neuropathy       Pain      Subjective  General Patient s/p steroid injection right shoulder 3/31/25.  Per notes Dr. Kelley 4/1/25        X-ray of the shoulder reveals severe joint space narrowing with osteophytosis/bone spurs in the glenohumeral joint as well as some mild degenerative changes in the AC joint.  Recommend follow-up with orthopedic surgery if symptoms not improving for discussion of shoulder replacement surgery if pain is severe/not improving     Patient with marked decrease pain and increase in shoulder ROM and function.  Patient pleased with progress.  Patient able to use arm for ADLs/perform exercise involving shoulder elevation with much less pain      Objective  Weeping noticed  right LE secondary    Treatment:  Therapeutic Exercise  Poor supine tolerance secondary to pulmonary issues     Assisted patient back to PT via wheel chair   Pt sat to doff coat     Therapeutic exercises  - seated edge of w/c- Towel roll behind low back   Cervical rotation x 10   Cervical flex/ext x 10  Cervical lateral flexion x 10     Shoulder pinches x 12- OTB- right shoulder pain        pelvis tilts x 10 - visual and verbal cues for form   Cervical retraction x 10   AAROM flexion to 90 x 10  AAROM with cane- DNP  - chest press x 10- DNP  - flexion to 90 degrees x 10- crepitus right  ----- Message from Saroj Kinney MD sent at 9/19/2021  8:52 AM CDT -----  Please let [place] or his wife know his a1c blood sugar improved a lot (10.2 to 8.1), kidney function is on the lower side but stable, blood count looks good. No medication changes. No major abnormalities that would be causing the blood in the urine, consult with Urology as scheduled. Follow up with me as schedule in october   shoulder        Manual: Performed after exercise  Pillow under right UE  Gentle STM with right upper trap, right proximal UE, cervical paraspinal     Current HEP:  HEP to be completed daily, exercises include:  Access Code: [email or web address]  URL: [email or web address]  Date: 02/20/2025  Prepared by: Smita Lima     Exercises  - Seated Posterior Pelvic Tilt  - 1 x daily - 5 x weekly - 1-2 sets - 10 reps  - Seated Cervical Retraction  - 1 x daily - 5 x weekly - 1-2 sets - 10 reps  - Seated Scapular Retraction  - 1 x daily - 5 x weekly - 1-2 sets - 10 reps  - Seated Cervical Rotation AROM  - 1 x daily - 5 x weekly - 1-2 sets - 10 reps  - Seated Cervical Sidebending AROM  - 1 x daily - 5 x weekly - 1-2 sets - 10 reps       Has patient been compliant with HEP? Yes    Activity tolerance:  {Activity:54620}    OP EDUCATION:       Assessment:  Pt's response to treatment:  ***  Areas of improvements:  ***  Limitations/deficits:  ***    Pain end of session: ***    Plan:  Continue with current POC with the following changes ***    Assessment of current progress against goals:  Progressing toward functional goals      Goals:  STG (3 visits)  Patient to be independent with HEP     LTG (10 visits)   NDI to < 36% impaired   Patient to perform ADLs with pain < 3/10   Patient to quilt/cook with pain < 3/10   Cervical AROM left = right

## 2025-04-03 NOTE — DISCHARGE INSTRUCTIONS
Please keep the lower extremity clean and wrapped and you may apply bacitracin to your wounds for therapy.  Please follow-up with the wound clinic for further management of your right lower extremity dermatitis.  Take Keflex 4 times daily for 7 days for treatment of possible developing skin infection of the right lower extremity.  Please present back to ER if you develop any worsening of your symptoms despite treatment, chest pain or shortness of breath, fevers or chills.    It is important to remember that your care does not end here and you must continue to monitor your condition closely. Please return to the emergency department for any worsening or concerning signs or symptoms as directed by our conversations and the discharge instructions. If you do not have a doctor please contact the referral number on your discharge instructions. Please contact any physician specialists provided in your discharge notes as it is very important to follow up with them regarding your condition. If you are unable to reach the physicians provided, please come back to the Emergency Department at any time.

## 2025-04-03 NOTE — PROGRESS NOTES
Physical Therapy                 Therapy Communication Note    Patient Name: Vickie Foster  MRN: [id number]  Department:   Room: Room/bed info not found  Today's Date: 4/3/2025     Discipline: Physical Therapy    Patient arrived for PT 1425.  Patient reports  right LE has  been weeping past 2-3 days.  Upon inspection of right LE it was noted to be red/edematous and 3 small open areas were noted to be weeping clear fluid and were foul smelling.  Patient reported her dog has liked the open areas.  Dr. Maurice was aware and he advised patient to report to ER for evaluation.  Plan is to resume PT as able.      Missed Time: Cancel

## 2025-04-09 ENCOUNTER — OFFICE VISIT (OUTPATIENT)
Dept: WOUND CARE | Facility: HOSPITAL | Age: 70
End: 2025-04-09
Payer: MEDICARE

## 2025-04-09 DIAGNOSIS — I87.2 VENOUS STASIS DERMATITIS OF RIGHT LOWER EXTREMITY: ICD-10-CM

## 2025-04-09 PROCEDURE — 99214 OFFICE O/P EST MOD 30 MIN: CPT

## 2025-04-10 ENCOUNTER — APPOINTMENT (OUTPATIENT)
Dept: PHYSICAL THERAPY | Facility: CLINIC | Age: 70
End: 2025-04-10
Payer: MEDICARE

## 2025-04-11 ENCOUNTER — DOCUMENTATION (OUTPATIENT)
Dept: PHYSICAL THERAPY | Facility: CLINIC | Age: 70
End: 2025-04-11
Payer: MEDICARE

## 2025-04-11 NOTE — PROGRESS NOTES
Physical Therapy                 Therapy Communication Note    Patient Name: Vickie Foster  MRN: [id number]  Department:   Room: Room/bed info not found  Today's Date: 4/11/2025     Discipline: Physical Therapy    Missed Visit Reason: Patient did not check in for her appointment or call to cancel    Missed Time: No Show    Comment: No Show #1 (5 cancellations)

## 2025-04-16 ENCOUNTER — OFFICE VISIT (OUTPATIENT)
Dept: WOUND CARE | Facility: HOSPITAL | Age: 70
End: 2025-04-16
Payer: MEDICARE

## 2025-04-16 PROCEDURE — 11042 DBRDMT SUBQ TIS 1ST 20SQCM/<: CPT | Mod: RT

## 2025-04-17 ENCOUNTER — TREATMENT (OUTPATIENT)
Dept: PHYSICAL THERAPY | Facility: CLINIC | Age: 70
End: 2025-04-17
Payer: MEDICARE

## 2025-04-17 DIAGNOSIS — M54.2 CERVICALGIA: ICD-10-CM

## 2025-04-17 PROCEDURE — 97110 THERAPEUTIC EXERCISES: CPT | Mod: GP,CQ

## 2025-04-17 PROCEDURE — 97140 MANUAL THERAPY 1/> REGIONS: CPT | Mod: GP,CQ

## 2025-04-17 NOTE — PROGRESS NOTES
"    Physical Therapy Treatment    Patient Name: Vickie Foster  MRN: [id number]  Encounter date:  4/17/2025  Time Calculation  Start Time: 1515  Stop Time: 1600  Time Calculation (min): 45 min     PT Therapeutic Procedures Time Entry  Manual Therapy Time Entry: 15  Therapeutic Exercise Time Entry: 15       Plan of Care to transfer to [facility] after 4/18/2025      Visit Number:  6 (including evaluation)  Planned total visits: 10  Visits Authorized/Insurance Coverage:  NO AUTH, 30.00 CO PAY, 100% [place], 3300 OOP, M[place]    Current Problem  Problem List Items Addressed This Visit    None  Visit Diagnoses         Codes      Cervicalgia     M54.2          Precautions   A-fib  Poor supine tolerance secondary to pulmonary issues  Skin cancer     Relevant PMH:  CHF  Pulmonary HTN  A-fib  COPD  CVA  HA  Neuropathy     Pain  At rest 0/10 and 6/10 with movement     Subjective  General  \"I have been concentrating on my wound.\"  \"The injection helped a lot.\"     Objective  Leg wrapped in ace bandages upon arrival.     Treatment:  Therapeutic Exercise  Poor supine tolerance secondary to pulmonary issues     Assisted patient back to PT via wheel chair     Therapeutic exercises  - seated edge of w/c   Cervical rotation x 10   Cervical flex/ext x 10  Cervical lateral flexion x 10    HOB raised in semi reclined position 30 degrees   Max assist BLE onto mat   AROM  -Cervical retraction x 10   -Scapular retraction x 10   -pelvis tilts x 10 - visual and verbal cues for form   AAROM with cane  - chest press x 10  -flexion to 90 x 10  -horizontal abduction 2x5   -ER x 10 R/L     Manual   HOB raised in semi reclined position 30 degrees   Pillow under right UE  PROM   Cervical ROM  Upper trap stretch  Occipital release  Gentle cervical traction     Patient able to perform supine to sit on own from 30 degrees raised HOB    Therapeutic Exercise  Seated edge of mat   Cervical retraction x 10   -Scapular retraction x 10   -lateral " "side bend with hold count to 20 aloud x 3 ea        Current HEP:  HEP to be completed daily, exercises include:  Access Code: [email or web address]  URL: [email or web address]  Date: 02/20/2025  Prepared by: Smita Lima     Exercises  - Seated Posterior Pelvic Tilt  - 1 x daily - 5 x weekly - 1-2 sets - 10 reps  - Seated Cervical Retraction  - 1 x daily - 5 x weekly - 1-2 sets - 10 reps  - Seated Scapular Retraction  - 1 x daily - 5 x weekly - 1-2 sets - 10 reps  - Seated Cervical Rotation AROM  - 1 x daily - 5 x weekly - 1-2 sets - 10 reps  - Seated Cervical Sidebending AROM  - 1 x daily - 5 x weekly - 1-2 sets - 10 reps  -lateral side bend with hold count to 20 aloud x 3 ea      Has patient been compliant with HEP? no    Activity tolerance:  good    OP EDUCATION:  HEP  Postural training    Assessment:  Added lateral side bend or upper trap stretch to HEP. The patient was able to tolerate HOB at 30 degrees for exercise and manual.     Pain end of session: \"I feel great.\"       Plan:  Continue with current POC with the following changes advance as able after injection 3/31.    Assessment of current progress against goals:  Progressing toward functional goals       Goals:  STG (3 visits)  Patient to be independent with HEP     LTG (10 visits)   NDI to < 36% impaired   Patient to perform ADLs with pain < 3/10   Patient to quilt/cook with pain < 3/10   Cervical AROM left = right  "

## 2025-04-22 ENCOUNTER — DOCUMENTATION (OUTPATIENT)
Dept: PHYSICAL THERAPY | Facility: CLINIC | Age: 70
End: 2025-04-22

## 2025-04-22 ENCOUNTER — APPOINTMENT (OUTPATIENT)
Dept: PHYSICAL THERAPY | Facility: CLINIC | Age: 70
End: 2025-04-22
Payer: MEDICARE

## 2025-04-22 DIAGNOSIS — M54.2 CERVICALGIA: ICD-10-CM

## 2025-04-22 PROCEDURE — 97140 MANUAL THERAPY 1/> REGIONS: CPT | Mod: GP | Performed by: PHYSICAL THERAPIST

## 2025-04-22 PROCEDURE — 97110 THERAPEUTIC EXERCISES: CPT | Mod: GP | Performed by: PHYSICAL THERAPIST

## 2025-04-22 NOTE — PROGRESS NOTES
Physical Therapy                 Therapy Communication Note    Patient Name: Vickie Foster  MRN: [id number]  Department:   Room: Room/bed info not found  Today's Date: 4/22/2025     Discipline: Physical Therapy      Missed Time: No Show    Comment:  I called and L/M for pt to call us if she cannot make it to next PT session.

## 2025-04-22 NOTE — PROGRESS NOTES
"    Physical Therapy Treatment    Patient Name: Vickie Foster  MRN: [id number]  Encounter date:  4/22/2025  Time Calculation  Start Time: 1551  Stop Time: 1617  Time Calculation (min): 26 min     PT Therapeutic Procedures Time Entry  Manual Therapy Time Entry: 6  Therapeutic Exercise Time Entry: 20       Plan of Care to transfer to [facility] after 4/18/2025      Visit Number:  7 (including evaluation)  Planned total visits: 10  Visits Authorized/Insurance Coverage:  NO AUTH, 30.00 CO PAY, [place], 3300 OOP, M[place]    Current Problem  Problem List Items Addressed This Visit           ICD-10-CM    Cervicalgia M54.2     Precautions  A-fib  Poor supine tolerance secondary to pulmonary issues  Skin cancer     Relevant PMH:  CHF  Pulmonary HTN  A-fib  COPD  CVA  HA  Neuropathy     Pain  At rest 0/10 and 3/10 with movement     Subjective  General  Pt arrives to session late.  I felt benefit from last session exercise lying down with wedge.  I would like to do that again if I can get in on time.    Prior:  \"I have been concentrating on my wound.\"  \"The injection helped a lot.\"     Objective  Leg wrapped in ace bandages upon arrival.     Treatment:  Therapeutic Exercise    Poor supine tolerance secondary to pulmonary issues      Therapeutic exercises  - seated edge of w/c   Cervical rotation x 10   Cervical flex/ext x 10  Cervical lateral flexion x 10  Shrugs x 10  Iso scap ret 3\" x 10    HOB raised in semi reclined position 30 degrees DNP due to pt arriving late  Max assist BLE onto mat   AROM  -Cervical retraction x 10   -Scapular retraction x 10   -pelvis tilts x 10 - visual and verbal cues for form   AAROM with cane  - chest press x 10  -flexion to 90 x 10  -horizontal abduction 2x5   -ER x 10 R/L     Manual   HOB raised in semi reclined position 30 degrees DNP due to pt arriving late  Pillow under right UE  PROM   Cervical ROM  Upper trap stretch  Occipital release  Gentle cervical traction     Patient " "able to perform supine to sit on own from 30 degrees raised HOB    Therapeutic Exercise  Seated edge of wheel chair:  Cervical retraction x 10   -Scapular retraction x 10   -lateral side bend with hold count to 20 aloud x 3 ea     -standing at high low table:  WS side to side, WB through UE's x 10  Towel slides x 10 ea    Manual:  STM to C-spine mm to decr tension    Current HEP:  HEP to be completed daily, exercises include:  Access Code: [email or web address]  URL: [email or web address]  Date: 02/20/2025  Prepared by: Smita Lima     Exercises  - Seated Posterior Pelvic Tilt  - 1 x daily - 5 x weekly - 1-2 sets - 10 reps  - Seated Cervical Retraction  - 1 x daily - 5 x weekly - 1-2 sets - 10 reps  - Seated Scapular Retraction  - 1 x daily - 5 x weekly - 1-2 sets - 10 reps  - Seated Cervical Rotation AROM  - 1 x daily - 5 x weekly - 1-2 sets - 10 reps  - Seated Cervical Sidebending AROM  - 1 x daily - 5 x weekly - 1-2 sets - 10 reps  -lateral side bend with hold count to 20 aloud x 3 ea      Has patient been compliant with HEP? fair    Activity tolerance:  good    OP EDUCATION:  HEP  Postural training    Assessment:  Added lateral side bend or upper trap stretch to HEP. The patient was able to tolerate HOB at 30 degrees for exercise and manual.     Pain end of session: \"I feel great.\"       Plan:  Advance as appropriate, upgrade  HEP    Assessment of current progress against goals:  Progressing toward functional goals       Goals:  STG (3 visits)  Patient to be independent with HEP     LTG (10 visits)   NDI to < 36% impaired   Patient to perform ADLs with pain < 3/10   Patient to quilt/cook with pain < 3/10   Cervical AROM left = right  "

## 2025-04-23 ENCOUNTER — OFFICE VISIT (OUTPATIENT)
Dept: WOUND CARE | Facility: HOSPITAL | Age: 70
End: 2025-04-23
Payer: MEDICARE

## 2025-04-23 PROCEDURE — 99212 OFFICE O/P EST SF 10 MIN: CPT

## 2025-04-24 ENCOUNTER — APPOINTMENT (OUTPATIENT)
Dept: PHYSICAL THERAPY | Facility: CLINIC | Age: 70
End: 2025-04-24
Payer: MEDICARE

## 2025-04-24 ENCOUNTER — APPOINTMENT (OUTPATIENT)
Dept: PAIN MEDICINE | Facility: CLINIC | Age: 70
End: 2025-04-24
Payer: MEDICARE

## 2025-04-24 NOTE — PROGRESS NOTES
[facility]  Follow Up Office Visit Note 2025    Patient Information: Vickie Foster, MRN: [id number], : 1955   Primary Care/Referring Physician: , [address] [place] [address] / [place] [address]     Chief Complaint: Left knee pain  Interval History: At her last visit I performed a right shoulder joint injection    Today she reports ***    subacromial bursa injection and TPI's    Today she reports relief of her left sided neck pain from the TPI's but no relief from the right subacromial bursa injection. She continues to have severe right shoulder/upper arm pain when trying to raise her arm above her head. She continues to participate in PT. A right shoulder xray was ordered by her PCP but has not yet been completed.     Brief History of Pain: Ms. Vickie Fsoter is a 69 y.o. female with a PMHx of CHF, pulmonary HTN, AFib (on Eliquis) who presents today for follow up regarding chronic low back/buttock pain.    For reference, she reports pain started approximately 2022 with no obvious inciting event. She has had this type of pain in past, but it is much more severe than normal. Has been to the ED a few times for this, had xrays performed which didn't show any fracture in the spine or hip. During one of these visits she was noted to have worsening LE edema and was admitted for CHF exacerbation. During this admission she continued to have back pain but also developed gout in her right foot. Is having difficulty bearing weight on her right foot, as it worsens both foot pain and causes pain in her right low back and burning pain in her buttock. Has to lie a certain way in bed (moreso on her left side) to get somewhat comfortable. Denies any numbness, tingling in her legs. She is unsure about weakness as she is not walking much.    Current Pain Medications: Lidocaine patches - tried on both back and foot without benefit.  Previously Tried Pain Medications: Methocarbamol. Unable to use NSAID's due to history of  ulcers. Tried BioFreeze without benefit. Gabapentin, Cyclobenzaprine    Relevant Surgeries: Denies hip or back surgery. Denies ankle or foot surgery.  Injections: Left knee injection - 60% pain relief for 8 months. Caudal ADAM - 95% improvement, Right subacromial bursa injection -85% relief. Right SI joint injection - 80% pain relief  Physical/Occupational Therapy: Currently doing PT    Medications:   Current Outpatient Medications   Medication Instructions    allopurinol (ZYLOPRIM) 300 mg, oral, Daily    ascorbic acid (VITAMIN C) 500 mg, Daily    atorvastatin (LIPITOR) 40 mg, oral, Daily    buPROPion XL (WELLBUTRIN XL) 300 mg, oral, Daily before breakfast    Eliquis 5 mg, oral, 2 times daily    ergocalciferol, vitamin D2, (VITAMIN D2 ORAL) Take by mouth.    fluticasone propion-salmeteroL (Advair Diskus) 100-50 mcg/dose diskus inhaler 1 puff, inhalation, 2 times daily RT, Rinse mouth with water after use to reduce aftertaste and incidence of candidiasis. Do not swallow.    gabapentin (NEURONTIN) 300 mg, oral, 3 times daily    hydrOXYzine HCL (ATARAX) 10 mg, oral, Nightly PRN    metoprolol succinate XL (TOPROL-XL) 25 mg, oral, Daily    multivitamin tablet 1 tablet, Daily    NON FORMULARY Biotin    oxygen (O2) gas therapy 1 each, inhalation, Every 12 hours    potassium chloride CR (Klor-Con M20) 20 mEq ER tablet 20 mEq, oral, Daily, Do not crush, chew, or split.    torsemide (DEMADEX) 50 mg, oral, 2 times daily      Allergies: No Known Allergies    Past Medical & Surgical History:  Past Medical History:   Diagnosis Date    A-fib (Multi)     Cellulitis     CHF (congestive heart failure)     COVID     Heart disease     HTN (hypertension)     Low back pain       Past Surgical History:   Procedure Laterality Date    ABLATION OF DYSRHYTHMIC FOCUS      BREAST BIOPSY  2000    CARDIAC ELECTROPHYSIOLOGY PROCEDURE N/A 05/01/2024    Procedure: Cardioversion;  Surgeon: Amado Ty MD;  Location: [facility] Cardiac Cath Lab;   Service: Electrophysiology;  Laterality: N/A;  33667  i48.0  medical mutual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    CARDIAC ELECTROPHYSIOLOGY PROCEDURE N/A 2024    Procedure: Ablation A-Fib;  Surgeon: Amado Ty MD;  Location: [facility] Cardiac Cath Lab;  Service: Electrophysiology;  Laterality: N/A;  AFIB CRYOABLATION; CPT 53078; ICD I48.0   Medicare mutual of [place] medicare  ID - [id number]  [phone]  No Auth required  Call ref # [id number]    CARDIAC ELECTROPHYSIOLOGY PROCEDURE N/A 2024    Procedure: Cardioversion;  Surgeon: Amado Ty MD;  Location: [facility] Cardiac Cath Lab;  Service: Electrophysiology;  Laterality: N/A;  CARDIOVERSION CPT 38350, ICD I48.0 no auth required/REF # [id number]    CARDIOVERSION      2024       Family History   Problem Relation Name Age of Onset    Breast cancer Mother      Heart attack Mother           in early 80s of MI    Heart disease Mother      Colon cancer Father      Breast cancer Sister      Diabetes type II Sister      Kidney failure Sister      Other (herione addict) Sister      No Known Problems Brother      No Known Problems Daughter      Other (recovering alcoholic) Son      Breast cancer Other Maternal Aunt      Social History     Socioeconomic History    Marital status:      Spouse name: Not on file    Number of children: Not on file    Years of education: Not on file    Highest education level: Not on file   Occupational History    Not on file   Tobacco Use    Smoking status: Former     Current packs/day: 0.00     Types: Cigarettes     Quit date:      Years since quittin.3    Smokeless tobacco: Never   Vaping Use    Vaping status: Never Used   Substance and Sexual Activity    Alcohol use: Not Currently     Comment: monthly or less    Drug use: Never    Sexual activity: Defer   Other Topics Concern    Not on file   Social History Narrative    Not on file     Social Drivers of Health     Financial Resource Strain: Low Risk  (2024)     Overall Financial Resource Strain (CARDIA)     Difficulty of Paying Living Expenses: Not hard at all   Food Insecurity: No Food Insecurity (12/9/2024)    Hunger Vital Sign     Worried About Running Out of Food in the Last Year: Never true     Ran Out of Food in the Last Year: Never true   Transportation Needs: No Transportation Needs (12/9/2024)    PRAPARE - Transportation     Lack of Transportation (Medical): No     Lack of Transportation (Non-Medical): No   Physical Activity: Inactive (12/9/2024)    Exercise Vital Sign     Days of Exercise per Week: 0 days     Minutes of Exercise per Session: 0 min   Stress: No Stress Concern Present (12/9/2024)    [employer] [place] [employer] - Occupational Stress Questionnaire     Feeling of Stress : Not at all   Social Connections: Moderately Integrated (12/9/2024)    Social Connection and Isolation Panel [NHANES]     Frequency of Communication with Friends and Family: More than three times a week     Frequency of Social Gatherings with Friends and Family: More than three times a week     Attends [employer] Services: More than 4 times per year     Active Member of Clubs or Organizations: No     Attends Club or Organization Meetings: Never     Marital Status:    Intimate Partner Violence: Not At Risk (12/9/2024)    Humiliation, Afraid, Rape, and Kick questionnaire     Fear of Current or Ex-Partner: No     Emotionally Abused: No     Physically Abused: No     Sexually Abused: No   Housing Stability: Low Risk  (12/9/2024)    Housing Stability Vital Sign     Unable to Pay for Housing in the Last Year: No     Number of Times Moved in the Last Year: 0     Homeless in the Last Year: No       Problems, Past medical history, past surgical history, Medications, allergies, social and family history reviewed and as per the electronic medical record from today's encounter    Review of Systems:  CONST: No fever, chills, fatigue, weight changes  EYES: No loss of vision  ENT:  No hearing loss, tinnitus  CV: No chest pain, palpitations  RESP: Reports some dyspnea on exertion  GI: No stool incontinence, nausea, vomiting  : No urinary incontinence  MSK: No joint swelling  SKIN: No rash, no hives  NEURO: No headache, dizziness  PSYCH: No anxiety, depression  HEM/LYMPH: No easy bruising or bleeding  All other systems reviewed are negative     Physical Exam:  Vitals: There were no vitals taken for this visit.  General: No apparent distress. Alert, appropriate, oriented x 3. Mood generally positive, affect congruent. Speaking in full sentences.   HENT: Normocephalic, atraumatic. Hearing intact.  Eyes: Pupils equal and round  Neck: Supple, trachea midline  Lungs: Symmetric respiratory excursion on visual exam, nonlabored breathing.   Extremities: No cyanosis noted in extremities. Pitting edema noted in legs up to the shins  Skin: No rashes, lesions noted.  MSK: Reports pain with passive abduction of right shoulder above 80 degrees.   Neuro: Alert and appropriate. Using a wheelchair to ambulate. Bulk and tone within normal limits.    Laboratory Data:  The following laboratory data were reviewed during this visit:   Lab Results   Component Value Date    WBC 9.3 03/28/2025    RBC 4.28 03/28/2025    HGB 13.0 03/28/2025    HCT 40.1 03/28/2025     03/28/2025      Lab Results   Component Value Date    INR 1.0 07/08/2024    INR 1.0 06/05/2023    INR 1.0 12/01/2019     Lab Results   Component Value Date    CREATININE 0.70 03/28/2025    HGBA1C 6.0 (H) 03/28/2025       Imaging:  The following imaging impressions were reviewed by me during this visit:    -5/22/24 right shoulder xray shows moderate to severe glenohumeral joint OA, moderate AC joint OA  -8/2022 lumbar MRI shows L3-L4 moderate to marked spinal canal stenosis and right foraminal stenosis without nerve root impingement. L4-L5 moderate spinal canal stenosis. L5-S1 with no significatn central or foraminal stenosis   -4/2021 left knee xray  shows moderate OA  -7/20/23 cervical spine xray shows diffuse, severe degenerative changes    I also personally reviewed the images from the above studies myself. These images and my interpretation of them contributed to the management and decision making of the patient's medical plan.    ASSESSMENT:  Ms. Vickie Foster is a 69 y.o. female with low back, leg, and neck pain that is consistent with:    No diagnosis found.        PLAN:  Radiology: -Recommend she have her right shoulder xray completed    Physically: -Continue PT to focus on neck posture and myofascial release given apparent progression of cervical kyphosis.     Psychologically: No needs at this time    Medication: -She previously entered into a controlled substance agreement with our office but has not needed these medications consistently. Given recently worsening of right shoulder pain I did provide a refill of Norco today    Duration: Greater than 2 month    Intervention: -Reports significant benefit from recent right SI joint injection but continued to have burning buttock pain (R>L) that radiates down her thighs, likely secondary to known lumbar spinal stenosis. She subsequently underwent a caudal ADAM with 95% improvement. Of note, is on Eliquis  - Also reports bilateral knee pain, with prior knee x-rays showing moderate osteoarthritis. She has undergone both left and right knee joint injections with generally 60% pain relief for 3-4 months. Would consider genicular nerve blocks/RFA in the future  - I suspect her current neck pain is secondary to muscle strain/myofascial pain in the setting of poor posture while sleeping. She sleeps sitting up and bending over a pillow at a table due to difficulty breathing while lying flat. Recommend trying to sleep in a recliner and continue working with your cardiology/pulmonology teams to optimize your breathing. She underwent TPI's to the left neck with significant improvement. Will monitor for duration of  relief  - Her right shoulder pain remains severe.  She previously received relief from right subacromial bursa injection.  This was repeated with no pain relief at all.  Given presence of fairly severe glenohumeral joint arthritis I recommend a right shoulder joint steroid injection utilizing live fluoroscopy and local anesthesia.  Risk and benefits, alternative discussed.  She would like to proceed.              Sincerely,  Joseph Kelley MD  [facility] [place]

## 2025-04-28 ENCOUNTER — OFFICE VISIT (OUTPATIENT)
Dept: PAIN MEDICINE | Facility: CLINIC | Age: 70
End: 2025-04-28
Payer: MEDICARE

## 2025-04-28 VITALS
BODY MASS INDEX: 41.66 KG/M2 | WEIGHT: 244 LBS | HEART RATE: 74 BPM | HEIGHT: 64 IN | SYSTOLIC BLOOD PRESSURE: 81 MMHG | OXYGEN SATURATION: 91 % | DIASTOLIC BLOOD PRESSURE: 57 MMHG

## 2025-04-28 DIAGNOSIS — M47.816 LUMBAR SPONDYLOSIS: ICD-10-CM

## 2025-04-28 DIAGNOSIS — M25.511 CHRONIC RIGHT SHOULDER PAIN: ICD-10-CM

## 2025-04-28 DIAGNOSIS — M54.16 LUMBAR RADICULOPATHY: ICD-10-CM

## 2025-04-28 DIAGNOSIS — M17.0 PRIMARY OSTEOARTHRITIS OF KNEES, BILATERAL: Primary | ICD-10-CM

## 2025-04-28 DIAGNOSIS — G89.29 CHRONIC RIGHT SHOULDER PAIN: ICD-10-CM

## 2025-04-28 DIAGNOSIS — M54.2 CERVICALGIA: ICD-10-CM

## 2025-04-28 PROCEDURE — G2211 COMPLEX E/M VISIT ADD ON: HCPCS | Performed by: PHYSICIAN ASSISTANT

## 2025-04-28 PROCEDURE — 99214 OFFICE O/P EST MOD 30 MIN: CPT | Performed by: PHYSICIAN ASSISTANT

## 2025-04-28 PROCEDURE — 3078F DIAST BP <80 MM HG: CPT | Performed by: PHYSICIAN ASSISTANT

## 2025-04-28 PROCEDURE — 3008F BODY MASS INDEX DOCD: CPT | Performed by: PHYSICIAN ASSISTANT

## 2025-04-28 PROCEDURE — 1159F MED LIST DOCD IN RCRD: CPT | Performed by: PHYSICIAN ASSISTANT

## 2025-04-28 PROCEDURE — 1125F AMNT PAIN NOTED PAIN PRSNT: CPT | Performed by: PHYSICIAN ASSISTANT

## 2025-04-28 PROCEDURE — 3074F SYST BP LT 130 MM HG: CPT | Performed by: PHYSICIAN ASSISTANT

## 2025-04-28 RX ORDER — HYDROCODONE BITARTRATE AND ACETAMINOPHEN 5; 325 MG/1; MG/1
1 TABLET ORAL 2 TIMES DAILY PRN
Qty: 60 TABLET | Refills: 0 | Status: SHIPPED | OUTPATIENT
Start: 2025-05-05 | End: 2025-06-04

## 2025-04-28 ASSESSMENT — PAIN SCALES - GENERAL
PAINLEVEL_OUTOF10: 8
PAINLEVEL_OUTOF10: 8

## 2025-04-28 ASSESSMENT — PAIN DESCRIPTION - DESCRIPTORS: DESCRIPTORS: ACHING

## 2025-04-28 ASSESSMENT — PAIN - FUNCTIONAL ASSESSMENT: PAIN_FUNCTIONAL_ASSESSMENT: 0-10

## 2025-04-28 NOTE — PROGRESS NOTES
"Chronic Pain Clinic Follow-Up Evaluation    Date: April 28, 2025 - 2:26 PM    Chief Complaint:   Chief Complaint   Patient presents with    Follow-up     Post shoulder inj       SUBJECTIVE:    Vickie Foster is a 69 y.o. female who presents to [facility] for a follow up appointment for ***.    Patient is an established patient of     Patient has hx of:     PMH also significant for:      The plan from the last visit on *** was:    Since the last visit, ***     Takes norco mainly at night   Is right hand dominant     Ortho has not seen- not sure if intesrested in shouilder replacement   ROM is better 50% in right arm - pain  50% better   Able to comb hair now     Has only 5 more PT sessions - would like to work on posture and core conditioning     Had been on semaglutide therapy buthad to hold due to procedurse and surgeries    Feels has #13 norco left     Arm pain over deltoid   Duration of pain:      The pain is located      Pain Description      Aggravating Factors:      Alleviating Factors:       Previous pain treatments:     Physical Therapy:        Current Outpatient Medications:  Current Medications[1]    Current Anticoagulant Therapy: {NEGATIVE/POSITIVE:25591}    OARRS: {OARRS:86553}    Pain medications reviewed:  {YES/NO:63}    Past Medical History:  Medical History[2]    Past Surgical History:  Surgical History[3]    Family History:  Family History[4]    Social History:  Social History     Substance and Sexual Activity   Alcohol Use Not Currently    Comment: monthly or less     Tobacco Use History[5]  Social History     Substance and Sexual Activity   Drug Use Never         Review of Systems:  GENERAL: {ROS-GENERAL:23::\"SEE HPI\",\"No weight loss, malaise or fevers.\"}  HEENT: {ROS -  HEENT:30::\"Negative for frequent or significant headaches\",\"No changes in hearing or vision, no nose bleeds or other nasal problems\"}  NECK: {ROS -NECK:34::\"Negative for lumps, goiter, pain and significant neck " "swelling\"}  RESPIRATORY: {ROS - RESPIRATORY:37::\"Negative for cough, wheezing or shortness of breath.\"}  CARDIOVASCULAR: {ROS-CARDIAC:53::\"Negative for chest pain, leg swelling or palpitations.\"}  GASTROINTESTINAL: {ROS- GI:56::\"Negative for abdominal discomfort, blood in stools or black stools or change in bowel habits\"}  GENITOURINARY: {ROS- :58::\"No history of dysuria, frequency or incontinence\"}  GYN: {ROS- GYN:71::\"Negative for abnormal vaginal bleeding, abnormal vaginal discharge\"}  MUSCULOSKELETAL: {MUSCULOSKELETAL INTMED ROS:232::\"Negative for joint pain or swelling, back pain or muscle pain.\"}  NEUROLOGIC: {NEUROLOGIC INT MED ROS:235::\"Negative for focal numbness or weakness, headaches and dizziness or syncope.\"}  SKIN: {SKIN INT MED ROS:238::\"Negative for lesions, rash, and itching.\"}  PSYCHIATRIC: {PSYCHIATRIC INT MED ROS:241::\"Negative for sleep disturbance, mood disorder and recent psychosocial stressors.\"}  HEMATOLOGIC/LYMPHATIC/IMMUNOLOGIC: {HEMATOLOGY/LYMPHATIC/IMMUNOLOGY INT MED ROS:244::\"Negative for prolonged bleeding, bruising easily or swollen nodes.\"}  ENDOCRINE: {ENDOCRINE INT MED ROS:247::\"Negative for cold or heat intolerance, polyuria, polydipsia and goiter.\"}  The remainder of the ROS was negative.    Physical Examination:  Vitals:    04/28/25 1359   BP: 81/57   Pulse: 74   SpO2: 91%     General:in no acute distress  Skin: skin color, texture, turgor normal, no rashes or lesions  HEENT: normocephalic, atraumatic, sclera non-icteric   Resp: unlabored on room air   Musculoskeletal:  Neck: {FVPM NECK:45366::\"Supple; good ROM.\"}  Back: {FVPM BACK:06259::\"No pain on palpation of the lumbar spine. Full ROM without reproducible pain.  Straight leg raising test negative bilaterally.  \"}  Extremities: {EXTREMITY EXAM:3013::\"Extremities normal. No deformities, edema, or skin discoloration\"}  Neurological:  Mental Status: alert and oriented  Gait: not observed, in wheelchair today     New Imaging " and Diagnostic Studies:  ***    ASSESSMENT:    Vickie Foster is a 69 y.o. female with ***. Since last visit, the pain has ***.    {Assess/Plan SmartLinks (Optional):62464}    PLAN:    Diagnostics:      Physical Therapy and Rehabilitation:     Psychologically:    Medication: Continue current medication regimen. See counseling note below. Prescription for *** sent to the pharmacy   Urine tox last collected -   Opioid agreement signed -   Pill count     Duration:     Intervention: None needed at this time.     7.   Follow up:    The patient continues to see benefit and improvement in their quality of life and ability to maintain ADLs.  Patient educated about the risks of taking opioids and operating a motor vehicle.  Patient reports no adverse side effects to current medication regimen.  Current regimen does allow patient to maintain ADLs.      Patient has been educated on the risks, benefits, and alternatives of controlled substances as well as the proper way to store these medications.  The patient and I discussed the nature of this medication and its side effects.  We discussed tolerance, physical dependence, psychological dependence, addiction and opioid-induced hyperalgesia.   We discussed the fact that the patient should not drive an automobile or operate heavy machinery while taking this medication.     The above plan and management options were discussed at length with patient. Patient is in agreement with the above and verbalized understanding. It will be communicated with the referring physician via electronic record, fax, or mail.    Sydnee Montiel PA-C  April 28, 2025         [1]   Current Outpatient Medications   Medication Sig Dispense Refill    allopurinol (Zyloprim) 300 mg tablet Take 1 tablet (300 mg) by mouth once daily. 90 tablet 0    ascorbic acid (Vitamin C) 500 mg tablet Take 1 tablet (500 mg) by mouth once daily.      atorvastatin (Lipitor) 40 mg tablet Take 1 tablet (40 mg) by mouth once  daily. 90 tablet 0    buPROPion XL (Wellbutrin XL) 300 mg 24 hr tablet Take 1 tablet (300 mg) by mouth once daily in the morning. Take before meals. 90 tablet 1    Eliquis 5 mg tablet Take 1 tablet (5 mg) by mouth 2 times a day. 180 tablet 1    ergocalciferol, vitamin D2, (VITAMIN D2 ORAL) Take by mouth.      fluticasone propion-salmeteroL (Advair Diskus) 100-50 mcg/dose diskus inhaler Inhale 1 puff 2 times a day. Rinse mouth with water after use to reduce aftertaste and incidence of candidiasis. Do not swallow. 180 each 0    gabapentin (Neurontin) 300 mg capsule Take 1 capsule (300 mg) by mouth 3 times a day. 270 capsule 1    hydrOXYzine HCL (Atarax) 10 mg tablet Take 1 tablet (10 mg) by mouth as needed at bedtime (insomnia). 30 tablet 2    metoprolol succinate XL (Toprol-XL) 25 mg 24 hr tablet Take 1 tablet (25 mg) by mouth once daily. 90 tablet 0    multivitamin tablet Take 1 tablet by mouth once daily.      NON FORMULARY Biotin      oxygen (O2) gas therapy Inhale 1 each every 12 hours.      potassium chloride CR (Klor-Con M20) 20 mEq ER tablet Take 1 tablet (20 mEq) by mouth once daily. Do not crush, chew, or split. 90 tablet 0    torsemide (Demadex) 100 mg tablet Take 0.5 tablets (50 mg) by mouth 2 times a day. 90 tablet 0     No current facility-administered medications for this visit.   [2]   Past Medical History:  Diagnosis Date    A-fib (Multi)     Cellulitis     CHF (congestive heart failure)     COVID     Heart disease     HTN (hypertension)     Low back pain    [3]   Past Surgical History:  Procedure Laterality Date    ABLATION OF DYSRHYTHMIC FOCUS      BREAST BIOPSY  2000    CARDIAC ELECTROPHYSIOLOGY PROCEDURE N/A 05/01/2024    Procedure: Cardioversion;  Surgeon: Amado Ty MD;  Location: [facility] Cardiac Cath Lab;  Service: Electrophysiology;  Laterality: N/A;  15574  i48.0  medical mutual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    CARDIAC ELECTROPHYSIOLOGY PROCEDURE N/A 07/08/2024    Procedure: Ablation A-Fib;  Surgeon:  Amado Ty MD;  Location: [facility] Cardiac Cath Lab;  Service: Electrophysiology;  Laterality: N/A;  AFIB CRYOABLATION; CPT 44297; ICD I48.0   Medicare mutual of [place] medicare  ID - [id number]  [phone]  No Auth required  Call ref # [id number]    CARDIAC ELECTROPHYSIOLOGY PROCEDURE N/A 2024    Procedure: Cardioversion;  Surgeon: Amado Ty MD;  Location: [facility] Cardiac Cath Lab;  Service: Electrophysiology;  Laterality: N/A;  CARDIOVERSION CPT 78261, ICD I48.0 no auth required/REF # [id number]    CARDIOVERSION      2024   [4]   Family History  Problem Relation Name Age of Onset    Breast cancer Mother      Heart attack Mother           in early 80s of MI    Heart disease Mother      Colon cancer Father      Breast cancer Sister      Diabetes type II Sister      Kidney failure Sister      Other (herione addict) Sister      No Known Problems Brother      No Known Problems Daughter      Other (recovering alcoholic) Son      Breast cancer Other Maternal Aunt    [5]   Social History  Tobacco Use   Smoking Status Former    Current packs/day: 0.00    Types: Cigarettes    Quit date:     Years since quittin.3   Smokeless Tobacco Never

## 2025-04-29 ENCOUNTER — APPOINTMENT (OUTPATIENT)
Dept: PHYSICAL THERAPY | Facility: CLINIC | Age: 70
End: 2025-04-29
Payer: MEDICARE

## 2025-04-29 DIAGNOSIS — M54.2 CERVICALGIA: ICD-10-CM

## 2025-04-29 PROCEDURE — 97110 THERAPEUTIC EXERCISES: CPT | Mod: GP | Performed by: PHYSICAL THERAPIST

## 2025-04-29 PROCEDURE — 97140 MANUAL THERAPY 1/> REGIONS: CPT | Mod: GP | Performed by: PHYSICAL THERAPIST

## 2025-04-29 NOTE — PROGRESS NOTES
"    Physical Therapy Treatment    Patient Name: Vickie Foster  MRN: [id number]  Encounter date:  4/29/2025  Time Calculation  Start Time: 1530  Stop Time: 1610  Time Calculation (min): 40 min     PT Therapeutic Procedures Time Entry  Manual Therapy Time Entry: 12  Therapeutic Exercise Time Entry: 28       Plan of Care to transfer to [facility] after 4/18/2025      Visit Number:  8 (including evaluation)  Planned total visits: 10  Visits Authorized/Insurance Coverage:  NO AUTH, 30.00 CO PAY, 100% [place], 3300 OOP, MMO    Current Problem  Problem List Items Addressed This Visit           ICD-10-CM    Cervicalgia M54.2     Precautions  A-fib  Poor supine tolerance secondary to pulmonary issues  Skin cancer     Relevant PMH:  CHF  Pulmonary HTN  A-fib  COPD  CVA  HA  Neuropathy     Pain  At rest 0/10 and 3/10 with movement     Subjective  General  I will be getting cataract surgery    Prior:  \"I have been concentrating on my wound.\"  \"The injection helped a lot.\"     Objective  Leg wrapped in ace bandages upon arrival.     Treatment:  Therapeutic Exercise    Poor supine tolerance secondary to pulmonary issues      Therapeutic exercises  - seated edge of w/c   Cervical rotation x 10   Cervical flex/ext x 10  Cervical lateral flexion x 10  Shrugs x 10  Iso scap ret 3\" x 10    HOB raised in semi reclined position 30 degrees   MIN assist BLE onto mat -ONE AT a time-pt request  AROM  -Cervical retraction x 10   -Scapular retraction x 10   -pelvis tilts 2 x 10 - visual and verbal cues for form   AAROM with cane  - chest press x 10  -flexion to 90 x 10  -horizontal abduction 2x5   -ER x 10 R/L     Manual   HOB raised in semi reclined position 30 degrees DNP due to pt arriving late  Pillow under right UE  PROM   Cervical ROM  Upper trap stretch  Occipital release  Gentle cervical traction     Patient able to perform supine to sit on own from 30 degrees raised HOB    Therapeutic Exercise  Seated edge of wheel " "chair:  Cervical retraction 2\" x 10   -Scapular retraction 5\" x 10   -lateral side bend with hold count to 20 aloud x 3 ea     -standing at high low table:  WS side to side, WB through UE's x 10  Towel slides x 10 ea    Manual:  STM to C-spine mm to decr tension DNP    Current HEP:  4/29/25-add 2 long walks a day inside home, until you need to si, to build endurance  HEP to be completed daily, exercises include:  Access Code: [email or web address]  URL: [email or web address]  Date: 02/20/2025  Prepared by: Smita Lima     Exercises  - Seated Posterior Pelvic Tilt  - 1 x daily - 5 x weekly - 1-2 sets - 10 reps  - Seated Cervical Retraction  - 1 x daily - 5 x weekly - 1-2 sets - 10 reps  - Seated Scapular Retraction  - 1 x daily - 5 x weekly - 1-2 sets - 10 reps  - Seated Cervical Rotation AROM  - 1 x daily - 5 x weekly - 1-2 sets - 10 reps  - Seated Cervical Sidebending AROM  - 1 x daily - 5 x weekly - 1-2 sets - 10 reps  -lateral side bend with hold count to 20 aloud x 3 ea      Has patient been compliant with HEP? Fair/good    Activity tolerance:  good    OP EDUCATION:  HEP  Postural training    Assessment:  Added lateral side bend or upper trap stretch to HEP. The patient was able to tolerate HOB at 30 degrees for exercise and manual.     Pain end of session: \"I feel good, I can move more and I am looser\"      Plan:  Advance as appropriate, upgrade  HEP.  Plan is to continue with neck, scapula, shoulder exercise for the next 2 visits and perform reeval on visit #11.  At that time we will also look at gait, knees, core as per new prescription from patient's pain management office, and then pt will continue shoulder and neck work on her own.    Assessment of current progress against goals:  Progressing toward functional goals       Goals:  STG (3 visits)  Patient to be independent with HEP     LTG (10 visits)   NDI to < 36% impaired   Patient to perform ADLs with pain < 3/10   Patient to quilt/cook with pain < 3/10   " Cervical AROM left = right

## 2025-04-30 ENCOUNTER — OFFICE VISIT (OUTPATIENT)
Dept: OPHTHALMOLOGY | Facility: CLINIC | Age: 70
End: 2025-04-30
Payer: MEDICARE

## 2025-04-30 DIAGNOSIS — R73.03 PREDIABETES: ICD-10-CM

## 2025-04-30 DIAGNOSIS — H52.7 REFRACTION ERROR: ICD-10-CM

## 2025-04-30 DIAGNOSIS — H25.813 COMBINED FORMS OF AGE-RELATED CATARACT OF BOTH EYES: Primary | ICD-10-CM

## 2025-04-30 PROCEDURE — 99204 OFFICE O/P NEW MOD 45 MIN: CPT | Performed by: OPHTHALMOLOGY

## 2025-04-30 PROCEDURE — 99214 OFFICE O/P EST MOD 30 MIN: CPT | Performed by: OPHTHALMOLOGY

## 2025-04-30 ASSESSMENT — CUP TO DISC RATIO
OS_RATIO: 0.2
OD_RATIO: 0.2

## 2025-04-30 ASSESSMENT — VISUAL ACUITY
OS_SC: 20/40
OD_PH_SC: 20/40
OD_SC: 20/200
OD_PH_SC+: -1
OS_PH_SC: 20/20
METHOD: SNELLEN - LINEAR

## 2025-04-30 ASSESSMENT — SLIT LAMP EXAM - LIDS
COMMENTS: NORMAL
COMMENTS: NORMAL

## 2025-04-30 ASSESSMENT — ENCOUNTER SYMPTOMS
ENDOCRINE NEGATIVE: 0
MUSCULOSKELETAL NEGATIVE: 0
PSYCHIATRIC NEGATIVE: 0
GASTROINTESTINAL NEGATIVE: 0
CONSTITUTIONAL NEGATIVE: 0
CARDIOVASCULAR NEGATIVE: 0
HEMATOLOGIC/LYMPHATIC NEGATIVE: 0
ALLERGIC/IMMUNOLOGIC NEGATIVE: 0
EYES NEGATIVE: 0
NEUROLOGICAL NEGATIVE: 0
RESPIRATORY NEGATIVE: 0

## 2025-04-30 ASSESSMENT — EXTERNAL EXAM - LEFT EYE: OS_EXAM: NORMAL

## 2025-04-30 ASSESSMENT — PAIN SCALES - GENERAL: PAINLEVEL_OUTOF10: 0-NO PAIN

## 2025-04-30 ASSESSMENT — EXTERNAL EXAM - RIGHT EYE: OD_EXAM: NORMAL

## 2025-04-30 ASSESSMENT — TONOMETRY
OD_IOP_MMHG: 13
IOP_METHOD: GOLDMANN APPLANATION
OS_IOP_MMHG: 13

## 2025-04-30 NOTE — ASSESSMENT & PLAN NOTE
Glare used as justification in prior office but no significant glare symptoms. Unclear on BCVA with refraction as does not appear was done. Will request prior records. Suspect still significant right eye (OD) and borderline left eye (OS). Will ask for retina records as documented as having DME. Wont need biometry, only consents if significant.

## 2025-04-30 NOTE — PROGRESS NOTES
Assessment/Plan   Problem List Items Addressed This Visit       Prediabetes    Advised no signs of diabetic changes on exam. Recommend to continue best sugar control and on need for annual checkup. Understands role of good BP control and weight loss if applicable. Discussed some components of selected studies like WESDR, DCCT, and UKPDS to describe the likely course of diabetes and effects on the eyes.           Combined forms of age-related cataract of both eyes - Primary    Glare used as justification in prior office but no significant glare symptoms. Unclear on BCVA with refraction as does not appear was done. Will request prior records. Suspect still significant right eye (OD) and borderline left eye (OS). Will ask for retina records as documented as having DME. Wont need biometry, only consents if significant.         Refraction error    Plan formal refraction next visit            Provided reassurance regarding above diagnoses and care received in the office visit today. Discussed outcomes and options along with the importance of treatment compliance. Understands the importance of any follow up visits. Patient instructed to call/communicate with our office if any new issues, questions, or concerns.     Will plan to see back in few weeks for refraction and potential cataract extraction (CE) consents or sooner PRN

## 2025-04-30 NOTE — PATIENT INSTRUCTIONS
Thank you so much for choosing me to provide your care today!    If you were dilated your vision may remain blurry   or light sensitive for several hours.    The nature of eye and vision problems can require frequent follow up, please make every effort to adhere to any future appointments.    If you have any issues, questions, or concerns,   please do not hesitate to reach out.    If you receive a survey in regards to your care today, please mention any exceptional care my office staff and/or technicians provided.    You can reach our office at this number:    [phone]    Please consider signing up for and utilizing [employer]!  This is the best way to directly reach me or other  providers

## 2025-05-05 ENCOUNTER — TELEPHONE (OUTPATIENT)
Dept: PRIMARY CARE | Facility: CLINIC | Age: 70
End: 2025-05-05
Payer: MEDICARE

## 2025-05-05 NOTE — PROGRESS NOTES
"    Physical Therapy Treatment    Patient Name: Vickie Foster  MRN: [id number]  Encounter date:  5/6/2025  Time Calculation  Start Time: 1530  Stop Time: 1614  Time Calculation (min): 44 min     PT Therapeutic Procedures Time Entry  Manual Therapy Time Entry: 12  Therapeutic Exercise Time Entry: 30       Plan of Care to transfer to [facility] after 4/18/2025    Visit Number:  9 (including evaluation)  Planned total visits: 10  Visits Authorized/Insurance Coverage:  NO AUTH, 30.00 CO PAY, 100% [place], 3300 OOP, MMO    perform reeval on visit #11    Current Problem  Problem List Items Addressed This Visit           ICD-10-CM    Cervicalgia M54.2       Precautions  A-fib  Poor supine tolerance secondary to pulmonary issues  Skin cancer     Relevant PMH:  CHF  Pulmonary HTN  A-fib  COPD  CVA  HA  Neuropathy     Pain   6/10 R shoulder and neck pain with movement     Subjective  General   \"My legs are really swollen this week\", from an unknown cause, noting she feels generally bloated. Patient reports increased pain in R shoulder and a cervical region. Patient stated her R shoulder is still sore from the injection from pain management.     Prior:  \"I have been concentrating on my wound.\"  \"The injection helped a lot.\"     Objective    No ace wrap on either LE at start of appointment. B UT TTP, R>L, during STM/manual interventions. Increased R side cervical muscle tightness vs. L.      Treatment:  Therapeutic Exercise    Poor supine tolerance secondary to pulmonary issues    Therapeutic exercises  - seated edge of w/c   Cervical rotation x 10   Cervical flex/ext x 10  Cervical lateral flexion x 10  Shrugs x 10  Iso scap ret 3\" x 10    HOB raised in semi reclined position 30 degrees, 3 pillows for comfort   MIN assist BLE onto mat -ONE AT a time-pt request  AROM  -Cervical retraction x 10   -Scapular retraction x 10   -pelvis tilts 2 x 10 - visual and verbal cues for form     AAROM with cane  - chest press x 10 " "- R shoulder pain and grinding   -flexion to 90 x 10  -horizontal abduction 2x5- R shoulder pain   -ER x 10 R/L     Manual   HOB raised in semi reclined position 30 degrees  Pillow under right UE  PROM   Cervical ROM  Upper trap stretch  Occipital release  Gentle cervical traction     Patient able to perform supine to sit on own from 30 degrees raised HOB    DNP 5/ 6  Therapeutic Exercise  Seated edge of wheel chair:  Cervical retraction 2\" x 10   -Scapular retraction 5\" x 10   -lateral side bend with hold count to 20 aloud x 3 ea     -standing at high low table:  WS side to side, WB through UE's x 10  Towel slides x 10 ea    Current HEP:  4/29/25-add 2 long walks a day inside home, until you need to si, to build endurance  HEP to be completed daily, exercises include:  Access Code: [email or web address]  URL: [email or web address]  Date: 02/20/2025  Prepared by: Smita Lima     Exercises  - Seated Posterior Pelvic Tilt  - 1 x daily - 5 x weekly - 1-2 sets - 10 reps  - Seated Cervical Retraction  - 1 x daily - 5 x weekly - 1-2 sets - 10 reps  - Seated Scapular Retraction  - 1 x daily - 5 x weekly - 1-2 sets - 10 reps  - Seated Cervical Rotation AROM  - 1 x daily - 5 x weekly - 1-2 sets - 10 reps  - Seated Cervical Sidebending AROM  - 1 x daily - 5 x weekly - 1-2 sets - 10 reps  -lateral side bend with hold count to 20 aloud x 3 ea      Has patient been compliant with HEP? Fair/good    Activity tolerance:  good    OP EDUCATION:  HEP  Postural training    Assessment:  Good response to STM and manual interventions at end of appointment. Patient reported R shoulder and cervical pain resolved. R shoulder grinding and increased pain with horizontal abduction, pt denied pain with shoulder flexion. Improved neck AROM following STM and occipital release. Pt relayed she was hesitant about coming to the appointment today due to increased shoulder and cervical pain, but stated she was glad she did because it relieved her pain and " "improved how much she can move her neck.     Pain end of session: \"I don't have one ache in my neck or shoulder\"       Plan:  Advance as appropriate, upgrade  HEP.  Plan is to continue with neck, scapula, shoulder exercise for the next 2 visits and perform reeval on visit #11.  At that time we will also look at gait, knees, core as per new prescription from patient's pain management office, and then pt will continue shoulder and neck work on her own.    Assessment of current progress against goals:  Progressing toward functional goals       Goals:  STG (3 visits)  Patient to be independent with HEP     LTG (10 visits)   NDI to < 36% impaired   Patient to perform ADLs with pain < 3/10   Patient to quilt/cook with pain < 3/10   Cervical AROM left = right  "

## 2025-05-05 NOTE — TELEPHONE ENCOUNTER
Pt was prescribed a medication from another provider and stated she stopped it and would like to get back on it. Pt stated she needs a clearance for semaglutide 2 mg/dose (8 mg/3 mL) pen injector    Does pt need appt?

## 2025-05-06 ENCOUNTER — TREATMENT (OUTPATIENT)
Dept: PHYSICAL THERAPY | Facility: CLINIC | Age: 70
End: 2025-05-06
Payer: MEDICARE

## 2025-05-06 DIAGNOSIS — M54.2 CERVICALGIA: ICD-10-CM

## 2025-05-06 PROCEDURE — 97110 THERAPEUTIC EXERCISES: CPT | Mod: CQ,GP | Performed by: SPECIALIST/TECHNOLOGIST

## 2025-05-06 PROCEDURE — 97140 MANUAL THERAPY 1/> REGIONS: CPT | Mod: CQ,GP | Performed by: SPECIALIST/TECHNOLOGIST

## 2025-05-10 NOTE — PROGRESS NOTES
"    Physical Therapy Treatment    Patient Name: Vickie Foster  MRN: [id number]  Encounter date:  5/13/2025  Time Calculation  Start Time: 1530  Stop Time: 1614  Time Calculation (min): 44 min     PT Therapeutic Procedures Time Entry  Manual Therapy Time Entry: 15  Therapeutic Exercise Time Entry: 25     Plan of Care to transfer to [facility] after 4/18/2025    Visit Number:  10 (including evaluation)  Planned total visits: 10  Visits Authorized/Insurance Coverage:  NO AUTH, 30.00 CO PAY, 100% [place], [place], 3300 OOP, MMO    perform reeval on visit #11    Current Problem  Problem List Items Addressed This Visit           ICD-10-CM    Cervicalgia M54.2     Precautions  A-fib  Poor supine tolerance secondary to pulmonary issues  Skin cancer     Relevant PMH:  CHF  Pulmonary HTN  A-fib  COPD  CVA  HA  Neuropathy     Pain   8/10 R shoulder and neck pain with movement     Subjective  General    Patient reports increased BLE swelling today, attributed to the weather. Pt also reports increased R shoulder  and R side cervical region pain, noting she has a follow up with her pain management doctor Thursday and may have another injection in the future.     Objective    No ace wrap on either LE at start of appointment. Patient is transported into and out of appointment seated in WC, but is able to independently transfer <> between treatment table and WC, forward leaning posture, SBA.  STM focused on R shoulder and R side neck. Increased R side cervical muscle and UT tightness vs L. Fair PPT performance in reclined posture.     Treatment:  Therapeutic Exercise    Poor supine tolerance secondary to pulmonary issues    Therapeutic exercises  - seated edge of table    Cervical rotation x 10   Cervical flex/ext x 10  Cervical lateral flexion x 10  Cervical retractions x 10  Shrugs x 10  Iso scap ret 3\" x 10    HOB raised in semi reclined position 30 degrees, 3 pillows for comfort   MIN assist BLE onto mat -ONE AT a time-pt " "request  AROM  -Cervical retraction x 10   -Scapular retraction x 10   -pelvis tilts 2 x 10 - visual and verbal cues for form     AAROM with cane  -chest press x 10 - R shoulder pain and grinding   -flexion to 90 x 10  -horizontal abduction 2x5- R shoulder pain DNP   -ER x 10 R/L- Limited ROM     Manual   HOB raised in semi reclined position 30 degrees  STM to R shoulder and R side cervical region     DNP 5/13  Pillow under right UE  PROM   Cervical ROM  Upper trap stretch  Occipital release  Gentle cervical traction     Patient able to perform supine to sit on own from 30 degrees raised HOB    DNP 5/ 6  Therapeutic Exercise  Seated edge of wheel chair:  Cervical retraction 2\" x 10   -Scapular retraction 5\" x 10   -lateral side bend with hold count to 20 aloud x 3 ea     -standing at high low table:  WS side to side, WB through UE's x 10  Towel slides x 10 ea    Current HEP:  4/29/25-add 2 long walks a day inside home, until you need to si, to build endurance  HEP to be completed daily, exercises include:  Access Code: [email or web address]  URL: [email or web address]  Date: 02/20/2025  Prepared by: Smita Lima     Exercises  - Seated Posterior Pelvic Tilt  - 1 x daily - 5 x weekly - 1-2 sets - 10 reps  - Seated Cervical Retraction  - 1 x daily - 5 x weekly - 1-2 sets - 10 reps  - Seated Scapular Retraction  - 1 x daily - 5 x weekly - 1-2 sets - 10 reps  - Seated Cervical Rotation AROM  - 1 x daily - 5 x weekly - 1-2 sets - 10 reps  - Seated Cervical Sidebending AROM  - 1 x daily - 5 x weekly - 1-2 sets - 10 reps  -lateral side bend with hold count to 20 aloud x 3 ea      Has patient been compliant with HEP? Fair/good    Activity tolerance:  Good tolerance of STM. Grinding in R GH joint reported during exercises    OP EDUCATION:  HEP  Postural training    Assessment:  Good response to STM and manual interventions, significant decrease in R shoulder pain after. R shoulder grinding remains with inclined scapular retractions " and exercises with the cane. Pt is motivated to participate but limited in ability due to increased R shoulder pain and grinding.     Pain end of session: 2/10 R shoulder and neck      Plan:  Advance as appropriate, upgrade  HEP.  Plan is to continue with neck, scapula, shoulder exercise for the next 2 visits and perform reeval on visit #11.  At that time we will also look at gait, knees, core as per new prescription from patient's pain management office, and then pt will continue shoulder and neck work on her own.    Assessment of current progress against goals:  Progressing toward functional goals       Goals:  STG (3 visits)  Patient to be independent with HEP     LTG (10 visits)   NDI to < 36% impaired   Patient to perform ADLs with pain < 3/10   Patient to quilt/cook with pain < 3/10   Cervical AROM left = right

## 2025-05-13 ENCOUNTER — TREATMENT (OUTPATIENT)
Dept: PHYSICAL THERAPY | Facility: CLINIC | Age: 70
End: 2025-05-13
Payer: MEDICARE

## 2025-05-13 DIAGNOSIS — M54.2 CERVICALGIA: ICD-10-CM

## 2025-05-13 PROCEDURE — 97140 MANUAL THERAPY 1/> REGIONS: CPT | Mod: GP,CQ | Performed by: SPECIALIST/TECHNOLOGIST

## 2025-05-13 PROCEDURE — 97110 THERAPEUTIC EXERCISES: CPT | Mod: CQ,GP | Performed by: SPECIALIST/TECHNOLOGIST

## 2025-05-14 NOTE — PROGRESS NOTES
[facility]  Follow Up Office Visit Note 5/15/2025    Patient Information: Vickie Foster, MRN: [id number], : 1955   Primary Care/Referring Physician: Jamison Maurice DO, [address] [facility] / [place] [address]     Chief Complaint: Left knee pain  Interval History: At her last visit I performed a right shoulder joint injection    Today she reports 50% relief but only for approximately 1 month. She continues to have right shoulder pain and pain overlying the trapezius. She is interested in trying trigger point injections on the right neck/trapezius as this provided her significant relief on the left. She continues to participate in PT.    Brief History of Pain: Ms. Vickie Foster is a 69 y.o. female with a PMHx of CHF, pulmonary HTN, AFib (on Eliquis) who presents today for follow up regarding chronic low back/buttock pain.    For reference, she reports pain started approximately 2022 with no obvious inciting event. She has had this type of pain in past, but it is much more severe than normal. Has been to the ED a few times for this, had xrays performed which didn't show any fracture in the spine or hip. During one of these visits she was noted to have worsening LE edema and was admitted for CHF exacerbation. During this admission she continued to have back pain but also developed gout in her right foot. Is having difficulty bearing weight on her right foot, as it worsens both foot pain and causes pain in her right low back and burning pain in her buttock. Has to lie a certain way in bed (moreso on her left side) to get somewhat comfortable. Denies any numbness, tingling in her legs. She is unsure about weakness as she is not walking much.    Current Pain Medications: Lidocaine patches - tried on both back and foot without benefit. Norco 5/325 mg BID PRN  Previously Tried Pain Medications: Methocarbamol. Unable to use NSAID's due to history of ulcers. Tried BioFreeze without benefit. Gabapentin,  Cyclobenzaprine    Relevant Surgeries: Denies hip or back surgery. Denies ankle or foot surgery.  Injections: Left knee injection - 60% pain relief for 8 months. Caudal ADAM - 95% improvement, Right subacromial bursa injection -85% relief. Right SI joint injection - 80% pain relief. Right shoulder joint injection - 50% improvement but only for 1 month  Physical/Occupational Therapy: Currently doing PT    Medications:   Current Outpatient Medications   Medication Instructions    allopurinol (ZYLOPRIM) 300 mg, oral, Daily    ascorbic acid (VITAMIN C) 500 mg, Daily    atorvastatin (LIPITOR) 40 mg, oral, Daily    buPROPion XL (WELLBUTRIN XL) 300 mg, oral, Daily before breakfast    Eliquis 5 mg, oral, 2 times daily    ergocalciferol, vitamin D2, (VITAMIN D2 ORAL) Take by mouth.    fluticasone propion-salmeteroL (Advair Diskus) 100-50 mcg/dose diskus inhaler 1 puff, inhalation, 2 times daily RT, Rinse mouth with water after use to reduce aftertaste and incidence of candidiasis. Do not swallow.    gabapentin (NEURONTIN) 300 mg, oral, 3 times daily    HYDROcodone-acetaminophen (Norco) 5-325 mg tablet 1 tablet, oral, 2 times daily PRN    hydrOXYzine HCL (ATARAX) 10 mg, oral, Nightly PRN    metoprolol succinate XL (TOPROL-XL) 25 mg, oral, Daily    multivitamin tablet 1 tablet, Daily    NON FORMULARY Biotin    oxygen (O2) gas therapy 1 each, inhalation, Every 12 hours    potassium chloride CR (Klor-Con M20) 20 mEq ER tablet 20 mEq, oral, Daily, Do not crush, chew, or split.    torsemide (DEMADEX) 50 mg, oral, 2 times daily      Allergies: No Known Allergies    Past Medical & Surgical History:  Past Medical History:   Diagnosis Date    A-fib (Multi)     Cellulitis     CHF (congestive heart failure)     COVID     Heart disease     HTN (hypertension)     Low back pain       Past Surgical History:   Procedure Laterality Date    ABLATION OF DYSRHYTHMIC FOCUS      BREAST BIOPSY  2000    CARDIAC ELECTROPHYSIOLOGY PROCEDURE N/A  2024    Procedure: Cardioversion;  Surgeon: Amado Ty MD;  Location: [facility] Cardiac Cath Lab;  Service: Electrophysiology;  Laterality: N/A;  05464  i48.0  medical mutual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    CARDIAC ELECTROPHYSIOLOGY PROCEDURE N/A 2024    Procedure: Ablation A-Fib;  Surgeon: Amado Ty MD;  Location: [facility] Cardiac Cath Lab;  Service: Electrophysiology;  Laterality: N/A;  AFIB CRYOABLATION; CPT 58952; ICD I48.0   Medicare mutual of [place] medicare  ID - [id number]  [phone]  No Auth required  Call ref # [id number]    CARDIAC ELECTROPHYSIOLOGY PROCEDURE N/A 2024    Procedure: Cardioversion;  Surgeon: Amado Ty MD;  Location: [facility] Cardiac Cath Lab;  Service: Electrophysiology;  Laterality: N/A;  CARDIOVERSION CPT 07012, ICD I48.0 no auth required/REF # [id number]    CARDIOVERSION      2024       Family History   Problem Relation Name Age of Onset    Breast cancer Mother      Heart attack Mother           in early 80s of MI    Heart disease Mother      Colon cancer Father      Breast cancer Sister      Diabetes type II Sister      Kidney failure Sister      Other (herione addict) Sister      No Known Problems Brother      No Known Problems Daughter      Other (recovering alcoholic) Son      Breast cancer Other Maternal Aunt      Social History     Socioeconomic History    Marital status:      Spouse name: Not on file    Number of children: Not on file    Years of education: Not on file    Highest education level: Not on file   Occupational History    Not on file   Tobacco Use    Smoking status: Former     Current packs/day: 0.00     Types: Cigarettes     Quit date:      Years since quittin.3    Smokeless tobacco: Never   Vaping Use    Vaping status: Never Used   Substance and Sexual Activity    Alcohol use: Not Currently     Comment: monthly or less    Drug use: Never    Sexual activity: Defer   Other Topics Concern    Not on file   Social History  Narrative    Not on file     Social Drivers of Health     Financial Resource Strain: Low Risk  (12/9/2024)    Overall Financial Resource Strain (CARDIA)     Difficulty of Paying Living Expenses: Not hard at all   Food Insecurity: No Food Insecurity (12/9/2024)    Hunger Vital Sign     Worried About Running Out of Food in the Last Year: Never true     Ran Out of Food in the Last Year: Never true   Transportation Needs: No Transportation Needs (12/9/2024)    PRAPARE - Transportation     Lack of Transportation (Medical): No     Lack of Transportation (Non-Medical): No   Physical Activity: Inactive (12/9/2024)    Exercise Vital Sign     Days of Exercise per Week: 0 days     Minutes of Exercise per Session: 0 min   Stress: No Stress Concern Present (12/9/2024)    [employer] - Occupational Stress Questionnaire     Feeling of Stress : Not at all   Social Connections: Moderately Integrated (12/9/2024)    Social Connection and Isolation Panel [NHANES]     Frequency of Communication with Friends and Family: More than three times a week     Frequency of Social Gatherings with Friends and Family: More than three times a week     Attends Yazdanism Services: More than 4 times per year     Active Member of Clubs or Organizations: No     Attends Club or Organization Meetings: Never     Marital Status:    Intimate Partner Violence: Not At Risk (12/9/2024)    Humiliation, Afraid, Rape, and Kick questionnaire     Fear of Current or Ex-Partner: No     Emotionally Abused: No     Physically Abused: No     Sexually Abused: No   Housing Stability: Low Risk  (12/9/2024)    Housing Stability Vital Sign     Unable to Pay for Housing in the Last Year: No     Number of Times Moved in the Last Year: 0     Homeless in the Last Year: No       Problems, Past medical history, past surgical history, Medications, allergies, social and family history reviewed and as per the electronic medical record from today's  "encounter    Review of Systems:  CONST: No fever, chills, fatigue, weight changes  EYES: No loss of vision  ENT: No hearing loss, tinnitus  CV: No chest pain, palpitations  RESP: Reports some dyspnea on exertion  GI: No stool incontinence, nausea, vomiting  : No urinary incontinence  MSK: No joint swelling  SKIN: No rash, no hives  NEURO: No headache, dizziness  PSYCH: No anxiety, depression  HEM/LYMPH: No easy bruising or bleeding  All other systems reviewed are negative     Physical Exam:  Vitals: /76   Pulse 89   Resp 18   Ht 1.626 m (5' 4\")   Wt 111 kg (244 lb)   SpO2 93%   BMI 41.88 kg/m²   General: No apparent distress. Alert, appropriate, oriented x 3. Mood generally positive, affect congruent. Speaking in full sentences.   HENT: Normocephalic, atraumatic. Hearing intact.  Eyes: Pupils equal and round  Neck: Supple, trachea midline  Lungs: Symmetric respiratory excursion on visual exam, nonlabored breathing.   Extremities: No cyanosis noted in extremities. Pitting edema noted in legs up to the shins  Skin: No rashes, lesions noted.  Neck: Reports pain to palpation of right trapezius and cervical paraspinal muscles  Neuro: Alert and appropriate. Using a wheelchair to ambulate. Bulk and tone within normal limits.    Laboratory Data:  The following laboratory data were reviewed during this visit:   Lab Results   Component Value Date    WBC 9.3 03/28/2025    RBC 4.28 03/28/2025    HGB 13.0 03/28/2025    HCT 40.1 03/28/2025     03/28/2025      Lab Results   Component Value Date    INR 1.0 07/08/2024    INR 1.0 06/05/2023    INR 1.0 12/01/2019     Lab Results   Component Value Date    CREATININE 0.70 03/28/2025    HGBA1C 6.0 (H) 03/28/2025       Imaging:  The following imaging impressions were reviewed by me during this visit:    -5/22/24 right shoulder xray shows moderate to severe glenohumeral joint OA, moderate AC joint OA  -8/2022 lumbar MRI shows L3-L4 moderate to marked spinal canal " stenosis and right foraminal stenosis without nerve root impingement. L4-L5 moderate spinal canal stenosis. L5-S1 with no significatn central or foraminal stenosis   -4/2021 left knee xray shows moderate OA  -7/20/23 cervical spine xray shows diffuse, severe degenerative changes    I also personally reviewed the images from the above studies myself. These images and my interpretation of them contributed to the management and decision making of the patient's medical plan.    ASSESSMENT:  Ms. Vickie Foster is a 69 y.o. female with low back, leg, and neck pain that is consistent with:    1. Myofascial pain    2. Primary osteoarthritis, right shoulder    3. Long-term current use of opiate analgesic            PLAN:  Radiology: -No additional diagnostics at this time    Physically: -Continue PT to focus on neck posture and myofascial release given apparent progression of cervical kyphosis, along with right shoulder pain.     Psychologically: No needs at this time    Medication: -Will continue Norco 5/325 mg BID PRN. No refills needed today    Duration: Greater than 6 months    Intervention: -Reports significant benefit from recent right SI joint injection but continued to have burning buttock pain (R>L) that radiates down her thighs, likely secondary to known lumbar spinal stenosis. She subsequently underwent a caudal ADAM with 95% improvement. Of note, is on Eliquis  - Also reports bilateral knee pain, with prior knee x-rays showing moderate osteoarthritis. She has undergone both left and right knee joint injections with generally 60% pain relief for 3-4 months. Would consider genicular nerve blocks/RFA in the future  - I suspect her current neck pain is secondary to muscle strain/myofascial pain in the setting of poor posture while sleeping. She sleeps sitting up and bending over a pillow at a table due to difficulty breathing while lying flat. Recommend trying to sleep in a recliner and continue working with your  cardiology/pulmonology teams to optimize your breathing. She underwent TPI's to the left neck with significant improvement and is having similar pain on the right. She underwent TPI's today, as noted below  - Her right shoulder pain remains severe.  She previously received relief from right subacromial bursa injection.  This was repeated with no pain relief at all.  Given presence of fairly severe glenohumeral joint arthritis she underwent a right shoulder joint steroid injection with 50% relief but only for 1 month. Would consider a right suprascapular nerve Sprint PNS in the future given she is not a good surgical candidate for shoulder replacement.    I will refill the patient's opioids today for 1 month.  The patient continues to see benefit and improvement in their quality of life and ability to maintain ADLs.  Patient educated about the risks of taking opioids and operating a motor vehicle.  Patient reports no adverse side effects to current medication regimen.  Current regimen does allow patient to maintain ADLs.  Patient reports no new neurologic symptoms, new pain areas, or exacerbation in pain today.  Patient reports they are happy with current treatment care path.    [place] was reviewed and was consistent with the history.    Patient has been educated on the risks, benefits, and alternatives of controlled substances as well as the proper way to store these medications.  The patient and I discussed the nature of this medication and its side effects.  We discussed tolerance, physical dependence, psychological dependence, addiction and opioid-induced hyperalgesia.  We discussed the potential need to wean from this medication.  We discussed the availability of programs that can help with this process if necessary.  We discussed safety issues related to opioids including safe storage.  We discussed the fact that the patient should not drive an automobile or operate heavy machinery while taking this medication.   A prescription for naloxone was offered to the patient.  The patient will be re-evaluated for the need to continue opioid therapy in 60-90 days.  A Pill Count Was not completed today. She forgot her pills  Most recent Toxicology reviewed and appropriate. New Utox ordered today      Inject Trigger Point, 1 or 2    Date/Time: 5/15/2025 3:00 PM    Performed by: Joseph Kelley MD  Authorized by: Joseph Kelley MD  Preparation: Patient was prepped and draped in the usual sterile fashion.  Local anesthesia used: no    Anesthesia:  Local anesthesia used: no    Sedation:  Patient sedated: no    Comments: Procedure Note: Trigger Point Injections  The correct site and laterality were confirmed with the patient.  Next, I palpated and marked 4 trigger points in the right trapezius and cervical paraspinal muscles.  The sites were then cleaned with alcohol and a 1.5 inch 25 gauge needle was used to infiltrate each site with 1.25 mL of a mixture of 10 mg triamcinolone diluted in 5 mL bupivacaine 0.5%.  Dry needling was then performed at each site for 5-10 seconds.  The patient tolerated this procedure well and there were no immediate complications                          Sincerely,  Joseph Kelley MD  [facility] [place]

## 2025-05-15 ENCOUNTER — OFFICE VISIT (OUTPATIENT)
Dept: PAIN MEDICINE | Facility: CLINIC | Age: 70
End: 2025-05-15
Payer: MEDICARE

## 2025-05-15 VITALS
WEIGHT: 244 LBS | SYSTOLIC BLOOD PRESSURE: 126 MMHG | OXYGEN SATURATION: 93 % | BODY MASS INDEX: 41.66 KG/M2 | HEART RATE: 89 BPM | HEIGHT: 64 IN | RESPIRATION RATE: 18 BRPM | DIASTOLIC BLOOD PRESSURE: 76 MMHG

## 2025-05-15 DIAGNOSIS — M19.011 PRIMARY OSTEOARTHRITIS, RIGHT SHOULDER: ICD-10-CM

## 2025-05-15 DIAGNOSIS — M79.18 MYOFASCIAL PAIN: Primary | ICD-10-CM

## 2025-05-15 DIAGNOSIS — Z79.891 LONG-TERM CURRENT USE OF OPIATE ANALGESIC: ICD-10-CM

## 2025-05-15 PROCEDURE — 1159F MED LIST DOCD IN RCRD: CPT | Performed by: STUDENT IN AN ORGANIZED HEALTH CARE EDUCATION/TRAINING PROGRAM

## 2025-05-15 PROCEDURE — 96372 THER/PROPH/DIAG INJ SC/IM: CPT | Performed by: STUDENT IN AN ORGANIZED HEALTH CARE EDUCATION/TRAINING PROGRAM

## 2025-05-15 PROCEDURE — 1160F RVW MEDS BY RX/DR IN RCRD: CPT | Performed by: STUDENT IN AN ORGANIZED HEALTH CARE EDUCATION/TRAINING PROGRAM

## 2025-05-15 PROCEDURE — 99214 OFFICE O/P EST MOD 30 MIN: CPT | Performed by: STUDENT IN AN ORGANIZED HEALTH CARE EDUCATION/TRAINING PROGRAM

## 2025-05-15 PROCEDURE — 1125F AMNT PAIN NOTED PAIN PRSNT: CPT | Performed by: STUDENT IN AN ORGANIZED HEALTH CARE EDUCATION/TRAINING PROGRAM

## 2025-05-15 PROCEDURE — 3008F BODY MASS INDEX DOCD: CPT | Performed by: STUDENT IN AN ORGANIZED HEALTH CARE EDUCATION/TRAINING PROGRAM

## 2025-05-15 PROCEDURE — 3078F DIAST BP <80 MM HG: CPT | Performed by: STUDENT IN AN ORGANIZED HEALTH CARE EDUCATION/TRAINING PROGRAM

## 2025-05-15 PROCEDURE — 20552 NJX 1/MLT TRIGGER POINT 1/2: CPT | Performed by: STUDENT IN AN ORGANIZED HEALTH CARE EDUCATION/TRAINING PROGRAM

## 2025-05-15 PROCEDURE — 3074F SYST BP LT 130 MM HG: CPT | Performed by: STUDENT IN AN ORGANIZED HEALTH CARE EDUCATION/TRAINING PROGRAM

## 2025-05-15 PROCEDURE — 2500000004 HC RX 250 GENERAL PHARMACY W/ HCPCS (ALT 636 FOR OP/ED): Performed by: STUDENT IN AN ORGANIZED HEALTH CARE EDUCATION/TRAINING PROGRAM

## 2025-05-15 PROCEDURE — 1036F TOBACCO NON-USER: CPT | Performed by: STUDENT IN AN ORGANIZED HEALTH CARE EDUCATION/TRAINING PROGRAM

## 2025-05-15 RX ORDER — TRIAMCINOLONE ACETONIDE 40 MG/ML
10 INJECTION, SUSPENSION INTRA-ARTICULAR; INTRAMUSCULAR ONCE
Status: COMPLETED | OUTPATIENT
Start: 2025-05-15 | End: 2025-05-16

## 2025-05-15 RX ORDER — BUPIVACAINE HYDROCHLORIDE 5 MG/ML
5 INJECTION, SOLUTION EPIDURAL; INTRACAUDAL; PERINEURAL ONCE
Status: COMPLETED | OUTPATIENT
Start: 2025-05-15 | End: 2025-05-16

## 2025-05-15 ASSESSMENT — PATIENT HEALTH QUESTIONNAIRE - PHQ9
2. FEELING DOWN, DEPRESSED OR HOPELESS: SEVERAL DAYS
SUM OF ALL RESPONSES TO PHQ9 QUESTIONS 1 AND 2: 2
10. IF YOU CHECKED OFF ANY PROBLEMS, HOW DIFFICULT HAVE THESE PROBLEMS MADE IT FOR YOU TO DO YOUR WORK, TAKE CARE OF THINGS AT HOME, OR GET ALONG WITH OTHER PEOPLE: SOMEWHAT DIFFICULT
1. LITTLE INTEREST OR PLEASURE IN DOING THINGS: SEVERAL DAYS

## 2025-05-15 ASSESSMENT — PAIN - FUNCTIONAL ASSESSMENT: PAIN_FUNCTIONAL_ASSESSMENT: 0-10

## 2025-05-15 ASSESSMENT — PAIN SCALES - GENERAL
PAINLEVEL_OUTOF10: 7
PAINLEVEL_OUTOF10: 7

## 2025-05-15 ASSESSMENT — PAIN DESCRIPTION - DESCRIPTORS: DESCRIPTORS: ACHING

## 2025-05-16 RX ADMIN — TRIAMCINOLONE ACETONIDE 10 MG: 40 SUSPENSION INTRA-ARTERIAL; INTRAMUSCULAR at 09:09

## 2025-05-16 RX ADMIN — BUPIVACAINE HYDROCHLORIDE 25 MG: 5 INJECTION, SOLUTION EPIDURAL; INTRACAUDAL; PERINEURAL at 09:07

## 2025-05-18 LAB
1OH-MIDAZOLAM UR-MCNC: NEGATIVE NG/ML
7AMINOCLONAZEPAM UR-MCNC: NEGATIVE NG/ML
A-OH ALPRAZ UR-MCNC: NEGATIVE NG/ML
A-OH-TRIAZOLAM UR-MCNC: NEGATIVE NG/ML
AMPHETAMINES UR QL: NEGATIVE NG/ML
BARBITURATES UR QL: NEGATIVE NG/ML
BZE UR QL: NEGATIVE NG/ML
CODEINE UR-MCNC: NEGATIVE NG/ML
CREAT UR-MCNC: 6.8 MG/DL
DRUG SCREEN COMMENT UR-IMP: ABNORMAL
EDDP UR-MCNC: NEGATIVE NG/ML
FENTANYL UR-MCNC: ABNORMAL NG/ML
HYDROCODONE UR-MCNC: NEGATIVE NG/ML
HYDROMORPHONE UR-MCNC: NEGATIVE NG/ML
LORAZEPAM UR-MCNC: NEGATIVE NG/ML
METHADONE UR-MCNC: NEGATIVE NG/ML
MORPHINE UR-MCNC: NEGATIVE NG/ML
NORDIAZEPAM UR-MCNC: NEGATIVE NG/ML
NORFENTANYL UR-MCNC: ABNORMAL NG/ML
NORHYDROCODONE UR CFM-MCNC: 69 NG/ML
NOROXYCODONE UR CFM-MCNC: NEGATIVE NG/ML
NORTRAMADOL UR-MCNC: ABNORMAL UG/ML
OH-ETHYLFLURAZ UR-MCNC: NEGATIVE NG/ML
OXAZEPAM UR-MCNC: NEGATIVE NG/ML
OXIDANTS UR QL: NEGATIVE MCG/ML
OXYCODONE UR CFM-MCNC: NEGATIVE NG/ML
OXYMORPHONE UR CFM-MCNC: NEGATIVE NG/ML
PCP UR QL: NEGATIVE NG/ML
PH UR: 6.9 [PH] (ref 4.5–9)
QUEST 6 ACETYLMORPHINE: ABNORMAL
QUEST NOTES AND COMMENTS: ABNORMAL
QUEST PATIENT HISTORICAL REPORT: ABNORMAL
QUEST ZOLPIDEM: NEGATIVE NG/ML
SP GR UR: 1.01
TEMAZEPAM UR-MCNC: NEGATIVE NG/ML
THC UR QL: NEGATIVE NG/ML
TRAMADOL UR-MCNC: ABNORMAL UG/ML
ZOLPIDEM PHENYL-4-CARB UR CFM-MCNC: NEGATIVE NG/ML

## 2025-05-20 ENCOUNTER — APPOINTMENT (OUTPATIENT)
Dept: PHYSICAL THERAPY | Facility: CLINIC | Age: 70
End: 2025-05-20
Payer: MEDICARE

## 2025-05-20 LAB

## 2025-05-20 NOTE — PROGRESS NOTES
"    Physical Therapy Treatment    Patient Name: Vickie Foster  MRN: [id number]  Encounter date:  5/21/2025              Plan of Care to transfer to [facility] after 4/18/2025    Visit Number:  11 (including evaluation)  Planned total visits: [place]  Visits Authorized/Insurance Coverage:  NO AUTH, 30.00 CO PAY, 100% [place], [place], 3300 OOP, MMO    Perform reeval on visit #11    Current Problem  Problem List Items Addressed This Visit    None      Precautions  A-fib  Poor supine tolerance secondary to pulmonary issues  Skin cancer     Relevant PMH:  CHF  Pulmonary HTN  A-fib  COPD  CVA  HA  Neuropathy     Pain   8/10 R shoulder and neck pain with movement     Subjective  General    Patient reports increased BLE swelling today, attributed to the weather. Pt also reports increased R shoulder  and R side cervical region pain, noting she has a follow up with her pain management doctor Thursday and may have another injection in the future.     Objective    No ace wrap on either LE at start of appointment. Patient is transported into and out of appointment seated in WC, but is able to independently transfer <> between treatment table and WC, forward leaning posture, SBA.  STM focused on R shoulder and R side neck. Increased R side cervical muscle and UT tightness vs L. Fair PPT performance in reclined posture.     Treatment:  Therapeutic Exercise    Poor supine tolerance secondary to pulmonary issues    Therapeutic exercises  - seated edge of table    Cervical rotation x 10   Cervical flex/ext x 10  Cervical lateral flexion x 10  Cervical retractions x 10  Shrugs x 10  Iso scap ret 3\" x 10    HOB raised in semi reclined position 30 degrees, 3 pillows for comfort   MIN assist BLE onto mat -ONE AT a time-pt request  AROM  -Cervical retraction x 10   -Scapular retraction x 10   -pelvis tilts 2 x 10 - visual and verbal cues for form     AAROM with cane  -chest press x 10 - R shoulder pain and grinding   -flexion to 90 x " "10  -horizontal abduction 2x5- R shoulder pain DNP   -ER x 10 R/L- Limited ROM     Manual   HOB raised in semi reclined position 30 degrees  STM to R shoulder and R side cervical region     DNP 5/13  Pillow under right UE  PROM   Cervical ROM  Upper trap stretch  Occipital release  Gentle cervical traction     Patient able to perform supine to sit on own from 30 degrees raised HOB    DNP 5/ 6  Therapeutic Exercise  Seated edge of wheel chair:  Cervical retraction 2\" x 10   -Scapular retraction 5\" x 10   -lateral side bend with hold count to 20 aloud x 3 ea     -standing at high low table:  WS side to side, WB through UE's x 10  Towel slides x 10 ea    Current HEP:  4/29/25-add 2 long walks a day inside home, until you need to si, to build endurance  HEP to be completed daily, exercises include:  Access Code: [email or web address]  URL: [email or web address]  Date: 02/20/2025  Prepared by: Smita Lima     Exercises  - Seated Posterior Pelvic Tilt  - 1 x daily - 5 x weekly - 1-2 sets - 10 reps  - Seated Cervical Retraction  - 1 x daily - 5 x weekly - 1-2 sets - 10 reps  - Seated Scapular Retraction  - 1 x daily - 5 x weekly - 1-2 sets - 10 reps  - Seated Cervical Rotation AROM  - 1 x daily - 5 x weekly - 1-2 sets - 10 reps  - Seated Cervical Sidebending AROM  - 1 x daily - 5 x weekly - 1-2 sets - 10 reps  -lateral side bend with hold count to 20 aloud x 3 ea      Has patient been compliant with HEP? Fair/good    Activity tolerance:  Good tolerance of STM. Grinding in R GH joint reported during exercises    OP EDUCATION:  HEP  Postural training    Assessment:  Good response to STM and manual interventions, significant decrease in R shoulder pain after. R shoulder grinding remains with inclined scapular retractions and exercises with the cane. Pt is motivated to participate but limited in ability due to increased R shoulder pain and grinding.     Pain end of session: 2/10 R shoulder and neck      Plan:  Advance as appropriate, " upgrade  HEP.  Plan is to continue with neck, scapula, shoulder exercise for the next 2 visits and perform reeval on visit #11.  At that time we will also look at gait, knees, core as per new prescription from patient's pain management office, and then pt will continue shoulder and neck work on her own.    Assessment of current progress against goals:  Progressing toward functional goals       Goals:  STG (3 visits)  Patient to be independent with HEP     LTG (10 visits)   NDI to < 36% impaired   Patient to perform ADLs with pain < 3/10   Patient to quilt/cook with pain < 3/10   Cervical AROM left = right

## 2025-05-21 ENCOUNTER — APPOINTMENT (OUTPATIENT)
Dept: PHYSICAL THERAPY | Facility: CLINIC | Age: 70
End: 2025-05-21
Payer: MEDICARE

## 2025-05-21 ENCOUNTER — DOCUMENTATION (OUTPATIENT)
Dept: PHYSICAL THERAPY | Facility: CLINIC | Age: 70
End: 2025-05-21

## 2025-05-21 NOTE — PROGRESS NOTES
Physical Therapy                 Therapy Communication Note    Patient Name: Vickie Foster  MRN: [id number]  Department:   Room: Room/bed info not found  Today's Date: 5/21/2025     Discipline: Physical Therapy    Missed Visit: Patient cancelled her appointment because of illness    Missed Time: Cancel    Comment: Cancellation #8, no more than 2 in a row.

## 2025-05-22 ENCOUNTER — OFFICE VISIT (OUTPATIENT)
Dept: OPHTHALMOLOGY | Facility: CLINIC | Age: 70
End: 2025-05-22
Payer: MEDICARE

## 2025-05-22 DIAGNOSIS — H25.813 COMBINED FORMS OF AGE-RELATED CATARACT OF BOTH EYES: Primary | ICD-10-CM

## 2025-05-22 DIAGNOSIS — H52.7 REFRACTION ERROR: ICD-10-CM

## 2025-05-22 DIAGNOSIS — E87.6 HYPOKALEMIA: ICD-10-CM

## 2025-05-22 PROCEDURE — 99213 OFFICE O/P EST LOW 20 MIN: CPT | Performed by: OPHTHALMOLOGY

## 2025-05-22 PROCEDURE — 92015 DETERMINE REFRACTIVE STATE: CPT | Performed by: OPHTHALMOLOGY

## 2025-05-22 RX ORDER — POTASSIUM CHLORIDE 1500 MG/1
TABLET, EXTENDED RELEASE ORAL
Qty: 270 TABLET | Refills: 3 | Status: SHIPPED | OUTPATIENT
Start: 2025-05-22

## 2025-05-22 RX ORDER — CIPROFLOXACIN 500 MG/1
TABLET, FILM COATED ORAL
COMMUNITY
Start: 2025-05-20

## 2025-05-22 ASSESSMENT — REFRACTION_MANIFEST
METHOD_AUTOREFRACTION: 1
OD_AXIS: 080
OS_AXIS: 140
OD_CYLINDER: -1.50
OS_CYLINDER: -0.75
OD_SPHERE: -2.00
OD_SPHERE: -1.25
OD_AXIS: 080
OD_ADD: +2.50
OD_CYLINDER: -2.25
OS_CYLINDER: -0.25
OS_AXIS: 140
OS_SPHERE: -1.75
OS_ADD: +2.50
OS_SPHERE: -1.75

## 2025-05-22 ASSESSMENT — EXTERNAL EXAM - RIGHT EYE: OD_EXAM: NORMAL

## 2025-05-22 ASSESSMENT — ENCOUNTER SYMPTOMS
RESPIRATORY NEGATIVE: 0
GASTROINTESTINAL NEGATIVE: 0
CARDIOVASCULAR NEGATIVE: 0
ENDOCRINE NEGATIVE: 0
ALLERGIC/IMMUNOLOGIC NEGATIVE: 0
NEUROLOGICAL NEGATIVE: 0
MUSCULOSKELETAL NEGATIVE: 0
PSYCHIATRIC NEGATIVE: 0
HEMATOLOGIC/LYMPHATIC NEGATIVE: 0
CONSTITUTIONAL NEGATIVE: 0
EYES NEGATIVE: 0

## 2025-05-22 ASSESSMENT — VISUAL ACUITY
OD_SC: 20/100
OD_BAT_MED: 20/40
OS_SC+: -1
OS_BAT_MED: 20/40
METHOD: SNELLEN - SINGLE
OS_SC: 20/30
OD_PH_SC: 20/40

## 2025-05-22 ASSESSMENT — EXTERNAL EXAM - LEFT EYE: OS_EXAM: NORMAL

## 2025-05-22 ASSESSMENT — SLIT LAMP EXAM - LIDS
COMMENTS: NORMAL
COMMENTS: NORMAL

## 2025-05-22 ASSESSMENT — KERATOMETRY
OS_K1POWER_DIOPTERS: 42.25
METHOD_AUTO_MANUAL: AUTOMATED
OD_AXISANGLE2_DEGREES: 60
OS_K2POWER_DIOPTERS: 42.50
OS_AXISANGLE_DEGREES: 70
OD_K1POWER_DIOPTERS: 41.75
OD_AXISANGLE_DEGREES: 150
OS_AXISANGLE2_DEGREES: 160
OD_K2POWER_DIOPTERS: 43.00

## 2025-05-22 ASSESSMENT — PAIN SCALES - GENERAL: PAINLEVEL_OUTOF10: 0-NO PAIN

## 2025-05-22 NOTE — PROGRESS NOTES
Assessment/Plan   Problem List Items Addressed This Visit       Combined forms of age-related cataract of both eyes - Primary    Great result with refraction and does not glare significantly to match prior records. Does endorse some glare symptoms but cannot justify cataract extraction (CE) at this time. Will provide with updated RX and monitor with serial exam.          Refraction error    New Rx for glasses/SCL given per patient request. Patient's signature obtained to acknowledge and confirm that a paper copy of glasses/SCL Rx was given to patient in compliance with [facility] Eyeglass Rule. Electronic copy of Rx will also be available via [email or web address].               Provided reassurance regarding above diagnoses and care received in the office visit today. Discussed outcomes and options along with the importance of treatment compliance. Understands the importance of any follow up visits. Patient instructed to call/communicate with our office if any new issues, questions, or concerns.     Will plan to see back in 1 year full glare or sooner PRN

## 2025-05-22 NOTE — ASSESSMENT & PLAN NOTE
Great result with refraction and does not glare significantly to match prior records. Does endorse some glare symptoms but cannot justify cataract extraction (CE) at this time. Will provide with updated RX and monitor with serial exam.

## 2025-05-22 NOTE — ASSESSMENT & PLAN NOTE
New Rx for glasses/SCL given per patient request. Patient's signature obtained to acknowledge and confirm that a paper copy of glasses/SCL Rx was given to patient in compliance with [facility] Eyeglass Rule. Electronic copy of Rx will also be available via [email or web address].

## 2025-05-22 NOTE — PATIENT INSTRUCTIONS
Thank you so much for choosing me to provide your care today!    If you were dilated your vision may remain blurry   or light sensitive for several hours.    The nature of eye and vision problems can require frequent follow up, please make every effort to adhere to any future appointments.    If you have any issues, questions, or concerns,   please do not hesitate to reach out.    If you receive a survey in regards to your care today, please mention any exceptional care my office staff and/or technicians provided.    You can reach our office at this number:    [phone]    Please consider signing up for and utilizing [employer]!  This is the best way to directly reach me or other  providers

## 2025-05-28 ENCOUNTER — APPOINTMENT (OUTPATIENT)
Dept: PRIMARY CARE | Facility: CLINIC | Age: 70
End: 2025-05-28
Payer: MEDICARE

## 2025-05-28 DIAGNOSIS — I51.7 CARDIOMEGALY: ICD-10-CM

## 2025-05-28 DIAGNOSIS — I25.10 CORONARY ARTERIOSCLEROSIS: ICD-10-CM

## 2025-05-28 DIAGNOSIS — I67.9 CVD (CEREBROVASCULAR DISEASE): ICD-10-CM

## 2025-05-28 DIAGNOSIS — M1A.9XX0 CHRONIC GOUT WITHOUT TOPHUS, UNSPECIFIED CAUSE, UNSPECIFIED SITE: ICD-10-CM

## 2025-05-28 DIAGNOSIS — I50.32 CHRONIC DIASTOLIC HEART FAILURE: ICD-10-CM

## 2025-05-28 RX ORDER — ATORVASTATIN CALCIUM 40 MG/1
40 TABLET, FILM COATED ORAL DAILY
Qty: 90 TABLET | Refills: 0 | Status: SHIPPED | OUTPATIENT
Start: 2025-05-28

## 2025-05-28 RX ORDER — TORSEMIDE 100 MG/1
TABLET ORAL
Qty: 90 TABLET | Refills: 0 | Status: SHIPPED | OUTPATIENT
Start: 2025-05-28

## 2025-05-28 RX ORDER — ALLOPURINOL 300 MG/1
300 TABLET ORAL DAILY
Qty: 90 TABLET | Refills: 0 | Status: SHIPPED | OUTPATIENT
Start: 2025-05-28

## 2025-05-29 ENCOUNTER — APPOINTMENT (OUTPATIENT)
Dept: PHYSICAL THERAPY | Facility: CLINIC | Age: 70
End: 2025-05-29
Payer: MEDICARE

## 2025-06-03 ENCOUNTER — TELEMEDICINE (OUTPATIENT)
Dept: PRIMARY CARE | Facility: CLINIC | Age: 70
End: 2025-06-03
Payer: MEDICARE

## 2025-06-03 DIAGNOSIS — G89.29 CHRONIC RIGHT SHOULDER PAIN: ICD-10-CM

## 2025-06-03 DIAGNOSIS — I25.10 CORONARY ARTERIOSCLEROSIS: ICD-10-CM

## 2025-06-03 DIAGNOSIS — I48.0 PAF (PAROXYSMAL ATRIAL FIBRILLATION) (MULTI): ICD-10-CM

## 2025-06-03 DIAGNOSIS — I50.32 CHRONIC DIASTOLIC HEART FAILURE: ICD-10-CM

## 2025-06-03 DIAGNOSIS — J43.1 PANLOBULAR EMPHYSEMA (MULTI): ICD-10-CM

## 2025-06-03 DIAGNOSIS — M25.511 CHRONIC RIGHT SHOULDER PAIN: ICD-10-CM

## 2025-06-03 DIAGNOSIS — R73.03 PREDIABETES: ICD-10-CM

## 2025-06-03 DIAGNOSIS — E79.0 HYPERURICEMIA: Primary | ICD-10-CM

## 2025-06-03 DIAGNOSIS — E87.6 HYPOKALEMIA: ICD-10-CM

## 2025-06-03 DIAGNOSIS — M1A.9XX0 CHRONIC GOUT WITHOUT TOPHUS, UNSPECIFIED CAUSE, UNSPECIFIED SITE: ICD-10-CM

## 2025-06-03 DIAGNOSIS — I51.7 CARDIOMEGALY: ICD-10-CM

## 2025-06-03 DIAGNOSIS — I67.9 CVD (CEREBROVASCULAR DISEASE): ICD-10-CM

## 2025-06-03 DIAGNOSIS — F32.A MODERATELY SEVERE DEPRESSION: ICD-10-CM

## 2025-06-03 DIAGNOSIS — J41.0 SIMPLE CHRONIC BRONCHITIS (MULTI): ICD-10-CM

## 2025-06-03 PROCEDURE — RXMED WILLOW AMBULATORY MEDICATION CHARGE

## 2025-06-03 PROCEDURE — G2211 COMPLEX E/M VISIT ADD ON: HCPCS | Performed by: FAMILY MEDICINE

## 2025-06-03 PROCEDURE — 99214 OFFICE O/P EST MOD 30 MIN: CPT | Performed by: FAMILY MEDICINE

## 2025-06-03 RX ORDER — BUPROPION HYDROCHLORIDE 300 MG/1
300 TABLET ORAL
Qty: 90 TABLET | Refills: 1 | Status: SHIPPED | OUTPATIENT
Start: 2025-06-03

## 2025-06-03 RX ORDER — ATORVASTATIN CALCIUM 40 MG/1
40 TABLET, FILM COATED ORAL DAILY
Qty: 90 TABLET | Refills: 3 | Status: SHIPPED | OUTPATIENT
Start: 2025-06-03

## 2025-06-03 RX ORDER — METOPROLOL SUCCINATE 25 MG/1
25 TABLET, EXTENDED RELEASE ORAL DAILY
Qty: 90 TABLET | Refills: 3 | Status: SHIPPED | OUTPATIENT
Start: 2025-06-03 | End: 2026-06-03

## 2025-06-03 RX ORDER — POTASSIUM CHLORIDE 20 MEQ/1
20 TABLET, EXTENDED RELEASE ORAL 2 TIMES DAILY
Qty: 180 TABLET | Refills: 3 | Status: SHIPPED | OUTPATIENT
Start: 2025-06-03 | End: 2026-06-03

## 2025-06-03 RX ORDER — FLUTICASONE PROPIONATE AND SALMETEROL 100; 50 UG/1; UG/1
1 POWDER RESPIRATORY (INHALATION)
Qty: 180 EACH | Refills: 3 | Status: SHIPPED | OUTPATIENT
Start: 2025-06-03 | End: 2026-06-03

## 2025-06-03 RX ORDER — ALLOPURINOL 300 MG/1
300 TABLET ORAL DAILY
Qty: 90 TABLET | Refills: 3 | Status: SHIPPED | OUTPATIENT
Start: 2025-06-03 | End: 2026-06-03

## 2025-06-03 RX ORDER — APIXABAN 5 MG/1
5 TABLET, FILM COATED ORAL 2 TIMES DAILY
Qty: 180 TABLET | Refills: 1 | Status: SHIPPED | OUTPATIENT
Start: 2025-06-03 | End: 2025-11-30

## 2025-06-03 RX ORDER — TORSEMIDE 100 MG/1
100 TABLET ORAL DAILY
Qty: 90 TABLET | Refills: 3 | Status: SHIPPED | OUTPATIENT
Start: 2025-06-03 | End: 2026-06-03

## 2025-06-03 NOTE — PROGRESS NOTES
Virtual or Telephone Consent    An interactive audio and video telecommunication system which permits real time communications between the patient (at the originating site) and provider (at the distant site) was utilized to provide this telehealth service.   Verbal consent was requested and obtained from Vickie CUADRA Cristian on this date, 06/03/25 for a telehealth visit and the patient's location was confirmed at the time of the visit.      Presents via virtual visit for follow-up    1. Hyperuricemia   -Uric acid elevated above 8 currently on 3 mg allopurinol no recent gout flares   2. CVD (cerebrovascular disease)   Goal LDL less than 70 goal blood pressure less than 130/80   3. Moderately severe depression   Well-controlled on current therapy   4. PAF (paroxysmal atrial fibrillation) (Multi)   Rate controlled and anticoagulated appropriately   5. Panlobular emphysema (Multi)   Following with pulmonology regularly currently on Advair denies any recurrent shortness of breath presently   6. Coronary arteriosclerosis    7. Chronic right shoulder pain   Working on conservative management   8. Hypokalemia   Currently potassium twice daily on torsemide needs rechecked was low recently   9. Prediabetes   Previously on semaglutide no longer taking as it was compounded.  Working on lifestyle inventions   10. Chronic gout without tophus, unspecified cause, unspecified site    11. Simple chronic bronchitis (Multi)    12. Chronic diastolic heart failure   Currently on optimal medical therapy follows with cardiology   13. Cardiomegaly      All pertinent positive symptoms are included in history of present illness.    All other systems have been reviewed and are negative and noncontributory to this patient's current ailments.    CONSTITUTIONAL - INAD. Not ill appearing.  SKIN - No lesions or rashes visualized. No jaundice.   HEENT- head appears atraumatic and normocephalic. There is no scleral icterus visualized. EOMI. External nares  are not erythematous without drainage. Limited inspection of the throat shows is nonerythematous and without exudate.  RESP - No labored breathing.      1. Hyperuricemia (Primary)  Recheck uric acid continue allopurinol  - Uric acid; Future  - Uric acid    2. CVD (cerebrovascular disease)  Continue statin goal LDL less than 70  - atorvastatin (Lipitor) 40 mg tablet; Take 1 tablet (40 mg) by mouth once daily.  Dispense: 90 tablet; Refill: 3    3. Moderately severe depression  Stable continue meds  - buPROPion XL (Wellbutrin XL) 300 mg 24 hr tablet; Take 1 tablet (300 mg) by mouth once daily in the morning. Take before meals.  Dispense: 90 tablet; Refill: 1    4. PAF (paroxysmal atrial fibrillation) (Multi)  Rate controlled and anticoagulated appropriately follow-up with cardiology  - Eliquis 5 mg tablet; Take 1 tablet (5 mg) by mouth 2 times a day.  Dispense: 180 tablet; Refill: 1  - metoprolol succinate XL (Toprol-XL) 25 mg 24 hr tablet; Take 1 tablet (25 mg) by mouth once daily.  Dispense: 90 tablet; Refill: 3    5. Panlobular emphysema (Multi)  Continue following with pulmonary medicine    6. Coronary arteriosclerosis    - torsemide (Demadex) 100 mg tablet; Take 1 tablet (100 mg) by mouth once daily.  Dispense: 90 tablet; Refill: 3    7. Chronic right shoulder pain  Continue conservative treatment    8. Hypokalemia  Recheck potassium level continue potassium supplementation  - Comprehensive metabolic panel; Future  - potassium chloride CR (Klor-Con M20) 20 mEq ER tablet; Take 1 tablet (20 mEq) by mouth 2 times a day. Do not crush or chew.  Dispense: 180 tablet; Refill: 3  - Comprehensive metabolic panel    9. Prediabetes  Recommend low-carb diet    10. Chronic gout without tophus, unspecified cause, unspecified site  Recheck uric acid continue allopurinol  - allopurinol (Zyloprim) 300 mg tablet; Take 1 tablet (300 mg) by mouth once daily.  Dispense: 90 tablet; Refill: 3    11. Simple chronic bronchitis  (Multi)  Continue inhalers follow-up with pulmonary  - fluticasone propion-salmeteroL (Advair Diskus) 100-50 mcg/dose diskus inhaler; Inhale 1 puff 2 times a day. Rinse mouth with water after use to reduce aftertaste and incidence of candidiasis. Do not swallow.  Dispense: 180 each; Refill: 3    12. Chronic diastolic heart failure  Continue medications follow-up with cardiology  - torsemide (Demadex) 100 mg tablet; Take 1 tablet (100 mg) by mouth once daily.  Dispense: 90 tablet; Refill: 3    13. Cardiomegaly    - torsemide (Demadex) 100 mg tablet; Take 1 tablet (100 mg) by mouth once daily.  Dispense: 90 tablet; Refill: 3

## 2025-06-04 PROCEDURE — RXMED WILLOW AMBULATORY MEDICATION CHARGE

## 2025-06-05 ENCOUNTER — TREATMENT (OUTPATIENT)
Dept: PHYSICAL THERAPY | Facility: CLINIC | Age: 70
End: 2025-06-05
Payer: MEDICARE

## 2025-06-05 DIAGNOSIS — M54.2 CERVICALGIA: ICD-10-CM

## 2025-06-05 DIAGNOSIS — R26.2 DIFFICULTY WALKING: Primary | ICD-10-CM

## 2025-06-05 DIAGNOSIS — R29.898 LEG WEAKNESS: ICD-10-CM

## 2025-06-05 DIAGNOSIS — M54.50 LUMBAGO: ICD-10-CM

## 2025-06-05 PROCEDURE — 97116 GAIT TRAINING THERAPY: CPT | Mod: GP | Performed by: PHYSICAL THERAPIST

## 2025-06-05 PROCEDURE — 97110 THERAPEUTIC EXERCISES: CPT | Mod: GP | Performed by: PHYSICAL THERAPIST

## 2025-06-05 PROCEDURE — 97112 NEUROMUSCULAR REEDUCATION: CPT | Mod: GP | Performed by: PHYSICAL THERAPIST

## 2025-06-05 NOTE — PROGRESS NOTES
Physical Therapy Treatment    Patient Name: Vickie Foster  MRN: [id number]  Encounter date:  6/5/2025  Time Calculation  Start Time: 1535  Stop Time: 1628  Time Calculation (min): 53 min     PT Therapeutic Procedures Time Entry  Therapeutic Exercise Time Entry: 38  Gait Training Time Entry: 15       Visit Number:  11 (including evaluation)  Planned total visits: 20  Visits Authorized/Insurance Coverage:  NO AUTH, 30.00 CO PAY, 100% [place], 3300 OOP, MMO    Perform re-eval on visit #20    Current Problem  Problem List Items Addressed This Visit           ICD-10-CM    Cervicalgia M54.2    Difficulty walking - Primary R26.2    Leg weakness R29.898    Lumbago M54.50       Precautions  A-fib  Poor supine tolerance secondary to pulmonary issues  Skin cancer     Relevant PMH:  CHF  Pulmonary HTN  A-fib  COPD  CVA  HA  Neuropathy     Pain  8/10 R shoulder with movement, 0 at rest  neck pain 5/10 with movement, 0 at rest  LB 0, was 2-3 with standing straight up  R knee 0, but some with crawling on bed  L knee 0, but some with crawling on bed    Subjective  General  Patient reports not needing cataract surgery and instead just needs new glasses.  Patient also states dental abscess and so she is going to see the oral surgeon.  Patient is ready to start working on her ambulation and her knees and a little more focus on her core and is willing to do her shoulder neck exercises at home now.  Pain in neck and shoulder no longer gets bad with cooking, neck pain no longer gets bad with ADL.  Has not quilted lately.  Walks with flexed posture, and uses rollator at times as well with less flexed posture.  If she straightens up too much she has LBP.  Crawls up stairs on and off all day long to use her bathroom.  Takes a lot of very short walks each day.  When walks outdoor family gives SBA.  Continues to quite require to come in to therapy with a wheelchair from car to treatment area, with someone pushing  her.    Objective  LE/core strength:  Hip flexion and knee flexion 3+/5  Ankle df 4/5 bilateral  Posterior pelvic tilt fair    Range of Motion:  Cervical AROM Range           R rotation 45 degrees   L rotation 60 degrees   R sidebend 25 degrees   L sidebend 22 degrees   Lumbar active range of motion    Flexion WFL   Extension Just shy of neutral      Knee AROM L R   Flexion WNL WNL   Extension WNL  WNL    Abduction     External Rotation       Gait:  Severe shuffling with poor foot clearance when amb in tx room no AD as well as severe flexed trunk posture, standby assist  Mild flexed posture when walking 2 laps in the parallel bars and then needing to sit, standby assist for safety  Ambulates 45 feet with front wheeled walker and moderately flexed posture standby assist for safety  MORLEY with each gait bout, with quick recovery with rest    Transfers:  Standby assist for safety     Outcome Measures:  NDI=15/50    WOMAC = 26    Treatment:  Poor supine tolerance secondary to pulmonary issues    Therapeutic exercises  Range of motion of C-spine and lumbar spine as above for objective measurements  Shoulder range of motion to simulate use of pulleys at home  Gait training as above per objective measurements with cues for improved posture  MREs for lower extremity strengthening    Current HEP:  Add pulleys  And a few longer walks each day with rollator until feel winded to start improving endurance  Continue previous issued exercises for neck, shoulder, and pelvic tilt    Has patient been compliant with HEP? Fair/good    Activity tolerance:  Poor to fair for gait  OP EDUCATION:  As per new HEP recommendations including how body responds to the stresses exercise puts on it    Assessment:  Pt has met goals for neck, and now requires continued PT to reach all goals with respect to walking and leg strengthening to restore function.  PT recommendation is to continue to reach all goals.  Pt agrees.  New goals added  today.      Pain end of session: No change      Plan:  Next session continue core, lower extremity strengthening as well as gait every visit, and stability training.    Assessment of current progress against goals:  Progressing toward functional goals       Goals:  STG (3 visits)  Patient to be independent with HEP-goal met for R shoulder and neck     LTG (10 visits)   NDI to < 36% impaired goal met   Patient to perform ADLs with pain < 3/10 goal met   Patient to quilt/cook with pain < 3/10 goal met   Cervical AROM left = right goal met    New goals added 6/5/2025:  Short-term goal:   1.   Patient to be independent HEP for lower extremity strengthening and increased gait frequency    Long-term goal in 9 visits:  #1. patient ambulate 100-200 feet x 2 with wheeled walker with improved posture for community ambulation.  #2. patient to perform sit to stand 2 x 10 for lower extremity strengthening for more stability with gait and transfers  #3 patient to stand without upper extremity support 1 minute x 2 for improved stability with ADL.

## 2025-06-06 ENCOUNTER — HOSPITAL ENCOUNTER (EMERGENCY)
Facility: HOSPITAL | Age: 70
Discharge: HOME | End: 2025-06-06
Payer: MEDICARE

## 2025-06-06 ENCOUNTER — APPOINTMENT (OUTPATIENT)
Dept: PRIMARY CARE | Facility: CLINIC | Age: 70
End: 2025-06-06
Payer: MEDICARE

## 2025-06-06 VITALS
BODY MASS INDEX: 49.09 KG/M2 | RESPIRATION RATE: 18 BRPM | TEMPERATURE: 98.4 F | HEIGHT: 61 IN | DIASTOLIC BLOOD PRESSURE: 84 MMHG | WEIGHT: 260 LBS | OXYGEN SATURATION: 95 % | HEART RATE: 93 BPM | SYSTOLIC BLOOD PRESSURE: 128 MMHG

## 2025-06-06 DIAGNOSIS — K04.7 ACUTE PERIAPICAL ABSCESS: Primary | ICD-10-CM

## 2025-06-06 PROCEDURE — 40800 DRAINAGE OF MOUTH LESION: CPT | Performed by: PHYSICIAN ASSISTANT

## 2025-06-06 PROCEDURE — 99284 EMERGENCY DEPT VISIT MOD MDM: CPT

## 2025-06-06 PROCEDURE — 2500000004 HC RX 250 GENERAL PHARMACY W/ HCPCS (ALT 636 FOR OP/ED): Performed by: PHYSICIAN ASSISTANT

## 2025-06-06 PROCEDURE — 2500000001 HC RX 250 WO HCPCS SELF ADMINISTERED DRUGS (ALT 637 FOR MEDICARE OP): Performed by: PHYSICIAN ASSISTANT

## 2025-06-06 PROCEDURE — 96372 THER/PROPH/DIAG INJ SC/IM: CPT | Performed by: PHYSICIAN ASSISTANT

## 2025-06-06 RX ORDER — ACETAMINOPHEN 500 MG
1000 TABLET ORAL EVERY 6 HOURS PRN
Qty: 30 TABLET | Refills: 0 | Status: SHIPPED | OUTPATIENT
Start: 2025-06-06 | End: 2025-06-16

## 2025-06-06 RX ORDER — DEXAMETHASONE SODIUM PHOSPHATE 10 MG/ML
10 INJECTION INTRAMUSCULAR; INTRAVENOUS ONCE
Status: COMPLETED | OUTPATIENT
Start: 2025-06-06 | End: 2025-06-06

## 2025-06-06 RX ORDER — ACETAMINOPHEN 500 MG
1000 TABLET ORAL ONCE
Status: COMPLETED | OUTPATIENT
Start: 2025-06-06 | End: 2025-06-06

## 2025-06-06 RX ORDER — AMOXICILLIN AND CLAVULANATE POTASSIUM 875; 125 MG/1; MG/1
1 TABLET, FILM COATED ORAL EVERY 12 HOURS
Qty: 20 TABLET | Refills: 0 | Status: SHIPPED | OUTPATIENT
Start: 2025-06-06 | End: 2025-06-16

## 2025-06-06 RX ORDER — AMOXICILLIN AND CLAVULANATE POTASSIUM 875; 125 MG/1; MG/1
1 TABLET, FILM COATED ORAL ONCE
Status: COMPLETED | OUTPATIENT
Start: 2025-06-06 | End: 2025-06-06

## 2025-06-06 RX ORDER — OXYCODONE HYDROCHLORIDE 5 MG/1
5 TABLET ORAL ONCE
Refills: 0 | Status: COMPLETED | OUTPATIENT
Start: 2025-06-06 | End: 2025-06-06

## 2025-06-06 RX ADMIN — DEXAMETHASONE SODIUM PHOSPHATE 10 MG: 10 INJECTION INTRAMUSCULAR; INTRAVENOUS at 12:05

## 2025-06-06 RX ADMIN — AMOXICILLIN AND CLAVULANATE POTASSIUM 1 TABLET: 875; 125 TABLET, FILM COATED ORAL at 10:56

## 2025-06-06 RX ADMIN — Medication 1 APPLICATION: at 10:56

## 2025-06-06 RX ADMIN — ACETAMINOPHEN 1000 MG: 500 TABLET ORAL at 10:56

## 2025-06-06 RX ADMIN — OXYCODONE HYDROCHLORIDE 5 MG: 5 TABLET ORAL at 10:56

## 2025-06-06 ASSESSMENT — PAIN SCALES - GENERAL: PAINLEVEL_OUTOF10: 10 - WORST POSSIBLE PAIN

## 2025-06-06 ASSESSMENT — LIFESTYLE VARIABLES
EVER HAD A DRINK FIRST THING IN THE MORNING TO STEADY YOUR NERVES TO GET RID OF A HANGOVER: NO
TOTAL SCORE: 0
HAVE PEOPLE ANNOYED YOU BY CRITICIZING YOUR DRINKING: NO
EVER FELT BAD OR GUILTY ABOUT YOUR DRINKING: NO
HAVE YOU EVER FELT YOU SHOULD CUT DOWN ON YOUR DRINKING: NO

## 2025-06-06 ASSESSMENT — PAIN DESCRIPTION - PAIN TYPE: TYPE: ACUTE PAIN

## 2025-06-06 ASSESSMENT — PAIN DESCRIPTION - ORIENTATION: ORIENTATION: RIGHT

## 2025-06-06 ASSESSMENT — PAIN DESCRIPTION - LOCATION: LOCATION: MOUTH

## 2025-06-06 ASSESSMENT — PAIN DESCRIPTION - DESCRIPTORS: DESCRIPTORS: ACHING;SHOOTING;SHARP

## 2025-06-06 ASSESSMENT — PAIN - FUNCTIONAL ASSESSMENT: PAIN_FUNCTIONAL_ASSESSMENT: 0-10

## 2025-06-06 NOTE — ED TRIAGE NOTES
"Pt has oral abscess on lower right side of her mouth and cannot be seen until July. Pt states she has a hx of chf and does not want \"things to get worse\"/ lower right side of face is visibly swollen  "

## 2025-06-06 NOTE — DISCHARGE INSTRUCTIONS
Please take Augmentin as prescribed.  Take Tylenol as needed for pain.  I recommend using warm compresses to the right side of your cheek.  I do suspect that abscess will continue to drain.  The swelling and pain should improve over the next 24 to 48 hours.  I do recommend a soft diet over the next 2 days avoiding things that are crunchy or very chewy.  If you start to have fevers or chills or worsening pain/swelling despite being on antibiotics for 2 days return to the emergency department for reevaluation.

## 2025-06-07 NOTE — ED PROVIDER NOTES
HPI   Chief Complaint   Patient presents with    Oral Swelling       HPI  This is a 69-year-old female presenting to the emergency department for evaluation of swelling to the right cheek.  Symptoms started yesterday evening.  Patient has noted pain in the upper right jar region and swelling of the right cheek.  No difficulty swallowing or speaking.  No neck pain.  No fevers or chills.  Patient denies previous history of dental abscess.    Please see HPI for pertinent positive and negative ROS.       Patient History   Medical History[1]  Surgical History[2]  Family History[3]  Social History[4]    Physical Exam   ED Triage Vitals [06/06/25 1029]   Temperature Heart Rate Respirations BP   36.9 °C (98.4 °F) 93 18 (!) 147/111      Pulse Ox Temp Source Heart Rate Source Patient Position   (!) 91 % Temporal Monitor Sitting      BP Location FiO2 (%)     Right arm --       Physical Exam  GENERAL APPEARANCE: This patient is in no acute respiratory distress. Awake and alert, talking appropriately.   VITAL SIGNS: As per the nurses' triage record.  HEENT: Normocephalic, atraumatic.  Oropharynx clear.  Uvula midline.  No tonsillar hypertrophy or exudate bilaterally.  No swelling or tenderness to palpation to the lower of the mouth.  No lesions in the mouth.  Evidence of a periapical abscess over the upper right gum region.  Scant amount of drainage with manipulation.  NECK:  full gross ROM during exam  MUSCULOSKELETAL:  Full gross active range of motion. Ambulating on own with no acute difficulties  NEUROLOGICAL: Awake, alert and oriented x 3.  IMMUNOLOGICAL: No palpable lymphadenopathy or lymphatic streaking noted on visible skin.  DERM: No petechiae, rashes, or ecchymoses on visible skin  PSYCH: mood and affect appear normal.      ED Course & MDM   Diagnoses as of 06/08/25 1321   Acute periapical abscess                 No data recorded                                 Medical Decision Making  Parts of this chart have been  completed using voice recognition software. Please excuse any errors of transcription.  My thought process and reason for plan has been formulated from the time that I saw the patient until the time of disposition and is not specific to one specific moment during their visit and furthermore my MDM encompasses this entire chart and not only this text box.      HPI: Detailed above.    Exam: A medically appropriate exam performed, outlined above, given the known history and presentation.    History obtained from: Patient    Medications given during visit:  Medications   lidocaine-racepinephrine-tetracaine (LET) 4-0.05-0.5 % gel 1 Application (1 Application Topical Given 6/6/25 1056)   acetaminophen (Tylenol) tablet 1,000 mg (1,000 mg oral Given 6/6/25 1056)   oxyCODONE (Roxicodone) immediate release tablet 5 mg (5 mg oral Given 6/6/25 1056)   amoxicillin-clavulanate (Augmentin) 875-125 mg per tablet 1 tablet (1 tablet oral Given 6/6/25 1056)   dexAMETHasone (Decadron) injection 10 mg (10 mg intramuscular Given 6/6/25 1205)        Diagnostic/tests  Labs Reviewed - No data to display   No orders to display        Considerations/further MDM:    Patient presents for swelling to the right side of the mouth.  Physical exam findings are consistent with a periapical abscess.  I have low suspicion for deep space infection, retropharyngeal abscess, Lance angina based off of history and physical exam findings.  There was an area of fluctuance in the periapical space that I performed incision and drainage at with successful drainage of abscess, patient had immediate relief.  She was given oral Augmentin, oral Tylenol, oral oxycodone, and IM dexamethasone to help with swelling.  A referral to OMFS was laced and patient was discharged with a prescription for Augmentin and acetaminophen.  Strict return precautions were discussed with patient and she verbalized understanding and felt comfortable with discharge plan home.  She was  discharged in stable condition in the company of her .      Procedure  Incision and Drainage    Performed by: Zoey Rodriguez PA-C  Authorized by: Zoey Rodriguez PA-C    Consent:     Consent obtained:  Verbal    Consent given by:  Patient    Risks discussed:  Bleeding, incomplete drainage, infection and pain  Universal protocol:     Patient identity confirmed:  Verbally with patient  Location:     Type:  Abscess    Location:  Mouth    Mouth location:  Vestibule of mouth (Posterior upper right gum)  Sedation:     Sedation type:  None  Anesthesia:     Anesthesia method:  Topical application    Topical anesthetic:  LET  Procedure type:     Complexity:  Simple  Procedure details:     Incision types:  Stab incision    Drainage:  Purulent    Drainage amount:  Scant    Wound treatment:  Wound left open  Post-procedure details:     Procedure completion:  Tolerated         [1]   Past Medical History:  Diagnosis Date    A-fib (Multi)     Cellulitis     CHF (congestive heart failure)     COVID     Heart disease     HTN (hypertension)     Low back pain    [2]   Past Surgical History:  Procedure Laterality Date    ABLATION OF DYSRHYTHMIC FOCUS      BREAST BIOPSY  2000    CARDIAC ELECTROPHYSIOLOGY PROCEDURE N/A 05/01/2024    Procedure: Cardioversion;  Surgeon: Amado Ty MD;  Location: [facility];  Service: Electrophysiology;  Laterality: N/A;  90047  i48.0  medical mutual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    CARDIAC ELECTROPHYSIOLOGY PROCEDURE N/A 07/08/2024    Procedure: Ablation A-Fib;  Surgeon: Amado Ty MD;  Location: [facility];  Service: Electrophysiology;  Laterality: N/A;  AFIB CRYOABLATION; CPT 07747; ICD I48.0   Medicare mutual of [place] medicare  ID - [id number]  [phone]  No Auth required  Call ref # [id number]    CARDIAC ELECTROPHYSIOLOGY PROCEDURE N/A 09/18/2024    Procedure: Cardioversion;  Surgeon: Amado Ty MD;  Location: [facility];  Service: Electrophysiology;   Laterality: N/A;  CARDIOVERSION CPT 13594, ICD I48.0 no auth required/REF # [id number]    CARDIOVERSION      2024   [3]   Family History  Problem Relation Name Age of Onset    Breast cancer Mother      Heart attack Mother           in early 80s of MI    Heart disease Mother      Colon cancer Father      Breast cancer Sister      Diabetes type II Sister      Kidney failure Sister      Other (herione addict) Sister      No Known Problems Brother      No Known Problems Daughter      Other (recovering alcoholic) Son      Breast cancer Other Maternal Aunt    [4]   Social History  Tobacco Use    Smoking status: Former     Current packs/day: 0.00     Types: Cigarettes     Quit date:      Years since quittin.4    Smokeless tobacco: Never   Vaping Use    Vaping status: Never Used   Substance Use Topics    Alcohol use: Not Currently     Comment: monthly or less    Drug use: Never        Zoey Rodriguez PA-C  25 [phone]

## 2025-06-10 ENCOUNTER — TELEPHONE (OUTPATIENT)
Dept: PRIMARY CARE | Facility: CLINIC | Age: 70
End: 2025-06-10
Payer: MEDICARE

## 2025-06-10 NOTE — TELEPHONE ENCOUNTER
Pt called stating she can't lay flat and is wanting a recliner or a hospital bed. Pt stated she called her insurance and they need a referral from PCP saying pt needs it.   Please advise.     Pt stating she is starting to hallucinate and wants pcp to check her electrolytes once she gets her blood work done.

## 2025-06-10 NOTE — TELEPHONE ENCOUNTER
If patient states she is starting to hallucinate recommend direct referral to emergency department.    With regards to the hospital bed this is something that needs to be discussed during an appointment as a correct diagnosis and documentation will need to be provided to justify a prescription for the patient.  I cannot just simply write a prescription for it

## 2025-06-11 ENCOUNTER — TELEPHONE (OUTPATIENT)
Dept: PAIN MEDICINE | Facility: CLINIC | Age: 70
End: 2025-06-11
Payer: MEDICARE

## 2025-06-11 NOTE — TELEPHONE ENCOUNTER
Pt. Called and left a message stating that she went to the ED for an abscess in her tooth and the ED was able to drain it. Pt. Stated that she was told to reach out to pain management about pain control for the tooth, and stated that she has just been taking her Norco to help with the pain. Left message for pt to call the office to determine how much medication she is taking at this time.

## 2025-06-11 NOTE — TELEPHONE ENCOUNTER
Will await her input of how she is taking the medication - she was prescribed antibiotics and advised to take tylenol through the ED

## 2025-06-12 ENCOUNTER — HOSPITAL ENCOUNTER (INPATIENT)
Facility: HOSPITAL | Age: 70
DRG: 291 | End: 2025-06-12
Attending: STUDENT IN AN ORGANIZED HEALTH CARE EDUCATION/TRAINING PROGRAM | Admitting: STUDENT IN AN ORGANIZED HEALTH CARE EDUCATION/TRAINING PROGRAM
Payer: MEDICARE

## 2025-06-12 ENCOUNTER — APPOINTMENT (OUTPATIENT)
Dept: PHYSICAL THERAPY | Facility: CLINIC | Age: 70
End: 2025-06-12
Payer: MEDICARE

## 2025-06-12 ENCOUNTER — APPOINTMENT (OUTPATIENT)
Dept: RADIOLOGY | Facility: HOSPITAL | Age: 70
DRG: 291 | End: 2025-06-12
Payer: MEDICARE

## 2025-06-12 DIAGNOSIS — I50.41 ACUTE COMBINED SYSTOLIC (CONGESTIVE) AND DIASTOLIC (CONGESTIVE) HEART FAILURE: ICD-10-CM

## 2025-06-12 DIAGNOSIS — M17.0 PRIMARY OSTEOARTHRITIS OF KNEES, BILATERAL: ICD-10-CM

## 2025-06-12 DIAGNOSIS — J96.11 CHRONIC HYPOXEMIC RESPIRATORY FAILURE: ICD-10-CM

## 2025-06-12 DIAGNOSIS — I50.33 ACUTE ON CHRONIC DIASTOLIC CONGESTIVE HEART FAILURE: Primary | ICD-10-CM

## 2025-06-12 DIAGNOSIS — G89.29 OTHER CHRONIC PAIN: ICD-10-CM

## 2025-06-12 DIAGNOSIS — E87.6 HYPOKALEMIA: ICD-10-CM

## 2025-06-12 DIAGNOSIS — R06.02 SHORTNESS OF BREATH: ICD-10-CM

## 2025-06-12 LAB
APTT PPP: 30.2 SECONDS (ref 22–32.5)
CARDIAC TROPONIN I PNL SERPL HS: 9 NG/L (ref 0–13)
ERYTHROCYTE [DISTWIDTH] IN BLOOD BY AUTOMATED COUNT: 16.2 % (ref 11.5–14.5)
HCT VFR BLD AUTO: 40 % (ref 36–46)
HGB BLD-MCNC: 12.1 G/DL (ref 12–16)
INR PPP: 1.1 (ref 0.9–1.2)
MCH RBC QN AUTO: 28.9 PG (ref 26–34)
MCHC RBC AUTO-ENTMCNC: 30.3 G/DL (ref 32–36)
MCV RBC AUTO: 96 FL (ref 80–100)
NRBC BLD-RTO: 0 /100 WBCS (ref 0–0)
PLATELET # BLD AUTO: 232 X10*3/UL (ref 150–450)
PROTHROMBIN TIME: 11.5 SECONDS (ref 9.3–12.7)
RBC # BLD AUTO: 4.18 X10*6/UL (ref 4–5.2)
WBC # BLD AUTO: 10 X10*3/UL (ref 4.4–11.3)

## 2025-06-12 PROCEDURE — 71045 X-RAY EXAM CHEST 1 VIEW: CPT | Performed by: RADIOLOGY

## 2025-06-12 PROCEDURE — 2500000004 HC RX 250 GENERAL PHARMACY W/ HCPCS (ALT 636 FOR OP/ED): Performed by: STUDENT IN AN ORGANIZED HEALTH CARE EDUCATION/TRAINING PROGRAM

## 2025-06-12 PROCEDURE — 71045 X-RAY EXAM CHEST 1 VIEW: CPT

## 2025-06-12 PROCEDURE — 96374 THER/PROPH/DIAG INJ IV PUSH: CPT

## 2025-06-12 PROCEDURE — 80053 COMPREHEN METABOLIC PANEL: CPT | Performed by: STUDENT IN AN ORGANIZED HEALTH CARE EDUCATION/TRAINING PROGRAM

## 2025-06-12 PROCEDURE — 85027 COMPLETE CBC AUTOMATED: CPT | Performed by: STUDENT IN AN ORGANIZED HEALTH CARE EDUCATION/TRAINING PROGRAM

## 2025-06-12 PROCEDURE — 83880 ASSAY OF NATRIURETIC PEPTIDE: CPT | Performed by: STUDENT IN AN ORGANIZED HEALTH CARE EDUCATION/TRAINING PROGRAM

## 2025-06-12 PROCEDURE — 85730 THROMBOPLASTIN TIME PARTIAL: CPT | Performed by: STUDENT IN AN ORGANIZED HEALTH CARE EDUCATION/TRAINING PROGRAM

## 2025-06-12 PROCEDURE — 85610 PROTHROMBIN TIME: CPT | Performed by: STUDENT IN AN ORGANIZED HEALTH CARE EDUCATION/TRAINING PROGRAM

## 2025-06-12 PROCEDURE — 36415 COLL VENOUS BLD VENIPUNCTURE: CPT | Performed by: STUDENT IN AN ORGANIZED HEALTH CARE EDUCATION/TRAINING PROGRAM

## 2025-06-12 PROCEDURE — 84484 ASSAY OF TROPONIN QUANT: CPT | Performed by: STUDENT IN AN ORGANIZED HEALTH CARE EDUCATION/TRAINING PROGRAM

## 2025-06-12 PROCEDURE — 99291 CRITICAL CARE FIRST HOUR: CPT | Performed by: STUDENT IN AN ORGANIZED HEALTH CARE EDUCATION/TRAINING PROGRAM

## 2025-06-12 RX ORDER — FUROSEMIDE 10 MG/ML
80 INJECTION INTRAMUSCULAR; INTRAVENOUS ONCE
Status: COMPLETED | OUTPATIENT
Start: 2025-06-12 | End: 2025-06-12

## 2025-06-12 RX ADMIN — FUROSEMIDE 80 MG: 10 INJECTION INTRAMUSCULAR; INTRAVENOUS at 23:32

## 2025-06-12 ASSESSMENT — PAIN SCALES - GENERAL: PAINLEVEL_OUTOF10: 9

## 2025-06-12 ASSESSMENT — PAIN - FUNCTIONAL ASSESSMENT: PAIN_FUNCTIONAL_ASSESSMENT: 0-10

## 2025-06-12 ASSESSMENT — PAIN DESCRIPTION - PAIN TYPE: TYPE: ACUTE PAIN

## 2025-06-13 ENCOUNTER — APPOINTMENT (OUTPATIENT)
Dept: CARDIOLOGY | Facility: HOSPITAL | Age: 70
DRG: 291 | End: 2025-06-13
Payer: MEDICARE

## 2025-06-13 PROBLEM — I48.20 CHRONIC ATRIAL FIBRILLATION (MULTI): Status: ACTIVE | Noted: 2025-06-13

## 2025-06-13 PROBLEM — K04.7 DENTAL ABSCESS: Status: ACTIVE | Noted: 2025-06-13

## 2025-06-13 PROBLEM — I50.33 ACUTE ON CHRONIC DIASTOLIC CONGESTIVE HEART FAILURE: Status: ACTIVE | Noted: 2025-06-13

## 2025-06-13 LAB
ALBUMIN SERPL BCP-MCNC: 3.8 G/DL (ref 3.4–5)
ALP SERPL-CCNC: 77 U/L (ref 33–136)
ALT SERPL W P-5'-P-CCNC: 19 U/L (ref 7–45)
ANION GAP BLDA CALCULATED.4IONS-SCNC: -3 MMO/L (ref 10–25)
ANION GAP SERPL CALCULATED.3IONS-SCNC: 7 MMOL/L (ref 10–20)
ANION GAP SERPL CALCULATED.3IONS-SCNC: 7 MMOL/L (ref 10–20)
AST SERPL W P-5'-P-CCNC: 17 U/L (ref 9–39)
BASE EXCESS BLDA CALC-SCNC: 17.7 MMOL/L (ref -2–3)
BILIRUB SERPL-MCNC: 0.5 MG/DL (ref 0–1.2)
BNP SERPL-MCNC: 215 PG/ML (ref 0–99)
BODY TEMPERATURE: 37 DEGREES CELSIUS
BUN SERPL-MCNC: 21 MG/DL (ref 6–23)
BUN SERPL-MCNC: 27 MG/DL (ref 6–23)
CA-I BLDA-SCNC: 1.15 MMOL/L (ref 1.1–1.33)
CALCIUM SERPL-MCNC: 8.9 MG/DL (ref 8.6–10.3)
CALCIUM SERPL-MCNC: 8.9 MG/DL (ref 8.6–10.3)
CARDIAC TROPONIN I PNL SERPL HS: 10 NG/L (ref 0–13)
CHLORIDE BLDA-SCNC: 101 MMOL/L (ref 98–107)
CHLORIDE SERPL-SCNC: 100 MMOL/L (ref 98–107)
CHLORIDE SERPL-SCNC: 98 MMOL/L (ref 98–107)
CO2 SERPL-SCNC: 40 MMOL/L (ref 21–32)
CO2 SERPL-SCNC: 41 MMOL/L (ref 21–32)
CREAT SERPL-MCNC: 0.47 MG/DL (ref 0.5–1.05)
CREAT SERPL-MCNC: 0.6 MG/DL (ref 0.5–1.05)
EGFRCR SERPLBLD CKD-EPI 2021: >90 ML/MIN/1.73M*2
EGFRCR SERPLBLD CKD-EPI 2021: >90 ML/MIN/1.73M*2
GLUCOSE BLDA-MCNC: 106 MG/DL (ref 74–99)
GLUCOSE SERPL-MCNC: 109 MG/DL (ref 74–99)
GLUCOSE SERPL-MCNC: 120 MG/DL (ref 74–99)
HCO3 BLDA-SCNC: 44.8 MMOL/L (ref 22–26)
HCT VFR BLD EST: 39 % (ref 36–46)
HGB BLDA-MCNC: 12.9 G/DL (ref 12–16)
INHALED O2 CONCENTRATION: 36 %
LACTATE BLDA-SCNC: 0.5 MMOL/L (ref 0.4–2)
OXYHGB MFR BLDA: 95.7 % (ref 94–98)
PCO2 BLDA: 63 MM HG (ref 38–42)
PH BLDA: 7.46 PH (ref 7.38–7.42)
PO2 BLDA: 77 MM HG (ref 85–95)
POTASSIUM BLDA-SCNC: 3.2 MMOL/L (ref 3.5–5.3)
POTASSIUM SERPL-SCNC: 3.1 MMOL/L (ref 3.5–5.3)
POTASSIUM SERPL-SCNC: 3.6 MMOL/L (ref 3.5–5.3)
PROT SERPL-MCNC: 7 G/DL (ref 6.4–8.2)
Q ONSET: 222 MS
QRS COUNT: 13 BEATS
QRS DURATION: 88 MS
QT INTERVAL: 372 MS
QTC CALCULATION(BAZETT): 429 MS
QTC FREDERICIA: 409 MS
R AXIS: 56 DEGREES
SAO2 % BLDA: 97 % (ref 94–100)
SODIUM BLDA-SCNC: 140 MMOL/L (ref 136–145)
SODIUM SERPL-SCNC: 142 MMOL/L (ref 136–145)
SODIUM SERPL-SCNC: 144 MMOL/L (ref 136–145)
T AXIS: 100 DEGREES
T OFFSET: 408 MS
VENTRICULAR RATE: 80 BPM

## 2025-06-13 PROCEDURE — 2500000002 HC RX 250 W HCPCS SELF ADMINISTERED DRUGS (ALT 637 FOR MEDICARE OP, ALT 636 FOR OP/ED): Performed by: STUDENT IN AN ORGANIZED HEALTH CARE EDUCATION/TRAINING PROGRAM

## 2025-06-13 PROCEDURE — 2500000001 HC RX 250 WO HCPCS SELF ADMINISTERED DRUGS (ALT 637 FOR MEDICARE OP): Performed by: HOSPITALIST

## 2025-06-13 PROCEDURE — 93308 TTE F-UP OR LMTD: CPT | Performed by: INTERNAL MEDICINE

## 2025-06-13 PROCEDURE — 93325 DOPPLER ECHO COLOR FLOW MAPG: CPT | Performed by: INTERNAL MEDICINE

## 2025-06-13 PROCEDURE — 2500000001 HC RX 250 WO HCPCS SELF ADMINISTERED DRUGS (ALT 637 FOR MEDICARE OP): Performed by: STUDENT IN AN ORGANIZED HEALTH CARE EDUCATION/TRAINING PROGRAM

## 2025-06-13 PROCEDURE — 99233 SBSQ HOSP IP/OBS HIGH 50: CPT | Performed by: HOSPITALIST

## 2025-06-13 PROCEDURE — 99222 1ST HOSP IP/OBS MODERATE 55: CPT

## 2025-06-13 PROCEDURE — 93325 DOPPLER ECHO COLOR FLOW MAPG: CPT

## 2025-06-13 PROCEDURE — 36415 COLL VENOUS BLD VENIPUNCTURE: CPT | Performed by: STUDENT IN AN ORGANIZED HEALTH CARE EDUCATION/TRAINING PROGRAM

## 2025-06-13 PROCEDURE — 2500000001 HC RX 250 WO HCPCS SELF ADMINISTERED DRUGS (ALT 637 FOR MEDICARE OP): Performed by: INTERNAL MEDICINE

## 2025-06-13 PROCEDURE — 93005 ELECTROCARDIOGRAM TRACING: CPT

## 2025-06-13 PROCEDURE — 36600 WITHDRAWAL OF ARTERIAL BLOOD: CPT

## 2025-06-13 PROCEDURE — 84484 ASSAY OF TROPONIN QUANT: CPT | Performed by: STUDENT IN AN ORGANIZED HEALTH CARE EDUCATION/TRAINING PROGRAM

## 2025-06-13 PROCEDURE — 82947 ASSAY GLUCOSE BLOOD QUANT: CPT | Performed by: STUDENT IN AN ORGANIZED HEALTH CARE EDUCATION/TRAINING PROGRAM

## 2025-06-13 PROCEDURE — 93321 DOPPLER ECHO F-UP/LMTD STD: CPT | Performed by: INTERNAL MEDICINE

## 2025-06-13 PROCEDURE — 82435 ASSAY OF BLOOD CHLORIDE: CPT | Performed by: STUDENT IN AN ORGANIZED HEALTH CARE EDUCATION/TRAINING PROGRAM

## 2025-06-13 PROCEDURE — 1200000002 HC GENERAL ROOM WITH TELEMETRY DAILY

## 2025-06-13 PROCEDURE — 2500000005 HC RX 250 GENERAL PHARMACY W/O HCPCS: Performed by: STUDENT IN AN ORGANIZED HEALTH CARE EDUCATION/TRAINING PROGRAM

## 2025-06-13 PROCEDURE — 99222 1ST HOSP IP/OBS MODERATE 55: CPT | Performed by: STUDENT IN AN ORGANIZED HEALTH CARE EDUCATION/TRAINING PROGRAM

## 2025-06-13 PROCEDURE — 99291 CRITICAL CARE FIRST HOUR: CPT | Performed by: STUDENT IN AN ORGANIZED HEALTH CARE EDUCATION/TRAINING PROGRAM

## 2025-06-13 PROCEDURE — 2500000004 HC RX 250 GENERAL PHARMACY W/ HCPCS (ALT 636 FOR OP/ED): Performed by: INTERNAL MEDICINE

## 2025-06-13 RX ORDER — ATORVASTATIN CALCIUM 40 MG/1
40 TABLET, FILM COATED ORAL NIGHTLY
Status: DISCONTINUED | OUTPATIENT
Start: 2025-06-13 | End: 2025-06-16 | Stop reason: HOSPADM

## 2025-06-13 RX ORDER — ALLOPURINOL 300 MG/1
300 TABLET ORAL DAILY
Status: DISCONTINUED | OUTPATIENT
Start: 2025-06-13 | End: 2025-06-16 | Stop reason: HOSPADM

## 2025-06-13 RX ORDER — MIDODRINE HYDROCHLORIDE 5 MG/1
5 TABLET ORAL EVERY 6 HOURS PRN
Status: DISCONTINUED | OUTPATIENT
Start: 2025-06-13 | End: 2025-06-16 | Stop reason: HOSPADM

## 2025-06-13 RX ORDER — HYDROCODONE BITARTRATE AND ACETAMINOPHEN 5; 325 MG/1; MG/1
1 TABLET ORAL EVERY 6 HOURS PRN
Refills: 0 | Status: DISCONTINUED | OUTPATIENT
Start: 2025-06-13 | End: 2025-06-16 | Stop reason: HOSPADM

## 2025-06-13 RX ORDER — ASPIRIN/CAFFEINE 500-32.5MG
2 TABLET ORAL DAILY
COMMUNITY

## 2025-06-13 RX ORDER — ACETAZOLAMIDE 250 MG/1
250 TABLET ORAL 2 TIMES DAILY
Status: DISCONTINUED | OUTPATIENT
Start: 2025-06-13 | End: 2025-06-16 | Stop reason: HOSPADM

## 2025-06-13 RX ORDER — HYDROXYZINE HYDROCHLORIDE 10 MG/1
10 TABLET, FILM COATED ORAL NIGHTLY PRN
Status: DISCONTINUED | OUTPATIENT
Start: 2025-06-13 | End: 2025-06-16 | Stop reason: HOSPADM

## 2025-06-13 RX ORDER — FLUTICASONE FUROATE AND VILANTEROL 100; 25 UG/1; UG/1
1 POWDER RESPIRATORY (INHALATION)
Status: DISCONTINUED | OUTPATIENT
Start: 2025-06-13 | End: 2025-06-16 | Stop reason: HOSPADM

## 2025-06-13 RX ORDER — ACETAMINOPHEN 500 MG
5 TABLET ORAL NIGHTLY PRN
Status: DISCONTINUED | OUTPATIENT
Start: 2025-06-13 | End: 2025-06-16 | Stop reason: HOSPADM

## 2025-06-13 RX ORDER — AMMONIUM LACTATE 12 G/100G
LOTION TOPICAL 2 TIMES DAILY
Status: DISCONTINUED | OUTPATIENT
Start: 2025-06-13 | End: 2025-06-16 | Stop reason: HOSPADM

## 2025-06-13 RX ORDER — BUPROPION HYDROCHLORIDE 300 MG/1
300 TABLET ORAL
Status: DISCONTINUED | OUTPATIENT
Start: 2025-06-13 | End: 2025-06-16 | Stop reason: HOSPADM

## 2025-06-13 RX ORDER — METOPROLOL SUCCINATE 25 MG/1
25 TABLET, EXTENDED RELEASE ORAL DAILY
Status: DISCONTINUED | OUTPATIENT
Start: 2025-06-13 | End: 2025-06-16 | Stop reason: HOSPADM

## 2025-06-13 RX ORDER — POTASSIUM CHLORIDE 20 MEQ/1
40 TABLET, EXTENDED RELEASE ORAL 2 TIMES DAILY
Status: DISCONTINUED | OUTPATIENT
Start: 2025-06-13 | End: 2025-06-16 | Stop reason: HOSPADM

## 2025-06-13 RX ORDER — AMOXICILLIN AND CLAVULANATE POTASSIUM 875; 125 MG/1; MG/1
1 TABLET, FILM COATED ORAL EVERY 12 HOURS SCHEDULED
Status: DISCONTINUED | OUTPATIENT
Start: 2025-06-13 | End: 2025-06-16 | Stop reason: HOSPADM

## 2025-06-13 RX ORDER — ACETAMINOPHEN 500 MG
125 TABLET ORAL DAILY
COMMUNITY

## 2025-06-13 RX ORDER — FUROSEMIDE 10 MG/ML
80 INJECTION INTRAMUSCULAR; INTRAVENOUS ONCE
Status: COMPLETED | OUTPATIENT
Start: 2025-06-13 | End: 2025-06-13

## 2025-06-13 RX ORDER — POTASSIUM CHLORIDE 20 MEQ/1
40 TABLET, EXTENDED RELEASE ORAL ONCE
Status: COMPLETED | OUTPATIENT
Start: 2025-06-13 | End: 2025-06-13

## 2025-06-13 RX ORDER — GABAPENTIN 300 MG/1
300 CAPSULE ORAL 3 TIMES DAILY
Status: DISCONTINUED | OUTPATIENT
Start: 2025-06-13 | End: 2025-06-16 | Stop reason: HOSPADM

## 2025-06-13 RX ORDER — BIOTIN 5 MG
1 TABLET ORAL DAILY
COMMUNITY

## 2025-06-13 RX ORDER — FUROSEMIDE 10 MG/ML
80 INJECTION INTRAMUSCULAR; INTRAVENOUS DAILY
Status: DISCONTINUED | OUTPATIENT
Start: 2025-06-13 | End: 2025-06-13

## 2025-06-13 RX ORDER — ACETAMINOPHEN 325 MG/1
650 TABLET ORAL EVERY 6 HOURS PRN
Status: DISCONTINUED | OUTPATIENT
Start: 2025-06-13 | End: 2025-06-16 | Stop reason: HOSPADM

## 2025-06-13 RX ADMIN — APIXABAN 5 MG: 5 TABLET, FILM COATED ORAL at 08:15

## 2025-06-13 RX ADMIN — Medication: at 12:21

## 2025-06-13 RX ADMIN — HYDROCODONE BITARTRATE AND ACETAMINOPHEN 1 TABLET: 5; 325 TABLET ORAL at 16:15

## 2025-06-13 RX ADMIN — POTASSIUM CHLORIDE 40 MEQ: 1500 TABLET, EXTENDED RELEASE ORAL at 21:41

## 2025-06-13 RX ADMIN — GABAPENTIN 300 MG: 300 CAPSULE ORAL at 08:15

## 2025-06-13 RX ADMIN — GABAPENTIN 300 MG: 300 CAPSULE ORAL at 21:34

## 2025-06-13 RX ADMIN — AMOXICILLIN AND CLAVULANATE POTASSIUM 1 TABLET: 875; 125 TABLET, COATED ORAL at 21:34

## 2025-06-13 RX ADMIN — Medication 4 L/MIN: at 03:31

## 2025-06-13 RX ADMIN — Medication: at 21:35

## 2025-06-13 RX ADMIN — GABAPENTIN 300 MG: 300 CAPSULE ORAL at 16:15

## 2025-06-13 RX ADMIN — ACETAZOLAMIDE 250 MG: 250 TABLET ORAL at 21:33

## 2025-06-13 RX ADMIN — POTASSIUM CHLORIDE 40 MEQ: 1500 TABLET, EXTENDED RELEASE ORAL at 03:02

## 2025-06-13 RX ADMIN — ACETAMINOPHEN 650 MG: 325 TABLET ORAL at 21:34

## 2025-06-13 RX ADMIN — HYDROCODONE BITARTRATE AND ACETAMINOPHEN 1 TABLET: 5; 325 TABLET ORAL at 23:02

## 2025-06-13 RX ADMIN — APIXABAN 5 MG: 5 TABLET, FILM COATED ORAL at 21:34

## 2025-06-13 RX ADMIN — HYDROCODONE BITARTRATE AND ACETAMINOPHEN 1 TABLET: 5; 325 TABLET ORAL at 06:58

## 2025-06-13 RX ADMIN — BUPROPION HYDROCHLORIDE 300 MG: 300 TABLET, EXTENDED RELEASE ORAL at 08:15

## 2025-06-13 RX ADMIN — ALLOPURINOL 300 MG: 300 TABLET ORAL at 08:15

## 2025-06-13 RX ADMIN — Medication 3 L/MIN: at 07:06

## 2025-06-13 RX ADMIN — Medication 4 L/MIN: at 20:15

## 2025-06-13 RX ADMIN — ACETAZOLAMIDE 250 MG: 250 TABLET ORAL at 10:58

## 2025-06-13 RX ADMIN — FUROSEMIDE 80 MG: 10 INJECTION, SOLUTION INTRAMUSCULAR; INTRAVENOUS at 10:58

## 2025-06-13 RX ADMIN — ATORVASTATIN CALCIUM 40 MG: 40 TABLET, FILM COATED ORAL at 21:34

## 2025-06-13 RX ADMIN — Medication 5 MG: at 21:34

## 2025-06-13 RX ADMIN — METOPROLOL SUCCINATE 25 MG: 25 TABLET, EXTENDED RELEASE ORAL at 08:15

## 2025-06-13 RX ADMIN — AMOXICILLIN AND CLAVULANATE POTASSIUM 1 TABLET: 875; 125 TABLET, COATED ORAL at 08:15

## 2025-06-13 SDOH — HEALTH STABILITY: MENTAL HEALTH
DO YOU FEEL STRESS - TENSE, RESTLESS, NERVOUS, OR ANXIOUS, OR UNABLE TO SLEEP AT NIGHT BECAUSE YOUR MIND IS TROUBLED ALL THE TIME - THESE DAYS?: VERY MUCH

## 2025-06-13 SDOH — HEALTH STABILITY: MENTAL HEALTH: HOW MANY DRINKS CONTAINING ALCOHOL DO YOU HAVE ON A TYPICAL DAY WHEN YOU ARE DRINKING?: PATIENT DOES NOT DRINK

## 2025-06-13 SDOH — SOCIAL STABILITY: SOCIAL NETWORK: HOW OFTEN DO YOU GET TOGETHER WITH FRIENDS OR RELATIVES?: NEVER

## 2025-06-13 SDOH — ECONOMIC STABILITY: FOOD INSECURITY: WITHIN THE PAST 12 MONTHS, YOU WORRIED THAT YOUR FOOD WOULD RUN OUT BEFORE YOU GOT THE MONEY TO BUY MORE.: NEVER TRUE

## 2025-06-13 SDOH — ECONOMIC STABILITY: FOOD INSECURITY: WITHIN THE PAST 12 MONTHS, THE FOOD YOU BOUGHT JUST DIDN'T LAST AND YOU DIDN'T HAVE MONEY TO GET MORE.: NEVER TRUE

## 2025-06-13 SDOH — ECONOMIC STABILITY: HOUSING INSECURITY: AT ANY TIME IN THE PAST 12 MONTHS, WERE YOU HOMELESS OR LIVING IN A SHELTER (INCLUDING NOW)?: NO

## 2025-06-13 SDOH — ECONOMIC STABILITY: TRANSPORTATION INSECURITY: IN THE PAST 12 MONTHS, HAS LACK OF TRANSPORTATION KEPT YOU FROM MEDICAL APPOINTMENTS OR FROM GETTING MEDICATIONS?: NO

## 2025-06-13 SDOH — ECONOMIC STABILITY: FOOD INSECURITY: HOW HARD IS IT FOR YOU TO PAY FOR THE VERY BASICS LIKE FOOD, HOUSING, MEDICAL CARE, AND HEATING?: NOT HARD AT ALL

## 2025-06-13 SDOH — SOCIAL STABILITY: SOCIAL NETWORK: HOW OFTEN DO YOU ATTEND MEETINGS OF THE CLUBS OR ORGANIZATIONS YOU BELONG TO?: NEVER

## 2025-06-13 SDOH — ECONOMIC STABILITY: HOUSING INSECURITY: IN THE PAST 12 MONTHS, HOW MANY TIMES HAVE YOU MOVED WHERE YOU WERE LIVING?: 0

## 2025-06-13 SDOH — ECONOMIC STABILITY: HOUSING INSECURITY: IN THE LAST 12 MONTHS, WAS THERE A TIME WHEN YOU WERE NOT ABLE TO PAY THE MORTGAGE OR RENT ON TIME?: NO

## 2025-06-13 SDOH — HEALTH STABILITY: MENTAL HEALTH: HOW OFTEN DO YOU HAVE SIX OR MORE DRINKS ON ONE OCCASION?: NEVER

## 2025-06-13 SDOH — HEALTH STABILITY: PHYSICAL HEALTH: ON AVERAGE, HOW MANY DAYS PER WEEK DO YOU ENGAGE IN MODERATE TO STRENUOUS EXERCISE (LIKE A BRISK WALK)?: 0 DAYS

## 2025-06-13 SDOH — HEALTH STABILITY: MENTAL HEALTH: HOW OFTEN DO YOU HAVE A DRINK CONTAINING ALCOHOL?: NEVER

## 2025-06-13 SDOH — SOCIAL STABILITY: SOCIAL INSECURITY: ARE YOU OR HAVE YOU BEEN THREATENED OR ABUSED PHYSICALLY, EMOTIONALLY, OR SEXUALLY BY ANYONE?: NO

## 2025-06-13 SDOH — ECONOMIC STABILITY: INCOME INSECURITY: IN THE PAST 12 MONTHS HAS THE ELECTRIC, GAS, OIL, OR WATER COMPANY THREATENED TO SHUT OFF SERVICES IN YOUR HOME?: NO

## 2025-06-13 SDOH — SOCIAL STABILITY: SOCIAL INSECURITY: WITHIN THE LAST YEAR, HAVE YOU BEEN HUMILIATED OR EMOTIONALLY ABUSED IN OTHER WAYS BY YOUR PARTNER OR EX-PARTNER?: NO

## 2025-06-13 SDOH — SOCIAL STABILITY: SOCIAL INSECURITY: DO YOU FEEL UNSAFE GOING BACK TO THE PLACE WHERE YOU ARE LIVING?: NO

## 2025-06-13 SDOH — SOCIAL STABILITY: SOCIAL INSECURITY: ARE YOU MARRIED, WIDOWED, DIVORCED, SEPARATED, NEVER MARRIED, OR LIVING WITH A PARTNER?: MARRIED

## 2025-06-13 SDOH — SOCIAL STABILITY: SOCIAL INSECURITY: ABUSE: ADULT

## 2025-06-13 SDOH — SOCIAL STABILITY: SOCIAL INSECURITY: WITHIN THE LAST YEAR, HAVE YOU BEEN AFRAID OF YOUR PARTNER OR EX-PARTNER?: NO

## 2025-06-13 SDOH — SOCIAL STABILITY: SOCIAL INSECURITY: HAVE YOU HAD ANY THOUGHTS OF HARMING ANYONE ELSE?: NO

## 2025-06-13 SDOH — HEALTH STABILITY: PHYSICAL HEALTH: ON AVERAGE, HOW MANY MINUTES DO YOU ENGAGE IN EXERCISE AT THIS LEVEL?: 0 MIN

## 2025-06-13 SDOH — SOCIAL STABILITY: SOCIAL NETWORK: HOW OFTEN DO YOU ATTEND CHURCH OR RELIGIOUS SERVICES?: MORE THAN 4 TIMES PER YEAR

## 2025-06-13 SDOH — SOCIAL STABILITY: SOCIAL INSECURITY: DO YOU FEEL ANYONE HAS EXPLOITED OR TAKEN ADVANTAGE OF YOU FINANCIALLY OR OF YOUR PERSONAL PROPERTY?: NO

## 2025-06-13 SDOH — SOCIAL STABILITY: SOCIAL INSECURITY: HAVE YOU HAD THOUGHTS OF HARMING ANYONE ELSE?: NO

## 2025-06-13 SDOH — SOCIAL STABILITY: SOCIAL INSECURITY: HAS ANYONE EVER THREATENED TO HURT YOUR FAMILY OR YOUR PETS?: NO

## 2025-06-13 SDOH — SOCIAL STABILITY: SOCIAL INSECURITY: DOES ANYONE TRY TO KEEP YOU FROM HAVING/CONTACTING OTHER FRIENDS OR DOING THINGS OUTSIDE YOUR HOME?: NO

## 2025-06-13 SDOH — SOCIAL STABILITY: SOCIAL INSECURITY: ARE THERE ANY APPARENT SIGNS OF INJURIES/BEHAVIORS THAT COULD BE RELATED TO ABUSE/NEGLECT?: NO

## 2025-06-13 SDOH — SOCIAL STABILITY: SOCIAL INSECURITY: WERE YOU ABLE TO COMPLETE ALL THE BEHAVIORAL HEALTH SCREENINGS?: YES

## 2025-06-13 ASSESSMENT — PAIN DESCRIPTION - DESCRIPTORS
DESCRIPTORS: ACHING;SHARP
DESCRIPTORS: ACHING;SHARP
DESCRIPTORS: ACHING

## 2025-06-13 ASSESSMENT — ENCOUNTER SYMPTOMS
WHEEZING: 0
SHORTNESS OF BREATH: 1
EYES NEGATIVE: 1
FATIGUE: 0
ENDOCRINE NEGATIVE: 1
MUSCULOSKELETAL NEGATIVE: 1
NEUROLOGICAL NEGATIVE: 1
STRIDOR: 0
CHEST TIGHTNESS: 0
FEVER: 0
COUGH: 0
PALPITATIONS: 0
CHILLS: 0
CONSTITUTIONAL NEGATIVE: 1
PSYCHIATRIC NEGATIVE: 1

## 2025-06-13 ASSESSMENT — PAIN SCALES - GENERAL
PAINLEVEL_OUTOF10: 10 - WORST POSSIBLE PAIN
PAINLEVEL_OUTOF10: 0 - NO PAIN
PAINLEVEL_OUTOF10: 7
PAINLEVEL_OUTOF10: 7

## 2025-06-13 ASSESSMENT — ACTIVITIES OF DAILY LIVING (ADL)
HEARING - LEFT EAR: FUNCTIONAL
FEEDING YOURSELF: INDEPENDENT
BATHING: INDEPENDENT
LACK_OF_TRANSPORTATION: NO
DRESSING YOURSELF: INDEPENDENT
HEARING - RIGHT EAR: FUNCTIONAL
ADEQUATE_TO_COMPLETE_ADL: YES
HEARING - RIGHT EAR: FUNCTIONAL
PATIENT'S MEMORY ADEQUATE TO SAFELY COMPLETE DAILY ACTIVITIES?: YES
JUDGMENT_ADEQUATE_SAFELY_COMPLETE_DAILY_ACTIVITIES: YES
GROOMING: INDEPENDENT
PATIENT'S MEMORY ADEQUATE TO SAFELY COMPLETE DAILY ACTIVITIES?: YES
LACK_OF_TRANSPORTATION: NO
ASSISTIVE_DEVICE: EYEGLASSES;OXYGEN
WALKS IN HOME: INDEPENDENT
FEEDING YOURSELF: INDEPENDENT
JUDGMENT_ADEQUATE_SAFELY_COMPLETE_DAILY_ACTIVITIES: YES
DRESSING YOURSELF: INDEPENDENT
TOILETING: INDEPENDENT
BATHING: INDEPENDENT
WALKS IN HOME: INDEPENDENT
TOILETING: INDEPENDENT
GROOMING: INDEPENDENT
LACK_OF_TRANSPORTATION: NO
ADEQUATE_TO_COMPLETE_ADL: YES
HEARING - LEFT EAR: FUNCTIONAL

## 2025-06-13 ASSESSMENT — PATIENT HEALTH QUESTIONNAIRE - PHQ9
1. LITTLE INTEREST OR PLEASURE IN DOING THINGS: NOT AT ALL
2. FEELING DOWN, DEPRESSED OR HOPELESS: NOT AT ALL
SUM OF ALL RESPONSES TO PHQ9 QUESTIONS 1 & 2: 0

## 2025-06-13 ASSESSMENT — LIFESTYLE VARIABLES
SKIP TO QUESTIONS 9-10: 1
AUDIT-C TOTAL SCORE: 0
AUDIT-C TOTAL SCORE: 0
PRESCIPTION_ABUSE_PAST_12_MONTHS: NO
SKIP TO QUESTIONS 9-10: 1
HOW MANY STANDARD DRINKS CONTAINING ALCOHOL DO YOU HAVE ON A TYPICAL DAY: PATIENT DOES NOT DRINK
HOW OFTEN DO YOU HAVE A DRINK CONTAINING ALCOHOL: NEVER
HOW OFTEN DO YOU HAVE 6 OR MORE DRINKS ON ONE OCCASION: NEVER
AUDIT-C TOTAL SCORE: 0
SUBSTANCE_ABUSE_PAST_12_MONTHS: NO

## 2025-06-13 ASSESSMENT — COGNITIVE AND FUNCTIONAL STATUS - GENERAL
MOVING TO AND FROM BED TO CHAIR: A LITTLE
WALKING IN HOSPITAL ROOM: A LITTLE
MOBILITY SCORE: 18
CLIMB 3 TO 5 STEPS WITH RAILING: TOTAL
PERSONAL GROOMING: A LITTLE
HELP NEEDED FOR BATHING: A LITTLE
DRESSING REGULAR LOWER BODY CLOTHING: A LOT
PATIENT BASELINE BEDBOUND: NO
EATING MEALS: A LITTLE
STANDING UP FROM CHAIR USING ARMS: A LITTLE
TOILETING: A LITTLE
DRESSING REGULAR UPPER BODY CLOTHING: A LITTLE
DAILY ACTIVITIY SCORE: 17

## 2025-06-13 ASSESSMENT — PAIN - FUNCTIONAL ASSESSMENT
PAIN_FUNCTIONAL_ASSESSMENT: 0-10

## 2025-06-13 ASSESSMENT — PAIN DESCRIPTION - ORIENTATION
ORIENTATION: RIGHT
ORIENTATION: RIGHT

## 2025-06-13 ASSESSMENT — PAIN DESCRIPTION - LOCATION
LOCATION: SHOULDER
LOCATION: SHOULDER

## 2025-06-13 NOTE — ASSESSMENT & PLAN NOTE
As mentioned above. Likely element of compensation from her COPD and contraction alkalosis from diuretics. Dr. Kaur starting diamox and will monitor daily labs

## 2025-06-13 NOTE — PROGRESS NOTES
06/13/25 0816   Physical Activity   On average, how many days per week do you engage in moderate to strenuous exercise (like a brisk walk)? 0 days   On average, how many minutes do you engage in exercise at this level? 0 min   Financial Resource Strain   How hard is it for you to pay for the very basics like food, housing, medical care, and heating? Not hard   Housing Stability   In the last 12 months, was there a time when you were not able to pay the mortgage or rent on time? N   In the past 12 months, how many times have you moved where you were living? 0   At any time in the past 12 months, were you homeless or living in a shelter (including now)? N   Transportation Needs   In the past 12 months, has lack of transportation kept you from medical appointments or from getting medications? no   In the past 12 months, has lack of transportation kept you from meetings, work, or from getting things needed for daily living? No   Food Insecurity   Within the past 12 months, you worried that your food would run out before you got the money to buy more. Never true   Within the past 12 months, the food you bought just didn't last and you didn't have money to get more. Never true   Stress   Do you feel stress - tense, restless, nervous, or anxious, or unable to sleep at night because your mind is troubled all the time - these days? Very much  (pt said that her  is aware)   Social Connections   In a typical week, how many times do you talk on the phone with family, friends, or neighbors? More than 3   How often do you get together with friends or relatives? Never  (pt said that her house is a mess and she would rather not have visitors.)   How often do you attend Jainism or Roman Catholic services? More than 4   Do you belong to any clubs or organizations such as Jainism groups, unions, fraternal or athletic groups, or school groups? No   How often do you attend meetings of the clubs or organizations you  belong to? Never   Are you , , , , never , or living with a partner?    Intimate Partner Violence   Within the last year, have you been afraid of your partner or ex-partner? No   Within the last year, have you been humiliated or emotionally abused in other ways by your partner or ex-partner? No   Within the last year, have you been kicked, hit, slapped, or otherwise physically hurt by your partner or ex-partner? No   Within the last year, have you been raped or forced to have any kind of sexual activity by your partner or ex-partner? No   Alcohol Use   Q1: How often do you have a drink containing alcohol? Never   Q2: How many drinks containing alcohol do you have on a typical day when you are drinking? None   Q3: How often do you have six or more drinks on one occasion? Never   Utilities   In the past 12 months has the electric, gas, oil, or water company threatened to shut off services in your home? No   Health Literacy   How often do you need to have someone help you when you read instructions, pamphlets, or other written material from your doctor or pharmacy? Never   Follow-Ups   We make community resources available to all of our patients to assist with everyday needs. We may be able to connect you with those resources. Would you be interested? N

## 2025-06-13 NOTE — ED PROVIDER NOTES
Emergency Department Provider Note       History of Present Illness     History provided by: Patient and    Limitations to History: None  External Records Reviewed with Brief Summary: EMR reviewed     HPI:  Vickie Foster is a 69 y.o. female With past medical history of A-fib,  heart failure with preserved EF, tricuspid   regurgitation, hypertension  presented to the ER due to difficulty breathing for a couple of days.  Patient reports that difficulty breathing is getting progressively worse, usually uses oxygen at home 2 L at night time and  has been using it every day during the day.  Report orthopnea, sleeping  on a chair because is unable to lay down, and worsening lower extremity edema. MORLEY.   Report that she has been compliant with her Lasix.  Reports runny nose.   Reported has been drinking more water than usual.  Denies fever, chills, chest pain, abdominal pain, nausea or vomiting.    Physical Exam   Triage vitals:  T 36.6 °C (97.9 °F)  HR 88  /90  RR (!) 22  O2 (!) 85 % None (Room air)    Physical Exam  Vitals and nursing note reviewed.   Constitutional:       Appearance: She is well-developed.      Interventions: She is not intubated.  HENT:      Head: Normocephalic and atraumatic.   Eyes:      Extraocular Movements: Extraocular movements intact.   Cardiovascular:      Rate and Rhythm: Normal rate. Rhythm irregular.   Pulmonary:      Effort: Pulmonary effort is normal. No tachypnea, bradypnea, accessory muscle usage or respiratory distress. She is not intubated.      Breath sounds: No stridor. Examination of the right-lower field reveals decreased breath sounds and rales. Examination of the left-lower field reveals decreased breath sounds and rales. Decreased breath sounds and rales present.   Chest:      Chest wall: No mass.   Musculoskeletal:      Right lower leg: Edema present.      Left lower leg: Edema present.      Comments:   Bilateral 3+ pitting edema until mid thigh      Neurological:      General: No focal deficit present.      Mental Status: She is alert.           Medical Decision Making & ED Course   Medical Decision Makin y.o. female With past medical history of A-fib,  heart failure with preserved EF, tricuspid   regurgitation, hypertension  presented to the ER due to difficulty breathing for a couple of days.  Patient have dyspnea exertion, orthopnea, lower extremity edema that is getting progressively worse.  Requiring more oxygen than usual.  Here was hypoxic to room air 85%,   Speaking in short full sentences, difficulty breathing worse with ambulation.  There is decreased breath sounds in the bases, with associated Rales.   Most likely CHF exacerbation.  No cough, afebrile, low suspicion of pneumonia.  No chest pain, low suspicion of ACS.  Considered PE but patient is already taking Eliquis, there is bilateral symmetrical lower extremity edema, patient reports that she is been drinking more water than usual, will defer PE testing at this moment   Because most likely diagnosis is CHF exacerbation.  Plan: Labs, chest x-ray, EKG, Lasix, and admission.  At this moment patient is using 4 L of oxygen, not in distress, if there is any change  will place patient on BiPAP.  ----      Differential diagnoses considered include but are not limited to: see above     Social Determinants of Health which Significantly Impact Care: Social Determinants of Health which Significantly Impact Care: None identified     EKG Independent Interpretation: EKG interpreted by myself. Please see ED Course for full interpretation.    Independent Result Review and Interpretation: Relevant laboratory and radiographic results were reviewed and independently interpreted by myself.  As necessary, they are commented on in the ED Course.    Chronic conditions affecting the patient's care: As documented above in MDM    The patient was discussed with the following consultants/services:  Hospitalist/Admitting Provider who accepted the patient for admission    Care Considerations: As documented above in [facility]    ED Course:  ED Course as of 06/13/25 0406   Thu Jun 12, 2025 2338   EKG interpreted by me: Atrial fibrillation, ventricular rate 80, no ST elevations or depressions. [CC]   2357   X-ray interpreted by me:  vascular congestion  Received lasix 80 mg.  [CC]   Fri Jun 13, 2025   0057 Bicarbonate(!!): 40 [CC]   0057 POTASSIUM(!): 3.1    Potassium replacement ordered.  Blood gas arterial panel ordered. [CC]   0111 Report improvement of symptoms, looks comfortable in bed, having good urinary output. [CC]   0148 POCT pH, Arterial(!): 7.46 [CC]   0149 POCT pCO2, Arterial(!): 63 [CC]   0149 POCT HCO3 Calculated, Arterial(!): 44.8  Metabolic alkalosis with a mixed disorder, most likely chronic. [CC]   0219 Admitted to medicine     Due to CHF exacerbation requiring aggressive diuresis and oxygen supplementation.  Case discussed with admitting doctor. [CC]      ED Course User Index  [CC] Sharon Wong MD         Diagnoses as of 06/13/25 0406   Shortness of breath   Acute on chronic diastolic congestive heart failure   Hypokalemia       Disposition   As a result of their workup, the patient will require admission to the hospital.  The patient was informed of her diagnosis.  The patient was given the opportunity to ask questions and I answered them. The patient agreed to be admitted to the hospital.    Procedures   Procedures    Patient was seen independently    Sharon Wong MD  Emergency Medicine                                                       Sharon Wong MD  06/12/25 2305       Sharon Wong MD  06/12/25 2338       Sharon Wong MD  06/13/25 0406

## 2025-06-13 NOTE — PROGRESS NOTES
Vickie Foster is a 69 y.o. female on day 0 of admission presenting with Acute on chronic diastolic congestive heart failure.      Subjective   Patient reports she is feeling better. Breathing better than when she came in. Still with significant LE edema. No pain. Has some questions about her medications.       Objective     Last Recorded Vitals  BP (!) 122/46 (BP Location: Left arm, Patient Position: Lying)   Pulse 67   Temp 36.6 °C (97.9 °F) (Oral)   Resp 20   SpO2 91%   Intake/Output last 3 Shifts:  No intake or output data in the 24 hours ending 06/13/25 1127    Admission Weight       Daily Weight  06/06/25 : 118 kg (260 lb)    Image Results  ECG 12 lead  Atrial fibrillation  Nonspecific T wave abnormality  Abnormal ECG  When compared with ECG of 08-DEC-2024 18:22,  QT has shortened      Physical Exam  General: alert, no diaphoresis   HENT: mucous membranes moist, external ears normal, no rhinorrhea   Eyes: no icterus or injection, no discharge   Lungs: bibasilar rales   Heart: RRR, +2-3 pitting edema to mid thighs   GI: abdomen soft, nontender, nondistended, BS present   MSK: no joint effusion or deformity   Skin: no rashes, erythema, or ecchymosis   Neuro: grossly normal cognition, motor strength, sensation    Relevant Results                              Assessment & Plan  Acute on chronic diastolic congestive heart failure  -cardiac diet, fluid restriction, strict I&Os, CHF bundle  -limited echo  - discussed with cardio NP  -Given alkalosis she can be difficult to diurese. Consult Dr. Kaur-- discussed at length with him. He is initiating diamox and will also dose IV lasix today. She may need long term diamox.  -patient has chronic diastolic heart failure with frequent exacerbations. Reports she is no longer able to lay down due to her orthopnea. She would benefit from a hospital bed as she has no other way to rest at night safely. Also she has chronic MORLEY from her CHF and would benefit from  wheelchair.  Chronic obstructive lung disease (Multi)  Acute on chronic respiratory failure with hypoxia and hypercapnia  -2L prn at home, required 4L here but weaning down with great UOP from lasix given in ER  -continue home inhalers, does not appear to be in exacerbation  Hypokalemia  -increase home potassium supplementation   -Monitor and replete additional prn, check mag  Chronic atrial fibrillation (Multi)  -continue Eliquis, metoprolol  Dental abscess  -s/p I&D in ER 6/06  -continue Augmentin  Metabolic alkalosis  As mentioned above. Likely element of compensation from her COPD and contraction alkalosis from diuretics. Dr. Kaur starting diamox and will monitor daily labs    Functional decline  Patient requires a manual wheelchair for primary mode of mobility for ADL's in the home. Patient with recent decline in mobility and strength due to chronic diastolic heart failure with frequent exacerbations and hospitalizations. Patient is not able to ambulate safely with a walker while attempting functional tasks.   -Face to face discussion conducted regarding the need for a hospital bed - adjustable bed at discharge in the home environment. Patient with impaired functional mobility and instability due to progressive and frequent exacerbations of CHF. Requires a hospital bed to be able to complete transfers and ADL's within the home.          Joanna Farnsworth,

## 2025-06-13 NOTE — PROGRESS NOTES
06/13/25 0816   Physical Activity   On average, how many days per week do you engage in moderate to strenuous exercise (like a brisk walk)? 0 days   On average, how many minutes do you engage in exercise at this level? 0 min   Financial Resource Strain   How hard is it for you to pay for the very basics like food, housing, medical care, and heating? Not hard   Housing Stability   In the last 12 months, was there a time when you were not able to pay the mortgage or rent on time? N   In the past 12 months, how many times have you moved where you were living? 0   At any time in the past 12 months, were you homeless or living in a shelter (including now)? N   Transportation Needs   In the past 12 months, has lack of transportation kept you from medical appointments or from getting medications? no   In the past 12 months, has lack of transportation kept you from meetings, work, or from getting things needed for daily living? No   Food Insecurity   Within the past 12 months, you worried that your food would run out before you got the money to buy more. Never true   Within the past 12 months, the food you bought just didn't last and you didn't have money to get more. Never true   Stress   Do you feel stress - tense, restless, nervous, or anxious, or unable to sleep at night because your mind is troubled all the time - these days? Very much  (pt said that her  is aware)   Social Connections   In a typical week, how many times do you talk on the phone with family, friends, or neighbors? More than 3   How often do you get together with friends or relatives? Never  (pt said that her house is a mess and she would rather not have visitors.)   How often do you attend Anabaptist or Voodoo services? More than 4   Do you belong to any clubs or organizations such as Anabaptist groups, unions, fraternal or athletic groups, or school groups? No   How often do you attend meetings of the clubs or organizations you  belong to? Never   Are you , , , , never , or living with a partner?    Intimate Partner Violence   Within the last year, have you been afraid of your partner or ex-partner? No   Within the last year, have you been humiliated or emotionally abused in other ways by your partner or ex-partner? No   Within the last year, have you been kicked, hit, slapped, or otherwise physically hurt by your partner or ex-partner? No   Within the last year, have you been raped or forced to have any kind of sexual activity by your partner or ex-partner? No   Alcohol Use   Q1: How often do you have a drink containing alcohol? Never   Q2: How many drinks containing alcohol do you have on a typical day when you are drinking? None   Q3: How often do you have six or more drinks on one occasion? Never   Utilities   In the past 12 months has the electric, gas, oil, or water company threatened to shut off services in your home? No   Health Literacy   How often do you need to have someone help you when you read instructions, pamphlets, or other written material from your doctor or pharmacy? Never   Follow-Ups   We make community resources available to all of our patients to assist with everyday needs. We may be able to connect you with those resources. Would you be interested? N

## 2025-06-13 NOTE — H&P
History of Present Illness  Vickie Foster is a 69 y.o. female who presented with Leg Swelling (History of CHF, patient arrives to triage at 85% on room air, patient able to speak in 2-3 word sentences. Patient placed on 4L NC and taken back to T3) and Shortness of Breath and is admitted for Acute on chronic diastolic congestive heart failure    Subjective    She reports increasing shortness of breath for the past few days. She usually only wears her home O2 at night, however she has been wearing it all the time. She has been unable to lay flat at night due to increased shortness of breath. She has had about a month of increasing lower extremity edema. She has been compliant with her lasix. She reports that her previous cardiologist, Dr Sanchez, had her take metolazone as needed when her legs began to swell. However he has retired and she has not taken it. She reports drinking more fluids than usual but denies any more salt in her diet. Denies chest pain or tightness    Review of Systems   Respiratory:  Positive for shortness of breath.    Cardiovascular:  Positive for leg swelling.         History    Vickie Foster  has a past medical history of A-fib (Multi), Cellulitis, CHF (congestive heart failure), COVID, Heart disease, HTN (hypertension), and Low back pain.    She has no past medical history of Personal history of irradiation.     Surgical History[1]   Patient  reports that she quit smoking about 21 years ago. Her smoking use included cigarettes. She has never used smokeless tobacco. She reports that she does not currently use alcohol. She reports that she does not use drugs.     Patient's family history includes Breast cancer in her mother, sister, and another family member; Colon cancer in her father; Diabetes type II in her sister; Heart attack in her mother; Heart disease in her mother; Kidney failure in her sister; No Known Problems in her brother and daughter; herione addict in her sister; recovering  "alcoholic in her son.          Objective    Blood pressure 105/62, pulse 89, temperature 36.6 °C (97.9 °F), temperature source Oral, resp. rate (!) 34, height 1.549 m (5' 1\"), SpO2 97%.  Vitals Reviewed: yes  Constitutional: awake and alert, no acute distress  Eyes: EOMI, normal conjunctiva  HENT: moist mucus membranes, airway patent  Pulm: decreased breath sounds, minimal basilar crackles  Cardiac: reg rate, irregular rhythm, no murmur  MSK: bilateral 3+/4+ lower extremity edema with venous stasis changes  Skin: wound on RLE  Neuro: No focal deficit, oriented  Psych: Cooperative, appropriate affect, fair judgement    No intake or output data in the 24 hours ending 06/13/25 0610    Relevant Results  XR chest 1 view  Result Date: 6/12/2025  1. Constellation of findings most compatible with pulmonary edema. 2. Baseline cardiomegaly and enlargement of the central pulmonary arteries indicating pulmonary hypertension.       MACRO: None   Signed by: Servando Walker 6/12/2025 11:23 PM Dictation workstation:   [id number]      Microbiology  No results found for the last 90 days.      Scheduled medications  Scheduled Medications[2]  Continuous medications  Continuous Medications[3]  PRN medications  PRN Medications[4]        Assessment    Assessment & Plan  Acute on chronic diastolic congestive heart failure  -cardiac diet, fluid restriction, strict I&Os, CHF bundle  -Echo, cardiology consult  -lasix 80mg IV daily  Chronic obstructive lung disease (Multi)  Acute on chronic respiratory failure with hypoxia and hypercapnia  -2L prn at home, required 4L here but weaning down with great UOP from lasix given in ER  -continue home inhalers, does not appear to be in exacerbation  Hypokalemia  -increase home potassium supplementation   -Monitor and replete additional prn, check mag  Chronic atrial fibrillation (Multi)  -continue Eliquis, metoprolol  Dental abscess  -s/p I&D in ER 6/06  -continue Augmentin    DVT prophylaxis: " Eliquis  Disposition: Admit      Ginger Coello         [1]   Past Surgical History:  Procedure Laterality Date    ABLATION OF DYSRHYTHMIC FOCUS      BREAST BIOPSY  2000    CARDIAC ELECTROPHYSIOLOGY PROCEDURE N/A 05/01/2024    Procedure: Cardioversion;  Surgeon: Amado Ty MD;  Location: [facility] Cardiac Cath Lab;  Service: Electrophysiology;  Laterality: N/A;  70308  i48.0  medical [employer]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    CARDIAC ELECTROPHYSIOLOGY PROCEDURE N/A 07/08/2024    Procedure: Ablation A-Fib;  Surgeon: Amado Ty MD;  Location: [facility] Cardiac Cath Lab;  Service: Electrophysiology;  Laterality: N/A;  AFIB CRYOABLATION; CPT 03029; ICD I48.0   Medicare [employer] of [place] medicare  ID - [id number]  [phone]  No Auth required  Call ref # [id number]    CARDIAC ELECTROPHYSIOLOGY PROCEDURE N/A 09/18/2024    Procedure: Cardioversion;  Surgeon: Amdao Ty MD;  Location: [facility] Cardiac Cath Lab;  Service: Electrophysiology;  Laterality: N/A;  CARDIOVERSION CPT 60234, ICD I48.0 no auth required/REF # [id number]    CARDIOVERSION      05/01/2024   [2] allopurinol, 300 mg, oral, Daily  amoxicillin-clavulanate, 1 tablet, oral, q12h BRIDGETTE  apixaban, 5 mg, oral, BID  atorvastatin, 40 mg, oral, Nightly  fluticasone furoate-vilanteroL, 1 puff, inhalation, Daily  furosemide, 80 mg, intravenous, Daily  gabapentin, 300 mg, oral, TID  metoprolol succinate XL, 25 mg, oral, Daily  oxygen, , inhalation, Continuous - Inhalation  potassium chloride CR, 40 mEq, oral, BID  [3]    [4] PRN medications: acetaminophen, HYDROcodone-acetaminophen, melatonin, midodrine, oxygen

## 2025-06-13 NOTE — ED PROCEDURE NOTE
Procedure  Critical Care    Performed by: Sharon Wong MD  Authorized by: Sharon Wong MD    Critical care provider statement:     Critical care time (minutes):  45    Critical care was necessary to treat or prevent imminent or life-threatening deterioration of the following conditions:  Respiratory failure and cardiac failure    Critical care was time spent personally by me on the following activities:  Blood draw for specimens, examination of patient, obtaining history from patient or surrogate, ordering and performing treatments and interventions, ordering and review of radiographic studies, ordering and review of laboratory studies, pulse oximetry, re-evaluation of patient's condition, review of old charts, development of treatment plan with patient or surrogate and evaluation of patient's response to treatment    Care discussed with: admitting provider                 Sharon Wong MD  06/13/25 0332

## 2025-06-13 NOTE — PROGRESS NOTES
06/13/25 1224   Discharge Planning   Expected Discharge Disposition Home     Order form sent to [facility] for hospital bed and wheelchair at this time

## 2025-06-13 NOTE — CONSULTS
CONSULT: Nephrology SERVICE    SERVICE DATE: 6/13/2025   SERVICE TIME:  10:13 AM    REASON FOR CONSULT: Metabolic alkalosis and edema management  REQUESTING PHYSICIAN: Dr. Farnsworth  PRIMARY CARE PHYSICIAN: Jamison Maurice, DO    ASSESSMENT AND PLAN  69-year-old woman with atrial fibrillation, diastolic CHF, COPD admitted with fluid overload.  1.  Metabolic alkalosis  2.  Fluid overload  3.  Edema  4.  Hypokalemia    Arterial blood gases demonstrating alkalemia.  I do believe she needs acetazolamide therapy.  The etiology of her alkalosis is contraction and compensatory.  She has a chronic compensatory metabolic alkalosis from her chronic CO2 retention on top of an existing contraction alkalosis with alkalemia on the recent ABG.  I have lost my window to check urine chloride given that she was dosed with diuretics yesterday which will skew the results.  Dosed her again with Lasix 80 mg IV x 1.  Begin acetazolamide 250 mg p.o. twice daily.  Agree with potassium replacement.  Trend daily labs, continue supportive care.  I will continue to follow this patient.  Thank for the consultation.     SUBJECTIVE  Ms. Foster is a 69 y.o. woman with atrial fibrillation, diastolic CHF, and COPD admitted to the hospital with swelling and dyspnea, consulted for metabolic alkalosis.  I saw her about 7 months ago.  It was a similar presentation.  She maintains chronically on torsemide 50 mg p.o. twice daily with metolazone 5 mg given once weekly as needed.  She does not regularly take metolazone as it makes her urinate too frequently.  She is compliant with her torsemide.  She does not take acetazolamide in the outpatient setting.  She has been told she has COPD by her pulmonologist, Dr. Broussard.  She has known diastolic dysfunction.  She maintains on oxygen therapy at night and occasional oxygen therapy during the day.  She does not weigh herself, but she believes she has been gaining weight.  She has increased swelling and increased  "dyspnea.  She does not have a cough.  She has lately been taken to amoxicillin due to some sort of dental abscess.  This is really the only new medication.  She is dependent on gabapentin at 300 mg p.o. 3 times daily due to her neuropathy.  She was unaware that this medicine causes swelling.  No major changes in her diet.  No major changes in her bowel habits, bladder habits, or oral intake.    PAST MEDICAL HISTORY: Medical History[1]  PAST SURGICAL HISTORY: Surgical History[2]  FAMILY HISTORY: Family History[3]  SOCIAL HISTORY: Social History[4]  MEDICATIONS:  Scheduled Medications[5]   Continuous Medications[6]   PRN Medications[7]   CURRENT ALLERGIES:   Allergies as of 06/12/2025    (No Known Allergies)       COMPLETE REVIEW OF SYSTEMS:    Full ROS was negative unless mentioned above    OBJECTIVE  PHYSICAL EXAM  Heart Rate:  [67-89]   Temp:  [36.6 °C (97.9 °F)]   Resp:  [17-34]   BP: ()/(46-90)   Height:  [154.9 cm (5' 1\")]   SpO2:  [85 %-98 %]    Body mass index is 49.13 kg/m².  This is a chronically ill and obese white woman who appears much older than her known age  No obvious skin rashes  Appropriate affect  Hearing intact  Phonation intact  Moist mucosa  Rales auscultated at the bases  Distant heart sounds  Abdomen is soft, nondistended, nontender, positive bowel sounds  No Cuellar catheter in place, no suprapubic tenderness to palpation  Fairly extensive bilateral lower extremity edema  Moves 4 limbs spontaneously  No obvious joint deformities  No lymphadenopathy    DATA:   Labs:  Results for orders placed or performed during the hospital encounter of 06/12/25 (from the past 96 hours)   B-type natriuretic peptide   Result Value Ref Range     (H) 0 - 99 pg/mL   CBC   Result Value Ref Range    WBC 10.0 4.4 - 11.3 x10*3/uL    nRBC 0.0 0.0 - 0.0 /100 WBCs    RBC 4.18 4.00 - 5.20 x10*6/uL    Hemoglobin 12.1 12.0 - 16.0 g/dL    Hematocrit 40.0 36.0 - 46.0 %    MCV 96 80 - 100 fL    MCH 28.9 26.0 - 34.0 " pg    MCHC 30.3 (L) 32.0 - 36.0 g/dL    RDW 16.2 (H) 11.5 - 14.5 %    Platelets 232 150 - 450 x10*3/uL   Comprehensive metabolic panel   Result Value Ref Range    Glucose 120 (H) 74 - 99 mg/dL    Sodium 142 136 - 145 mmol/L    Potassium 3.1 (L) 3.5 - 5.3 mmol/L    Chloride 98 98 - 107 mmol/L    Bicarbonate 40 (HH) 21 - 32 mmol/L    Anion Gap 7 (L) 10 - 20 mmol/L    Urea Nitrogen 27 (H) 6 - 23 mg/dL    Creatinine 0.60 0.50 - 1.05 mg/dL    eGFR >90 >60 mL/min/1.73m*2    Calcium 8.9 8.6 - 10.3 mg/dL    Albumin 3.8 3.4 - 5.0 g/dL    Alkaline Phosphatase 77 33 - 136 U/L    Total Protein 7.0 6.4 - 8.2 g/dL    AST 17 9 - 39 U/L    Bilirubin, Total 0.5 0.0 - 1.2 mg/dL    ALT 19 7 - 45 U/L   Protime-INR   Result Value Ref Range    Protime 11.5 9.3 - 12.7 seconds    INR 1.1 0.9 - 1.2   APTT   Result Value Ref Range    aPTT 30.2 22.0 - 32.5 seconds   Troponin I, High Sensitivity, Initial   Result Value Ref Range    Troponin I, High Sensitivity 9 0 - 13 ng/L   Blood Gas Arterial Full Panel   Result Value Ref Range    POCT pH, Arterial 7.46 (H) 7.38 - 7.42 pH    POCT pCO2, Arterial 63 (H) 38 - 42 mm Hg    POCT pO2, Arterial 77 (L) 85 - 95 mm Hg    POCT SO2, Arterial 97 94 - 100 %    POCT Oxy Hemoglobin, Arterial 95.7 94.0 - 98.0 %    POCT Hematocrit Calculated, Arterial 39.0 36.0 - 46.0 %    POCT Sodium, Arterial 140 136 - 145 mmol/L    POCT Potassium, Arterial 3.2 (L) 3.5 - 5.3 mmol/L    POCT Chloride, Arterial 101 98 - 107 mmol/L    POCT Ionized Calcium, Arterial 1.15 1.10 - 1.33 mmol/L    POCT Glucose, Arterial 106 (H) 74 - 99 mg/dL    POCT Lactate, Arterial 0.5 0.4 - 2.0 mmol/L    POCT Base Excess, Arterial 17.7 (H) -2.0 - 3.0 mmol/L    POCT HCO3 Calculated, Arterial 44.8 (H) 22.0 - 26.0 mmol/L    POCT Hemoglobin, Arterial 12.9 12.0 - 16.0 g/dL    POCT Anion Gap, Arterial -3 (L) 10 - 25 mmo/L    Patient Temperature 37.0 degrees Celsius    FiO2 36 %   ECG 12 lead   Result Value Ref Range    Ventricular Rate 80 BPM    QRS  Duration 88 ms    QT Interval 372 ms    QTC Calculation(Bazett) 429 ms    R Axis 56 degrees    T Axis 100 degrees    QRS Count 13 beats    Q Onset 222 ms    T Offset 408 ms    QTC Fredericia 409 ms   Troponin, High Sensitivity, 1 Hour   Result Value Ref Range    Troponin I, High Sensitivity 10 0 - 13 ng/L   Basic metabolic panel   Result Value Ref Range    Glucose 109 (H) 74 - 99 mg/dL    Sodium 144 136 - 145 mmol/L    Potassium 3.6 3.5 - 5.3 mmol/L    Chloride 100 98 - 107 mmol/L    Bicarbonate 41 (HH) 21 - 32 mmol/L    Anion Gap 7 (L) 10 - 20 mmol/L    Urea Nitrogen 21 6 - 23 mg/dL    Creatinine 0.47 (L) 0.50 - 1.05 mg/dL    eGFR >90 >60 mL/min/1.73m*2    Calcium 8.9 8.6 - 10.3 mg/dL     *Note: Due to a large number of results and/or encounters for the requested time period, some results have not been displayed. A complete set of results can be found in Results Review.       SIGNATURE: Refugio Kaur MD PATIENT NAME: Vickie Foster   DATE: June 13, 2025 MRN: [id number]   TIME: 10:13 AM PAGER: [phone]            [1]   Past Medical History:  Diagnosis Date    A-fib (Multi)     Cellulitis     CHF (congestive heart failure)     COVID     Heart disease     HTN (hypertension)     Low back pain    [2]   Past Surgical History:  Procedure Laterality Date    ABLATION OF DYSRHYTHMIC FOCUS      BREAST BIOPSY  2000    CARDIAC ELECTROPHYSIOLOGY PROCEDURE N/A 05/01/2024    Procedure: Cardioversion;  Surgeon: Amado Ty MD;  Location: [facility] Cardiac Cath Lab;  Service: Electrophysiology;  Laterality: N/A;  73613  i48.0  medical [employer]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    CARDIAC ELECTROPHYSIOLOGY PROCEDURE N/A 07/08/2024    Procedure: Ablation A-Fib;  Surgeon: Amado Ty MD;  Location: [facility] Cardiac Cath Lab;  Service: Electrophysiology;  Laterality: N/A;  AFIB CRYOABLATION; CPT 77031; ICD I48.0   Medicare [employer] of [place] medicare  ID - [id number]  [phone]  No Auth required  Call ref # [id number]    CARDIAC  ELECTROPHYSIOLOGY PROCEDURE N/A 2024    Procedure: Cardioversion;  Surgeon: Amado Ty MD;  Location: [facility] Cardiac [facility] Lab;  Service: Electrophysiology;  Laterality: N/A;  CARDIOVERSION CPT 04947, ICD I48.0 no auth required/REF # [id number]    CARDIOVERSION      2024   [3]   Family History  Problem Relation Name Age of Onset    Breast cancer Mother      Heart attack Mother           in early 80s of MI    Heart disease Mother      Colon cancer Father      Breast cancer Sister      Diabetes type II Sister      Kidney failure Sister      Other (herione addict) Sister      No Known Problems Brother      No Known Problems Daughter      Other (recovering alcoholic) Son      Breast cancer Other Maternal Aunt    [4]   Social History  Tobacco Use    Smoking status: Former     Current packs/day: 0.00     Types: Cigarettes     Quit date:      Years since quittin.4    Smokeless tobacco: Never   Vaping Use    Vaping status: Never Used   Substance Use Topics    Alcohol use: Not Currently     Comment: monthly or less    Drug use: Never   [5] acetaZOLAMIDE, 250 mg, oral, BID  allopurinol, 300 mg, oral, Daily  amoxicillin-clavulanate, 1 tablet, oral, q12h BRIDGETTE  apixaban, 5 mg, oral, BID  atorvastatin, 40 mg, oral, Nightly  buPROPion XL, 300 mg, oral, Daily before breakfast  fluticasone furoate-vilanteroL, 1 puff, inhalation, Daily  furosemide, 80 mg, intravenous, Once  gabapentin, 300 mg, oral, TID  metoprolol succinate XL, 25 mg, oral, Daily  oxygen, , inhalation, Continuous - Inhalation  potassium chloride CR, 40 mEq, oral, BID  [6]    [7] PRN medications: acetaminophen, HYDROcodone-acetaminophen, hydrOXYzine HCL, melatonin, midodrine, oxygen

## 2025-06-13 NOTE — PROGRESS NOTES
Pharmacy Medication History Review    Vickie Foster is a 69 y.o. female admitted for Acute on chronic diastolic congestive heart failure. Pharmacy reviewed the patient's [id number] medications and allergies for accuracy.    Medications ADDED:  Rick back and body   Vitamin D3 5000unit  Biotin 5mg   Medications CHANGED:  Advair diskus - not taking   Hydroxyzine 10mg - not taking -    Oxygen - 2L/min - nightly and as needed  Potassium chloride CR 20mEq - BID and a third if needed  Torsemide 100mg - 50mg BID  Medications REMOVED:   Ciprofloxacin 500mg   Vitamin D2 oral  Nonformulary - biotin       The list below reflects the updated PTA list. Comments regarding how patient may be taking medications differently can be found in the Admit Orders Activity  Prior to Admission Medications   Prescriptions Last Dose Informant   Eliquis 5 mg tablet 2025 self   Sig: Take 1 tablet (5 mg) by mouth 2 times a day.   acetaminophen (Tylenol) 500 mg tablet Unknown self   Sig: Take 2 tablets (1,000 mg) by mouth every 6 hours if needed for mild pain (1 - 3) for up to 10 days.   allopurinol (Zyloprim) 300 mg tablet 2025 self   Sig: Take 1 tablet (300 mg) by mouth once daily.   amoxicillin-clavulanate (Augmentin) 875-125 mg tablet 2025 self   Sig: Take 1 tablet by mouth every 12 hours for 10 days.   ascorbic acid (Vitamin C) 500 mg tablet 2025 self   Sig: Take 1 tablet (500 mg) by mouth once daily.   aspirin-caffeine (Rick Back and Body) 500-32.5 mg tablet 2025 self   Sig: Take 2 each by mouth once daily.   atorvastatin (Lipitor) 40 mg tablet 2025 self   Sig: Take 1 tablet (40 mg) by mouth once daily.   biotin 5 mg tablet 2025 self   Sig: Take 1 tablet (5 mg) by mouth once daily.   buPROPion XL (Wellbutrin XL) 300 mg 24 hr tablet 2025 self   Sig: Take 1 tablet (300 mg) by mouth once daily in the morning. Take before meals.   cholecalciferol (Vitamin D-3) 125 mcg (5,000 units) tablet  6/12/2025 self   Sig: Take 1 tablet (125 mcg) by mouth once daily.   gabapentin (Neurontin) 300 mg capsule 6/12/2025 self   Sig: Take 1 capsule (300 mg) by mouth 3 times a day.   metoprolol succinate XL (Toprol-XL) 25 mg 24 hr tablet 6/12/2025 self   Sig: Take 1 tablet (25 mg) by mouth once daily.   multivitamin tablet 6/12/2025 self   Sig: Take 1 tablet by mouth once daily.   oxygen (O2) gas therapy  self   Sig: Inhale 1 each every 12 hours.   Patient taking differently: Inhale 2 L/min at 120,000 mL/hr continuously. At night and as needed   potassium chloride CR (Klor-Con M20) 20 mEq ER tablet 6/12/2025 self   Sig: Take 1 tablet (20 mEq) by mouth 2 times a day. Do not crush or chew.   Patient taking differently: Take 1 tablet (20 mEq) by mouth 2 times a day. BID and a third daily as needed per patient   torsemide (Demadex) 100 mg tablet 6/12/2025 self   Sig: Take 1 tablet (100 mg) by mouth once daily.   Patient taking differently: Take 0.5 tablets (50 mg) by mouth 2 times a day.      Facility-Administered Medications: None        The list below reflects the updated allergy list. Please review each documented allergy for additional clarification and justification.  Allergies  Reviewed by Isaura Gutierrez CPhT on 6/13/2025   No Known Allergies         Pharmacy has been updated to [facility].    Sources used to complete the med history include dispense history, PTA medication list, patient interview. Patient is a good historian.    Below are additional concerns with the patient's PTA list.  None     Isaura Gutierrez CPhT-Adv  Please reach out via [employer] Secure Chat for questions

## 2025-06-13 NOTE — CONSULTS
Heart Failure Nurse Navigator    The role of the HF nurse navigator is to (1) characterize risk profiles of patients with heart failure transitioning from [id number] after hospitalization, (2) recommend interventions to improve care and reduce risks of worse post-hospitalization outcomes. Potential recommendations may touch base on patient/family education, additional consults that may bring value, and appointment scheduling.    History    I met with Vickie Foster at the bedside, and my impressions include: 69 y.o. female presenting with leg swelling and shortness of breath.  Patient states that she has been having worsening shortness of breath over the past few days.  She wears home O2 only at night, however she has been wearing it more often throughout the day.  She states that she cannot lay flat.  Patient states that she has noticed worsening lower leg edema over the past month.  Past medical history of hypertension, hyperlipidemia, diastolic heart failure, longstanding persistent atrial fibrillation- hx of cardioversion's, hx of ablation, COPD, lymphedema, obesity, history of embolic stroke, Lumbar radiculopathy, anxiety, and gout.  Patient had an echocardiogram completed in December 2024, EF 60 to 65%, Grade I (impaired relaxation pattern) of left ventricular diastolic filling with normal left atrial filling pressure, normal right ventricular global systolic function, severely dilated left atrium, moderate mitral annular dilation, moderate tricuspid regurgitation, aortic valve sclerosis. EKG obtained in the ED showed, Atrial fibrillation with nonspecific T wave abnormality.     Patients Cardiologist(s): Dr. Dick    Patients Primary Care Provider:    1. Medical Domain  What is the patient's most recent LVEF? 60-65%  HFrEF (LVEF <= 40%) Quadruple therapy recommended  HFmrEF (LVEF 41-49%) Quadruple therapy recommended  HFpEF (LVEF >= 50%) Minimum recommendations: SGLT2i and MRA  Is the  patient on OP GDMT for their condition?   ARNI/ACEI/ARB: No None  BB: Yes Metoprolol succinate 25 mg daily  MRA: No None  SGLT2i: No None  Is the patient prescribed a diuretic? Yes  Torsemide 100 mg daily  Does this patient have an implanted device (ie cardioMEMS, ICD, CRT-D)?  Device type: None  Could this patient have advanced heart failure (Stage D heart failure)?: No       2. Mind and Emotion  Does this patient have possible cognitive impairment?: No   Does this patient have major depression?: No     3. Physical Function  Could this patient be frail?: No   Defined by presence of all of these: slowness, weakness, shrinking, inactivity, exhaustion  Is this patient at risk for falling?: No   Defined by having experienced a fall in the last 12 months.    4. Social Determinants of Health  Transportation deficits?: No   Lack of insurance?: No   Poor financial resources?: No   Living conditions (homelessness, unstable home)?: N/A   Poor family/social support?: No   Poor personal care?: No   Food insecurity?: No   Lack of HCPOA?: No    I provided the following heart failure education:  - Living With Heart Failure book provided.  - HF signs and symptoms, heart failure zones and when to call cardiologist.   - Controlling Heart Failure at Home: medication adherence, following up with cardiologist at least once yearly, staying healthy and active, limiting sodium and fluid intake as directed by cardiologist.  - Daily Weight Education    Assessment  I have followed Vickie 2x in 2024. She states she takes all meds as prescribed and watches her salt intake but could do better. We had a discussion about what she can and should not be eating and ways to reduce her sodium intake. She has difficulty getting around too much d/t weakness and SOB. She is agreeable to me following her again for 30 days after DC. DC plan is to [facility]    DC Medications: TBD      Recommendations/ Plan  Follow up with  cards as recommended, take all meds, keep salt and salty foods to a minimum, practice daily wts and monitor for HF symptoms and report them promptly to you cardiologist.      *Cardiology Discharge Appointment Follow-up Plan: Msg sent to Dr. Dick office to move the 7/24/25 appt up to before 7/1/25.    Mireya Orantes RN BSN  [facility] Clinical Nurse Navigator, CHF  [phone]

## 2025-06-13 NOTE — CONSULTS
Inpatient consult to Cardiology  Consult performed by: Cira Quiroga, KEVON-CNP  Consult ordered by: Ginger Coello MD  Reason for consult: Acute on chronic CHF          Reason For Consult  Acute on chronic CHF     History Of Present Illness  Vickie Foster is a 69 y.o. female presenting with leg swelling and shortness of breath.  Patient states that she has been having worsening shortness of breath over the past few days.  She wears home O2 only at night, however she has been wearing it more often throughout the day.  She states that she cannot lay flat.  Patient states that she has noticed worsening lower leg edema over the past month, even after being compliant with her furosemide.  Patient denies chest pain, palpitations, pressure, or tightness.  Patient was short of breath on arrival to the ED, and had severe conversational dyspnea only able to speak 2-3 word sentences.   Past medical history of hypertension, hyperlipidemia, diastolic heart failure, longstanding persistent atrial fibrillation- hx of cardioversion's, hx of ablation, COPD, lymphedema, obesity, history of embolic stroke, Lumbar radiculopathy, anxiety, and gout.  Patient had an echocardiogram completed in December 2024, EF 60 to 65%, Grade I (impaired relaxation pattern) of left ventricular diastolic filling with normal left atrial filling pressure, normal right ventricular global systolic function, severely dilated left atrium, moderate mitral annular dilation, moderate tricuspid regurgitation, aortic valve sclerosis. EKG obtained in the ED showed, Atrial fibrillation with nonspecific T wave abnormality.  Labs this morning show sodium 144, potassium 3.6, bicarb 41, BUN 21, creatinine 0.47, , troponin 9 and 10, hemoglobin/hematocrit 12.1/40.0.  Arterial blood gas was obtained that showed a pH of 7.46, CO2 63.  Chest x-ray was obtained in the ED and showed constellation of findings most compatible with pulmonary edema and baseline  cardiomegaly and enlargement of the central pulmonary arteries indicating pulmonary hypertension.  The patient was given 80 mg IV furosemide for diuresis and was given a one-time oral dose of potassium chloride for hypokalemia.  Patient was previously seen by cardiologist Dr. Sanchez.  Patient has a future appointment with Dr. Dick.     Past Medical History  She has a past medical history of A-fib (Multi), Cellulitis, CHF (congestive heart failure), COVID, Heart disease, HTN (hypertension), and Low back pain.    She has no past medical history of Personal history of irradiation.    Surgical History  She has a past surgical history that includes Cardioversion; Cardiac electrophysiology procedure (N/A, 05/01/2024); Cardiac electrophysiology procedure (N/A, 07/08/2024); Ablation of dysrhythmic focus; Cardiac electrophysiology procedure (N/A, 09/18/2024); and Breast biopsy (2000).     Social History  She reports that she quit smoking about 21 years ago. Her smoking use included cigarettes. She has never used smokeless tobacco. She reports that she does not currently use alcohol. She reports that she does not use drugs.    Family History  Family History[1]     Allergies  Patient has no known allergies.    Review of Systems   Constitutional: Negative.  Negative for chills, fatigue and fever.   HENT: Negative.     Eyes: Negative.    Respiratory:  Positive for shortness of breath. Negative for cough, chest tightness, wheezing and stridor.    Cardiovascular:  Positive for leg swelling. Negative for chest pain and palpitations.   Endocrine: Negative.    Genitourinary: Negative.    Musculoskeletal: Negative.    Neurological: Negative.    Psychiatric/Behavioral: Negative.          Physical Exam  Vitals and nursing note reviewed.   Constitutional:       Appearance: She is obese.   HENT:      Head: Normocephalic and atraumatic.      Mouth/Throat:      Mouth: Mucous membranes are moist.      Pharynx: Oropharynx is clear.   Eyes:  "     Extraocular Movements: Extraocular movements intact.   Cardiovascular:      Rate and Rhythm: Normal rate. Rhythm irregular.      Heart sounds: No murmur heard.     No friction rub. No gallop.      Comments: On telemetry patient is in rate controlled atrial flutter with heart rates 80s-90s  Pulmonary:      Effort: Respiratory distress present.      Breath sounds: No stridor. No wheezing, rhonchi or rales.      Comments: Patient on 4 L nasal cannula.  Conversational dyspnea noted.  Bilateral upper lobes are clear but slightly diminished, bilateral lower lobes are completely diminished  Chest:      Chest wall: No tenderness.   Abdominal:      General: Bowel sounds are normal.      Palpations: Abdomen is soft.   Musculoskeletal:         General: Swelling present.      Cervical back: Normal range of motion and neck supple.      Right lower leg: Edema (+2) present.      Left lower leg: Edema (+2) present.   Skin:     General: Skin is warm and dry.      Capillary Refill: Capillary refill takes less than 2 seconds.      Findings: Erythema present.   Neurological:      Mental Status: She is alert and oriented to person, place, and time.   Psychiatric:         Mood and Affect: Mood normal.         Behavior: Behavior normal.         Thought Content: Thought content normal.         Judgment: Judgment normal.          Last Recorded Vitals  Blood pressure 113/54, pulse 87, temperature 36.6 °C (97.9 °F), temperature source Oral, resp. rate 18, height 1.549 m (5' 1\"), SpO2 96%.    Relevant Results  Results for orders placed or performed during the hospital encounter of 06/12/25 (from the past 24 hours)   B-type natriuretic peptide   Result Value Ref Range     (H) 0 - 99 pg/mL   CBC   Result Value Ref Range    WBC 10.0 4.4 - 11.3 x10*3/uL    nRBC 0.0 0.0 - 0.0 /100 WBCs    RBC 4.18 4.00 - 5.20 x10*6/uL    Hemoglobin 12.1 12.0 - 16.0 g/dL    Hematocrit 40.0 36.0 - 46.0 %    MCV 96 80 - 100 fL    MCH 28.9 26.0 - 34.0 pg    " MCHC 30.3 (L) 32.0 - 36.0 g/dL    RDW 16.2 (H) 11.5 - 14.5 %    Platelets 232 150 - 450 x10*3/uL   Comprehensive metabolic panel   Result Value Ref Range    Glucose 120 (H) 74 - 99 mg/dL    Sodium 142 136 - 145 mmol/L    Potassium 3.1 (L) 3.5 - 5.3 mmol/L    Chloride 98 98 - 107 mmol/L    Bicarbonate 40 (HH) 21 - 32 mmol/L    Anion Gap 7 (L) 10 - 20 mmol/L    Urea Nitrogen 27 (H) 6 - 23 mg/dL    Creatinine 0.60 0.50 - 1.05 mg/dL    eGFR >90 >60 mL/min/1.73m*2    Calcium 8.9 8.6 - 10.3 mg/dL    Albumin 3.8 3.4 - 5.0 g/dL    Alkaline Phosphatase 77 33 - 136 U/L    Total Protein 7.0 6.4 - 8.2 g/dL    AST 17 9 - 39 U/L    Bilirubin, Total 0.5 0.0 - 1.2 mg/dL    ALT 19 7 - 45 U/L   Protime-INR   Result Value Ref Range    Protime 11.5 9.3 - 12.7 seconds    INR 1.1 0.9 - 1.2   APTT   Result Value Ref Range    aPTT 30.2 22.0 - 32.5 seconds   Troponin I, High Sensitivity, Initial   Result Value Ref Range    Troponin I, High Sensitivity 9 0 - 13 ng/L   Blood Gas Arterial Full Panel   Result Value Ref Range    POCT pH, Arterial 7.46 (H) 7.38 - 7.42 pH    POCT pCO2, Arterial 63 (H) 38 - 42 mm Hg    POCT pO2, Arterial 77 (L) 85 - 95 mm Hg    POCT SO2, Arterial 97 94 - 100 %    POCT Oxy Hemoglobin, Arterial 95.7 94.0 - 98.0 %    POCT Hematocrit Calculated, Arterial 39.0 36.0 - 46.0 %    POCT Sodium, Arterial 140 136 - 145 mmol/L    POCT Potassium, Arterial 3.2 (L) 3.5 - 5.3 mmol/L    POCT Chloride, Arterial 101 98 - 107 mmol/L    POCT Ionized Calcium, Arterial 1.15 1.10 - 1.33 mmol/L    POCT Glucose, Arterial 106 (H) 74 - 99 mg/dL    POCT Lactate, Arterial 0.5 0.4 - 2.0 mmol/L    POCT Base Excess, Arterial 17.7 (H) -2.0 - 3.0 mmol/L    POCT HCO3 Calculated, Arterial 44.8 (H) 22.0 - 26.0 mmol/L    POCT Hemoglobin, Arterial 12.9 12.0 - 16.0 g/dL    POCT Anion Gap, Arterial -3 (L) 10 - 25 mmo/L    Patient Temperature 37.0 degrees Celsius    FiO2 36 %   ECG 12 lead   Result Value Ref Range    Ventricular Rate 80 BPM    QRS Duration  88 ms    QT Interval 372 ms    QTC Calculation(Bazett) 429 ms    R Axis 56 degrees    T Axis 100 degrees    QRS Count 13 beats    Q Onset 222 ms    T Offset 408 ms    QTC Fredericia 409 ms   Troponin, High Sensitivity, 1 Hour   Result Value Ref Range    Troponin I, High Sensitivity 10 0 - 13 ng/L   Basic metabolic panel   Result Value Ref Range    Glucose 109 (H) 74 - 99 mg/dL    Sodium 144 136 - 145 mmol/L    Potassium 3.6 3.5 - 5.3 mmol/L    Chloride 100 98 - 107 mmol/L    Bicarbonate 41 (HH) 21 - 32 mmol/L    Anion Gap 7 (L) 10 - 20 mmol/L    Urea Nitrogen 21 6 - 23 mg/dL    Creatinine 0.47 (L) 0.50 - 1.05 mg/dL    eGFR >90 >60 mL/min/1.73m*2    Calcium 8.9 8.6 - 10.3 mg/dL     *Note: Due to a large number of results and/or encounters for the requested time period, some results have not been displayed. A complete set of results can be found in Results Review.         Assessment/Plan     Shortness of breath  Fluid overload  Acute on chronic diastolic heart failure  Alkalosis  Persistent longstanding atrial fibrillation-on apixaban  Hypertension  Hyperlipidemia  COPD-home O2 at night  Obesity  Lymphedema    6/13 As above, presenting with leg swelling and shortness of breath.  Patient states that she has been having worsening shortness of breath over the past few days.  She wears home O2 only at night, however she has been wearing it more often throughout the day.  She states that she cannot lay flat.  Patient states that she has noticed worsening lower leg edema over the past month, even after being compliant with her furosemide.  Patient denies chest pain, palpitations, pressure, or tightness.  Patient was short of breath on arrival to the ED, and had severe conversational dyspnea only able to speak 2-3 word sentences.  Patient was previously being seen by cardiologist Dr. Sanchez would take metolazone as needed when her legs began to swell.  She reports not taking it since [facility].   Echocardiogram completed  in 2024, EF 60 to 65%, Grade I (impaired relaxation pattern) of left ventricular diastolic filling with normal left atrial filling pressure, normal right ventricular global systolic function, severely dilated left atrium, moderate mitral annular dilation, moderate tricuspid regurgitation, aortic valve sclerosis  I spent 60 minutes in the professional and overall care of this patient.   Vitals this morning show a heart rate of 86, blood pressure 108/73, satting 95% on 4L nasal cannula.  On telemetry patient is in a rate controlled atrial flutter with heart rates in the 80s-90s.  Patient appears to be fluid overloaded, diminished lung sounds throughout all lobes and +2 bilateral lower extremity edema.  The patient does have a history of lymphedema, baseline has lower extremity swelling. In the ED, patient received a one-time dose of IV furosemide 80 mg, no I/O recorded. The admitting team ordered IV furosemide 80 mg to be given daily, however with the patient's increased bicarb, that order was discontinued and admitting team has consulted nephrology for the patient's alkalosis.  Will appreciate their input on diuresis.  A limited echocardiogram was ordered by the admitting team and will be completed sometime today. Continue home medications of apixaban 5 mg twice daily, atorvastatin 40 mg daily, and metoprolol succinate 25 mg daily.  We will continue to follow along with you.         [1]   Family History  Problem Relation Name Age of Onset    Breast cancer Mother      Heart attack Mother           in early 80s of MI    Heart disease Mother      Colon cancer Father      Breast cancer Sister      Diabetes type II Sister      Kidney failure Sister      Other (herione addict) Sister      No Known Problems Brother      No Known Problems Daughter      Other (recovering alcoholic) Son      Breast cancer Other Maternal Aunt

## 2025-06-13 NOTE — ASSESSMENT & PLAN NOTE
-cardiac diet, fluid restriction, strict I&Os, CHF bundle  -limited echo  - discussed with cardio NP  -Given alkalosis she can be difficult to diurese. Consult Dr. Kaur-- discussed at length with him. He is initiating diamox and will also dose IV lasix today. She may need long term diamox.  -patient has chronic diastolic heart failure with frequent exacerbations. Reports she is no longer able to lay down due to her orthopnea. She would benefit from a hospital bed as she has no other way to rest at night safely. Also she has chronic MORLEY from her CHF and would benefit from wheelchair.

## 2025-06-13 NOTE — CARE PLAN
The patient's goals for the shift include Maintain safety and comfort, return to respiratory baseline    The clinical goals for the shift include Absence of SOB with ambulation

## 2025-06-13 NOTE — CARE PLAN
"Pt does not have a POA or Living Will  ADOD: 2 days    Pt lives at home with her , son, and her sons girlfriend  She lives in a split level home with 1 step to enter the home and 12 steps inside the house. Pt said that she \"crawls\" up the stairs but walks down them  She uses a cane, walker, rollator, and wheelchair, depending on what she needs at the time. She also holds onto the walls and furniture in the house. No falls in the last 6 months.  Pt said that her  does the shopping, cleaning and laundry. She can assist with the cooking.  Pt wears 2 Liters of 02 at night and PRN; pt has been using her 02 during the day lately.  She will be receiving her 1st pair of glasses in about 1 week. No hearing aids  She said that she is able to read and comprehend what she reads  She is being tx for depression but denies S.I.  Her PCP is Dr. Maurice from  and she uses [employer] [facility] and she can afford her meds  Pt is here for SOB and worsening leg edema. Pt said that she is on a fluid restriction but she has been drinking a lot of water lately. She said that she feels parched.  Discussed discharge planning; pt said she is active with a program through her insurance called \"[facility]\"; a nurse from her insurance co. Follows up frequently and she can also call her nurse.  Pt is currently declining HHC and rehab if recommended.    DISCHARGE PLAN: AT THIS TIME, THE PLAN IS HOME WITH FAMILY  "

## 2025-06-13 NOTE — PROGRESS NOTES
06/13/25 0820   Discharge Planning   Living Arrangements Spouse/significant other   Support Systems Spouse/significant other;Children   Type of Residence Private residence   Number of Stairs to Enter Residence 1   Number of Stairs Within Residence 12   Do you have animals or pets at home? Yes   Type of Animals or Pets 1 dog   Who is requesting discharge planning? Provider   Home or Post Acute Services None  (pt is decling rehab and HHC at this time)   Expected Discharge Disposition Home   Does the patient need discharge transport arranged? No   Financial Resource Strain   How hard is it for you to pay for the very basics like food, housing, medical care, and heating? Not hard   Housing Stability   In the last 12 months, was there a time when you were not able to pay the mortgage or rent on time? N   In the past 12 months, how many times have you moved where you were living? 0   At any time in the past 12 months, were you homeless or living in a shelter (including now)? N   Transportation Needs   In the past 12 months, has lack of transportation kept you from medical appointments or from getting medications? no   In the past 12 months, has lack of transportation kept you from meetings, work, or from getting things needed for daily living? No   Patient Choice   Patient / Family choosing to utilize agency / facility established prior to hospitalization No   Stroke Family Assessment   Stroke Family Assessment Needed No

## 2025-06-13 NOTE — ASSESSMENT & PLAN NOTE
-cardiac diet, fluid restriction, strict I&Os, CHF bundle  -Echo, cardiology consult  -lasix 80mg IV daily

## 2025-06-13 NOTE — ASSESSMENT & PLAN NOTE
-2L prn at home, required 4L here but weaning down with great UOP from lasix given in ER  -continue home inhalers, does not appear to be in exacerbation

## 2025-06-13 NOTE — PROGRESS NOTES
06/13/25 0821   [facility] Disability Status   Are you deaf or do you have serious difficulty hearing? N   Are you blind or do you have serious difficulty seeing, even when wearing glasses? N   Because of a physical, mental, or emotional condition, do you have serious difficulty concentrating, remembering, or making decisions? (5 years old or older) N   Do you have serious difficulty walking or climbing stairs? Y   Do you have serious difficulty dressing or bathing? N   Because of a physical, mental, or emotional condition, do you have serious difficulty doing errands alone such as visiting the doctor? N

## 2025-06-14 LAB
ALBUMIN SERPL BCP-MCNC: 3.8 G/DL (ref 3.4–5)
ANION GAP SERPL CALCULATED.3IONS-SCNC: 9 MMOL/L (ref 10–20)
BUN SERPL-MCNC: 24 MG/DL (ref 6–23)
CALCIUM SERPL-MCNC: 9.5 MG/DL (ref 8.6–10.3)
CHLORIDE SERPL-SCNC: 99 MMOL/L (ref 98–107)
CO2 SERPL-SCNC: 38 MMOL/L (ref 21–32)
CREAT SERPL-MCNC: 0.61 MG/DL (ref 0.5–1.05)
EGFRCR SERPLBLD CKD-EPI 2021: >90 ML/MIN/1.73M*2
ERYTHROCYTE [DISTWIDTH] IN BLOOD BY AUTOMATED COUNT: 16.2 % (ref 11.5–14.5)
GLUCOSE SERPL-MCNC: 86 MG/DL (ref 74–99)
HCT VFR BLD AUTO: 42.7 % (ref 36–46)
HGB BLD-MCNC: 12.8 G/DL (ref 12–16)
MCH RBC QN AUTO: 29 PG (ref 26–34)
MCHC RBC AUTO-ENTMCNC: 30 G/DL (ref 32–36)
MCV RBC AUTO: 97 FL (ref 80–100)
NRBC BLD-RTO: 0 /100 WBCS (ref 0–0)
PHOSPHATE SERPL-MCNC: 4.4 MG/DL (ref 2.5–4.9)
PLATELET # BLD AUTO: 224 X10*3/UL (ref 150–450)
POTASSIUM SERPL-SCNC: 3.8 MMOL/L (ref 3.5–5.3)
RBC # BLD AUTO: 4.41 X10*6/UL (ref 4–5.2)
SODIUM SERPL-SCNC: 142 MMOL/L (ref 136–145)
WBC # BLD AUTO: 8.1 X10*3/UL (ref 4.4–11.3)

## 2025-06-14 PROCEDURE — 2500000001 HC RX 250 WO HCPCS SELF ADMINISTERED DRUGS (ALT 637 FOR MEDICARE OP): Performed by: STUDENT IN AN ORGANIZED HEALTH CARE EDUCATION/TRAINING PROGRAM

## 2025-06-14 PROCEDURE — 80069 RENAL FUNCTION PANEL: CPT | Performed by: INTERNAL MEDICINE

## 2025-06-14 PROCEDURE — 1200000002 HC GENERAL ROOM WITH TELEMETRY DAILY

## 2025-06-14 PROCEDURE — 36415 COLL VENOUS BLD VENIPUNCTURE: CPT | Performed by: INTERNAL MEDICINE

## 2025-06-14 PROCEDURE — 99232 SBSQ HOSP IP/OBS MODERATE 35: CPT | Performed by: HOSPITALIST

## 2025-06-14 PROCEDURE — 2500000005 HC RX 250 GENERAL PHARMACY W/O HCPCS: Performed by: HOSPITALIST

## 2025-06-14 PROCEDURE — 2500000002 HC RX 250 W HCPCS SELF ADMINISTERED DRUGS (ALT 637 FOR MEDICARE OP, ALT 636 FOR OP/ED): Performed by: STUDENT IN AN ORGANIZED HEALTH CARE EDUCATION/TRAINING PROGRAM

## 2025-06-14 PROCEDURE — 2500000001 HC RX 250 WO HCPCS SELF ADMINISTERED DRUGS (ALT 637 FOR MEDICARE OP): Performed by: INTERNAL MEDICINE

## 2025-06-14 PROCEDURE — 94640 AIRWAY INHALATION TREATMENT: CPT

## 2025-06-14 PROCEDURE — 2500000005 HC RX 250 GENERAL PHARMACY W/O HCPCS: Performed by: STUDENT IN AN ORGANIZED HEALTH CARE EDUCATION/TRAINING PROGRAM

## 2025-06-14 PROCEDURE — 85027 COMPLETE CBC AUTOMATED: CPT | Performed by: STUDENT IN AN ORGANIZED HEALTH CARE EDUCATION/TRAINING PROGRAM

## 2025-06-14 PROCEDURE — 2500000004 HC RX 250 GENERAL PHARMACY W/ HCPCS (ALT 636 FOR OP/ED): Performed by: INTERNAL MEDICINE

## 2025-06-14 PROCEDURE — 99232 SBSQ HOSP IP/OBS MODERATE 35: CPT

## 2025-06-14 RX ORDER — FUROSEMIDE 10 MG/ML
80 INJECTION INTRAMUSCULAR; INTRAVENOUS ONCE
Status: COMPLETED | OUTPATIENT
Start: 2025-06-14 | End: 2025-06-14

## 2025-06-14 RX ADMIN — HYDROCODONE BITARTRATE AND ACETAMINOPHEN 1 TABLET: 5; 325 TABLET ORAL at 07:26

## 2025-06-14 RX ADMIN — HYDROCODONE BITARTRATE AND ACETAMINOPHEN 1 TABLET: 5; 325 TABLET ORAL at 23:26

## 2025-06-14 RX ADMIN — GABAPENTIN 300 MG: 300 CAPSULE ORAL at 16:18

## 2025-06-14 RX ADMIN — ALLOPURINOL 300 MG: 300 TABLET ORAL at 09:02

## 2025-06-14 RX ADMIN — ACETAZOLAMIDE 250 MG: 250 TABLET ORAL at 09:02

## 2025-06-14 RX ADMIN — ACETAZOLAMIDE 250 MG: 250 TABLET ORAL at 20:47

## 2025-06-14 RX ADMIN — HYDROCODONE BITARTRATE AND ACETAMINOPHEN 1 TABLET: 5; 325 TABLET ORAL at 16:18

## 2025-06-14 RX ADMIN — ATORVASTATIN CALCIUM 40 MG: 40 TABLET, FILM COATED ORAL at 20:47

## 2025-06-14 RX ADMIN — AMOXICILLIN AND CLAVULANATE POTASSIUM 1 TABLET: 875; 125 TABLET, COATED ORAL at 20:47

## 2025-06-14 RX ADMIN — Medication: at 09:03

## 2025-06-14 RX ADMIN — Medication 3 L/MIN: at 07:48

## 2025-06-14 RX ADMIN — METOPROLOL SUCCINATE 25 MG: 25 TABLET, EXTENDED RELEASE ORAL at 09:02

## 2025-06-14 RX ADMIN — GABAPENTIN 300 MG: 300 CAPSULE ORAL at 09:02

## 2025-06-14 RX ADMIN — POTASSIUM CHLORIDE 40 MEQ: 1500 TABLET, EXTENDED RELEASE ORAL at 09:02

## 2025-06-14 RX ADMIN — GABAPENTIN 300 MG: 300 CAPSULE ORAL at 20:47

## 2025-06-14 RX ADMIN — APIXABAN 5 MG: 5 TABLET, FILM COATED ORAL at 20:47

## 2025-06-14 RX ADMIN — FLUTICASONE FUROATE AND VILANTEROL TRIFENATATE 1 PUFF: 100; 25 POWDER RESPIRATORY (INHALATION) at 07:46

## 2025-06-14 RX ADMIN — FUROSEMIDE 80 MG: 10 INJECTION, SOLUTION INTRAMUSCULAR; INTRAVENOUS at 10:36

## 2025-06-14 RX ADMIN — POTASSIUM CHLORIDE 40 MEQ: 1500 TABLET, EXTENDED RELEASE ORAL at 20:47

## 2025-06-14 RX ADMIN — APIXABAN 5 MG: 5 TABLET, FILM COATED ORAL at 09:02

## 2025-06-14 RX ADMIN — AMOXICILLIN AND CLAVULANATE POTASSIUM 1 TABLET: 875; 125 TABLET, COATED ORAL at 09:02

## 2025-06-14 RX ADMIN — BUPROPION HYDROCHLORIDE 300 MG: 300 TABLET, EXTENDED RELEASE ORAL at 07:03

## 2025-06-14 RX ADMIN — Medication 3 L/MIN: at 19:41

## 2025-06-14 ASSESSMENT — COGNITIVE AND FUNCTIONAL STATUS - GENERAL
DAILY ACTIVITIY SCORE: 17
CLIMB 3 TO 5 STEPS WITH RAILING: TOTAL
EATING MEALS: A LITTLE
MOBILITY SCORE: 18
STANDING UP FROM CHAIR USING ARMS: A LITTLE
TOILETING: A LITTLE
DRESSING REGULAR LOWER BODY CLOTHING: A LOT
MOVING TO AND FROM BED TO CHAIR: A LITTLE
STANDING UP FROM CHAIR USING ARMS: A LITTLE
DRESSING REGULAR LOWER BODY CLOTHING: A LOT
CLIMB 3 TO 5 STEPS WITH RAILING: TOTAL
HELP NEEDED FOR BATHING: A LITTLE
MOVING TO AND FROM BED TO CHAIR: A LITTLE
HELP NEEDED FOR BATHING: A LITTLE
DRESSING REGULAR UPPER BODY CLOTHING: A LITTLE
DRESSING REGULAR UPPER BODY CLOTHING: A LITTLE
EATING MEALS: A LITTLE
TOILETING: A LITTLE
WALKING IN HOSPITAL ROOM: A LITTLE
PERSONAL GROOMING: A LITTLE
DAILY ACTIVITIY SCORE: 17
PERSONAL GROOMING: A LITTLE
WALKING IN HOSPITAL ROOM: A LITTLE
MOBILITY SCORE: 18

## 2025-06-14 ASSESSMENT — PAIN DESCRIPTION - DESCRIPTORS
DESCRIPTORS: ACHING
DESCRIPTORS: ACHING;DISCOMFORT
DESCRIPTORS: ACHING;HEADACHE
DESCRIPTORS: ACHING

## 2025-06-14 ASSESSMENT — PAIN SCALES - GENERAL
PAINLEVEL_OUTOF10: 8
PAINLEVEL_OUTOF10: 9
PAINLEVEL_OUTOF10: 0 - NO PAIN
PAINLEVEL_OUTOF10: 5 - MODERATE PAIN
PAINLEVEL_OUTOF10: 0 - NO PAIN
PAINLEVEL_OUTOF10: 10 - WORST POSSIBLE PAIN
PAINLEVEL_OUTOF10: 7

## 2025-06-14 ASSESSMENT — PAIN DESCRIPTION - LOCATION
LOCATION: HEAD
LOCATION: SHOULDER
LOCATION: SHOULDER

## 2025-06-14 ASSESSMENT — PAIN - FUNCTIONAL ASSESSMENT
PAIN_FUNCTIONAL_ASSESSMENT: 0-10

## 2025-06-14 ASSESSMENT — PAIN DESCRIPTION - ORIENTATION
ORIENTATION: RIGHT
ORIENTATION: RIGHT

## 2025-06-14 NOTE — PROGRESS NOTES
Spiritual Care Visit  Spiritual Care Request    Reason for Visit:  Routine Visit: Introduction     Request Received From:       Focus of Care:  Visited With: Patient         Refer to :          Spiritual Care Assessment    Spiritual Assessment:                      Care Provided:       Sense of Community and or Yarsani Affiliation:  Evangelical   Values/Beliefs  Spiritual Requests During Hospitalization: I gave Vickie a blessing while she was sleeping.     Addressed Needs/Concerns and/or Gianfranco Through:          Outcome:        Advance Directives:         Spiritual Care Annotation    Annotation:  I gave Vickie a blessing while she was sleeping.  Uziel Ambriz

## 2025-06-14 NOTE — ASSESSMENT & PLAN NOTE
-cardiac diet, fluid restriction, strict I&Os, CHF bundle  -limited echo done but results pending  - discussed with cardio NP  -Given alkalosis she can be difficult to diurese. Consult Dr. Kaur-- discussed at length with him. He started diamox and will also dose IV lasix today again. She may need long term diamox.  -patient has chronic diastolic heart failure with frequent exacerbations. Reports she is no longer able to lay down due to her orthopnea. She would benefit from a hospital bed as she has no other way to rest at night safely. Also she has chronic MORLEY from her CHF and would benefit from wheelchair.

## 2025-06-14 NOTE — ASSESSMENT & PLAN NOTE
-2L prn at home, required 4L here but weaning down with great UOP from lasix given in ER. Now on 3L  -continue home inhalers, does not appear to be in exacerbation

## 2025-06-14 NOTE — PROGRESS NOTES
CONSULT PROGRESS NOTES    SERVICE DATE: 6/14/2025   SERVICE TIME: 9:57 AM    CONSULTING SERVICE: Nephrology    ASSESSMENT AND PLAN   69-year-old woman with atrial fibrillation, diastolic CHF, COPD admitted with fluid overload.  1.  Metabolic alkalosis  2.  Fluid overload  3.  Edema  4.  Hypokalemia     Arterial blood gases demonstrating alkalemia.  Continue acetazolamide therapy.  The etiology of her alkalosis is contraction and compensatory.  She has a chronic compensatory metabolic alkalosis from her chronic CO2 retention on top of an existing contraction alkalosis with alkalemia on the recent ABG.  I have lost my window to check urine chloride given that she has been given diuretics hitherto, which will skew the results.  Dosed her again with Lasix 80 mg IV x 1.  Continue acetazolamide 250 mg p.o. twice daily.  Agree with potassium replacement.  Trend daily labs, continue supportive care.  I will continue to follow this patient.  Case discussed with Dr. Farnsworth and cardiology.    SUBJECTIVE  INTERVAL HPI: She has improvement in her swelling.  Good urine output.  Blood pressure has been stable.  She has no chest pain, no nausea, no vomiting.    MEDICATIONS:  Scheduled Medications[1]   Continuous Medications[2]   PRN Medications[3]     OBJECTIVE  PHYSICAL EXAM:   Heart Rate:  [69-90]   Temp:  [36.5 °C (97.7 °F)-36.8 °C (98.2 °F)]   Resp:  [18-20]   BP: (112-119)/(63-81)   SpO2:  [90 %-100 %]   Body mass index is 49.13 kg/m².  This is a chronically ill and obese white woman who appears much older than her known age  No obvious skin rashes  Appropriate affect  Hearing intact  Phonation intact  Moist mucosa  Rales auscultated at the bases  Distant heart sounds  Abdomen is soft, nondistended, nontender, positive bowel sounds  No Cuellar catheter in place, no suprapubic tenderness to palpation  Fairly extensive bilateral lower extremity edema  Moves 4 limbs spontaneously  No obvious joint deformities  No  lymphadenopathy    DATA:   Labs:  Results for orders placed or performed during the hospital encounter of 06/12/25 (from the past 96 hours)   B-type natriuretic peptide   Result Value Ref Range     (H) 0 - 99 pg/mL   CBC   Result Value Ref Range    WBC 10.0 4.4 - 11.3 x10*3/uL    nRBC 0.0 0.0 - 0.0 /100 WBCs    RBC 4.18 4.00 - 5.20 x10*6/uL    Hemoglobin 12.1 12.0 - 16.0 g/dL    Hematocrit 40.0 36.0 - 46.0 %    MCV 96 80 - 100 fL    MCH 28.9 26.0 - 34.0 pg    MCHC 30.3 (L) 32.0 - 36.0 g/dL    RDW 16.2 (H) 11.5 - 14.5 %    Platelets 232 150 - 450 x10*3/uL   Comprehensive metabolic panel   Result Value Ref Range    Glucose 120 (H) 74 - 99 mg/dL    Sodium 142 136 - 145 mmol/L    Potassium 3.1 (L) 3.5 - 5.3 mmol/L    Chloride 98 98 - 107 mmol/L    Bicarbonate 40 (HH) 21 - 32 mmol/L    Anion Gap 7 (L) 10 - 20 mmol/L    Urea Nitrogen 27 (H) 6 - 23 mg/dL    Creatinine 0.60 0.50 - 1.05 mg/dL    eGFR >90 >60 mL/min/1.73m*2    Calcium 8.9 8.6 - 10.3 mg/dL    Albumin 3.8 3.4 - 5.0 g/dL    Alkaline Phosphatase 77 33 - 136 U/L    Total Protein 7.0 6.4 - 8.2 g/dL    AST 17 9 - 39 U/L    Bilirubin, Total 0.5 0.0 - 1.2 mg/dL    ALT 19 7 - 45 U/L   Protime-INR   Result Value Ref Range    Protime 11.5 9.3 - 12.7 seconds    INR 1.1 0.9 - 1.2   APTT   Result Value Ref Range    aPTT 30.2 22.0 - 32.5 seconds   Troponin I, High Sensitivity, Initial   Result Value Ref Range    Troponin I, High Sensitivity 9 0 - 13 ng/L   Blood Gas Arterial Full Panel   Result Value Ref Range    POCT pH, Arterial 7.46 (H) 7.38 - 7.42 pH    POCT pCO2, Arterial 63 (H) 38 - 42 mm Hg    POCT pO2, Arterial 77 (L) 85 - 95 mm Hg    POCT SO2, Arterial 97 94 - 100 %    POCT Oxy Hemoglobin, Arterial 95.7 94.0 - 98.0 %    POCT Hematocrit Calculated, Arterial 39.0 36.0 - 46.0 %    POCT Sodium, Arterial 140 136 - 145 mmol/L    POCT Potassium, Arterial 3.2 (L) 3.5 - 5.3 mmol/L    POCT Chloride, Arterial 101 98 - 107 mmol/L    POCT Ionized Calcium, Arterial 1.15  1.10 - 1.33 mmol/L    POCT Glucose, Arterial 106 (H) 74 - 99 mg/dL    POCT Lactate, Arterial 0.5 0.4 - 2.0 mmol/L    POCT Base Excess, Arterial 17.7 (H) -2.0 - 3.0 mmol/L    POCT HCO3 Calculated, Arterial 44.8 (H) 22.0 - 26.0 mmol/L    POCT Hemoglobin, Arterial 12.9 12.0 - 16.0 g/dL    POCT Anion Gap, Arterial -3 (L) 10 - 25 mmo/L    Patient Temperature 37.0 degrees Celsius    FiO2 36 %   ECG 12 lead   Result Value Ref Range    Ventricular Rate 80 BPM    QRS Duration 88 ms    QT Interval 372 ms    QTC Calculation(Bazett) 429 ms    R Axis 56 degrees    T Axis 100 degrees    QRS Count 13 beats    Q Onset 222 ms    T Offset 408 ms    QTC Fredericia 409 ms   Troponin, High Sensitivity, 1 Hour   Result Value Ref Range    Troponin I, High Sensitivity 10 0 - 13 ng/L   Basic metabolic panel   Result Value Ref Range    Glucose 109 (H) 74 - 99 mg/dL    Sodium 144 136 - 145 mmol/L    Potassium 3.6 3.5 - 5.3 mmol/L    Chloride 100 98 - 107 mmol/L    Bicarbonate 41 (HH) 21 - 32 mmol/L    Anion Gap 7 (L) 10 - 20 mmol/L    Urea Nitrogen 21 6 - 23 mg/dL    Creatinine 0.47 (L) 0.50 - 1.05 mg/dL    eGFR >90 >60 mL/min/1.73m*2    Calcium 8.9 8.6 - 10.3 mg/dL   CBC   Result Value Ref Range    WBC 8.1 4.4 - 11.3 x10*3/uL    nRBC 0.0 0.0 - 0.0 /100 WBCs    RBC 4.41 4.00 - 5.20 x10*6/uL    Hemoglobin 12.8 12.0 - 16.0 g/dL    Hematocrit 42.7 36.0 - 46.0 %    MCV 97 80 - 100 fL    MCH 29.0 26.0 - 34.0 pg    MCHC 30.0 (L) 32.0 - 36.0 g/dL    RDW 16.2 (H) 11.5 - 14.5 %    Platelets 224 150 - 450 x10*3/uL   Renal Function Panel   Result Value Ref Range    Glucose 86 74 - 99 mg/dL    Sodium 142 136 - 145 mmol/L    Potassium 3.8 3.5 - 5.3 mmol/L    Chloride 99 98 - 107 mmol/L    Bicarbonate 38 (H) 21 - 32 mmol/L    Anion Gap 9 (L) 10 - 20 mmol/L    Urea Nitrogen 24 (H) 6 - 23 mg/dL    Creatinine 0.61 0.50 - 1.05 mg/dL    eGFR >90 >60 mL/min/1.73m*2    Calcium 9.5 8.6 - 10.3 mg/dL    Phosphorus 4.4 2.5 - 4.9 mg/dL    Albumin 3.8 3.4 - 5.0 g/dL      *Note: Due to a large number of results and/or encounters for the requested time period, some results have not been displayed. A complete set of results can be found in Results Review.         SIGNATURE: Refugio Kaur MD PATIENT NAME: Vickie Foster   DATE: June 14, 2025 MRN: [id number]   TIME: 9:57 AM PAGER: [phone]            [1] acetaZOLAMIDE, 250 mg, oral, BID  allopurinol, 300 mg, oral, Daily  ammonium lactate, , Topical, BID  amoxicillin-clavulanate, 1 tablet, oral, q12h BRIDGETTE  apixaban, 5 mg, oral, BID  atorvastatin, 40 mg, oral, Nightly  buPROPion XL, 300 mg, oral, Daily before breakfast  fluticasone furoate-vilanteroL, 1 puff, inhalation, Daily  furosemide, 80 mg, intravenous, Once  gabapentin, 300 mg, oral, TID  metoprolol succinate XL, 25 mg, oral, Daily  oxygen, , inhalation, Continuous - Inhalation  potassium chloride CR, 40 mEq, oral, BID  [2]    [3] PRN medications: acetaminophen, HYDROcodone-acetaminophen, hydrOXYzine HCL, melatonin, midodrine, oxygen

## 2025-06-14 NOTE — NURSING NOTE
Assumed care of patient at this time, patient is resting in chair with brake in place and call light in reach denies any needs.

## 2025-06-14 NOTE — CARE PLAN
The patient's goals for the shift include Maintain safety and comfort, return to respiratory baseline    The clinical goals for the shift include improve edema

## 2025-06-14 NOTE — PROGRESS NOTES
Vickie Foster is a 69 y.o. female on day 1 of admission presenting with Acute on chronic diastolic congestive heart failure.      Subjective   Patient reports she is feeling better overall but tired today. Slept in her bedside chair. Breathing remains better and she says her thighs are less swollen and tight than before.       Objective     Last Recorded Vitals  /63 (BP Location: Right arm, Patient Position: Sitting)   Pulse 79   Temp 36.5 °C (97.7 °F) (Oral)   Resp 18   SpO2 99%   Intake/Output last 3 Shifts:    Intake/Output Summary (Last 24 hours) at 6/14/2025 1235  Last data filed at 6/14/2025 0531  Gross per 24 hour   Intake --   Output 1600 ml   Net -1600 ml       Admission Weight       Daily Weight  06/06/25 : 118 kg (260 lb)    Image Results  ECG 12 lead  Atrial fibrillation  Nonspecific T wave abnormality  Abnormal ECG  When compared with ECG of 08-DEC-2024 18:22,  QT has shortened  Confirmed by Servando Lundberg ([id number]) on 6/13/2025 12:23:47 PM      Physical Exam  General: alert, no diaphoresis   Lungs: bibasilar rales   Heart: regular, +2 to knees today, only trace in thighs now   GI: abdomen soft, nontender, nondistended, BS present   MSK: no joint effusion or deformity   Skin: no rashes, erythema, or ecchymosis   Neuro: grossly normal cognition, motor strength, sensation    Relevant Results                              Assessment & Plan  Acute on chronic diastolic congestive heart failure  -cardiac diet, fluid restriction, strict I&Os, CHF bundle  -limited echo done but results pending  - discussed with cardio NP  -Given alkalosis she can be difficult to diurese. Consult Dr. Kaur-- discussed at length with him. He started diamox and will also dose IV lasix today again. She may need long term diamox.  -patient has chronic diastolic heart failure with frequent exacerbations. Reports she is no longer able to lay down due to her orthopnea. She would benefit from a hospital bed as she has no other  way to rest at night safely. Also she has chronic MORLEY from her CHF and would benefit from wheelchair.  Chronic obstructive lung disease (Multi)  Acute on chronic respiratory failure with hypoxia and hypercapnia  -2L prn at home, required 4L here but weaning down with great UOP from lasix given in ER. Now on 3L  -continue home inhalers, does not appear to be in exacerbation  Hypokalemia  -increase home potassium supplementation   -Monitor and replete additional prn, check mag  Chronic atrial fibrillation (Multi)  -continue Eliquis, metoprolol  Dental abscess  -s/p I&D in ER 6/06  -continue Augmentin  Metabolic alkalosis  As mentioned above. Likely element of compensation from her COPD and contraction alkalosis from diuretics. Dr. Kaur starting diamox and will monitor daily labs    Functional decline  Patient requires a manual wheelchair for primary mode of mobility for ADL's in the home. Patient with recent decline in mobility and strength due to chronic diastolic heart failure with frequent exacerbations and hospitalizations. Patient is not able to ambulate safely with a walker while attempting functional tasks.   -Face to face discussion conducted regarding the need for a hospital bed - adjustable bed at discharge in the home environment. Patient with impaired functional mobility and instability due to progressive and frequent exacerbations of CHF. Requires a hospital bed to be able to complete transfers and ADL's within the home.          Joanna Farnsworth DO

## 2025-06-14 NOTE — PROGRESS NOTES
Vickie Foster is a 69 y.o. female on day 1 of admission presenting with Acute on chronic diastolic congestive heart failure.    Subjective   Patient sitting up in chair.  Denies chest pain, palpitations, pressure or tightness.  Denies worsening shortness of breath, still on 2 L nasal cannula, but states that her breathing has improved over the last 24 hours.        Objective     Physical Exam  Vitals and nursing note reviewed.   Constitutional:       General: She is not in acute distress.  HENT:      Head: Normocephalic and atraumatic.      Mouth/Throat:      Mouth: Mucous membranes are moist.      Pharynx: Oropharynx is clear.   Eyes:      Extraocular Movements: Extraocular movements intact.   Cardiovascular:      Rate and Rhythm: Normal rate. Rhythm irregular.      Heart sounds: No murmur heard.     No friction rub. No gallop.      Comments: On telemetry patient is in a rate controlled atrial flutter with heart rates ranging 70s-80s  Pulmonary:      Effort: No respiratory distress.      Breath sounds: No stridor. No wheezing, rhonchi or rales.      Comments: Patient on 2 L nasal cannula.  Bilateral upper lobes clear and slightly diminished, bilateral lower lobes completely diminished  Chest:      Chest wall: No tenderness.   Abdominal:      General: Bowel sounds are normal.      Palpations: Abdomen is soft.   Musculoskeletal:         General: Swelling present.      Cervical back: Normal range of motion and neck supple.      Right lower leg: Edema present.      Left lower leg: Edema present.      Comments: Baseline patient has lymphedema.  Nonpitting bilateral lower extremity edema, slight improvement today   Skin:     General: Skin is warm and dry.      Capillary Refill: Capillary refill takes less than 2 seconds.      Comments: Slight erythema noted on the bilateral lower extremities, improved today   Neurological:      Mental Status: She is alert and oriented to person, place, and time.   Psychiatric:          "Mood and Affect: Mood normal.         Behavior: Behavior normal.         Thought Content: Thought content normal.         Judgment: Judgment normal.         Last Recorded Vitals  Blood pressure 112/63, pulse 79, temperature 36.5 °C (97.7 °F), temperature source Oral, resp. rate 18, height 1.549 m (5' 1\"), SpO2 99%.  Intake/Output last 3 Shifts:  I/O last 3 completed shifts:  In: -   Out: 1600 [Urine:1600]    Relevant Results  Results for orders placed or performed during the hospital encounter of 06/12/25 (from the past 24 hours)   CBC   Result Value Ref Range    WBC 8.1 4.4 - 11.3 x10*3/uL    nRBC 0.0 0.0 - 0.0 /100 WBCs    RBC 4.41 4.00 - 5.20 x10*6/uL    Hemoglobin 12.8 12.0 - 16.0 g/dL    Hematocrit 42.7 36.0 - 46.0 %    MCV 97 80 - 100 fL    MCH 29.0 26.0 - 34.0 pg    MCHC 30.0 (L) 32.0 - 36.0 g/dL    RDW 16.2 (H) 11.5 - 14.5 %    Platelets 224 150 - 450 x10*3/uL   Renal Function Panel   Result Value Ref Range    Glucose 86 74 - 99 mg/dL    Sodium 142 136 - 145 mmol/L    Potassium 3.8 3.5 - 5.3 mmol/L    Chloride 99 98 - 107 mmol/L    Bicarbonate 38 (H) 21 - 32 mmol/L    Anion Gap 9 (L) 10 - 20 mmol/L    Urea Nitrogen 24 (H) 6 - 23 mg/dL    Creatinine 0.61 0.50 - 1.05 mg/dL    eGFR >90 >60 mL/min/1.73m*2    Calcium 9.5 8.6 - 10.3 mg/dL    Phosphorus 4.4 2.5 - 4.9 mg/dL    Albumin 3.8 3.4 - 5.0 g/dL     *Note: Due to a large number of results and/or encounters for the requested time period, some results have not been displayed. A complete set of results can be found in Results Review.      Assessment & Plan  Acute on chronic diastolic congestive heart failure    Chronic obstructive lung disease (Multi)    Hypokalemia    Acute on chronic respiratory failure with hypoxia and hypercapnia    Metabolic alkalosis    Chronic atrial fibrillation (Multi)    Dental abscess    Shortness of breath  Fluid overload  Acute on chronic diastolic heart failure  Alkalosis  Persistent longstanding atrial fibrillation-on " apixaban  Hypertension  Hyperlipidemia  COPD-home O2 at night  Obesity  Lymphedema     6/13 As above, presenting with leg swelling and shortness of breath.  Patient states that she has been having worsening shortness of breath over the past few days.  She wears home O2 only at night, however she has been wearing it more often throughout the day.  She states that she cannot lay flat.  Patient states that she has noticed worsening lower leg edema over the past month, even after being compliant with her furosemide.  Patient denies chest pain, palpitations, pressure, or tightness.  Patient was short of breath on arrival to the ED, and had severe conversational dyspnea only able to speak 2-3 word sentences.  Patient was previously being seen by cardiologist Dr. Sanchez would take metolazone as needed when her legs began to swell.  She reports not taking it since his FDC.   Echocardiogram completed in December 2024, EF 60 to 65%, Grade I (impaired relaxation pattern) of left ventricular diastolic filling with normal left atrial filling pressure, normal right ventricular global systolic function, severely dilated left atrium, moderate mitral annular dilation, moderate tricuspid regurgitation, aortic valve sclerosis  I spent 60 minutes in the professional and overall care of this patient.              Vitals this morning show a heart rate of 86, blood pressure 108/73, satting 95% on 4L nasal cannula.  On telemetry patient is in a rate controlled atrial flutter with heart rates in the 80s-90s.  Patient appears to be fluid overloaded, diminished lung sounds throughout all lobes and +2 bilateral lower extremity edema.  The patient does have a history of lymphedema, baseline has lower extremity swelling. In the ED, patient received a one-time dose of IV furosemide 80 mg, no I/O recorded. The admitting team ordered IV furosemide 80 mg to be given daily, however with the patient's increased bicarb, that order was  discontinued and admitting team has consulted nephrology for the patient's alkalosis.  Will appreciate their input on diuresis.  A limited echocardiogram was ordered by the admitting team and will be completed sometime today. Continue home medications of apixaban 5 mg twice daily, atorvastatin 40 mg daily, and metoprolol succinate 25 mg daily.  We will continue to follow along with you.    6/14: Patient sitting up in chair.  Denies chest pain, palpitations, pressure or tightness.  Denies worsening shortness of breath, still on 2 L nasal cannula, but states that her breathing has improved over the last 24 hours. Patient had an echocardiogram completed yesterday, results are still pending. Vitals this morning show a heart rate of 79, blood pressure 112/63, satting 92% on 3 L nasal cannula.  On telemetry patient is in a rate controlled atrial flutter with heart rates ranging 70s-80s. Labs this morning show a potassium of 3.8, slightly improved bicarb of 38, BUN 24, creatinine 0.61, hemoglobin/hematocrit 12.8/42.7. Nephrology is consulted for the patient's alkalosis, and are managing the diuretics. A one time dose of IV 80 mg furosemide was given to the patient yesterday and initiation of Diamox 250 mg twice daily. I/O show a net loss of -1.6 L. Patient appears slightly better this morning, conversational dyspnea has improved, leg swelling has slightly improved (hx of lymphedema), patient is still diminished on auscultation of her lungs.  Dr. Kaur from nephrology was present on my assessment and stated wanting to continue Diamox 250 mg twice daily and will order another dose of IV 80mg furosemide.  Please monitor strict I/Os. Continue home medications of apixaban 5 mg twice daily, atorvastatin 40 mg daily, and metoprolol succinate 25 mg daily. We will continue to follow along, anticipate signing off possibly in the next 1-2 days       I spent 35 minutes in the professional and overall care of this  patient.      Cira Quiroga, APRN-CNP

## 2025-06-15 VITALS
TEMPERATURE: 98.4 F | RESPIRATION RATE: 17 BRPM | OXYGEN SATURATION: 98 % | BODY MASS INDEX: 50.37 KG/M2 | WEIGHT: 266.8 LBS | HEIGHT: 61 IN | SYSTOLIC BLOOD PRESSURE: 96 MMHG | HEART RATE: 85 BPM | DIASTOLIC BLOOD PRESSURE: 66 MMHG

## 2025-06-15 LAB
ALBUMIN SERPL BCP-MCNC: 3.8 G/DL (ref 3.4–5)
ANION GAP SERPL CALCULATED.3IONS-SCNC: 11 MMOL/L (ref 10–20)
BUN SERPL-MCNC: 22 MG/DL (ref 6–23)
CALCIUM SERPL-MCNC: 9.5 MG/DL (ref 8.6–10.3)
CHLORIDE SERPL-SCNC: 100 MMOL/L (ref 98–107)
CO2 SERPL-SCNC: 34 MMOL/L (ref 21–32)
CREAT SERPL-MCNC: 0.6 MG/DL (ref 0.5–1.05)
EGFRCR SERPLBLD CKD-EPI 2021: >90 ML/MIN/1.73M*2
EJECTION FRACTION APICAL 4 CHAMBER: 62.3
EJECTION FRACTION: 63 %
ERYTHROCYTE [DISTWIDTH] IN BLOOD BY AUTOMATED COUNT: 15.9 % (ref 11.5–14.5)
GLUCOSE SERPL-MCNC: 97 MG/DL (ref 74–99)
HCT VFR BLD AUTO: 42.3 % (ref 36–46)
HGB BLD-MCNC: 12.3 G/DL (ref 12–16)
LEFT VENTRICLE INTERNAL DIMENSION DIASTOLE: 4.65 CM (ref 3.5–6)
MCH RBC QN AUTO: 28.5 PG (ref 26–34)
MCHC RBC AUTO-ENTMCNC: 29.1 G/DL (ref 32–36)
MCV RBC AUTO: 98 FL (ref 80–100)
NRBC BLD-RTO: 0 /100 WBCS (ref 0–0)
PHOSPHATE SERPL-MCNC: 3.7 MG/DL (ref 2.5–4.9)
PLATELET # BLD AUTO: 223 X10*3/UL (ref 150–450)
POTASSIUM SERPL-SCNC: 3.9 MMOL/L (ref 3.5–5.3)
RBC # BLD AUTO: 4.31 X10*6/UL (ref 4–5.2)
RIGHT VENTRICLE PEAK SYSTOLIC PRESSURE: 42 MMHG
SODIUM SERPL-SCNC: 141 MMOL/L (ref 136–145)
WBC # BLD AUTO: 8.7 X10*3/UL (ref 4.4–11.3)

## 2025-06-15 PROCEDURE — 36415 COLL VENOUS BLD VENIPUNCTURE: CPT | Performed by: STUDENT IN AN ORGANIZED HEALTH CARE EDUCATION/TRAINING PROGRAM

## 2025-06-15 PROCEDURE — 2500000004 HC RX 250 GENERAL PHARMACY W/ HCPCS (ALT 636 FOR OP/ED): Performed by: INTERNAL MEDICINE

## 2025-06-15 PROCEDURE — 2500000005 HC RX 250 GENERAL PHARMACY W/O HCPCS: Performed by: HOSPITALIST

## 2025-06-15 PROCEDURE — 1200000002 HC GENERAL ROOM WITH TELEMETRY DAILY

## 2025-06-15 PROCEDURE — 2500000002 HC RX 250 W HCPCS SELF ADMINISTERED DRUGS (ALT 637 FOR MEDICARE OP, ALT 636 FOR OP/ED): Performed by: STUDENT IN AN ORGANIZED HEALTH CARE EDUCATION/TRAINING PROGRAM

## 2025-06-15 PROCEDURE — 99232 SBSQ HOSP IP/OBS MODERATE 35: CPT | Performed by: HOSPITALIST

## 2025-06-15 PROCEDURE — 85027 COMPLETE CBC AUTOMATED: CPT | Performed by: STUDENT IN AN ORGANIZED HEALTH CARE EDUCATION/TRAINING PROGRAM

## 2025-06-15 PROCEDURE — 80069 RENAL FUNCTION PANEL: CPT | Performed by: INTERNAL MEDICINE

## 2025-06-15 PROCEDURE — 2500000001 HC RX 250 WO HCPCS SELF ADMINISTERED DRUGS (ALT 637 FOR MEDICARE OP): Performed by: STUDENT IN AN ORGANIZED HEALTH CARE EDUCATION/TRAINING PROGRAM

## 2025-06-15 PROCEDURE — 94640 AIRWAY INHALATION TREATMENT: CPT

## 2025-06-15 PROCEDURE — 99232 SBSQ HOSP IP/OBS MODERATE 35: CPT | Performed by: PHYSICIAN ASSISTANT

## 2025-06-15 PROCEDURE — 2500000001 HC RX 250 WO HCPCS SELF ADMINISTERED DRUGS (ALT 637 FOR MEDICARE OP): Performed by: INTERNAL MEDICINE

## 2025-06-15 RX ORDER — FUROSEMIDE 10 MG/ML
80 INJECTION INTRAMUSCULAR; INTRAVENOUS ONCE
Status: COMPLETED | OUTPATIENT
Start: 2025-06-15 | End: 2025-06-15

## 2025-06-15 RX ADMIN — AMOXICILLIN AND CLAVULANATE POTASSIUM 1 TABLET: 875; 125 TABLET, COATED ORAL at 21:20

## 2025-06-15 RX ADMIN — GABAPENTIN 300 MG: 300 CAPSULE ORAL at 21:20

## 2025-06-15 RX ADMIN — ACETAMINOPHEN 650 MG: 325 TABLET ORAL at 06:54

## 2025-06-15 RX ADMIN — ATORVASTATIN CALCIUM 40 MG: 40 TABLET, FILM COATED ORAL at 21:20

## 2025-06-15 RX ADMIN — Medication: at 21:21

## 2025-06-15 RX ADMIN — POTASSIUM CHLORIDE 40 MEQ: 1500 TABLET, EXTENDED RELEASE ORAL at 09:16

## 2025-06-15 RX ADMIN — ALLOPURINOL 300 MG: 300 TABLET ORAL at 09:15

## 2025-06-15 RX ADMIN — Medication 5 MG: at 21:20

## 2025-06-15 RX ADMIN — APIXABAN 5 MG: 5 TABLET, FILM COATED ORAL at 21:20

## 2025-06-15 RX ADMIN — GABAPENTIN 300 MG: 300 CAPSULE ORAL at 15:25

## 2025-06-15 RX ADMIN — METOPROLOL SUCCINATE 25 MG: 25 TABLET, EXTENDED RELEASE ORAL at 09:15

## 2025-06-15 RX ADMIN — AMOXICILLIN AND CLAVULANATE POTASSIUM 1 TABLET: 875; 125 TABLET, COATED ORAL at 09:15

## 2025-06-15 RX ADMIN — FLUTICASONE FUROATE AND VILANTEROL TRIFENATATE 1 PUFF: 100; 25 POWDER RESPIRATORY (INHALATION) at 07:29

## 2025-06-15 RX ADMIN — Medication: at 09:16

## 2025-06-15 RX ADMIN — GABAPENTIN 300 MG: 300 CAPSULE ORAL at 09:16

## 2025-06-15 RX ADMIN — FUROSEMIDE 80 MG: 10 INJECTION, SOLUTION INTRAMUSCULAR; INTRAVENOUS at 13:04

## 2025-06-15 RX ADMIN — ACETAZOLAMIDE 250 MG: 250 TABLET ORAL at 21:19

## 2025-06-15 RX ADMIN — POTASSIUM CHLORIDE 40 MEQ: 1500 TABLET, EXTENDED RELEASE ORAL at 21:19

## 2025-06-15 RX ADMIN — Medication 3 L/MIN: at 07:31

## 2025-06-15 RX ADMIN — APIXABAN 5 MG: 5 TABLET, FILM COATED ORAL at 09:16

## 2025-06-15 RX ADMIN — HYDROCODONE BITARTRATE AND ACETAMINOPHEN 1 TABLET: 5; 325 TABLET ORAL at 15:25

## 2025-06-15 RX ADMIN — BUPROPION HYDROCHLORIDE 300 MG: 300 TABLET, EXTENDED RELEASE ORAL at 05:41

## 2025-06-15 RX ADMIN — HYDROCODONE BITARTRATE AND ACETAMINOPHEN 1 TABLET: 5; 325 TABLET ORAL at 21:19

## 2025-06-15 RX ADMIN — ACETAZOLAMIDE 250 MG: 250 TABLET ORAL at 09:16

## 2025-06-15 ASSESSMENT — COGNITIVE AND FUNCTIONAL STATUS - GENERAL
DRESSING REGULAR LOWER BODY CLOTHING: A LOT
DRESSING REGULAR LOWER BODY CLOTHING: A LOT
MOVING TO AND FROM BED TO CHAIR: A LITTLE
CLIMB 3 TO 5 STEPS WITH RAILING: TOTAL
PERSONAL GROOMING: A LITTLE
HELP NEEDED FOR BATHING: A LITTLE
WALKING IN HOSPITAL ROOM: A LITTLE
EATING MEALS: A LITTLE
DAILY ACTIVITIY SCORE: 17
HELP NEEDED FOR BATHING: A LITTLE
DRESSING REGULAR UPPER BODY CLOTHING: A LITTLE
MOBILITY SCORE: 17
DAILY ACTIVITIY SCORE: 18
PERSONAL GROOMING: A LITTLE
TOILETING: A LITTLE
MOBILITY SCORE: 18
STANDING UP FROM CHAIR USING ARMS: A LITTLE
TOILETING: A LITTLE
WALKING IN HOSPITAL ROOM: A LITTLE
STANDING UP FROM CHAIR USING ARMS: A LITTLE
MOVING TO AND FROM BED TO CHAIR: A LITTLE
CLIMB 3 TO 5 STEPS WITH RAILING: TOTAL
TURNING FROM BACK TO SIDE WHILE IN FLAT BAD: A LITTLE
DRESSING REGULAR UPPER BODY CLOTHING: A LITTLE

## 2025-06-15 ASSESSMENT — PAIN SCALES - GENERAL
PAINLEVEL_OUTOF10: 0 - NO PAIN
PAINLEVEL_OUTOF10: 3
PAINLEVEL_OUTOF10: 0 - NO PAIN
PAINLEVEL_OUTOF10: 7

## 2025-06-15 ASSESSMENT — PAIN DESCRIPTION - DESCRIPTORS
DESCRIPTORS: ACHING
DESCRIPTORS: ACHING

## 2025-06-15 ASSESSMENT — PAIN DESCRIPTION - LOCATION
LOCATION: SHOULDER
LOCATION: HEAD

## 2025-06-15 ASSESSMENT — PAIN - FUNCTIONAL ASSESSMENT
PAIN_FUNCTIONAL_ASSESSMENT: 0-10
PAIN_FUNCTIONAL_ASSESSMENT: FLACC (FACE, LEGS, ACTIVITY, CRY, CONSOLABILITY)

## 2025-06-15 ASSESSMENT — PAIN DESCRIPTION - ORIENTATION: ORIENTATION: RIGHT

## 2025-06-15 NOTE — CARE PLAN
The patient's goals for the shift include Maintain safety and comfort, return to respiratory baseline    The clinical goals for the shift include Improve respiratory status and decrease edema

## 2025-06-15 NOTE — CARE PLAN
The patient's goals for the shift include Maintain safety and comfort, return to respiratory baseline    The clinical goals for the shift include Patient will have decreased edema.

## 2025-06-15 NOTE — PROGRESS NOTES
"Vickie Foster is a 69 y.o. female on day 2 of admission presenting with Acute on chronic diastolic congestive heart failure.    Subjective   Alert and oriented, sitting up in a chair.  Patient states she feels about the same as yesterday.  Patient denies chest pain, palpitations, abdominal pain, nausea, or dizziness.       Objective     Physical Exam    Appearance: Alert, oriented, cooperative, in no acute distress.     Eyes: Conjunctiva pink with no redness or exudates. Eyelids without lesions. No scleral icterus.     ENT: Hearing grossly intact. External inspection of ears without lesions or erythema. no nasal flaring. no tripoding, no drooling, no difficulty swallowing oral secretions.    Pulmonary: Diminished breath sounds bilaterally in all lobes. No rales, rhonchi or wheezing. No accessory muscle use or stridor.  Very mild conversational dyspnea    Cardiac: Irregular rhythm, normal rate no rub, gallop. No JVD.    Musculoskeletal: No cyanosis, clubbing.  Bilateral +2-3 pitting lower extremity edema with overlying erythema, no weeping or sores.  Capillary refill less than 2 seconds in lower extremities    Neurological: no focal findings identified.    Psychiatric: Appropriate mood and affect.    Last Recorded Vitals  Blood pressure 122/80, pulse 83, temperature 36.4 °C (97.5 °F), temperature source Oral, resp. rate 18, height 1.549 m (5' 1\"), SpO2 97%.  Intake/Output last 3 Shifts:  I/O last 3 completed shifts:  In: -   Out: 2200 [Urine:2200]    Relevant Results  Results for orders placed or performed during the hospital encounter of 06/12/25 (from the past 24 hours)   CBC   Result Value Ref Range    WBC 8.7 4.4 - 11.3 x10*3/uL    nRBC 0.0 0.0 - 0.0 /100 WBCs    RBC 4.31 4.00 - 5.20 x10*6/uL    Hemoglobin 12.3 12.0 - 16.0 g/dL    Hematocrit 42.3 36.0 - 46.0 %    MCV 98 80 - 100 fL    MCH 28.5 26.0 - 34.0 pg    MCHC 29.1 (L) 32.0 - 36.0 g/dL    RDW 15.9 (H) 11.5 - 14.5 %    Platelets 223 150 - 450 x10*3/uL "     *Note: Due to a large number of results and/or encounters for the requested time period, some results have not been displayed. A complete set of results can be found in Results Review.     Scheduled medications  Scheduled Medications[1]  Continuous medications  Continuous Medications[2]  PRN medications  PRN Medications[3]       Assessment & Plan  Acute on chronic diastolic congestive heart failure    Chronic obstructive lung disease (Multi)    Hypokalemia    Acute on chronic respiratory failure with hypoxia and hypercapnia    Metabolic alkalosis    Chronic atrial fibrillation (Multi)    Dental abscess    Shortness of breath-supplemental O2, monitor I's and O's, additional management per admitting  Fluid overload-diuresis management per nephrology  Acute on chronic diastolic heart failure-diuresis, limited echocardiogram results pending  Alkalosis-management per nephrology  Persistent longstanding atrial fibrillation-on apixaban, metoprolol tartrate for rate control  Hypertension-controlled with metoprolol, diuretic  Hyperlipidemia-continue with atorvastatin  COPD-home O2 at night, management per admitting  Obesity-diet counseling  Lymphedema-see fluid overload     6/13 As above, presenting with leg swelling and shortness of breath.  Patient states that she has been having worsening shortness of breath over the past few days.  She wears home O2 only at night, however she has been wearing it more often throughout the day.  She states that she cannot lay flat.  Patient states that she has noticed worsening lower leg edema over the past month, even after being compliant with her furosemide.  Patient denies chest pain, palpitations, pressure, or tightness.  Patient was short of breath on arrival to the ED, and had severe conversational dyspnea only able to speak 2-3 word sentences.  Patient was previously being seen by cardiologist Dr. Sanchez would take metolazone as needed when her legs began to swell.  She reports  not taking it since his jail.   Echocardiogram completed in December 2024, EF 60 to 65%, Grade I (impaired relaxation pattern) of left ventricular diastolic filling with normal left atrial filling pressure, normal right ventricular global systolic function, severely dilated left atrium, moderate mitral annular dilation, moderate tricuspid regurgitation, aortic valve sclerosis  I spent 60 minutes in the professional and overall care of this patient.              Vitals this morning show a heart rate of 86, blood pressure 108/73, satting 95% on 4L nasal cannula.  On telemetry patient is in a rate controlled atrial flutter with heart rates in the 80s-90s.  Patient appears to be fluid overloaded, diminished lung sounds throughout all lobes and +2 bilateral lower extremity edema.  The patient does have a history of lymphedema, baseline has lower extremity swelling. In the ED, patient received a one-time dose of IV furosemide 80 mg, no I/O recorded. The admitting team ordered IV furosemide 80 mg to be given daily, however with the patient's increased bicarb, that order was discontinued and admitting team has consulted nephrology for the patient's alkalosis.  Will appreciate their input on diuresis.  A limited echocardiogram was ordered by the admitting team and will be completed sometime today. Continue home medications of apixaban 5 mg twice daily, atorvastatin 40 mg daily, and metoprolol succinate 25 mg daily.  We will continue to follow along with you.    Attestation signed by Phi Perez DO at 6/13/2025  2:19 PM     This is a shared visit. I have reviewed the Advanced Practice Provider's encounter note, approve the Advanced Practice Provider's documentation, and provide the following additional information from my personal encounter.      I agree with the assessment and plan I examined the patient.  Her chronic leg edema is related to impaired left ventricular diastolic relaxation and chronic lymphedema  along with renal insufficiency.  She has nephrology consultation now managing diuretic therapy.  Her atrial flutter is persistent and rate controlled.  She is on chronic Eliquis for cardioembolic risk reduction.  No other active cardiac issues    6/15: Labs reviewed this morning showing improving bicarb, 34, sodium 141, potassium 3.9, creatinine 0.6 hemoglobin 12.3, hematocrit 42.3, I/O's show net loss of 1.6 L yesterday.  Normotensive blood pressure this morning 102/68, heart rate 62, 97% oxygen on 3 L.  Appreciate recommendations from nephrology, patient received 80 mg of IV Lasix yesterday and continued on acetazolamide. On telemetry patient remains in a rate controlled atrial flutter with rates predominantly in the 60s-70's.  Limited echocardiogram results pending.  We will continue to follow this patient with you.      I spent 35 minutes in the professional and overall care of this patient.      Colleen Rincon PA-C         [1] acetaZOLAMIDE, 250 mg, oral, BID  allopurinol, 300 mg, oral, Daily  ammonium lactate, , Topical, BID  amoxicillin-clavulanate, 1 tablet, oral, q12h BRIDGETTE  apixaban, 5 mg, oral, BID  atorvastatin, 40 mg, oral, Nightly  buPROPion XL, 300 mg, oral, Daily before breakfast  fluticasone furoate-vilanteroL, 1 puff, inhalation, Daily  gabapentin, 300 mg, oral, TID  metoprolol succinate XL, 25 mg, oral, Daily  oxygen, , inhalation, Continuous - Inhalation  potassium chloride CR, 40 mEq, oral, BID  [2]    [3] PRN medications: acetaminophen, HYDROcodone-acetaminophen, hydrOXYzine HCL, melatonin, midodrine, oxygen

## 2025-06-15 NOTE — PROGRESS NOTES
CONSULT PROGRESS NOTES    SERVICE DATE: 6/15/2025   SERVICE TIME: 10:16 AM    CONSULTING SERVICE: Nephrology    ASSESSMENT AND PLAN   69-year-old woman with atrial fibrillation, diastolic CHF, COPD admitted with fluid overload.  1.  Metabolic alkalosis  2.  Fluid overload  3.  Edema  4.  Hypokalemia     Continue acetazolamide therapy.  The etiology of her alkalosis is contraction and compensatory.  She has a chronic compensatory metabolic alkalosis from her chronic CO2 retention on top of an existing contraction alkalosis with alkalemia on the recent ABG.  Today I will dose her again with Lasix 80 mg IV x 1.  Continue acetazolamide 250 mg p.o. twice daily.  Agree with potassium replacement.  Trend daily labs, continue supportive care.  I will continue to follow this patient.  Case discussed with Dr. Farnsworth.  Probably will plan on conversion to oral diuretics tomorrow.    SUBJECTIVE  INTERVAL HPI: She believes she has improvement in her swelling.  1.6 L urine output recorded.  She is not really elevating her legs.  Said the room was very cold last night.  She has no vomiting, no diarrhea.    MEDICATIONS:  Scheduled Medications[1]   Continuous Medications[2]   PRN Medications[3]     OBJECTIVE  PHYSICAL EXAM:   Heart Rate:  [62-83]   Temp:  [36.4 °C (97.5 °F)-36.8 °C (98.2 °F)]   Resp:  [16-18]   BP: (102-122)/(68-96)   Weight:  [121 kg (266 lb 12.8 oz)]   SpO2:  [92 %-98 %]   Body mass index is 50.41 kg/m².  This is a chronically ill and obese white woman who appears much older than her known age  No obvious skin rashes  Appropriate affect  Hearing intact  Phonation intact  Moist mucosa  Rales auscultated at the bases  Distant heart sounds  Abdomen is soft, nondistended, nontender, positive bowel sounds  No Cuellar catheter in place, no suprapubic tenderness to palpation  Fairly extensive bilateral lower extremity edema  Moves 4 limbs spontaneously  No obvious joint deformities  No lymphadenopathy    DATA:    Labs:  Results for orders placed or performed during the hospital encounter of 06/12/25 (from the past 96 hours)   B-type natriuretic peptide   Result Value Ref Range     (H) 0 - 99 pg/mL   CBC   Result Value Ref Range    WBC 10.0 4.4 - 11.3 x10*3/uL    nRBC 0.0 0.0 - 0.0 /100 WBCs    RBC 4.18 4.00 - 5.20 x10*6/uL    Hemoglobin 12.1 12.0 - 16.0 g/dL    Hematocrit 40.0 36.0 - 46.0 %    MCV 96 80 - 100 fL    MCH 28.9 26.0 - 34.0 pg    MCHC 30.3 (L) 32.0 - 36.0 g/dL    RDW 16.2 (H) 11.5 - 14.5 %    Platelets 232 150 - 450 x10*3/uL   Comprehensive metabolic panel   Result Value Ref Range    Glucose 120 (H) 74 - 99 mg/dL    Sodium 142 136 - 145 mmol/L    Potassium 3.1 (L) 3.5 - 5.3 mmol/L    Chloride 98 98 - 107 mmol/L    Bicarbonate 40 (HH) 21 - 32 mmol/L    Anion Gap 7 (L) 10 - 20 mmol/L    Urea Nitrogen 27 (H) 6 - 23 mg/dL    Creatinine 0.60 0.50 - 1.05 mg/dL    eGFR >90 >60 mL/min/1.73m*2    Calcium 8.9 8.6 - 10.3 mg/dL    Albumin 3.8 3.4 - 5.0 g/dL    Alkaline Phosphatase 77 33 - 136 U/L    Total Protein 7.0 6.4 - 8.2 g/dL    AST 17 9 - 39 U/L    Bilirubin, Total 0.5 0.0 - 1.2 mg/dL    ALT 19 7 - 45 U/L   Protime-INR   Result Value Ref Range    Protime 11.5 9.3 - 12.7 seconds    INR 1.1 0.9 - 1.2   APTT   Result Value Ref Range    aPTT 30.2 22.0 - 32.5 seconds   Troponin I, High Sensitivity, Initial   Result Value Ref Range    Troponin I, High Sensitivity 9 0 - 13 ng/L   Blood Gas Arterial Full Panel   Result Value Ref Range    POCT pH, Arterial 7.46 (H) 7.38 - 7.42 pH    POCT pCO2, Arterial 63 (H) 38 - 42 mm Hg    POCT pO2, Arterial 77 (L) 85 - 95 mm Hg    POCT SO2, Arterial 97 94 - 100 %    POCT Oxy Hemoglobin, Arterial 95.7 94.0 - 98.0 %    POCT Hematocrit Calculated, Arterial 39.0 36.0 - 46.0 %    POCT Sodium, Arterial 140 136 - 145 mmol/L    POCT Potassium, Arterial 3.2 (L) 3.5 - 5.3 mmol/L    POCT Chloride, Arterial 101 98 - 107 mmol/L    POCT Ionized Calcium, Arterial 1.15 1.10 - 1.33 mmol/L    POCT  Glucose, Arterial 106 (H) 74 - 99 mg/dL    POCT Lactate, Arterial 0.5 0.4 - 2.0 mmol/L    POCT Base Excess, Arterial 17.7 (H) -2.0 - 3.0 mmol/L    POCT HCO3 Calculated, Arterial 44.8 (H) 22.0 - 26.0 mmol/L    POCT Hemoglobin, Arterial 12.9 12.0 - 16.0 g/dL    POCT Anion Gap, Arterial -3 (L) 10 - 25 mmo/L    Patient Temperature 37.0 degrees Celsius    FiO2 36 %   ECG 12 lead   Result Value Ref Range    Ventricular Rate 80 BPM    QRS Duration 88 ms    QT Interval 372 ms    QTC Calculation(Bazett) 429 ms    R Axis 56 degrees    T Axis 100 degrees    QRS Count 13 beats    Q Onset 222 ms    T Offset 408 ms    QTC Fredericia 409 ms   Troponin, High Sensitivity, 1 Hour   Result Value Ref Range    Troponin I, High Sensitivity 10 0 - 13 ng/L   Basic metabolic panel   Result Value Ref Range    Glucose 109 (H) 74 - 99 mg/dL    Sodium 144 136 - 145 mmol/L    Potassium 3.6 3.5 - 5.3 mmol/L    Chloride 100 98 - 107 mmol/L    Bicarbonate 41 (HH) 21 - 32 mmol/L    Anion Gap 7 (L) 10 - 20 mmol/L    Urea Nitrogen 21 6 - 23 mg/dL    Creatinine 0.47 (L) 0.50 - 1.05 mg/dL    eGFR >90 >60 mL/min/1.73m*2    Calcium 8.9 8.6 - 10.3 mg/dL   CBC   Result Value Ref Range    WBC 8.1 4.4 - 11.3 x10*3/uL    nRBC 0.0 0.0 - 0.0 /100 WBCs    RBC 4.41 4.00 - 5.20 x10*6/uL    Hemoglobin 12.8 12.0 - 16.0 g/dL    Hematocrit 42.7 36.0 - 46.0 %    MCV 97 80 - 100 fL    MCH 29.0 26.0 - 34.0 pg    MCHC 30.0 (L) 32.0 - 36.0 g/dL    RDW 16.2 (H) 11.5 - 14.5 %    Platelets 224 150 - 450 x10*3/uL   Renal Function Panel   Result Value Ref Range    Glucose 86 74 - 99 mg/dL    Sodium 142 136 - 145 mmol/L    Potassium 3.8 3.5 - 5.3 mmol/L    Chloride 99 98 - 107 mmol/L    Bicarbonate 38 (H) 21 - 32 mmol/L    Anion Gap 9 (L) 10 - 20 mmol/L    Urea Nitrogen 24 (H) 6 - 23 mg/dL    Creatinine 0.61 0.50 - 1.05 mg/dL    eGFR >90 >60 mL/min/1.73m*2    Calcium 9.5 8.6 - 10.3 mg/dL    Phosphorus 4.4 2.5 - 4.9 mg/dL    Albumin 3.8 3.4 - 5.0 g/dL   CBC   Result Value Ref  Range    WBC 8.7 4.4 - 11.3 x10*3/uL    nRBC 0.0 0.0 - 0.0 /100 WBCs    RBC 4.31 4.00 - 5.20 x10*6/uL    Hemoglobin 12.3 12.0 - 16.0 g/dL    Hematocrit 42.3 36.0 - 46.0 %    MCV 98 80 - 100 fL    MCH 28.5 26.0 - 34.0 pg    MCHC 29.1 (L) 32.0 - 36.0 g/dL    RDW 15.9 (H) 11.5 - 14.5 %    Platelets 223 150 - 450 x10*3/uL   Renal Function Panel   Result Value Ref Range    Glucose 97 74 - 99 mg/dL    Sodium 141 136 - 145 mmol/L    Potassium 3.9 3.5 - 5.3 mmol/L    Chloride 100 98 - 107 mmol/L    Bicarbonate 34 (H) 21 - 32 mmol/L    Anion Gap 11 10 - 20 mmol/L    Urea Nitrogen 22 6 - 23 mg/dL    Creatinine 0.60 0.50 - 1.05 mg/dL    eGFR >90 >60 mL/min/1.73m*2    Calcium 9.5 8.6 - 10.3 mg/dL    Phosphorus 3.7 2.5 - 4.9 mg/dL    Albumin 3.8 3.4 - 5.0 g/dL     *Note: Due to a large number of results and/or encounters for the requested time period, some results have not been displayed. A complete set of results can be found in Results Review.         SIGNATURE: Refugio Kaur MD PATIENT NAME: Vickie Foster   DATE: Julia 15, 2025 MRN: [id number]   TIME: 10:16 AM PAGER: [phone]            [1] acetaZOLAMIDE, 250 mg, oral, BID  allopurinol, 300 mg, oral, Daily  ammonium lactate, , Topical, BID  amoxicillin-clavulanate, 1 tablet, oral, q12h BRIDGETTE  apixaban, 5 mg, oral, BID  atorvastatin, 40 mg, oral, Nightly  buPROPion XL, 300 mg, oral, Daily before breakfast  fluticasone furoate-vilanteroL, 1 puff, inhalation, Daily  furosemide, 80 mg, intravenous, Once  gabapentin, 300 mg, oral, TID  metoprolol succinate XL, 25 mg, oral, Daily  oxygen, , inhalation, Continuous - Inhalation  potassium chloride CR, 40 mEq, oral, BID     [2]    [3] PRN medications: acetaminophen, HYDROcodone-acetaminophen, hydrOXYzine HCL, melatonin, midodrine, oxygen

## 2025-06-15 NOTE — ASSESSMENT & PLAN NOTE
-cardiac diet, fluid restriction, strict I&Os, CHF bundle  -limited echo done but results pending  - discussed with cardio NP and Dr. Kaur  -Given alkalosis she can be difficult to diurese. Consult Dr. Kaur-- discussed at length with him. He started diamox and has her diuresing with IV lasix as well. She may need long term diamox.  -patient has chronic diastolic heart failure with frequent exacerbations. Reports she is no longer able to lay down due to her orthopnea. She would benefit from a hospital bed as she has no other way to rest at night safely. Also she has chronic MORLEY from her CHF and would benefit from wheelchair.

## 2025-06-15 NOTE — PROGRESS NOTES
..DC instructions reviewed with parent. Parent verbalized understanding. All questions answered. Pt left the unit in stable condition; VSS, awake and alert, breathing non-labored, skin pink/warm/dry/intact. Tylenol dosing guide provided  Angie  used    Vickie Foster is a 69 y.o. female on day 2 of admission presenting with Acute on chronic diastolic congestive heart failure.      Subjective   Patient reports that she is feeling well today. Denies any pain or SOB. Mostly sitting in chair, though slept in bed for a couple hours tomorrow.        Objective     Last Recorded Vitals  /68 (BP Location: Right arm, Patient Position: Sitting)   Pulse 62   Temp 36.5 °C (97.7 °F) (Oral)   Resp 16   Wt 121 kg (266 lb 12.8 oz)   SpO2 92%   Intake/Output last 3 Shifts:    Intake/Output Summary (Last 24 hours) at 6/15/2025 0936  Last data filed at 6/15/2025 0615  Gross per 24 hour   Intake --   Output 1600 ml   Net -1600 ml       Admission Weight  Weight: 121 kg (266 lb 12.8 oz) (06/15/25 0900)    Daily Weight  06/15/25 : 121 kg (266 lb 12.8 oz)    Image Results  ECG 12 lead  Atrial fibrillation  Nonspecific T wave abnormality  Abnormal ECG  When compared with ECG of 08-DEC-2024 18:22,  QT has shortened  Confirmed by Servando Lundberg ([id number]) on 6/13/2025 12:23:47 PM      Physical Exam  General: alert, no diaphoresis   Lungs: bibasilar rales   Heart: regular, +2 to knees today, only trace in thighs now   GI: abdomen soft, nontender, nondistended, BS present   MSK: no joint effusion or deformity   Skin: no rashes, erythema, or ecchymosis   Neuro: grossly normal cognition, motor strength, sensation    Relevant Results                              Assessment & Plan  Acute on chronic diastolic congestive heart failure  -cardiac diet, fluid restriction, strict I&Os, CHF bundle  -limited echo done but results pending  - discussed with cardio NP and Dr. Kaur  -Given alkalosis she can be difficult to diurese. Consult Dr. Kaur-- discussed at length with him. He started diamox and has her diuresing with IV lasix as well. She may need long term diamox.  -patient has chronic diastolic heart failure with frequent exacerbations. Reports she is no longer able to lay down due to  her orthopnea. She would benefit from a hospital bed as she has no other way to rest at night safely. Also she has chronic MORLEY from her CHF and would benefit from wheelchair.  Chronic obstructive lung disease (Multi)  Acute on chronic respiratory failure with hypoxia and hypercapnia  -2L prn at home, required 4L here but weaning down with great UOP from lasix given in ER. Now on 3L  -continue home inhalers, does not appear to be in exacerbation  Hypokalemia  -increase home potassium supplementation   -Monitor and replete additional prn, check mag  Chronic atrial fibrillation (Multi)  -continue Eliquis, metoprolol  Dental abscess  -s/p I&D in ER 6/06  -continue Augmentin  Metabolic alkalosis  As mentioned above. Likely element of compensation from her COPD and contraction alkalosis from diuretics. Dr. Kaur starting diamox and will monitor daily labs    Functional decline  Patient requires a manual wheelchair for primary mode of mobility for ADL's in the home. Patient with recent decline in mobility and strength due to chronic diastolic heart failure with frequent exacerbations and hospitalizations. Patient is not able to ambulate safely with a walker while attempting functional tasks.   -Face to face discussion conducted regarding the need for a hospital bed - adjustable bed at discharge in the home environment. Patient with impaired functional mobility and instability due to progressive and frequent exacerbations of CHF. Requires a hospital bed to be able to complete transfers and ADL's within the home.      I asked RN to find patient a recliner. If this is not available, I think we should try wrapping her legs in ACE today.    Joanna Farnsworth,

## 2025-06-16 ENCOUNTER — PHARMACY VISIT (OUTPATIENT)
Dept: PHARMACY | Facility: CLINIC | Age: 70
End: 2025-06-16
Payer: COMMERCIAL

## 2025-06-16 VITALS
RESPIRATION RATE: 18 BRPM | SYSTOLIC BLOOD PRESSURE: 113 MMHG | OXYGEN SATURATION: 97 % | HEIGHT: 61 IN | DIASTOLIC BLOOD PRESSURE: 70 MMHG | HEART RATE: 77 BPM | TEMPERATURE: 97.5 F | WEIGHT: 265.2 LBS | BODY MASS INDEX: 50.07 KG/M2

## 2025-06-16 PROBLEM — I50.33 ACUTE ON CHRONIC DIASTOLIC CONGESTIVE HEART FAILURE: Status: RESOLVED | Noted: 2025-06-13 | Resolved: 2025-06-16

## 2025-06-16 PROBLEM — E87.6 HYPOKALEMIA: Status: RESOLVED | Noted: 2023-08-30 | Resolved: 2025-06-16

## 2025-06-16 PROBLEM — J96.22 ACUTE ON CHRONIC RESPIRATORY FAILURE WITH HYPOXIA AND HYPERCAPNIA: Status: RESOLVED | Noted: 2024-12-08 | Resolved: 2025-06-16

## 2025-06-16 PROBLEM — J96.21 ACUTE ON CHRONIC RESPIRATORY FAILURE WITH HYPOXIA AND HYPERCAPNIA: Status: RESOLVED | Noted: 2024-12-08 | Resolved: 2025-06-16

## 2025-06-16 LAB
ALBUMIN SERPL BCP-MCNC: 3.7 G/DL (ref 3.4–5)
ANION GAP SERPL CALCULATED.3IONS-SCNC: 8 MMOL/L (ref 10–20)
BUN SERPL-MCNC: 26 MG/DL (ref 6–23)
CALCIUM SERPL-MCNC: 9.8 MG/DL (ref 8.6–10.3)
CHLORIDE SERPL-SCNC: 102 MMOL/L (ref 98–107)
CO2 SERPL-SCNC: 35 MMOL/L (ref 21–32)
CREAT SERPL-MCNC: 0.67 MG/DL (ref 0.5–1.05)
EGFRCR SERPLBLD CKD-EPI 2021: >90 ML/MIN/1.73M*2
ERYTHROCYTE [DISTWIDTH] IN BLOOD BY AUTOMATED COUNT: 16.1 % (ref 11.5–14.5)
GLUCOSE SERPL-MCNC: 91 MG/DL (ref 74–99)
HCT VFR BLD AUTO: 38.1 % (ref 36–46)
HGB BLD-MCNC: 11.9 G/DL (ref 12–16)
MCH RBC QN AUTO: 29.2 PG (ref 26–34)
MCHC RBC AUTO-ENTMCNC: 31.2 G/DL (ref 32–36)
MCV RBC AUTO: 94 FL (ref 80–100)
NRBC BLD-RTO: 0 /100 WBCS (ref 0–0)
PHOSPHATE SERPL-MCNC: 3.5 MG/DL (ref 2.5–4.9)
PLATELET # BLD AUTO: 219 X10*3/UL (ref 150–450)
POTASSIUM SERPL-SCNC: 4.2 MMOL/L (ref 3.5–5.3)
RBC # BLD AUTO: 4.07 X10*6/UL (ref 4–5.2)
SODIUM SERPL-SCNC: 141 MMOL/L (ref 136–145)
WBC # BLD AUTO: 8.1 X10*3/UL (ref 4.4–11.3)

## 2025-06-16 PROCEDURE — 99239 HOSP IP/OBS DSCHRG MGMT >30: CPT

## 2025-06-16 PROCEDURE — 99232 SBSQ HOSP IP/OBS MODERATE 35: CPT | Performed by: PHYSICIAN ASSISTANT

## 2025-06-16 PROCEDURE — 97535 SELF CARE MNGMENT TRAINING: CPT | Mod: GO

## 2025-06-16 PROCEDURE — 97530 THERAPEUTIC ACTIVITIES: CPT | Mod: GP

## 2025-06-16 PROCEDURE — 80069 RENAL FUNCTION PANEL: CPT | Performed by: INTERNAL MEDICINE

## 2025-06-16 PROCEDURE — 2500000001 HC RX 250 WO HCPCS SELF ADMINISTERED DRUGS (ALT 637 FOR MEDICARE OP): Performed by: STUDENT IN AN ORGANIZED HEALTH CARE EDUCATION/TRAINING PROGRAM

## 2025-06-16 PROCEDURE — 97165 OT EVAL LOW COMPLEX 30 MIN: CPT | Mod: GO

## 2025-06-16 PROCEDURE — 36415 COLL VENOUS BLD VENIPUNCTURE: CPT | Performed by: INTERNAL MEDICINE

## 2025-06-16 PROCEDURE — 85027 COMPLETE CBC AUTOMATED: CPT | Performed by: STUDENT IN AN ORGANIZED HEALTH CARE EDUCATION/TRAINING PROGRAM

## 2025-06-16 PROCEDURE — 2500000001 HC RX 250 WO HCPCS SELF ADMINISTERED DRUGS (ALT 637 FOR MEDICARE OP): Performed by: INTERNAL MEDICINE

## 2025-06-16 PROCEDURE — 2500000002 HC RX 250 W HCPCS SELF ADMINISTERED DRUGS (ALT 637 FOR MEDICARE OP, ALT 636 FOR OP/ED): Performed by: STUDENT IN AN ORGANIZED HEALTH CARE EDUCATION/TRAINING PROGRAM

## 2025-06-16 PROCEDURE — 94640 AIRWAY INHALATION TREATMENT: CPT

## 2025-06-16 PROCEDURE — 97161 PT EVAL LOW COMPLEX 20 MIN: CPT | Mod: GP

## 2025-06-16 PROCEDURE — 2500000005 HC RX 250 GENERAL PHARMACY W/O HCPCS: Performed by: HOSPITALIST

## 2025-06-16 PROCEDURE — RXMED WILLOW AMBULATORY MEDICATION CHARGE

## 2025-06-16 RX ORDER — ACETAZOLAMIDE 250 MG/1
250 TABLET ORAL 2 TIMES DAILY
Qty: 60 TABLET | Refills: 0 | Status: SHIPPED | OUTPATIENT
Start: 2025-06-16

## 2025-06-16 RX ORDER — HYDROCODONE BITARTRATE AND ACETAMINOPHEN 5; 325 MG/1; MG/1
1 TABLET ORAL EVERY 8 HOURS PRN
Qty: 9 TABLET | Refills: 0 | Status: SHIPPED | OUTPATIENT
Start: 2025-06-16 | End: 2025-06-19

## 2025-06-16 RX ORDER — TORSEMIDE 20 MG/1
60 TABLET ORAL
Qty: 180 TABLET | Refills: 0 | Status: SHIPPED | OUTPATIENT
Start: 2025-06-16

## 2025-06-16 RX ORDER — TORSEMIDE 20 MG/1
60 TABLET ORAL
Status: DISCONTINUED | OUTPATIENT
Start: 2025-06-16 | End: 2025-06-16 | Stop reason: HOSPADM

## 2025-06-16 RX ORDER — AMMONIUM LACTATE 12 G/100G
LOTION TOPICAL 2 TIMES DAILY
Qty: 226 G | Refills: 0 | Status: SHIPPED | OUTPATIENT
Start: 2025-06-16 | End: 2025-07-16

## 2025-06-16 RX ORDER — POTASSIUM CHLORIDE 20 MEQ/1
40 TABLET, EXTENDED RELEASE ORAL 2 TIMES DAILY
Qty: 120 TABLET | Refills: 0 | Status: SHIPPED | OUTPATIENT
Start: 2025-06-16 | End: 2025-07-16

## 2025-06-16 RX ADMIN — GABAPENTIN 300 MG: 300 CAPSULE ORAL at 08:58

## 2025-06-16 RX ADMIN — Medication 3 L/MIN: at 00:16

## 2025-06-16 RX ADMIN — BUPROPION HYDROCHLORIDE 300 MG: 300 TABLET, EXTENDED RELEASE ORAL at 06:15

## 2025-06-16 RX ADMIN — FLUTICASONE FUROATE AND VILANTEROL TRIFENATATE 1 PUFF: 100; 25 POWDER RESPIRATORY (INHALATION) at 07:44

## 2025-06-16 RX ADMIN — ACETAZOLAMIDE 250 MG: 250 TABLET ORAL at 08:58

## 2025-06-16 RX ADMIN — AMOXICILLIN AND CLAVULANATE POTASSIUM 1 TABLET: 875; 125 TABLET, COATED ORAL at 08:58

## 2025-06-16 RX ADMIN — Medication: at 08:59

## 2025-06-16 RX ADMIN — ALLOPURINOL 300 MG: 300 TABLET ORAL at 08:58

## 2025-06-16 RX ADMIN — APIXABAN 5 MG: 5 TABLET, FILM COATED ORAL at 08:58

## 2025-06-16 RX ADMIN — HYDROCODONE BITARTRATE AND ACETAMINOPHEN 1 TABLET: 5; 325 TABLET ORAL at 06:14

## 2025-06-16 RX ADMIN — Medication 2 L/MIN: at 07:45

## 2025-06-16 RX ADMIN — METOPROLOL SUCCINATE 25 MG: 25 TABLET, EXTENDED RELEASE ORAL at 08:58

## 2025-06-16 RX ADMIN — POTASSIUM CHLORIDE 40 MEQ: 1500 TABLET, EXTENDED RELEASE ORAL at 08:58

## 2025-06-16 ASSESSMENT — COGNITIVE AND FUNCTIONAL STATUS - GENERAL
DRESSING REGULAR UPPER BODY CLOTHING: A LITTLE
CLIMB 3 TO 5 STEPS WITH RAILING: A LOT
MOVING TO AND FROM BED TO CHAIR: A LITTLE
MOVING TO AND FROM BED TO CHAIR: A LITTLE
STANDING UP FROM CHAIR USING ARMS: A LITTLE
DAILY ACTIVITIY SCORE: 18
DRESSING REGULAR LOWER BODY CLOTHING: A LOT
MOVING FROM LYING ON BACK TO SITTING ON SIDE OF FLAT BED WITH BEDRAILS: A LITTLE
DAILY ACTIVITIY SCORE: 16
CLIMB 3 TO 5 STEPS WITH RAILING: TOTAL
DRESSING REGULAR UPPER BODY CLOTHING: A LITTLE
MOBILITY SCORE: 16
TURNING FROM BACK TO SIDE WHILE IN FLAT BAD: A LITTLE
PERSONAL GROOMING: A LITTLE
DRESSING REGULAR LOWER BODY CLOTHING: A LOT
TURNING FROM BACK TO SIDE WHILE IN FLAT BAD: A LOT
WALKING IN HOSPITAL ROOM: A LITTLE
HELP NEEDED FOR BATHING: A LOT
PERSONAL GROOMING: A LITTLE
TOILETING: A LITTLE
TOILETING: A LOT
STANDING UP FROM CHAIR USING ARMS: A LITTLE
WALKING IN HOSPITAL ROOM: A LITTLE
HELP NEEDED FOR BATHING: A LITTLE
MOBILITY SCORE: 17

## 2025-06-16 ASSESSMENT — ACTIVITIES OF DAILY LIVING (ADL)
ADL_ASSISTANCE: INDEPENDENT
HOME_MANAGEMENT_TIME_ENTRY: 8
BATHING_ASSISTANCE: MODERATE
ADL_ASSISTANCE: INDEPENDENT

## 2025-06-16 ASSESSMENT — PAIN DESCRIPTION - DESCRIPTORS
DESCRIPTORS: ACHING
DESCRIPTORS: ACHING

## 2025-06-16 ASSESSMENT — PAIN SCALES - GENERAL
PAINLEVEL_OUTOF10: 0 - NO PAIN
PAINLEVEL_OUTOF10: 0 - NO PAIN
PAINLEVEL_OUTOF10: 9
PAINLEVEL_OUTOF10: 2

## 2025-06-16 ASSESSMENT — PAIN - FUNCTIONAL ASSESSMENT
PAIN_FUNCTIONAL_ASSESSMENT: 0-10

## 2025-06-16 ASSESSMENT — PAIN DESCRIPTION - LOCATION: LOCATION: SHOULDER

## 2025-06-16 ASSESSMENT — PAIN DESCRIPTION - ORIENTATION: ORIENTATION: RIGHT

## 2025-06-16 NOTE — CARE PLAN
The patient's goals for the shift include Maintain safety and comfort, return to respiratory baseline    The clinical goals for the shift include continue to improve resp status

## 2025-06-16 NOTE — NURSING NOTE
Assumed care of patient at this time, patient is resting in chair with call light in reach denies any needs.

## 2025-06-16 NOTE — NURSING NOTE
Discharge instructions thoroughly reviewed with patient at this time. Medications, Future Lab, Follow up appts, and education completed with patient. She is calling her  to come pick her up from [place], [place]. No further questions from pt at this time.

## 2025-06-16 NOTE — CARE PLAN
Problem: Respiratory  Goal: No signs of respiratory distress (eg. Use of accessory muscles. Peds grunting)  Outcome: Progressing  Goal: Wean oxygen to maintain O2 saturation per order/standard this shift  Outcome: Not Progressing     Problem: Respiratory  Goal: Wean oxygen to maintain O2 saturation per order/standard this shift  Outcome: Not Progressing

## 2025-06-16 NOTE — PROGRESS NOTES
Spiritual Care Visit  Spiritual Care Request    Reason for Visit:  Routine Visit: Introduction     Request Received From:       Focus of Care:  Visited With: Patient         Refer to :          Spiritual Care Assessment    Spiritual Assessment:                      Care Provided:  Intended Effects: Build relationship of care and support, Convey a calming presence, Demonstrate caring and concern    Sense of Community and or Latter day Affiliation:  Buddhism   Values/Beliefs  Spiritual Requests During Hospitalization: Keyonna asked to be anointed and to have Communion.     Addressed Needs/Concerns and/or Gianfranco Through:     Sacramental Encounters  Communion: Patient wants communion  Communion Given Indicator: Yes  Sacrament of Sick-Anointing: Anointed    Outcome:        Advance Directives:         Spiritual Care Annotation    Annotation:  Vickie asked to be anointed and to have Communion.  Uziel Ambriz

## 2025-06-16 NOTE — PROGRESS NOTES
Vickie Foster is a 69 y.o. female on day 3 of admission presenting with Acute on chronic diastolic congestive heart failure.    Follow up on DME orders. Per Arabella at [facility], hospital bed is ready, they were unable to reach anyone for delivery. They also needed documentation showing need for wheelchair. PT recommended a BSC as there is no bathroom on main level where patient will have hospital bed. Received written orders from provider, faxed them and documentation to Arabella.   Patient has a discharge order. She states she does not need these items to discharge. Will follow up with Rita to ensure orders are correct and items delivered.     Yessica Perez RN

## 2025-06-16 NOTE — PROGRESS NOTES
CONSULT PROGRESS NOTES    SERVICE DATE: 6/16/2025   SERVICE TIME: 11:12 AM    CONSULTING SERVICE: Nephrology    ASSESSMENT AND PLAN   69-year-old woman with atrial fibrillation, diastolic CHF, COPD admitted with fluid overload.  1.  Metabolic alkalosis  2.  Fluid overload  3.  Edema  4.  Hypokalemia     Continue acetazolamide therapy.  The etiology of her alkalosis is contraction and compensatory.  She has a chronic compensatory metabolic alkalosis from her chronic CO2 retention on top of an existing contraction alkalosis with alkalemia on the recent ABG.    Improvement in the alkalosis overall.  I would use acetazolamide 250 mg p.o. twice daily even for discharge.  Begin torsemide 60 mg p.o. twice daily.  Continue current twice daily potassium replacement.  Trend daily labs, continue supportive care.  I will continue to follow this patient.    Discharge at this point.  Doppler she needs renal follow-up slightly increased torsemide dosing with torsemide 60 p.o. twice daily and new medication of acetazolamide 250 p.o. twice daily for discharge.    SUBJECTIVE  INTERVAL HPI: Improvement in swelling.  She is incontinent of urine.  She has an external urinary collection device in place.  She is losing weight, albeit slowly.  She has no major dyspnea.    MEDICATIONS:  Scheduled Medications[1]   Continuous Medications[2]   PRN Medications[3]     OBJECTIVE  PHYSICAL EXAM:   Heart Rate:  [75-85]   Temp:  [36.4 °C (97.5 °F)-36.9 °C (98.4 °F)]   Resp:  [17-18]   BP: ()/(66-90)   Weight:  [120 kg (265 lb 3.2 oz)]   SpO2:  [93 %-98 %]   Body mass index is 50.11 kg/m².  This is a chronically ill and obese white woman who appears much older than her known age  No obvious skin rashes  Appropriate affect  Hearing intact  Phonation intact  Moist mucosa  Rales auscultated at the bases  Distant heart sounds  Abdomen is soft, nondistended, nontender, positive bowel sounds  No Cuellar catheter in place, no suprapubic tenderness to  palpation  Fairly extensive bilateral lower extremity edema  Moves 4 limbs spontaneously  No obvious joint deformities  No lymphadenopathy    DATA:   Labs:  Results for orders placed or performed during the hospital encounter of 06/12/25 (from the past 96 hours)   B-type natriuretic peptide   Result Value Ref Range     (H) 0 - 99 pg/mL   CBC   Result Value Ref Range    WBC 10.0 4.4 - 11.3 x10*3/uL    nRBC 0.0 0.0 - 0.0 /100 WBCs    RBC 4.18 4.00 - 5.20 x10*6/uL    Hemoglobin 12.1 12.0 - 16.0 g/dL    Hematocrit 40.0 36.0 - 46.0 %    MCV 96 80 - 100 fL    MCH 28.9 26.0 - 34.0 pg    MCHC 30.3 (L) 32.0 - 36.0 g/dL    RDW 16.2 (H) 11.5 - 14.5 %    Platelets 232 150 - 450 x10*3/uL   Comprehensive metabolic panel   Result Value Ref Range    Glucose 120 (H) 74 - 99 mg/dL    Sodium 142 136 - 145 mmol/L    Potassium 3.1 (L) 3.5 - 5.3 mmol/L    Chloride 98 98 - 107 mmol/L    Bicarbonate 40 (HH) 21 - 32 mmol/L    Anion Gap 7 (L) 10 - 20 mmol/L    Urea Nitrogen 27 (H) 6 - 23 mg/dL    Creatinine 0.60 0.50 - 1.05 mg/dL    eGFR >90 >60 mL/min/1.73m*2    Calcium 8.9 8.6 - 10.3 mg/dL    Albumin 3.8 3.4 - 5.0 g/dL    Alkaline Phosphatase 77 33 - 136 U/L    Total Protein 7.0 6.4 - 8.2 g/dL    AST 17 9 - 39 U/L    Bilirubin, Total 0.5 0.0 - 1.2 mg/dL    ALT 19 7 - 45 U/L   Protime-INR   Result Value Ref Range    Protime 11.5 9.3 - 12.7 seconds    INR 1.1 0.9 - 1.2   APTT   Result Value Ref Range    aPTT 30.2 22.0 - 32.5 seconds   Troponin I, High Sensitivity, Initial   Result Value Ref Range    Troponin I, High Sensitivity 9 0 - 13 ng/L   Blood Gas Arterial Full Panel   Result Value Ref Range    POCT pH, Arterial 7.46 (H) 7.38 - 7.42 pH    POCT pCO2, Arterial 63 (H) 38 - 42 mm Hg    POCT pO2, Arterial 77 (L) 85 - 95 mm Hg    POCT SO2, Arterial 97 94 - 100 %    POCT Oxy Hemoglobin, Arterial 95.7 94.0 - 98.0 %    POCT Hematocrit Calculated, Arterial 39.0 36.0 - 46.0 %    POCT Sodium, Arterial 140 136 - 145 mmol/L    POCT Potassium,  Arterial 3.2 (L) 3.5 - 5.3 mmol/L    POCT Chloride, Arterial 101 98 - 107 mmol/L    POCT Ionized Calcium, Arterial 1.15 1.10 - 1.33 mmol/L    POCT Glucose, Arterial 106 (H) 74 - 99 mg/dL    POCT Lactate, Arterial 0.5 0.4 - 2.0 mmol/L    POCT Base Excess, Arterial 17.7 (H) -2.0 - 3.0 mmol/L    POCT HCO3 Calculated, Arterial 44.8 (H) 22.0 - 26.0 mmol/L    POCT Hemoglobin, Arterial 12.9 12.0 - 16.0 g/dL    POCT Anion Gap, Arterial -3 (L) 10 - 25 mmo/L    Patient Temperature 37.0 degrees Celsius    FiO2 36 %   ECG 12 lead   Result Value Ref Range    Ventricular Rate 80 BPM    QRS Duration 88 ms    QT Interval 372 ms    QTC Calculation(Bazett) 429 ms    R Axis 56 degrees    T Axis 100 degrees    QRS Count 13 beats    Q Onset 222 ms    T Offset 408 ms    QTC Fredericia 409 ms   Troponin, High Sensitivity, 1 Hour   Result Value Ref Range    Troponin I, High Sensitivity 10 0 - 13 ng/L   Basic metabolic panel   Result Value Ref Range    Glucose 109 (H) 74 - 99 mg/dL    Sodium 144 136 - 145 mmol/L    Potassium 3.6 3.5 - 5.3 mmol/L    Chloride 100 98 - 107 mmol/L    Bicarbonate 41 (HH) 21 - 32 mmol/L    Anion Gap 7 (L) 10 - 20 mmol/L    Urea Nitrogen 21 6 - 23 mg/dL    Creatinine 0.47 (L) 0.50 - 1.05 mg/dL    eGFR >90 >60 mL/min/1.73m*2    Calcium 8.9 8.6 - 10.3 mg/dL   Transthoracic Echo (TTE) Limited   Result Value Ref Range    LV EF 63 %    RVSP 42 mmHg    LVIDd 4.65 cm    LV A4C EF 62.3    CBC   Result Value Ref Range    WBC 8.1 4.4 - 11.3 x10*3/uL    nRBC 0.0 0.0 - 0.0 /100 WBCs    RBC 4.41 4.00 - 5.20 x10*6/uL    Hemoglobin 12.8 12.0 - 16.0 g/dL    Hematocrit 42.7 36.0 - 46.0 %    MCV 97 80 - 100 fL    MCH 29.0 26.0 - 34.0 pg    MCHC 30.0 (L) 32.0 - 36.0 g/dL    RDW 16.2 (H) 11.5 - 14.5 %    Platelets 224 150 - 450 x10*3/uL   Renal Function Panel   Result Value Ref Range    Glucose 86 74 - 99 mg/dL    Sodium 142 136 - 145 mmol/L    Potassium 3.8 3.5 - 5.3 mmol/L    Chloride 99 98 - 107 mmol/L    Bicarbonate 38 (H) 21 -  32 mmol/L    Anion Gap 9 (L) 10 - 20 mmol/L    Urea Nitrogen 24 (H) 6 - 23 mg/dL    Creatinine 0.61 0.50 - 1.05 mg/dL    eGFR >90 >60 mL/min/1.73m*2    Calcium 9.5 8.6 - 10.3 mg/dL    Phosphorus 4.4 2.5 - 4.9 mg/dL    Albumin 3.8 3.4 - 5.0 g/dL   CBC   Result Value Ref Range    WBC 8.7 4.4 - 11.3 x10*3/uL    nRBC 0.0 0.0 - 0.0 /100 WBCs    RBC 4.31 4.00 - 5.20 x10*6/uL    Hemoglobin 12.3 12.0 - 16.0 g/dL    Hematocrit 42.3 36.0 - 46.0 %    MCV 98 80 - 100 fL    MCH 28.5 26.0 - 34.0 pg    MCHC 29.1 (L) 32.0 - 36.0 g/dL    RDW 15.9 (H) 11.5 - 14.5 %    Platelets 223 150 - 450 x10*3/uL   Renal Function Panel   Result Value Ref Range    Glucose 97 74 - 99 mg/dL    Sodium 141 136 - 145 mmol/L    Potassium 3.9 3.5 - 5.3 mmol/L    Chloride 100 98 - 107 mmol/L    Bicarbonate 34 (H) 21 - 32 mmol/L    Anion Gap 11 10 - 20 mmol/L    Urea Nitrogen 22 6 - 23 mg/dL    Creatinine 0.60 0.50 - 1.05 mg/dL    eGFR >90 >60 mL/min/1.73m*2    Calcium 9.5 8.6 - 10.3 mg/dL    Phosphorus 3.7 2.5 - 4.9 mg/dL    Albumin 3.8 3.4 - 5.0 g/dL   CBC   Result Value Ref Range    WBC 8.1 4.4 - 11.3 x10*3/uL    nRBC 0.0 0.0 - 0.0 /100 WBCs    RBC 4.07 4.00 - 5.20 x10*6/uL    Hemoglobin 11.9 (L) 12.0 - 16.0 g/dL    Hematocrit 38.1 36.0 - 46.0 %    MCV 94 80 - 100 fL    MCH 29.2 26.0 - 34.0 pg    MCHC 31.2 (L) 32.0 - 36.0 g/dL    RDW 16.1 (H) 11.5 - 14.5 %    Platelets 219 150 - 450 x10*3/uL   Renal Function Panel   Result Value Ref Range    Glucose 91 74 - 99 mg/dL    Sodium 141 136 - 145 mmol/L    Potassium 4.2 3.5 - 5.3 mmol/L    Chloride 102 98 - 107 mmol/L    Bicarbonate 35 (H) 21 - 32 mmol/L    Anion Gap 8 (L) 10 - 20 mmol/L    Urea Nitrogen 26 (H) 6 - 23 mg/dL    Creatinine 0.67 0.50 - 1.05 mg/dL    eGFR >90 >60 mL/min/1.73m*2    Calcium 9.8 8.6 - 10.3 mg/dL    Phosphorus 3.5 2.5 - 4.9 mg/dL    Albumin 3.7 3.4 - 5.0 g/dL     *Note: Due to a large number of results and/or encounters for the requested time period, some results have not been  displayed. A complete set of results can be found in Results Review.         SIGNATURE: Refugio Kaur MD PATIENT NAME: Vickie Foster   DATE: June 16, 2025 MRN: [id number]   TIME: 11:12 AM PAGER: [phone]            [1] acetaZOLAMIDE, 250 mg, oral, BID  allopurinol, 300 mg, oral, Daily  ammonium lactate, , Topical, BID  amoxicillin-clavulanate, 1 tablet, oral, q12h BRIDGETTE  apixaban, 5 mg, oral, BID  atorvastatin, 40 mg, oral, Nightly  buPROPion XL, 300 mg, oral, Daily before breakfast  fluticasone furoate-vilanteroL, 1 puff, inhalation, Daily  gabapentin, 300 mg, oral, TID  metoprolol succinate XL, 25 mg, oral, Daily  oxygen, , inhalation, Continuous - Inhalation  potassium chloride CR, 40 mEq, oral, BID  torsemide, 60 mg, oral, BID     [2]    [3] PRN medications: acetaminophen, HYDROcodone-acetaminophen, hydrOXYzine HCL, melatonin, midodrine, oxygen

## 2025-06-16 NOTE — PROGRESS NOTES
Physical Therapy    Physical Therapy Evaluation & Treatment    Patient Name: Vickie Foster  MRN: [id number]  Department: [address]  Room: [address] [place] [address]  Today's Date: 6/16/2025   Time Calculation  Start Time: 1310  Stop Time: 1353  Time Calculation (min): 43 min    Assessment/Plan   PT Assessment  PT Assessment Results: Decreased strength, Decreased endurance, Impaired balance, Decreased mobility, Decreased coordination, Decreased safety awareness, Impaired sensation, Obesity  Rehab Prognosis: Good  Barriers to Discharge Home: Physical needs  Physical Needs: Stair navigation to access bed limited by function/safety, Stair navigation to access bath limited by function/safety  Evaluation/Treatment Tolerance: Patient tolerated treatment well  Medical Staff Made Aware: Yes  Strengths: Ability to acquire knowledge  Barriers to Participation: Comorbidities  End of Session Communication: Bedside nurse  Assessment Comment: pt required fluctuating min assist to close supervision of 1 for the above mobility. Pt demonstrates decreased strength, balance, endurance, mobility ease and safety, increased difficulty with stair negtiation. pt will benefit from continued skilled PT services while in acute care, outpatient PT upon hospital d/c, 24/7 S/A from family, especially for stair negotiation. recommend BSC at home by hospital bed.  End of Session Patient Position: Alarm off, not on at start of session, Up in chair (call button in reach; Nurse aware alarm is not on and is not concerned pt will attempt to stand up by herself.)   IP OR SWING BED PT PLAN  Inpatient or Swing Bed: Inpatient  PT Plan  Treatment/Interventions: Bed mobility, Transfer training, Gait training, Stair training, Balance training, Strengthening, Endurance training, Therapeutic exercise, Therapeutic activity, Wheelchair management  PT Plan: Ongoing PT  PT Frequency: 5 times per week  PT Discharge Recommendations: Low intensity level of continued care  Equipment  Recommended upon Discharge: Wheeled walker, Wheelchair  PT Recommended Transfer Status: Assist x1, Assistive device (FWW)  PT - OK to Discharge: Yes      Subjective     PT Visit Info:  PT Received On: 06/16/25  General Visit Information:  General  Reason for Referral: impaired mobility; + acute on chronic respiratory failure  Referred By: Harry Coronado DO  Past Medical History Relevant to Rehab: A-fib, CHF, COPD, CVA, neuropsthy, HTN, obesity, anxiety/depression, asthma, meningioma, chronic respiratory failure---2 liters o2 PRn/night  Family/Caregiver Present: No  Co-Treatment: OT  Co-Treatment Reason: partial co-eval to maximize pt safety, limited mobility endurance  Prior to Session Communication: Bedside nurse  Patient Position Received: Up in chair, Alarm off, not on at start of session  Preferred Learning Style: verbal, visual  General Comment: 68 yo WF admitted to [facility] via ED 6/12/25 with diagnosed acute on chronic CHF. Cleared by nurse for therapy. Pt agreeable to therapy, [facility]  Home Living:  Home Living  Type of Home: House  Lives With: Spouse, Adult children, Other (Comment) (son and SO)  Home Adaptive Equipment: Walker rolling or standard, Wheelchair-manual, Reacher, Long-handled shoehorn  Home Layout: Multi-level, Stairs to alternate level with rails, Stairs to alternate level without rails  Alternate Level Stairs-Rails:  (unilateral)  Alternate Level Stairs-Number of Steps: 5 steps up to tub shower; 5 steps down to walk-in shower  Home Access: Stairs to enter with rails  Entrance Stairs-Rails:  (unilateral)  Entrance Stairs-Number of Steps: 1  Bathroom Shower/Tub: Tub/shower unit, Walk-in shower  Bathroom Toilet: Standard  Bathroom Accessibility: no bathroom on main floor where hospital bed with be placed in livingroom  Home Living Comments: pt to receive a hospital bed and w/c prior to homegoing; recommend a BSC also  Prior Level of Function:  Prior Function Per Pt/Caregiver  "Report  Level of [place]: Needs assistance with homemaking, Independent with ADLs and functional transfers  Receives Help From: Family  ADL Assistance: Independent  Homemaking Assistance: Needs assistance  Driving/Transportation:  (spouse and son are the 's in the family at this time)  Ambulatory Assistance: Independent (furniture walks and/or rollator in house; w/c in community)  Prior Function Comments: pt manages her own meds; pt admits to 13 falls in past year around the same time due to \" electrolyte imbalances\"  Precautions:  Precautions  Hearing/Visual Limitations: pt reports \" I am getting glasses soon.\"  Medical Precautions: Fall precautions, Oxygen therapy device and L/min (2 liters o2 via nc)     Date/Time Vitals Session Patient Position Pulse Resp SpO2 BP MAP (mmHg)    06/16/25 1310 During PT  --  --  --  --  --  --           Vital Signs Comment: O2 sat 91 to 93% with HR 77 to 95 bpm during session---2 liters    Objective   Pain:  Pain Assessment  Pain Assessment: 0-10  0-10 (Numeric) Pain Score: 0 - No pain  Cognition:  Cognition  Overall Cognitive Status: Within Functional Limits  Orientation Level: Oriented X4  Safety/Judgement:  (decreased safety insight during functional mobility)  Insight: Mild    General Assessments:  General Observation  General Observation: pleasant, cooperative, mod edema bilateral lower legs/ankles/feet----ace wrapped               Activity Tolerance  Endurance: Decreased tolerance for upright activites  Activity Tolerance Comments: fair due to fatigue, SOB    Sensation  Sensation Comment: + neuropathy rakel feet    Strength  Strength Comments: rakel hips 3+/5, knees 4-/5, ankles 4-/5  Coordination  Movements are Fluid and Coordinated: No  Lower Body Coordination: decreased timing/accuracy due to weakness, + mod edema    Postural Control  Posture Comment: mod forward head, protracted shldrs    Static Sitting Balance  Static Sitting-Balance Support: Feet " supported  Static Sitting-Level of Assistance: Close supervision  Static Sitting-Comment/Number of Minutes: good  Dynamic Sitting Balance  Dynamic Sitting-Balance Support: Feet supported  Dynamic Sitting-Level of Assistance: Contact guard  Dynamic Sitting-Comments: good -    Static Standing Balance  Static Standing-Balance Support: Bilateral upper extremity supported  Static Standing-Level of Assistance: Contact guard  Static Standing-Comment/Number of Minutes: FWW, good  Dynamic Standing Balance  Dynamic Standing-Balance Support: Bilateral upper extremity supported  Dynamic Standing-Level of Assistance: Contact guard  Dynamic Standing-Comments: good -  Functional Assessments:  Bed Mobility  Bed Mobility: No    Transfers  Transfer: Yes  Transfer 1  Transfer From 1: Chair with arms to  Transfer to 1: Stand  Technique 1: Sit to stand  Transfer Device 1: Walker  Transfer Level of Assistance 1: Minimum assistance, Minimal verbal cues  Trials/Comments 1: min assist of 1 for trunk up, verbal cues for proper rakel hand placement on arms of chair  Transfers 2  Transfer From 2: Stand to  Transfer to 2: Chair with arms  Technique 2: Stand to sit  Transfer Device 2: Walker  Transfer Level of Assistance 2: Minimum assistance, Minimal verbal cues  Trials/Comments 2: min assist of 1 for trunk down, verbal cues for reaching back with both hands for arms of chair    Ambulation/Gait Training  Ambulation/Gait Training Performed: Yes  Ambulation/Gait Training 1  Surface 1: Level tile  Device 1: Rolling walker  Assistance 1: Minimum assistance, Contact guard  Quality of Gait 1: Diminished heel strike, Decreased step length, Shuffling gait, Forward flexed posture, Soft knee(s)  Comments/Distance (ft) 1: pt amb 16 ft x 4, + turns, initially min assist of 1 to steady, progressing to contact guard of 1, verbal cues for erect posture, pursed lip breathing, staying close to frame of FWW at all times. Pt able to stand for 2 min prior to amb  trial and after initial 32 ft of amb.    Stairs  Stairs: No  Extremity/Trunk Assessments:  Cervical Spine   Cervical Spine:  (mod forward head)  RLE   RLE :  (see above strength comments)  LLE   LLE :  (see above strength comments)  Treatments:  Therapeutic Activity  Therapeutic Activity Performed:  (see transfers, standing/amb trials)  Outcome Measures:  [facility] Basic Mobility  Turning from your back to your side while in a flat bed without using bedrails: A little  Moving from lying on your back to sitting on the side of a flat bed without using bedrails: A lot  Moving to and from bed to chair (including a wheelchair): A little  Standing up from a chair using your arms (e.g. wheelchair or bedside chair): A little  To walk in hospital room: A little  Climbing 3-5 steps with railing: A lot  Basic Mobility - Total Score: 16    Encounter Problems       Encounter Problems (Active)       Balance       STG - Maintains dynamic standing balance with upper extremity support (Progressing)       Start:  06/16/25    Expected End:  07/14/25       INTERVENTIONS:1. Practice standing with minimal support.2. Educate patient about standing tolerance.3. Educate patient about independence with gait, transfers, and ADL's.4. Educate patient about use of assistive device.5. Educate patient about self-directed care.            Mobility       STG - Patient will ambulate 50 ft, FWW, + turns, mod ind (Progressing)       Start:  06/16/25    Expected End:  07/14/25            pt ascends/descends 5 steps, 1 rail with min assist of 1 (Progressing)       Start:  06/16/25    Expected End:  07/14/25            pt will self propel w/c 50 ft, 2 turns, mod ind on level surfaces (Progressing)       Start:  06/16/25    Expected End:  07/14/25               PT Transfers       STG - Patient to transfer to and from sit to supine mod ind (Progressing)       Start:  06/16/25    Expected End:  07/14/25            STG - Patient will transfer sit to and from  stand mod ind (Progressing)       Start:  06/16/25    Expected End:  07/14/25               Pain - Adult              Education Documentation  Mobility Training, taught by Jess Milton, PT at 6/16/2025  2:22 PM.  Learner: Patient  Readiness: Acceptance  Method: Explanation  Response: Verbalizes Understanding, Needs Reinforcement  Comment: PT educated pt in safe transfer technique    Education Comments  No comments found.

## 2025-06-16 NOTE — CARE PLAN
Problem: Heart Failure  Goal: Improved gas exchange this shift  Outcome: Progressing  Goal: Improved urinary output this shift  Outcome: Progressing  Goal: Reduction in peripheral edema within 24 hours  Outcome: Progressing  Goal: Report improvement of dyspnea/breathlessness this shift  Outcome: Progressing  Goal: Weight from fluid excess reduced over 2-3 days, then stabilize  Outcome: Progressing  Goal: Increase self care and/or family involvement in 24 hours  Outcome: Progressing     Problem: Pain - Adult  Goal: Verbalizes/displays adequate comfort level or baseline comfort level  Outcome: Progressing     Problem: Safety - Adult  Goal: Free from fall injury  Outcome: Progressing     Problem: Discharge Planning  Goal: Discharge to home or other facility with appropriate resources  Outcome: Progressing     Problem: Chronic Conditions and Co-morbidities  Goal: Patient's chronic conditions and co-morbidity symptoms are monitored and maintained or improved  Outcome: Progressing     Problem: Nutrition  Goal: Nutrient intake appropriate for maintaining nutritional needs  Outcome: Progressing     Problem: Skin  Goal: Decreased wound size/increased tissue granulation at next dressing change  Outcome: Progressing  Flowsheets (Taken 6/15/2025 2314)  Decreased wound size/increased tissue granulation at next dressing change:   Promote sleep for wound healing   Protective dressings over bony prominences   Utilize specialty bed per algorithm  Goal: Participates in plan/prevention/treatment measures  Outcome: Progressing  Flowsheets (Taken 6/15/2025 2314)  Participates in plan/prevention/treatment measures:   Discuss with provider PT/OT consult   Elevate heels   Increase activity/out of bed for meals  Goal: Prevent/manage excess moisture  Outcome: Progressing  Flowsheets (Taken 6/15/2025 2314)  Prevent/manage excess moisture:   Cleanse incontinence/protect with barrier cream   Follow provider orders for dressing changes    Moisturize dry skin   Monitor for/manage infection if present   Use wicking fabric (obtain order)  Goal: Prevent/minimize sheer/friction injuries  Outcome: Progressing  Flowsheets (Taken 6/15/2025 2314)  Prevent/minimize sheer/friction injuries:   Complete micro-shifts as needed if patient unable. Adjust patient position to relieve pressure points, not a full turn   HOB 30 degrees or less   Increase activity/out of bed for meals   Turn/reposition every 2 hours/use positioning/transfer devices   Use pull sheet   Utilize specialty bed per algorithm  Goal: Promote/optimize nutrition  Outcome: Progressing  Flowsheets (Taken 6/15/2025 2314)  Promote/optimize nutrition:   Assist with feeding   Consume > 50% meals/supplements   Discuss with provider if NPO > 2 days   Monitor/record intake including meals   Offer water/supplements/favorite foods   Reassess MST if dietician not consulted  Goal: Promote skin healing  Outcome: Progressing  Flowsheets (Taken 6/15/2025 2314)  Promote skin healing:   Assess skin/pad under line(s)/device(s)   Ensure correct size (line/device) and apply per  instructions   Protective dressings over bony prominences   Rotate device position/do not position patient on device   Turn/reposition every 2 hours/use positioning/transfer devices     Problem: Fall/Injury  Goal: Not fall by end of shift  Outcome: Progressing  Goal: Be free from injury by end of the shift  Outcome: Progressing  Goal: Verbalize understanding of personal risk factors for fall in the hospital  Outcome: Progressing  Goal: Verbalize understanding of risk factor reduction measures to prevent injury from fall in the home  Outcome: Progressing  Goal: Use assistive devices by end of the shift  Outcome: Progressing  Goal: Pace activities to prevent fatigue by end of the shift  Outcome: Progressing     Problem: Pain  Goal: Takes deep breaths with improved pain control throughout the shift  Outcome: Progressing  Goal: Turns in  bed with improved pain control throughout the shift  Outcome: Progressing  Goal: Walks with improved pain control throughout the shift  Outcome: Progressing  Goal: Performs ADL's with improved pain control throughout shift  Outcome: Progressing  Goal: Participates in PT with improved pain control throughout the shift  Outcome: Progressing  Goal: Free from opioid side effects throughout the shift  Outcome: Progressing  Goal: Free from acute confusion related to pain meds throughout the shift  Outcome: Progressing     Problem: Respiratory  Goal: Clear secretions with interventions this shift  Outcome: Progressing  Goal: Minimize anxiety/maximize coping throughout shift  Outcome: Progressing  Goal: Minimal/no exertional discomfort or dyspnea this shift  Outcome: Progressing  Goal: No signs of respiratory distress (eg. Use of accessory muscles. Peds grunting)  Outcome: Progressing  Goal: Patent airway maintained this shift  Outcome: Progressing  Goal: Tolerate mechanical ventilation evidenced by VS/agitation level this shift  Outcome: Progressing  Goal: Tolerate pulmonary toileting this shift  Outcome: Progressing  Goal: Verbalize decreased shortness of breath this shift  Outcome: Progressing  Goal: Wean oxygen to maintain O2 saturation per order/standard this shift  Outcome: Progressing  Goal: Increase self care and/or family involvement in next 24 hours  Outcome: Progressing   The patient's goals for the shift include Maintain safety and comfort, return to respiratory baseline    The clinical goals for the shift include Improve respiratory status and decrease edema    Over the shift, the patient did not make progress toward the following goals. Barriers to progression include . Recommendations to address these barriers include .

## 2025-06-16 NOTE — PROGRESS NOTES
Occupational Therapy    Evaluation/Treatment    Patient Name: Vickie Foster  MRN: [id number]  Department: [address]  Room: [facility]42-  Today's Date: 06/16/25  Time Calculation  Start Time: 1323  Stop Time: 1353  Time Calculation (min): 30 min       Assessment:  OT Assessment: Pt would benefit from acute OT services to address deficits in ADLs, functional mobility, and transfers  Prognosis: Good  Barriers to Discharge Home: Physical needs  Physical Needs: Stair navigation to access bath limited by function/safety  Medical Staff Made Aware: Yes  End of Session Communication: Bedside nurse  End of Session Patient Position: Up in chair, Alarm off, not on at start of session (all needs in reach, alarm cleared to remain off per RN, pt demonstrated ability to use call light and is agreeable to call for assist)  OT Assessment Results: Decreased ADL status, Decreased upper extremity strength, Decreased endurance, Decreased functional mobility  Prognosis: Good  Medical Staff Made Aware: Yes  Strengths: Ability to acquire knowledge, Support of extended family/friends, Support and attitude of living partners  Barriers to Participation: Comorbidities  Plan:  Treatment Interventions: ADL retraining, Functional transfer training, UE strengthening/ROM, Endurance training, Patient/family training, Equipment evaluation/education  OT Frequency: 3 times per week  OT Discharge Recommendations: Low intensity level of continued care (Low d/c rec pending progress towards goals. Recommend assist upon d/c)  Equipment Recommended upon Discharge: Wheeled walker, Wheelchair  OT Recommended Transfer Status: Assist of 1  OT - OK to Discharge: Yes  Treatment Interventions: ADL retraining, Functional transfer training, UE strengthening/ROM, Endurance training, Patient/family training, Equipment evaluation/education    Subjective   Current Problem:  1. Acute on chronic diastolic congestive heart failure  Transthoracic Echo (TTE) Limited     "Transthoracic Echo (TTE) Limited    Referral to Healthy at Home Program    Wheelchair    Commode at bedside      2. Shortness of breath        3. Hypokalemia        4. Acute combined systolic (congestive) and diastolic (congestive) heart failure  Transthoracic Echo (TTE) Limited    potassium chloride CR (Klor-Con M20) 20 mEq ER tablet    torsemide (Demadex) 20 mg tablet    acetaZOLAMIDE (Diamox) 250 mg tablet    ammonium lactate (Lac-Hydrin) 12 % lotion    Basic metabolic panel    General supply request Bedside commode      5. Chronic hypoxemic respiratory failure  oxygen (O2) gas therapy      6. Other chronic pain  HYDROcodone-acetaminophen (Norco) 5-325 mg tablet      7. Primary osteoarthritis of knees, bilateral  HYDROcodone-acetaminophen (Norco) 5-325 mg tablet    General supply request Bedside commode        OT Visit Info:  OT Received On: 06/16/25  General Visit Info:   General  Reason for Referral: Impaired ADLs. 68 yo WF admitted to [facility] via ED 6/12/25 with diagnosed acute on chronic CHF.  Referred By: Harry Coronado DO  Past Medical History Relevant to Rehab: A-fib, CHF, COPD, CVA, neuropsthy, HTN, obesity, anxiety/depression, asthma, meningioma, chronic respiratory failure---2 liters o2 PRn/night  Family/Caregiver Present: No  Co-Treatment: PT  Co-Treatment Reason: to optimize pt safety, outcomes, and participation  Prior to Session Communication: Bedside nurse  Patient Position Received: Up in chair, Alarm off, not on at start of session  General Comment: Cleared by nursing. Pt pleasant and agreeable to therapy   Precautions:  Hearing/Visual Limitations: pt reports \" I am getting glasses soon.\"  Medical Precautions: Fall precautions, Oxygen therapy device and L/min (2LO2 via NC)      Vital Signs Comment: O2  91-93% with HR 77-95 bpm     Pain:  Pain Assessment  Pain Assessment: 0-10  0-10 (Numeric) Pain Score: 0 - No pain    Objective   Cognition:  Overall Cognitive Status: Within Functional " "Limits  Orientation Level: Oriented X4  Insight: Mild           Home Living:  Type of Home: House  Lives With: Spouse (son and his SO)  Home Adaptive Equipment: Walker rolling or standard, Wheelchair-manual, Reacher, Long-handled shoehorn  Home Layout: Multi-level, Stairs to alternate level with rails, Stairs to alternate level without rails  Alternate Level Stairs-Rails:  (unilateral)  Alternate Level Stairs-Number of Steps: 5 steps up to tub shower; 5 steps down to walk-in shower  Home Access: Stairs to enter with rails  Entrance Stairs-Rails:  (unilateral)  Entrance Stairs-Number of Steps: 1  Bathroom Shower/Tub: Tub/shower unit, Walk-in shower  Bathroom Toilet: Standard  Bathroom Accessibility: no bathroom on main floor where hospital bed will be placed in living room  Home Living Comments: pt to receive a hospital bed and w/c prior to homegoing; recommend a BSC also  Prior Function:  Level of [place]: Needs assistance with homemaking, Independent with ADLs and functional transfers  Receives Help From: Family  ADL Assistance: Independent  Homemaking Assistance: Needs assistance  Driving/Transportation:  (spouse and son are the 's in the family at this time)  Ambulatory Assistance:  (furniture walks and/or rollator in house; w/c in community)  Prior Function Comments: pt manages her own meds; pt admits to 13 falls in past year around the same time due to \" electrolyte imbalances\"     ADL:  Eating Assistance: Independent  Grooming Assistance: Stand by  Bathing Assistance: Moderate  UE Dressing Assistance: Stand by  LE Dressing Assistance: Moderate  Toileting Assistance with Device: Moderate  Activities of Daily Living:          LE Dressing  LE Dressing: Yes  Adult Briefs Level of Assistance: Moderate assistance  LE Dressing Where Assessed:  (standing at chair)  LE Dressing Comments: mod A to assist with threading BLEs into briefs and to pull up over hips     Activity Tolerance:  Endurance: Decreased " tolerance for upright activites     Bed Mobility/Transfers: Bed Mobility  Bed Mobility: No    Transfers  Transfer:  (min A for balance/controlled descent sit<>stand chair level, VCs for safe hand and leg placement)     Functional Mobility:  Functional Mobility  Functional Mobility Performed:  (min A for balance/safety for functional mobility short household distance with use of RW, no LOB noted)    Standing Balance:  Static Standing Balance  Static Standing-Balance Support: Bilateral upper extremity supported  Static Standing-Level of Assistance: Close supervision  Static Standing-Comment/Number of Minutes: ~2-3 for ADL tasks performed in standing with use of RW  Sensation:  Sensation Comment: denies numbness/paresthesia BUEs  Strength:  Strength Comments: BUEs grossly 3+/5  Hand Function:  Hand Function  Gross Grasp: Functional  Coordination: Functional  Extremities: RUE   RUE : Within Functional Limits and LUE   LUE: Within Functional Limits    Outcome Measures: [facility] Daily Activity  Putting on and taking off regular lower body clothing: A lot  Bathing (including washing, rinsing, drying): A lot  Putting on and taking off regular upper body clothing: A little  Toileting, which includes using toilet, bedpan or urinal: A lot  Taking care of personal grooming such as brushing teeth: A little  Eating Meals: None  Daily Activity - Total Score: 16      Education Documentation  ADL Training, taught by Jenn Miner OT at 6/16/2025  3:23 PM.  Learner: Patient  Readiness: Acceptance  Method: Explanation, Demonstration  Response: Verbalizes Understanding  Comment: Educated on OT POC    Education Comments  No comments found.      Goals:  Encounter Problems       Encounter Problems (Active)       ADLs       Pt will complete ADL tasks at mod I with use of AE prn  (Progressing)       Start:  06/16/25    Expected End:  07/07/25               Functional Mobility       Pt will perform functional mobility household  distances at mod I with use of RW.    (Progressing)       Start:  06/16/25    Expected End:  07/07/25               OT Transfers       Pt will perform functional transfers at mod I. (Progressing)       Start:  06/16/25    Expected End:  07/07/25

## 2025-06-16 NOTE — CONSULTS
Wound Care Consult     Visit Date: 6/16/2025      Patient Name: Vickie Foster         MRN: [id number]             Reason for Consult: Wound of the posterior lateral right lower extremity        Wound History: Defer to the patient's chart     Pertinent Labs: Defer to the patient's chart      Assessment:  Wound 06/13/25 Tibial Posterior;Right (Active)   Date First Assessed/Time First Assessed: 06/13/25 1624   Location: Tibial  Wound Location Orientation: Posterior;Right      Assessments 6/16/2025  1:10 PM   Wound Length (cm) 1 cm   Wound Width (cm) 1.2 cm   Wound Surface Area (cm^2) 0.94 cm^2   State of Healing Epithelialized       No associated orders.   Hermann of 18 at time of wound care consult/ assessment                Wound presents with nearly complete epithelial covering   ( wound healing)    Wound Plan: Wound care recommendations:     Wash wound and affected area with soap and water and pat dry.     Apply Mepitel-One to healing wound bed.     Bilateral Compression wraps as follows:   1) Apply Kerlix roll gauze starting at the base of the toes at the metatarsals to just below the knees at the tibial tuberosities.   2) Apply a 4 inch ace wrap from the metatarsals to the mid ankle.   3) Apply a 6 inch ace wrap from the mid ankle to just below the knee at the tibial tuberosities.     * may take wraps down for Hermann and skin audits .       Moiz Verma RN  6/16/2025  2:21 PM

## 2025-06-16 NOTE — DISCHARGE SUMMARY
Discharge Diagnosis  Acute on chronic diastolic congestive heart failure  Metabolic alkalosis            Issues Requiring Follow-Up  Follow up PCP 1-2 weeks  Follow up Nephrology 2 weeks  Repeat BMP 1 week     Discharge Meds     Medication List      START taking these medications     acetaZOLAMIDE 250 mg tablet; Commonly known as: Diamox; Take 1 tablet   (250 mg) by mouth 2 times a day.   ammonium lactate 12 % lotion; Commonly known as: Lac-Hydrin; Apply   topically 2 times a day.     CHANGE how you take these medications     oxygen gas therapy; Commonly known as: O2; Inhale 2 L/min at 120,000   mL/hr continuously. At night and as needed; What changed: how much to   take, when to take this, additional instructions, how fast to infuse this   potassium chloride CR 20 mEq ER tablet; Commonly known as: Klor-Con M20;   Take 2 tablets (40 mEq) by mouth 2 times a day. Do not crush or chew.;   What changed: how much to take   torsemide 60 mg tablet; Take 60 mg by mouth 2 times daily (morning and   late afternoon).; What changed: medication strength, how much to take,   when to take this     CONTINUE taking these medications     acetaminophen 500 mg tablet; Commonly known as: Tylenol; Take 2 tablets   (1,000 mg) by mouth every 6 hours if needed for mild pain (1 - 3) for up   to 10 days.   allopurinol 300 mg tablet; Commonly known as: Zyloprim; Take 1 tablet   (300 mg) by mouth once daily.   atorvastatin 40 mg tablet; Commonly known as: Lipitor; Take 1 tablet (40   mg) by mouth once daily.   Rick Back and Body 500-32.5 mg tablet; Generic drug: aspirin-caffeine   biotin 5 mg tablet   buPROPion  mg 24 hr tablet; Commonly known as: Wellbutrin XL; Take   1 tablet (300 mg) by mouth once daily in the morning. Take before meals.   cholecalciferol 125 mcg (5,000 units) tablet; Commonly known as: Vitamin   D-3   Eliquis 5 mg tablet; Generic drug: apixaban; Take 1 tablet (5 mg) by   mouth 2 times a day.   gabapentin 300 mg  capsule; Commonly known as: Neurontin; Take 1 capsule   (300 mg) by mouth 3 times a day.   metoprolol succinate XL 25 mg 24 hr tablet; Commonly known as:   Toprol-XL; Take 1 tablet (25 mg) by mouth once daily.   multivitamin tablet   Vitamin C 500 mg tablet; Generic drug: ascorbic acid     STOP taking these medications     amoxicillin-clavulanate 875-125 mg tablet; Commonly known as: Augmentin   hydrOXYzine HCL 10 mg tablet; Commonly known as: Atarax     ASK your doctor about these medications     fluticasone propion-salmeteroL 100-50 mcg/dose diskus inhaler; Commonly   known as: Advair Diskus; Inhale 1 puff 2 times a day. Rinse mouth with   water after use to reduce aftertaste and incidence of candidiasis. Do not   swallow.       Test Results Pending At Discharge  Pending Labs       No current pending labs.            Hospital Course  69 year old female pmhx chronic afib, D-CHF, Chronic respiratory failure baseline 2L 02, COPD, admitted 6/12/25 for shortness of breath. Treated for acute on chronic D-CHF and metabolic alkalosis. Echo 6/13/25 with normal LV systolic function with EF 60-65%. S/p eval with cardiology and nephrology. Diuresis adjusted per nephrology.     At this point, patient is awake alert oriented x3. Denies any pain. States her breathing is back at her baseline. Reports improved BL-LE edema since admission. States overall she is feeling better since admission. Requesting discharge.   Patient has been cleared to discharge by all specialists.    Patient to follow up PCP 1-2 weeks, nephrology 2 weeks, repeat BMP 1 week.      Pertinent Physical Exam At Time of Discharge  Physical Exam  Vitals and nursing note reviewed. Exam conducted with a chaperone present (SYDNEY Griffin).   Constitutional:       Appearance: She is obese.   HENT:      Head: Normocephalic and atraumatic.      Right Ear: External ear normal.      Left Ear: External ear normal.      Nose: Nose normal.      Mouth/Throat:      Mouth: Mucous  membranes are moist.   Eyes:      Extraocular Movements: Extraocular movements intact.      Conjunctiva/sclera: Conjunctivae normal.   Cardiovascular:      Rate and Rhythm: Normal rate.   Pulmonary:      Effort: Pulmonary effort is normal.      Breath sounds: Normal breath sounds.      Comments: Chronically 2L 02 via NC  Diminished all fields, but believe chronic  Abdominal:      General: Bowel sounds are normal.      Palpations: Abdomen is soft.   Musculoskeletal:         General: Normal range of motion.      Cervical back: Normal range of motion and neck supple.      Right lower leg: Edema present.      Left lower leg: Edema present.      Comments: Improved BLLE edema per patient   Skin:     General: Skin is warm and dry.      Capillary Refill: Capillary refill takes less than 2 seconds.   Neurological:      Mental Status: She is alert and oriented to person, place, and time.   Psychiatric:         Mood and Affect: Mood normal.         Behavior: Behavior normal.         Outpatient Follow-Up  Future Appointments   Date Time Provider Department [place]   6/17/2025  3:30 PM Servando Osman PTA, ATC [facility]   6/24/2025  1:15 PM Raymond Dick DO [id number] [facility]   6/24/2025  1:15 PM Agus Solo PT [facility]   6/26/2025 11:30 AM Amado Ty MD [id number] [facility]   6/27/2025  3:40 PM KEVON Smith-CNP [id number] [place]   7/8/2025 12:00 PM Servando Osman PTA, ATC [facility]   7/10/2025  3:30 PM Agus Solo PT [facility]   7/17/2025  3:30 PM Agus Solo PT [facility]   7/22/2025  3:30 PM Agus Solo, PT [facility]   7/31/2025  3:30 PM gAus Solo PT [facility]   8/5/2025  3:30 PM Agus Solo PT [facility]   8/7/2025  2:45 PM Joseph Kelley MD [id number] [facility]   8/7/2025  3:30 PM Agus Solo PT [facility]   5/27/2026  1:15 PM Shankar Lai MD [id number] [facility]     Time spent on discharge 40 minutes    Kate Johansen, APRN-CNP

## 2025-06-16 NOTE — PROGRESS NOTES
"Vickie Foster is a 69 y.o. female on day 3 of admission presenting with Acute on chronic diastolic congestive heart failure.    Subjective   Alert and oriented, sitting in a chair at bedside.  Patient reports feeling improved from yesterday.  Patient denies chest pain, palpitations, feelings of fast heart rate.       Objective     Physical Exam    Appearance: Alert, oriented, cooperative, in no acute distress.      Eyes: Conjunctiva pink with no redness or exudates. Eyelids without lesions. No scleral icterus.      ENT: Hearing grossly intact. External inspection of ears without lesions or erythema. no nasal flaring. no tripoding, no drooling, no difficulty swallowing oral secretions.     Pulmonary: Diminished breath sounds bilaterally in all lobes. No rales, rhonchi or wheezing. No accessory muscle use or stridor.  Very mild conversational dyspnea     Cardiac: Irregular rhythm, normal rate no rub, gallop. No JVD.     Musculoskeletal: No cyanosis, clubbing.  Bilateral +2 pitting lower extremity edema, ace wraps in place.  Capillary refill less than 2 seconds in lower extremities     Neurological: no focal findings identified.     Psychiatric: Appropriate mood and affect.    Last Recorded Vitals  Blood pressure 113/70, pulse 77, temperature 36.4 °C (97.5 °F), temperature source Oral, resp. rate 18, height 1.549 m (5' 1\"), weight 120 kg (265 lb 3.2 oz), SpO2 97%.  Intake/Output last 3 Shifts:  I/O last 3 completed shifts:  In: 340 (2.8 mL/kg) [P.O.:340]  Out: 1700 (14 mL/kg) [Urine:1700 (0.4 mL/kg/hr)]  Weight: 121 kg     Relevant Results  Results for orders placed or performed during the hospital encounter of 06/12/25 (from the past 24 hours)   CBC   Result Value Ref Range    WBC 8.1 4.4 - 11.3 x10*3/uL    nRBC 0.0 0.0 - 0.0 /100 WBCs    RBC 4.07 4.00 - 5.20 x10*6/uL    Hemoglobin 11.9 (L) 12.0 - 16.0 g/dL    Hematocrit 38.1 36.0 - 46.0 %    MCV 94 80 - 100 fL    MCH 29.2 26.0 - 34.0 pg    MCHC 31.2 (L) 32.0 - 36.0 " g/dL    RDW 16.1 (H) 11.5 - 14.5 %    Platelets 219 150 - 450 x10*3/uL   Renal Function Panel   Result Value Ref Range    Glucose 91 74 - 99 mg/dL    Sodium 141 136 - 145 mmol/L    Potassium 4.2 3.5 - 5.3 mmol/L    Chloride 102 98 - 107 mmol/L    Bicarbonate 35 (H) 21 - 32 mmol/L    Anion Gap 8 (L) 10 - 20 mmol/L    Urea Nitrogen 26 (H) 6 - 23 mg/dL    Creatinine 0.67 0.50 - 1.05 mg/dL    eGFR >90 >60 mL/min/1.73m*2    Calcium 9.8 8.6 - 10.3 mg/dL    Phosphorus 3.5 2.5 - 4.9 mg/dL    Albumin 3.7 3.4 - 5.0 g/dL     *Note: Due to a large number of results and/or encounters for the requested time period, some results have not been displayed. A complete set of results can be found in Results Review.     Transthoracic Echo (TTE) Limited  Result Date: 6/15/2025           [place] [address]            Phone [phone] TRANSTHORACIC ECHOCARDIOGRAM REPORT Patient Name:       SOURAV MIN      Adin Physician:    [id number] [facility] Study Date:         6/13/2025            Ordering Provider:    Amy NEUMANN MRN/PID:            [id number]             Fellow: Accession#:         [id number]         Nurse: Date of Birth/Age:  1955 / 69      Sonographer:          Lucius Jackson RDCS                     years Gender Assigned at  F                    Additional Staff: Birth: Height:             154.94 cm            Admit Date: Weight:             117.94 kg            Admission Status:     Inpatient -                                                                Routine BSA / BMI:          2.11 m2 / 49.13      Department Location:  [facility]                     kg/m2 Blood Pressure: 122 /46 mmHg Study Type:    TRANSTHORACIC ECHO (TTE) LIMITED Diagnosis/ICD: Acute combined systolic (congestive) and diastolic (congestive)                 heart failure (CHF)-I50.41 Indication:    SOB, edema CPT Codes:     Echo Limited-90048; Doppler Limited-42754; Color Doppler-75439 Patient History: BMI:               Obese >30 Pertinent History: A-Fib, CAD, CHF, Dyspnea, HTN and LE Edema. PHTN. Study Detail: The following Echo studies were performed: 2D, Doppler and color               flow. Technically challenging study due to body habitus and               patient lying in supine position.  PHYSICIAN INTERPRETATION: Left Ventricle: The left ventricular systolic function is normal with a visually estimated ejection fraction of 60-65%. There is moderate concentric left ventricular hypertrophy. There are no regional wall motion abnormalities. The left ventricular cavity size is normal. There is moderately increased septal and mildly increased posterior left ventricular wall thickness. Left ventricular diastolic filling was not assessed. Left Atrium: The left atrial size is severely dilated. Right Ventricle: The right ventricle is mildly enlarged. There is normal right ventricular global systolic function. Right Atrium: The right atrial size is moderately dilated. Aortic Valve: The aortic valve is trileaflet. There is no evidence of aortic valve regurgitation. Mitral Valve: The mitral valve is normal in structure. There is trace to mild mitral valve regurgitation. Tricuspid Valve: The tricuspid valve is structurally normal. No evidence of tricuspid regurgitation. Pulmonic Valve: The pulmonic valve is not well visualized. The pulmonic valve regurgitation was not well visualized. Pericardium: No pericardial effusion noted. Aorta: The aortic root is normal. Systemic Veins: The inferior vena cava appears dilated, with IVC inspiratory collapse less than 50%.  CONCLUSIONS:  1. The left ventricular systolic function is normal with a visually estimated ejection fraction of 60-65%.  2. There is normal right ventricular global systolic function.  3. Mildly enlarged right  ventricle.  4. The left atrial size is severely dilated.  5. The right atrial size is moderately dilated.  6. There is moderate concentric left ventricular hypertrophy.  7. There is moderately increased septal and mildly increased posterior left ventricular wall thickness. QUANTITATIVE DATA SUMMARY:  2D MEASUREMENTS:            Normal Ranges: LAs:             5.40 cm    (2.7-4.0cm) IVSd:            1.47 cm    (0.6-1.1cm) LVPWd:           1.17 cm    (0.6-1.1cm) LVIDd:           4.65 cm    (3.9-5.9cm) LVIDs:           2.99 cm LV Mass Index:   113.3 g/m2 LV % FS          35.7 %  LV SYSTOLIC FUNCTION:                      Normal Ranges: EF-A4C View:    62 % (>=55%) EF-Visual:      63 % LV EF Reported: 63 %  TRICUSPID VALVE/RVSP:          Normal Ranges: Peak TR Velocity:     3.12 m/s  [id number] Servando Lundberg DO Electronically signed on 6/15/2025 at 11:15:41 AM  ** Final **     Transthoracic Echo (TTE) Complete  Result Date: 12/16/2024           [place] [address]            Phone [phone] TRANSTHORACIC ECHOCARDIOGRAM REPORT Patient Name:       SOURAV Oakley Physician:    [id number] Phi Perez DO Study Date:         12/16/2024          Ordering Provider:    [id number] ANICETO CHAPIN MRN/PID:            [id number]            Fellow: Accession#:         [id number]        Nurse: Date of Birth/Age:  1955 / 68     Sonographer:          Patricia OMALLEY Gender Assigned at  F                   Additional Staff: Birth: Height:             157.50 cm           Admit Date:           12/16/2024 Weight:             103.87 kg           Admission Status:     Inpatient -                                                               Routine BSA / BMI:          2.02 m2 / 41.87     Department  Location:                     kg/m2 Blood Pressure: 101 /63 mmHg Study Type:    TRANSTHORACIC ECHO (TTE) COMPLETE Diagnosis/ICD: Acute on chronic combined systolic (congestive) and diastolic                (congestive) heart failure (CHF)-I50.43 Indication:    Assess LV Function CPT Codes:     Echo Complete w Full Doppler-45229 Patient History: Pertinent History: HTN Cardiomegaly COPD Former Smoker CAD PAF A-fib HF Coronary                    Arteriosclerosis. Study Detail: The following Echo studies were performed: 2D, M-Mode, Doppler and               color flow. Technically challenging study due to patient lying in               supine position, prominent lung artifact and body habitus.               Definity used as a contrast agent for endocardial border               definition. Unable to obtain suprasternal notch view.  PHYSICIAN INTERPRETATION: Left Ventricle: Left ventricular ejection fraction is normal, by visual estimate at 60-65%. There is mild concentric left ventricular hypertrophy. There are no regional wall motion abnormalities. The left ventricular cavity size is normal. There is mild increased septal and mildly increased posterior left ventricular wall thickness. There is left ventricular concentric remodeling. Spectral Doppler shows a Grade I (impaired relaxation pattern) of left ventricular diastolic filling with normal left atrial filling pressure. Left Atrium: The left atrium is severely dilated. Right Ventricle: The right ventricle is normal in size. There is normal right ventricular global systolic function. Right Atrium: The right atrium is normal in size. Aortic Valve: The aortic valve appears abnormal. There is mild to moderate aortic valve cusp calcification. There is mild aortic valve thickening. There is evidence of mildly elevated transaortic gradients consistent with sclerosis of the aortic valve. There is no evidence of aortic valve regurgitation. The peak instantaneous gradient of  the aortic valve is 14 mmHg. Mitral Valve: The mitral valve is abnormal. There is moderate mitral annular dilatation. There is mild mitral valve regurgitation. Tricuspid Valve: The tricuspid valve is structurally normal. There is moderate tricuspid regurgitation. Pulmonic Valve: The pulmonic valve is structurally normal. There is no indication of pulmonic valve regurgitation. Pericardium: No pericardial effusion noted. Aorta: The aortic root is normal. Systemic Veins: The inferior vena cava appears dilated.  CONCLUSIONS:  1. Left ventricular ejection fraction is normal, by visual estimate at 60-65%.  2. Spectral Doppler shows a Grade I (impaired relaxation pattern) of left ventricular diastolic filling with normal left atrial filling pressure.  3. There is normal right ventricular global systolic function.  4. The left atrium is severely dilated.  5. There is moderate mitral annular dilatation.  6. Moderate tricuspid regurgitation.  7. Aortic valve sclerosis. QUANTITATIVE DATA SUMMARY:  2D MEASUREMENTS:           Normal Ranges: Ao Root s:       3.41 cm IVSd:            1.16 cm   (0.6-1.1cm) LVPWd:           1.03 cm   (0.6-1.1cm) LVIDd:           4.68 cm   (3.9-5.9cm) LVIDs:           2.78 cm LV Mass Index:   91.4 g/m2 LV % FS          40.6 %  LA VOLUME:                     Normal Ranges: LA Vol A4C:        215.5 ml    (22+/-6mL/m2) LA Vol A2C:        193.1 ml LA Vol BP:         210.6 ml LA Vol Index A4C:  106.4ml/m2 LA Vol Index A2C:  95.4 ml/m2 LA Vol Index BP:   104.0 ml/m2 LA Area A4C:       43.9 cm2 LA Area A2C:       42.9 cm2 LA Major Axis A4C: 7.6 cm LA Major Axis A2C: 8.1 cm LA Volume Index:   104.5 ml/m2 LA Vol A4C:        194.3 ml LA Vol A2C:        183.4 ml LA Vol Index BSA:  93.3 ml/m2  RA VOLUME BY A/L METHOD:            Normal Ranges: RA Vol A4C:              72.0 ml    (8.3-19.5ml) RA Vol Index A4C:        35.6 ml/m2 RA Area A4C:             24.0 cm2 RA Major Axis A4C:       6.8 cm  M-MODE  MEASUREMENTS:         Normal Ranges: LAs:                 5.65 cm (2.7-4.0cm)  AORTA MEASUREMENTS:         Normal Ranges: Ao Sinus, d:        3.40 cm (2.1-3.5cm) Ao STJ, d:          3.20 cm (1.7-3.4cm) Asc Ao, d:          3.30 cm (2.1-3.4cm)  LV SYSTOLIC FUNCTION BY 2D PLANIMETRY (MOD):                      Normal Ranges: EF-A4C View:    75 % (>=55%) EF-A2C View:    53 % EF-Biplane:     68 % EF-Visual:      63 % LV EF Reported: 63 %  LV DIASTOLIC FUNCTION:             Normal Ranges: MV Peak E:             0.88 m/s    (0.7-1.2 m/s) MV Peak A:             0.28 m/s    (0.42-0.7 m/s) E/A Ratio:             3.12        (1.0-2.2) MV e'                  0.102 m/s   (>8.0) MV lateral e'          0.13 m/s MV medial e'           0.07 m/s MV A Dur:              110.37 msec E/e' Ratio:            8.66        (<8.0) PulmV Sys Sean:         44.95 cm/s PulmV Vizcarra Sean:        25.40 cm/s PulmV S/D Sean:         1.77  MITRAL VALVE:          Normal Ranges: MV DT:        175 msec (150-240msec)  AORTIC VALVE:            Normal Ranges: AoV Vmax:      1.84 m/s  (<=1.7m/s) AoV Peak P.6 mmHg (<20mmHg) LVOT Max Sean:  1.34 m/s  (<=1.1m/s) LVOT VTI:      21.54 cm LVOT Diameter: 2.04 cm   (1.8-2.4cm) AoV Area,Vmax: 2.38 cm2  (2.5-4.5cm2)  RIGHT VENTRICLE: RV Basal 4.20 cm RV Mid   3.10 cm RV Major 6.3 cm  TRICUSPID VALVE/RVSP:          Normal Ranges: Peak TR Velocity:     3.90 m/s RV Syst Pressure:     64 mmHg  (< 30mmHg) IVC Diam:             2.29 cm  PULMONIC VALVE:          Normal Ranges: PV Accel Time:  32 msec  (>120ms) PV Max Sean:     1.1 m/s  (0.6-0.9m/s) PV Max P.6 mmHg  Pulmonary Veins: PulmV Vizcarra Sean: 25.40 cm/s PulmV S/D Sean:  1.77 PulmV Sys Sean:  44.95 cm/s  AORTA: Asc Ao Diam 3.31 cm  [id number] Phi Perez DO Electronically signed on 2024 at 4:50:47 PM  ** Final **     Transthoracic Echo (TTE) Complete  Result Date: 2024           [place] [id number]             Phone [phone] TRANSTHORACIC ECHOCARDIOGRAM REPORT Patient Name:      SOURAV MIN       Reading Physician:    [id number] Raymond                                                                Josephbetty  Study Date:        7/11/2024             Ordering Provider:    [id number] NIRAV MELARA MRN/PID:           [id number]              Fellow: Accession#:        [id number]          Nurse: Date of Birth/Age: 1955 / 68 years Sonographer:          Rehtt Matute RDCS Gender:            F                     Additional Staff: Height:            155.00 cm             Admit Date: Weight:            112.00 kg             Admission Status:     Inpatient -                                                                Routine BSA / BMI:         2.07 m2 / 46.62 kg/m2 Department Location:  [facility] Blood Pressure: 119 /86 mmHg Study Type:    TRANSTHORACIC ECHO (TTE) COMPLETE Diagnosis/ICD: Other transient cerebral ischemic attacks and related                syndromes-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 MORLEY,TANVIR,PCI. Study Detail: The following Echo studies were performed: 2D, M-Mode, Doppler and               color flow. Technically challenging study due to poor acoustic               windows, the patient's lack of cooperation, body habitus and               patient lying in supine position.  PHYSICIAN INTERPRETATION: Left Ventricle: The left ventricular systolic function is normal, with a visually estimated ejection fraction of 60-65%. There are no regional wall motion abnormalities. The left ventricular cavity size is normal. The left ventricular septal wall thickness is mildly increased. There is mildly  increased left ventricular posterior wall thickness. Left ventricular diastolic filling was indeterminate. Left Atrium: The left atrium is moderately dilated. Right Ventricle: The right ventricle is normal in size. There is normal right ventricular global systolic function. Right Atrium: The right atrium is normal in size. Aortic Valve: The aortic valve is trileaflet. The aortic valve dimensionless index is 0.76. There is no evidence of aortic valve regurgitation. The peak instantaneous gradient of the aortic valve is 8.5 mmHg. The mean gradient of the aortic valve is 3.8 mmHg. Mitral Valve: The mitral valve is normal in structure. There is no evidence of mitral valve regurgitation. Tricuspid Valve: The tricuspid valve is structurally normal. No evidence of tricuspid regurgitation. Pulmonic Valve: The pulmonic valve is not well visualized. The pulmonic valve regurgitation was not well visualized. Pericardium: There is no pericardial effusion noted. Aorta: The aortic root is normal.  CONCLUSIONS:  1. The left ventricular systolic function is normal, with a visually estimated ejection fraction of 60-65%.  2. Left ventricular diastolic filling was indeterminate.  3. There is normal right ventricular global systolic function.  4. The left atrium is moderately dilated. QUANTITATIVE DATA SUMMARY: 2D MEASUREMENTS:                           Normal Ranges: Ao Root s:     4.11 cm IVSd:          1.32 cm    (0.6-1.1cm) LVPWd:         5.22 cm    (0.6-1.1cm) LVIDd:         1.37 cm    (3.9-5.9cm) LVIDs:         2.42 cm LV Mass Index: 198.7 g/m2 LV % FS        -76.1 % LA VOLUME:                             Normal Ranges: LA Volume Index: 57.0 ml/m2 LA Vol A4C:      164.4 ml LA Vol A2C:      160.8 ml RA VOLUME BY A/L METHOD:                       Normal Ranges: RA Area A4C: 24.0 cm2 LV SYSTOLIC FUNCTION BY 2D PLANIMETRY (MOD):                      Normal Ranges: EF-A4C View:    60 % (>=55%) EF-A2C View:    53 % EF-Biplane:     58 %  EF-Visual:      63 % LV EF Reported: 63 % LV DIASTOLIC FUNCTION:                     Normal Ranges: MV Peak E: 0.78 m/s (0.7-1.2 m/s) MV Peak A: 0.01 m/s (0.42-0.7 m/s) E/A Ratio: 115.55   (1.0-2.2) MITRAL VALVE:                 Normal Ranges: MV DT: 248 msec (150-240msec) AORTIC VALVE:                                   Normal Ranges: AoV Vmax:                1.46 m/s (<=1.7m/s) AoV Peak P.5 mmHg (<20mmHg) AoV Mean PG:             3.8 mmHg (1.7-11.5mmHg) LVOT Max Sean:            0.89 m/s (<=1.1m/s) AoV VTI:                 21.84 cm (18-25cm) LVOT VTI:                16.56 cm LVOT Diameter:           1.97 cm  (1.8-2.4cm) AoV Area, VTI:           2.32 cm2 (2.5-5.5cm2) AoV Area,Vmax:           1.87 cm2 (2.5-4.5cm2) AoV Dimensionless Index: 0.76  RIGHT VENTRICLE: RV Basal 4.70 cm RV Mid   3.70 cm RV Major 6.4 cm TRICUSPID VALVE/RVSP:                             Normal Ranges: Peak TR Velocity: 2.78 m/s RV Syst Pressure: 33.8 mmHg (< 30mmHg) IVC Diam:         1.97 cm AORTA: Asc Ao Diam 3.22 cm  [id number] Raymond Dick DO Electronically signed on 2024 at 1:02:11 PM  ** Final **     Transthoracic Echo (TTE) Complete  Result Date: 2024           [place] [id number]            Phone [phone] TRANSTHORACIC ECHOCARDIOGRAM REPORT Patient Name:     SOURAV Oakley Physician[facility]   [id number] [facility] Study Date:       2024             Ordering Provider:   [id number] MELLISSA MULLINS MRN/PID:          [id number]             Fellow: Accession#:       [id number]         Nurse: Date of           1955      Sonographer:         Dalila Kaba RDCS Birth/Age:        years Gender:           F                    Additional Staff: Height:           157.48 cm            Admit Date:          2024 Weight:           113.85 kg            Admission Status:    Inpatient - STAT BSA / BMI:         2.11 m2 / 45.91      Department Location: [facility] ICU                   kg/m2 Blood Pressure: 161 /96 mmHg Study Type:    TRANSTHORACIC ECHO (TTE) COMPLETE Diagnosis/ICD: Dyspnea, unspecified-R06.00 Indication:    dypsnea post afib ablation, r/o pericardial disease CPT Codes:     Echo Complete w Full Doppler-22940 Patient History: Pertinent History: HTN, CHF, cardiomegaly, coronary arteriosclerosis, AFIB,                    PHTN, COPD, GERD. Study Detail: The following Echo studies were performed: 2D, M-Mode, Doppler and               color flow.  PHYSICIAN INTERPRETATION: Left Ventricle: The left ventricular systolic function is normal, with a visually estimated ejection fraction of 65-70%. There are no regional wall motion abnormalities. The left ventricular cavity size is normal. Spectral Doppler shows an impaired relaxation pattern of left ventricular diastolic filling. Left Atrium: The left atrium is moderately dilated. Right Ventricle: The right ventricle is normal in size. There is normal right ventricular global systolic function. Right Atrium: The right atrium is mildly dilated. Aortic Valve: The aortic valve is trileaflet. The aortic valve dimensionless index is 0.87. There is no evidence of aortic valve regurgitation. The peak instantaneous gradient of the aortic valve is 18.6 mmHg. The mean gradient of the aortic valve is 8.4 mmHg. Mitral Valve: The mitral valve is normal in structure. There is mild to moderate mitral valve regurgitation. Tricuspid Valve: The tricuspid valve is structurally normal. There is mild to moderate tricuspid regurgitation. Pulmonic Valve: The pulmonic valve is not well visualized. The pulmonic valve regurgitation was not well visualized. Pericardium: There is no pericardial effusion noted. Aorta: The aortic root is normal.  CONCLUSIONS:  1. The left ventricular systolic function is normal, with a visually estimated ejection fraction of 65-70%.  2. Spectral Doppler shows an  impaired relaxation pattern of left ventricular diastolic filling.  3. There is normal right ventricular global systolic function.  4. The left atrium is moderately dilated.  5. Mild to moderate mitral valve regurgitation.  6. Mild to moderate tricuspid regurgitation. QUANTITATIVE DATA SUMMARY: 2D MEASUREMENTS:                           Normal Ranges: IVSd:          1.45 cm    (0.6-1.1cm) LVPWd:         1.14 cm    (0.6-1.1cm) LVIDd:         4.75 cm    (3.9-5.9cm) LVIDs:         3.05 cm LV Mass Index: 114.6 g/m2 LV % FS        35.7 % LA VOLUME:                               Normal Ranges: LA Vol A4C:        144.8 ml   (22+/-6mL/m2) LA Vol A2C:        183.3 ml LA Vol BP:         179.1 ml LA Vol Index A4C:  68.8 ml/m2 LA Vol Index A2C:  87.1 ml/m2 LA Vol Index BP:   85.1 ml/m2 LA Area A4C:       38.5 cm2 LA Area A2C:       39.4 cm2 LA Major Axis A4C: 8.7 cm LA Major Axis A2C: 7.2 cm LA Volume Index:   85.0 ml/m2 LA Vol A4C:        144.0 ml LA Vol A2C:        184.0 ml RA VOLUME BY A/L METHOD:                               Normal Ranges: RA Vol A4C:        97.3 ml    (8.3-19.5ml) RA Vol Index A4C:  46.2 ml/m2 RA Area A4C:       28.9 cm2 RA Major Axis A4C: 7.3 cm AORTA MEASUREMENTS:                      Normal Ranges: Ao Sinus, d: 2.90 cm (2.1-3.5cm) Ao STJ, d:   2.60 cm (1.7-3.4cm) Asc Ao, d:   3.30 cm (2.1-3.4cm) LV SYSTOLIC FUNCTION BY 2D PLANIMETRY (MOD):                      Normal Ranges: EF-A4C View:    62 % (>=55%) EF-A2C View:    64 % EF-Biplane:     63 % EF-Visual:      68 % LV EF Reported: 68 % LV DIASTOLIC FUNCTION:                         Normal Ranges: MV Peak E:    1.24 m/s  (0.7-1.2 m/s) MV Peak A:    0.01 m/s  (0.42-0.7 m/s) E/A Ratio:    106.77    (1.0-2.2) MV e'         0.109 m/s (>8.0) MV lateral e' 0.17 m/s MV medial e'  0.05 m/s E/e' Ratio:   11.36     (<8.0) MITRAL VALVE:                 Normal Ranges: MV DT: 134 msec (150-240msec) AORTIC VALVE:                                    Normal Ranges: AoV  Vmax:                2.15 m/s  (<=1.7m/s) AoV Peak P.6 mmHg (<20mmHg) AoV Mean P.4 mmHg  (1.7-11.5mmHg) LVOT Max Sean:            1.64 m/s  (<=1.1m/s) AoV VTI:                 31.64 cm  (18-25cm) LVOT VTI:                27.54 cm LVOT Diameter:           2.04 cm   (1.8-2.4cm) AoV Area, VTI:           2.83 cm2  (2.5-5.5cm2) AoV Area,Vmax:           2.48 cm2  (2.5-4.5cm2) AoV Dimensionless Index: 0.87  RIGHT VENTRICLE: RV Basal 4.58 cm RV Major 7.5 cm TAPSE:   19.5 mm RV s'    0.15 m/s TRICUSPID VALVE/RVSP:                             Normal Ranges: Peak TR Velocity: 3.62 m/s RV Syst Pressure: 55.4 mmHg (< 30mmHg) IVC Diam:         2.41 cm AORTA: Asc Ao Diam 3.35 cm  [id number] [facility]  Electronically signed on 2024 at 4:24:40 PM  ** Final **     Transthoracic echo (TTE) complete  Result Date: 2024   [facility], [address] [place] [address]            Tel [phone] and Fax [phone] TRANSTHORACIC ECHOCARDIOGRAM REPORT  Patient Name:      SOURAV KHALIF Oakley Physician:    [id number] Demetrius Guerrero MD Study Date:        2024            Ordering Provider:    [id number] AGA CAMPBELL MRN/PID:           [id number]             Fellow: Accession#:        [id number]         Nurse: Date of Birth/Age: 1955      Sonographer:          Price parks RDCS Gender:            F                    Additional Staff: Height:            157.48 cm            Admit Date: Weight:            112.49 kg            Admission Status:     Outpatient BSA / BMI:         2.09 m2 / 45.36      Encounter#:           [id number]                    kg/m2 Blood Pressure:    138/84 mmHg          Department Location:  [facility] Study Type:    TRANSTHORACIC ECHO (TTE) COMPLETE  Diagnosis/ICD: Paroxysmal atrial fibrillation-I48.0; Other forms of                dyspnea-R06.09 Indication:    afib, MORLEY CPT Code:      Echo Complete w Full Doppler-[id number] Patient History: Pertinent History: HTN, diastolic HF, cardiomegaly, afib, PHTN, CHF,MORLEY, morbid                    obesity. Study Detail: The following Echo studies were performed: M-Mode, 2D, Doppler and               color flow. Technically challenging study due to body habitus,               poor acoustic windows and prominent lung artifact.  PHYSICIAN INTERPRETATION: Left Ventricle: Left ventricular ejection fraction is normal, by visual estimate at 60-65%. There are no regional wall motion abnormalities. The left ventricular cavity size is normal. There is mild to moderate concentric left ventricular hypertrophy. Left ventricular diastolic filling was not assessed. Left Atrium: The left atrium is moderate to severely dilated. Right Ventricle: The right ventricle is normal in size. There is normal right ventriclar wall thickness. There is normal right ventricular global systolic function. Right Atrium: The right atrium is normal in size. Aortic Valve: The aortic valve appears structurally normal. The aortic valve appears tricuspid and non-restricted. There is no evidence of aortic valve regurgitation. The peak instantaneous gradient of the aortic valve is 8.7 mmHg. Mitral Valve: The mitral valve is normal in structure. There is normal mitral valve leaflet mobility. There is trace to mild mitral valve regurgitation. Tricuspid Valve: The tricuspid valve is structurally normal. There is normal tricuspid valve leaflet mobility. There is mild tricuspid regurgitation. The Doppler estimated RVSP is mildly elevated at 43.1 mmHg. Pulmonic Valve: The pulmonic valve is structurally normal. There is trace pulmonic valve regurgitation. Pericardium: There is no pericardial effusion noted. Aorta: The aortic root is normal. The Ao Sinus is 3.70 cm. The Asc  Ao is 3.50 cm. There is no dilatation of the aortic arch. There is no dilatation of the ascending aorta. There is no dilatation of the aortic root. There is plaque visualized in the ascending aorta, which is classified as a Grade 2 [mild (focal or diffuse) intimal thickening of 2-3 mm] atherosclerosis. Pulmonary Artery: The pulmonary artery is normal in size. The tricuspid regurgitant velocity is 3.17 m/s, and with an estimated right atrial pressure of 3 mmHg, the estimated pulmonary artery pressure is mildly elevated with the RVSP at 43.1 mmHg. The estimated PASP is 43 mmHg. Systemic Veins: The inferior vena cava appears to be of normal size. There is IVC inspiratory collapse greater than 50%.  CONCLUSIONS:  1. Left ventricular ejection fraction is normal, by visual estimate at 60-65%.  2. There is normal right ventricular global systolic function.  3. The left atrium is moderate to severely dilated.  4. Trace to mild mitral valve regurgitation.  5. Mild tricuspid regurgitation visualized.  6. Mildly elevated RVSP.  7. The estimated PASP is 43 mmHg.  8. There is plaque visualized in the ascending aorta. QUANTITATIVE DATA SUMMARY: 2D MEASUREMENTS:                          Normal Ranges: Ao Root d:     3.70 cm   (2.0-3.7cm) LAs:           6.06 cm   (2.7-4.0cm) IVSd:          1.27 cm   (0.6-1.1cm) LVPWd:         1.09 cm   (0.6-1.1cm) LVIDd:         4.22 cm   (3.9-5.9cm) LVIDs:         3.15 cm LV Mass Index: 83.8 g/m2 LV % FS        25.2 % LA VOLUME:                                Normal Ranges: LA Vol A4C:        147.1 ml    (22+/-6mL/m2) LA Vol A2C:        216.2 ml LA Vol BP:         180.4 ml LA Vol Index A4C:  70.2 ml/m2 LA Vol Index A2C:  103.2 ml/m2 LA Vol Index BP:   86.1 ml/m2 LA Area A4C:       38.8 cm2 LA Area A2C:       46.5 cm2 LA Major Axis A4C: 8.7 cm LA Major Axis A2C: 8.5 cm LA Volume Index:   86.1 ml/m2 LA Vol A4C:        132.5 ml LA Vol A2C:        210.3 ml RA VOLUME BY A/L METHOD:                        Normal Ranges: RA Area A4C: 26.6 cm2 AORTA MEASUREMENTS:                      Normal Ranges: Ao Sinus, d: 3.70 cm (2.1-3.5cm) Asc Ao, d:   3.50 cm (2.1-3.4cm) LV SYSTOLIC FUNCTION BY 2D PLANIMETRY (MOD):                      Normal Ranges: EF-A4C View:    62 % (>=55%) EF-A2C View:    64 % EF-Biplane:     64 % EF-Visual:      63 % LV EF Reported: 63 % LV DIASTOLIC FUNCTION:                     Normal Ranges: MV Peak E: 0.85 m/s (0.7-1.2 m/s) AORTIC VALVE:                         Normal Ranges: AoV Vmax:      1.47 m/s (<=1.7m/s) AoV Peak P.7 mmHg (<20mmHg) LVOT Max Sean:  0.94 m/s (<=1.1m/s) LVOT VTI:      17.61 cm LVOT Diameter: 2.25 cm  (1.8-2.4cm) AoV Area,Vmax: 2.53 cm2 (2.5-4.5cm2)  RIGHT VENTRICLE: RV Basal 4.40 cm TAPSE:   13.0 mm RV s'    0.09 m/s TRICUSPID VALVE/RVSP:                             Normal Ranges: Peak TR Velocity: 3.17 m/s RV Syst Pressure: 43.1 mmHg (< 30mmHg) IVC Diam:         1.80 cm AORTA: Asc Ao Diam 3.51 cm  [id number] Demetrius Guerrero MD Electronically signed on 2024 at 12:31:59 PM  ** Final **       Scheduled medications  Scheduled Medications[1]  Continuous medications  Continuous Medications[2]  PRN medications  PRN Medications[3]         Assessment & Plan  Acute on chronic diastolic congestive heart failure    Chronic obstructive lung disease (Multi)    Hypokalemia    Acute on chronic respiratory failure with hypoxia and hypercapnia    Metabolic alkalosis    Chronic atrial fibrillation (Multi)    Dental abscess    Shortness of breath-supplemental O2, monitor I's and O's, additional management per admitting  Fluid overload-diuresis management per nephrology  Acute on chronic diastolic heart failure-diuresis, limited echocardiogram results pending  Alkalosis-management per nephrology  Persistent longstanding atrial fibrillation-on apixaban, metoprolol tartrate for rate control  Hypertension-controlled with metoprolol, diuretic  Hyperlipidemia-continue with atorvastatin  COPD-home O2  at night, management per admitting  Obesity-diet counseling  Lymphedema-see fluid overload     6/13 As above, presenting with leg swelling and shortness of breath.  Patient states that she has been having worsening shortness of breath over the past few days.  She wears home O2 only at night, however she has been wearing it more often throughout the day.  She states that she cannot lay flat.  Patient states that she has noticed worsening lower leg edema over the past month, even after being compliant with her furosemide.  Patient denies chest pain, palpitations, pressure, or tightness.  Patient was short of breath on arrival to the ED, and had severe conversational dyspnea only able to speak 2-3 word sentences.  Patient was previously being seen by cardiologist Dr. Sanchez would take metolazone as needed when her legs began to swell.  She reports not taking it since his [facility].   Echocardiogram completed in December 2024, EF 60 to 65%, Grade I (impaired relaxation pattern) of left ventricular diastolic filling with normal left atrial filling pressure, normal right ventricular global systolic function, severely dilated left atrium, moderate mitral annular dilation, moderate tricuspid regurgitation, aortic valve sclerosis  I spent 60 minutes in the professional and overall care of this patient.              Vitals this morning show a heart rate of 86, blood pressure 108/73, satting 95% on 4L nasal cannula.  On telemetry patient is in a rate controlled atrial flutter with heart rates in the 80s-90s.  Patient appears to be fluid overloaded, diminished lung sounds throughout all lobes and +2 bilateral lower extremity edema.  The patient does have a history of lymphedema, baseline has lower extremity swelling. In the ED, patient received a one-time dose of IV furosemide 80 mg, no I/O recorded. The admitting team ordered IV furosemide 80 mg to be given daily, however with the patient's increased bicarb, that order was  discontinued and admitting team has consulted nephrology for the patient's alkalosis.  Will appreciate their input on diuresis.  A limited echocardiogram was ordered by the admitting team and will be completed sometime today. Continue home medications of apixaban 5 mg twice daily, atorvastatin 40 mg daily, and metoprolol succinate 25 mg daily.  We will continue to follow along with you.     Attestation signed by Phi Perez DO at 6/13/2025  2:19 PM     This is a shared visit. I have reviewed the Advanced Practice Provider's encounter note, approve the Advanced Practice Provider's documentation, and provide the following additional information from my personal encounter.      I agree with the assessment and plan I examined the patient.  Her chronic leg edema is related to impaired left ventricular diastolic relaxation and chronic lymphedema along with renal insufficiency.  She has nephrology consultation now managing diuretic therapy.  Her atrial flutter is persistent and rate controlled.  She is on chronic Eliquis for cardioembolic risk reduction.  No other active cardiac issues     6/15: Labs reviewed this morning showing improving bicarb, 34, sodium 141, potassium 3.9, creatinine 0.6 hemoglobin 12.3, hematocrit 42.3, I/O's show net loss of 1.6 L yesterday.  Normotensive blood pressure this morning 102/68, heart rate 62, 97% oxygen on 3 L.  Appreciate recommendations from nephrology, patient received 80 mg of IV Lasix yesterday and continued on acetazolamide. On telemetry patient remains in a rate controlled atrial flutter with rates predominantly in the 60s-70's.  Limited echocardiogram results pending.  We will continue to follow this patient with you.    6/16: Labs reviewed this morning show a sodium 141, potassium 4.2, bicarb 35, BUN 26, creatinine 0.67, hemoglobin 11.9, hematocrit 38.1, I/O's likely inaccurate, showing a net loss of 460 mL.  Telemetry overnight reviewed, rate controlled atrial flutter/fib,  rates predominantly 70's. Patient received 80 mg of IV Lasix yesterday and 2 doses of acetazolamide 250 mg.  Limited echocardiogram reviewed showing an EF of 60-65%, mildly enlarged right ventricle, left atria severely dilated, right atrium moderately dilated, and moderate concentric left ventricular hypertrophy, moderately increased septal and mildly increased posterior left ventricular wall thickness.  Will continue to defer diuresis to nephrology.  No further recommendations from a cardiac perspective at this time.  We will sign off at this time, please don't hesitate to reach out with any further concerns.       I spent 35 minutes in the professional and overall care of this patient.      Colleen Rincon PA-C         [1] acetaZOLAMIDE, 250 mg, oral, BID  allopurinol, 300 mg, oral, Daily  ammonium lactate, , Topical, BID  amoxicillin-clavulanate, 1 tablet, oral, q12h BRIDGETTE  apixaban, 5 mg, oral, BID  atorvastatin, 40 mg, oral, Nightly  buPROPion XL, 300 mg, oral, Daily before breakfast  fluticasone furoate-vilanteroL, 1 puff, inhalation, Daily  gabapentin, 300 mg, oral, TID  metoprolol succinate XL, 25 mg, oral, Daily  oxygen, , inhalation, Continuous - Inhalation  potassium chloride CR, 40 mEq, oral, BID  [2]    [3] PRN medications: acetaminophen, HYDROcodone-acetaminophen, hydrOXYzine HCL, melatonin, midodrine, oxygen

## 2025-06-17 ENCOUNTER — DOCUMENTATION (OUTPATIENT)
Dept: PHYSICAL THERAPY | Facility: CLINIC | Age: 70
End: 2025-06-17
Payer: MEDICARE

## 2025-06-17 ENCOUNTER — PATIENT OUTREACH (OUTPATIENT)
Dept: PRIMARY CARE | Facility: CLINIC | Age: 70
End: 2025-06-17
Payer: MEDICARE

## 2025-06-17 ENCOUNTER — PATIENT OUTREACH (OUTPATIENT)
Dept: CARE COORDINATION | Age: 70
End: 2025-06-17
Payer: MEDICARE

## 2025-06-17 NOTE — PROGRESS NOTES
Discharge Facility: [facility]     Discharge Diagnosis:    Acute on chronic diastolic congestive heart failure  Metabolic alkalosis      Issues Requiring Follow-Up  Follow up PCP 1-2 weeks  Follow up Nephrology 2 weeks  Repeat BMP 1 week     Admission Date: 6/13/2025   Discharge Date:  6/16/2025     PCP Appointment Date: 6/24/2025       Specialist Appointment Date:     Healthy at home referral from hospital in process     Follow up PCP 1-2 weeks     Follow up nephrology 2 weeks Repeat BMP (LAB) 1 week     Hospital Encounter and Summary Linked: Yes    ED to Hosp-Admission (Discharged) with Harry Coronado DO; Sharon Wong MD (06/12/2025)     See discharge assessment below for further details     Wrap Up  Wrap Up Additional Comments: Patient has spouse for support, aware of med chnages, PCP F/U 6/24,  Nephrology F/U call to [facility] . along with labs due week prior. Patient aware to obtain weight every am after B.R use before eating or drinking if 2+ lb weight gain one day or 5+lbs in a week call provider right away, patient verbalizes understanding. Patient encouraged to call providers for any questions concerns or change  in condition (6/17/2025  1:39 PM)    Engagement  Call Start Time: 1339 (6/17/2025  1:39 PM)    Medications  Medications reviewed with patient/caregiver?: Yes (6/17/2025  1:39 PM)  Is the patient having any side effects they believe may be caused by any medication additions or changes?: No (6/17/2025  1:39 PM)  Does the patient have all medications ordered at discharge?: Yes (6/17/2025  1:39 PM)  Care Management Interventions: No intervention needed (6/17/2025  1:39 PM)  Prescription Comments: START taking these medications     acetaZOLAMIDE 250 mg tablet; Commonly known as: Diamox; Take 1 tablet   (250 mg) by mouth 2 times a day.   ammonium lactate 12 % lotion; Commonly known as: Lac-Hydrin; Apply   topically 2 times a day.     CHANGE how you take these medications     oxygen  gas therapy; Commonly known as: O2; Inhale 2 L/min at 120,000   mL/hr continuously. At night and as needed; What changed: how much to   take, when to take this, additional instructions, how fast to infuse this   potassium chloride CR 20 mEq ER tablet; Commonly known as: Klor-Con M20;   Take 2 tablets (40 mEq) by mouth 2 times a day. Do not crush or chew.;   What changed: how much to take   torsemide 60 mg tablet; Take 60 mg by mouth 2 times daily (morning and   late afternoon).; What changed: medication strength, how much to take,   when to take this (6/17/2025  1:39 PM)  Is the patient taking all medications as directed (includes completed medication regime)?: -- (pt states has meds) (6/17/2025  1:39 PM)  Care Management Interventions: Provided patient education (6/17/2025  1:39 PM)  Medication Comments: STOP taking these medications     amoxicillin-clavulanate 875-125 mg tablet; Commonly known as: Augmentin   hydrOXYzine HCL 10 mg tablet; Commonly known as: Atarax     ASK your doctor about these medications     fluticasone propion-salmeteroL 100-50 mcg/dose diskus inhaler; Commonly   known as: Advair Diskus; Inhale 1 puff 2 times a day. Rinse mouth with   water after use to reduce aftertaste and incidence of candidiasis. Do not   swallow. (6/17/2025  1:39 PM)    Appointments  Does the patient have a primary care provider?: Yes (6/17/2025  1:39 PM)  Care Management Interventions: Verified appointment date/time/provider (6/17/2025  1:39 PM)  Care Management Interventions: Advised to schedule with specialist (6/17/2025  1:39 PM)    Self Management  What is the home health agency?: NA (6/17/2025  1:39 PM)  What Durable Medical Equipment (DME) was ordered?: Rita on the way ..Commode at bedside  General supply request Bedside commode  Wheelchair  Your medications have changed  Lab, Imaging, and Other Procedure instructions  Other instructions  Durable Medical Equipment (6/17/2025  1:39 PM)  Has all Durable Medical  Equipment (DME) been delivered?: -- (on the way) (6/17/2025  1:39 PM)    Patient Teaching  Does the patient have access to their discharge instructions?: Yes (6/17/2025  1:39 PM)  Care Management Interventions: Reviewed instructions with patient (6/17/2025  1:39 PM)  What is the patient's perception of their health status since discharge?: Improving (6/17/2025  1:39 PM)  Is the patient/caregiver able to teach back the hierarchy of who to call/visit for symptoms/problems? PCP, Specialist, Home Health nurse, Urgent Care, ED, 911: Yes (6/17/2025  1:39 PM)

## 2025-06-17 NOTE — PROGRESS NOTES
Physical Therapy                 Therapy Communication Note    Patient Name: Vickie Foster  MRN: [id number]  Department:   Room: Room/bed info not found  Today's Date: 6/17/2025     Discipline: Physical Therapy    Missed Visit:    Missed Visit Reason:  Patient did not check in for their appointment or cancel     Missed Time: No Show    Comment: No Show #2, 10 cancellations overall

## 2025-06-18 ENCOUNTER — PATIENT OUTREACH (OUTPATIENT)
Dept: CARE COORDINATION | Age: 70
End: 2025-06-18
Payer: MEDICARE

## 2025-06-19 ENCOUNTER — PATIENT OUTREACH (OUTPATIENT)
Dept: CASE MANAGEMENT | Facility: HOSPITAL | Age: 70
End: 2025-06-19
Payer: MEDICARE

## 2025-06-19 NOTE — PROGRESS NOTES
Heart Failure Nurse Navigator Transition of Care Phone Call    The role of the HF nurse navigator is to (1) characterize risk profiles of patients with heart failure transitioning from [id number] after hospitalization, (2) recommend interventions to improve care and reduce risks of worse post-hospitalization outcomes.     Assessment  Call attempted to patient . Spoke with patient and obtained the following information.    HF Symptoms  Chest pain? No  Shortness of breath? none  Orthopnea? No  Paroxysmal nocturnal dyspnea? Yes - has a hospital bed on order to help  Edema? Yes - normal for her.  Weight gain >2lbs in 3 days or 5lbs in 1 week? No    Medications  Is the patient prescribed the following medications?  ARNi/ACEi/ARB: None  BB: Metoprolol succinate 25 mg daily  MRA: None  SGLT2i: None  Diuretic: Torsemide 60 mg BID  Is the patient adherent to prescribed medications? YES    Management    Is the patient obtaining daily weights? NO has not started. She states she will start.  If yes, current weight? unknown  Is the patient following diet limitations (2-3g Na, fluid restriction)? YES  Does the patient have a  cardiology follow-up scheduled? YES  If yes, appointment details? Dr. Dick 6/24/25.  Does the patient require HF education reinforcement? Yes    Recommendations/Comments:  Vickie states she is doing ok since PA. She is going to be getting a WC, bedside commode and hospital bed. She denies HF symptoms at this time. We reviewed all her medications and times to take them and all of her follow up appts. She understood and was able to teach back.     Mireya Orantes RN BSN  [facility] Clinical Nurse Navigator, CHF  [phone]

## 2025-06-23 DIAGNOSIS — I50.41 ACUTE COMBINED SYSTOLIC (CONGESTIVE) AND DIASTOLIC (CONGESTIVE) HEART FAILURE: ICD-10-CM

## 2025-06-24 ENCOUNTER — APPOINTMENT (OUTPATIENT)
Dept: PHYSICAL THERAPY | Facility: CLINIC | Age: 70
End: 2025-06-24
Payer: MEDICARE

## 2025-06-24 ENCOUNTER — APPOINTMENT (OUTPATIENT)
Facility: CLINIC | Age: 70
End: 2025-06-24
Payer: MEDICARE

## 2025-06-24 ENCOUNTER — APPOINTMENT (OUTPATIENT)
Dept: PRIMARY CARE | Facility: CLINIC | Age: 70
End: 2025-06-24
Payer: MEDICARE

## 2025-06-24 NOTE — PROGRESS NOTES
Subjective      No chief complaint on file.       69-year-old female presents for follow-up.  She is a former patient of Dr. Sanchez.  She has a history of atrial fibrillation as well as heart failure with preserved ejection fraction.  She was hospitalized earlier this month with a heart failure exacerbation.  She was diuresed, this was complicated by metabolic alkalosis.  Acetazolamide was used.  She had an echocardiogram that showed normal left ventricular size and function, severely enlarged left atrium and moderately enlarged right atrium.  There were trivial valvular abnormalities.  She was sent home on torsemide 60 mg twice daily with potassium supplementation as well as acetazolamide           ROS     Medical History[1]     Surgical History[2]     Social History     Socioeconomic History    Marital status:      Spouse name: Not on file    Number of children: Not on file    Years of education: Not on file    Highest education level: Not on file   Occupational History    Not on file   Tobacco Use    Smoking status: Former     Current packs/day: 0.00     Types: Cigarettes     Quit date:      Years since quittin.4    Smokeless tobacco: Never   Vaping Use    Vaping status: Never Used   Substance and Sexual Activity    Alcohol use: Not Currently     Comment: monthly or less    Drug use: Never    Sexual activity: Defer   Other Topics Concern    Not on file   Social History Narrative    Not on file     Social Drivers of Health     Financial Resource Strain: Low Risk  (2025)    Overall Financial Resource Strain (CARDIA)     Difficulty of Paying Living Expenses: Not hard at all   Food Insecurity: No Food Insecurity (2025)    Hunger Vital Sign     Worried About Running Out of Food in the Last Year: Never true     Ran Out of Food in the Last Year: Never true   Transportation Needs: No Transportation Needs (2025)    PRAPARE - Transportation     Lack of Transportation (Medical): No     Lack of  "Transportation (Non-Medical): No   Physical Activity: Inactive (6/13/2025)    Exercise Vital Sign     Days of Exercise per Week: 0 days     Minutes of Exercise per Session: 0 min   Stress: Stress Concern Present (6/13/2025)    [place] [employer] - Occupational Stress Questionnaire     Feeling of Stress : Very much   Social Connections: Moderately Integrated (6/13/2025)    Social Connection and Isolation Panel [NHANES]     Frequency of Communication with Friends and Family: More than three times a week     Frequency of Social Gatherings with Friends and Family: Never     Attends Shinto Services: More than 4 times per year     Active Member of Clubs or Organizations: No     Attends Club or Organization Meetings: Never     Marital Status:    Intimate Partner Violence: Not At Risk (6/13/2025)    Humiliation, Afraid, Rape, and Kick questionnaire     Fear of Current or Ex-Partner: No     Emotionally Abused: No     Physically Abused: No     Sexually Abused: No   Housing Stability: Low Risk  (6/13/2025)    Housing Stability Vital Sign     Unable to Pay for Housing in the Last Year: No     Number of Times Moved in the Last Year: 0     Homeless in the Last Year: No        Family History[3]     OBJECTIVE:    There were no vitals filed for this visit.     Physical Exam     Lab Review:   {Recent labs:97326::\"not applicable\"}    Lab Results   Component Value Date    LDLCALC 72 03/28/2025        No problem-specific Assessment & Plan notes found for this encounter.            [1]   Past Medical History:  Diagnosis Date    A-fib (Multi)     Cellulitis     CHF (congestive heart failure)     COVID     Heart disease     HTN (hypertension)     Low back pain    [2]   Past Surgical History:  Procedure Laterality Date    ABLATION OF DYSRHYTHMIC FOCUS      BREAST BIOPSY  2000    CARDIAC ELECTROPHYSIOLOGY PROCEDURE N/A 05/01/2024    Procedure: Cardioversion;  Surgeon: Amado Ty MD;  Location: [facility]" Cardiac Cath Lab;  Service: Electrophysiology;  Laterality: N/A;  23049  i48.0  medical mutual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    CARDIAC ELECTROPHYSIOLOGY PROCEDURE N/A 2024    Procedure: Ablation A-Fib;  Surgeon: Amado Ty MD;  Location: [facility] Cardiac Cath Lab;  Service: Electrophysiology;  Laterality: N/A;  AFIB CRYOABLATION; CPT 41506; ICD I48.0   Medicare mutual of [place] medicare  ID - [id number]  [phone]  No Auth required  Call ref # [id number]    CARDIAC ELECTROPHYSIOLOGY PROCEDURE N/A 2024    Procedure: Cardioversion;  Surgeon: Amado Ty MD;  Location: [facility] Cardiac Cath Lab;  Service: Electrophysiology;  Laterality: N/A;  CARDIOVERSION CPT 96671, ICD I48.0 no auth required/REF # [id number]    CARDIOVERSION      2024   [3]   Family History  Problem Relation Name Age of Onset    Breast cancer Mother      Heart attack Mother           in early 80s of MI    Heart disease Mother      Colon cancer Father      Breast cancer Sister      Diabetes type II Sister      Kidney failure Sister      Other (herione addict) Sister      No Known Problems Brother      No Known Problems Daughter      Other (recovering alcoholic) Son      Breast cancer Other Maternal Aunt

## 2025-06-25 ENCOUNTER — PATIENT OUTREACH (OUTPATIENT)
Dept: PRIMARY CARE | Facility: CLINIC | Age: 70
End: 2025-06-25
Payer: MEDICARE

## 2025-06-25 NOTE — PROGRESS NOTES
Unable to reach patient for follow up call,  PCP now [facility] 7/2/2025     Left voicemail with call back number for patient to call if needed   If no voicemail available call attempts x 2 were made to contact the patient to assist with any questions or concerns patient may have.     L/M  Encouraged to call providers for any questions concerns or change in condition

## 2025-06-25 NOTE — PROGRESS NOTES
Cardiac Electrophysiology Office Visit     Chief Complaint   Patient presents with    Atrial Fibrillation     RATE CONTROL/attempted on several occasions to restore sinus rhythm with a hybrid approach of catheter-based therapy as well as antiarrhythmic drugs including dofetilide    Heart Failure     Admitted 06/2025        The patient is well-known to me.  She is a 69-year-old female with a history of recurrent atrial fibrillation that has now become persistent.  This is in the setting of hypertension, morbid obesity, COPD, and diastolic heart failure.  She has preserved LV systolic function.  She recently had an admission to the hospital with worsening lower extremity edema although her proBNP was only in the 200 range she was diuresed and her COPD exacerbation was treated and sent home she is following up just a week after her hospitalization.  She remains in atrial fibrillation of course with a controlled ventricular response and feels improved.       Patient Active Problem List    Diagnosis Date Noted    Combined forms of age-related cataract of both eyes 04/30/2025    Refraction error 04/30/2025    Chronic atrial fibrillation (Multi) 06/13/2025    Dental abscess 06/13/2025    Difficulty walking 06/05/2025    Leg weakness 06/05/2025    Lumbago 06/05/2025    Cervicalgia 04/22/2025    Metabolic alkalosis 12/14/2024    CHF (congestive heart failure), NYHA class I, acute on chronic, combined 12/08/2024    Atrial fib/flutter, transient (Multi) 12/08/2024    Cellulitis 12/08/2024    COPD (chronic obstructive pulmonary disease) (Multi) 12/08/2024    Obesity 12/08/2024    Head injury 11/13/2024    Hallucinations, visual 11/12/2024    Chronic right shoulder pain 10/22/2024    Long term current use of opiate analgesic 10/22/2024    Primary osteoarthritis of knees, bilateral 10/22/2024    Lumbar radiculopathy 10/22/2024    Diastolic CHF, acute on chronic (Multi) 10/08/2024    Acute and chronic respiratory failure with  hypoxia 10/08/2024    Sepsis with acute hypoxic respiratory failure without septic shock, due to unspecified organism 10/02/2024    Shortness of breath 08/06/2024    History of embolic stroke 07/19/2024    Meningioma (Multi) 07/18/2024    Acute URI 02/09/2024    Epistaxis 08/30/2023    Benign essential hypertension 08/30/2023    Chronic gout without tophus 08/30/2023    Chronic hypoxemic respiratory failure 08/30/2023    Chronic obstructive lung disease (Multi) 08/30/2023    Diastolic heart failure 08/30/2023    Cardiomegaly 08/30/2023    Coronary arteriosclerosis 08/30/2023    Dyspnea on exertion 08/30/2023    Gastroesophageal reflux disease 08/30/2023    Hypertension 08/30/2023    Hypertensive heart disease without congestive heart failure 08/30/2023    Longstanding persistent atrial fibrillation (Multi) 08/30/2023    Mixed anxiety depressive disorder 08/30/2023    Moderately severe depression 08/30/2023    Obesity with body mass index 30 or greater 08/30/2023    Morbid (severe) obesity due to excess calories (Multi) 08/30/2023    Other chronic pain 08/30/2023    Obstructive sleep apnea syndrome 08/30/2023    Osteoporosis 08/30/2023    Pleural effusion 08/30/2023    Prediabetes 08/30/2023    Primary osteoarthritis of left knee 08/30/2023    Pulmonary hypertension (Multi) 08/30/2023    Lumbar spondylosis 08/30/2023    Thoracic spondylosis 08/30/2023    Asthma 08/30/2023    Arthropathy of both sacroiliac joints 08/30/2023    Anxiety 08/30/2023    Age-related osteoporosis without current pathological fracture 08/30/2023    PAF (paroxysmal atrial fibrillation) (Multi) 04/16/2024        Review of Systems   All other systems reviewed and are negative.         Current Outpatient Medications   Medication Instructions    acetaZOLAMIDE (DIAMOX) 250 mg, oral, 2 times daily    allopurinol (ZYLOPRIM) 300 mg, oral, Daily    ammonium lactate (Lac-Hydrin) 12 % lotion Topical, 2 times daily    ascorbic acid (VITAMIN C) 500 mg,  oral, Daily    aspirin-caffeine (Rick Back and Body) 500-32.5 mg tablet 2 each, oral, Daily    atorvastatin (LIPITOR) 40 mg, oral, Daily    biotin 5 mg tablet 1 tablet, oral, Daily    buPROPion XL (WELLBUTRIN XL) 300 mg, oral, Daily before breakfast    cholecalciferol (VITAMIN D-3) 125 mcg, oral, Daily    Eliquis 5 mg, oral, 2 times daily    fluticasone propion-salmeteroL (Advair Diskus) 100-50 mcg/dose diskus inhaler 1 puff, inhalation, 2 times daily RT, Rinse mouth with water after use to reduce aftertaste and incidence of candidiasis. Do not swallow.    gabapentin (NEURONTIN) 300 mg, oral, 3 times daily    metoprolol succinate XL (TOPROL-XL) 25 mg, oral, Daily    multivitamin tablet 1 tablet, oral, Daily    oxygen (O2) 2 L/min (2 L/min), inhalation, Continuous, At night and as needed    potassium chloride CR (Klor-Con M20) 20 mEq ER tablet 40 mEq, oral, 2 times daily, Do not crush or chew.    torsemide (DEMADEX) 60 mg, oral, 2 times daily (morning and late afternoon)       Objective     There were no vitals filed for this visit.     Constitutional:       Appearance: Healthy appearance.   Pulmonary:      Effort: Pulmonary effort is normal.      Breath sounds: Normal breath sounds.   Cardiovascular:      PMI at left midclavicular line. Normal rate. Irregularly irregular rhythm.      Murmurs: There is a systolic murmur.      No gallop.  No click. No rub.   Pulses:     Intact distal pulses.   Edema:     Thigh: bilateral 3+ edema of the thigh.     Pretibial: bilateral 3+ edema of the pretibial area.     Ankle: bilateral 3+ edema of the ankle.     Feet: bilateral 3+ edema of the feet.  Abdominal:      General: Bowel sounds are normal.   Musculoskeletal:      Cervical back: Neck supple. Skin:     General: Skin is warm and dry.   Neurological:      General: No focal deficit present.          RELEVANT TESTING:     Transthoracic Echo (TTE) Limited 06/13/2025   CONCLUSIONS:   1. The left ventricular systolic function is  normal with a visually estimated ejection fraction of 60-65%.   2. There is normal right ventricular global systolic function.   3. Mildly enlarged right ventricle.   4. The left atrial size is severely dilated.   5. The right atrial size is moderately dilated.   6. There is moderate concentric left ventricular hypertrophy.   7. There is moderately increased septal and mildly increased posterior left ventricular wall thickness.     CT CALCIUM SCORE 02/23/2023;  IMPRESSION:  Coronary artery calcium score of 15.2 corresponding to mild plaque burden.  Dictation workstation:   [id number]  This exam is available in DICOM format to non-affiliated healthcare  facilities on a secure media free searchable basis with prior patient  authorization.  The patient exposure is reported to a radiation dose index  registry.  All CT examinations are performed with one or more of the  following dose reduction techniques: Automated Exposure Control, Adjustment  of mA and/or KV according to patient size, or use of iterative  reconstruction techniques.    Lab Review:   Lab Results   Component Value Date    WBC 8.1 06/16/2025    HGB 11.9 (L) 06/16/2025    HCT 38.1 06/16/2025     06/16/2025    CHOL 137 03/28/2025    TRIG 58 03/28/2025    HDL 52 03/28/2025    ALT 19 06/12/2025    AST 17 06/12/2025     06/16/2025    K 4.2 06/16/2025     06/16/2025    CREATININE 0.67 06/16/2025    BUN 26 (H) 06/16/2025    CO2 35 (H) 06/16/2025    TSH 1.94 03/28/2025    INR 1.1 06/12/2025    HGBA1C 6.0 (H) 03/28/2025       [id number] Score  Age 65-74: 1   Sex Female: 1   CHF History Yes: 1   HTN Yes: 1   Stroke/TIA/Thromboembolism No: 0   Vascular Dz: CAD/PAD/Aortic Plaque No: 0   DM No: 0   Total Score 4        ECG:  Atrial fibrillation with a ventricular response in the 70s QRS duration 90 ms QTc 420 ms    Assessment/plan:  Permanent atrial fibrillation  History as above.  Continue rate control approach.    Problem List Items Addressed  This Visit       PAF (paroxysmal atrial fibrillation) (Multi) - Primary    Diastolic CHF, acute on chronic (Multi)        Amado Ty MD, [facility]  Cardiac Electrophysiology

## 2025-06-26 ENCOUNTER — OFFICE VISIT (OUTPATIENT)
Facility: CLINIC | Age: 70
End: 2025-06-26
Payer: MEDICARE

## 2025-06-26 ENCOUNTER — APPOINTMENT (OUTPATIENT)
Dept: PHYSICAL THERAPY | Facility: CLINIC | Age: 70
End: 2025-06-26
Payer: MEDICARE

## 2025-06-26 VITALS — SYSTOLIC BLOOD PRESSURE: 116 MMHG | DIASTOLIC BLOOD PRESSURE: 78 MMHG

## 2025-06-26 DIAGNOSIS — R06.01 ORTHOPNEA: ICD-10-CM

## 2025-06-26 DIAGNOSIS — I50.32 CHRONIC DIASTOLIC (CONGESTIVE) HEART FAILURE: ICD-10-CM

## 2025-06-26 DIAGNOSIS — I50.43 CHF (CONGESTIVE HEART FAILURE), NYHA CLASS I, ACUTE ON CHRONIC, COMBINED: ICD-10-CM

## 2025-06-26 DIAGNOSIS — I48.0 PAF (PAROXYSMAL ATRIAL FIBRILLATION) (MULTI): Primary | ICD-10-CM

## 2025-06-26 DIAGNOSIS — I50.33 DIASTOLIC CHF, ACUTE ON CHRONIC (MULTI): ICD-10-CM

## 2025-06-26 PROCEDURE — 93005 ELECTROCARDIOGRAM TRACING: CPT | Performed by: INTERNAL MEDICINE

## 2025-06-26 PROCEDURE — 1111F DSCHRG MED/CURRENT MED MERGE: CPT | Performed by: INTERNAL MEDICINE

## 2025-06-26 PROCEDURE — G2211 COMPLEX E/M VISIT ADD ON: HCPCS | Performed by: INTERNAL MEDICINE

## 2025-06-26 PROCEDURE — 99212 OFFICE O/P EST SF 10 MIN: CPT

## 2025-06-26 PROCEDURE — 3074F SYST BP LT 130 MM HG: CPT | Performed by: INTERNAL MEDICINE

## 2025-06-26 PROCEDURE — 3078F DIAST BP <80 MM HG: CPT | Performed by: INTERNAL MEDICINE

## 2025-06-26 PROCEDURE — 1036F TOBACCO NON-USER: CPT | Performed by: INTERNAL MEDICINE

## 2025-06-26 PROCEDURE — 99213 OFFICE O/P EST LOW 20 MIN: CPT | Performed by: INTERNAL MEDICINE

## 2025-06-26 PROCEDURE — 1159F MED LIST DOCD IN RCRD: CPT | Performed by: INTERNAL MEDICINE

## 2025-06-26 ASSESSMENT — ENCOUNTER SYMPTOMS
LOSS OF SENSATION IN FEET: 0
DEPRESSION: 0
OCCASIONAL FEELINGS OF UNSTEADINESS: 0

## 2025-06-27 ENCOUNTER — APPOINTMENT (OUTPATIENT)
Dept: PULMONOLOGY | Facility: CLINIC | Age: 70
End: 2025-06-27
Payer: MEDICARE

## 2025-06-27 ENCOUNTER — TELEPHONE (OUTPATIENT)
Dept: SLEEP MEDICINE | Facility: CLINIC | Age: 70
End: 2025-06-27

## 2025-06-27 ENCOUNTER — PATIENT OUTREACH (OUTPATIENT)
Dept: CASE MANAGEMENT | Facility: HOSPITAL | Age: 70
End: 2025-06-27

## 2025-06-27 LAB
ATRIAL RATE: 375 BPM
Q ONSET: 226 MS
QRS COUNT: 12 BEATS
QRS DURATION: 92 MS
QT INTERVAL: 382 MS
QTC CALCULATION(BAZETT): 426 MS
QTC FREDERICIA: 411 MS
R AXIS: 75 DEGREES
T AXIS: 157 DEGREES
T OFFSET: 417 MS
VENTRICULAR RATE: 75 BPM

## 2025-06-27 NOTE — PROGRESS NOTES
Heart Failure Nurse Navigator Transition of Care Phone Call     The role of the HF nurse navigator is to (1) characterize risk profiles of patients with heart failure transitioning from [id number] after hospitalization, (2) recommend interventions to improve care and reduce risks of worse post-hospitalization outcomes.      Assessment  Call attempted to patient . Spoke with patient and obtained the following information.     HF Symptoms  Chest pain? No  Shortness of breath? none  Orthopnea? No  Paroxysmal nocturnal dyspnea? Yes - has a hospital bed on order to help  Edema? Yes - normal for her.  Weight gain >2lbs in 3 days or 5lbs in 1 week? No     Medications  Is the patient prescribed the following medications?  ARNi/ACEi/ARB: None  BB: Metoprolol succinate 25 mg daily  MRA: None  SGLT2i: None  Diuretic: Torsemide 60 mg BID  Is the patient adherent to prescribed medications? YES     Management    Is the patient obtaining daily weights? NO has not started. She states she will start.  If yes, current weight? 261 a week ago.  Is the patient following diet limitations (2-3g Na, fluid restriction)? YES  Does the patient have a  cardiology follow-up scheduled? YES  If yes, appointment details? Dr. Dick 6/24/25. Pt cancelled and rescheduled to 7/17/25.  Does the patient require HF education reinforcement? Yes     Recommendations/Comments:  Vickie states she is doing ok since DC. She denies HF symptoms at this time. She is taking all her meds and watching for HF symptoms. She did her follow up with Dr. Ty. No needs/issues at this time.     Mireya Orantes RN BSN  [facility] Clinical Nurse Navigator, CHF  [phone]

## 2025-07-02 ENCOUNTER — OFFICE VISIT (OUTPATIENT)
Dept: PRIMARY CARE | Facility: CLINIC | Age: 70
End: 2025-07-02
Payer: MEDICARE

## 2025-07-02 VITALS — HEART RATE: 98 BPM | OXYGEN SATURATION: 93 % | SYSTOLIC BLOOD PRESSURE: 126 MMHG | DIASTOLIC BLOOD PRESSURE: 74 MMHG

## 2025-07-02 DIAGNOSIS — J96.11 CHRONIC HYPOXEMIC RESPIRATORY FAILURE: ICD-10-CM

## 2025-07-02 DIAGNOSIS — J43.1 PANLOBULAR EMPHYSEMA (MULTI): Primary | ICD-10-CM

## 2025-07-02 DIAGNOSIS — G47.33 OSA (OBSTRUCTIVE SLEEP APNEA): ICD-10-CM

## 2025-07-02 DIAGNOSIS — I50.32 CHRONIC HEART FAILURE WITH PRESERVED EJECTION FRACTION: ICD-10-CM

## 2025-07-02 PROBLEM — D32.9 MENINGIOMA (MULTI): Status: RESOLVED | Noted: 2024-07-18 | Resolved: 2025-07-02

## 2025-07-02 PROCEDURE — 1111F DSCHRG MED/CURRENT MED MERGE: CPT | Performed by: FAMILY MEDICINE

## 2025-07-02 PROCEDURE — G2211 COMPLEX E/M VISIT ADD ON: HCPCS | Performed by: FAMILY MEDICINE

## 2025-07-02 PROCEDURE — 99215 OFFICE O/P EST HI 40 MIN: CPT | Performed by: FAMILY MEDICINE

## 2025-07-02 PROCEDURE — 1126F AMNT PAIN NOTED NONE PRSNT: CPT | Performed by: FAMILY MEDICINE

## 2025-07-02 PROCEDURE — 3074F SYST BP LT 130 MM HG: CPT | Performed by: FAMILY MEDICINE

## 2025-07-02 PROCEDURE — 3078F DIAST BP <80 MM HG: CPT | Performed by: FAMILY MEDICINE

## 2025-07-02 ASSESSMENT — PAIN SCALES - GENERAL: PAINLEVEL_OUTOF10: 0-NO PAIN

## 2025-07-02 NOTE — PROGRESS NOTES
"Patient: Vickie Foster  : 1955  PCP: Jamison Maurice DO  MRN: [id number]  Program: Disease Specific Discharge Navigator  Status: Enrolled  Effective Dates: 2025 - present  Responsible Staff: Mireya Orantes RN  Social Drivers to be Addressed: Physical Activity, Stress, Tobacco Use    Transitional Care Management  Status: Enrolled  Effective Dates: 2025 - present  Responsible Staff: Charo Ocampo  Social Drivers to be Addressed: Physical Activity, Stress, Tobacco Use         Vickie Foster is a 69 y.o. female presenting today for follow-up after being discharged from the hospital 16 days ago. The main problem requiring admission was acute Evoxac respiratory failure CHF. The discharge summary and/or Transitional Care Management documentation was reviewed. Medication reconciliation was performed as indicated via the \"Jean as Reviewed\" timestamp.     Vickie Foster was contacted by Transitional Care Management services two days after her discharge. This encounter and supporting documentation was reviewed.     \"Hospital Course  69 year old female pmhx chronic afib, D-CHF, Chronic respiratory failure baseline 2L 02, COPD, admitted 25 for shortness of breath. Treated for acute on chronic D-CHF and metabolic alkalosis. Echo 25 with normal LV systolic function with EF 60-65%. S/p eval with cardiology and nephrology. Diuresis adjusted per nephrology.      At this point, patient is awake alert oriented x3. Denies any pain. States her breathing is back at her baseline. Reports improved BL-LE edema since admission. States overall she is feeling better since admission. Requesting discharge.   Patient has been cleared to discharge by all specialists.     Patient to follow up PCP 1-2 weeks, nephrology 2 weeks, repeat BMP 1 week.\"    1. Panlobular emphysema (Multi)   Breathing is back to baseline currently on home O2 levels previous denies any worsening chest pain or shortness of breath   2. Chronic " heart failure with preserved ejection fraction   Currently medically optimized although lower extremity edema has not improved not using compression stockings   3. TANVIR (obstructive sleep apnea)   History of sleep apnea cannot tolerate CPAP has not had sleep study in many years obesity is contributing   4. Chronic hypoxemic respiratory failure        All pertinent positive symptoms are included in history of present illness.    All other systems have been reviewed and are negative and noncontributory to this patient's current ailments.    /74   Pulse 98   SpO2 93%     CONSTITUTIONAL - INAD. Not ill appearing.  SKIN - No lesions or rashes visualized. No jaundice visualized.  HEENT- Atraumatic, normocephalic, no scleral icterus, external nares are not erythematous and without drainage, no neck masses visualized, oropharynx visualized and is without erythema or exudate  RESP - respiration not labored   CARDIAC - no grade 6 systolic murmurs auscultated  ABDOMEN - nondistended.  NEURO- CNs II-XII grossly intact  EXTREMITIES-+2 pitting edema bilaterally, stasis dermatitis    1. Panlobular emphysema (Multi) (Primary)  Continue inhalers as prescribed back to baseline O2 levels vital signs are stable today    2. Chronic heart failure with preserved ejection fraction  Recheck BMP for potassium recommend compression stockings thigh-high  - Basic metabolic panel; Future  - In-Center Sleep Study (Non-Sleep Provider); Future  - Basic metabolic panel  - Compression Stockings 30-40 mmHg    3. TANVIR (obstructive sleep apnea)  Spoke about the natural history and course of sleep apnea.       Spoke about the signs and symptoms of sleep apnea including chronic fatigue, excessive daytime sleepiness, snoring, the appearance of stopping breathing while sleeping, morning headaches, and the need for frequent napping.    Spoke about the evaluation and treatment of sleep apnea including the need for either home sleep study or evaluation  had a sleep center with a polysomnogram.  Spoke about treatment options including lifestyle interventions, weight loss, positional therapy, CPAP machine/AutoPap machine, surgical correction, as well as implantable devices like the inspire device.    Spoke about the possible long-term side effects of untreated sleep apnea with the patient including the increased risk for cardiovascular disease, lung disease, difficulty losing weight, high fasting sugars and diabetes, and risk for metabolic syndrome.    We will start with a home sleep study or polysomnogram and proceed with treatment from there if necessary.  - In-Center Sleep Study (Non-Sleep Provider); Future    4. Chronic hypoxemic respiratory failure    - Referral to Pulmonology; Future      40 minutes was spent in this patient encounter greater than 50% of which was spent in counseling

## 2025-07-03 ENCOUNTER — PATIENT OUTREACH (OUTPATIENT)
Dept: CASE MANAGEMENT | Facility: HOSPITAL | Age: 70
End: 2025-07-03
Payer: MEDICARE

## 2025-07-03 NOTE — PROGRESS NOTES
Heart Failure Nurse Navigator Transition of Care Phone Call    The role of the HF nurse navigator is to (1) characterize risk profiles of patients with heart failure transitioning from [id number] after hospitalization, (2) recommend interventions to improve care and reduce risks of worse post-hospitalization outcomes.     HF Nurse Navigator outreach to patient via phone call to review HF education and check on progress. No answer, message left with contact information to return my call.     Mireya Orantes RN BSN  [facility] Clinical Nurse Navigator, CHF  [phone]

## 2025-07-08 ENCOUNTER — DOCUMENTATION (OUTPATIENT)
Dept: PHYSICAL THERAPY | Facility: CLINIC | Age: 70
End: 2025-07-08

## 2025-07-08 ENCOUNTER — PATIENT OUTREACH (OUTPATIENT)
Dept: PRIMARY CARE | Facility: CLINIC | Age: 70
End: 2025-07-08

## 2025-07-08 ENCOUNTER — APPOINTMENT (OUTPATIENT)
Dept: PHYSICAL THERAPY | Facility: CLINIC | Age: 70
End: 2025-07-08
Payer: MEDICARE

## 2025-07-08 NOTE — PROGRESS NOTES
Unable to reach patient for follow up call     Left voicemail with call back number for patient to call if needed   If no voicemail available call attempts x 2 were made to contact the patient to assist with any questions or concerns patient may have.       L/M  Encouraged  to call providers for any questions concerns or change in condition

## 2025-07-08 NOTE — PROGRESS NOTES
"Physical Therapy                 Therapy Communication Note    Patient Name: Vickie Foster  MRN: [id number]  Department:   Room: Room/bed info not found  Today's Date: 7/8/2025     Discipline: Physical Therapy    Missed Visit:       Missed Visit Reason: Patient cancelled and rescheduled because she is \"not feeling well at all.\"    Missed Time: Cancel    Comment: Cancellation/no show  #7 (non consecutive)      "

## 2025-07-09 DIAGNOSIS — I50.41 ACUTE COMBINED SYSTOLIC (CONGESTIVE) AND DIASTOLIC (CONGESTIVE) HEART FAILURE: ICD-10-CM

## 2025-07-09 DIAGNOSIS — I67.9 CVD (CEREBROVASCULAR DISEASE): ICD-10-CM

## 2025-07-09 RX ORDER — TORSEMIDE 20 MG/1
60 TABLET ORAL
Qty: 180 TABLET | Refills: 2 | Status: SHIPPED | OUTPATIENT
Start: 2025-07-09

## 2025-07-09 RX ORDER — ATORVASTATIN CALCIUM 40 MG/1
40 TABLET, FILM COATED ORAL DAILY
Qty: 90 TABLET | Refills: 3 | Status: SHIPPED | OUTPATIENT
Start: 2025-07-09

## 2025-07-09 RX ORDER — ACETAZOLAMIDE 250 MG/1
250 TABLET ORAL 2 TIMES DAILY
Qty: 60 TABLET | Refills: 2 | Status: SHIPPED | OUTPATIENT
Start: 2025-07-09

## 2025-07-09 NOTE — TELEPHONE ENCOUNTER
Pt asking for refill on atorvastatin (Lipitor) 40 mg tablet  and   acetaZOLAMIDE (Diamox) 250 mg tablet     [place]

## 2025-07-10 ENCOUNTER — OFFICE VISIT (OUTPATIENT)
Dept: PAIN MEDICINE | Facility: CLINIC | Age: 70
End: 2025-07-10
Payer: MEDICARE

## 2025-07-10 ENCOUNTER — APPOINTMENT (OUTPATIENT)
Dept: PHYSICAL THERAPY | Facility: CLINIC | Age: 70
End: 2025-07-10
Payer: MEDICARE

## 2025-07-10 VITALS
HEIGHT: 61 IN | HEART RATE: 65 BPM | RESPIRATION RATE: 20 BRPM | SYSTOLIC BLOOD PRESSURE: 132 MMHG | BODY MASS INDEX: 50.03 KG/M2 | WEIGHT: 265 LBS | OXYGEN SATURATION: 97 % | DIASTOLIC BLOOD PRESSURE: 76 MMHG

## 2025-07-10 DIAGNOSIS — M54.2 MYOFASCIAL NECK PAIN: ICD-10-CM

## 2025-07-10 DIAGNOSIS — G58.9 MONONEUROPATHY, UNSPECIFIED: Primary | ICD-10-CM

## 2025-07-10 DIAGNOSIS — M17.11 PRIMARY OSTEOARTHRITIS OF RIGHT KNEE: ICD-10-CM

## 2025-07-10 DIAGNOSIS — M19.011 PRIMARY OSTEOARTHRITIS, RIGHT SHOULDER: ICD-10-CM

## 2025-07-10 PROCEDURE — 99214 OFFICE O/P EST MOD 30 MIN: CPT | Performed by: STUDENT IN AN ORGANIZED HEALTH CARE EDUCATION/TRAINING PROGRAM

## 2025-07-10 PROCEDURE — 1111F DSCHRG MED/CURRENT MED MERGE: CPT | Performed by: STUDENT IN AN ORGANIZED HEALTH CARE EDUCATION/TRAINING PROGRAM

## 2025-07-10 PROCEDURE — 3008F BODY MASS INDEX DOCD: CPT | Performed by: STUDENT IN AN ORGANIZED HEALTH CARE EDUCATION/TRAINING PROGRAM

## 2025-07-10 PROCEDURE — 1159F MED LIST DOCD IN RCRD: CPT | Performed by: STUDENT IN AN ORGANIZED HEALTH CARE EDUCATION/TRAINING PROGRAM

## 2025-07-10 PROCEDURE — 20610 DRAIN/INJ JOINT/BURSA W/O US: CPT | Performed by: STUDENT IN AN ORGANIZED HEALTH CARE EDUCATION/TRAINING PROGRAM

## 2025-07-10 PROCEDURE — 1036F TOBACCO NON-USER: CPT | Performed by: STUDENT IN AN ORGANIZED HEALTH CARE EDUCATION/TRAINING PROGRAM

## 2025-07-10 PROCEDURE — 3075F SYST BP GE 130 - 139MM HG: CPT | Performed by: STUDENT IN AN ORGANIZED HEALTH CARE EDUCATION/TRAINING PROGRAM

## 2025-07-10 PROCEDURE — 1160F RVW MEDS BY RX/DR IN RCRD: CPT | Performed by: STUDENT IN AN ORGANIZED HEALTH CARE EDUCATION/TRAINING PROGRAM

## 2025-07-10 PROCEDURE — 1125F AMNT PAIN NOTED PAIN PRSNT: CPT | Performed by: STUDENT IN AN ORGANIZED HEALTH CARE EDUCATION/TRAINING PROGRAM

## 2025-07-10 PROCEDURE — 99214 OFFICE O/P EST MOD 30 MIN: CPT | Mod: 25 | Performed by: STUDENT IN AN ORGANIZED HEALTH CARE EDUCATION/TRAINING PROGRAM

## 2025-07-10 PROCEDURE — 3078F DIAST BP <80 MM HG: CPT | Performed by: STUDENT IN AN ORGANIZED HEALTH CARE EDUCATION/TRAINING PROGRAM

## 2025-07-10 ASSESSMENT — PATIENT HEALTH QUESTIONNAIRE - PHQ9
3. TROUBLE FALLING OR STAYING ASLEEP OR SLEEPING TOO MUCH: SEVERAL DAYS
1. LITTLE INTEREST OR PLEASURE IN DOING THINGS: NEARLY EVERY DAY
9. THOUGHTS THAT YOU WOULD BE BETTER OFF DEAD, OR OF HURTING YOURSELF: NOT AT ALL
SUM OF ALL RESPONSES TO PHQ QUESTIONS 1-9: 16
2. FEELING DOWN, DEPRESSED OR HOPELESS: NEARLY EVERY DAY
8. MOVING OR SPEAKING SO SLOWLY THAT OTHER PEOPLE COULD HAVE NOTICED. OR THE OPPOSITE, BEING SO FIGETY OR RESTLESS THAT YOU HAVE BEEN MOVING AROUND A LOT MORE THAN USUAL: SEVERAL DAYS
5. POOR APPETITE OR OVEREATING: NEARLY EVERY DAY
4. FEELING TIRED OR HAVING LITTLE ENERGY: MORE THAN HALF THE DAYS
SUM OF ALL RESPONSES TO PHQ9 QUESTIONS 1 AND 2: 6
7. TROUBLE CONCENTRATING ON THINGS, SUCH AS READING THE NEWSPAPER OR WATCHING TELEVISION: SEVERAL DAYS
6. FEELING BAD ABOUT YOURSELF - OR THAT YOU ARE A FAILURE OR HAVE LET YOURSELF OR YOUR FAMILY DOWN: MORE THAN HALF THE DAYS

## 2025-07-10 ASSESSMENT — PAIN DESCRIPTION - DESCRIPTORS: DESCRIPTORS: ACHING

## 2025-07-10 ASSESSMENT — PAIN - FUNCTIONAL ASSESSMENT: PAIN_FUNCTIONAL_ASSESSMENT: 0-10

## 2025-07-10 ASSESSMENT — PAIN SCALES - GENERAL
PAINLEVEL_OUTOF10: 10 - WORST POSSIBLE PAIN
PAINLEVEL_OUTOF10: 10-WORST PAIN EVER

## 2025-07-10 NOTE — PROGRESS NOTES
[facility] Pain Management  Follow Up Office Visit Note 7/10/2025    Patient Information: Vickie Foster, MRN: [id number], : 1955   Primary Care/Referring Physician: Jamison Maurice DO, [address] / [place] [address]     Chief Complaint: Right knee pain, right neck and shoulder pain  Interval History: At her last visit I performed TPI's and discussed Sprint PNS for her shoulder pain     Today she reports worsening right neck/shoulder pain along with worsening right knee pain. She last underwent trigger point injections to her neck on 5/15/25 which did provide significant relief but they have already worn off. She last underwent a right knee joint steroid injection over a year ago and feels this pain has worsened to the point where she is interested in having another steroid injection. She has been getting less and less improvement from the shoulder injections over time and is interested in proceeding with Sprint PNS    Brief History of Pain: Ms. Vickie Foster is a 69 y.o. female with a PMHx of CHF, pulmonary HTN, AFib (on Eliquis) who presents today for follow up regarding chronic low back/buttock pain.    For reference, she reports pain started approximately 2022 with no obvious inciting event. She has had this type of pain in past, but it is much more severe than normal. Has been to the ED a few times for this, had xrays performed which didn't show any fracture in the spine or hip. During one of these visits she was noted to have worsening LE edema and was admitted for CHF exacerbation. During this admission she continued to have back pain but also developed gout in her right foot. Is having difficulty bearing weight on her right foot, as it worsens both foot pain and causes pain in her right low back and burning pain in her buttock. Has to lie a certain way in bed (moreso on her left side) to get somewhat comfortable. Denies any numbness, tingling in her legs. She is unsure about weakness as she is not  walking much.    Current Pain Medications: Lidocaine patches - tried on both back and foot without benefit. Norco 5/325 mg BID PRN  Previously Tried Pain Medications: Methocarbamol. Unable to use NSAID's due to history of ulcers. Tried BioFreeze without benefit. Gabapentin, Cyclobenzaprine    Relevant Surgeries: Denies hip or back surgery. Denies ankle or foot surgery.  Injections: Left knee injection - 60% pain relief for 8 months. Caudal ADAM - 95% improvement, Right subacromial bursa injection -85% relief. Right SI joint injection - 80% pain relief. Right shoulder joint injection - 50% improvement but only for 1 month  Physical/Occupational Therapy: Currently doing PT    Medications:   Current Outpatient Medications   Medication Instructions    acetaZOLAMIDE (DIAMOX) 250 mg, oral, 2 times daily    allopurinol (ZYLOPRIM) 300 mg, oral, Daily    ammonium lactate (Lac-Hydrin) 12 % lotion Topical, 2 times daily    ascorbic acid (VITAMIN C) 500 mg, Daily    aspirin-caffeine (Rick Back and Body) 500-32.5 mg tablet 2 each, Daily    atorvastatin (LIPITOR) 40 mg, oral, Daily    biotin 5 mg tablet 1 tablet, Daily    buPROPion XL (WELLBUTRIN XL) 300 mg, oral, Daily before breakfast    cholecalciferol (VITAMIN D-3) 125 mcg, Daily    Eliquis 5 mg, oral, 2 times daily    fluticasone propion-salmeteroL (Advair Diskus) 100-50 mcg/dose diskus inhaler 1 puff, inhalation, 2 times daily RT, Rinse mouth with water after use to reduce aftertaste and incidence of candidiasis. Do not swallow.    gabapentin (NEURONTIN) 300 mg, oral, 3 times daily    HYDROcodone-acetaminophen (Norco) 5-325 mg tablet 1 tablet, oral, Every 6 hours PRN    metoprolol succinate XL (TOPROL-XL) 25 mg, oral, Daily    multivitamin tablet 1 tablet, Daily    oxygen (O2) 2 L/min (2 L/min), inhalation, Continuous, At night and as needed    potassium chloride CR (Klor-Con M20) 20 mEq ER tablet 40 mEq, oral, 2 times daily, Do not crush or chew.    torsemide (DEMADEX) 60  mg, oral, 2 times daily (morning and late afternoon)      Allergies: No Known Allergies    Past Medical & Surgical History:  Past Medical History:   Diagnosis Date    A-fib (Multi)     Cellulitis     CHF (congestive heart failure)     COVID     Heart disease     HTN (hypertension)     Low back pain       Past Surgical History:   Procedure Laterality Date    ABLATION OF DYSRHYTHMIC FOCUS      BREAST BIOPSY      CARDIAC ELECTROPHYSIOLOGY PROCEDURE N/A 2024    Procedure: Cardioversion;  Surgeon: Amado Ty MD;  Location: [facility] Cardiac Cath Lab;  Service: Electrophysiology;  Laterality: N/A;  80597  i48.0  medical mutual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    CARDIAC ELECTROPHYSIOLOGY PROCEDURE N/A 2024    Procedure: Ablation A-Fib;  Surgeon: Amado Ty MD;  Location: [facility] Cardiac Cath Lab;  Service: Electrophysiology;  Laterality: N/A;  AFIB CRYOABLATION; CPT 75582; ICD I48.0   Medicare mutual of [place] medicare  ID - [id number]  [phone]  No Auth required  Call ref # [id number]    CARDIAC ELECTROPHYSIOLOGY PROCEDURE N/A 2024    Procedure: Cardioversion;  Surgeon: Amado Ty MD;  Location: [facility] Cardiac Cath Lab;  Service: Electrophysiology;  Laterality: N/A;  CARDIOVERSION CPT 68718, ICD I48.0 no auth required/REF # [id number]    CARDIOVERSION      2024       Family History   Problem Relation Name Age of Onset    Breast cancer Mother      Heart attack Mother           in early 80s of MI    Heart disease Mother      Colon cancer Father      Breast cancer Sister      Diabetes type II Sister      Kidney failure Sister      Other (herione addict) Sister      No Known Problems Brother      No Known Problems Daughter      Other (recovering alcoholic) Son      Breast cancer Other Maternal Aunt      Social History     Socioeconomic History    Marital status:      Spouse name: Not on file    Number of children: Not on file    Years of education: Not on file    Highest education  level: Not on file   Occupational History    Not on file   Tobacco Use    Smoking status: Former     Current packs/day: 0.00     Types: Cigarettes     Quit date: 2004     Years since quittin.5    Smokeless tobacco: Never   Vaping Use    Vaping status: Never Used   Substance and Sexual Activity    Alcohol use: Not Currently     Comment: monthly or less    Drug use: Never    Sexual activity: Defer   Other Topics Concern    Not on file   Social History Narrative    Not on file     Social Drivers of Health     Financial Resource Strain: Low Risk  (2025)    Overall Financial Resource Strain (CARDIA)     Difficulty of Paying Living Expenses: Not hard at all   Food Insecurity: No Food Insecurity (2025)    Hunger Vital Sign     Worried About Running Out of Food in the Last Year: Never true     Ran Out of Food in the Last Year: Never true   Transportation Needs: No Transportation Needs (2025)    PRAPARE - Transportation     Lack of Transportation (Medical): No     Lack of Transportation (Non-Medical): No   Physical Activity: Inactive (2025)    Exercise Vital Sign     Days of Exercise per Week: 0 days     Minutes of Exercise per Session: 0 min   Stress: Stress Concern Present (2025)    [place] [employer] - Occupational Stress Questionnaire     Feeling of Stress : Very much   Social Connections: Moderately Integrated (2025)    Social Connection and Isolation Panel [NHANES]     Frequency of Communication with Friends and Family: More than three times a week     Frequency of Social Gatherings with Friends and Family: Never     Attends Rastafari Services: More than 4 times per year     Active Member of Clubs or Organizations: No     Attends Club or Organization Meetings: Never     Marital Status:    Intimate Partner Violence: Not At Risk (2025)    Humiliation, Afraid, Rape, and Kick questionnaire     Fear of Current or Ex-Partner: No     Emotionally Abused: No  "    Physically Abused: No     Sexually Abused: No   Housing Stability: Low Risk  (6/13/2025)    Housing Stability Vital Sign     Unable to Pay for Housing in the Last Year: No     Number of Times Moved in the Last Year: 0     Homeless in the Last Year: No       Problems, Past medical history, past surgical history, Medications, allergies, social and family history reviewed and as per the electronic medical record from today's encounter    Review of Systems:  CONST: No fever, chills, fatigue, weight changes  EYES: No loss of vision  ENT: No hearing loss, tinnitus  CV: No chest pain, palpitations  RESP: Reports some dyspnea on exertion  GI: No stool incontinence, nausea, vomiting  : No urinary incontinence  MSK: No joint swelling  SKIN: No rash, no hives  NEURO: No headache, dizziness  PSYCH: No anxiety, depression  HEM/LYMPH: No easy bruising or bleeding  All other systems reviewed are negative     Physical Exam:  Vitals: /76   Pulse 65   Resp 20   Ht (!) 1.549 m (5' 1\")   Wt 120 kg (265 lb)   SpO2 97%   BMI 50.07 kg/m²   General: No apparent distress. Alert, appropriate, oriented x 3. Mood generally positive, affect congruent. Speaking in full sentences.   HENT: Normocephalic, atraumatic. Hearing intact.  Eyes: Pupils equal and round  Neck: Supple, trachea midline  Lungs: Symmetric respiratory excursion on visual exam, nonlabored breathing.   Extremities: No cyanosis noted in extremities. Pitting edema noted in legs up to the shins  Skin: No rashes, lesions noted.  Neck: Reports pain to palpation of right trapezius and cervical paraspinal muscles  Neuro: Alert and appropriate. Using a wheelchair to ambulate. Bulk and tone within normal limits.    Laboratory Data:  The following laboratory data were reviewed during this visit:   Lab Results   Component Value Date    WBC 8.1 06/16/2025    RBC 4.07 06/16/2025    HGB 11.9 (L) 06/16/2025    HCT 38.1 06/16/2025     06/16/2025      Lab Results "   Component Value Date    INR 1.1 06/12/2025    INR 1.0 07/08/2024    INR 1.0 06/05/2023     Lab Results   Component Value Date    CREATININE 0.67 06/16/2025    HGBA1C 6.0 (H) 03/28/2025       Imaging:  The following imaging impressions were reviewed by me during this visit:    -5/22/24 right shoulder xray shows moderate to severe glenohumeral joint OA, moderate AC joint OA  -8/2022 lumbar MRI shows L3-L4 moderate to marked spinal canal stenosis and right foraminal stenosis without nerve root impingement. L4-L5 moderate spinal canal stenosis. L5-S1 with no significatn central or foraminal stenosis   -4/2021 left knee xray shows moderate OA  -7/20/23 cervical spine xray shows diffuse, severe degenerative changes    I also personally reviewed the images from the above studies myself. These images and my interpretation of them contributed to the management and decision making of the patient's medical plan.    ASSESSMENT:  Ms. Vickie Foster is a 69 y.o. female with low back, leg, and neck pain that is consistent with:    1. Mononeuropathy, unspecified    2. Primary osteoarthritis, right shoulder    3. Myofascial neck pain    4. Primary osteoarthritis of right knee              PLAN:  Radiology: -No additional diagnostics at this time    Physically: -Continue PT to focus on neck posture and myofascial release given apparent progression of cervical kyphosis, along with right shoulder pain.     Psychologically: No needs at this time    Medication: -Will continue Norco 5/325 mg BID PRN. Small refill provided today    Duration: Greater than 6 months    Intervention: -Reports significant benefit from recent right SI joint injection but continued to have burning buttock pain (R>L) that radiates down her thighs, likely secondary to known lumbar spinal stenosis. She subsequently underwent a caudal ADAM with 95% improvement. Of note, is on Eliquis  - Also reports bilateral knee pain, with prior knee x-rays showing moderate  osteoarthritis. She has undergone both left and right knee joint injections with generally 60% pain relief for 3-4 months. A right knee joint steroid injection was performed today, as noted below. Would consider genicular nerve blocks/RFA in the future  - I suspect her current neck pain is secondary to muscle strain/myofascial pain in the setting of poor posture while sleeping. She sleeps sitting up and bending over a pillow at a table due to difficulty breathing while lying flat. Recommend trying to sleep in a recliner and continue working with your cardiology/pulmonology teams to optimize your breathing. She underwent TPI's to the neck previously with significant pain relief for 2 months but her pain has returned to baseline. Will repeat these at her next office visit.   - Her right shoulder pain remains severe.  She previously received relief from right subacromial bursa injection.  This was repeated with no pain relief at all.  Given presence of fairly severe glenohumeral joint arthritis she underwent a right shoulder joint steroid injection with 50% relief but only for 1 month. Given minimal relief from these procedures I recommend a right suprascapular nerve Sprint PNS utilizing ultrasound guidance and local anesthesia. Risks, benefits, alternatives discussed. She would like to proceed.    I will refill the patient's opioids today for 1 week.  The patient continues to see benefit and improvement in their quality of life and ability to maintain ADLs.  Patient educated about the risks of taking opioids and operating a motor vehicle.  Patient reports no adverse side effects to current medication regimen.  Current regimen does allow patient to maintain ADLs.  Patient reports no new neurologic symptoms, new pain areas, or exacerbation in pain today.  Patient reports they are happy with current treatment care path.    [place] was reviewed and was consistent with the history.    Patient has been educated on the risks,  benefits, and alternatives of controlled substances as well as the proper way to store these medications.  The patient and I discussed the nature of this medication and its side effects.  We discussed tolerance, physical dependence, psychological dependence, addiction and opioid-induced hyperalgesia.  We discussed the potential need to wean from this medication.  We discussed the availability of programs that can help with this process if necessary.  We discussed safety issues related to opioids including safe storage.  We discussed the fact that the patient should not drive an automobile or operate heavy machinery while taking this medication.  A prescription for naloxone was offered to the patient.  The patient will be re-evaluated for the need to continue opioid therapy in 60-90 days  Most recent Toxicology reviewed and appropriate.      Joint Injection/Aspiration    Date/Time: 7/10/2025 3:30 PM    Performed by: Joseph Kelley MD  Authorized by: Joseph Kelley MD    Consent:     Consent obtained:  Written    Consent given by:  Patient    Risks, benefits, and alternatives were discussed: yes      Risks discussed:  Infection, bleeding and pain    Alternatives discussed:  Alternative treatment  Universal protocol:     Procedure explained and questions answered to patient or proxy's satisfaction: yes      Relevant documents present and verified: yes      Test results available: yes      Imaging studies available: yes      Immediately prior to procedure, a time out was called: yes      Patient identity confirmed:  Verbally with patient  Location:     Location:  Knee    Knee:  R knee  Anesthesia:     Anesthesia method:  Local infiltration    Local anesthetic: ropivacaine 0.2%  Procedure details:     Preparation: Patient was prepped and draped in usual sterile fashion      Needle gauge:  22 G    Ultrasound guidance: no      Approach:  Anterior    Steroid injected: yes    Post-procedure details:     Dressing:   Adhesive bandage    Procedure completion:  Tolerated well, no immediate complications  Comments:      Procedure Note: RIght knee intra-articular steroid injection  The patient was positioned sitting with the legs hanging over the exam table. The joint space was palpated and marked approximately 1 cm superior to the tibial plateau and 1 cm lateral to the patellar tendon. This area was cleaned with EtOH and the skin was anesthetized with 2 mL of ropivacaine 0.2% with a 25G 1.5 inch needle. Next, the knee was prepped with chlorhexidine and allowed to dry for 3 minutes. A 22 G 1.5 inch needle was then used to enter the joint space. After negative aspiration, a mixture of 40 mg triamcinolone diluted in 4 mL of ropivacaine 0.2% was then injected, and the needle was then withdrawn. The patient tolerated the procedure well                        Sincerely,  Joseph Kelley MD  [facility] - [place]

## 2025-07-11 ENCOUNTER — PATIENT OUTREACH (OUTPATIENT)
Dept: CASE MANAGEMENT | Facility: HOSPITAL | Age: 70
End: 2025-07-11
Payer: MEDICARE

## 2025-07-11 ENCOUNTER — TELEPHONE (OUTPATIENT)
Dept: PAIN MEDICINE | Facility: CLINIC | Age: 70
End: 2025-07-11
Payer: MEDICARE

## 2025-07-11 RX ORDER — TRIAMCINOLONE ACETONIDE 40 MG/ML
40 INJECTION, SUSPENSION INTRA-ARTICULAR; INTRAMUSCULAR ONCE
Status: SHIPPED | OUTPATIENT
Start: 2025-07-11

## 2025-07-11 RX ORDER — ROPIVACAINE HYDROCHLORIDE 2 MG/ML
6 INJECTION, SOLUTION EPIDURAL; INFILTRATION; PERINEURAL ONCE
Status: SHIPPED | OUTPATIENT
Start: 2025-07-11

## 2025-07-11 RX ORDER — HYDROCODONE BITARTRATE AND ACETAMINOPHEN 5; 325 MG/1; MG/1
1 TABLET ORAL EVERY 6 HOURS PRN
Qty: 28 TABLET | Refills: 0 | Status: SHIPPED | OUTPATIENT
Start: 2025-07-11 | End: 2025-07-18

## 2025-07-11 NOTE — PROGRESS NOTES
Heart Failure Nurse Navigator Transition of Care Phone Call    The role of the HF nurse navigator is to (1) characterize risk profiles of patients with heart failure transitioning from [id number] after hospitalization, (2) recommend interventions to improve care and reduce risks of worse post-hospitalization outcomes.     HF Nurse Navigator outreach to patient via phone call to review HF education and check on progress. No answer, message left with contact information to return my call.     Mireya Orantes RN BSN  [facility] Clinical Nurse Navigator, CHF  [phone]

## 2025-07-15 ENCOUNTER — APPOINTMENT (OUTPATIENT)
Facility: CLINIC | Age: 70
End: 2025-07-15
Payer: MEDICARE

## 2025-07-16 ENCOUNTER — TELEPHONE (OUTPATIENT)
Dept: PAIN MEDICINE | Facility: CLINIC | Age: 70
End: 2025-07-16
Payer: MEDICARE

## 2025-07-17 ENCOUNTER — OFFICE VISIT (OUTPATIENT)
Facility: CLINIC | Age: 70
End: 2025-07-17
Payer: MEDICARE

## 2025-07-17 VITALS
OXYGEN SATURATION: 88 % | WEIGHT: 265 LBS | DIASTOLIC BLOOD PRESSURE: 78 MMHG | SYSTOLIC BLOOD PRESSURE: 130 MMHG | BODY MASS INDEX: 50.07 KG/M2 | HEART RATE: 97 BPM

## 2025-07-17 DIAGNOSIS — I50.32 CHRONIC DIASTOLIC HEART FAILURE: Primary | ICD-10-CM

## 2025-07-17 DIAGNOSIS — I48.92 ATRIAL FIB/FLUTTER, TRANSIENT (MULTI): ICD-10-CM

## 2025-07-17 DIAGNOSIS — I50.41 ACUTE COMBINED SYSTOLIC (CONGESTIVE) AND DIASTOLIC (CONGESTIVE) HEART FAILURE: ICD-10-CM

## 2025-07-17 DIAGNOSIS — I48.91 ATRIAL FIB/FLUTTER, TRANSIENT (MULTI): ICD-10-CM

## 2025-07-17 PROCEDURE — 99212 OFFICE O/P EST SF 10 MIN: CPT

## 2025-07-17 PROCEDURE — 3078F DIAST BP <80 MM HG: CPT | Performed by: INTERNAL MEDICINE

## 2025-07-17 PROCEDURE — 1159F MED LIST DOCD IN RCRD: CPT | Performed by: INTERNAL MEDICINE

## 2025-07-17 PROCEDURE — 3075F SYST BP GE 130 - 139MM HG: CPT | Performed by: INTERNAL MEDICINE

## 2025-07-17 PROCEDURE — 99213 OFFICE O/P EST LOW 20 MIN: CPT | Performed by: INTERNAL MEDICINE

## 2025-07-17 PROCEDURE — 1125F AMNT PAIN NOTED PAIN PRSNT: CPT | Performed by: INTERNAL MEDICINE

## 2025-07-17 RX ORDER — POTASSIUM CHLORIDE 20 MEQ/1
20 TABLET, EXTENDED RELEASE ORAL DAILY
Qty: 30 TABLET | Refills: 0 | Status: SHIPPED | OUTPATIENT
Start: 2025-07-17 | End: 2025-08-16

## 2025-07-17 RX ORDER — SPIRONOLACTONE 25 MG/1
25 TABLET ORAL DAILY
Qty: 30 TABLET | Refills: 11 | Status: SHIPPED | OUTPATIENT
Start: 2025-07-17 | End: 2026-07-17

## 2025-07-17 ASSESSMENT — ENCOUNTER SYMPTOMS
DEPRESSION: 0
LOSS OF SENSATION IN FEET: 0
OCCASIONAL FEELINGS OF UNSTEADINESS: 1

## 2025-07-17 ASSESSMENT — LIFESTYLE VARIABLES: TOTAL SCORE: 0

## 2025-07-17 ASSESSMENT — PAIN SCALES - GENERAL: PAINLEVEL_OUTOF10: 9

## 2025-07-17 NOTE — ASSESSMENT & PLAN NOTE
Stable.  Continue torsemide  And metoprolol.  I am adding spironolactone.  BMP in 2 weeks.  Encouraged her to follow-up with Dr. Kaur

## 2025-07-17 NOTE — PROGRESS NOTES
Subjective      Chief Complaint   Patient presents with    Hospital Follow-up        69-year-old female presents for follow-up.  She has a history of heart failure with preserved ejection fraction though an echocardiogram in 2020 for shows an EF of 60 to 65% with only grade 1 diastolic dysfunction.  However her left atrium is severely dilated which suggests that it is possibly worse than this.  She had moderate tricuspid regurgitation and mild valvular abnormalities otherwise.  She was in the hospital last month with congestive heart failure.  She responded well to diuresis.  She also has a history of atrial flutter that is rate controlled.  She is on Eliquis for oral anticoagulation.  She had a limited echocardiogram during the hospital stay which did not show any change.  She was also seen by nephrology.  She was discharged on torsemide 60 mg twice daily, acetazolamide, metoprolol succinate 25 mg daily, and Eliquis.  She saw Dr. Ty in follow-up who continued with a rate control strategy for her atrial flutter/fibrillation.           ROS     Medical History[1]     Surgical History[2]     Social History     Socioeconomic History    Marital status:      Spouse name: Not on file    Number of children: Not on file    Years of education: Not on file    Highest education level: Not on file   Occupational History    Not on file   Tobacco Use    Smoking status: Former     Current packs/day: 0.00     Types: Cigarettes     Quit date:      Years since quittin.5    Smokeless tobacco: Never   Vaping Use    Vaping status: Never Used   Substance and Sexual Activity    Alcohol use: Not Currently     Comment: monthly or less    Drug use: Never    Sexual activity: Defer   Other Topics Concern    Not on file   Social History Narrative    Not on file     Social Drivers of Health     Financial Resource Strain: Low Risk  (2025)    Overall Financial Resource Strain (CARDIA)     Difficulty of Paying  Living Expenses: Not hard at all   Food Insecurity: No Food Insecurity (6/13/2025)    Hunger Vital Sign     Worried About Running Out of Food in the Last Year: Never true     Ran Out of Food in the Last Year: Never true   Transportation Needs: No Transportation Needs (6/13/2025)    PRAPARE - Transportation     Lack of Transportation (Medical): No     Lack of Transportation (Non-Medical): No   Physical Activity: Inactive (6/13/2025)    Exercise Vital Sign     Days of Exercise per Week: 0 days     Minutes of Exercise per Session: 0 min   Stress: Stress Concern Present (6/13/2025)    Citizen of [place] of Occupational Health - Occupational Stress Questionnaire     Feeling of Stress : Very much   Social Connections: Moderately Integrated (6/13/2025)    Social Connection and Isolation Panel     Frequency of Communication with Friends and Family: More than three times a week     Frequency of Social Gatherings with Friends and Family: Never     Attends [employer] Services: More than 4 times per year     Active Member of Clubs or Organizations: No     Attends Club or Organization Meetings: Never     Marital Status:    Intimate Partner Violence: Not At Risk (6/13/2025)    Humiliation, Afraid, Rape, and Kick questionnaire     Fear of Current or Ex-Partner: No     Emotionally Abused: No     Physically Abused: No     Sexually Abused: No   Housing Stability: Low Risk  (6/13/2025)    Housing Stability Vital Sign     Unable to Pay for Housing in the Last Year: No     Number of Times Moved in the Last Year: 0     Homeless in the Last Year: No        Family History[3]     OBJECTIVE:    Vitals:    07/17/25 1123   BP: 130/78   Pulse: 97   SpO2: (!) 88%        Vitals reviewed.   Constitutional:       Appearance: Normal and healthy appearance. Not in distress.   Pulmonary:      Effort: Pulmonary effort is normal.      Breath sounds: Normal breath sounds.   Cardiovascular:      Normal rate. Regular rhythm. Normal S1. Normal S2.        Murmurs: There is no murmur.      No gallop.  No click.   Pulses:     Intact distal pulses.   Edema:     Peripheral edema present.     Ankle: bilateral 1+ pitting edema of the ankle.     Feet: bilateral 1+ pitting edema of the feet.  Skin:     General: Skin is warm and dry.   Neurological:      General: No focal deficit present.        Lab Review:   Lab Results   Component Value Date    CHOL 137 03/28/2025    TRIG 58 03/28/2025    HDL 52 03/28/2025       Lab Results   Component Value Date    LDLCALC 72 03/28/2025        Diastolic heart failure (Multi)  Stable.  Continue torsemide  And metoprolol.  I am adding spironolactone.  BMP in 2 weeks.  Encouraged her to follow-up with Dr. Kaur    Atrial fib/flutter, transient (Multi)  Adequately rate controlled.  Continue Eliquis for oral anticoagulation.            [1]   Past Medical History:  Diagnosis Date    A-fib (Multi)     Cellulitis     CHF (congestive heart failure)     COVID     Heart disease     HTN (hypertension)     Low back pain    [2]   Past Surgical History:  Procedure Laterality Date    ABLATION OF DYSRHYTHMIC FOCUS      BREAST BIOPSY  2000    CARDIAC ELECTROPHYSIOLOGY PROCEDURE N/A 05/01/2024    Procedure: Cardioversion;  Surgeon: Amado Ty MD;  Location: [facility] Cardiac Cath Lab;  Service: Electrophysiology;  Laterality: N/A;  50591  i48.0  medical mutual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    CARDIAC ELECTROPHYSIOLOGY PROCEDURE N/A 07/08/2024    Procedure: Ablation A-Fib;  Surgeon: Amado Ty MD;  Location: [facility] Cardiac Cath Lab;  Service: Electrophysiology;  Laterality: N/A;  AFIB CRYOABLATION; CPT 09721; ICD I48.0   Medicare mutual of [place] medicare  ID - [id number]  [phone]  No Auth required  Call ref # [id number]    CARDIAC ELECTROPHYSIOLOGY PROCEDURE N/A 09/18/2024    Procedure: Cardioversion;  Surgeon: Amado Ty MD;  Location: [facility] Cardiac Cath Lab;  Service: Electrophysiology;  Laterality: N/A;  CARDIOVERSION CPT 79507, ICD I48.0 no auth  required/REF # [id number]    CARDIOVERSION      2024   [3]   Family History  Problem Relation Name Age of Onset    Breast cancer Mother      Heart attack Mother           in early 80s of MI    Heart disease Mother      Colon cancer Father      Breast cancer Sister      Diabetes type II Sister      Kidney failure Sister      Other (herione addict) Sister      No Known Problems Brother      No Known Problems Daughter      Other (recovering alcoholic) Son      Breast cancer Other Maternal Aunt

## 2025-07-17 NOTE — PATIENT INSTRUCTIONS
We are starting a medication: Spironolactone. Your will take this daily. Please get blood drawn in 2 weeks, it is non-fasting

## 2025-07-21 ENCOUNTER — APPOINTMENT (OUTPATIENT)
Age: 70
End: 2025-07-21
Payer: MEDICARE

## 2025-07-21 ENCOUNTER — DOCUMENTATION (OUTPATIENT)
Dept: PHYSICAL THERAPY | Facility: CLINIC | Age: 70
End: 2025-07-21

## 2025-07-21 NOTE — PROGRESS NOTES
Physical Therapy    Discharge Summary    Name: Vickie Foster  MRN: [id number]  : 1955  Date: 25    Discharge Summary: PT    Discharge Information: Date of discharge 25    Discharge Status: pt is able to walk short distances     Rehab Discharge Reason: Other pt unable to make it to PT for over 45 days.  Pt to focus on HEP, colleen incr ambulation and standing LE exercises, marching and side kicks.  Pt agrees.

## 2025-07-22 ENCOUNTER — PATIENT OUTREACH (OUTPATIENT)
Dept: CASE MANAGEMENT | Facility: HOSPITAL | Age: 70
End: 2025-07-22
Payer: MEDICARE

## 2025-07-22 ENCOUNTER — APPOINTMENT (OUTPATIENT)
Dept: PHYSICAL THERAPY | Facility: CLINIC | Age: 70
End: 2025-07-22
Payer: MEDICARE

## 2025-07-22 NOTE — PROGRESS NOTES
Heart Failure Nurse Navigator Transition of Care Phone Call     The role of the HF nurse navigator is to (1) characterize risk profiles of patients with heart failure transitioning from ejcumbht-jv-[id number] after hospitalization, (2) recommend interventions to improve care and reduce risks of worse post-hospitalization outcomes.      Assessment  Call attempted to patient . Spoke with patient and obtained the following information.     HF Symptoms  Chest pain? No  Shortness of breath? none  Orthopnea? No  Paroxysmal nocturnal dyspnea? Yes - has a hospital bed to help  Edema? Yes - normal for her.  Weight gain >2lbs in 3 days or 5lbs in 1 week? No     Medications  Is the patient prescribed the following medications?  ARNi/ACEi/ARB: None  BB: Metoprolol succinate 25 mg daily  MRA: Aldactone 25 mg started  SGLT2i: None  Diuretic: Torsemide 60 mg BID  Is the patient adherent to prescribed medications? YES     Management    Is the patient obtaining daily weights? NO has not started. She states she will start.  If yes, current weight? 261 a week ago.  Is the patient following diet limitations (2-3g Na, fluid restriction)? YES  Does the patient have a  cardiology follow-up scheduled? YES/Complete  If yes, appointment details? Dr. Dick 6/24/25. Pt cancelled and rescheduled to 7/17/25.  Does the patient require HF education reinforcement? Yes     Recommendations/Comments:  Vickie states she is doing ok since DC. She denies HF symptoms at this time. She is taking all her meds and watching for HF symptoms. She did her follow up with Dr. Ty and Dr. Dick. No needs/issues at this time.    She was DC on 6/16/25 with no readmission and compliance with HF Navigator program. I am closing her case at this time.     Mireya Orantes RN BSN  [facility] Clinical Nurse Navigator, CHF  [phone]

## 2025-07-24 ENCOUNTER — APPOINTMENT (OUTPATIENT)
Dept: CARDIOLOGY | Facility: CLINIC | Age: 70
End: 2025-07-24
Payer: MEDICARE

## 2025-07-28 NOTE — TELEPHONE ENCOUNTER
Received returned fax that it was only partially received. Refaxed anticoag request to Dr. Ty's office.

## 2025-07-31 ENCOUNTER — APPOINTMENT (OUTPATIENT)
Dept: PHYSICAL THERAPY | Facility: CLINIC | Age: 70
End: 2025-07-31
Payer: MEDICARE

## 2025-07-31 DIAGNOSIS — I50.32 CHRONIC DIASTOLIC HEART FAILURE: ICD-10-CM

## 2025-08-05 ENCOUNTER — APPOINTMENT (OUTPATIENT)
Dept: PHYSICAL THERAPY | Facility: CLINIC | Age: 70
End: 2025-08-05
Payer: MEDICARE

## 2025-08-05 LAB
ALBUMIN SERPL-MCNC: 3.6 G/DL (ref 3.6–5.1)
ALP SERPL-CCNC: 71 U/L (ref 37–153)
ALT SERPL-CCNC: 16 U/L (ref 6–29)
ANION GAP SERPL CALCULATED.4IONS-SCNC: 11 MMOL/L (CALC) (ref 7–17)
AST SERPL-CCNC: 15 U/L (ref 10–35)
BILIRUB SERPL-MCNC: 0.6 MG/DL (ref 0.2–1.2)
BUN SERPL-MCNC: 33 MG/DL (ref 7–25)
CALCIUM SERPL-MCNC: 9 MG/DL (ref 8.6–10.4)
CHLORIDE SERPL-SCNC: 102 MMOL/L (ref 98–110)
CO2 SERPL-SCNC: 29 MMOL/L (ref 20–32)
CREAT SERPL-MCNC: 0.91 MG/DL (ref 0.5–1.05)
EGFRCR SERPLBLD CKD-EPI 2021: 69 ML/MIN/1.73M2
GLUCOSE SERPL-MCNC: 87 MG/DL (ref 65–99)
POTASSIUM SERPL-SCNC: 4.1 MMOL/L (ref 3.5–5.3)
PROT SERPL-MCNC: 6.4 G/DL (ref 6.1–8.1)
SODIUM SERPL-SCNC: 142 MMOL/L (ref 135–146)
URATE SERPL-MCNC: 4.8 MG/DL (ref 2.5–7)

## 2025-08-07 ENCOUNTER — APPOINTMENT (OUTPATIENT)
Dept: PAIN MEDICINE | Facility: CLINIC | Age: 70
End: 2025-08-07
Payer: MEDICARE

## 2025-08-07 NOTE — PROGRESS NOTES
[facility] Pain Management  Follow Up Office Visit Note 2025    Patient Information: Vickie Foster, MRN: [id number], : 1955   Primary Care/Referring Physician: Jamison Maurice DO, [address] [facility] / [place] [address]     Chief Complaint: Right knee pain, right neck and shoulder pain  Interval History: At her last visit I performed a right knee joint injection. She returns today for TPI's    Today she reports ***    TPI's and discussed Sprint PNS for her shoulder pain     Today she reports worsening right neck/shoulder pain along with worsening right knee pain. She last underwent trigger point injections to her neck on 5/15/25 which did provide significant relief but they have already worn off. She last underwent a right knee joint steroid injection over a year ago and feels this pain has worsened to the point where she is interested in having another steroid injection. She has been getting less and less improvement from the shoulder injections over time and is interested in proceeding with Sprint PNS    Brief History of Pain: Ms. Vickie Foster is a 69 y.o. female with a PMHx of CHF, pulmonary HTN, AFib (on Eliquis) who presents today for follow up regarding chronic low back/buttock pain.    For reference, she reports pain started approximately 2022 with no obvious inciting event. She has had this type of pain in past, but it is much more severe than normal. Has been to the ED a few times for this, had xrays performed which didn't show any fracture in the spine or hip. During one of these visits she was noted to have worsening LE edema and was admitted for CHF exacerbation. During this admission she continued to have back pain but also developed gout in her right foot. Is having difficulty bearing weight on her right foot, as it worsens both foot pain and causes pain in her right low back and burning pain in her buttock. Has to lie a certain way in bed (moreso on her left side) to get somewhat comfortable.  Denies any numbness, tingling in her legs. She is unsure about weakness as she is not walking much.    Current Pain Medications: Lidocaine patches - tried on both back and foot without benefit. Norco 5/325 mg BID PRN  Previously Tried Pain Medications: Methocarbamol. Unable to use NSAID's due to history of ulcers. Tried BioFreeze without benefit. Gabapentin, Cyclobenzaprine    Relevant Surgeries: Denies hip or back surgery. Denies ankle or foot surgery.  Injections: Left knee injection - 60% pain relief for 8 months. Caudal ADAM - 95% improvement, Right subacromial bursa injection -85% relief. Right SI joint injection - 80% pain relief. Right shoulder joint injection - 50% improvement but only for 1 month  Physical/Occupational Therapy: Currently doing PT    Medications:   Current Outpatient Medications   Medication Instructions    acetaZOLAMIDE (DIAMOX) 250 mg, oral, 2 times daily    allopurinol (ZYLOPRIM) 300 mg, oral, Daily    ascorbic acid (VITAMIN C) 500 mg, Daily    aspirin-caffeine (Rick Back and Body) 500-32.5 mg tablet 2 each, Daily    atorvastatin (LIPITOR) 40 mg, oral, Daily    biotin 5 mg tablet 1 tablet, Daily    buPROPion XL (WELLBUTRIN XL) 300 mg, oral, Daily before breakfast    cholecalciferol (VITAMIN D-3) 125 mcg, Daily    Eliquis 5 mg, oral, 2 times daily    fluticasone propion-salmeteroL (Advair Diskus) 100-50 mcg/dose diskus inhaler 1 puff, inhalation, 2 times daily RT, Rinse mouth with water after use to reduce aftertaste and incidence of candidiasis. Do not swallow.    gabapentin (NEURONTIN) 300 mg, oral, 3 times daily    metoprolol succinate XL (TOPROL-XL) 25 mg, oral, Daily    multivitamin tablet 1 tablet, Daily    oxygen (O2) 2 L/min (2 L/min), inhalation, Continuous, At night and as needed    potassium chloride CR (Klor-Con M20) 20 mEq ER tablet 20 mEq, oral, Daily, Do not crush or chew.    spironolactone (ALDACTONE) 25 mg, oral, Daily    torsemide (DEMADEX) 60 mg, oral, 2 times daily  (morning and late afternoon)      Allergies: No Known Allergies    Past Medical & Surgical History:  Past Medical History:   Diagnosis Date    A-fib (Multi)     Cellulitis     CHF (congestive heart failure)     COVID     Heart disease     HTN (hypertension)     Low back pain       Past Surgical History:   Procedure Laterality Date    ABLATION OF DYSRHYTHMIC FOCUS      BREAST BIOPSY      CARDIAC ELECTROPHYSIOLOGY PROCEDURE N/A 2024    Procedure: Cardioversion;  Surgeon: Amado Ty MD;  Location: [facility] Cardiac Cath Lab;  Service: Electrophysiology;  Laterality: N/A;  26676  i48.0  medical mutual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    CARDIAC ELECTROPHYSIOLOGY PROCEDURE N/A 2024    Procedure: Ablation A-Fib;  Surgeon: Amado Ty MD;  Location: [facility] Cardiac Cath Lab;  Service: Electrophysiology;  Laterality: N/A;  AFIB CRYOABLATION; CPT 84931; ICD I48.0   Medicare mutual of [place] medicare  ID - [id number]  [phone]  No Auth required  Call ref # [id number]    CARDIAC ELECTROPHYSIOLOGY PROCEDURE N/A 2024    Procedure: Cardioversion;  Surgeon: Amado Ty MD;  Location: [facility] Cardiac Cath Lab;  Service: Electrophysiology;  Laterality: N/A;  CARDIOVERSION CPT 33263, ICD I48.0 no auth required/REF # [id number]    CARDIOVERSION      2024       Family History   Problem Relation Name Age of Onset    Breast cancer Mother      Heart attack Mother           in early 80s of MI    Heart disease Mother      Colon cancer Father      Breast cancer Sister      Diabetes type II Sister      Kidney failure Sister      Other (herione addict) Sister      No Known Problems Brother      No Known Problems Daughter      Other (recovering alcoholic) Son      Breast cancer Other Maternal Aunt      Social History     Socioeconomic History    Marital status:      Spouse name: Not on file    Number of children: Not on file    Years of education: Not on file    Highest education level: Not on file    Occupational History    Not on file   Tobacco Use    Smoking status: Former     Current packs/day: 0.00     Types: Cigarettes     Quit date: 2004     Years since quittin.6    Smokeless tobacco: Never   Vaping Use    Vaping status: Never Used   Substance and Sexual Activity    Alcohol use: Not Currently     Comment: monthly or less    Drug use: Never    Sexual activity: Defer   Other Topics Concern    Not on file   Social History Narrative    Not on file     Social Drivers of Health     Financial Resource Strain: Low Risk  (2025)    Overall Financial Resource Strain (CARDIA)     Difficulty of Paying Living Expenses: Not hard at all   Food Insecurity: No Food Insecurity (2025)    Hunger Vital Sign     Worried About Running Out of Food in the Last Year: Never true     Ran Out of Food in the Last Year: Never true   Transportation Needs: No Transportation Needs (2025)    PRAPARE - Transportation     Lack of Transportation (Medical): No     Lack of Transportation (Non-Medical): No   Physical Activity: Inactive (2025)    Exercise Vital Sign     Days of Exercise per Week: 0 days     Minutes of Exercise per Session: 0 min   Stress: Stress Concern Present (2025)    [place] [employer] - Occupational Stress Questionnaire     Feeling of Stress : Very much   Social Connections: Moderately Integrated (2025)    Social Connection and Isolation Panel     Frequency of Communication with Friends and Family: More than three times a week     Frequency of Social Gatherings with Friends and Family: Never     Attends Jew Services: More than 4 times per year     Active Member of Clubs or Organizations: No     Attends Club or Organization Meetings: Never     Marital Status:    Intimate Partner Violence: Not At Risk (2025)    Humiliation, Afraid, Rape, and Kick questionnaire     Fear of Current or Ex-Partner: No     Emotionally Abused: No     Physically Abused: No      Sexually Abused: No   Housing Stability: Low Risk  (6/13/2025)    Housing Stability Vital Sign     Unable to Pay for Housing in the Last Year: No     Number of Times Moved in the Last Year: 0     Homeless in the Last Year: No       Problems, Past medical history, past surgical history, Medications, allergies, social and family history reviewed and as per the electronic medical record from today's encounter    Review of Systems:  CONST: No fever, chills, fatigue, weight changes  EYES: No loss of vision  ENT: No hearing loss, tinnitus  CV: No chest pain, palpitations  RESP: Reports some dyspnea on exertion  GI: No stool incontinence, nausea, vomiting  : No urinary incontinence  MSK: No joint swelling  SKIN: No rash, no hives  NEURO: No headache, dizziness  PSYCH: No anxiety, depression  HEM/LYMPH: No easy bruising or bleeding  All other systems reviewed are negative     Physical Exam:  Vitals: There were no vitals taken for this visit.  General: No apparent distress. Alert, appropriate, oriented x 3. Mood generally positive, affect congruent. Speaking in full sentences.   HENT: Normocephalic, atraumatic. Hearing intact.  Eyes: Pupils equal and round  Neck: Supple, trachea midline  Lungs: Symmetric respiratory excursion on visual exam, nonlabored breathing.   Extremities: No cyanosis noted in extremities. Pitting edema noted in legs up to the shins  Skin: No rashes, lesions noted.  Neck: Reports pain to palpation of right trapezius and cervical paraspinal muscles  Neuro: Alert and appropriate. Using a wheelchair to ambulate. Bulk and tone within normal limits.    Laboratory Data:  The following laboratory data were reviewed during this visit:   Lab Results   Component Value Date    WBC 8.1 06/16/2025    RBC 4.07 06/16/2025    HGB 11.9 (L) 06/16/2025    HCT 38.1 06/16/2025     06/16/2025      Lab Results   Component Value Date    INR 1.1 06/12/2025    INR 1.0 07/08/2024    INR 1.0 06/05/2023     Lab Results    Component Value Date    CREATININE 0.91 08/04/2025    HGBA1C 6.0 (H) 03/28/2025       Imaging:  The following imaging impressions were reviewed by me during this visit:    -5/22/24 right shoulder xray shows moderate to severe glenohumeral joint OA, moderate AC joint OA  -8/2022 lumbar MRI shows L3-L4 moderate to marked spinal canal stenosis and right foraminal stenosis without nerve root impingement. L4-L5 moderate spinal canal stenosis. L5-S1 with no significatn central or foraminal stenosis   -4/2021 left knee xray shows moderate OA  -7/20/23 cervical spine xray shows diffuse, severe degenerative changes    I also personally reviewed the images from the above studies myself. These images and my interpretation of them contributed to the management and decision making of the patient's medical plan.    ASSESSMENT:  Ms. Vickie Foster is a 69 y.o. female with low back, leg, and neck pain that is consistent with:    No diagnosis found.            PLAN:  Radiology: -No additional diagnostics at this time    Physically: -Continue PT to focus on neck posture and myofascial release given apparent progression of cervical kyphosis, along with right shoulder pain.     Psychologically: No needs at this time    Medication: -Will continue Norco 5/325 mg BID PRN. Small refill provided today    Duration: Greater than 6 months    Intervention: -Reports significant benefit from recent right SI joint injection but continued to have burning buttock pain (R>L) that radiates down her thighs, likely secondary to known lumbar spinal stenosis. She subsequently underwent a caudal ADAM with 95% improvement. Of note, is on Eliquis  - Also reports bilateral knee pain, with prior knee x-rays showing moderate osteoarthritis. She has undergone both left and right knee joint injections with generally 60% pain relief for 3-4 months. A right knee joint steroid injection was performed today, as noted below. Would consider genicular nerve blocks/RFA  in the future  - I suspect her current neck pain is secondary to muscle strain/myofascial pain in the setting of poor posture while sleeping. She sleeps sitting up and bending over a pillow at a table due to difficulty breathing while lying flat. Recommend trying to sleep in a recliner and continue working with your cardiology/pulmonology teams to optimize your breathing. She underwent TPI's to the neck previously with significant pain relief for 2 months but her pain has returned to baseline. Will repeat these at her next office visit.   - Her right shoulder pain remains severe.  She previously received relief from right subacromial bursa injection.  This was repeated with no pain relief at all.  Given presence of fairly severe glenohumeral joint arthritis she underwent a right shoulder joint steroid injection with 50% relief but only for 1 month. Given minimal relief from these procedures I recommend a right suprascapular nerve Sprint PNS utilizing ultrasound guidance and local anesthesia. Risks, benefits, alternatives discussed. She would like to proceed.    I will refill the patient's opioids today for 1 week.  The patient continues to see benefit and improvement in their quality of life and ability to maintain ADLs.  Patient educated about the risks of taking opioids and operating a motor vehicle.  Patient reports no adverse side effects to current medication regimen.  Current regimen does allow patient to maintain ADLs.  Patient reports no new neurologic symptoms, new pain areas, or exacerbation in pain today.  Patient reports they are happy with current treatment care path.    [place] was reviewed and was consistent with the history.    Patient has been educated on the risks, benefits, and alternatives of controlled substances as well as the proper way to store these medications.  The patient and I discussed the nature of this medication and its side effects.  We discussed tolerance, physical dependence,  psychological dependence, addiction and opioid-induced hyperalgesia.  We discussed the potential need to wean from this medication.  We discussed the availability of programs that can help with this process if necessary.  We discussed safety issues related to opioids including safe storage.  We discussed the fact that the patient should not drive an automobile or operate heavy machinery while taking this medication.  A prescription for naloxone was offered to the patient.  The patient will be re-evaluated for the need to continue opioid therapy in 60-90 days  Most recent Toxicology reviewed and appropriate.      Procedures                      Sincerely,  Joseph Kelley MD  [facility] - [place]

## 2025-08-08 ENCOUNTER — OFFICE VISIT (OUTPATIENT)
Dept: PAIN MEDICINE | Facility: CLINIC | Age: 70
End: 2025-08-08
Payer: MEDICARE

## 2025-08-08 VITALS
WEIGHT: 265 LBS | HEART RATE: 90 BPM | RESPIRATION RATE: 20 BRPM | SYSTOLIC BLOOD PRESSURE: 132 MMHG | BODY MASS INDEX: 50.07 KG/M2 | DIASTOLIC BLOOD PRESSURE: 88 MMHG

## 2025-08-08 DIAGNOSIS — M19.011 PRIMARY OSTEOARTHRITIS, RIGHT SHOULDER: ICD-10-CM

## 2025-08-08 DIAGNOSIS — M17.0 PRIMARY OSTEOARTHRITIS OF KNEES, BILATERAL: ICD-10-CM

## 2025-08-08 DIAGNOSIS — Z79.891 LONG-TERM CURRENT USE OF OPIATE ANALGESIC: ICD-10-CM

## 2025-08-08 DIAGNOSIS — M54.2 MYOFASCIAL NECK PAIN: Primary | ICD-10-CM

## 2025-08-08 PROCEDURE — 2500000004 HC RX 250 GENERAL PHARMACY W/ HCPCS (ALT 636 FOR OP/ED): Performed by: STUDENT IN AN ORGANIZED HEALTH CARE EDUCATION/TRAINING PROGRAM

## 2025-08-08 PROCEDURE — 20553 NJX 1/MLT TRIGGER POINTS 3/>: CPT | Performed by: STUDENT IN AN ORGANIZED HEALTH CARE EDUCATION/TRAINING PROGRAM

## 2025-08-08 PROCEDURE — 99214 OFFICE O/P EST MOD 30 MIN: CPT | Performed by: STUDENT IN AN ORGANIZED HEALTH CARE EDUCATION/TRAINING PROGRAM

## 2025-08-08 PROCEDURE — 3079F DIAST BP 80-89 MM HG: CPT | Performed by: STUDENT IN AN ORGANIZED HEALTH CARE EDUCATION/TRAINING PROGRAM

## 2025-08-08 PROCEDURE — 1160F RVW MEDS BY RX/DR IN RCRD: CPT | Performed by: STUDENT IN AN ORGANIZED HEALTH CARE EDUCATION/TRAINING PROGRAM

## 2025-08-08 PROCEDURE — 96372 THER/PROPH/DIAG INJ SC/IM: CPT | Performed by: STUDENT IN AN ORGANIZED HEALTH CARE EDUCATION/TRAINING PROGRAM

## 2025-08-08 PROCEDURE — 1159F MED LIST DOCD IN RCRD: CPT | Performed by: STUDENT IN AN ORGANIZED HEALTH CARE EDUCATION/TRAINING PROGRAM

## 2025-08-08 PROCEDURE — 3075F SYST BP GE 130 - 139MM HG: CPT | Performed by: STUDENT IN AN ORGANIZED HEALTH CARE EDUCATION/TRAINING PROGRAM

## 2025-08-08 RX ORDER — HYDROCODONE BITARTRATE AND ACETAMINOPHEN 5; 325 MG/1; MG/1
1 TABLET ORAL 2 TIMES DAILY PRN
Qty: 60 TABLET | Refills: 0 | Status: SHIPPED | OUTPATIENT
Start: 2025-09-07 | End: 2025-10-07

## 2025-08-08 RX ORDER — TRIAMCINOLONE ACETONIDE 40 MG/ML
10 INJECTION, SUSPENSION INTRA-ARTICULAR; INTRAMUSCULAR ONCE
Status: COMPLETED | OUTPATIENT
Start: 2025-08-08 | End: 2025-08-08

## 2025-08-08 RX ORDER — BUPIVACAINE HYDROCHLORIDE 5 MG/ML
5 INJECTION, SOLUTION EPIDURAL; INTRACAUDAL; PERINEURAL ONCE
Status: COMPLETED | OUTPATIENT
Start: 2025-08-08 | End: 2025-08-08

## 2025-08-08 RX ORDER — HYDROCODONE BITARTRATE AND ACETAMINOPHEN 5; 325 MG/1; MG/1
1 TABLET ORAL 2 TIMES DAILY PRN
Qty: 60 TABLET | Refills: 0 | Status: SHIPPED | OUTPATIENT
Start: 2025-08-08 | End: 2025-09-07

## 2025-08-08 RX ADMIN — TRIAMCINOLONE ACETONIDE 10 MG: 40 SUSPENSION INTRA-ARTERIAL; INTRAMUSCULAR at 11:55

## 2025-08-08 RX ADMIN — BUPIVACAINE HYDROCHLORIDE 25 MG: 5 INJECTION, SOLUTION EPIDURAL; INTRACAUDAL; PERINEURAL at 11:58

## 2025-08-08 ASSESSMENT — PAIN DESCRIPTION - DESCRIPTORS: DESCRIPTORS: ACHING;PRESSURE

## 2025-08-08 ASSESSMENT — PAIN SCALES - GENERAL
PAINLEVEL_OUTOF10: 10 - WORST POSSIBLE PAIN
PAINLEVEL_OUTOF10: 10-WORST PAIN EVER

## 2025-08-08 ASSESSMENT — PAIN - FUNCTIONAL ASSESSMENT: PAIN_FUNCTIONAL_ASSESSMENT: 0-10

## 2025-08-08 NOTE — PROGRESS NOTES
MEDICATION NAME: norco   STRENGTH: 5-325  LAST FILL DATE: 25  DATE LAST TAKEN: 25  QUANTITY FILLED: 28  QUANTITY REMAININ  COUNT COMPLETED BY: CATY RUELAS RN and FERNANDEZ CHAMBERLAIN LPN      UDS COMPLETED  CONTROLLED SUBSTANCES AGREEMENT SIGNED  ORT COMPLETED  Modified [employer] disability form filled out annually.

## 2025-08-12 ENCOUNTER — APPOINTMENT (OUTPATIENT)
Dept: PHYSICAL THERAPY | Facility: CLINIC | Age: 70
End: 2025-08-12
Payer: MEDICARE

## 2025-08-12 ENCOUNTER — TELEPHONE (OUTPATIENT)
Dept: PRIMARY CARE | Facility: CLINIC | Age: 70
End: 2025-08-12

## 2025-08-14 ENCOUNTER — APPOINTMENT (OUTPATIENT)
Dept: PHYSICAL THERAPY | Facility: CLINIC | Age: 70
End: 2025-08-14
Payer: MEDICARE

## 2025-08-15 DIAGNOSIS — I50.41 ACUTE COMBINED SYSTOLIC (CONGESTIVE) AND DIASTOLIC (CONGESTIVE) HEART FAILURE: ICD-10-CM

## 2025-08-15 RX ORDER — TORSEMIDE 20 MG/1
60 TABLET ORAL 2 TIMES DAILY
Qty: 540 TABLET | Refills: 3 | Status: SHIPPED | OUTPATIENT
Start: 2025-08-15 | End: 2026-08-15

## 2025-08-26 ENCOUNTER — APPOINTMENT (OUTPATIENT)
Facility: CLINIC | Age: 70
End: 2025-08-26
Payer: MEDICARE

## 2025-09-02 ENCOUNTER — TELEPHONE (OUTPATIENT)
Dept: PAIN MEDICINE | Facility: CLINIC | Age: 70
End: 2025-09-02
Payer: MEDICARE

## 2025-09-02 DIAGNOSIS — I48.0 PAF (PAROXYSMAL ATRIAL FIBRILLATION) (MULTI): ICD-10-CM

## 2025-09-02 RX ORDER — METOPROLOL SUCCINATE 25 MG/1
25 TABLET, EXTENDED RELEASE ORAL DAILY
Qty: 90 TABLET | Refills: 3 | Status: SHIPPED | OUTPATIENT
Start: 2025-09-02 | End: 2026-09-02

## 2025-09-04 ENCOUNTER — TELEPHONE (OUTPATIENT)
Dept: CARDIOLOGY | Facility: CLINIC | Age: 70
End: 2025-09-04
Payer: MEDICARE

## 2025-10-09 ENCOUNTER — APPOINTMENT (OUTPATIENT)
Facility: CLINIC | Age: 70
End: 2025-10-09
Payer: MEDICARE

## 2025-10-24 ENCOUNTER — APPOINTMENT (OUTPATIENT)
Dept: PULMONOLOGY | Facility: CLINIC | Age: 70
End: 2025-10-24
Payer: MEDICARE

## 2026-05-27 ENCOUNTER — APPOINTMENT (OUTPATIENT)
Dept: OPHTHALMOLOGY | Facility: CLINIC | Age: 71
End: 2026-05-27
Payer: MEDICARE

## (undated) DEVICE — CATHETER, ELECTRODE, STEERABLE, EP-XT, LARGE CURVE, 6 FR X 110 CM

## (undated) DEVICE — KIT, NAMIC STANDARD LEFT HEART, CUSTOM, LWMC

## (undated) DEVICE — CABLE, OCTAPOLAR, EASYMATE 8 125CML

## (undated) DEVICE — CATHETER, ARCTIC FRONT ADVANCE PRO, 28MM

## (undated) DEVICE — CATHETER, ACHIEVE MAPPING, 20MM

## (undated) DEVICE — PAD, ELECTRODE DEFIB PADPRO ADULT STRL W/ADAPTER

## (undated) DEVICE — CABLE, CONNECTOR, 10FT

## (undated) DEVICE — CATHETER, ACQCROSS, QX FLEXCATH, 65CM

## (undated) DEVICE — PACK, ANGIO P2, CUSTOM, LAKE

## (undated) DEVICE — INTRODUCER, PERCUTANEOUS, SHEATH AVANTI PLUS, W/MINI GUIDEWIRE, STANDARD LENGTH, 8 FR, 11 CM

## (undated) DEVICE — CATHETER, VIEW FLEX XTRA ICE, 9FR

## (undated) DEVICE — INTRODUCER SET, ULTIMUM IV, 12FR X 12CM

## (undated) DEVICE — GUIDEWIRE, AMPLATZ, TFE, EXTRA STIFF, CURVED, .032/145CM/3MMTIP

## (undated) DEVICE — COVER, EQUIPMENT, ZERO GRAVITY

## (undated) DEVICE — GUIDEWIRE, AMPLATZ, TFE, EXTRA STIFF, CURVED, .035/180CM/3MMTIP

## (undated) DEVICE — CLOSURE SYSTEM, VASCULAR, MVP 6-12FR, VENOUS

## (undated) DEVICE — STEERABLE SHEATH, FLEXCATH, 12FR

## (undated) DEVICE — INTRODUCER, TRANSSEPTAL GUIDE, SWARTZ, SLO 8FR/63CM

## (undated) DEVICE — NEEDLE, TRANSSEPTAL, BROCKENBROUGH CURVE, ADULT, 18 G X 71 CM

## (undated) DEVICE — INTRODUCER, SHEATH, AVANTI, 10 FR X 11 CM

## (undated) DEVICE — CABLE, ACHIEVE ADVANCE, 10 PIN, 196CM, GRAY

## (undated) DEVICE — CABLE, COAXIAL, UMBILICAL

## (undated) DEVICE — INTRODUCER, SHEATH, FAST-CATH, 7 FR X 23 CM

## (undated) DEVICE — ELECTRODE KIT, ENSITE X EP SYSTEM SURFACE

## (undated) DEVICE — NEEDLE, NRG TRANSSEPTAL, 71CM, CURVE C1

## (undated) DEVICE — CABLE, ELECTRICAL, UMBILICAL

## (undated) DEVICE — POSITIONER, RETENTION SYSTEM, PANNUS, 2 PADS 1 STRAP, NS

## (undated) DEVICE — CATHETER, INQUIRY, 6FR X 110CM, 2-5-2MM SPACING, 1MM TIP, LG CURVE STEERABLE